# Patient Record
Sex: FEMALE | Race: WHITE | Employment: UNEMPLOYED | URBAN - METROPOLITAN AREA
[De-identification: names, ages, dates, MRNs, and addresses within clinical notes are randomized per-mention and may not be internally consistent; named-entity substitution may affect disease eponyms.]

---

## 2017-01-21 ENCOUNTER — HOSPITAL ENCOUNTER (EMERGENCY)
Age: 52
Discharge: HOME | End: 2017-01-21
Attending: EMERGENCY MEDICINE
Payer: SELF-PAY

## 2017-01-21 VITALS
HEIGHT: 65 IN | RESPIRATION RATE: 18 BRPM | SYSTOLIC BLOOD PRESSURE: 152 MMHG | DIASTOLIC BLOOD PRESSURE: 77 MMHG | TEMPERATURE: 98.5 F | WEIGHT: 223.99 LBS | BODY MASS INDEX: 37.32 KG/M2 | OXYGEN SATURATION: 96 % | HEART RATE: 90 BPM

## 2017-01-21 DIAGNOSIS — M54.2 CHRONIC NECK PAIN: Primary | ICD-10-CM

## 2017-01-21 DIAGNOSIS — G89.29 CHRONIC NECK PAIN: Primary | ICD-10-CM

## 2017-01-21 PROCEDURE — 74011250637 HC RX REV CODE- 250/637: Performed by: PHYSICIAN ASSISTANT

## 2017-01-21 PROCEDURE — 99283 EMERGENCY DEPT VISIT LOW MDM: CPT

## 2017-01-21 RX ORDER — ACETAMINOPHEN 500 MG
1000 TABLET ORAL
COMMUNITY
End: 2018-02-26

## 2017-01-21 RX ORDER — CYCLOBENZAPRINE HCL 5 MG
5 TABLET ORAL
Qty: 15 TAB | Refills: 0 | Status: SHIPPED | OUTPATIENT
Start: 2017-01-21 | End: 2017-03-09

## 2017-01-21 RX ORDER — CYCLOBENZAPRINE HCL 10 MG
5 TABLET ORAL
Status: COMPLETED | OUTPATIENT
Start: 2017-01-21 | End: 2017-01-21

## 2017-01-21 RX ORDER — HYDROCODONE BITARTRATE AND ACETAMINOPHEN 5; 325 MG/1; MG/1
1 TABLET ORAL
Status: COMPLETED | OUTPATIENT
Start: 2017-01-21 | End: 2017-01-21

## 2017-01-21 RX ADMIN — CYCLOBENZAPRINE HYDROCHLORIDE 5 MG: 10 TABLET, FILM COATED ORAL at 18:50

## 2017-01-21 RX ADMIN — HYDROCODONE BITARTRATE AND ACETAMINOPHEN 1 TABLET: 5; 325 TABLET ORAL at 18:50

## 2017-01-21 NOTE — ED PROVIDER NOTES
HPI Comments: Domenica Silva, 46 y.o. Female with PMHx of DM, neuropathy, arthritis, HTN, asthma, and chronic back pain presents ambulatory to ED [facility] ED with cc of acute-on-chronic neck pain x few days. Pt notes this feels like an arthritis flare that she attributes to the recent cold weather. Pt does not have insurance and has been treated in this ED 13 times in the the past year. She is currently applying for Medicaid. Pt last had a neck XR performed here on 11/27/16 after falling and hitting her head which showed no acute fracture, comparable to previous XR in 2015 and denies subsequent falls since. Pt has not fallen or hit her head since November. Her current neck pain is similar to previous neck and back pain visits here. She denies any fevers, chills, nausea, vomiting, diarrhea, abdominal pain, CP, SOB, or dysuria. PCP: none currently due to lack of insurance    Social history significant for: - Tobacco, - EtOH, - Illicit Drug Use  PSHx: tonsillectomy    There are no other complaints, changes, or physical findings at this time. Written by YUAN Mcwilliams, as dictated by Wan Shetty. The history is provided by the patient. No  was used. Past Medical History:   Diagnosis Date    Arthritis     Asthma     Chronic back pain     Diabetes ([address]     Hypertension     MRSA (methicillin resistant staph aureus) culture positive      labial abscess    Neuropathy        Past Surgical History:   Procedure Laterality Date    Hx heent       tonsillectomy    Hx orthopaedic       left ft bunionectomy    Hx other surgical       lanced labial abscess         No family history on file. Social History     Social History    Marital status:      Spouse name: N/A    Number of children: N/A    Years of education: N/A     Occupational History    Not on file.      Social History Main Topics    Smoking status: Never Smoker    Smokeless tobacco: Never Used   Sheron Tucker Alcohol use No    Drug use: No    Sexual activity: Not on file     Other Topics Concern    Not on file     Social History Narrative         ALLERGIES: Aspirin and Ciprofloxacin    Review of Systems   Constitutional: Negative for activity change, chills, fatigue and unexpected weight change. Respiratory: Negative for cough, chest tightness, shortness of breath and wheezing. Cardiovascular: Negative. Negative for chest pain and palpitations. Gastrointestinal: Negative. Negative for abdominal pain, diarrhea, nausea and vomiting. Genitourinary: Negative. Negative for dysuria, flank pain, frequency and hematuria. Musculoskeletal: Positive for neck pain. Negative for arthralgias, back pain and neck stiffness. Skin: Negative. Negative for color change and rash. Neurological: Negative for dizziness, weakness, numbness and headaches. Psychiatric/Behavioral: Negative. Negative for confusion. All other systems reviewed and are negative. Patient Vitals for the past 12 hrs:   Temp Pulse Resp BP SpO2   01/21/17 1913 - 90 - - -   01/21/17 1747 98.5 °F (36.9 °C) (!) 103 18 152/77 96 %         Physical Exam   Constitutional: She is oriented to person, place, and time. She appears well-developed and well-nourished. She is active. Non-toxic appearance. No distress. WF in no acute distress   HENT:   Head: Normocephalic and atraumatic. Eyes: Conjunctivae are normal. Pupils are equal, round, and reactive to light. Right eye exhibits no discharge. Left eye exhibits no discharge. Neck: Normal range of motion and full passive range of motion without pain. Neck supple. No tracheal tenderness present. No midline spinous process tenderness throughout; FROM of neck. No meningismus. Cardiovascular: Normal rate, intact distal pulses and normal pulses. Pulmonary/Chest: Effort normal and breath sounds normal. No respiratory distress. She exhibits no tenderness. Abdominal: She exhibits no distension. Musculoskeletal: Normal range of motion. She exhibits no edema or tenderness. Neurological: She is alert and oriented to person, place, and time. She has normal strength. No cranial nerve deficit or sensory deficit. Coordination normal.   Skin: Skin is warm, dry and intact. No abrasion and no rash noted. She is not diaphoretic. No erythema. Psychiatric: She has a normal mood and affect. Her speech is normal and behavior is normal. Cognition and memory are normal.   Nursing note and vitals reviewed. MDM  Number of Diagnoses or Management Options  Chronic neck pain:   Diagnosis management comments: DDx: chronic pain, muscle spasm, drug seeking behavior       Amount and/or Complexity of Data Reviewed  Review and summarize past medical records: yes    Patient Progress  Patient progress: stable    ED Course       Procedures    MEDICATIONS GIVEN:  Medications   cyclobenzaprine (FLEXERIL) tablet 5 mg (5 mg Oral Given 1/21/17 1850)   HYDROcodone-acetaminophen (NORCO) 5-325 mg per tablet 1 Tab (1 Tab Oral Given 1/21/17 1850)       IMPRESSION:  1. Chronic neck pain        PLAN:  1. Rx Flexeril; advised will not continue to prescribe narcotics from the ED and she should see pain management, PCP or clinic or ortho Spine for further management of her chornic neck pain. She expressed understanding. Most recent imaging done Nov. 2016, no acute changes, discussed results with patient and she denies fall since imaging done. No neuro deficits. Discharge Medication List as of 1/21/2017  6:59 PM      CONTINUE these medications which have CHANGED    Details   cyclobenzaprine (FLEXERIL) 5 mg tablet Take 1 Tab by mouth three (3) times daily as needed for Muscle Spasm(s). , Print, Disp-15 Tab, R-0         CONTINUE these medications which have NOT CHANGED    Details   acetaminophen (TYLENOL EXTRA STRENGTH) 500 mg tablet Take 1,000 mg by mouth every six (6) hours as needed for Pain., Historical Med      insulin lispro (HUMALOG) 100 unit/mL injection Take 32 units with meals, only if BG > 100. Indications: HYPERGLYCEMIA, TYPE 2 DIABETES MELLITUS, Print, Disp-3 Vial, R-3      sitaGLIPtin-metFORMIN (JANUMET) 50-1,000 mg per tablet Take 1 Tab by mouth two (2) times daily (with meals). , Print, Disp-60 Tab, R-0      insulin glargine (LANTUS) 100 unit/mL injection 72 Units by SubCUTAneous route daily. Indications: TYPE 2 DIABETES MELLITUS, Normal, Disp-3 Vial, R-3      glucose blood VI test strips (BLOOD GLUCOSE TEST) strip Check blood sugar 1-2 times daily. , Normal, Disp-1 Each, R-11      fenofibrate nanocrystallized (TRICOR) 145 mg tablet Take 1 Tab by mouth daily. , Normal, Disp-30 Tab, R-3      lisinopril (PRINIVIL, ZESTRIL) 10 mg tablet Take 1 Tab by mouth daily. , Normal, Disp-30 Tab, R-3      glipiZIDE (GLUCOTROL) 5 mg tablet Take 1 Tab by mouth two (2) times a day., Normal, Disp-60 Tab, R-3      traZODone (DESYREL) 50 mg tablet Take 1 Tab by mouth nightly., Normal, Disp-30 Tab, R-3      amitriptyline (ELAVIL) 100 mg tablet Take 1 Tab by mouth nightly., Normal, Disp-30 Tab, R-3      ondansetron (ZOFRAN ODT) 4 mg disintegrating tablet Take 1 Tab by mouth every eight (8) hours as needed for Nausea. , Print, Disp-16 Tab, R-0      HYDROcodone-acetaminophen (NORCO) 5-325 mg per tablet Take 1 Tab by mouth every four (4) hours as needed for Pain. Max Daily Amount: 6 Tabs., Print, Disp-20 Tab, R-0           2. Follow-up Information     Follow up With Details Comments [address], [place] Schedule an appointment as soon as possible for a visit FOR FOLLOW-UP OR WITH A FREE CLINIC FOR MANAGEMENT OF YOUR CHRONIC NECK PAIN. [address] [address]  [phone] [id number] [phone]       Schedule an appointment as soon as possible for a visit          Return to ED if worse     Discharge Note:  7:09 PM  The pt is ready for discharge.  The pt's signs, symptoms, diagnosis, and discharge instructions have been discussed and pt has conveyed their understanding. The pt is to follow up as recommended or return to ER should their symptoms worsen. Plan has been discussed and pt is in agreement. This note is prepared by Cahrlotte Almanza, acting as a Scribe for [employer] [place]. HILLARY Groves: The scribe's documentation has been prepared under my direction and personally reviewed by me in its entirety. I confirm that the notes above accurately reflects all work, treatment, procedures, and medical decision making performed by me.

## 2017-01-21 NOTE — DISCHARGE INSTRUCTIONS
Neck Pain: Care Instructions  Your Care Instructions  You can have neck pain anywhere from the bottom of your head to the top of your shoulders. It can spread to the upper back or arms. Injuries, painting a ceiling, sleeping with your neck twisted, staying in one position for too long, and many other activities can cause neck pain. Most neck pain gets better with home care. Your doctor may recommend medicine to relieve pain or relax your muscles. He or she may suggest exercise and physical therapy to increase flexibility and relieve stress. You may need to wear a special (cervical) collar to support your neck for a day or two. Follow-up care is a key part of your treatment and safety. Be sure to make and go to all appointments, and call your doctor if you are having problems. It's also a good idea to know your test results and keep a list of the medicines you take. How can you care for yourself at home? · Try using a heating pad on a low or medium setting for 15 to 20 minutes every 2 or 3 hours. Try a warm shower in place of one session with the heating pad. · You can also try an ice pack for 10 to 15 minutes every 2 to 3 hours. Put a thin cloth between the ice and your skin. · Take pain medicines exactly as directed. ¨ If the doctor gave you a prescription medicine for pain, take it as prescribed. ¨ If you are not taking a prescription pain medicine, ask your doctor if you can take an over-the-counter medicine. · If your doctor recommends a cervical collar, wear it exactly as directed. When should you call for help? Call your doctor now or seek immediate medical care if:  · You have new or worsening numbness in your arms, buttocks or legs. · You have new or worsening weakness in your arms or legs. (This could make it hard to stand up.)  · You lose control of your bladder or bowels.   Watch closely for changes in your health, and be sure to contact your doctor if:  · Your neck pain is getting worse.  · You are not getting better after 1 week. · You do not get better as expected. Where can you learn more? Go to http://delfina-cony.info/. Enter [id number] in the search box to learn more about \"Neck Pain: Care Instructions. \"  Current as of: May 23, 2016  Content Version: 11.1  © 8794-8858 [employer]. Care instructions adapted under license by [employer] (which disclaims liability or warranty for this information). If you have questions about a medical condition or this instruction, always ask your healthcare professional. [place] [address] any warranty or liability for your use of this information. Chronic Pain: Care Instructions  Your Care Instructions  Chronic pain is pain that lasts a long time (months or even years) and may or may not have a clear cause. It is different from acute pain, which usually does have a clear cause--like an injury or illness--and gets better over time. Chronic pain:  · Lasts over time but may vary from day to day. · Does not go away despite efforts to end it. · May disrupt your sleep and lead to fatigue. · May cause depression or anxiety. · May make your muscles tense, causing more pain. · Can disrupt your work, hobbies, home life, and relationships with friends and family. Chronic pain is a very real condition. It is not just in your head. Treatment can help and usually includes several methods used together, such as medicines, physical therapy, exercise, and other treatments. Learning how to relax and changing negative thought patterns can also help you cope. Chronic pain is complex. Taking an active role in your treatment will help you better manage your pain. Tell your doctor if you have trouble dealing with your pain. You may have to try several things before you find what works best for you. Follow-up care is a key part of your treatment and safety.  Be sure to make and go to all appointments, and call your doctor if you are having problems. Its also a good idea to know your test results and keep a list of the medicines you take. How can you care for yourself at home? · Pace yourself. Break up large jobs into smaller tasks. Save harder tasks for days when you have less pain, or go back and forth between hard tasks and easier ones. Take rest breaks. · Relax, and reduce stress. Relaxation techniques such as deep breathing or meditation can help. · Keep moving. Gentle, daily exercise can help reduce pain over the long run. Try low- or no-impact exercises such as walking, swimming, and stationary biking. Do stretches to stay flexible. · Try heat, cold packs, and massage. · Get enough sleep. Chronic pain can make you tired and drain your energy. Talk with your doctor if you have trouble sleeping because of pain. · Think positive. Your thoughts can affect your pain level. Do things that you enjoy to distract yourself when you have pain instead of focusing on the pain. See a movie, read a book, listen to music, or spend time with a friend. · If you think you are depressed, talk to your doctor about treatment. · Keep a daily pain diary. Record how your moods, thoughts, sleep patterns, activities, and medicine affect your pain. You may find that your pain is worse during or after certain activities or when you are feeling a certain emotion. Having a record of your pain can help you and your doctor find the best ways to treat your pain. · Take pain medicines exactly as directed. ¨ If the doctor gave you a prescription medicine for pain, take it as prescribed. ¨ If you are not taking a prescription pain medicine, ask your doctor if you can take an over-the-counter medicine. Reducing constipation caused by pain medicine  · Include fruits, vegetables, beans, and whole grains in your diet each day. These foods are high in fiber.   · Drink plenty of fluids, enough so that your urine is light yellow or clear like water. If you have kidney, heart, or liver disease and have to limit fluids, talk with your doctor before you increase the amount of fluids you drink. · If your doctor recommends it, get more exercise. Walking is a good choice. Bit by bit, increase the amount you walk every day. Try for at least 30 minutes on most days of the week. · Schedule time each day for a bowel movement. A daily routine may help. Take your time and do not strain when having a bowel movement. When should you call for help? Call your doctor now or seek immediate medical care if:  · Your pain gets worse or is out of control. · You feel down or blue, or you do not enjoy things like you once did. You may be depressed, which is common in people with chronic pain. Depression can be treated. · You have vomiting or cramps for more than 2 hours. Watch closely for changes in your health, and be sure to contact your doctor if:  · You cannot sleep because of pain. · You are very worried or anxious about your pain. · You have trouble taking your pain medicine. · You have any concerns about your pain medicine. · You have trouble with bowel movements, such as:  ¨ No bowel movement in 3 days. ¨ Blood in the anal area, in your stool, or on the toilet paper. ¨ Diarrhea for more than 24 hours. Where can you learn more? Go to http://delfina-cony.info/. Enter N004 in the search box to learn more about \"Chronic Pain: Care Instructions. \"  Current as of: February 19, 2016  Content Version: 11.1  © 7617-7993 [employer]. Care instructions adapted under license by [employer] (which disclaims liability or warranty for this information). If you have questions about a medical condition or this instruction, always ask your healthcare professional. [place] [address] any warranty or liability for your use of this information.     [facility] Departments     For adult and child immunizations, family planning, TB screening, STD testing and women's health services. [facility] AND [facility] CENTER: [place] [phone]      [facility] D 25   [address]   [address]   170 [facility]: Blanca Rouse [address] [phone]      [address]          [address] [address]     For primary care services, woman and child wellness, and some clinics providing specialty care. [facility] -- [address] [facility] 2525 [facility] [phone]   411 [facility] 200 [facility] 3614 [facility] [phone]   339 [facility] [address] [place] [address] [phone] [address]  [employer] [address] [place] [address] 5850  Community  [phone]   [address] [place] [address] [address] [phone]   [facility] 81 [facility] [phone]   [facility] [address] [place] [address] [phone]   [facility]: [facility] [address], ext [address] [facility], [address] [phone]     [place] [address] [facility] [address] [phone]   [facility] 5850 [facility]  [phone]   [employer] [facility]  [address] ([email or web address]) [phone]         Sexual Health/Woman Wellness Clinics    For STD/HIV testing and treatment, pregnancy testing and services, men's health, birth control services, LGBT services, and hepatitis/HPV vaccine services. [employer] for [place] [employer] [address]. [facility] [address] [facility] [address] [facility] 15[address] [phone]   [facility] [address], 5th floor [phone]   Pregnancy [address] [facility] [address] Blood Pressure Center [phone]   [facility] -- [address] Luanne [address]   [phone]   [place]   [phone]   Women, Infant and Children's Services: [address] [phone]       600 [facility]   [phone]   [address]   [facility]     [phone]   [address] Crisis   [address] [phone]     Local Primary Care Physicians  [facility] [phone]  MD Saira Quigley MD Bernice Duval, MD [facility] Community Doctors [phone]  Monique Danielle, P  Staci Cuellar, MD Fady Zheng MD   908 [facility] [phone]  MD Aura Pineda MD [address] [facility] [phone]  MD Sam Cid MD Kirtland Faith, MD Fredderick Pion, MD   [facility] [phone]  [facility] ZXRNYV ALEXANDRA, MD Emma Hassan, MD Mendez [facility], NP [address] [phone]  Kai Resendez, MD Wesley Granda, MD Yesika Nelson MD Vertis Board, MD Yesenia Puri MD   1739 [facility] [phone]  Jad Lopez MD [address] [facility] [phone]  MD Essence Man, NP  Jenn Dumont, MD Lori Mcrae MD Dionicia Lights, MD Padmini Mata MD   24[address] [facility] [phone]  Nghia De León, MD Lee Mason, P  Haritha Cano, AIMEE De Souza, MD Mariella Perez MD Tonye Messing, MD [facility] [phone]  [facility] Dorothea, MD Jennifer Guerra, MD Gustavo Elias MD Manning Rue, MD   [address] [phone]  MD Micaela Gofranklin Morel, [address] [facility] [phone]  MD Mlaachi Quintanilla MD Adella Ina, [address] [place] [address] [phone]  Mitch Crowe Phi Jimenez, MD Danyelle Reno, MD Zehra Suárez, MD Julian Roldan, MD Veronica Ruiz, MD Miranda Orozco, NP  Gaby Caputo MD 1619 [facility]   [phone]  Roberto Melissa, MD Juan Jamison, MD Hannah Ballard MD   [address] [phone]  [address] [place], [place] [address]MD Simone, FNP  Nash Cavazos, HILLARY Cavazos, JENNIFER Hamilton, HILLARY Baron, MD Yvette Scott, AIMEE Fang, DO Miscellaneous:  Evi Stewart [phone]

## 2017-01-22 NOTE — ED NOTES
PA has provided verbal and written d/c instructions. All questions answered.  Pt ambulatory from ED in stable condition with papers in hand

## 2017-03-06 ENCOUNTER — HOSPITAL ENCOUNTER (EMERGENCY)
Age: 52
Discharge: HOME | End: 2017-03-06
Attending: EMERGENCY MEDICINE
Payer: SELF-PAY

## 2017-03-06 VITALS
RESPIRATION RATE: 16 BRPM | WEIGHT: 219.8 LBS | DIASTOLIC BLOOD PRESSURE: 79 MMHG | BODY MASS INDEX: 36.62 KG/M2 | HEIGHT: 65 IN | OXYGEN SATURATION: 98 % | TEMPERATURE: 98.1 F | HEART RATE: 104 BPM | SYSTOLIC BLOOD PRESSURE: 131 MMHG

## 2017-03-06 PROCEDURE — 75810000275 HC EMERGENCY DEPT VISIT NO LEVEL OF CARE

## 2017-03-07 NOTE — ED NOTES
1st attempt to call patient with no answer. Dr. Agudelo Media to see patient in triage but no answer.

## 2017-03-09 ENCOUNTER — HOSPITAL ENCOUNTER (EMERGENCY)
Age: 52
Discharge: HOME | End: 2017-03-10
Attending: EMERGENCY MEDICINE
Payer: SELF-PAY

## 2017-03-09 ENCOUNTER — APPOINTMENT (OUTPATIENT)
Dept: CT IMAGING | Age: 52
End: 2017-03-09
Attending: EMERGENCY MEDICINE
Payer: SELF-PAY

## 2017-03-09 VITALS
TEMPERATURE: 98.2 F | RESPIRATION RATE: 16 BRPM | WEIGHT: 221.38 LBS | BODY MASS INDEX: 35.58 KG/M2 | SYSTOLIC BLOOD PRESSURE: 148 MMHG | HEART RATE: 100 BPM | DIASTOLIC BLOOD PRESSURE: 87 MMHG | OXYGEN SATURATION: 95 % | HEIGHT: 66 IN

## 2017-03-09 DIAGNOSIS — N30.01 ACUTE CYSTITIS WITH HEMATURIA: Primary | ICD-10-CM

## 2017-03-09 LAB
ALBUMIN SERPL BCP-MCNC: 3.5 G/DL (ref 3.5–5)
ALBUMIN/GLOB SERPL: 0.8 {RATIO} (ref 1.1–2.2)
ALP SERPL-CCNC: 166 U/L (ref 45–117)
ALT SERPL-CCNC: 35 U/L (ref 12–78)
ANION GAP BLD CALC-SCNC: 10 MMOL/L (ref 5–15)
APPEARANCE UR: ABNORMAL
AST SERPL W P-5'-P-CCNC: 20 U/L (ref 15–37)
BACTERIA URNS QL MICRO: ABNORMAL /HPF
BASOPHILS # BLD AUTO: 0 K/UL (ref 0–0.1)
BASOPHILS # BLD: 0 % (ref 0–1)
BILIRUB SERPL-MCNC: 0.5 MG/DL (ref 0.2–1)
BILIRUB UR QL: NEGATIVE
BUN SERPL-MCNC: 18 MG/DL (ref 6–20)
BUN/CREAT SERPL: 18 (ref 12–20)
CALCIUM SERPL-MCNC: 9.3 MG/DL (ref 8.5–10.1)
CHLORIDE SERPL-SCNC: 93 MMOL/L (ref 97–108)
CO2 SERPL-SCNC: 25 MMOL/L (ref 21–32)
COLOR UR: ABNORMAL
CREAT SERPL-MCNC: 0.98 MG/DL (ref 0.55–1.02)
EOSINOPHIL # BLD: 0.1 K/UL (ref 0–0.4)
EOSINOPHIL NFR BLD: 2 % (ref 0–7)
EPITH CASTS URNS QL MICRO: ABNORMAL /LPF
ERYTHROCYTE [DISTWIDTH] IN BLOOD BY AUTOMATED COUNT: 12.8 % (ref 11.5–14.5)
GLOBULIN SER CALC-MCNC: 4.2 G/DL (ref 2–4)
GLUCOSE SERPL-MCNC: 469 MG/DL (ref 65–100)
GLUCOSE UR STRIP.AUTO-MCNC: >1000 MG/DL
HCT VFR BLD AUTO: 37.1 % (ref 35–47)
HGB BLD-MCNC: 13.3 G/DL (ref 11.5–16)
HGB UR QL STRIP: ABNORMAL
KETONES UR QL STRIP.AUTO: 15 MG/DL
LEUKOCYTE ESTERASE UR QL STRIP.AUTO: ABNORMAL
LYMPHOCYTES # BLD AUTO: 16 % (ref 12–49)
LYMPHOCYTES # BLD: 1.1 K/UL (ref 0.8–3.5)
MCH RBC QN AUTO: 30.5 PG (ref 26–34)
MCHC RBC AUTO-ENTMCNC: 35.8 G/DL (ref 30–36.5)
MCV RBC AUTO: 85.1 FL (ref 80–99)
MONOCYTES # BLD: 0.5 K/UL (ref 0–1)
MONOCYTES NFR BLD AUTO: 7 % (ref 5–13)
NEUTS SEG # BLD: 5.1 K/UL (ref 1.8–8)
NEUTS SEG NFR BLD AUTO: 75 % (ref 32–75)
NITRITE UR QL STRIP.AUTO: NEGATIVE
PH UR STRIP: 6 [PH] (ref 5–8)
PLATELET # BLD AUTO: 185 K/UL (ref 150–400)
POTASSIUM SERPL-SCNC: 4 MMOL/L (ref 3.5–5.1)
PROT SERPL-MCNC: 7.7 G/DL (ref 6.4–8.2)
PROT UR STRIP-MCNC: 100 MG/DL
RBC # BLD AUTO: 4.36 M/UL (ref 3.8–5.2)
RBC #/AREA URNS HPF: ABNORMAL /HPF (ref 0–5)
SODIUM SERPL-SCNC: 128 MMOL/L (ref 136–145)
SP GR UR REFRACTOMETRY: 1.03 (ref 1–1.03)
UROBILINOGEN UR QL STRIP.AUTO: 0.2 EU/DL (ref 0.2–1)
WBC # BLD AUTO: 6.8 K/UL (ref 3.6–11)
WBC URNS QL MICRO: >100 /HPF (ref 0–4)
YEAST BUDDING URNS QL: PRESENT

## 2017-03-09 PROCEDURE — 74011636320 HC RX REV CODE- 636/320: Performed by: EMERGENCY MEDICINE

## 2017-03-09 PROCEDURE — 36415 COLL VENOUS BLD VENIPUNCTURE: CPT | Performed by: EMERGENCY MEDICINE

## 2017-03-09 PROCEDURE — 87186 SC STD MICRODIL/AGAR DIL: CPT | Performed by: EMERGENCY MEDICINE

## 2017-03-09 PROCEDURE — 87077 CULTURE AEROBIC IDENTIFY: CPT | Performed by: EMERGENCY MEDICINE

## 2017-03-09 PROCEDURE — 85025 COMPLETE CBC W/AUTO DIFF WBC: CPT | Performed by: EMERGENCY MEDICINE

## 2017-03-09 PROCEDURE — 74011000258 HC RX REV CODE- 258: Performed by: EMERGENCY MEDICINE

## 2017-03-09 PROCEDURE — 80053 COMPREHEN METABOLIC PANEL: CPT | Performed by: EMERGENCY MEDICINE

## 2017-03-09 PROCEDURE — 99283 EMERGENCY DEPT VISIT LOW MDM: CPT

## 2017-03-09 PROCEDURE — 87086 URINE CULTURE/COLONY COUNT: CPT | Performed by: EMERGENCY MEDICINE

## 2017-03-09 PROCEDURE — 96365 THER/PROPH/DIAG IV INF INIT: CPT

## 2017-03-09 PROCEDURE — 74177 CT ABD & PELVIS W/CONTRAST: CPT

## 2017-03-09 PROCEDURE — 81001 URINALYSIS AUTO W/SCOPE: CPT | Performed by: EMERGENCY MEDICINE

## 2017-03-09 PROCEDURE — 74011250636 HC RX REV CODE- 250/636: Performed by: EMERGENCY MEDICINE

## 2017-03-09 PROCEDURE — 96375 TX/PRO/DX INJ NEW DRUG ADDON: CPT

## 2017-03-09 RX ORDER — MORPHINE SULFATE 2 MG/ML
6 INJECTION, SOLUTION INTRAMUSCULAR; INTRAVENOUS
Status: COMPLETED | OUTPATIENT
Start: 2017-03-09 | End: 2017-03-09

## 2017-03-09 RX ORDER — SODIUM CHLORIDE 0.9 % (FLUSH) 0.9 %
10 SYRINGE (ML) INJECTION
Status: COMPLETED | OUTPATIENT
Start: 2017-03-09 | End: 2017-03-09

## 2017-03-09 RX ORDER — SODIUM CHLORIDE 9 MG/ML
1000 INJECTION, SOLUTION INTRAVENOUS ONCE
Status: COMPLETED | OUTPATIENT
Start: 2017-03-09 | End: 2017-03-09

## 2017-03-09 RX ORDER — HYDROCODONE BITARTRATE AND ACETAMINOPHEN 5; 325 MG/1; MG/1
1 TABLET ORAL
Qty: 12 TAB | Refills: 0 | Status: SHIPPED | OUTPATIENT
Start: 2017-03-09 | End: 2017-04-09

## 2017-03-09 RX ORDER — CEFDINIR 300 MG/1
300 CAPSULE ORAL 2 TIMES DAILY
Qty: 20 CAP | Refills: 0 | Status: SHIPPED | OUTPATIENT
Start: 2017-03-09 | End: 2017-03-19

## 2017-03-09 RX ADMIN — CEFTRIAXONE 1 G: 1 INJECTION, POWDER, FOR SOLUTION INTRAMUSCULAR; INTRAVENOUS at 22:24

## 2017-03-09 RX ADMIN — SODIUM CHLORIDE 100 ML: 900 INJECTION, SOLUTION INTRAVENOUS at 21:48

## 2017-03-09 RX ADMIN — Medication 10 ML: at 21:48

## 2017-03-09 RX ADMIN — Medication 6 MG: at 20:55

## 2017-03-09 RX ADMIN — IOPAMIDOL 100 ML: 755 INJECTION, SOLUTION INTRAVENOUS at 21:48

## 2017-03-09 RX ADMIN — SODIUM CHLORIDE 1000 ML: 900 INJECTION, SOLUTION INTRAVENOUS at 20:55

## 2017-03-09 NOTE — ED TRIAGE NOTES
Left lower abd pain since today, denies fever, denies n/v, +constipation, last bm Monday night, pt stated she does not feel good

## 2017-03-10 RX ORDER — FLUCONAZOLE 150 MG/1
150 TABLET ORAL ONCE
Qty: 1 TAB | Refills: 0 | Status: SHIPPED | OUTPATIENT
Start: 2017-03-10 | End: 2017-03-10

## 2017-03-10 NOTE — ED PROVIDER NOTES
HPI Comments: 72-year-old female presents to the emergency department for evaluation of left lower quadrant pain. Pain started this morning and constant. The pain is described as a spasm. Pain now radiates into the back. No associated nausea or vomiting. No associated diarrhea or constipation. No known dysuria frequency or urgency. No known blood in the urine. No cough congestion runny nose or sore throat. No fever. He has not taken anything for the pain. No alleviating factors. No known precipitating event. Pain 9/10    Social hx  Nonsmoker  No alcohol      Patient is a 46 y.o. female presenting with abdominal pain. The history is provided by the patient. Abdominal Pain    Pertinent negatives include no fever, no diarrhea, no nausea, no vomiting, no dysuria, no frequency, no headaches, no arthralgias, no myalgias and no chest pain. Past Medical History:   Diagnosis Date    Arthritis     Asthma     Chronic back pain     [facility] [address]     Diabetic retinopathy ([facility] [address]     Hypertension     MRSA (methicillin resistant staph aureus) culture positive     labial abscess    Neuropathy        Past Surgical History:   Procedure Laterality Date    HX HEENT      tonsillectomy    HX ORTHOPAEDIC      left ft bunionectomy    HX OTHER SURGICAL      lanced labial abscess         History reviewed. No pertinent family history. Social History     Social History    Marital status:      Spouse name: N/A    Number of children: N/A    Years of education: N/A     Occupational History    Not on file. Social History Main Topics    Smoking status: Never Smoker    Smokeless tobacco: Never Used    Alcohol use No    Drug use: No    Sexual activity: Not on file     Other Topics Concern    Not on file     Social History Narrative         ALLERGIES: Aspirin and Ciprofloxacin    Review of Systems   Constitutional: Negative for chills and fever. HENT: Negative for congestion and sore throat. Respiratory: Negative for cough and shortness of breath. Cardiovascular: Negative for chest pain and palpitations. Gastrointestinal: Positive for abdominal pain. Negative for blood in stool, diarrhea, nausea and vomiting. Genitourinary: Negative for dysuria, flank pain, frequency and urgency. Musculoskeletal: Negative for arthralgias, myalgias, neck pain and neck stiffness. Skin: Negative for rash and wound. Neurological: Negative for dizziness, numbness and headaches. All other systems reviewed and are negative. Vitals:    03/09/17 1813   BP: (!) 171/103   Pulse: (!) 106   Resp: 20   Temp: 98.2 °F (36.8 °C)   SpO2: 96%   Weight: 100.4 kg (221 lb 6 oz)   Height: 5' 5.5\" (1.664 m)            Physical Exam   Constitutional: She is oriented to person, place, and time. She appears well-developed and well-nourished. No distress. HENT:   Head: Normocephalic and atraumatic. Right Ear: External ear normal.   Left Ear: External ear normal.   Eyes: Conjunctivae and EOM are normal. Pupils are equal, round, and reactive to light. Neck: Normal range of motion. Neck supple. Cardiovascular: Normal rate, regular rhythm and normal heart sounds. Pulmonary/Chest: Effort normal and breath sounds normal. No respiratory distress. She has no wheezes. Abdominal: Soft. Normal appearance and bowel sounds are normal. She exhibits no shifting dullness, no distension, no pulsatile liver, no abdominal bruit, no pulsatile midline mass and no mass. There is no hepatosplenomegaly, splenomegaly or hepatomegaly. There is tenderness. There is no rigidity, no rebound, no guarding, no CVA tenderness, no tenderness at McBurney's point and negative Liao's sign. Abdomen exposed for exam.  Soft, no peritoneal signs   Tender mild llq. Musculoskeletal: Normal range of motion. She exhibits no edema or tenderness. Neurological: She is alert and oriented to person, place, and time. She has normal reflexes.  No cranial nerve deficit. Coordination normal.   Skin: Skin is warm and dry. No rash noted. No erythema. Psychiatric: She has a normal mood and affect. Her behavior is normal. Judgment and thought content normal.   Nursing note and vitals reviewed. MDM  Number of Diagnoses or Management Options  Acute cystitis with hematuria:   Diagnosis management comments: 80-year-old female presenting for left lower quadrant pain. Abdomen is soft no peritoneal signs on exam. She is tender in left lower quadrant. She is afebrile and nontoxic-appearing. Diverticulitis versus UTI versus kidney stone versus colitis versus constipation versus ovarian pathology versus musculoskeletal  Plan: CT, labs and urinalysis, intravenous fluid pain medication    11:39 PM  Ct unremarkable. UA consistent with uti. Patient is well hydrated, well appearing, and in no respiratory distress. Physical exam is reassuring, and without signs of serious illness. Pt has received dose of rocephin. Will discharge patient home with omnicef supportive care, and follow-up with PCP within the next few days. Standard narcotic and sedating medication warnings given  Patient's results have been reviewed with them. Patient and/or family have verbally conveyed their understanding and agreement of the patient's signs, symptoms, diagnosis, treatment and prognosis and additionally agree to follow up as recommended or return to the Emergency Room should their condition change prior to follow-up. Discharge instructions have also been provided to the patient with some educational information regarding their diagnosis as well a list of reasons why they would want to return to the ER prior to their follow-up appointment should their condition change.                Amount and/or Complexity of Data Reviewed  Discuss the patient with other providers: yes (ER attending-Renny)    Patient Progress  Patient progress: stable    ED Course       Procedures         Pt case including HPI, PE, and all available lab and radiology results has been discussed with attending physician. Opportunity to evaluate patient has been provided to ER attending. Discharge and prescription plan has been agreed upon.

## 2017-03-10 NOTE — DISCHARGE INSTRUCTIONS
We hope that we have addressed all of your medical concerns. The examination and treatment you received in the Emergency Department were for an emergent problem and were not intended as complete care. It is important that you follow up with your healthcare provider(s) for ongoing care. If your symptoms worsen or do not improve as expected, and you are unable to reach your usual health care provider(s), you should return to the Emergency Department. Today's healthcare is undergoing tremendous change, and patient satisfaction surveys are one of the many tools to assess the quality of medical care. You may receive a survey from the [employer] organization regarding your experience in the Emergency Department. I hope that your experience has been completely positive, particularly the medical care that I provided. As such, please participate in the survey; anything less than excellent does not meet my expectations or intentions. Count includes the [facility] and [facility] participate in nationally recognized quality of care measures. If your blood pressure is greater than 120/80, as reported below, we urge that you seek medical care to address the potential of high blood pressure, commonly known as hypertension. Hypertension can be hereditary or can be caused by certain medical conditions, pain, stress, or \"white coat syndrome. \"       Please make an appointment with your health care provider(s) for follow up of your Emergency Department visit. VITALS:   Patient Vitals for the past 8 hrs:   Temp Pulse Resp BP SpO2   03/09/17 2227 - 100 16 148/87 95 %   03/09/17 1813 98.2 °F (36.8 °C) (!) 106 20 (!) 171/103 96 %          Thank you for allowing us to provide you with medical care today. We realize that you have many choices for your emergency care needs. Please choose us in the future for any continued health care needs. Andrew Villarreal, [address] [place] [address].   Office: [phone]            Recent Results (from the past 24 hour(s))   CBC WITH AUTOMATED DIFF    Collection Time: 03/09/17  6:18 PM   Result Value Ref Range    WBC 6.8 3.6 - 11.0 K/uL    RBC 4.36 3.80 - 5.20 M/uL    HGB 13.3 11.5 - 16.0 g/dL    HCT 37.1 35.0 - 47.0 %    MCV 85.1 80.0 - 99.0 FL    MCH 30.5 26.0 - 34.0 PG    MCHC 35.8 30.0 - 36.5 g/dL    RDW 12.8 11.5 - 14.5 %    PLATELET 671 717 - 547 K/uL    NEUTROPHILS 75 32 - 75 %    LYMPHOCYTES 16 12 - 49 %    MONOCYTES 7 5 - 13 %    EOSINOPHILS 2 0 - 7 %    BASOPHILS 0 0 - 1 %    ABS. NEUTROPHILS 5.1 1.8 - 8.0 K/UL    ABS. LYMPHOCYTES 1.1 0.8 - 3.5 K/UL    ABS. MONOCYTES 0.5 0.0 - 1.0 K/UL    ABS. EOSINOPHILS 0.1 0.0 - 0.4 K/UL    ABS. BASOPHILS 0.0 0.0 - 0.1 K/UL   METABOLIC PANEL, COMPREHENSIVE    Collection Time: 03/09/17  6:18 PM   Result Value Ref Range    Sodium 128 (L) 136 - 145 mmol/L    Potassium 4.0 3.5 - 5.1 mmol/L    Chloride 93 (L) 97 - 108 mmol/L    CO2 25 21 - 32 mmol/L    Anion gap 10 5 - 15 mmol/L    Glucose 469 (H) 65 - 100 mg/dL    BUN 18 6 - 20 MG/DL    Creatinine 0.98 0.55 - 1.02 MG/DL    BUN/Creatinine ratio 18 12 - 20      GFR est AA >60 >60 ml/min/1.73m2    GFR est non-AA 60 (L) >60 ml/min/1.73m2    Calcium 9.3 8.5 - 10.1 MG/DL    Bilirubin, total 0.5 0.2 - 1.0 MG/DL    ALT (SGPT) 35 12 - 78 U/L    AST (SGOT) 20 15 - 37 U/L    Alk.  phosphatase 166 (H) 45 - 117 U/L    Protein, total 7.7 6.4 - 8.2 g/dL    Albumin 3.5 3.5 - 5.0 g/dL    Globulin 4.2 (H) 2.0 - 4.0 g/dL    A-G Ratio 0.8 (L) 1.1 - 2.2     URINALYSIS W/MICROSCOPIC    Collection Time: 03/09/17  8:20 PM   Result Value Ref Range    Color YELLOW/STRAW      Appearance CLOUDY (A) CLEAR      Specific gravity 1.026 1.003 - 1.030      pH (UA) 6.0 5.0 - 8.0      Protein 100 (A) NEG mg/dL    Glucose >1000 (A) NEG mg/dL    Ketone 15 (A) NEG mg/dL    Bilirubin NEGATIVE  NEG      Blood LARGE (A) NEG      Urobilinogen 0.2 0.2 - 1.0 EU/dL Nitrites NEGATIVE  NEG      Leukocyte Esterase MODERATE (A) NEG      WBC >100 (H) 0 - 4 /hpf    RBC 0-5 0 - 5 /hpf    Epithelial cells FEW FEW /lpf    Bacteria 4+ (A) NEG /hpf    Budding Yeast PRESENT (A) NEG         Ct Abd Pelv W Cont    Result Date: 3/9/2017  Clinical history: Left lower quadrant pain INDICATION: Left lower quadrant pain COMPARISON: 8/9/2013 TECHNIQUE: Following the uneventful intravenous administration of 100 cc Isovue-370, thin axial images were obtained through the abdomen and pelvis. Coronal and sagittal reconstructions were generated. Oral contrast was not administered. CT dose reduction was achieved through use of a standardized protocol tailored for this examination and automatic exposure control for dose modulation. FINDINGS: The lung bases are clear. The heart size is normal. There is mild diffuse hepatic steatosis. . The spleen demonstrates no abnormality. The pancreas has a normal appearance. The gallbladder demonstrates no evidence of wall thickening or cholelithiasis. The kidneys are normal in size and demonstrate no evidence of mass or hydronephrosis. There is a 2 mm nonobstructing right renal calculus. The adrenal glands are normal. There is a 2.0 x 2.7 cm previously 3.5 x 2.3 cm right common iliac chain lymph node. . No intraperitoneal free air or free fluid is seen. The stomach, small bowel, and colon are unremarkable. The appendix is normal. Imaging of the pelvis demonstrates a normal uterus and ovaries. Air within the urinary bladder. Correlate for recent instrumentation. No pelvic mass or free fluid is seen. Review of the bone windows demonstrates no evidence of destructive bone lesion. IMPRESSION: Mild diffuse hepatic steatosis. Diminished size of right common iliac lymph node, diminished in size since 8/9/2013.               Urinary Tract Infection in Women: Care Instructions  Your Care Instructions    A urinary tract infection, or UTI, is a general term for an infection anywhere between the kidneys and the urethra (where urine comes out). Most UTIs are bladder infections. They often cause pain or burning when you urinate. UTIs are caused by bacteria and can be cured with antibiotics. Be sure to complete your treatment so that the infection goes away. Follow-up care is a key part of your treatment and safety. Be sure to make and go to all appointments, and call your doctor if you are having problems. It's also a good idea to know your test results and keep a list of the medicines you take. How can you care for yourself at home? · Take your antibiotics as directed. Do not stop taking them just because you feel better. You need to take the full course of antibiotics. · Drink extra water and other fluids for the next day or two. This may help wash out the bacteria that are causing the infection. (If you have kidney, heart, or liver disease and have to limit fluids, talk with your doctor before you increase your fluid intake.)  · Avoid drinks that are carbonated or have caffeine. They can irritate the bladder. · Urinate often. Try to empty your bladder each time. · To relieve pain, take a hot bath or lay a heating pad set on low over your lower belly or genital area. Never go to sleep with a heating pad in place. To prevent UTIs  · Drink plenty of water each day. This helps you urinate often, which clears bacteria from your system. (If you have kidney, heart, or liver disease and have to limit fluids, talk with your doctor before you increase your fluid intake.)  · Consider adding cranberry juice to your diet. · Urinate when you need to. · Urinate right after you have sex. · Change sanitary pads often. · Avoid douches, bubble baths, feminine hygiene sprays, and other feminine hygiene products that have deodorants. · After going to the bathroom, wipe from front to back. When should you call for help?   Call your doctor now or seek immediate medical care if:  · Symptoms such as fever, chills, nausea, or vomiting get worse or appear for the first time. · You have new pain in your back just below your rib cage. This is called flank pain. · There is new blood or pus in your urine. · You have any problems with your antibiotic medicine. Watch closely for changes in your health, and be sure to contact your doctor if:  · You are not getting better after taking an antibiotic for 2 days. · Your symptoms go away but then come back. Where can you learn more? Go to http://delfina-cony.info/. Enter J638 in the search box to learn more about \"Urinary Tract Infection in Women: Care Instructions. \"  Current as of: August 12, 2016  Content Version: 11.1  © 3833-9176 [employer], [employer]. Care instructions adapted under license by [employer] (which disclaims liability or warranty for this information). If you have questions about a medical condition or this instruction, always ask your healthcare professional. [place] [address] any warranty or liability for your use of this information.

## 2017-03-11 LAB
BACTERIA SPEC CULT: ABNORMAL
CC UR VC: ABNORMAL
SERVICE CMNT-IMP: ABNORMAL

## 2017-04-08 ENCOUNTER — HOSPITAL ENCOUNTER (EMERGENCY)
Age: 52
Discharge: HOME | End: 2017-04-09
Attending: EMERGENCY MEDICINE
Payer: SELF-PAY

## 2017-04-08 VITALS
RESPIRATION RATE: 16 BRPM | TEMPERATURE: 98.2 F | OXYGEN SATURATION: 100 % | DIASTOLIC BLOOD PRESSURE: 82 MMHG | HEIGHT: 66 IN | SYSTOLIC BLOOD PRESSURE: 150 MMHG | BODY MASS INDEX: 34.26 KG/M2 | WEIGHT: 213.19 LBS | HEART RATE: 88 BPM

## 2017-04-08 DIAGNOSIS — R73.9 HYPERGLYCEMIA: ICD-10-CM

## 2017-04-08 DIAGNOSIS — N30.00 ACUTE CYSTITIS WITHOUT HEMATURIA: Primary | ICD-10-CM

## 2017-04-08 LAB
ALBUMIN SERPL BCP-MCNC: 3.5 G/DL (ref 3.5–5)
ALBUMIN/GLOB SERPL: 0.7 {RATIO} (ref 1.1–2.2)
ALP SERPL-CCNC: 113 U/L (ref 45–117)
ALT SERPL-CCNC: 28 U/L (ref 12–78)
ANION GAP BLD CALC-SCNC: 9 MMOL/L (ref 5–15)
APPEARANCE UR: ABNORMAL
AST SERPL W P-5'-P-CCNC: 15 U/L (ref 15–37)
BACTERIA URNS QL MICRO: ABNORMAL /HPF
BASOPHILS # BLD AUTO: 0 K/UL (ref 0–0.1)
BASOPHILS # BLD: 0 % (ref 0–1)
BILIRUB SERPL-MCNC: 0.8 MG/DL (ref 0.2–1)
BILIRUB UR QL: NEGATIVE
BUN SERPL-MCNC: 11 MG/DL (ref 6–20)
BUN/CREAT SERPL: 11 (ref 12–20)
CALCIUM SERPL-MCNC: 8.9 MG/DL (ref 8.5–10.1)
CHLORIDE SERPL-SCNC: 92 MMOL/L (ref 97–108)
CO2 SERPL-SCNC: 29 MMOL/L (ref 21–32)
COLOR UR: ABNORMAL
CREAT SERPL-MCNC: 1.04 MG/DL (ref 0.55–1.02)
DIFFERENTIAL METHOD BLD: ABNORMAL
EOSINOPHIL # BLD: 0.1 K/UL (ref 0–0.4)
EOSINOPHIL NFR BLD: 1 % (ref 0–7)
EPITH CASTS URNS QL MICRO: ABNORMAL /LPF
ERYTHROCYTE [DISTWIDTH] IN BLOOD BY AUTOMATED COUNT: 13.3 % (ref 11.5–14.5)
GLOBULIN SER CALC-MCNC: 4.9 G/DL (ref 2–4)
GLUCOSE BLD STRIP.AUTO-MCNC: 392 MG/DL (ref 65–100)
GLUCOSE BLD STRIP.AUTO-MCNC: 456 MG/DL (ref 65–100)
GLUCOSE BLD STRIP.AUTO-MCNC: 561 MG/DL (ref 65–100)
GLUCOSE SERPL-MCNC: 539 MG/DL (ref 65–100)
GLUCOSE UR STRIP.AUTO-MCNC: >1000 MG/DL
HCT VFR BLD AUTO: 39.3 % (ref 35–47)
HGB BLD-MCNC: 13.9 G/DL (ref 11.5–16)
HGB UR QL STRIP: ABNORMAL
HYALINE CASTS URNS QL MICRO: ABNORMAL /LPF (ref 0–5)
KETONES UR QL STRIP.AUTO: ABNORMAL MG/DL
LEUKOCYTE ESTERASE UR QL STRIP.AUTO: ABNORMAL
LYMPHOCYTES # BLD AUTO: 12 % (ref 12–49)
LYMPHOCYTES # BLD: 0.8 K/UL (ref 0.8–3.5)
MCH RBC QN AUTO: 31.2 PG (ref 26–34)
MCHC RBC AUTO-ENTMCNC: 35.4 G/DL (ref 30–36.5)
MCV RBC AUTO: 88.1 FL (ref 80–99)
MONOCYTES # BLD: 0.5 K/UL (ref 0–1)
MONOCYTES NFR BLD AUTO: 8 % (ref 5–13)
NEUTS SEG # BLD: 5 K/UL (ref 1.8–8)
NEUTS SEG NFR BLD AUTO: 79 % (ref 32–75)
NITRITE UR QL STRIP.AUTO: POSITIVE
PH UR STRIP: 5.5 [PH] (ref 5–8)
PLATELET # BLD AUTO: 189 K/UL (ref 150–400)
POTASSIUM SERPL-SCNC: 4 MMOL/L (ref 3.5–5.1)
PROT SERPL-MCNC: 8.4 G/DL (ref 6.4–8.2)
PROT UR STRIP-MCNC: 100 MG/DL
RBC # BLD AUTO: 4.46 M/UL (ref 3.8–5.2)
RBC #/AREA URNS HPF: ABNORMAL /HPF (ref 0–5)
RBC MORPH BLD: ABNORMAL
SERVICE CMNT-IMP: ABNORMAL
SODIUM SERPL-SCNC: 130 MMOL/L (ref 136–145)
SP GR UR REFRACTOMETRY: 1.03 (ref 1–1.03)
UA: UC IF INDICATED,UAUC: ABNORMAL
UROBILINOGEN UR QL STRIP.AUTO: 0.2 EU/DL (ref 0.2–1)
WBC # BLD AUTO: 6.4 K/UL (ref 3.6–11)
WBC URNS QL MICRO: ABNORMAL /HPF (ref 0–4)

## 2017-04-08 PROCEDURE — 74011636637 HC RX REV CODE- 636/637: Performed by: PHYSICIAN ASSISTANT

## 2017-04-08 PROCEDURE — 87186 SC STD MICRODIL/AGAR DIL: CPT | Performed by: PHYSICIAN ASSISTANT

## 2017-04-08 PROCEDURE — 87086 URINE CULTURE/COLONY COUNT: CPT | Performed by: PHYSICIAN ASSISTANT

## 2017-04-08 PROCEDURE — 74011250636 HC RX REV CODE- 250/636: Performed by: PHYSICIAN ASSISTANT

## 2017-04-08 PROCEDURE — 82962 GLUCOSE BLOOD TEST: CPT

## 2017-04-08 PROCEDURE — 96361 HYDRATE IV INFUSION ADD-ON: CPT

## 2017-04-08 PROCEDURE — 74011000258 HC RX REV CODE- 258: Performed by: PHYSICIAN ASSISTANT

## 2017-04-08 PROCEDURE — 96365 THER/PROPH/DIAG IV INF INIT: CPT

## 2017-04-08 PROCEDURE — 99284 EMERGENCY DEPT VISIT MOD MDM: CPT

## 2017-04-08 PROCEDURE — 36415 COLL VENOUS BLD VENIPUNCTURE: CPT | Performed by: PHYSICIAN ASSISTANT

## 2017-04-08 PROCEDURE — 87077 CULTURE AEROBIC IDENTIFY: CPT | Performed by: PHYSICIAN ASSISTANT

## 2017-04-08 PROCEDURE — 96375 TX/PRO/DX INJ NEW DRUG ADDON: CPT

## 2017-04-08 PROCEDURE — 85025 COMPLETE CBC W/AUTO DIFF WBC: CPT | Performed by: PHYSICIAN ASSISTANT

## 2017-04-08 PROCEDURE — 81001 URINALYSIS AUTO W/SCOPE: CPT | Performed by: PHYSICIAN ASSISTANT

## 2017-04-08 PROCEDURE — 80053 COMPREHEN METABOLIC PANEL: CPT | Performed by: PHYSICIAN ASSISTANT

## 2017-04-08 RX ORDER — ONDANSETRON 2 MG/ML
4 INJECTION INTRAMUSCULAR; INTRAVENOUS
Status: COMPLETED | OUTPATIENT
Start: 2017-04-08 | End: 2017-04-08

## 2017-04-08 RX ADMIN — SODIUM CHLORIDE 1000 ML: 900 INJECTION, SOLUTION INTRAVENOUS at 21:51

## 2017-04-08 RX ADMIN — CEFTRIAXONE 1 G: 1 INJECTION, POWDER, FOR SOLUTION INTRAMUSCULAR; INTRAVENOUS at 21:17

## 2017-04-08 RX ADMIN — ONDANSETRON HYDROCHLORIDE 4 MG: 2 INJECTION, SOLUTION INTRAMUSCULAR; INTRAVENOUS at 22:35

## 2017-04-08 RX ADMIN — SODIUM CHLORIDE 1000 ML: 9 INJECTION, SOLUTION INTRAVENOUS at 20:24

## 2017-04-08 RX ADMIN — INSULIN HUMAN 10 UNITS: 100 INJECTION, SOLUTION PARENTERAL at 21:51

## 2017-04-08 NOTE — ED PROVIDER NOTES
HPI Comments: Ninoska De La Torre is a 46 y.o. 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 She also c/o worsening tingling and numbness in her extremities for the past 2 days stating \"the neuropathy is spreading and getting worse. I came in because I couldn't stand it. \" Per pt, she has been taking her sister's boyfriend's Lantus and Metformin (1000 mg bid) because \"they took him off it and my sister didn't want to throw it away, so she gave it to me. \" Pt also c/o lower suprapubic pain last night with an episode of nausea with non-bloody emesis x1 last night, but notes no episodes since then. She notes the abdominal discomfort persists today. She specifically denies any fevers, chills, groin numbness, chest pain or shortness of breath. PCP: None currently, previously Jameson Zhou NP    There are no other complaints, changes, or physical findings at this time. The history is provided by the patient. Past Medical History:   Diagnosis Date    Arthritis     Asthma     Chronic back pain     Diabetes ([address]     Diabetic retinopathy ([address]     Hypertension     MRSA (methicillin resistant staph aureus) culture positive     labial abscess    Neuropathy        Past Surgical History:   Procedure Laterality Date    HX HEENT      tonsillectomy    HX ORTHOPAEDIC      left ft bunionectomy    HX OTHER SURGICAL      lanced labial abscess         History reviewed. No pertinent family history. Social History     Social History    Marital status:      Spouse name: N/A    Number of children: N/A    Years of education: N/A     Occupational History    Not on file.      Social History Main Topics    Smoking status: Never Smoker    Smokeless tobacco: Never Used    Alcohol use No    Drug use: No    Sexual activity: Not on file     Other Topics Concern    Not on file     Social History Narrative         ALLERGIES: Aspirin and Ciprofloxacin    Review of Systems   Constitutional: Negative. Negative for chills and fever. HENT: Negative. Negative for rhinorrhea and sore throat. Eyes: Negative. Negative for visual disturbance. Respiratory: Negative. Negative for cough, chest tightness, shortness of breath and wheezing. Cardiovascular: Negative. Negative for chest pain and palpitations. Gastrointestinal: Negative. Negative for abdominal pain, constipation, diarrhea, nausea and vomiting. Genitourinary: Negative. Negative for dysuria and hematuria. Musculoskeletal: Positive for back pain. Negative for arthralgias and myalgias. Skin: Negative. Negative for rash. Allergic/Immunologic: Negative. Negative for environmental allergies and food allergies. Neurological: Positive for numbness (chronic in BL hands and feet). Negative for headaches. Psychiatric/Behavioral: Negative. Negative for suicidal ideas. Vitals:    04/08/17 1706   BP: 150/82   Pulse: 88   Resp: 16   Temp: 98.2 °F (36.8 °C)   SpO2: 100%   Weight: 96.7 kg (213 lb 3 oz)   Height: 5' 5.5\" (1.664 m)            Physical Exam   Constitutional: She is oriented to person, place, and time. She appears well-developed and well-nourished. She is active. Non-toxic appearance. No distress. Elevated BMI   HENT:   Head: Normocephalic and atraumatic. Eyes: Conjunctivae are normal. Pupils are equal, round, and reactive to light. Right eye exhibits no discharge. Left eye exhibits no discharge. Neck: Normal range of motion and full passive range of motion without pain. Neck supple. No tracheal tenderness present. Cardiovascular: Normal rate, regular rhythm, normal heart sounds, intact distal pulses and normal pulses. Exam reveals no gallop and no friction rub. No murmur heard. Pulmonary/Chest: Effort normal and breath sounds normal. No respiratory distress. She has no wheezes. She has no rales.  She exhibits no tenderness. Abdominal: Soft. Bowel sounds are normal. She exhibits no distension. There is no tenderness. There is no rebound, no guarding and no CVA tenderness. Musculoskeletal: Normal range of motion. She exhibits no edema or tenderness. Neurological: She is alert and oriented to person, place, and time. She has normal strength. No cranial nerve deficit or sensory deficit. Coordination normal.   Skin: Skin is warm, dry and intact. No abrasion and no rash noted. She is not diaphoretic. No erythema. Psychiatric: She has a normal mood and affect. Her speech is normal and behavior is normal. Cognition and memory are normal.   Nursing note and vitals reviewed. MDM  Number of Diagnoses or Management Options  Acute cystitis without hematuria:   Hyperglycemia:   Uncontrolled type 2 diabetes mellitus with other specified complication, with long-term current use of insulin [facility]):   Diagnosis management comments: DDx: electrolyte abnormality, DKA, UTI       Amount and/or Complexity of Data Reviewed  Clinical lab tests: ordered and reviewed  Review and summarize past medical records: yes    Patient Progress  Patient progress: stable    ED Course       Procedures    Progress Note  7:05 PM  I saw the patient on 01/21/2017 and told her I would not continue to prescribe her narcotics. Written by Damián Hudson ED Scribe, as dictated by Loreta Crocker. Progress Note  7:33 PM  I discussed the patient with Ariel Quinn DO. She recommends blood work based on the patient's compliant's and history. CBC, CMP, liter of fluids and UA. Written by Damián Hudson ED Scribe, as dictated by Loreta Crocker. Progress Note  9:50 PM  Discussed with Ariel Quinn DO. She recommends another liter of saline, 10 units of insulin IV with the goal of lowering her glucose and agrees to treating her UTI. Written by Damián Hudson ED Scribe, as dictated by Loreta Crocker.     Progress Note  10:26 PM  Nursing informed me the patient vomited. No blood present. Discussed with Tavon Castillo DO, who at this time recommends watching patient, doing PO challenge once her nausea improved. Written by Boni Babin, ED Scribe, as dictated by Kenton Nelson. Progress Note  12:17 AM  Patient is able to tolerate PO challenge. Has not vomited. Written by Boni Babin, ED Scribe, as dictated by Kenton Nelson. 12:29 AM  I reviewed our electronic medical record system for any past medical records that were available that may contribute to the patients current condition. Madie Chahal PA-C    Progress Note  1:18 AM  I spoke with Dwaine Ham MD who took over for Tavon Castillo DO and discussed careplan that was discussed with Dr. Spencer Fernandez. Dr. Asad Pierson advises as long as patient is tolerating PO she may be discharged and recommends to fill chronic medications patient may need. The patient requests generic Rx's only due to lack of insurance. She can only afford regular Metformin (had been on JanuMet at some point per records), gabapentin, phenergan and requests generic abx. I discussed the risk of uncontrolled diabetes that can result in neuropathy, reticular neuropathy, blindness, stroke, MI, organ failure and death. Tavon Castillo DO had said to give her the [place] [facility] pressure [facility] information at discharge as they will also see diabetic patients on Wednesdays. I also gave patient return precautions (fever, vomiting, confusion / worsening sx's to return)  Written by Boni Babin, ED Scribe, as dictated by Kenton Nelson.     LABORATORY TESTS:  Recent Results (from the past 12 hour(s))   CBC WITH AUTOMATED DIFF    Collection Time: 04/08/17  8:20 PM   Result Value Ref Range    WBC 6.4 3.6 - 11.0 K/uL    RBC 4.46 3.80 - 5.20 M/uL    HGB 13.9 11.5 - 16.0 g/dL    HCT 39.3 35.0 - 47.0 %    MCV 88.1 80.0 - 99.0 FL    MCH 31.2 26.0 - 34.0 PG    MCHC 35.4 30.0 - 36.5 g/dL    RDW 13.3 11.5 - 14.5 %    PLATELET 164 796 - 924 K/uL    NEUTROPHILS 79 (H) 32 - 75 %    LYMPHOCYTES 12 12 - 49 %    MONOCYTES 8 5 - 13 %    EOSINOPHILS 1 0 - 7 %    BASOPHILS 0 0 - 1 %    ABS. NEUTROPHILS 5.0 1.8 - 8.0 K/UL    ABS. LYMPHOCYTES 0.8 0.8 - 3.5 K/UL    ABS. MONOCYTES 0.5 0.0 - 1.0 K/UL    ABS. EOSINOPHILS 0.1 0.0 - 0.4 K/UL    ABS. BASOPHILS 0.0 0.0 - 0.1 K/UL    DF SMEAR SCANNED      RBC COMMENTS NORMOCYTIC, NORMOCHROMIC     METABOLIC PANEL, COMPREHENSIVE    Collection Time: 04/08/17  8:20 PM   Result Value Ref Range    Sodium 130 (L) 136 - 145 mmol/L    Potassium 4.0 3.5 - 5.1 mmol/L    Chloride 92 (L) 97 - 108 mmol/L    CO2 29 21 - 32 mmol/L    Anion gap 9 5 - 15 mmol/L    Glucose 539 (H) 65 - 100 mg/dL    BUN 11 6 - 20 MG/DL    Creatinine 1.04 (H) 0.55 - 1.02 MG/DL    BUN/Creatinine ratio 11 (L) 12 - 20      GFR est AA >60 >60 ml/min/1.73m2    GFR est non-AA 56 (L) >60 ml/min/1.73m2    Calcium 8.9 8.5 - 10.1 MG/DL    Bilirubin, total 0.8 0.2 - 1.0 MG/DL    ALT (SGPT) 28 12 - 78 U/L    AST (SGOT) 15 15 - 37 U/L    Alk.  phosphatase 113 45 - 117 U/L    Protein, total 8.4 (H) 6.4 - 8.2 g/dL    Albumin 3.5 3.5 - 5.0 g/dL    Globulin 4.9 (H) 2.0 - 4.0 g/dL    A-G Ratio 0.7 (L) 1.1 - 2.2     URINALYSIS W/ REFLEX CULTURE    Collection Time: 04/08/17  8:20 PM   Result Value Ref Range    Color YELLOW/STRAW      Appearance CLOUDY (A) CLEAR      Specific gravity 1.029 1.003 - 1.030      pH (UA) 5.5 5.0 - 8.0      Protein 100 (A) NEG mg/dL    Glucose >1000 (A) NEG mg/dL    Ketone TRACE (A) NEG mg/dL    Bilirubin NEGATIVE  NEG      Blood LARGE (A) NEG      Urobilinogen 0.2 0.2 - 1.0 EU/dL    Nitrites POSITIVE (A) NEG      Leukocyte Esterase SMALL (A) NEG      WBC  0 - 4 /hpf    RBC 20-50 0 - 5 /hpf    Epithelial cells FEW FEW /lpf    Bacteria 4+ (A) NEG /hpf    UA:UC IF INDICATED URINE CULTURE ORDERED (A) CNI      Hyaline cast 0-2 0 - 5 /lpf   GLUCOSE, POC    Collection Time: 04/08/17  8:29 PM   Result Value Ref Range Glucose (POC) 561 (H) 65 - 100 mg/dL    Performed by Brown Ruiz, POC    Collection Time: 04/08/17  9:22 PM   Result Value Ref Range    Glucose (POC) 456 (H) 65 - 100 mg/dL    Performed by CASSIDY LUCAS    GLUCOSE, POC    Collection Time: 04/08/17 10:33 PM   Result Value Ref Range    Glucose (POC) 392 (H) 65 - 100 mg/dL    Performed by Kendal Ramires    GLUCOSE, POC    Collection Time: 04/09/17 12:04 AM   Result Value Ref Range    Glucose (POC) 381 (H) 65 - 100 mg/dL    Performed by Winifred Bennett      MEDICATIONS GIVEN:  Medications   insulin regular (NOVOLIN R, HUMULIN R) 100 unit/mL injection (not administered)   sodium chloride 0.9 % bolus infusion 1,000 mL (1,000 mL IntraVENous New Bag 4/8/17 2024)   cefTRIAXone (ROCEPHIN) 1 g in 0.9% sodium chloride (MBP/ADV) 50 mL (1 g IntraVENous New Bag 4/8/17 2117)   insulin regular (NOVOLIN R, HUMULIN R) injection 10 Units (10 Units IntraVENous Given 4/8/17 2151)   sodium chloride 0.9 % bolus infusion 1,000 mL (1,000 mL IntraVENous New Bag 4/8/17 2151)   ondansetron (ZOFRAN) injection 4 mg (4 mg IntraVENous Given 4/8/17 2235)       IMPRESSION:  1. Acute cystitis without hematuria    2. Hyperglycemia    3. Uncontrolled type 2 diabetes mellitus with other specified complication, with long-term current use of insulin [facility]        PLAN:   1. Current Discharge Medication List      START taking these medications    Details   metFORMIN (GLUCOPHAGE) 1,000 mg tablet Take 1 Tab by mouth two (2) times daily (with meals) for 30 days. Qty: 60 Tab, Refills: 0      gabapentin (NEURONTIN) 300 mg capsule Take 2 Caps by mouth three (3) times daily as needed. Qty: 30 Cap, Refills: 0      promethazine (PHENERGAN) 25 mg tablet Take 1 Tab by mouth every six (6) hours as needed for Nausea. Qty: 12 Tab, Refills: 0      cephALEXin (KEFLEX) 500 mg capsule Take 1 Cap by mouth three (3) times daily for 7 days.   Qty: 21 Cap, Refills: 0         STOP taking these medications HYDROcodone-acetaminophen (NORCO) 5-325 mg per tablet Comments:   Reason for Stopping:         sitaGLIPtin-metFORMIN (JANUMET) 50-1,000 mg per tablet Comments:   Reason for Stoppin.   Follow-up Information     Follow up With Details Comments Contact Info    Your PCP or clinic Schedule an appointment as soon as possible for a visit for follow-up in the next week without fail. [facility] Schedule an appointment as soon as possible for a visit  THEY SEE PATIENTS WITH DIABETES SO CALL FOR AN APPOINTMENT TO BE SEEN. [address]  [phone]    \[facility]\"" EMERGENCY DEPT  If symptoms worsen- fever, vomiting or other worsening symptoms develop. [address] [place] [address]  [phone]          3. Return to ED if worse     DISCHARGE NOTE  1:18 AM  The patient has been re-evaluated and is ready for discharge. Reviewed available results with patient. Counseled patient on diagnosis and care plan. Patient has expressed understanding, and all questions have been answered. Patient agrees with plan and agrees to follow up as recommended, or return to the ED if their symptoms worsen. Discharge instructions have been provided and explained to the patient, along with reasons to return to the ED. This note is prepared by Larry Marrero, acting as Scribe for [employer]. PA-C Lue Hodgkin: The the scribe's documentation has been prepared under my direction and personally reviewed by me in its entirety. I confirm that the note above accurately reflects all work, treatment, procedures, and medical decision making performed by me. This note will not be viewable in [address]

## 2017-04-08 NOTE — ED NOTES
Pt arrives to the ED with c/c of lower back pain ongoing for a week since doing yard work. 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 Pt reports taking Tylenol and Aleve with no symptom relief. Pt reports having a history or neuropathy. Pt alert, ambulatory, able to follow commands. Pt reports having an episode of nausea and vomiting last night. Pt currently eating and drinking at bedside.

## 2017-04-09 LAB
GLUCOSE BLD STRIP.AUTO-MCNC: 381 MG/DL (ref 65–100)
SERVICE CMNT-IMP: ABNORMAL

## 2017-04-09 PROCEDURE — 82962 GLUCOSE BLOOD TEST: CPT

## 2017-04-09 RX ORDER — GABAPENTIN 300 MG/1
600 CAPSULE ORAL
Qty: 30 CAP | Refills: 0 | Status: SHIPPED | OUTPATIENT
Start: 2017-04-09 | End: 2017-06-06

## 2017-04-09 RX ORDER — CEPHALEXIN 500 MG/1
500 CAPSULE ORAL 3 TIMES DAILY
Qty: 21 CAP | Refills: 0 | Status: SHIPPED | OUTPATIENT
Start: 2017-04-09 | End: 2017-04-16

## 2017-04-09 RX ORDER — PROMETHAZINE HYDROCHLORIDE 25 MG/1
25 TABLET ORAL
Qty: 12 TAB | Refills: 0 | Status: SHIPPED | OUTPATIENT
Start: 2017-04-09 | End: 2018-12-20

## 2017-04-09 RX ORDER — METFORMIN HYDROCHLORIDE 1000 MG/1
1000 TABLET ORAL 2 TIMES DAILY WITH MEALS
Qty: 60 TAB | Refills: 0 | Status: SHIPPED | OUTPATIENT
Start: 2017-04-09 | End: 2017-05-09

## 2017-04-09 NOTE — DISCHARGE INSTRUCTIONS
Urinary Tract Infection in Women: Care Instructions  Your Care Instructions    A urinary tract infection, or UTI, is a general term for an infection anywhere between the kidneys and the urethra (where urine comes out). Most UTIs are bladder infections. They often cause pain or burning when you urinate. UTIs are caused by bacteria and can be cured with antibiotics. Be sure to complete your treatment so that the infection goes away. Follow-up care is a key part of your treatment and safety. Be sure to make and go to all appointments, and call your doctor if you are having problems. It's also a good idea to know your test results and keep a list of the medicines you take. How can you care for yourself at home? · Take your antibiotics as directed. Do not stop taking them just because you feel better. You need to take the full course of antibiotics. · Drink extra water and other fluids for the next day or two. This may help wash out the bacteria that are causing the infection. (If you have kidney, heart, or liver disease and have to limit fluids, talk with your doctor before you increase your fluid intake.)  · Avoid drinks that are carbonated or have caffeine. They can irritate the bladder. · Urinate often. Try to empty your bladder each time. · To relieve pain, take a hot bath or lay a heating pad set on low over your lower belly or genital area. Never go to sleep with a heating pad in place. To prevent UTIs  · Drink plenty of water each day. This helps you urinate often, which clears bacteria from your system. (If you have kidney, heart, or liver disease and have to limit fluids, talk with your doctor before you increase your fluid intake.)  · Urinate when you need to. · Urinate right after you have sex. · Change sanitary pads often. · Avoid douches, bubble baths, feminine hygiene sprays, and other feminine hygiene products that have deodorants.   · After going to the bathroom, wipe from front to back.  When should you call for help? Call your doctor now or seek immediate medical care if:  · Symptoms such as fever, chills, nausea, or vomiting get worse or appear for the first time. · You have new pain in your back just below your rib cage. This is called flank pain. · There is new blood or pus in your urine. · You have any problems with your antibiotic medicine. Watch closely for changes in your health, and be sure to contact your doctor if:  · You are not getting better after taking an antibiotic for 2 days. · Your symptoms go away but then come back. Where can you learn more? Go to http://delfina-cony.info/. Enter P512 in the search box to learn more about \"Urinary Tract Infection in Women: Care Instructions. \"  Current as of: November 28, 2016  Content Version: 11.2  © 0070-9115 [employer]. Care instructions adapted under license by [employer] (which disclaims liability or warranty for this information). If you have questions about a medical condition or this instruction, always ask your healthcare professional. [address] any warranty or liability for your use of this information. Learning About Diabetes Food Guidelines  Your Care Instructions  Meal planning is important to manage diabetes. It helps keep your blood sugar at a target level (which you set with your doctor). You don't have to eat special foods. You can eat what your family eats, including sweets once in a while. But you do have to pay attention to how often you eat and how much you eat of certain foods. You may want to work with a dietitian or a certified diabetes educator (CDE) to help you plan meals and snacks. A dietitian or CDE can also help you lose weight if that is one of your goals. What should you know about eating carbs? Managing the amount of carbohydrate (carbs) you eat is an important part of healthy meals when you have diabetes. Carbohydrate is found in many foods. · Learn which foods have carbs. And learn the amounts of carbs in different foods. ¨ Bread, cereal, pasta, and rice have about 15 grams of carbs in a serving. A serving is 1 slice of bread (1 ounce), ½ cup of cooked cereal, or 1/3 cup of cooked pasta or rice. ¨ Fruits have 15 grams of carbs in a serving. A serving is 1 small fresh fruit, such as an apple or orange; ½ of a banana; ½ cup of cooked or canned fruit; ½ cup of fruit juice; 1 cup of melon or raspberries; or 2 tablespoons of dried fruit. ¨ Milk and no-sugar-added yogurt have 15 grams of carbs in a serving. A serving is 1 cup of milk or 2/3 cup of no-sugar-added yogurt. ¨ Starchy vegetables have 15 grams of carbs in a serving. A serving is ½ cup of mashed potatoes or sweet potato; 1 cup winter squash; ½ of a small baked potato; ½ cup of cooked beans; or ½ cup cooked corn or green peas. · Learn how much carbs to eat each day and at each meal. A dietitian or CDE can teach you how to keep track of the amount of carbs you eat. This is called carbohydrate counting. · If you are not sure how to count carbohydrate grams, use the Plate Method to plan meals. It is a good, quick way to make sure that you have a balanced meal. It also helps you spread carbs throughout the day. ¨ Divide your plate by types of foods. Put non-starchy vegetables on half the plate, meat or other protein food on one-quarter of the plate, and a grain or starchy vegetable in the final quarter of the plate. To this you can add a small piece of fruit and 1 cup of milk or yogurt, depending on how many carbs you are supposed to eat at a meal.  · Try to eat about the same amount of carbs at each meal. Do not \"save up\" your daily allowance of carbs to eat at one meal.  · Proteins have very little or no carbs per serving. Examples of proteins are beef, chicken, turkey, fish, eggs, tofu, cheese, cottage cheese, and peanut butter.  A serving size of meat is 3 ounces, which is about the size of a deck of cards. Examples of meat substitute serving sizes (equal to 1 ounce of meat) are 1/4 cup of cottage cheese, 1 egg, 1 tablespoon of peanut butter, and ½ cup of tofu. How can you eat out and still eat healthy? · Learn to estimate the serving sizes of foods that have carbohydrate. If you measure food at home, it will be easier to estimate the amount in a serving of restaurant food. · If the meal you order has too much carbohydrate (such as potatoes, corn, or baked beans), ask to have a low-carbohydrate food instead. Ask for a salad or green vegetables. · If you use insulin, check your blood sugar before and after eating out to help you plan how much to eat in the future. · If you eat more carbohydrate at a meal than you had planned, take a walk or do other exercise. This will help lower your blood sugar. What else should you know? · Limit saturated fat, such as the fat from meat and dairy products. This is a healthy choice because people who have diabetes are at higher risk of heart disease. So choose lean cuts of meat and nonfat or low-fat dairy products. Use olive or canola oil instead of butter or shortening when cooking. · Don't skip meals. Your blood sugar may drop too low if you skip meals and take insulin or certain medicines for diabetes. · Check with your doctor before you drink alcohol. Alcohol can cause your blood sugar to drop too low. Alcohol can also cause a bad reaction if you take certain diabetes medicines. Follow-up care is a key part of your treatment and safety. Be sure to make and go to all appointments, and call your doctor if you are having problems. It's also a good idea to know your test results and keep a list of the medicines you take. Where can you learn more? Go to http://delfina-cony.info/. Enter W198 in the search box to learn more about \"Learning About Diabetes Food Guidelines. \"  Current as of: May 23, 2016  Content Version: 11.2  © 7931-9084 [employer]. Care instructions adapted under license by [employer] (which disclaims liability or warranty for this information). If you have questions about a medical condition or this instruction, always ask your healthcare professional. Leela Rasmussen any warranty or liability for your use of this information. [facility] Departments     For adult and child immunizations, family planning, TB screening, STD testing and women's health services. [facility] [place] [phone]      Psychiatric 25   [address]   [address]   170 [facility]: [facility] [address] [facility] [address] [phone]      [address] [facility]          [address] [address]     For primary care services, woman and child wellness, and some clinics providing specialty care. [facility] -- [address] [place] [facility]. [address] [place] [address] [phone] 411 [facility] 200 [facility] [address] [place] [address] [phone]   [address] [place] [address] [address] [place] [address] [phone]   [address] [facility] [address] [place] [address] 5850  Community  [phone]   [address] [place] [address] [phone]   [facility] 81 [facility] [phone]   [facility] [address] [place] [address] [phone]   [facility]: [facility] [address] [facility] [address] [phone]     Juan [address] [facility] [address] [phone]   [facility] [address]  Community  [phone]   [facility]  [address] ([email or web address]. [email or web address]) [phone]         Sexual Health/Woman Wellness Clinics    For STD/HIV testing and treatment, pregnancy testing and services, men's health, birth control services, LGBT services, and hepatitis/HPV vaccine services.    [place] [facility] [address] [facility] [address] [facility] [address] [facility] 15[address] [phone]   [facility] [address], 5th floor [phone]   Pregnancy [address] [facility] [address] [phone]         [address] [facility] [address] [place] [address]   [phone]   [place]   [phone]   Women, Infant and Children's Services: [address] [phone]       600 [facility]   [phone]   [facility]\""   [phone]   [facility]     [phone]   [address] Crisis   [address] [facility] [address] [phone]     Local Primary Care Physicians  [facility] [phone]  MD Michele Trivdei MD Burtis Service, MD [facility] [phone]  Moy Ardon, [facility]  MD Mat Valdez MD Kristi Lofty, MD [address] [phone]  Skip Rust, MD [place] [address] [facility] [phone]  MD Maria Del Carmen Campbell MD Alfrieda Campus, MD Jaylyn Del Rio MD   [facility] [phone]  [facility] IIFJCE ALIYA, MD Neha Stephens, NP [address] [phone]  MD Joe Dickey MD Alford Spotted, MD Antony Pound, MD Arlo Mariscal, MD Sigmund Grana, MD Regina Resides, MD   [address]36 [facility] [phone]  Carla Marrero MD [address] [phone]  MD Jailyn Nicole, NP  MD Danyelle Peters MD Levan Mesi, MD Donata Alpha, MD Purcell Jewel, MD   [address] [facility] [phone]  MD Josey Son, FNP  Sebastian Borrego, AIMEE Gallardo, MD Richie Presley, MD Devin Bennett, MD [facility] [phone]  Artie Dean, MD Cassius Gomes, MD Jaedn Escobar, MD Remedios Hernandez, MD Lien Holt, MD   [facility] [phone]  Bryson Rothman, MD Prasanna Perry [phone]  MD Cassy Soto, MD Octavia Mai, MD   [facility] [phone]  Izaiah Flores, MD Thad Bond, MD Ross Rico, MD Sheela Dejesus, MD Paul Killian, MD Hollie Sandhu, AIMEE Parisi MD [address]   [phone]  MD Will Cotton, MD Teo Doshi MD   2102 [facility] [phone]  [place] [address], MD Julian Gamboa, DERREKP  Jessica Gutierrez PA-C  Jessica Gutierrez, DERREKP  Soumya Fung PA-YOUSIF Puckett, MD Jefry Romo, AIMEE Turner, DO Miscellaneous:  Eddie Welsh [phone]

## 2017-04-11 LAB
BACTERIA SPEC CULT: ABNORMAL
CC UR VC: ABNORMAL
SERVICE CMNT-IMP: ABNORMAL

## 2017-06-06 ENCOUNTER — APPOINTMENT (OUTPATIENT)
Dept: GENERAL RADIOLOGY | Age: 52
End: 2017-06-06
Attending: PHYSICIAN ASSISTANT
Payer: SELF-PAY

## 2017-06-06 ENCOUNTER — HOSPITAL ENCOUNTER (EMERGENCY)
Age: 52
Discharge: HOME | End: 2017-06-06
Attending: EMERGENCY MEDICINE
Payer: SELF-PAY

## 2017-06-06 VITALS
DIASTOLIC BLOOD PRESSURE: 78 MMHG | WEIGHT: 217 LBS | BODY MASS INDEX: 34.87 KG/M2 | HEIGHT: 66 IN | HEART RATE: 99 BPM | RESPIRATION RATE: 16 BRPM | SYSTOLIC BLOOD PRESSURE: 159 MMHG | OXYGEN SATURATION: 97 % | TEMPERATURE: 98.4 F

## 2017-06-06 DIAGNOSIS — L97.511 FOOT ULCER, RIGHT, LIMITED TO BREAKDOWN OF SKIN (HCC): ICD-10-CM

## 2017-06-06 DIAGNOSIS — L97.521 FOOT ULCER, LEFT, LIMITED TO BREAKDOWN OF SKIN (HCC): Primary | ICD-10-CM

## 2017-06-06 LAB
ALBUMIN SERPL BCP-MCNC: 3.2 G/DL (ref 3.5–5)
ALBUMIN/GLOB SERPL: 0.7 {RATIO} (ref 1.1–2.2)
ALP SERPL-CCNC: 140 U/L (ref 45–117)
ALT SERPL-CCNC: 32 U/L (ref 12–78)
ANION GAP BLD CALC-SCNC: 8 MMOL/L (ref 5–15)
AST SERPL W P-5'-P-CCNC: 18 U/L (ref 15–37)
BASOPHILS # BLD AUTO: 0 K/UL (ref 0–0.1)
BASOPHILS # BLD: 1 % (ref 0–1)
BILIRUB SERPL-MCNC: 0.4 MG/DL (ref 0.2–1)
BUN SERPL-MCNC: 13 MG/DL (ref 6–20)
BUN/CREAT SERPL: 16 (ref 12–20)
CALCIUM SERPL-MCNC: 9.4 MG/DL (ref 8.5–10.1)
CHLORIDE SERPL-SCNC: 96 MMOL/L (ref 97–108)
CO2 SERPL-SCNC: 28 MMOL/L (ref 21–32)
CREAT SERPL-MCNC: 0.83 MG/DL (ref 0.55–1.02)
CRP SERPL-MCNC: 3.45 MG/DL (ref 0–0.6)
EOSINOPHIL # BLD: 0.2 K/UL (ref 0–0.4)
EOSINOPHIL NFR BLD: 3 % (ref 0–7)
ERYTHROCYTE [DISTWIDTH] IN BLOOD BY AUTOMATED COUNT: 13.2 % (ref 11.5–14.5)
ERYTHROCYTE [SEDIMENTATION RATE] IN BLOOD: 67 MM/HR (ref 0–30)
GLOBULIN SER CALC-MCNC: 4.5 G/DL (ref 2–4)
GLUCOSE SERPL-MCNC: 352 MG/DL (ref 65–100)
HCT VFR BLD AUTO: 38.5 % (ref 35–47)
HGB BLD-MCNC: 13.5 G/DL (ref 11.5–16)
LACTATE SERPL-SCNC: 0.8 MMOL/L (ref 0.4–2)
LYMPHOCYTES # BLD AUTO: 24 % (ref 12–49)
LYMPHOCYTES # BLD: 1.4 K/UL (ref 0.8–3.5)
MCH RBC QN AUTO: 29.9 PG (ref 26–34)
MCHC RBC AUTO-ENTMCNC: 35.1 G/DL (ref 30–36.5)
MCV RBC AUTO: 85.2 FL (ref 80–99)
MONOCYTES # BLD: 0.3 K/UL (ref 0–1)
MONOCYTES NFR BLD AUTO: 5 % (ref 5–13)
NEUTS SEG # BLD: 4 K/UL (ref 1.8–8)
NEUTS SEG NFR BLD AUTO: 67 % (ref 32–75)
PLATELET # BLD AUTO: 227 K/UL (ref 150–400)
POTASSIUM SERPL-SCNC: 3.9 MMOL/L (ref 3.5–5.1)
PROT SERPL-MCNC: 7.7 G/DL (ref 6.4–8.2)
RBC # BLD AUTO: 4.52 M/UL (ref 3.8–5.2)
SODIUM SERPL-SCNC: 132 MMOL/L (ref 136–145)
WBC # BLD AUTO: 6 K/UL (ref 3.6–11)

## 2017-06-06 PROCEDURE — 73630 X-RAY EXAM OF FOOT: CPT

## 2017-06-06 PROCEDURE — 36415 COLL VENOUS BLD VENIPUNCTURE: CPT | Performed by: PHYSICIAN ASSISTANT

## 2017-06-06 PROCEDURE — 74011250637 HC RX REV CODE- 250/637: Performed by: PHYSICIAN ASSISTANT

## 2017-06-06 PROCEDURE — 99283 EMERGENCY DEPT VISIT LOW MDM: CPT

## 2017-06-06 PROCEDURE — 80053 COMPREHEN METABOLIC PANEL: CPT | Performed by: PHYSICIAN ASSISTANT

## 2017-06-06 PROCEDURE — 83605 ASSAY OF LACTIC ACID: CPT | Performed by: PHYSICIAN ASSISTANT

## 2017-06-06 PROCEDURE — 85025 COMPLETE CBC W/AUTO DIFF WBC: CPT | Performed by: PHYSICIAN ASSISTANT

## 2017-06-06 PROCEDURE — 85652 RBC SED RATE AUTOMATED: CPT | Performed by: PHYSICIAN ASSISTANT

## 2017-06-06 PROCEDURE — 86140 C-REACTIVE PROTEIN: CPT | Performed by: PHYSICIAN ASSISTANT

## 2017-06-06 RX ORDER — GABAPENTIN 300 MG/1
600 CAPSULE ORAL
Qty: 30 CAP | Refills: 0 | Status: SHIPPED | OUTPATIENT
Start: 2017-06-06 | End: 2018-12-20 | Stop reason: SDUPTHER

## 2017-06-06 RX ORDER — BACITRACIN 500 UNIT/G
1 PACKET (EA) TOPICAL
Status: DISCONTINUED | OUTPATIENT
Start: 2017-06-06 | End: 2017-06-06 | Stop reason: HOSPADM

## 2017-06-06 RX ORDER — TRAMADOL HYDROCHLORIDE 50 MG/1
50 TABLET ORAL
Status: COMPLETED | OUTPATIENT
Start: 2017-06-06 | End: 2017-06-06

## 2017-06-06 RX ORDER — CLINDAMYCIN HYDROCHLORIDE 150 MG/1
300 CAPSULE ORAL
Status: COMPLETED | OUTPATIENT
Start: 2017-06-06 | End: 2017-06-06

## 2017-06-06 RX ORDER — CLINDAMYCIN HYDROCHLORIDE 300 MG/1
300 CAPSULE ORAL 4 TIMES DAILY
Qty: 28 CAP | Refills: 0 | Status: SHIPPED | OUTPATIENT
Start: 2017-06-06 | End: 2017-06-13

## 2017-06-06 RX ADMIN — TRAMADOL HYDROCHLORIDE 50 MG: 50 TABLET, FILM COATED ORAL at 19:38

## 2017-06-06 RX ADMIN — CLINDAMYCIN HYDROCHLORIDE 300 MG: 150 CAPSULE ORAL at 19:38

## 2017-06-06 NOTE — Clinical Note
Please follow-up with podiatry tomorrow. Call and schedule an appointment. Apply bacitracin three times daily.   Naprosyn twice daily for pain

## 2017-06-06 NOTE — ED PROVIDER NOTES
HPI Comments: 47 yo  female with medical hx remarkable for arthritis, asthma, chronic back pain, DM, diabetic retinopathy, hypertension, MRSA presenting ambulatory to the ED with complaint of pain and ulceration to the plantar surface of the feet bilaterally, at the base of great toes. She reports walking for about 2 hrs this weekend, 4 days ago. Associated low grade fever 100F orally, the other day and purulent drainage. Wound \"popped open\" spontaneously. Chronic neuropathy to the feet. Has not seen podiatry \"in a while\". No headache, sore throat, cough, rhinorrhea, sneezing, SOB, abdominal pain, nausea, vomiting, or urinary complaints. Patient is a 46 y.o. female presenting with foot problem. The history is provided by the patient. Foot Problem    Pertinent negatives include no numbness, no back pain and no neck pain. Past Medical History:   Diagnosis Date    Arthritis     Asthma     Chronic back pain     Diabetes ([address]     Diabetic retinopathy ([address]     Hypertension     MRSA (methicillin resistant staph aureus) culture positive     labial abscess    Neuropathy        Past Surgical History:   Procedure Laterality Date    HX HEENT      tonsillectomy    HX ORTHOPAEDIC      left ft bunionectomy    HX OTHER SURGICAL      lanced labial abscess         History reviewed. No pertinent family history. Social History     Social History    Marital status:      Spouse name: N/A    Number of children: N/A    Years of education: N/A     Occupational History    Not on file. Social History Main Topics    Smoking status: Never Smoker    Smokeless tobacco: Never Used    Alcohol use No    Drug use: No    Sexual activity: Not on file     Other Topics Concern    Not on file     Social History Narrative         ALLERGIES: Aspirin and Ciprofloxacin    Review of Systems   Constitutional: Negative. Negative for chills, fatigue and fever. HENT: Negative.   Negative for congestion, ear pain, facial swelling, rhinorrhea, sneezing and sore throat. Eyes: Negative for pain, discharge and itching. Respiratory: Negative for cough, chest tightness and shortness of breath. Cardiovascular: Negative. Negative for chest pain and leg swelling. Gastrointestinal: Negative. Negative for abdominal distention, abdominal pain, constipation, diarrhea, nausea and vomiting. Genitourinary: Negative for difficulty urinating, frequency and urgency. Musculoskeletal: Negative for arthralgias, back pain, joint swelling, neck pain and neck stiffness. Skin: Positive for wound. Negative for color change and rash. Neurological: Negative for dizziness, numbness and headaches. Psychiatric/Behavioral: Negative for confusion and decreased concentration. All other systems reviewed and are negative. Vitals:    06/06/17 1850   BP: 159/78   Pulse: 99   Resp: 16   Temp: 98.4 °F (36.9 °C)   SpO2: 97%   Weight: 98.4 kg (217 lb)   Height: 5' 5.5\" (1.664 m)            Physical Exam   Constitutional: She is oriented to person, place, and time. She appears well-developed and well-nourished. No distress. Well appearing obese  female in NAD   HENT:   Head: Normocephalic and atraumatic. Right Ear: External ear normal.   Left Ear: External ear normal.   Nose: Nose normal.   Mouth/Throat: Oropharynx is clear and moist. No oropharyngeal exudate. Eyes: Conjunctivae and EOM are normal. Pupils are equal, round, and reactive to light. Right eye exhibits no discharge. Left eye exhibits no discharge. No scleral icterus. Neck: Normal range of motion. Neck supple. Cardiovascular: Normal rate and regular rhythm. Exam reveals no gallop and no friction rub. No murmur heard. Pulmonary/Chest: Effort normal and breath sounds normal. She has no wheezes. She has no rales. Abdominal: Soft. Bowel sounds are normal. She exhibits no distension. There is no tenderness.  There is no rebound and no guarding. Musculoskeletal:        Feet:    Neurological: She is alert and oriented to person, place, and time. No cranial nerve deficit. Coordination normal.   Skin: Skin is warm and dry. She is not diaphoretic. Psychiatric: She has a normal mood and affect. Her behavior is normal.   Nursing note and vitals reviewed. MDM  Number of Diagnoses or Management Options  Diagnosis management comments: 45 yo  female with complaint of bilateral foot pain with fairly superficial ulcers on exam.  Reported fever and purulent drainage. Appears well currently, afebrile with stable vitals and non-toxic on exam.    Plan  CBC, CMP, ESR, CRP, Xray feet bilaterally, analgesia and reassess. Jessie Haines         Amount and/or Complexity of Data Reviewed  Clinical lab tests: ordered and reviewed  Tests in the radiology section of CPT®: ordered and reviewed  Independent visualization of images, tracings, or specimens: yes      ED Course       Procedures                               Progress note    Labs and imaging reviewed. No sig leukocytosis, afebrile with elevated inflam markers but - lact and reassuring xray. Jessie Haines     Pt will follow-up with podiatry tomorrow. Jessie Haines      Patient's results have been reviewed with them. Patient and/or family have verbally conveyed their understanding and agreement of the patient's signs, symptoms, diagnosis, treatment and prognosis and additionally agree to follow up as recommended or return to the Emergency Room should their condition change prior to follow-up. Discharge instructions have also been provided to the patient with some educational information regarding their diagnosis as well a list of reasons why they would want to return to the ER prior to their follow-up appointment should their condition change.  Jessie Dowd

## 2017-06-06 NOTE — ED TRIAGE NOTES
Pt reports walking >2 hrs this past weekend and have \"chuncks of skin missing\" to left foot and a wound to right foot. Hx DM.

## 2017-06-07 NOTE — DISCHARGE INSTRUCTIONS
We hope that we have addressed all of your medical concerns. The examination and treatment you received in the Emergency Department were for an emergent problem and were not intended as complete care. It is important that you follow up with your healthcare provider(s) for ongoing care. If your symptoms worsen or do not improve as expected, and you are unable to reach your usual health care provider(s), you should return to the Emergency Department. Today's healthcare is undergoing tremendous change, and patient satisfaction surveys are one of the many tools to assess the quality of medical care. You may receive a survey from the [employer] regarding your experience in the Emergency Department. I hope that your experience has been completely positive, particularly the medical care that I provided. As such, please participate in the survey; anything less than excellent does not meet my expectations or intentions. [facility] and [address] [place] [address] participate in nationally recognized quality of care measures. If your blood pressure is greater than 120/80, as reported below, we urge that you seek medical care to address the potential of high blood pressure, commonly known as hypertension. Hypertension can be hereditary or can be caused by certain medical conditions, pain, stress, or \"white coat syndrome. \"       Please make an appointment with your health care provider(s) for follow up of your Emergency Department visit. VITALS:   Patient Vitals for the past 8 hrs:   Temp Pulse Resp BP SpO2   06/06/17 1850 98.4 °F (36.9 °C) 99 16 159/78 97 %          Thank you for allowing us to provide you with medical care today. We realize that you have many choices for your emergency care needs. Please choose us in the future for any continued health care needs. Regards,           April C.  [place], [address] Office: [phone]            Recent Results (from the past 24 hour(s))   CBC WITH AUTOMATED DIFF    Collection Time: 06/06/17  7:32 PM   Result Value Ref Range    WBC 6.0 3.6 - 11.0 K/uL    RBC 4.52 3.80 - 5.20 M/uL    HGB 13.5 11.5 - 16.0 g/dL    HCT 38.5 35.0 - 47.0 %    MCV 85.2 80.0 - 99.0 FL    MCH 29.9 26.0 - 34.0 PG    MCHC 35.1 30.0 - 36.5 g/dL    RDW 13.2 11.5 - 14.5 %    PLATELET 528 921 - 630 K/uL    NEUTROPHILS 67 32 - 75 %    LYMPHOCYTES 24 12 - 49 %    MONOCYTES 5 5 - 13 %    EOSINOPHILS 3 0 - 7 %    BASOPHILS 1 0 - 1 %    ABS. NEUTROPHILS 4.0 1.8 - 8.0 K/UL    ABS. LYMPHOCYTES 1.4 0.8 - 3.5 K/UL    ABS. MONOCYTES 0.3 0.0 - 1.0 K/UL    ABS. EOSINOPHILS 0.2 0.0 - 0.4 K/UL    ABS. BASOPHILS 0.0 0.0 - 0.1 K/UL   METABOLIC PANEL, COMPREHENSIVE    Collection Time: 06/06/17  7:32 PM   Result Value Ref Range    Sodium 132 (L) 136 - 145 mmol/L    Potassium 3.9 3.5 - 5.1 mmol/L    Chloride 96 (L) 97 - 108 mmol/L    CO2 28 21 - 32 mmol/L    Anion gap 8 5 - 15 mmol/L    Glucose 352 (H) 65 - 100 mg/dL    BUN 13 6 - 20 MG/DL    Creatinine 0.83 0.55 - 1.02 MG/DL    BUN/Creatinine ratio 16 12 - 20      GFR est AA >60 >60 ml/min/1.73m2    GFR est non-AA >60 >60 ml/min/1.73m2    Calcium 9.4 8.5 - 10.1 MG/DL    Bilirubin, total 0.4 0.2 - 1.0 MG/DL    ALT (SGPT) 32 12 - 78 U/L    AST (SGOT) 18 15 - 37 U/L    Alk.  phosphatase 140 (H) 45 - 117 U/L    Protein, total 7.7 6.4 - 8.2 g/dL    Albumin 3.2 (L) 3.5 - 5.0 g/dL    Globulin 4.5 (H) 2.0 - 4.0 g/dL    A-G Ratio 0.7 (L) 1.1 - 2.2     SED RATE (ESR)    Collection Time: 06/06/17  7:32 PM   Result Value Ref Range    Sed rate, automated 67 (H) 0 - 30 mm/hr   C REACTIVE PROTEIN, QT    Collection Time: 06/06/17  7:32 PM   Result Value Ref Range    C-Reactive protein 3.45 (H) 0.00 - 0.60 mg/dL   LACTIC ACID, PLASMA    Collection Time: 06/06/17  7:32 PM   Result Value Ref Range    Lactic acid 0.8 0.4 - 2.0 MMOL/L       Xr Foot Lt Min 3 V    Result Date: 6/6/2017  EXAM:  XR FOOT LT MIN 3 V INDICATION:   diabetic, wound to plantar surface. COMPARISON:  None. FINDINGS:  Three views of the left foot demonstrate postsurgical changes with hardware in the proximal first metatarsal and in the first proximal phalanx. There is no evidence of acute fracture or focal bone destruction. No periosteal reaction or gas in the deep soft tissues is noted. .  There is soft tissue swelling. Brianne Espino IMPRESSION:  Postsurgical changes. No acute fracture or focal bone destruction. .     Xr Foot Rt Min 3 V    Result Date: 6/6/2017  EXAM:  XR FOOT RT MIN 3 V INDICATION:   ulcer to the plantar surface of foot. COMPARISON:  None. FINDINGS:  Three views of the right foot demonstrate no fracture or focal bone destruction. No periosteal reaction is demonstrated. A small soft tissue defect is seen on the plantar surface of the foot in the region of the metatarsals. No gas is seen in the deep soft tissues. .  There is deformity of the fifth proximal phalanx. Bony detail is not seen and the age is indeterminate. Clinical correlation is recommended. .     IMPRESSION:  Plantar soft tissue defect. No radiographic evidence of osteomyelitis. .  Indeterminate age fracture of the distal end of the fifth proximal phalanx. Pressure Sores: Care Instructions  Your Care Instructions    A pressure sore is an injury to the skin caused by constant pressure. These sores--also called decubitus ulcers or bedsores--may happen when you lie in bed or sit in a wheelchair for a long time. The constant pressure blocks the blood supply to the skin. This causes skin cells to die and creates a sore. Pressure sores usually occur over bony areas, such as the hips, lower back, elbows, heels, and shoulders. They also can occur in places where the skin folds over on itself. You may have mild redness or open sores that are harder to heal.  Good care at home can help heal pressure sores.  You or your caregiver needs to check your skin every day for sores. You need good nutrition and plenty of fluids to keep your skin healthy and prevent new pressure sores. Follow-up care is a key part of your treatment and safety. Be sure to make and go to all appointments, and call your doctor if you are having problems. It's also a good idea to know your test results and keep a list of the medicines you take. How can you care for yourself at home? · If your doctor prescribed a medicated ointment or cream, use it exactly as prescribed. Call your doctor if you think you are having a problem with your medicine. · Wash pressure sores every day, or as often as your doctor recommends. Most tap water is safe, but follow the advice of your doctor or nurse. He or she may recommend that you use a saline solution. This is a salt and water solution that you can buy over the counter. · Put on bandages as your doctor or wound care specialist says. · Keep healthy tissue around the sore clean and dry. · Check your skin every day for sores (or have a caregiver do it). · If you know what is causing the pressure that caused the sore, find a way to remove that pressure. To prevent pressure sores  · Change your position or have your caregiver help you change your position often. You may need to do this every 2 hours if you are in bed or every 15 minutes if you are in a wheelchair. This lowers the chance of making sores worse and getting new sores. · Use special mattresses or other support. These may include low-pressure mattresses or cushions made of foam that can be filled with air, water, beads, or fiber. · Eat healthy foods with plenty of protein to help heal damaged skin and to help new skin grow. · Try to stay at a healthy weight. Being overweight can lead to more pressure on your skin. · Do not slide across sheets or slump in a chair or bed. · Do not smoke. Smoking dries the skin and reduces its blood supply.  If you need help quitting, talk to your doctor about stop-smoking programs and medicines. These can increase your chances of quitting for good. When should you call for help? Call your doctor now or seek immediate medical care if:  · You have signs of infection, such as:  ¨ Increased pain, swelling, warmth, or redness. ¨ Red streaks leading from the sore. ¨ Pus draining from the sore. ¨ A fever. Watch closely for changes in your health, and be sure to contact your doctor if:  · Your pressure sores are not healing. · You have new pressure sores. · You need help changing positions in bed or in a chair. · Your caregiver needs help to move you. Where can you learn more? Go to http://delfina-cony.info/. Enter F114 in the search box to learn more about \"Pressure Sores: Care Instructions. \"  Current as of: June 4, 2016  Content Version: 11.2  © 4015-9576 [employer]. Care instructions adapted under license by [employer] (which disclaims liability or warranty for this information). If you have questions about a medical condition or this instruction, always ask your healthcare professional. [address] any warranty or liability for your use of this information.

## 2017-07-15 ENCOUNTER — HOSPITAL ENCOUNTER (EMERGENCY)
Age: 52
Discharge: HOME | End: 2017-07-15
Attending: EMERGENCY MEDICINE | Admitting: EMERGENCY MEDICINE
Payer: SELF-PAY

## 2017-07-15 ENCOUNTER — APPOINTMENT (OUTPATIENT)
Dept: CT IMAGING | Age: 52
End: 2017-07-15
Attending: EMERGENCY MEDICINE
Payer: SELF-PAY

## 2017-07-15 VITALS
BODY MASS INDEX: 34.07 KG/M2 | HEIGHT: 66 IN | OXYGEN SATURATION: 96 % | TEMPERATURE: 98.1 F | HEART RATE: 88 BPM | DIASTOLIC BLOOD PRESSURE: 89 MMHG | WEIGHT: 212 LBS | SYSTOLIC BLOOD PRESSURE: 150 MMHG | RESPIRATION RATE: 16 BRPM

## 2017-07-15 DIAGNOSIS — R10.84 ABDOMINAL PAIN, GENERALIZED: Primary | ICD-10-CM

## 2017-07-15 LAB
ALBUMIN SERPL BCP-MCNC: 3.4 G/DL (ref 3.5–5)
ALBUMIN/GLOB SERPL: 0.8 {RATIO} (ref 1.1–2.2)
ALP SERPL-CCNC: 101 U/L (ref 45–117)
ALT SERPL-CCNC: 29 U/L (ref 12–78)
ANION GAP BLD CALC-SCNC: 9 MMOL/L (ref 5–15)
APPEARANCE UR: ABNORMAL
AST SERPL W P-5'-P-CCNC: 24 U/L (ref 15–37)
BACTERIA URNS QL MICRO: ABNORMAL /HPF
BASOPHILS # BLD AUTO: 0 K/UL (ref 0–0.1)
BASOPHILS # BLD: 0 % (ref 0–1)
BILIRUB SERPL-MCNC: 0.5 MG/DL (ref 0.2–1)
BILIRUB UR QL: NEGATIVE
BUN SERPL-MCNC: 15 MG/DL (ref 6–20)
BUN/CREAT SERPL: 16 (ref 12–20)
CALCIUM SERPL-MCNC: 8.7 MG/DL (ref 8.5–10.1)
CHLORIDE SERPL-SCNC: 99 MMOL/L (ref 97–108)
CO2 SERPL-SCNC: 24 MMOL/L (ref 21–32)
COLOR UR: ABNORMAL
CREAT SERPL-MCNC: 0.92 MG/DL (ref 0.55–1.02)
EOSINOPHIL # BLD: 0.1 K/UL (ref 0–0.4)
EOSINOPHIL NFR BLD: 1 % (ref 0–7)
EPITH CASTS URNS QL MICRO: ABNORMAL /LPF
ERYTHROCYTE [DISTWIDTH] IN BLOOD BY AUTOMATED COUNT: 12.9 % (ref 11.5–14.5)
EST. AVERAGE GLUCOSE BLD GHB EST-MCNC: 209 MG/DL
GLOBULIN SER CALC-MCNC: 4.3 G/DL (ref 2–4)
GLUCOSE BLD STRIP.AUTO-MCNC: 347 MG/DL (ref 65–100)
GLUCOSE SERPL-MCNC: 522 MG/DL (ref 65–100)
GLUCOSE UR STRIP.AUTO-MCNC: >1000 MG/DL
HBA1C MFR BLD: 8.9 % (ref 4.2–6.3)
HCT VFR BLD AUTO: 40.4 % (ref 35–47)
HGB BLD-MCNC: 14.4 G/DL (ref 11.5–16)
HGB UR QL STRIP: ABNORMAL
KETONES UR QL STRIP.AUTO: ABNORMAL MG/DL
LEUKOCYTE ESTERASE UR QL STRIP.AUTO: NEGATIVE
LIPASE SERPL-CCNC: 153 U/L (ref 73–393)
LYMPHOCYTES # BLD AUTO: 20 % (ref 12–49)
LYMPHOCYTES # BLD: 1.4 K/UL (ref 0.8–3.5)
MCH RBC QN AUTO: 29.1 PG (ref 26–34)
MCHC RBC AUTO-ENTMCNC: 35.6 G/DL (ref 30–36.5)
MCV RBC AUTO: 81.6 FL (ref 80–99)
MONOCYTES # BLD: 0.5 K/UL (ref 0–1)
MONOCYTES NFR BLD AUTO: 7 % (ref 5–13)
NEUTS SEG # BLD: 4.8 K/UL (ref 1.8–8)
NEUTS SEG NFR BLD AUTO: 72 % (ref 32–75)
NITRITE UR QL STRIP.AUTO: NEGATIVE
PH UR STRIP: 5.5 [PH] (ref 5–8)
PLATELET # BLD AUTO: 217 K/UL (ref 150–400)
POTASSIUM SERPL-SCNC: 3.8 MMOL/L (ref 3.5–5.1)
PROT SERPL-MCNC: 7.7 G/DL (ref 6.4–8.2)
PROT UR STRIP-MCNC: 300 MG/DL
RBC # BLD AUTO: 4.95 M/UL (ref 3.8–5.2)
RBC #/AREA URNS HPF: ABNORMAL /HPF (ref 0–5)
SERVICE CMNT-IMP: ABNORMAL
SODIUM SERPL-SCNC: 132 MMOL/L (ref 136–145)
SP GR UR REFRACTOMETRY: 1.02 (ref 1–1.03)
UROBILINOGEN UR QL STRIP.AUTO: 0.2 EU/DL (ref 0.2–1)
WBC # BLD AUTO: 6.8 K/UL (ref 3.6–11)
WBC URNS QL MICRO: ABNORMAL /HPF (ref 0–4)
YEAST BUDDING URNS QL: PRESENT

## 2017-07-15 PROCEDURE — 85025 COMPLETE CBC W/AUTO DIFF WBC: CPT | Performed by: EMERGENCY MEDICINE

## 2017-07-15 PROCEDURE — 36415 COLL VENOUS BLD VENIPUNCTURE: CPT | Performed by: EMERGENCY MEDICINE

## 2017-07-15 PROCEDURE — 81001 URINALYSIS AUTO W/SCOPE: CPT | Performed by: EMERGENCY MEDICINE

## 2017-07-15 PROCEDURE — 74011250637 HC RX REV CODE- 250/637: Performed by: EMERGENCY MEDICINE

## 2017-07-15 PROCEDURE — 80053 COMPREHEN METABOLIC PANEL: CPT | Performed by: EMERGENCY MEDICINE

## 2017-07-15 PROCEDURE — 74011250636 HC RX REV CODE- 250/636: Performed by: EMERGENCY MEDICINE

## 2017-07-15 PROCEDURE — 96374 THER/PROPH/DIAG INJ IV PUSH: CPT

## 2017-07-15 PROCEDURE — 83690 ASSAY OF LIPASE: CPT | Performed by: EMERGENCY MEDICINE

## 2017-07-15 PROCEDURE — 74011636637 HC RX REV CODE- 636/637: Performed by: EMERGENCY MEDICINE

## 2017-07-15 PROCEDURE — 83036 HEMOGLOBIN GLYCOSYLATED A1C: CPT | Performed by: EMERGENCY MEDICINE

## 2017-07-15 PROCEDURE — 96361 HYDRATE IV INFUSION ADD-ON: CPT

## 2017-07-15 PROCEDURE — 99283 EMERGENCY DEPT VISIT LOW MDM: CPT

## 2017-07-15 PROCEDURE — 74176 CT ABD & PELVIS W/O CONTRAST: CPT

## 2017-07-15 PROCEDURE — 82962 GLUCOSE BLOOD TEST: CPT

## 2017-07-15 RX ORDER — ACETAMINOPHEN 325 MG/1
650 TABLET ORAL
Status: COMPLETED | OUTPATIENT
Start: 2017-07-15 | End: 2017-07-15

## 2017-07-15 RX ADMIN — SODIUM CHLORIDE 1000 ML: 900 INJECTION, SOLUTION INTRAVENOUS at 18:51

## 2017-07-15 RX ADMIN — ACETAMINOPHEN 650 MG: 325 TABLET, FILM COATED ORAL at 17:25

## 2017-07-15 RX ADMIN — HUMAN INSULIN 15 UNITS: 100 INJECTION, SOLUTION SUBCUTANEOUS at 18:51

## 2017-07-15 NOTE — DISCHARGE INSTRUCTIONS
Abdominal Pain: Care Instructions  Your Care Instructions    Abdominal pain has many possible causes. Some aren't serious and get better on their own in a few days. Others need more testing and treatment. If your pain continues or gets worse, you need to be rechecked and may need more tests to find out what is wrong. You may need surgery to correct the problem. Don't ignore new symptoms, such as fever, nausea and vomiting, urination problems, pain that gets worse, and dizziness. These may be signs of a more serious problem. Your doctor may have recommended a follow-up visit in the next 8 to 12 hours. If you are not getting better, you may need more tests or treatment. The doctor has checked you carefully, but problems can develop later. If you notice any problems or new symptoms, get medical treatment right away. Follow-up care is a key part of your treatment and safety. Be sure to make and go to all appointments, and call your doctor if you are having problems. It's also a good idea to know your test results and keep a list of the medicines you take. How can you care for yourself at home? · Rest until you feel better. · To prevent dehydration, drink plenty of fluids, enough so that your urine is light yellow or clear like water. Choose water and other caffeine-free clear liquids until you feel better. If you have kidney, heart, or liver disease and have to limit fluids, talk with your doctor before you increase the amount of fluids you drink. · If your stomach is upset, eat mild foods, such as rice, dry toast or crackers, bananas, and applesauce. Try eating several small meals instead of two or three large ones. · Wait until 48 hours after all symptoms have gone away before you have spicy foods, alcohol, and drinks that contain caffeine. · Do not eat foods that are high in fat. · Avoid anti-inflammatory medicines such as aspirin, ibuprofen (Advil, Motrin), and naproxen (Aleve).  These can cause stomach upset. Talk to your doctor if you take daily aspirin for another health problem. When should you call for help? Call 911 anytime you think you may need emergency care. For example, call if:  · You passed out (lost consciousness). · You pass maroon or very bloody stools. · You vomit blood or what looks like coffee grounds. · You have new, severe belly pain. Call your doctor now or seek immediate medical care if:  · Your pain gets worse, especially if it becomes focused in one area of your belly. · You have a new or higher fever. · Your stools are black and look like tar, or they have streaks of blood. · You have unexpected vaginal bleeding. · You have symptoms of a urinary tract infection. These may include:  ¨ Pain when you urinate. ¨ Urinating more often than usual.  ¨ Blood in your urine. · You are dizzy or lightheaded, or you feel like you may faint. Watch closely for changes in your health, and be sure to contact your doctor if:  · You are not getting better after 1 day (24 hours). Where can you learn more? Go to http://delfina-cony.info/. Enter D129 in the search box to learn more about \"Abdominal Pain: Care Instructions. \"  Current as of: March 20, 2017  Content Version: 11.3  © 1480-5291 [employer]. Care instructions adapted under license by [employer] (which disclaims liability or warranty for this information). If you have questions about a medical condition or this instruction, always ask your healthcare professional. [place] [address] any warranty or liability for your use of this information. We hope that we have addressed all of your medical concerns. The examination and treatment you received in the Emergency Department were for an emergent problem and were not intended as complete care. It is important that you follow up with your healthcare provider(s) for ongoing care.  If your symptoms worsen or do not improve as expected, and you are unable to reach your usual health care provider(s), you should return to the Emergency Department. Today's healthcare is undergoing tremendous change, and patient satisfaction surveys are one of the many tools to assess the quality of medical care. You may receive a survey from the [employer] regarding your experience in the Emergency Department. I hope that your experience has been completely positive, particularly the medical care that I provided. As such, please participate in the survey; anything less than excellent does not meet my expectations or intentions. [address] and [facility] participate in nationally recognized quality of care measures. If your blood pressure is greater than 120/80, as reported below, we urge that you seek medical care to address the potential of high blood pressure, commonly known as hypertension. Hypertension can be hereditary or can be caused by certain medical conditions, pain, stress, or \"white coat syndrome. \"       Please make an appointment with your health care provider(s) for follow up of your Emergency Department visit. VITALS:   Patient Vitals for the past 8 hrs:   Temp Pulse Resp BP   07/15/17 1636 98.5 °F (36.9 °C) 100 16 164/83          Thank you for allowing us to provide you with medical care today. We realize that you have many choices for your emergency care needs. Please choose us in the future for any continued health care needs.       Josefina Lovell MD    [facility]   Office: [phone]            Recent Results (from the past 24 hour(s))   CBC WITH AUTOMATED DIFF    Collection Time: 07/15/17  5:14 PM   Result Value Ref Range    WBC 6.8 3.6 - 11.0 K/uL    RBC 4.95 3.80 - 5.20 M/uL    HGB 14.4 11.5 - 16.0 g/dL    HCT 40.4 35.0 - 47.0 %    MCV 81.6 80.0 - 99.0 FL    MCH 29.1 26.0 - 34.0 PG    MCHC 35.6 30.0 - 36.5 g/dL    RDW 12.9 11.5 - 14.5 %    PLATELET 929 689 - 974 K/uL    NEUTROPHILS 72 32 - 75 %    LYMPHOCYTES 20 12 - 49 %    MONOCYTES 7 5 - 13 %    EOSINOPHILS 1 0 - 7 %    BASOPHILS 0 0 - 1 %    ABS. NEUTROPHILS 4.8 1.8 - 8.0 K/UL    ABS. LYMPHOCYTES 1.4 0.8 - 3.5 K/UL    ABS. MONOCYTES 0.5 0.0 - 1.0 K/UL    ABS. EOSINOPHILS 0.1 0.0 - 0.4 K/UL    ABS. BASOPHILS 0.0 0.0 - 0.1 K/UL   METABOLIC PANEL, COMPREHENSIVE    Collection Time: 07/15/17  5:14 PM   Result Value Ref Range    Sodium 132 (L) 136 - 145 mmol/L    Potassium 3.8 3.5 - 5.1 mmol/L    Chloride 99 97 - 108 mmol/L    CO2 24 21 - 32 mmol/L    Anion gap 9 5 - 15 mmol/L    Glucose 522 (H) 65 - 100 mg/dL    BUN 15 6 - 20 MG/DL    Creatinine 0.92 0.55 - 1.02 MG/DL    BUN/Creatinine ratio 16 12 - 20      GFR est AA >60 >60 ml/min/1.73m2    GFR est non-AA >60 >60 ml/min/1.73m2    Calcium 8.7 8.5 - 10.1 MG/DL    Bilirubin, total 0.5 0.2 - 1.0 MG/DL    ALT (SGPT) 29 12 - 78 U/L    AST (SGOT) 24 15 - 37 U/L    Alk.  phosphatase 101 45 - 117 U/L    Protein, total 7.7 6.4 - 8.2 g/dL    Albumin 3.4 (L) 3.5 - 5.0 g/dL    Globulin 4.3 (H) 2.0 - 4.0 g/dL    A-G Ratio 0.8 (L) 1.1 - 2.2     LIPASE    Collection Time: 07/15/17  5:14 PM   Result Value Ref Range    Lipase 153 73 - 393 U/L   URINALYSIS W/MICROSCOPIC    Collection Time: 07/15/17  5:14 PM   Result Value Ref Range    Color YELLOW/STRAW      Appearance TURBID (A) CLEAR      Specific gravity 1.020 1.003 - 1.030      pH (UA) 5.5 5.0 - 8.0      Protein 300 (A) NEG mg/dL    Glucose >1000 (A) NEG mg/dL    Ketone TRACE (A) NEG mg/dL    Bilirubin NEGATIVE  NEG      Blood SMALL (A) NEG      Urobilinogen 0.2 0.2 - 1.0 EU/dL    Nitrites NEGATIVE  NEG      Leukocyte Esterase NEGATIVE  NEG      WBC  0 - 4 /hpf    RBC 0-5 0 - 5 /hpf    Epithelial cells FEW FEW /lpf    Bacteria 4+ (A) NEG /hpf    Budding Yeast PRESENT (A) NEG     GLUCOSE, POC    Collection Time: 07/15/17  7:46 PM   Result Value Ref Range    Glucose (POC) 347 (H) 65 - 100 mg/dL    Performed by Nayla Castillo        Ct Abd Pelv Wo Cont    Result Date: 7/15/2017  EXAM:  CT ABD PELV WO CONT INDICATION:  small mass in soft tissue of L abd wall.  lymphnode?  lipoma? COMPARISON: 3/9/2017. CONTRAST:  None. TECHNIQUE: Unenhanced multislice helical CT was performed from the diaphragm to the symphysis pubis without oral or intravenous contrast administration. Contiguous 5 mm axial images were reconstructed and lung and soft tissue windows were generated. Coronal and sagittal reformations were generated. CT dose reduction was achieved through use of a standardized protocol tailored for this examination and automatic exposure control for dose modulation. FINDINGS: LUNG: The visualized portions of the lung bases are clear. The absence of intravenous contrast material reduces the sensitivity for evaluation of the solid parenchymal organs of the abdomen. LIVER: No focal lesion. GALLBLADDER: There is higher density material in the dependent portion of the gallbladder which may be poorly calcified stones. SPLEEN: No focal lesion. PANCREAS: No focal lesion. ADRENALS: No focal lesion. KIDNEYS: Small nonobstructing renal calculi noted in both kidneys. There is no hydronephrosis. GI: There is no evidence of bowel obstruction. There is moderate amount of fecal material in the right colon and transverse colon. . APPENDIX: Unremarkable. AORTA: The aorta tapers without aneurysm. RETROPERITONEUM:  There is no retroperitoneal adenopathy or mass. PERITONEUM: There is no ascites or free intraperitoneal air. Abdominal wall THAT: There are small subcutaneous densities in the right and left side of the abdominal wall which may be small sebaceous cysts. The largest of these is noted on the left at the level of the iliac crest measuring 1.2 x 2.2 cm. BLADDER: There is air in the urinary bladder. This should be correlated with patient's history of instrumentation to determine significance.  Bladder wall is otherwise unremarkable. PELVIS: There is no pelvic mass or adenopathy. BONES: No sclerotic or lytic lesions noted. IMPRESSION:  1. No evidence of urinary tract obstruction. Nonobstructing tiny renal calculi noted bilaterally. There is gas in the urinary bladder which needs to be correlated with patient's history of instrumentation. 2. No evidence of bowel obstruction. 3. There are subcutaneous densities which may be sebaceous cyst in the anterior abdominal wall inferiorly as described above.

## 2017-07-15 NOTE — ED PROVIDER NOTES
HPI Comments: 46 y.o. female with past medical history significant for DM, peripheral neuropathy, diabetic retinopathy/neuropathy, HTN, and asthma, who presents from home with chief complaint of intermittent 7/10 left sided abdominal pain, accompanied by a \"lump\" in the area of her pain, onset 1 week ago. Pt states that she can feel the \"lump\" if she lies on her right side, but she denies any mitigating factors for the abdominal pain. She reports that she has been taking Tylenol for pain control at home, and she states that her sx are different than abdominal pain she has experienced in the past. Of note, pt states that she was recently admitted to [facility] for tx of a diabetic foot ulcer and was given 2 days of IV abx while inpatient. She was discharged with PO abx, and states the ulcer is now \"all clear\". She also notes an insulin needle broke off around the same area of the \"lump\" 1 year ago. Pt specifically denies any vomiting, diarrhea, difficulty urinating, and measured fevers. There are no other acute medical concerns at this time. Note written by Olivia Kate, as dictated by Markel Villarreal MD 5:02 PM.    The history is provided by the patient. Past Medical History:   Diagnosis Date    Arthritis     Asthma     Chronic back pain     Diabetes ([address]     Diabetic retinopathy ([address]     Hypertension     MRSA (methicillin resistant staph aureus) culture positive     labial abscess    [facility]urop[address] [address]        Past Surgical History:   Procedure Laterality Date    HX HEENT      tonsillectomy    HX ORTHOPAEDIC      left ft bunionectomy    HX OTHER SURGICAL      lanced labial abscess         History reviewed. No pertinent family history. Social History     Social History    Marital status:      Spouse name: N/A    Number of children: N/A    Years of education: N/A     Occupational History    Not on file.      Social History Main Topics    Smoking status: Never Smoker    Smokeless tobacco: Never Used    Alcohol use No    Drug use: No    Sexual activity: Not on file     Other Topics Concern    Not on file     Social History Narrative         ALLERGIES: Aspirin and Ciprofloxacin    Review of Systems   Constitutional: Negative for fever. HENT: Negative for facial swelling. Eyes: Negative for visual disturbance. Respiratory: Negative for chest tightness. Cardiovascular: Negative for chest pain. Gastrointestinal: Positive for abdominal pain. Negative for diarrhea and vomiting. Positive for \"lump\" in the left abdomen   Genitourinary: Negative for difficulty urinating and dysuria. Musculoskeletal: Negative for arthralgias. Skin: Negative for rash. Neurological: Negative for dizziness. Hematological: Negative for adenopathy. Psychiatric/Behavioral: Negative for suicidal ideas. All other systems reviewed and are negative. Vitals:    07/15/17 1636   BP: 164/83   Pulse: 100   Resp: 16   Temp: 98.5 °F (36.9 °C)   Weight: 96.2 kg (212 lb)   Height: 5' 5.5\" (1.664 m)            Physical Exam   Constitutional: She is oriented to person, place, and time. She appears well-developed. No distress. She is obese. HENT:   Head: Normocephalic and atraumatic. Mouth/Throat: Oropharynx is clear and moist.   Eyes: Pupils are equal, round, and reactive to light. No scleral icterus. Neck: Normal range of motion. Neck supple. No thyromegaly present. Cardiovascular: Normal rate, regular rhythm, normal heart sounds and intact distal pulses. No murmur heard. Pulmonary/Chest: Effort normal and breath sounds normal. No respiratory distress. Abdominal: Soft. Bowel sounds are normal. She exhibits no distension. There is no tenderness. On the left side of the abdomen, there is a 1 cm palpable knot in the soft tissue. Musculoskeletal: Normal range of motion. She exhibits no edema.    Neurological: She is alert and oriented to person, place, and time. Skin: Skin is warm and dry. No rash noted. She is not diaphoretic. Nursing note and vitals reviewed. Note written by Stephen Gray. Michael Diamond, as dictated by Chelly Salgado MD 5:03 PM.         MDM  Number of Diagnoses or Management Options  Abdominal pain, generalized:   Diagnosis management comments: abd soft. Small nodule palpable in L flank/abd wall. Nontender. No redness. VS normal.    P:  Ct b/p  labs    ED Course       Procedures    Labs unremarkable except for glucose=522    BGL improved after fluids and insulin. F/u with PCP regarding CT findings. CT Results (most recent):    Results from Hospital Encounter encounter on 07/15/17   CT ABD PELV WO CONT   Narrative EXAM:  CT ABD PELV WO CONT    INDICATION:  small mass in soft tissue of L abd wall.  lymphnode?  lipoma? COMPARISON: 3/9/2017. CONTRAST:  None. TECHNIQUE:   Unenhanced multislice helical CT was performed from the diaphragm to the  symphysis pubis without oral or intravenous contrast administration. Contiguous  5 mm axial images were reconstructed and lung and soft tissue windows were  generated. Coronal and sagittal reformations were generated. CT dose reduction  was achieved through use of a standardized protocol tailored for this  examination and automatic exposure control for dose modulation. FINDINGS:  LUNG: The visualized portions of the lung bases are clear. The absence of intravenous contrast material reduces the sensitivity for  evaluation of the solid parenchymal organs of the abdomen. LIVER: No focal lesion. GALLBLADDER: There is higher density material in the dependent portion of the  gallbladder which may be poorly calcified stones. SPLEEN: No focal lesion. PANCREAS: No focal lesion. ADRENALS: No focal lesion. KIDNEYS: Small nonobstructing renal calculi noted in both kidneys. There is no  hydronephrosis. GI: There is no evidence of bowel obstruction.  There is moderate amount of fecal  material in the right colon and transverse colon. .  APPENDIX: Unremarkable. AORTA: The aorta tapers without aneurysm. RETROPERITONEUM:  There is no retroperitoneal adenopathy or mass. PERITONEUM: There is no ascites or free intraperitoneal air. Abdominal wall THAT: There are small subcutaneous densities in the right and  left side of the abdominal wall which may be small sebaceous cysts. The largest  of these is noted on the left at the level of the iliac crest measuring 1.2 x  2.2 cm. BLADDER: There is air in the urinary bladder. This should be correlated with  patient's history of instrumentation to determine significance. Bladder wall is  otherwise unremarkable. PELVIS: There is no pelvic mass or adenopathy. BONES: No sclerotic or lytic lesions noted. Impression IMPRESSION:    1. No evidence of urinary tract obstruction. Nonobstructing tiny renal calculi  noted bilaterally. There is gas in the urinary bladder which needs to be  correlated with patient's history of instrumentation. 2. No evidence of bowel obstruction. 3. There are subcutaneous densities which may be sebaceous cyst in the anterior  abdominal wall inferiorly as described above.

## 2017-07-15 NOTE — ED TRIAGE NOTES
LUQ abdominal pain onset 5 days ago. Denies N/V/D, dysuria, or fever. \"Constipation is normal for me but I feel a knot in my stomach when I lie on my left side\".

## 2017-07-16 NOTE — ED NOTES
MD reviewed discharge instructions with the patient. The patient verbalized understanding. Patient ambulated out of the emergency department without assistance. Patient remains in pain. Patient is in no apparent distress.

## 2017-11-14 ENCOUNTER — HOSPITAL ENCOUNTER (EMERGENCY)
Age: 52
Discharge: HOME | End: 2017-11-14
Attending: EMERGENCY MEDICINE
Payer: SELF-PAY

## 2017-11-14 VITALS
BODY MASS INDEX: 35.08 KG/M2 | OXYGEN SATURATION: 96 % | RESPIRATION RATE: 18 BRPM | TEMPERATURE: 98.5 F | DIASTOLIC BLOOD PRESSURE: 82 MMHG | WEIGHT: 210.54 LBS | HEART RATE: 100 BPM | HEIGHT: 65 IN | SYSTOLIC BLOOD PRESSURE: 160 MMHG

## 2017-11-14 DIAGNOSIS — R31.9 URINARY TRACT INFECTION WITH HEMATURIA, SITE UNSPECIFIED: ICD-10-CM

## 2017-11-14 DIAGNOSIS — B37.41 YEAST CYSTITIS: ICD-10-CM

## 2017-11-14 DIAGNOSIS — R73.9 HYPERGLYCEMIA: ICD-10-CM

## 2017-11-14 DIAGNOSIS — E11.40 DIABETIC NEUROPATHY, PAINFUL (HCC): Primary | ICD-10-CM

## 2017-11-14 DIAGNOSIS — Z76.5 DRUG-SEEKING BEHAVIOR: ICD-10-CM

## 2017-11-14 DIAGNOSIS — N39.0 URINARY TRACT INFECTION WITH HEMATURIA, SITE UNSPECIFIED: ICD-10-CM

## 2017-11-14 LAB
APPEARANCE UR: ABNORMAL
BACTERIA URNS QL MICRO: ABNORMAL /HPF
BILIRUB UR QL: NEGATIVE
COLOR UR: ABNORMAL
EPITH CASTS URNS QL MICRO: ABNORMAL /LPF
GLUCOSE BLD STRIP.AUTO-MCNC: 414 MG/DL (ref 65–100)
GLUCOSE UR STRIP.AUTO-MCNC: >1000 MG/DL
HGB UR QL STRIP: ABNORMAL
KETONES UR QL STRIP.AUTO: NEGATIVE MG/DL
LEUKOCYTE ESTERASE UR QL STRIP.AUTO: NEGATIVE
NITRITE UR QL STRIP.AUTO: POSITIVE
PH UR STRIP: 5.5 [PH] (ref 5–8)
PROT UR STRIP-MCNC: >300 MG/DL
RBC #/AREA URNS HPF: ABNORMAL /HPF (ref 0–5)
SERVICE CMNT-IMP: ABNORMAL
SP GR UR REFRACTOMETRY: 1.02 (ref 1–1.03)
UA: UC IF INDICATED,UAUC: ABNORMAL
UROBILINOGEN UR QL STRIP.AUTO: 1 EU/DL (ref 0.2–1)
WBC URNS QL MICRO: ABNORMAL /HPF (ref 0–4)
YEAST URNS QL MICRO: PRESENT

## 2017-11-14 PROCEDURE — 87186 SC STD MICRODIL/AGAR DIL: CPT | Performed by: EMERGENCY MEDICINE

## 2017-11-14 PROCEDURE — 74011636637 HC RX REV CODE- 636/637: Performed by: EMERGENCY MEDICINE

## 2017-11-14 PROCEDURE — 99284 EMERGENCY DEPT VISIT MOD MDM: CPT

## 2017-11-14 PROCEDURE — 87077 CULTURE AEROBIC IDENTIFY: CPT | Performed by: EMERGENCY MEDICINE

## 2017-11-14 PROCEDURE — 81001 URINALYSIS AUTO W/SCOPE: CPT | Performed by: EMERGENCY MEDICINE

## 2017-11-14 PROCEDURE — 82962 GLUCOSE BLOOD TEST: CPT

## 2017-11-14 PROCEDURE — 87086 URINE CULTURE/COLONY COUNT: CPT | Performed by: EMERGENCY MEDICINE

## 2017-11-14 RX ORDER — FLUCONAZOLE 150 MG/1
150 TABLET ORAL
Qty: 1 TAB | Refills: 1 | OUTPATIENT
Start: 2017-11-14 | End: 2017-11-14

## 2017-11-14 RX ORDER — CEPHALEXIN 500 MG/1
500 CAPSULE ORAL 4 TIMES DAILY
Qty: 28 CAP | Refills: 0 | Status: SHIPPED | OUTPATIENT
Start: 2017-11-14 | End: 2017-11-21

## 2017-11-14 RX ORDER — FLUCONAZOLE 150 MG/1
150 TABLET ORAL
Qty: 1 TAB | Refills: 1 | Status: SHIPPED | OUTPATIENT
Start: 2017-11-15 | End: 2017-11-15

## 2017-11-14 RX ORDER — CEPHALEXIN 500 MG/1
500 CAPSULE ORAL 4 TIMES DAILY
Qty: 28 CAP | Refills: 0 | OUTPATIENT
Start: 2017-11-14 | End: 2017-11-14

## 2017-11-14 RX ADMIN — INSULIN HUMAN 8 UNITS: 100 INJECTION, SOLUTION PARENTERAL at 22:43

## 2017-11-14 NOTE — LETTER
[address] 
[facility] EMERGENCY DEPT 
[address] [place] [address] [address] [id number] 
[phone] Work/School Note Date: To Whom It May concern: 
 
   was seen and treated today in the emergency room by the following provider(s): 
Attending Provider: Marco Russell MD.   
 
    may return to work/school on        . Sincerely, Marco Russell MD

## 2017-11-15 NOTE — ED NOTES
Dr. Dawayne Dandy reviewed discharge instructions with the patient. The patient verbalized understanding.

## 2017-11-15 NOTE — ED PROVIDER NOTES
[facility] [address] [address]  EMERGENCY DEPARTMENT HISTORY AND PHYSICAL EXAM         Date of Service: 11/14/2017   Patient Name: Sharon Olivas   YOB: 1965  Medical Record Number: [id number]    History of Presenting Illness     Chief Complaint   Patient presents with    Neuropathy     marty harveyill, been out of rx for 1 week, bilateral hand and feet pain        History Provided By:  patient    Additional History:   Sharon Olivas is a 46 y.o. female with PMHx significant for diabetic neuropathy who presents ambulatory to the ED with cc of 8/10 progressively worsening burning BL hand pain and BL feet pain x 1 week. She states that her pain feels similar to her diabetic neuropathy. The patient reports that she has taken OTC remedies with no relief in pain. She states that she is typically prescribed Gabapentin 600 mg TID, but she ran out of her prescription 1 week ago. The patient also c/o dysuria, and she states that she has a history of recurrent UTIs. She notes that she has been taking Azo. She states that she last evaluated her BG 1 week ago. She reports that she has been compliant with her insulin. She denies close follow up with a PCP. She specifically denies fevers, chest pain, SOB, nausea, vomiting, abdominal pain, weakness, or other complaints at this time. Social Hx: - Tobacco, - EtOH, - Illicit Drugs    There are no other complaints, changes or physical findings at this time.     Primary Care Provider: None     Past History     Past Medical History:   Past Medical History:   Diagnosis Date    Arthritis     Asthma     Chronic back pain     Diabetes ([facility]     Diabetic retinopathy ([facility]     Hypertension     MRSA (methicillin resistant staph aureus) culture positive     labial abscess    Neuropathy         Past Surgical History:   Past Surgical History:   Procedure Laterality Date    HX HEENT      tonsillectomy    HX ORTHOPAEDIC      left ft bunionectomy    HX OTHER SURGICAL      lanced labial abscess        Family History:   History reviewed. No pertinent family history. Social History:   Social History   Substance Use Topics    Smoking status: Never Smoker    Smokeless tobacco: Never Used    Alcohol use No        Allergies: Allergies   Allergen Reactions    Aspirin Unknown (comments)     Patient states naproxen ok    Ciprofloxacin Hives        Review of Systems   Review of Systems   Constitutional: Negative for chills and fever. HENT: Negative for congestion, ear pain, rhinorrhea and sore throat. Respiratory: Negative for cough and shortness of breath. Cardiovascular: Negative for chest pain, palpitations and leg swelling. Gastrointestinal: Negative for abdominal pain, constipation, diarrhea, nausea and vomiting. No melena  No hematochezia   Endocrine: Negative for polyuria. Genitourinary: Positive for dysuria. Negative for frequency and hematuria. Musculoskeletal: Positive for arthralgias (BL hands, feet). Neurological: Negative for dizziness, weakness, light-headedness, numbness and headaches. No tingling   All other systems reviewed and are negative.       Physical Exam  Physical Exam   General appearance - overweight, well appearing, and in no distress  Eyes - pupils equal and reactive, extraocular eye movements intact  ENT - mucous membranes moist, pharynx normal without lesions  Neck - supple, no significant adenopathy; non-tender to palpation  Chest - clear to auscultation, no wheezes, rales or rhonchi; non-tender to palpation  Heart - normal rate and regular rhythm, S1 and S2 normal, no murmurs noted, normal pulses  Abdomen - soft, nontender, nondistended, no masses or organomegaly  Musculoskeletal - no joint tenderness, deformity or swelling; normal ROM  Extremities - peripheral pulses normal, no pedal edema  Skin - normal coloration and turgor, no rashes, normal capillary refill, no wounds to her feet  Neurological - alert, oriented x3, normal speech, no movement disorder noted, decreased sensation to her hands and feet      Medical Decision Making   I am the first provider for this patient. I reviewed the vital signs, available nursing notes, past medical history, past surgical history, family history and social history. Provider Notes:   DDx: Neuropathy, UTI, hyperglycemia, diabetes. ED Course:  10:07 PM   Initial assessment performed. The patients presenting problems have been discussed, and they are in agreement with the care plan formulated and outlined with them. I have encouraged them to ask questions as they arise throughout their visit. Progress Note:  10:15 PM  The patient was counseled that she should evaluate her BG on a daily basis to ensure that her BG is stable. She conveys her understanding of this . The pt's  was reviewed, and she filled 90 tablets of Gabapentin 300 mg on 11/5/17. Per , this was intended to be a 30-day supply. Written by YUAN Weiner, as dictated by Octaviano Sauer MD.    Progress Note:  10:30 PM  The pt's initial POC BG in the ED is 414. Will order 8 units of insulin. Written by YUAN Weiner, as dictated by Octaviano Sauer MD.    Progress Note:  10:33 PM  The patient was confronted about her recently filled prescription of Gabapentin on 11/5/17. She was informed that she will not be getting an additional prescription for Gabapentin at time of discharge today. She is in understanding of this. Pending UA. Written by YUAN Weiner, as dictated by Octaviano Sauer MD.    Progress Note:  10:50 PM  The pt's UA is significant for a UTI, will prescribe Keflex, Diflucan and discharge. The patient was informed of her UA results, and she is in agreement with the plan at this time.   Written by YUAN Weiner, as dictated by Octaviano Sauer MD.      Diagnostic Study Results   Labs -      Recent Results (from the past 12 hour(s))   GLUCOSE, POC Collection Time: 11/14/17 10:24 PM   Result Value Ref Range    Glucose (POC) 414 (H) 65 - 100 mg/dL    Performed by Duffy Collette    URINALYSIS W/ REFLEX CULTURE    Collection Time: 11/14/17 10:26 PM   Result Value Ref Range    Color ORANGE      Appearance HAZY (A) CLEAR      Specific gravity 1.020 1.003 - 1.030      pH (UA) 5.5 5.0 - 8.0      Protein >300 (A) NEG mg/dL    Glucose >1000 (A) NEG mg/dL    Ketone NEGATIVE  NEG mg/dL    Bilirubin NEGATIVE  NEG      Blood SMALL (A) NEG      Urobilinogen 1.0 0.2 - 1.0 EU/dL    Nitrites POSITIVE (A) NEG      Leukocyte Esterase NEGATIVE  NEG      WBC 10-20 0 - 4 /hpf    RBC 5-10 0 - 5 /hpf    Epithelial cells FEW FEW /lpf    Bacteria 4+ (A) NEG /hpf    UA:UC IF INDICATED URINE CULTURE ORDERED (A) CNI      Yeast PRESENT (A) NEG         Vital Signs-Reviewed the patient's vital signs. Patient Vitals for the past 12 hrs:   Temp Pulse Resp BP SpO2   11/14/17 2245 - 100 18 160/82 96 %   11/14/17 2230 - (!) 102 18 168/87 96 %   11/14/17 2152 98.5 °F (36.9 °C) (!) 111 16 156/87 98 %       Medications Given in the ED:  Medications   insulin regular (NOVOLIN R, HUMULIN R) injection 8 Units (8 Units SubCUTAneous Given 11/14/17 2243)       Diagnosis:  Clinical Impression:   1. Diabetic neuropathy, painful ([address]    2. Hyperglycemia    3. Urinary tract infection with hematuria, site unspecified    4. Yeast cystitis    5. Drug-seeking behavior         Plan:  1:   Follow-up Information     Follow up With Details Comments Contact Info    [facility] EMERGENCY DEPT  If symptoms worsen [address] [place], [place] [address]way  [phone]          2:   Current Discharge Medication List      START taking these medications    Details   cephALEXin (KEFLEX) 500 mg capsule Take 1 Cap by mouth four (4) times daily for 7 days. Qty: 28 Cap, Refills: 0      fluconazole (DIFLUCAN) 150 mg tablet Take 1 Tab by mouth now for 1 dose.  FDA advises cautious prescribing of oral fluconazole in pregnancy. Qty: 1 Tab, Refills: 1           Return to ED if worse. Discharge Note:  10:57 PM  The pt is ready for discharge. The pt's signs, symptoms, diagnosis, and discharge instructions have been discussed and pt has conveyed their understanding. The pt is to follow up as recommended or return to ER should their symptoms worsen. Plan has been discussed and pt is in agreement. Attestations: This note is prepared by Sirisha Evans, acting as a Scribe for Bev Ruggiero MD.    Astrid Lin MD: The scribe's documentation has been prepared under my direction and personally reviewed by me in its entirety. I confirm that the notes above accurately reflects all work, treatment, procedures, and medical decision making performed by me. This note will not be viewable in [address]

## 2017-11-15 NOTE — DISCHARGE INSTRUCTIONS
Candidiasis: Care Instructions  Your Care Instructions  Candidiasis (say \"eyh-[id number]-uh-yuki\") is a yeast infection. Yeast normally lives in your body. But it can cause problems if your body's defenses don't work as they should. Some medicines can increase your chance of getting a yeast infection. These include antibiotics, steroids, and cancer drugs. And some diseases like AIDS and diabetes can make you more likely to get yeast infections. There are different types of yeast infections. Berneda Balls is a yeast infection in the mouth. It usually occurs in people with weak immune systems. It causes white patches inside the mouth and throat. Yeast infections of the skin usually occur in skin folds where the skin stays moist. They cause red, oozing patches on your skin. Babies can get these infections under the diaper. People who often wear gloves can get them on their hands. Many women get vaginal yeast infections. They are most common when women take antibiotics. These infections can cause the vagina to itch and burn. They also cause white discharge that looks like cottage cheese. In rare cases, yeast infects the blood. This can cause serious disease. This kind of infection is treated with medicine given through a needle into a vein (IV). After you start treatment, a yeast infection usually goes away quickly. But if your immune system is weak, the infection may come back. Tell your doctor if you get yeast infections often. Follow-up care is a key part of your treatment and safety. Be sure to make and go to all appointments, and call your doctor if you are having problems. It's also a good idea to know your test results and keep a list of the medicines you take. How can you care for yourself at home? · Take your medicines exactly as prescribed. Call your doctor if you think you are having a problem with your medicine. · Use antibiotics only as directed by your doctor. · Eat yogurt with live cultures.  It has bacteria called lactobacillus. It may help prevent some types of yeast infections. · Keep your skin clean and dry. Put powder on moist places. · If you are using a cream or suppository to treat a vaginal yeast infection, don't use condoms or a diaphragm. Use a different type of birth control. · Eat a healthy diet and get regular exercise. This will help keep your immune system strong. When should you call for help? Watch closely for changes in your health, and be sure to contact your doctor if:  ? · You do not get better as expected. Where can you learn more? Go to http://delfina-cony.info/. Enter C459 in the search box to learn more about \"Candidiasis: Care Instructions. \"  Current as of: October 13, 2016  Content Version: 11.4  © 1376-5893 [employer]. Care instructions adapted under license by [employer] (which disclaims liability or warranty for this information). If you have questions about a medical condition or this instruction, always ask your healthcare professional. [address] any warranty or liability for your use of this information. Learning About Diabetes Food Guidelines  Your Care Instructions    Meal planning is important to manage diabetes. It helps keep your blood sugar at a target level (which you set with your doctor). You don't have to eat special foods. You can eat what your family eats, including sweets once in a while. But you do have to pay attention to how often you eat and how much you eat of certain foods. You may want to work with a dietitian or a certified diabetes educator (CDE) to help you plan meals and snacks. A dietitian or CDE can also help you lose weight if that is one of your goals. What should you know about eating carbs? Managing the amount of carbohydrate (carbs) you eat is an important part of healthy meals when you have diabetes. Carbohydrate is found in many foods.   · Learn which foods have carbs. And learn the amounts of carbs in different foods. ¨ Bread, cereal, pasta, and rice have about 15 grams of carbs in a serving. A serving is 1 slice of bread (1 ounce), ½ cup of cooked cereal, or 1/3 cup of cooked pasta or rice. ¨ Fruits have 15 grams of carbs in a serving. A serving is 1 small fresh fruit, such as an apple or orange; ½ of a banana; ½ cup of cooked or canned fruit; ½ cup of fruit juice; 1 cup of melon or raspberries; or 2 tablespoons of dried fruit. ¨ Milk and no-sugar-added yogurt have 15 grams of carbs in a serving. A serving is 1 cup of milk or 2/3 cup of no-sugar-added yogurt. ¨ Starchy vegetables have 15 grams of carbs in a serving. A serving is ½ cup of mashed potatoes or sweet potato; 1 cup winter squash; ½ of a small baked potato; ½ cup of cooked beans; or ½ cup cooked corn or green peas. · Learn how much carbs to eat each day and at each meal. A dietitian or CDE can teach you how to keep track of the amount of carbs you eat. This is called carbohydrate counting. · If you are not sure how to count carbohydrate grams, use the Plate Method to plan meals. It is a good, quick way to make sure that you have a balanced meal. It also helps you spread carbs throughout the day. ¨ Divide your plate by types of foods. Put non-starchy vegetables on half the plate, meat or other protein food on one-quarter of the plate, and a grain or starchy vegetable in the final quarter of the plate. To this you can add a small piece of fruit and 1 cup of milk or yogurt, depending on how many carbs you are supposed to eat at a meal.  · Try to eat about the same amount of carbs at each meal. Do not \"save up\" your daily allowance of carbs to eat at one meal.  · Proteins have very little or no carbs per serving. Examples of proteins are beef, chicken, turkey, fish, eggs, tofu, cheese, cottage cheese, and peanut butter. A serving size of meat is 3 ounces, which is about the size of a deck of cards. Examples of meat substitute serving sizes (equal to 1 ounce of meat) are 1/4 cup of cottage cheese, 1 egg, 1 tablespoon of peanut butter, and ½ cup of tofu. How can you eat out and still eat healthy? · Learn to estimate the serving sizes of foods that have carbohydrate. If you measure food at home, it will be easier to estimate the amount in a serving of restaurant food. · If the meal you order has too much carbohydrate (such as potatoes, corn, or baked beans), ask to have a low-carbohydrate food instead. Ask for a salad or green vegetables. · If you use insulin, check your blood sugar before and after eating out to help you plan how much to eat in the future. · If you eat more carbohydrate at a meal than you had planned, take a walk or do other exercise. This will help lower your blood sugar. What else should you know? · Limit saturated fat, such as the fat from meat and dairy products. This is a healthy choice because people who have diabetes are at higher risk of heart disease. So choose lean cuts of meat and nonfat or low-fat dairy products. Use olive or canola oil instead of butter or shortening when cooking. · Don't skip meals. Your blood sugar may drop too low if you skip meals and take insulin or certain medicines for diabetes. · Check with your doctor before you drink alcohol. Alcohol can cause your blood sugar to drop too low. Alcohol can also cause a bad reaction if you take certain diabetes medicines. Follow-up care is a key part of your treatment and safety. Be sure to make and go to all appointments, and call your doctor if you are having problems. It's also a good idea to know your test results and keep a list of the medicines you take. Where can you learn more? Go to http://delfina-cony.info/. Enter T742 in the search box to learn more about \"Learning About Diabetes Food Guidelines. \"  Current as of: March 13, 2017  Content Version: 11.4  © 4781-2379 [employer]. Care instructions adapted under license by [employer] (which disclaims liability or warranty for this information). If you have questions about a medical condition or this instruction, always ask your healthcare professional. [place] [address] any warranty or liability for your use of this information. Urinary Tract Infection in Women: Care Instructions  Your Care Instructions    A urinary tract infection, or UTI, is a general term for an infection anywhere between the kidneys and the urethra (where urine comes out). Most UTIs are bladder infections. They often cause pain or burning when you urinate. UTIs are caused by bacteria and can be cured with antibiotics. Be sure to complete your treatment so that the infection goes away. Follow-up care is a key part of your treatment and safety. Be sure to make and go to all appointments, and call your doctor if you are having problems. It's also a good idea to know your test results and keep a list of the medicines you take. How can you care for yourself at home? · Take your antibiotics as directed. Do not stop taking them just because you feel better. You need to take the full course of antibiotics. · Drink extra water and other fluids for the next day or two. This may help wash out the bacteria that are causing the infection. (If you have kidney, heart, or liver disease and have to limit fluids, talk with your doctor before you increase your fluid intake.)  · Avoid drinks that are carbonated or have caffeine. They can irritate the bladder. · Urinate often. Try to empty your bladder each time. · To relieve pain, take a hot bath or lay a heating pad set on low over your lower belly or genital area. Never go to sleep with a heating pad in place. To prevent UTIs  · Drink plenty of water each day. This helps you urinate often, which clears bacteria from your system.  (If you have kidney, heart, or liver disease and have to limit fluids, talk with your doctor before you increase your fluid intake.)  · Urinate when you need to. · Urinate right after you have sex. · Change sanitary pads often. · Avoid douches, bubble baths, feminine hygiene sprays, and other feminine hygiene products that have deodorants. · After going to the bathroom, wipe from front to back. When should you call for help? Call your doctor now or seek immediate medical care if:  ? · Symptoms such as fever, chills, nausea, or vomiting get worse or appear for the first time. ? · You have new pain in your back just below your rib cage. This is called flank pain. ? · There is new blood or pus in your urine. ? · You have any problems with your antibiotic medicine. ? Watch closely for changes in your health, and be sure to contact your doctor if:  ? · You are not getting better after taking an antibiotic for 2 days. ? · Your symptoms go away but then come back. Where can you learn more? Go to http://delfina-cony.info/. Enter Z294 in the search box to learn more about \"Urinary Tract Infection in Women: Care Instructions. \"  Current as of: May 12, 2017  Content Version: 11.4  © 3145-0836 [employer]. Care instructions adapted under license by [employer] (which disclaims liability or warranty for this information). If you have questions about a medical condition or this instruction, always ask your healthcare professional. [place] [address] any warranty or liability for your use of this information. Diabetic Neuropathy: Care Instructions  Your Care Instructions    When you have diabetes, your blood sugar level may get too high. Over time, high blood sugar levels can damage nerves. This is called diabetic neuropathy. Nerve damage can cause pain, burning, tingling, and numbness and may leave you feeling weak. The feet are often affected.  When you have nerve damage in your feet, you cannot feel your feet and toes as well as normal and may not notice cuts or sores. Even a small injury can lead to a serious infection. It is very important that you follow your doctor's advice on foot care. Sometimes diabetes damages nerves that help the body function. If this happens, your blood pressure, sweating, digestion, and urination might be affected. Your doctor may give you a target blood sugar level that is higher or lower than you are used to. Try to keep your blood sugar very close to this target level to prevent more damage. Follow-up care is a key part of your treatment and safety. Be sure to make and go to all appointments, and call your doctor if you are having problems. It's also a good idea to know your test results and keep a list of the medicines you take. How can you care for yourself at home? · Take your medicines exactly as prescribed. Call your doctor if you think you are having a problem with your medicine. It is very important that you take your insulin or diabetes pills as your doctor tells you. · Try to keep blood sugar at your target level. ¨ Eat a variety of healthy foods, with carbohydrate spread out in your meals. A dietitian can help you plan meals. ¨ Try to get at least 30 minutes of exercise on most days. ¨ Check your blood sugar as many times each day as your doctor recommends. · Take and record your blood pressure at home if your doctor tells you to. Learn the importance of the two measures of blood pressure (such as 130 over 80, or 130/80). To take your blood pressure at home:  ¨ Ask your doctor to check your blood pressure monitor to be sure it is accurate and the cuff fits you. Also ask your doctor to watch you to make sure that you are using it right. ¨ Do not use medicine known to raise blood pressure (such as some nasal decongestant sprays) before taking your blood pressure. ¨ Avoid taking your blood pressure if you have just exercised or are nervous or upset.  Rest at least 15 minutes before you take a reading. · Take pain medicines exactly as directed. ¨ If the doctor gave you a prescription medicine for pain, take it as prescribed. ¨ If you are not taking a prescription pain medicine, ask your doctor if you can take an over-the-counter medicine. · Do not smoke. Smoking can increase your chance for a heart attack or stroke. If you need help quitting, talk to your doctor about stop-smoking programs and medicines. These can increase your chances of quitting for good. · Limit alcohol to 2 drinks a day for men and 1 drink a day for women. Too much alcohol can cause health problems. · Eat small meals often, rather than 2 or 3 large meals a day. To care for your feet  · Prevent injury by wearing shoes at all times, even when you are indoors. · Do foot care as part of your daily routine. Wash your feet and then rub lotion on your feet, but not between your toes. Use a handheld mirror or magnifying mirror to inspect your feet for blisters, cuts, cracks, or sores. · Have your toenails trimmed and filed straight across. · Wear shoes and socks that fit well. Soft shoes that have good support and that fit well (such as tennis shoes) are best for your feet. · Check your shoes for any loose objects or rough edges before you put them on. · Ask your doctor to check your feet during each visit. Your doctor may notice a foot problem you have missed. · Get early treatment for any foot problem, even a minor one. When should you call for help? Call your doctor now or seek immediate medical care if:  ? · You have symptoms of infection, such as:  ¨ Increased pain, swelling, warmth, or redness. ¨ Red streaks leading from the area. ¨ Pus draining from the area. ¨ A fever. ? · You have new or worse numbness, pain, or tingling in any part of your body. ? Watch closely for changes in your health, and be sure to contact your doctor if:  ? · You have a new problem with your feet, such as:  ¨ A new sore or ulcer. ¨ A break in the skin that is not healing after several days. ¨ Bleeding corns or calluses. ¨ An ingrown toenail. ? · You do not get better as expected. Where can you learn more? Go to http://delfina-cony.info/. Enter K857 in the search box to learn more about \"Diabetic Neuropathy: Care Instructions. \"  Current as of: March 13, 2017  Content Version: 11.4  © 0878-2998 [employer]. Care instructions adapted under license by [employer] (which disclaims liability or warranty for this information). If you have questions about a medical condition or this instruction, always ask your healthcare professional. [address] any warranty or liability for your use of this information. Diabetes Foot Health: Care Instructions  Your Care Instructions    When you have diabetes, your feet need extra care and attention. Diabetes can damage the nerve endings and blood vessels in your feet, making you less likely to notice when your feet are injured. Diabetes also limits your body's ability to fight infection and get blood to areas that need it. If you get a minor foot injury, it could become an ulcer or a serious infection. With good foot care, you can prevent most of these problems. Caring for your feet can be quick and easy. Most of the care can be done when you are bathing or getting ready for bed. Follow-up care is a key part of your treatment and safety. Be sure to make and go to all appointments, and call your doctor if you are having problems. It's also a good idea to know your test results and keep a list of the medicines you take. How can you care for yourself at home? · Keep your blood sugar close to normal by watching what and how much you eat, monitoring blood sugar, taking medicines if prescribed, and getting regular exercise. · Do not smoke. Smoking affects blood flow and can make foot problems worse.  If you need help quitting, talk to your doctor about stop-smoking programs and medicines. These can increase your chances of quitting for good. · Eat a diet that is low in fats. High fat intake can cause fat to build up in your blood vessels and decrease blood flow. · Inspect your feet daily for blisters, cuts, cracks, or sores. If you cannot see well, use a mirror or have someone help you. · Take care of your feet:  [facility] [place] [facility] your feet every day. Use warm (not hot) water. Check the water temperature with your wrists or other part of your body, not your feet. ¨ Dry your feet well. Pat them dry. Do not rub the skin on your feet too hard. Dry well between your toes. If the skin on your feet stays moist, bacteria or a fungus can grow, which can lead to infection. ¨ Keep your skin soft. Use moisturizing skin cream to keep the skin on your feet soft and prevent calluses and cracks. But do not put the cream between your toes, and stop using any cream that causes a rash. ¨ Clean underneath your toenails carefully. Do not use a sharp object to clean underneath your toenails. Use the blunt end of a nail file or other rounded tool. ¨ Trim and file your toenails straight across to prevent ingrown toenails. Use a nail clipper, not scissors. Use an emery board to smooth the edges. · Change socks daily. Socks without seams are best, because seams often rub the feet. You can find socks for people with diabetes from specialty catalogs. · Look inside your shoes every day for things like gravel or torn linings, which could cause blisters or sores. · Buy shoes that fit well:  ¨ Look for shoes that have plenty of space around the toes. This helps prevent bunions and blisters. ¨ Try on shoes while wearing the kind of socks you will usually wear with the shoes. ¨ Avoid plastic shoes. They may rub your feet and cause blisters. Good shoes should be made of materials that are flexible and breathable, such as leather or cloth.   ¨ Break in new shoes slowly by wearing them for no more than an hour a day for several days. Take extra time to check your feet for red areas, blisters, or other problems after you wear new shoes. · Do not go barefoot. Do not wear sandals, and do not wear shoes with very thin soles. Thin soles are easy to puncture. They also do not protect your feet from hot pavement or cold weather. · Have your doctor check your feet during each visit. If you have a foot problem, see your doctor. Do not try to treat an early foot problem at home. Home remedies or treatments that you can buy without a prescription (such as corn removers) can be harmful. · Always get early treatment for foot problems. A minor irritation can lead to a major problem if not properly cared for early. When should you call for help? Call your doctor now or seek immediate medical care if:  ? · You have a foot sore, an ulcer or break in the skin that is not healing after 4 days, bleeding corns or calluses, or an ingrown toenail. ? · You have blue or black areas, which can mean bruising or blood flow problems. ? · You have peeling skin or tiny blisters between your toes or cracking or oozing of the skin. ? · You have a fever for more than 24 hours and a foot sore. ? · You have new numbness or tingling in your feet that does not go away after you move your feet or change positions. ? · You have unexplained or unusual swelling of the foot or ankle. ? Watch closely for changes in your health, and be sure to contact your doctor if:  ? · You cannot do proper foot care. Where can you learn more? Go to http://delfina-cony.info/. Enter A739 in the search box to learn more about \"Diabetes Foot Health: Care Instructions. \"  Current as of: March 13, 2017  Content Version: 11.4  © 1683-3922 [employer]. Care instructions adapted under license by [employer] (which disclaims liability or warranty for this information).  If you have questions about a medical condition or this instruction, always ask your healthcare professional. [place] [address] any warranty or liability for your use of this information.

## 2017-11-17 LAB
BACTERIA SPEC CULT: ABNORMAL
CC UR VC: ABNORMAL
SERVICE CMNT-IMP: ABNORMAL

## 2018-02-26 ENCOUNTER — APPOINTMENT (OUTPATIENT)
Dept: GENERAL RADIOLOGY | Age: 53
End: 2018-02-26
Attending: PHYSICIAN ASSISTANT
Payer: SELF-PAY

## 2018-02-26 ENCOUNTER — HOSPITAL ENCOUNTER (EMERGENCY)
Age: 53
Discharge: HOME | End: 2018-02-26
Attending: EMERGENCY MEDICINE
Payer: SELF-PAY

## 2018-02-26 VITALS
SYSTOLIC BLOOD PRESSURE: 168 MMHG | TEMPERATURE: 98.2 F | RESPIRATION RATE: 16 BRPM | HEART RATE: 102 BPM | BODY MASS INDEX: 33.49 KG/M2 | WEIGHT: 201.28 LBS | DIASTOLIC BLOOD PRESSURE: 87 MMHG | OXYGEN SATURATION: 95 %

## 2018-02-26 DIAGNOSIS — M25.552 LEFT HIP PAIN: Primary | ICD-10-CM

## 2018-02-26 DIAGNOSIS — G89.4 CHRONIC PAIN SYNDROME: ICD-10-CM

## 2018-02-26 DIAGNOSIS — Z86.59 HISTORY OF DEPRESSION: ICD-10-CM

## 2018-02-26 DIAGNOSIS — M16.12 ARTHRITIS OF LEFT HIP: ICD-10-CM

## 2018-02-26 PROCEDURE — 99283 EMERGENCY DEPT VISIT LOW MDM: CPT

## 2018-02-26 PROCEDURE — 73502 X-RAY EXAM HIP UNI 2-3 VIEWS: CPT

## 2018-02-26 PROCEDURE — 74011250637 HC RX REV CODE- 250/637: Performed by: PHYSICIAN ASSISTANT

## 2018-02-26 RX ORDER — ACETAMINOPHEN 325 MG/1
650 TABLET ORAL
Qty: 20 TAB | Refills: 0 | Status: SHIPPED | OUTPATIENT
Start: 2018-02-26 | End: 2018-02-26

## 2018-02-26 RX ORDER — HYDROCODONE BITARTRATE AND ACETAMINOPHEN 5; 325 MG/1; MG/1
1 TABLET ORAL
Status: COMPLETED | OUTPATIENT
Start: 2018-02-26 | End: 2018-02-26

## 2018-02-26 RX ORDER — ACETAMINOPHEN 325 MG/1
650 TABLET ORAL
Qty: 20 TAB | Refills: 0 | Status: ON HOLD | OUTPATIENT
Start: 2018-02-26 | End: 2021-08-08

## 2018-02-26 RX ORDER — LIDOCAINE 50 MG/G
1 PATCH TOPICAL EVERY 24 HOURS
Status: DISCONTINUED | OUTPATIENT
Start: 2018-02-26 | End: 2018-02-26 | Stop reason: HOSPADM

## 2018-02-26 RX ADMIN — HYDROCODONE BITARTRATE AND ACETAMINOPHEN 1 TABLET: 5; 325 TABLET ORAL at 19:24

## 2018-02-26 NOTE — ED PROVIDER NOTES
EMERGENCY DEPARTMENT HISTORY AND PHYSICAL EXAM      Date: 2/26/2018  Patient Name: Gordon Strauss    History of Presenting Illness     Chief Complaint   Patient presents with    Hip Pain     left hip pain x3 weeks, reports hx arthritis. no injury. History Provided By: Patient    HPI: Gordon Strauss, 46 y.o. female with PMHx significant for HTN, diabetes, and chronic pain, presents ambulatory to the ED with cc of gradually worsening, constant, left hip pain that intermittently radiates to her left groin x 3 weeks. She reports that her pain is exacerbated with ambulation and while bearing weight. She reports taking a dose of Tylenol with no relief. She states that she was evaluated at another local ED 2 days ago, and she was prescribed Norco with some relief. She denies having any imaging ordered during that time. She denies close PCP or Orthopedic Surgery follow up due to lack of insurance. She also denies any recent falls, injuries, or trauma to her left hip. She specifically denies fevers or other complaints at this time. There are no other complaints, changes, or physical findings at this time. PCP: None    Current Facility-Administered Medications   Medication Dose Route Frequency Provider Last Rate Last Dose    lidocaine (LIDODERM) 5 % patch 1 Patch  1 [facility]H [facility]   1 Patch at 02/26/18 1924     Current Outpatient Prescriptions   Medication Sig Dispense Refill    acetaminophen (TYLENOL) 325 mg tablet Take 2 Tabs by mouth every four (4) hours as needed for Pain. 20 Tab 0    gabapentin (NEURONTIN) 300 mg capsule Take 2 Caps by mouth three (3) times daily as needed. 30 Cap 0    promethazine (PHENERGAN) 25 mg tablet Take 1 Tab by mouth every six (6) hours as needed for Nausea. 12 Tab 0    ondansetron (ZOFRAN ODT) 4 mg disintegrating tablet Take 1 Tab by mouth every eight (8) hours as needed for Nausea.  16 Tab 0    insulin lispro (HUMALOG) 100 unit/mL injection Take 32 units with meals, only if BG > 100. Indications: HYPERGLYCEMIA, TYPE 2 DIABETES MELLITUS 3 Vial 3    insulin glargine (LANTUS) 100 unit/mL injection 72 Units by SubCUTAneous route daily. Indications: TYPE 2 DIABETES MELLITUS 3 Vial 3    glucose blood VI test strips (BLOOD GLUCOSE TEST) strip Check blood sugar 1-2 times daily. 1 Each 11    fenofibrate nanocrystallized (TRICOR) 145 mg tablet Take 1 Tab by mouth daily. 30 Tab 3    lisinopril (PRINIVIL, ZESTRIL) 10 mg tablet Take 1 Tab by mouth daily. 30 Tab 3    glipiZIDE (GLUCOTROL) 5 mg tablet Take 1 Tab by mouth two (2) times a day. 60 Tab 3    traZODone (DESYREL) 50 mg tablet Take 1 Tab by mouth nightly. 30 Tab 3       Past History     Past Medical History:  Past Medical History:   Diagnosis Date    Arthritis     Asthma     Chronic back pain     Diabetes ([address]     Di[address] retinopathy [address] [address]     Hypertension     MRSA (methicillin resistant staph aureus) culture positive     labial abscess    Neuropathy        Past Surgical History:  Past Surgical History:   Procedure Laterality Date    HX HEENT      tonsillectomy    HX ORTHOPAEDIC      left ft bunionectomy    HX OTHER SURGICAL      lanced labial abscess       Family History:  No family history on file. Social History:  Social History   Substance Use Topics    Smoking status: Never Smoker    Smokeless tobacco: Never Used    Alcohol use No       Allergies: Allergies   Allergen Reactions    Aspirin Unknown (comments)     Patient states naproxen ok    Ciprofloxacin Hives       Review of Systems   Review of Systems   Constitutional: Negative. Negative for activity change, appetite change, chills, diaphoresis, fever and unexpected weight change. HENT: Negative for congestion, hearing loss, rhinorrhea, sinus pressure, sneezing, sore throat and trouble swallowing. Eyes: Negative for pain, redness, itching and visual disturbance.    Respiratory: Negative for cough, shortness of breath and wheezing. Cardiovascular: Negative for chest pain, palpitations and leg swelling. Gastrointestinal: Negative for abdominal pain, constipation, diarrhea, nausea and vomiting. Genitourinary: Negative for dysuria. Musculoskeletal: Positive for arthralgias (Left hip). Negative for gait problem and myalgias. Skin: Negative for color change, pallor, rash and wound. Neurological: Negative for tremors, weakness, light-headedness, numbness and headaches. All other systems reviewed and are negative. Physical Exam   Physical Exam   Constitutional: She is oriented to person, place, and time. Vital signs are normal. No distress. 46 y.o.  female in NAD  Communicates appropriately and in full sentences  Normal vital signs  Elevated BMI   HENT:   Head: Normocephalic and atraumatic. Eyes: Conjunctivae are normal. Pupils are equal, round, and reactive to light. Right eye exhibits no discharge. Left eye exhibits no discharge. Neck: Normal range of motion. Neck supple. No nuchal rigidity   Cardiovascular: Normal rate, regular rhythm and intact distal pulses. Pulmonary/Chest: Effort normal and breath sounds normal. No respiratory distress. She has no wheezes. Abdominal: Soft. Bowel sounds are normal. She exhibits no distension. There is no tenderness. No palpable or visible inguinal hernias. Musculoskeletal: Normal range of motion. She exhibits no edema or deformity. No neurologic, motor, vascular, or compartment embarrassment observed on exam. No focal neurologic deficits. Left hip: No bruising, redness, or swelling. Diffuse left hip tenderness; no focal tenderness. Ambulatory. Neurological: She is alert and oriented to person, place, and time. Coordination normal.   Skin: Skin is warm and dry. No rash noted. She is not diaphoretic. No erythema. No pallor. Psychiatric: She has a normal mood and affect. Nursing note and vitals reviewed.       Diagnostic Study Results     Radiologic Studies -    INDICATION:  left hip pain x 3 weeks, Hx of arthritis, no known injury      AP view of the pelvis and lateral view of the left hip.     No acute fracture or dislocation is visualized. There is mild narrowing of the  hip joint spaces bilaterally. Bones are well-mineralized. Soft tissues are  normal.     IMPRESSION  IMPRESSION: No evidence of acute fracture or dislocation.                  Medical Decision Making   I am the first provider for this patient. I reviewed the vital signs, available nursing notes, past medical history, past surgical history, family history and social history. Vital Signs-Reviewed the patient's vital signs. Patient Vitals for the past 12 hrs:   Temp Pulse Resp BP SpO2   02/26/18 1820 98.2 °F (36.8 °C) (!) 102 16 168/87 95 %       Records Reviewed: Nursing Notes and Old Medical Records    Provider Notes (Medical Decision Making):   DDx: Sprain, strain, fracture, contusion, DJD, arthritis. ED Course:   Initial assessment performed. The patients presenting problems have been discussed, and they are in agreement with the care plan formulated and outlined with them. I have encouraged them to ask questions as they arise throughout their visit. Progress Note:  6:35 PM  The patient was recommended to seek evaluation in a free clinic given her lack of insurance. She will be provided with a list of local free clinics. Written by Michelle Banda ED Scribfranklin, as dictated by Gladis Reyna PA-C.    7:26 PM  Pt requesting pain medication with ride in ED.  reviewed. Discussed the safe use of narcotics with the patient as well as common side effects including constipation, nausea, sedation, and drowsiness. Informed pt that they should not operate heavy machinery or a vehicle while taking this drug. Additionally, discussed concern for addiction or overuse with narcotic use.  Pt expresses she has no questions about appropriate pain medication use and her concerns have been addressed.    -[facility], [facility]  -02/15: Tramadol, #15  -02/06: Tramdol, #10    Progress Note:  7:21 PM  At time of discharge, the patient was informed of her negative left hip xray results, and she conveys her understanding. She was provided with Orthopedic Surgery follow up should her symptoms persist or worsen. Pt was highly encouraged to apply for health insurance, and she was provided with a list of local free clinics. Pt is currently stable for discharge, and she is in agreement with the plan. Written by YUAN Mathew, as dictated by Dutch Griffin PA-C. Critical Care Time: 0 minutes    Discharge Note:  7:22 PM  The pt is ready for discharge. The pt's signs, symptoms, diagnosis, and discharge instructions have been discussed and pt has conveyed their understanding. The pt is to follow up as recommended or return to ER should their symptoms worsen. Plan has been discussed and pt is in agreement. PLAN:  1. Discharge Medication List as of 2/26/2018  7:16 PM      CONTINUE these medications which have CHANGED    Details   acetaminophen (TYLENOL) 325 mg tablet Take 2 Tabs by mouth every four (4) hours as needed for Pain., Normal, Disp-20 Tab, R-0         CONTINUE these medications which have NOT CHANGED    Details   gabapentin (NEURONTIN) 300 mg capsule Take 2 Caps by mouth three (3) times daily as needed. , Print, Disp-30 Cap, R-0      promethazine (PHENERGAN) 25 mg tablet Take 1 Tab by mouth every six (6) hours as needed for Nausea. , Print, Disp-12 Tab, R-0      ondansetron (ZOFRAN ODT) 4 mg disintegrating tablet Take 1 Tab by mouth every eight (8) hours as needed for Nausea. , Print, Disp-16 Tab, R-0      insulin lispro (HUMALOG) 100 unit/mL injection Take 32 units with meals, only if BG > 100. Indications: HYPERGLYCEMIA, TYPE 2 DIABETES MELLITUS, Print, Disp-3 Vial, R-3      insulin glargine (LANTUS) 100 unit/mL injection 72 Units by SubCUTAneous route daily.  Indications: TYPE 2 DIABETES MELLITUS, Normal, Disp-3 Vial, R-3      glucose blood VI test strips (BLOOD GLUCOSE TEST) strip Check blood sugar 1-2 times daily. , Normal, Disp-1 Each, R-11      fenofibrate nanocrystallized (TRICOR) 145 mg tablet Take 1 Tab by mouth daily. , Normal, Disp-30 Tab, R-3      lisinopril (PRINIVIL, ZESTRIL) 10 mg tablet Take 1 Tab by mouth daily. , Normal, Disp-30 Tab, R-3      glipiZIDE (GLUCOTROL) 5 mg tablet Take 1 Tab by mouth two (2) times a day., Normal, Disp-60 Tab, R-3      traZODone (DESYREL) 50 mg tablet Take 1 Tab by mouth nightly., Normal, Disp-30 Tab, R-3           2. Follow-up Information     Follow up With Details Comments Contact Info    None Schedule an appointment as soon as possible for a visit in 2 days As needed, Possible further evaluation and treatment, If symptoms worsen None [phone] Patient stated that they have no PCP      [facility] EM[facility]PT Go to As needed, If symptoms worsen [address] Janell Bishop MD Call today  [facility]  [address]  [phone]          Return to ED if worse     Diagnosis     Clinical Impression:   1. Left hip pain    2. Arthritis of left hip    3. History of depression    4. Chronic pain syndrome        Attestations: This note is prepared by Susan Hoover, acting as a Scribe for [facility] Temitope [address] 79, PA-C: The scribe's documentation has been prepared under my direction and personally reviewed by me in its entirety. I confirm that the notes above accurately reflects all work, treatment, procedures, and medical decision making performed by me. This note will not be viewable in [address]

## 2018-02-26 NOTE — ED NOTES
Patient evaluated in [facility]. Patient alert and oriented;  C/o chronic left hip pain; denies injury. PA has evaluated.

## 2018-02-27 NOTE — DISCHARGE INSTRUCTIONS
Arthritis: Care Instructions  Your Care Instructions  Arthritis, also called osteoarthritis, is a breakdown of the cartilage that cushions your joints. When the cartilage wears down, your bones rub against each other. This causes pain and stiffness. Many people have some arthritis as they age. Arthritis most often affects the joints of the spine, hands, hips, knees, or feet. You can take simple measures to protect your joints, ease your pain, and help you stay active. Follow-up care is a key part of your treatment and safety. Be sure to make and go to all appointments, and call your doctor if you are having problems. It's also a good idea to know your test results and keep a list of the medicines you take. How can you care for yourself at home? · Stay at a healthy weight. Being overweight puts extra strain on your joints. · Talk to your doctor or physical therapist about exercises that will help ease joint pain. ¨ Stretch. You may enjoy gentle forms of yoga to help keep your joints and muscles flexible. ¨ Walk instead of jog. Other types of exercise that are less stressful on the joints include riding a bicycle, swimming, lula chi, or water exercise. ¨ Lift weights. Strong muscles help reduce stress on your joints. Stronger thigh muscles, for example, take some of the stress off of the knees and hips. Learn the right way to lift weights so you do not make joint pain worse. · Take your medicines exactly as prescribed. Call your doctor if you think you are having a problem with your medicine. · Take pain medicines exactly as directed. ¨ If the doctor gave you a prescription medicine for pain, take it as prescribed. ¨ If you are not taking a prescription pain medicine, ask your doctor if you can take an over-the-counter medicine. · Use a cane, crutch, walker, or another device if you need help to get around. These can help rest your joints.  You also can use other things to make life easier, such as a higher toilet seat and padded handles on kitchen utensils. · Do not sit in low chairs, which can make it hard to get up. · Put heat or cold on your sore joints as needed. Use whichever helps you most. You also can take turns with hot and cold packs. ¨ Apply heat 2 or 3 times a day for 20 to 30 minutes-using a heating pad, hot shower, or hot pack-to relieve pain and stiffness. ¨ Put ice or a cold pack on your sore joint for 10 to 20 minutes at a time. Put a thin cloth between the ice and your skin. When should you call for help? Call your doctor now or seek immediate medical care if:  ? · You have sudden swelling, warmth, or pain in any joint. ? · You have joint pain and a fever or rash. ? · You have such bad pain that you cannot use a joint. ? Watch closely for changes in your health, and be sure to contact your doctor if:  ? · You have mild joint symptoms that continue even with more than 6 weeks of care at home. ? · You have stomach pain or other problems with your medicine. Where can you learn more? Go to http://delfina-cony.info/. Enter Q429 in the search box to learn more about \"Arthritis: Care Instructions. \"  Current as of: October 31, 2016  Content Version: 11.4  © 5974-8639 [employer]. Care instructions adapted under license by [employer] (which disclaims liability or warranty for this information). If you have questions about a medical condition or this instruction, always ask your healthcare professional. [place] [address] any warranty or liability for your use of this information. Hip Arthritis: Care Instructions  Your Care Instructions    Arthritis, also called osteoarthritis, is a breakdown of the tissue (cartilage) that cushions your joints. Many people have some arthritis as they age. When the cartilage in your hip joints wears down, your hip bone rubs against the hip socket. This causes pain and stiffness.   Work with your doctor to find the right mix of treatments for your arthritis. There are things you can do at home to protect your hip joints, ease your pain, and help you stay active. But if your arthritis becomes so bad that you cannot walk, you may need surgery to replace the hip joint. Follow-up care is a key part of your treatment and safety. Be sure to make and go to all appointments, and call your doctor if you are having problems. It's also a good idea to know your test results and keep a list of the medicines you take. How can you care for yourself at home? · Stay at a healthy weight. Being overweight puts extra strain on your hip joints. · Talk to your doctor or physical therapist about exercises that will help ease hip pain. These tips may help. ¨ Stretch to help prevent stiffness and to prevent injury before you exercise. You may enjoy gentle forms of yoga to help keep your joints and muscles flexible. ¨ Walk instead of jog. Other types of exercise that are less stressful on the joints include riding a bike, swimming, and doing water exercise. ¨ Lift weights. Strong muscles help reduce stress on your joints. Stronger thigh muscles, for example, take some of the stress off of the knees and hips. Learn the right way to lift weights so you do not make joint pain worse. · Take pain medicines exactly as directed. ¨ If the doctor gave you a prescription medicine for pain, take it as prescribed. ¨ If you are not taking a prescription pain medicine, ask your doctor if you can take an over-the-counter medicine. · Use a cane, crutch, walker, or another device if you need help to get around. These can help rest your hips. You also can use other things to make life easier, such as a higher toilet seat. · Do not sit in low chairs, which can make it painful to get up. · Put heat or cold on your sore hips as needed. Use whichever helps you most. You also can go back and forth between hot and cold packs.   ¨ Apply heat 2 or 3 times a day for 20 to 30 minutes-using a heating pad, hot shower, or hot pack-to relieve pain and stiffness. ¨ Put ice or a cold pack on your sore hips for 10 to 20 minutes at a time to numb the area. Put a thin cloth between the ice and your skin. · Think about talking to your doctor about using capsaicin, a cream you apply to the skin for pain relief. When should you call for help? Call your doctor now or seek immediate medical care if:  ? · You have sudden swelling, warmth, or pain in any joint. ? · You have joint pain and a fever or rash. ? · You have such bad pain that you cannot use the joint. ? Watch closely for changes in your health, and be sure to contact your doctor if:  ? · You have mild joint symptoms that continue even with more than 6 weeks of care at home. ? · You do not get better as expected. ? · You have stomach pain or other problems with your medicine. Where can you learn more? Go to http://delfina-cony.info/. Enter R225 in the search box to learn more about \"Hip Arthritis: Care Instructions. \"  Current as of: October 31, 2016  Content Version: 11.4  © 6277-0364 [employer]. Care instructions adapted under license by [employer] (which disclaims liability or warranty for this information). If you have questions about a medical condition or this instruction, always ask your healthcare professional. [place] [address] any warranty or liability for your use of this information. Hip Arthritis: Exercises  Your Care Instructions  Here are some examples of exercises for hip arthritis. Start each exercise slowly. Ease off the exercise if you start to have pain. Your doctor or physical therapist will tell you when you can start these exercises and which ones will work best for you. How to do the exercises  Straight-leg raises to the outside    1. Lie on your side, with your affected hip on top.   2. Tighten the front thigh muscles of your top leg to keep your knee straight. 3. Keep your hip and your leg straight in line with the rest of your body, and keep your knee pointing forward. Do not drop your hip back. 4. Lift your top leg straight up toward the ceiling, about 12 inches off the floor. Hold for about 6 seconds, then slowly lower your leg. 5. Repeat 8 to 12 times. 6. Switch legs and repeat steps 1 through 5, even if only one hip is sore. Straight-leg raises to the inside    1. Lie on your side with your affected hip on the floor. 2. You can either prop up your other leg on a chair, or you can bend that knee and put that foot in front of your other knee. Do not drop your hip back. 3. Tighten the muscles on the front thigh of your bottom leg to straighten that knee. 4. Keep your kneecap pointing forward and your leg straight, and lift your bottom leg up toward the ceiling about 6 inches. Hold for about 6 seconds, then lower slowly. 5. Repeat 8 to 12 times. 6. Switch legs and repeat steps 1 through 5, even if only one hip is sore. Hip hike    1. Stand sideways on the bottom step of a staircase, and hold on to the banister or wall. 2. Keeping both knees straight, lift your good leg off the step and let it hang down. Then hike your good hip up to the same level as your affected hip or a little higher. 3. Repeat 8 to 12 times. 4. Switch legs and repeat steps 1 through 3, even if only one hip is sore. Bridging    1. Lie on your back with both knees bent. Your knees should be bent about 90 degrees. 2. Then push your feet into the floor, squeeze your buttocks, and lift your hips off the floor until your shoulders, hips, and knees are all in a straight line. 3. Hold for about 6 seconds as you continue to breathe normally, and then slowly lower your hips back down to the floor and rest for up to 10 seconds. 4. Repeat 8 to 12 times. Hamstring stretch (lying down)    1.  Lie flat on your back with your legs straight. If you feel discomfort in your back, place a small towel roll under your lower back. 2. Holding the back of your affected leg, lift your leg straight up and toward your body until you feel a stretch at the back of your thigh. 3. Hold the stretch for at least 30 seconds. 4. Repeat 2 to 4 times. 5. Switch legs and repeat steps 1 through 4, even if only one hip is sore. Standing quadriceps stretch    1. If you are not steady on your feet, hold on to a chair, counter, or wall. You can also lie on your stomach or your side to do this exercise. 2. Bend the knee of the leg you want to stretch, and reach behind you to grab the front of your foot or ankle with the hand on the same side. For example, if you are stretching your right leg, use your right hand. 3. Keeping your knees next to each other, pull your foot toward your buttock until you feel a gentle stretch across the front of your hip and down the front of your thigh. Your knee should be pointed directly to the ground, and not out to the side. 4. Hold the stretch for at least 15 to 30 seconds. 5. Repeat 2 to 4 times. 6. Switch legs and repeat steps 1 through 5, even if only one hip is sore. Hip rotator stretch    1. Lie on your back with both knees bent and your feet flat on the floor. 2. Put the ankle of your affected leg on your opposite thigh near your knee. 3. Use your hand to gently push your knee away from your body until you feel a gentle stretch around your hip. 4. Hold the stretch for 15 to 30 seconds. 5. Repeat 2 to 4 times. 6. Repeat steps 1 through 5, but this time use your hand to gently pull your knee toward your opposite shoulder. 7. Switch legs and repeat steps 1 through 6, even if only one hip is sore. Knee-to-chest    1. Lie on your back with your knees bent and your feet flat on the floor.   2. Bring your affected leg to your chest, keeping the other foot flat on the floor (or keeping the other leg straight, whichever feels better on your lower back). 3. Keep your lower back pressed to the floor. Hold for at least 15 to 30 seconds. 4. Relax, and lower the knee to the starting position. 5. Repeat 2 to 4 times. 6. Switch legs and repeat steps 1 through 5, even if only one hip is sore. 7. To get more stretch, put your other leg flat on the floor while pulling your knee to your chest.  Clamshell    1. Lie on your side, with your affected hip on top. Keep your feet and knees together and your knees bent. 2. Raise your top knee, but keep your feet together. Do not let your hips roll back. Your legs should open up like a clamshell. 3. Hold for 6 seconds. 4. Slowly lower your knee back down. Rest for 10 seconds. 5. Repeat 8 to 12 times. 6. Switch legs and repeat steps 1 through 5, even if only one hip is sore. Follow-up care is a key part of your treatment and safety. Be sure to make and go to all appointments, and call your doctor if you are having problems. It's also a good idea to know your test results and keep a list of the medicines you take. Where can you learn more? Go to http://delfina-cony.info/. Enter Y885 in the search box to learn more about \"Hip Arthritis: Exercises. \"  Current as of: March 21, 2017  Content Version: 11.4  © 8441-7315 [employer]. Care instructions adapted under license by [employer]" (which disclaims liability or warranty for this information). If you have questions about a medical condition or this instruction, always ask your healthcare professional. [address] any warranty or liability for your use of this information.

## 2018-04-08 ENCOUNTER — APPOINTMENT (OUTPATIENT)
Dept: GENERAL RADIOLOGY | Age: 53
End: 2018-04-08
Attending: PHYSICIAN ASSISTANT
Payer: SELF-PAY

## 2018-04-08 ENCOUNTER — HOSPITAL ENCOUNTER (EMERGENCY)
Age: 53
Discharge: HOME | End: 2018-04-08
Attending: EMERGENCY MEDICINE
Payer: SELF-PAY

## 2018-04-08 VITALS
SYSTOLIC BLOOD PRESSURE: 146 MMHG | TEMPERATURE: 98.4 F | WEIGHT: 202.6 LBS | HEIGHT: 66 IN | OXYGEN SATURATION: 98 % | BODY MASS INDEX: 32.56 KG/M2 | HEART RATE: 90 BPM | RESPIRATION RATE: 18 BRPM | DIASTOLIC BLOOD PRESSURE: 84 MMHG

## 2018-04-08 DIAGNOSIS — M25.552 LEFT HIP PAIN: Primary | ICD-10-CM

## 2018-04-08 PROCEDURE — 73502 X-RAY EXAM HIP UNI 2-3 VIEWS: CPT

## 2018-04-08 PROCEDURE — 73562 X-RAY EXAM OF KNEE 3: CPT

## 2018-04-08 PROCEDURE — 99282 EMERGENCY DEPT VISIT SF MDM: CPT

## 2018-04-08 RX ORDER — GABAPENTIN 300 MG/1
300 CAPSULE ORAL 3 TIMES DAILY
Qty: 30 CAP | Refills: 0 | Status: SHIPPED | OUTPATIENT
Start: 2018-04-08 | End: 2018-12-20 | Stop reason: DRUGHIGH

## 2018-04-08 RX ORDER — NAPROXEN 500 MG/1
500 TABLET ORAL 2 TIMES DAILY WITH MEALS
Qty: 20 TAB | Refills: 0 | Status: SHIPPED | OUTPATIENT
Start: 2018-04-08 | End: 2018-04-18

## 2018-04-08 NOTE — ED PROVIDER NOTES
HPI Comments: 46 y.o. female with past medical history significant for DM, neuropathy, arthritis, chronic back pain, asthma, and MRSA who presents from home with chief complaint of left hip pain x 2 months. Pt also c/o left knee pain and generalized weakness. Pt reports two ground level falls in the past 2 days. Denies hitting her head. She is not on any blood thinners. There are no other acute medical concerns at this time. Social hx: never smoker, no alcohol or drug use  PCP: None    Note written by Madyson Spaulding, as dictated by Oscar Beltran PA-C 1:53 PM    The history is provided by the patient. No  was used. Past Medical History:   Diagnosis Date    Arthritis     Asthma     Chronic back pain     Diabetes ([address]     Diabetic retinopathy ([address]     Hypertension     MRSA (methicillin resistant staph aureus) culture positive     labial abscess    Neuropathy        Past Surgical History:   Procedure Laterality Date    HX HEENT      tonsillectomy    HX ORTHOPAEDIC      left ft bunionectomy    HX OTHER SURGICAL      lanced labial abscess         History reviewed. No pertinent family history. Social History     Social History    Marital status:      Spouse name: N/A    Number of children: N/A    Years of education: N/A     Occupational History    Not on file. Social History Main Topics    Smoking status: Never Smoker    Smokeless tobacco: Never Used    Alcohol use No    Drug use: No    Sexual activity: Not on file     Other Topics Concern    Not on file     Social History Narrative         ALLERGIES: Aspirin and Ciprofloxacin    Review of Systems   Constitutional: Negative for activity change, appetite change and fatigue. HENT: Negative for ear pain, facial swelling, sore throat and trouble swallowing. Eyes: Negative for pain, discharge and visual disturbance.    Respiratory: Negative for chest tightness, shortness of breath and wheezing. Cardiovascular: Negative for chest pain and palpitations. Gastrointestinal: Negative for abdominal pain, blood in stool, nausea and vomiting. Genitourinary: Negative for difficulty urinating, flank pain and hematuria. Musculoskeletal: Positive for arthralgias (left hip pain, left knee pain). Negative for joint swelling, myalgias and neck pain. Skin: Negative for color change and rash. Neurological: Positive for weakness. Negative for dizziness, numbness and headaches. Hematological: Negative for adenopathy. Does not bruise/bleed easily. Psychiatric/Behavioral: Negative for behavioral problems, confusion and sleep disturbance. All other systems reviewed and are negative. Vitals:    04/08/18 1050 04/08/18 1235   BP: (!) 173/91 146/84   Pulse: 100 90   Resp: 20 18   Temp: 97.9 °F (36.6 °C) 98.4 °F (36.9 °C)   SpO2: 95% 98%   Weight: 91.9 kg (202 lb 9.6 oz)    Height: 5' 5.5\" (1.664 m)             Physical Exam   Constitutional: She is oriented to person, place, and time. She appears well-developed and well-nourished. No distress. HENT:   Head: Normocephalic and atraumatic. Nose: Nose normal.   Mouth/Throat: Oropharynx is clear and moist.   Eyes: Conjunctivae and EOM are normal. Pupils are equal, round, and reactive to light. No scleral icterus. Neck: Normal range of motion. Neck supple. No JVD present. No tracheal deviation present. No thyromegaly present. No carotid bruits noted. Cardiovascular: Normal rate, regular rhythm, normal heart sounds and intact distal pulses. Exam reveals no gallop and no friction rub. No murmur heard. Pulmonary/Chest: Effort normal and breath sounds normal. No respiratory distress. She has no wheezes. She has no rales. She exhibits no tenderness. Abdominal: Soft. Bowel sounds are normal. She exhibits no distension and no mass. There is no tenderness. There is no rebound and no guarding. Musculoskeletal: Normal range of motion.  She exhibits no edema or tenderness. No left hip or knee pain to palpation. No C, T, L, S spine tenderness. Pt has full mobility of upper and lower extremities. Pt is able to ambulate without difficulty. No perineal numbness. Pt is NVI. Lymphadenopathy:     She has no cervical adenopathy. Neurological: She is alert and oriented to person, place, and time. She has normal reflexes. No cranial nerve deficit. Coordination normal.   Skin: Skin is warm and dry. No rash noted. No erythema. Psychiatric: She has a normal mood and affect. Her behavior is normal. Judgment and thought content normal.   Nursing note and vitals reviewed. MDM  Number of Diagnoses or Management Options  Left hip pain:   Diagnosis management comments: Pt afebrile and nontoxic appearing. Vitals, physical exam, and imaging studies all unremarkable. Low index of suspicion for stroke, aneurysm, SAH, meningitis, PE, PTX, ACS, esophageal rupture, dissection, pancreatitis, appendicitis, cholecystitis/cholagnitis, bowel obstruction/perforation, sepsis or any other acute life threatening condition. Will treat symptomatically and advise close follow up with family doctor. Reviewed treatment plan with attending and they agree.   Batool Rojo PA-C        ED Course       Procedures

## 2018-04-08 NOTE — DISCHARGE INSTRUCTIONS
Hip Pain: Care Instructions  Your Care Instructions    Hip pain may be caused by many things, including overuse, a fall, or a twisting movement. Another cause of hip pain is arthritis. Your pain may increase when you stand up, walk, or squat. The pain may come and go or may be constant. Home treatment can help relieve hip pain, swelling, and stiffness. If your pain is ongoing, you may need more tests and treatment. Follow-up care is a key part of your treatment and safety. Be sure to make and go to all appointments, and call your doctor if you are having problems. It's also a good idea to know your test results and keep a list of the medicines you take. How can you care for yourself at home? · Take pain medicines exactly as directed. ¨ If the doctor gave you a prescription medicine for pain, take it as prescribed. ¨ If you are not taking a prescription pain medicine, ask your doctor if you can take an over-the-counter medicine. · Rest and protect your hip. Take a break from any activity, including standing or walking, that may cause pain. · Put ice or a cold pack against your hip for 10 to 20 minutes at a time. Try to do this every 1 to 2 hours for the next 3 days (when you are awake) or until the swelling goes down. Put a thin cloth between the ice and your skin. · Sleep on your healthy side with a pillow between your knees, or sleep on your back with pillows under your knees. · If there is no swelling, you can put moist heat, a heating pad, or a warm cloth on your hip. Do gentle stretching exercises to help keep your hip flexible. · Learn how to prevent falls. Have your vision and hearing checked regularly. Wear slippers or shoes with a nonskid sole. · Stay at a healthy weight. · Wear comfortable shoes. When should you call for help? Call 911 anytime you think you may need emergency care. For example, call if:  ? · You have sudden chest pain and shortness of breath, or you cough up blood.    ? · You are not able to stand or walk or bear weight. ? · Your buttocks, legs, or feet feel numb or tingly. ? · Your leg or foot is cool or pale or changes color. ? · You have severe pain. ?Call your doctor now or seek immediate medical care if:  ? · You have signs of infection, such as:  ¨ Increased pain, swelling, warmth, or redness in the hip area. ¨ Red streaks leading from the hip area. ¨ Pus draining from the hip area. ¨ A fever. ? · You have signs of a blood clot, such as:  ¨ Pain in your calf, back of the knee, thigh, or groin. ¨ Redness and swelling in your leg or groin. ? · You are not able to bend, straighten, or move your leg normally. ? · You have trouble urinating or having bowel movements. ? Watch closely for changes in your health, and be sure to contact your doctor if:  ? · You do not get better as expected. Where can you learn more? Go to http://delfina-cony.info/. Enter G781 in the search box to learn more about \"Hip Pain: Care Instructions. \"  Current as of: March 20, 2017  Content Version: 11.4  © 2861-4018 [employer]. Care instructions adapted under license by [employer] (which disclaims liability or warranty for this information). If you have questions about a medical condition or this instruction, always ask your healthcare professional. [place] [address] any warranty or liability for your use of this information. We hope that we have addressed all of your medical concerns. The examination and treatment you received in the Emergency Department were for an emergent problem and were not intended as complete care. It is important that you follow up with your healthcare provider(s) for ongoing care. If your symptoms worsen or do not improve as expected, and you are unable to reach your usual health care provider(s), you should return to the Emergency Department.       Today's healthcare is undergoing tremendous change, and patient satisfaction surveys are one of the many tools to assess the quality of medical care. You may receive a survey from the [employer] regarding your experience in the Emergency Department. I hope that your experience has been completely positive, particularly the medical care that I provided. As such, please participate in the survey; anything less than excellent does not meet my expectations or intentions. [facility] participate in nationally recognized quality of care measures. If your blood pressure is greater than 120/80, as reported below, we urge that you seek medical care to address the potential of high blood pressure, commonly known as hypertension. Hypertension can be hereditary or can be caused by certain medical conditions, pain, stress, or \"white coat syndrome. \"       Please make an appointment with your health care provider(s) for follow up of your Emergency Department visit. VITALS:   Patient Vitals for the past 8 hrs:   Temp Pulse Resp BP SpO2   04/08/18 1050 97.9 °F (36.6 °C) 100 20 (!) 173/91 95 %          Thank you for allowing us to provide you with medical care today. We realize that you have many choices for your emergency care needs. Please choose us in the future for any continued health care needs. Martinez Doshi, 12 [facility]: [phone]            No results found for this or any previous visit (from the past 24 hour(s)). Xr Hip Lt W Or Wo Pelv 2-3 Vws    Result Date: 4/8/2018  EXAM:  XR HIP LT W OR WO PELV 2-3 VWS INDICATION:   left hip pain. COMPARISON: None. FINDINGS: An AP view of the pelvis and a frogleg lateral view of the left hip demonstrate no fracture, dislocation or other acute abnormality. IMPRESSION:  No acute abnormality.      Xr Knee Lt 3 V    Result Date: 4/8/2018  EXAM:  XR KNEE LT 3 V INDICATION:   left knee pain with weakness beginning 2 months ago. 2 ground-level falls in the past 2 days. COMPARISON: None. FINDINGS: 4 views of the left knee demonstrate no fracture or other acute osseous or articular abnormality. There is no effusion. IMPRESSION:  No acute abnormality.

## 2018-04-08 NOTE — ED TRIAGE NOTES
Triage note: Patient c/o left hip and left knee pain and weakness beginning two months ago. Patient reports two GLF in the last two days.

## 2018-06-24 ENCOUNTER — HOSPITAL ENCOUNTER (EMERGENCY)
Age: 53
Discharge: HOME | End: 2018-06-24
Attending: EMERGENCY MEDICINE | Admitting: EMERGENCY MEDICINE
Payer: SELF-PAY

## 2018-06-24 ENCOUNTER — APPOINTMENT (OUTPATIENT)
Dept: GENERAL RADIOLOGY | Age: 53
End: 2018-06-24
Attending: EMERGENCY MEDICINE
Payer: SELF-PAY

## 2018-06-24 VITALS
BODY MASS INDEX: 32.54 KG/M2 | SYSTOLIC BLOOD PRESSURE: 135 MMHG | HEART RATE: 88 BPM | OXYGEN SATURATION: 96 % | TEMPERATURE: 97.9 F | WEIGHT: 195.33 LBS | HEIGHT: 65 IN | DIASTOLIC BLOOD PRESSURE: 77 MMHG | RESPIRATION RATE: 16 BRPM

## 2018-06-24 DIAGNOSIS — S42.341A CLOSED DISPLACED SPIRAL FRACTURE OF SHAFT OF RIGHT HUMERUS, INITIAL ENCOUNTER: ICD-10-CM

## 2018-06-24 DIAGNOSIS — R73.9 HYPERGLYCEMIA: Primary | ICD-10-CM

## 2018-06-24 DIAGNOSIS — N30.01 ACUTE CYSTITIS WITH HEMATURIA: ICD-10-CM

## 2018-06-24 LAB
ALBUMIN SERPL-MCNC: 2.9 G/DL (ref 3.5–5)
ALBUMIN/GLOB SERPL: 0.8 {RATIO} (ref 1.1–2.2)
ALP SERPL-CCNC: 72 U/L (ref 45–117)
ALT SERPL-CCNC: 17 U/L (ref 12–78)
ANION GAP SERPL CALC-SCNC: 10 MMOL/L (ref 5–15)
APPEARANCE UR: ABNORMAL
AST SERPL-CCNC: 14 U/L (ref 15–37)
BACTERIA URNS QL MICRO: ABNORMAL /HPF
BASE DEFICIT BLDV-SCNC: 1 MMOL/L
BASOPHILS # BLD: 0.1 K/UL (ref 0–0.1)
BASOPHILS NFR BLD: 1 % (ref 0–1)
BDY SITE: ABNORMAL
BILIRUB SERPL-MCNC: 0.3 MG/DL (ref 0.2–1)
BILIRUB UR QL: NEGATIVE
BREATHS.SPONTANEOUS ON VENT: 20
BUN SERPL-MCNC: 15 MG/DL (ref 6–20)
BUN/CREAT SERPL: 16 (ref 12–20)
CALCIUM SERPL-MCNC: 8.7 MG/DL (ref 8.5–10.1)
CHLORIDE SERPL-SCNC: 104 MMOL/L (ref 97–108)
CO2 SERPL-SCNC: 24 MMOL/L (ref 21–32)
COLOR UR: ABNORMAL
CREAT SERPL-MCNC: 0.93 MG/DL (ref 0.55–1.02)
DIFFERENTIAL METHOD BLD: ABNORMAL
EOSINOPHIL # BLD: 0.3 K/UL (ref 0–0.4)
EOSINOPHIL NFR BLD: 5 % (ref 0–7)
EPITH CASTS URNS QL MICRO: ABNORMAL /LPF
ERYTHROCYTE [DISTWIDTH] IN BLOOD BY AUTOMATED COUNT: 13.2 % (ref 11.5–14.5)
GLOBULIN SER CALC-MCNC: 3.6 G/DL (ref 2–4)
GLUCOSE BLD STRIP.AUTO-MCNC: 283 MG/DL (ref 65–100)
GLUCOSE BLD STRIP.AUTO-MCNC: 320 MG/DL (ref 65–100)
GLUCOSE SERPL-MCNC: 352 MG/DL (ref 65–100)
GLUCOSE UR STRIP.AUTO-MCNC: >1000 MG/DL
HCO3 BLDV-SCNC: 23 MMOL/L (ref 23–28)
HCT VFR BLD AUTO: 31.9 % (ref 35–47)
HGB BLD-MCNC: 11.3 G/DL (ref 11.5–16)
HGB UR QL STRIP: ABNORMAL
HYALINE CASTS URNS QL MICRO: ABNORMAL /LPF (ref 0–5)
IMM GRANULOCYTES # BLD: 0 K/UL (ref 0–0.04)
IMM GRANULOCYTES NFR BLD AUTO: 1 % (ref 0–0.5)
INR PPP: 1.1 (ref 0.9–1.1)
KETONES SERPL QL: NEGATIVE
KETONES UR QL STRIP.AUTO: NEGATIVE MG/DL
LEUKOCYTE ESTERASE UR QL STRIP.AUTO: NEGATIVE
LYMPHOCYTES # BLD: 1.5 K/UL (ref 0.8–3.5)
LYMPHOCYTES NFR BLD: 24 % (ref 12–49)
MCH RBC QN AUTO: 30 PG (ref 26–34)
MCHC RBC AUTO-ENTMCNC: 35.4 G/DL (ref 30–36.5)
MCV RBC AUTO: 84.6 FL (ref 80–99)
MONOCYTES # BLD: 0.4 K/UL (ref 0–1)
MONOCYTES NFR BLD: 7 % (ref 5–13)
NEUTS SEG # BLD: 3.8 K/UL (ref 1.8–8)
NEUTS SEG NFR BLD: 63 % (ref 32–75)
NITRITE UR QL STRIP.AUTO: POSITIVE
NRBC # BLD: 0 K/UL (ref 0–0.01)
NRBC BLD-RTO: 0 PER 100 WBC
PCO2 BLDV: 35 MMHG (ref 41–51)
PH BLDV: 7.43 [PH] (ref 7.32–7.42)
PH UR STRIP: 5 [PH] (ref 5–8)
PLATELET # BLD AUTO: 192 K/UL (ref 150–400)
PMV BLD AUTO: 9.5 FL (ref 8.9–12.9)
PO2 BLDV: 70 MMHG (ref 25–40)
POTASSIUM SERPL-SCNC: 3.5 MMOL/L (ref 3.5–5.1)
PROT SERPL-MCNC: 6.5 G/DL (ref 6.4–8.2)
PROT UR STRIP-MCNC: 300 MG/DL
PROTHROMBIN TIME: 10.7 SEC (ref 9–11.1)
RBC # BLD AUTO: 3.77 M/UL (ref 3.8–5.2)
RBC #/AREA URNS HPF: ABNORMAL /HPF (ref 0–5)
SAO2 % BLDV: 95 % (ref 65–88)
SAO2% DEVICE SAO2% SENSOR NAME: ABNORMAL
SERVICE CMNT-IMP: ABNORMAL
SERVICE CMNT-IMP: ABNORMAL
SODIUM SERPL-SCNC: 138 MMOL/L (ref 136–145)
SP GR UR REFRACTOMETRY: >1.03 (ref 1–1.03)
SPECIMEN SITE: ABNORMAL
UA: UC IF INDICATED,UAUC: ABNORMAL
UROBILINOGEN UR QL STRIP.AUTO: 0.2 EU/DL (ref 0.2–1)
WBC # BLD AUTO: 6.1 K/UL (ref 3.6–11)
WBC URNS QL MICRO: ABNORMAL /HPF (ref 0–4)

## 2018-06-24 PROCEDURE — 81001 URINALYSIS AUTO W/SCOPE: CPT | Performed by: EMERGENCY MEDICINE

## 2018-06-24 PROCEDURE — 74011250636 HC RX REV CODE- 250/636: Performed by: EMERGENCY MEDICINE

## 2018-06-24 PROCEDURE — 36415 COLL VENOUS BLD VENIPUNCTURE: CPT | Performed by: EMERGENCY MEDICINE

## 2018-06-24 PROCEDURE — 96376 TX/PRO/DX INJ SAME DRUG ADON: CPT

## 2018-06-24 PROCEDURE — 85610 PROTHROMBIN TIME: CPT | Performed by: EMERGENCY MEDICINE

## 2018-06-24 PROCEDURE — 73060 X-RAY EXAM OF HUMERUS: CPT

## 2018-06-24 PROCEDURE — 82962 GLUCOSE BLOOD TEST: CPT

## 2018-06-24 PROCEDURE — 87077 CULTURE AEROBIC IDENTIFY: CPT | Performed by: EMERGENCY MEDICINE

## 2018-06-24 PROCEDURE — 85025 COMPLETE CBC W/AUTO DIFF WBC: CPT | Performed by: EMERGENCY MEDICINE

## 2018-06-24 PROCEDURE — 74011250637 HC RX REV CODE- 250/637: Performed by: EMERGENCY MEDICINE

## 2018-06-24 PROCEDURE — 87086 URINE CULTURE/COLONY COUNT: CPT | Performed by: EMERGENCY MEDICINE

## 2018-06-24 PROCEDURE — 96361 HYDRATE IV INFUSION ADD-ON: CPT

## 2018-06-24 PROCEDURE — L3650 SO 8 ABD RESTRAINT PRE OTS: HCPCS

## 2018-06-24 PROCEDURE — 74011250636 HC RX REV CODE- 250/636: Performed by: PHYSICIAN ASSISTANT

## 2018-06-24 PROCEDURE — 80053 COMPREHEN METABOLIC PANEL: CPT | Performed by: EMERGENCY MEDICINE

## 2018-06-24 PROCEDURE — 96365 THER/PROPH/DIAG IV INF INIT: CPT

## 2018-06-24 PROCEDURE — 87186 SC STD MICRODIL/AGAR DIL: CPT | Performed by: EMERGENCY MEDICINE

## 2018-06-24 PROCEDURE — 82803 BLOOD GASES ANY COMBINATION: CPT | Performed by: EMERGENCY MEDICINE

## 2018-06-24 PROCEDURE — 77030038269 HC DRN EXT URIN PURWCK BARD -A

## 2018-06-24 PROCEDURE — 74011000258 HC RX REV CODE- 258: Performed by: EMERGENCY MEDICINE

## 2018-06-24 PROCEDURE — 82009 KETONE BODYS QUAL: CPT | Performed by: EMERGENCY MEDICINE

## 2018-06-24 PROCEDURE — 96375 TX/PRO/DX INJ NEW DRUG ADDON: CPT

## 2018-06-24 PROCEDURE — 74011636637 HC RX REV CODE- 636/637: Performed by: EMERGENCY MEDICINE

## 2018-06-24 PROCEDURE — 75810000053 HC SPLINT APPLICATION

## 2018-06-24 PROCEDURE — 99285 EMERGENCY DEPT VISIT HI MDM: CPT

## 2018-06-24 RX ORDER — CEPHALEXIN 500 MG/1
500 CAPSULE ORAL 3 TIMES DAILY
Qty: 21 CAP | Refills: 0 | Status: SHIPPED | OUTPATIENT
Start: 2018-06-24 | End: 2018-07-01

## 2018-06-24 RX ORDER — ACETAMINOPHEN 325 MG/1
650 TABLET ORAL
Status: COMPLETED | OUTPATIENT
Start: 2018-06-24 | End: 2018-06-24

## 2018-06-24 RX ORDER — HYDROCODONE BITARTRATE AND ACETAMINOPHEN 5; 325 MG/1; MG/1
1 TABLET ORAL
Qty: 20 TAB | Refills: 0 | Status: SHIPPED | OUTPATIENT
Start: 2018-06-24 | End: 2018-12-20

## 2018-06-24 RX ORDER — HYDROCODONE BITARTRATE AND ACETAMINOPHEN 5; 325 MG/1; MG/1
1 TABLET ORAL
Status: COMPLETED | OUTPATIENT
Start: 2018-06-24 | End: 2018-06-24

## 2018-06-24 RX ORDER — SODIUM CHLORIDE 9 MG/ML
2000 INJECTION, SOLUTION INTRAVENOUS ONCE
Status: COMPLETED | OUTPATIENT
Start: 2018-06-24 | End: 2018-06-24

## 2018-06-24 RX ORDER — FENTANYL CITRATE 50 UG/ML
75 INJECTION, SOLUTION INTRAMUSCULAR; INTRAVENOUS
Status: COMPLETED | OUTPATIENT
Start: 2018-06-24 | End: 2018-06-24

## 2018-06-24 RX ORDER — MIDAZOLAM HYDROCHLORIDE 1 MG/ML
1 INJECTION, SOLUTION INTRAMUSCULAR; INTRAVENOUS ONCE
Status: DISCONTINUED | OUTPATIENT
Start: 2018-06-24 | End: 2018-06-24 | Stop reason: HOSPADM

## 2018-06-24 RX ADMIN — INSULIN HUMAN 7 UNITS: 100 INJECTION, SOLUTION PARENTERAL at 06:28

## 2018-06-24 RX ADMIN — HYDROCODONE BITARTRATE AND ACETAMINOPHEN 1 TABLET: 5; 325 TABLET ORAL at 06:29

## 2018-06-24 RX ADMIN — ACETAMINOPHEN 650 MG: 325 TABLET ORAL at 02:58

## 2018-06-24 RX ADMIN — SODIUM CHLORIDE 2000 ML: 900 INJECTION, SOLUTION INTRAVENOUS at 03:16

## 2018-06-24 RX ADMIN — SODIUM CHLORIDE 1 G: 900 INJECTION, SOLUTION INTRAVENOUS at 05:55

## 2018-06-24 RX ADMIN — FENTANYL CITRATE 75 MCG: 50 INJECTION, SOLUTION INTRAMUSCULAR; INTRAVENOUS at 05:25

## 2018-06-24 RX ADMIN — FENTANYL CITRATE 75 MCG: 50 INJECTION, SOLUTION INTRAMUSCULAR; INTRAVENOUS at 03:12

## 2018-06-24 NOTE — DISCHARGE INSTRUCTIONS
Humerus Fracture: Care Instructions  Your Care Instructions    Your humerus is a bone in your upper arm. It extends from your shoulder to your elbow, and it is the largest bone in your arm. This bone may break (fracture) during sports, a fall, or other accidents. It may happen when your arm or shoulder is hit or used to protect you in a fall. Fractures can range from a small, hairline crack to a bone or bones broken into two or more pieces. Your treatment depends on how bad the break is. Your doctor may have put your arm in a cast, splint, or sling to allow it to heal or to keep it stable until you see another doctor. It may take weeks or months for your arm to heal. You can help your arm heal with some care at home. You heal best when you take good care of yourself. Eat a variety of healthy foods, and don't smoke. Follow-up care is a key part of your treatment and safety. Be sure to make and go to all appointments, and call your doctor if you are having problems. It's also a good idea to know your test results and keep a list of the medicines you take. How can you care for yourself at home? · Put ice or a cold pack on your arm for 10 to 20 minutes at a time. Try to do this every 1 to 2 hours for the next 3 days (when you are awake). Put a thin cloth between the ice and your cast or splint. Keep the cast or splint dry. If you do not have a splint or cast, use a cloth between the ice and your skin. · Follow the care instructions your doctor gives you. If you have a sling, do not take it off unless your doctor tells you to. · Be safe with medicines. Take pain medicines exactly as directed. ¨ If the doctor gave you a prescription medicine for pain, take it as prescribed. ¨ If you are not taking a prescription pain medicine, ask your doctor if you can take an over-the-counter medicine. · Your doctor may advise you to keep your arm next to your body.  It may help to use a pillow to support your elbow while sitting. · Follow instructions for moving your arm and doing exercises to keep your arm strong. · Wiggle your fingers and wrist often to reduce swelling and stiffness. When should you call for help? Call your doctor now or seek immediate medical care if:  ? · You have increased or severe pain in your arm. ? · Your hand is cool or pale or changes color. ? · You have tingling, weakness, or numbness in your hand or fingers. ? · Your cast or splint feels too tight. ? · You cannot move your fingers. ? · The skin under your cast or splint is burning or stinging. ? Watch closely for changes in your health, and be sure to contact your doctor if:  ? · You do not get better as expected. Where can you learn more? Go to http://delfina-cony.info/. Enter I461 in the search box to learn more about \"Humerus Fracture: Care Instructions. \"  Current as of: March 21, 2017  Content Version: 11.4  © 0508-8812 [employer]. Care instructions adapted under license by [email or web address] (which disclaims liability or warranty for this information). If you have questions about a medical condition or this instruction, always ask your healthcare professional. [place] [address] any warranty or liability for your use of this information. Urinary Tract Infection in Women: Care Instructions  Your Care Instructions    A urinary tract infection, or UTI, is a general term for an infection anywhere between the kidneys and the urethra (where urine comes out). Most UTIs are bladder infections. They often cause pain or burning when you urinate. UTIs are caused by bacteria and can be cured with antibiotics. Be sure to complete your treatment so that the infection goes away. Follow-up care is a key part of your treatment and safety. Be sure to make and go to all appointments, and call your doctor if you are having problems.  It's also a good idea to know your test results and keep a list of the medicines you take. How can you care for yourself at home? · Take your antibiotics as directed. Do not stop taking them just because you feel better. You need to take the full course of antibiotics. · Drink extra water and other fluids for the next day or two. This may help wash out the bacteria that are causing the infection. (If you have kidney, heart, or liver disease and have to limit fluids, talk with your doctor before you increase your fluid intake.)  · Avoid drinks that are carbonated or have caffeine. They can irritate the bladder. · Urinate often. Try to empty your bladder each time. · To relieve pain, take a hot bath or lay a heating pad set on low over your lower belly or genital area. Never go to sleep with a heating pad in place. To prevent UTIs  · Drink plenty of water each day. This helps you urinate often, which clears bacteria from your system. (If you have kidney, heart, or liver disease and have to limit fluids, talk with your doctor before you increase your fluid intake.)  · Urinate when you need to. · Urinate right after you have sex. · Change sanitary pads often. · Avoid douches, bubble baths, feminine hygiene sprays, and other feminine hygiene products that have deodorants. · After going to the bathroom, wipe from front to back. When should you call for help? Call your doctor now or seek immediate medical care if:  ? · Symptoms such as fever, chills, nausea, or vomiting get worse or appear for the first time. ? · You have new pain in your back just below your rib cage. This is called flank pain. ? · There is new blood or pus in your urine. ? · You have any problems with your antibiotic medicine. ? Watch closely for changes in your health, and be sure to contact your doctor if:  ? · You are not getting better after taking an antibiotic for 2 days. ? · Your symptoms go away but then come back. Where can you learn more?   Go to http://delfina-cony.info/. Enter G675 in the search box to learn more about \"Urinary Tract Infection in Women: Care Instructions. \"  Current as of: May 12, 2017  Content Version: 11.4  © 3325-0227 [employer]. Care instructions adapted under license by [employer] (which disclaims liability or warranty for this information). If you have questions about a medical condition or this instruction, always ask your healthcare professional. [address] any warranty or liability for your use of this information. Learning About High Blood Sugar  What is high blood sugar? Your body turns the food you eat into glucose (sugar), which it uses for energy. But if your body isn't able to use the sugar right away, it can build up in your blood and lead to high blood sugar. When the amount of sugar in your blood stays too high for too much of the time, you may have diabetes. Diabetes is a disease that can cause serious health problems. The good news is that lifestyle changes may help you get your blood sugar back to normal and avoid or delay diabetes. What causes high blood sugar? Sugar (glucose) can build up in your blood if you:  · Are overweight. · Have a family history of diabetes. · Take certain medicines, such as steroids. What are the symptoms? Having high blood sugar may not cause any symptoms at all. Or it may make you feel very thirsty or very hungry. You may also urinate more often than usual, have blurry vision, or lose weight without trying. How is high blood sugar treated? You can take steps to lower your blood sugar level if you understand what makes it get higher. Your doctor may want you to learn how to test your blood sugar level at home. Then you can see how illness, stress, or different kinds of food or medicine raise or lower your blood sugar level. Other tests may be needed to see if you have diabetes.   How can you prevent high blood sugar? · Watch your weight. If you're overweight, losing just a small amount of weight may help. Reducing fat around your waist is most important. · Limit the amount of calories, sweets, and unhealthy fat you eat. Ask your doctor if a dietitian can help you. A registered dietitian can help you create meal plans that fit your lifestyle. · Get at least 30 minutes of exercise on most days of the week. Exercise helps control your blood sugar. It also helps you maintain a healthy weight. Walking is a good choice. You also may want to do other activities, such as running, swimming, cycling, or playing tennis or team sports. · If your doctor prescribed medicines, take them exactly as prescribed. Call your doctor if you think you are having a problem with your medicine. You will get more details on the specific medicines your doctor prescribes. Follow-up care is a key part of your treatment and safety. Be sure to make and go to all appointments, and call your doctor if you are having problems. It's also a good idea to know your test results and keep a list of the medicines you take. Where can you learn more? Go to http://delfinaCayenne Medicalcony.info/. Enter O108 in the search box to learn more about \"Learning About High Blood Sugar. \"  Current as of: March 13, 2017  Content Version: 11.4  © 7155-1033 Healthwise, Incorporated. Care instructions adapted under license by "[employer] (which disclaims liability or warranty for this information). If you have questions about a medical condition or this instruction, always ask your healthcare professional. [place] [address] any warranty or liability for your use of this information.      ==================================    [facility] Departments     For adult and child immunizations, family planning, TB screening, STD testing and women's health services.    [facility]: [place] [phone]      [place] [phone] [facility]   [phone]    [place]   [phone]    [facility]   [phone]    [address] [facility] [phone]      [facility] [phone]      [address] [place] [address]     For primary care services, woman and child wellness, and some clinics providing specialty care. [facility] -- [address]. [facility]5 [facility] [phone]   [address] [place] [address] 200 [facility] 3614 [facility] [phone]   [address] [facility] [address] [phone]   [address] [facility] [address] [place] [address] 5850  Community  [phone]   [address] [facility] [address] [address] [place] [phone]   [facility] [phone]   [facility] [address] [place] [address] [phone]   [facility]: [facility] [address] [facility] [address] [phone]     [address] [phone]   [facility] [address] [facility]  [phone]   [facility]  [address] ([email or web address]) [phone]         Sexual Health/Woman Wellness Clinics    For STD/HIV testing and treatment, pregnancy testing and services, men's health, birth control services, LGBT services, and hepatitis/HPV vaccine services. [employer] for [place] [employer] [address] [facility] [address] [facility] [address] [facility] 15[address] [place] [phone]   [facility] [place] [facility] [address], 5th floor [phone]   Pregnancy [address] [facility] Women [address] Claude [phone]         [place] [address] [facility] [address] [place] [address]   [phone]   [place]   [phone]   Women, Infant and Children's Services: Genny Alcantara [phone] 600 [facility]   [phone]   [address]   9662 [facility]     [phone]   [address] Crisis   1212 [facility] [phone]     Local Primary Care Physicians  [facility] [phone]  MD Mitesh Lu, MD Jan Regalado MD [facility] [phone]  Annabel Campoverde, [facility]  Brenda Akhtar, MD Kim Hancock MD [address]  [phone]  Janet Garza, [place] [address] [facility] [phone]  MD Fam Coates MD Letta Monas, MD Barbera Pont, MD   [facility] [phone]  MD Josh Burns, MD De Souza [facility], NP [address] [phone]  MD Dahlia Kim, MD Lasha Coyne, MD Derik Pelletier, MD Cori Wagner, MD Bebe Sweeney, MD Laine Wall MD   [address] [facility] [phone]  Carlos Nunez MD [address] [facility] [address] [phone]  Melanie Treviño, MD Laura Oneill, NP  Kate Rees, MD Kennedi Duron, MD Michael Grady MD   026[address] [facility] [phone]  Bakari Angel, MD Kristin Oliveira, P  Litzy Hernandez, NP  Claris Dakins, MD Claudia Worrell MD Si Harper, MD [facility] [phone]  MD Mauro Brice MD Cathi Biles, MD Levon Nation, MD Ole Idol, MD   [address] [phone]  MD Silvia Winchester Hindman, 611 [facility] [phone]  MD Sadie Nails, MD Vince Burgos MD   [facility] [phone]  Mendel Alamin, MD Debbi Durham, MD Gab Rivas, MD Tyrel Mcclain, MD Joanie Reis, NP  Darrius Hogan  [address] [facility] [address]   [phone]  MD Sharif Zambrano MD Ballard Siren, MD   [address] [facility] [address] [phone]  MD Lindsay Huerta, DERREKP  Earl Lamb, HILLARY Lamb, HILLARY Love MD Lesli Postal, NP   Yessica Estrada,  Miscellaneous:  Elba Richardson [phone]

## 2018-06-24 NOTE — ED PROVIDER NOTES
EMERGENCY DEPARTMENT HISTORY AND PHYSICAL EXAM      Date: 6/24/2018  Patient Name: Dreama Lennox    History of Presenting Illness     Chief Complaint   Patient presents with   Rica Fare Fall     GLF with right arm/shoulder pain       History Provided By: Patient    HPI: Dreama Lennox, 46 y.o. female with PMHx significant for HTN, DM, arthritis, neuropathy, presents via EMS to the ED with cc of severe stabbing RUE and R shoulder pain s/p a GLF that occurred PTA. Pt reports that she was walking to the kitchen to get a popsicle when she caught her right foot \"on the extension cord\" and landed on her right elbow. She reports that the floor she landed on is \"thin carpet. \" Pt notes that she \"falls a lot because of my neuropathy,\" and per chart review pt was seen in the [facility] ED for a fall in April of this year. She denies hitting her abdomen, hitting her head, or any LOC. Pt denies any recent sickness. She denies any anticoagulation. Pt denies any exacerbating or alleviating factors for her sxs. She specifically denies any fevers, chills, N/V/D, constipation, back pain, leg pain, abd pain, SOB, CP, HA, or hematuria. Notes mild frequency, suspicious for UTI. There are no other complaints, changes, or physical findings at this time. PCP: None    Current Facility-Administered Medications   Medication Dose Route Frequency Provider Last Rate Last Dose    midazolam (VERSED) injection 1 mg  1 mg IntraVENous ONCE Patti Keller PA-C   Stopped at 06/24/18 0533     Current Outpatient Prescriptions   Medication Sig Dispense Refill    HYDROcodone-acetaminophen (LORTAB 5-325) 5-325 mg per tablet Take 1 Tab by mouth every six (6) hours as needed for Pain (breakthrough pain). Max Daily Amount: 4 Tabs. Indications: causes drowsiness;do not drink,drive, or use machinery while taking; take with a stool softener 20 Tab 0    cephALEXin (KEFLEX) 500 mg capsule Take 1 Cap by mouth three (3) times daily for 7 days.  21 Cap 0    gabapentin (NEURONTIN) 300 mg capsule Take 1 Cap by mouth three (3) times daily. 30 Cap 0    acetaminophen (TYLENOL) 325 mg tablet Take 2 Tabs by mouth every four (4) hours as needed for Pain. 20 Tab 0    gabapentin (NEURONTIN) 300 mg capsule Take 2 Caps by mouth three (3) times daily as needed. 30 Cap 0    promethazine (PHENERGAN) 25 mg tablet Take 1 Tab by mouth every six (6) hours as needed for Nausea. 12 Tab 0    ondansetron (ZOFRAN ODT) 4 mg disintegrating tablet Take 1 Tab by mouth every eight (8) hours as needed for Nausea. 16 Tab 0    insulin lispro (HUMALOG) 100 unit/mL injection Take 32 units with meals, only if BG > 100. Indications: HYPERGLYCEMIA, TYPE 2 DIABETES MELLITUS 3 Vial 3    insulin glargine (LANTUS) 100 unit/mL injection 72 Units by SubCUTAneous route daily. Indications: TYPE 2 DIABETES MELLITUS 3 Vial 3    glucose blood VI test strips (BLOOD GLUCOSE TEST) strip Check blood sugar 1-2 times daily. 1 Each 11    fenofibrate nanocrystallized (TRICOR) 145 mg tablet Take 1 Tab by mouth daily. 30 Tab 3    lisinopril (PRINIVIL, ZESTRIL) 10 mg tablet Take 1 Tab by mouth daily. 30 Tab 3    glipiZIDE (GLUCOTROL) 5 mg tablet Take 1 Tab by mouth two (2) times a day. 60 Tab 3    traZODone (DESYREL) 50 mg tablet Take 1 Tab by mouth nightly. 30 Tab 3       Past History     Past Medical History:  Past Medical History:   Diagnosis Date    Arthritis     Asthma     Chronic back pain     Diabetes [facility] [address] [facility]     Diabetic retinopathy ([facility] [address] [facility]     Hypertension     MRSA (methicillin resistant staph aureus) culture positive     labial abscess    Neuropathy        Past Surgical History:  Past Surgical History:   Procedure Laterality Date    HX HEENT      tonsillectomy    HX ORTHOPAEDIC      left ft bunionectomy    HX OTHER SURGICAL      lanced labial abscess       Family History:  No family history on file.     Social History:  Social History   Substance Use Topics    Smoking status: Never Smoker    Smokeless tobacco: Never Used    Alcohol use No       Allergies: Allergies   Allergen Reactions    Aspirin Unknown (comments)     Patient states naproxen ok    Ciprofloxacin Hives         Review of Systems   Review of Systems   Constitutional: Negative for chills and fever. HENT: Negative for congestion. Eyes: Negative for visual disturbance. Respiratory: Negative for chest tightness. Cardiovascular: Negative for chest pain and leg swelling. Gastrointestinal: Negative for abdominal pain and vomiting. Endocrine: Negative for polyuria. Genitourinary: Negative for dysuria and frequency. Musculoskeletal: Positive for myalgias. Negative for back pain. Skin: Negative for color change. Allergic/Immunologic: Negative for immunocompromised state. Neurological: Negative for numbness. Physical Exam   Physical Exam   Nursing note and vitals reviewed.   General appearance: non-toxic, uncomfortable  Eyes: PERRL, EOMI, conjunctiva normal, anicteric sclera  HEENT: mucous membranes tacky, oropharynx is clear, mostly edentulous   Pulmonary: clear to auscultation bilaterally  Cardiac: normal rate and regular rhythm, no murmurs, gallops, or rubs, 2+DP pulses, 2+ radial pulses, able to fully  w/ R hand, light touch intact RUE  Abdomen: soft, nontender, nondistended, bowel sounds present  MSK: no pre-tibial edema, swelling to the RUE (humerus) ROM RUE deferred  Neuro: Alert, answers questions appropriately, no step off over C spine   Skin: capillary refill brisk      Diagnostic Study Results     Labs -     Recent Results (from the past 12 hour(s))   CBC WITH AUTOMATED DIFF    Collection Time: 06/24/18  3:18 AM   Result Value Ref Range    WBC 6.1 3.6 - 11.0 K/uL    RBC 3.77 (L) 3.80 - 5.20 M/uL    HGB 11.3 (L) 11.5 - 16.0 g/dL    HCT 31.9 (L) 35.0 - 47.0 %    MCV 84.6 80.0 - 99.0 FL    MCH 30.0 26.0 - 34.0 PG    MCHC 35.4 30.0 - 36.5 g/dL    RDW 13.2 11.5 - 14.5 %    PLATELET 416 314 - 261 K/uL    MPV 9.5 8.9 - 12.9 FL    NRBC 0.0 0  WBC    ABSOLUTE NRBC 0.00 0.00 - 0.01 K/uL    NEUTROPHILS 63 32 - 75 %    LYMPHOCYTES 24 12 - 49 %    MONOCYTES 7 5 - 13 %    EOSINOPHILS 5 0 - 7 %    BASOPHILS 1 0 - 1 %    IMMATURE GRANULOCYTES 1 (H) 0.0 - 0.5 %    ABS. NEUTROPHILS 3.8 1.8 - 8.0 K/UL    ABS. LYMPHOCYTES 1.5 0.8 - 3.5 K/UL    ABS. MONOCYTES 0.4 0.0 - 1.0 K/UL    ABS. EOSINOPHILS 0.3 0.0 - 0.4 K/UL    ABS. BASOPHILS 0.1 0.0 - 0.1 K/UL    ABS. IMM. GRANS. 0.0 0.00 - 0.04 K/UL    DF AUTOMATED     PROTHROMBIN TIME + INR    Collection Time: 06/24/18  3:18 AM   Result Value Ref Range    INR 1.1 0.9 - 1.1      Prothrombin time 10.7 9.0 - 51.4 sec   METABOLIC PANEL, COMPREHENSIVE    Collection Time: 06/24/18  3:18 AM   Result Value Ref Range    Sodium 138 136 - 145 mmol/L    Potassium 3.5 3.5 - 5.1 mmol/L    Chloride 104 97 - 108 mmol/L    CO2 24 21 - 32 mmol/L    Anion gap 10 5 - 15 mmol/L    Glucose 352 (H) 65 - 100 mg/dL    BUN 15 6 - 20 MG/DL    Creatinine 0.93 0.55 - 1.02 MG/DL    BUN/Creatinine ratio 16 12 - 20      GFR est AA >60 >60 ml/min/1.73m2    GFR est non-AA >60 >60 ml/min/1.73m2    Calcium 8.7 8.5 - 10.1 MG/DL    Bilirubin, total 0.3 0.2 - 1.0 MG/DL    ALT (SGPT) 17 12 - 78 U/L    AST (SGOT) 14 (L) 15 - 37 U/L    Alk.  phosphatase 72 45 - 117 U/L    Protein, total 6.5 6.4 - 8.2 g/dL    Albumin 2.9 (L) 3.5 - 5.0 g/dL    Globulin 3.6 2.0 - 4.0 g/dL    A-G Ratio 0.8 (L) 1.1 - 2.2     ACETONE/KETONE, QL    Collection Time: 06/24/18  3:18 AM   Result Value Ref Range    Acetone/Ketone serum, QL. NEGATIVE  NEG        URINALYSIS W/ REFLEX CULTURE    Collection Time: 06/24/18  3:18 AM   Result Value Ref Range    Color YELLOW/STRAW      Appearance CLOUDY (A) CLEAR      Specific gravity >1.030 (H) 1.003 - 1.030    pH (UA) 5.0 5.0 - 8.0      Protein 300 (A) NEG mg/dL    Glucose >1000 (A) NEG mg/dL    Ketone NEGATIVE  NEG mg/dL    Bilirubin NEGATIVE  NEG      Blood LARGE (A) NEG      Urobilinogen 0.2 0.2 - 1.0 EU/dL Nitrites POSITIVE (A) NEG      Leukocyte Esterase NEGATIVE  NEG      WBC 5-10 0 - 4 /hpf    RBC 20-50 0 - 5 /hpf    Epithelial cells FEW FEW /lpf    Bacteria 4+ (A) NEG /hpf    UA:UC IF INDICATED URINE CULTURE ORDERED (A) CNI      Hyaline cast 0-2 0 - 5 /lpf   VENOUS BLOOD GAS    Collection Time: 06/24/18  5:30 AM   Result Value Ref Range    VENOUS PH 7.43 (H) 7.32 - 7.42      VENOUS PCO2 35 (L) 41 - 51 mmHg    VENOUS PO2 70 (H) 25 - 40 mmHg    VENOUS O2 SATURATION 95 (H) 65 - 88 %    VENOUS BICARBONATE 23 23 - 28 mmol/L    VENOUS BASE DEFICIT 1.0 mmol/L    O2 METHOD ROOM AIR      SPONTANEOUS RATE 20.0      Sample source VENOUS      SITE OTHER     GLUCOSE, POC    Collection Time: 06/24/18  5:59 AM   Result Value Ref Range    Glucose (POC) 320 (H) 65 - 100 mg/dL    Performed by Nilay Viera    GLUCOSE, POC    Collection Time: 06/24/18  7:22 AM   Result Value Ref Range    Glucose (POC) 283 (H) 65 - 100 mg/dL    Performed by Inez Christopher        Radiologic Studies -   XR HUMERUS RT   Final Result   EXAM:  XR HUMERUS RT     INDICATION:   R upper arm pain s/p fall.     COMPARISON: None.     FINDINGS: Two views of the right humerus demonstrate a spiral fracture of the  proximal third of the right humeral diaphysis with 50% anterolateral  displacement and 2 cm telescoping. No substantial angular deformity is shown. There is no dislocation.     IMPRESSION  IMPRESSION:  Positive for fracture of proximal-mid humeral diaphysis. _________________________________________________________________  XR HUMERUS RT (Post-splint placement)    EXAM:  XR HUMERUS RT     INDICATION:   post-splint placement.     COMPARISON: Earlier radiographs.     FINDINGS: Two views of the right humerus demonstrate interval splint placement  with spiral fracture of proximal humeral shaft showing no substantial change in  position or alignment.     IMPRESSION  IMPRESSION:  Interval splint placement.      Medical Decision Making   I am the first provider for this patient. I reviewed the vital signs, available nursing notes, past medical history, past surgical history, family history and social history. Vital Signs-Reviewed the patient's vital signs. Patient Vitals for the past 12 hrs:   Temp Pulse Resp BP SpO2   06/24/18 0500 - - - 171/89 96 %   06/24/18 0400 - - - 162/88 97 %   06/24/18 0239 97.9 °F (36.6 °C) 88 16 142/75 94 %       Pulse Oximetry Analysis - 94% on RA    Cardiac Monitor:   Rate: 88 bpm  Rhythm: Normal Sinus Rhythm      Records Reviewed: Nursing Notes, Old Medical Records, Ambulance Run Sheet, Previous Radiology Studies and Previous Laboratory Studies    Provider Notes (Medical Decision Making):   DDx: highly suspicious for humeral fracture. Doubt other associated injury. Elevated BG and UTI noted; fluids, Abx, insulin. Well appearing, residual pain RUE. Advised close ortho f/u this week. ED Course:   Initial assessment performed. The patients presenting problems have been discussed, and they are in agreement with the care plan formulated and outlined with them. I have encouraged them to ask questions as they arise throughout their visit. PROGRESS NOTE:   4:57 AM  [place] Text to Katie Santiago MD (Ortho)    Procedure Note - Splint Placement:  5:56 AM  Performed by: Coretta Tavares MD and Nadine Meadows PA-C (Ortho)  Neurovascularly intact prior to tx. An Orthoglass sugar tong/coaptation splint was placed on pt's right humerus. Joint was placed in neutral position. Neurovascularly intact after tx. The procedure took 1-15 minutes, and pt tolerated well. Critical Care Time:   0 minutes    Disposition:  Discharge Note:  7:581 AM  The pt is ready for discharge. The pt's signs, symptoms, diagnosis, and discharge instructions have been discussed and pt has conveyed their understanding. The pt is to follow up as recommended or return to ER should their symptoms worsen.  Plan has been discussed and pt is in agreement. PLAN:  1. Current Discharge Medication List      START taking these medications    Details   HYDROcodone-acetaminophen (LORTAB 5-325) 5-325 mg per tablet Take 1 Tab by mouth every six (6) hours as needed for Pain (breakthrough pain). Max Daily Amount: 4 Tabs. Indications: causes drowsiness;do not drink,drive, or use machinery while taking; take with a stool softener  Qty: 20 Tab, Refills: 0    Associated Diagnoses: Closed displaced spiral fracture of shaft of right humerus, initial encounter      cephALEXin (KEFLEX) 500 mg capsule Take 1 Cap by mouth three (3) times daily for 7 days. Qty: 21 Cap, Refills: 0    Associated Diagnoses: Acute cystitis with hematuria         CONTINUE these medications which have NOT CHANGED    Details   !! gabapentin (NEURONTIN) 300 mg capsule Take 1 Cap by mouth three (3) times daily. Qty: 30 Cap, Refills: 0      acetaminophen (TYLENOL) 325 mg tablet Take 2 Tabs by mouth every four (4) hours as needed for Pain. Qty: 20 Tab, Refills: 0      !! gabapentin (NEURONTIN) 300 mg capsule Take 2 Caps by mouth three (3) times daily as needed. Qty: 30 Cap, Refills: 0      promethazine (PHENERGAN) 25 mg tablet Take 1 Tab by mouth every six (6) hours as needed for Nausea. Qty: 12 Tab, Refills: 0      ondansetron (ZOFRAN ODT) 4 mg disintegrating tablet Take 1 Tab by mouth every eight (8) hours as needed for Nausea. Qty: 16 Tab, Refills: 0      insulin lispro (HUMALOG) 100 unit/mL injection Take 32 units with meals, only if BG > 100. Indications: HYPERGLYCEMIA, TYPE 2 DIABETES MELLITUS  Qty: 3 Vial, Refills: 3    Associated Diagnoses: Type 2 diabetes mellitus with peripheral neuropathy (HCC)      insulin glargine (LANTUS) 100 unit/mL injection 72 Units by SubCUTAneous route daily.  Indications: TYPE 2 DIABETES MELLITUS  Qty: 3 Vial, Refills: 3    Associated Diagnoses: Type 2 diabetes mellitus with peripheral neuropathy (HCC)      glucose blood VI test strips (BLOOD GLUCOSE TEST) strip Check blood sugar 1-2 times daily. Qty: 1 Each, Refills: 11    Associated Diagnoses: Type 2 diabetes mellitus with peripheral neuropathy (HCC)      fenofibrate nanocrystallized (TRICOR) 145 mg tablet Take 1 Tab by mouth daily. Qty: 30 Tab, Refills: 3    Associated Diagnoses: Hypercholesteremia      lisinopril (PRINIVIL, ZESTRIL) 10 mg tablet Take 1 Tab by mouth daily. Qty: 30 Tab, Refills: 3    Associated Diagnoses: Essential hypertension; Type 2 diabetes mellitus with peripheral neuropathy (HCC)      glipiZIDE (GLUCOTROL) 5 mg tablet Take 1 Tab by mouth two (2) times a day. Qty: 60 Tab, Refills: 3    Associated Diagnoses: Type 2 diabetes mellitus with peripheral neuropathy (HCC)      traZODone (DESYREL) 50 mg tablet Take 1 Tab by mouth nightly. Qty: 30 Tab, Refills: 3    Associated Diagnoses: Episodic tension-type headache, not intractable       !! - Potential duplicate medications found. Please discuss with provider. 2.   Follow-up Information     Follow up With Details Comments Milo Funes MD Schedule an appointment as soon as possible for a visit in 1 day [address] [place] [address]  [phone]      [facility] EMERGENCY DEPT Go in 1 day If symptoms worsen [address] [facility] [address]  [phone]    PRIMARY CARE DOCTOR Schedule an appointment as soon as possible for a visit in 1 week TO RECHECK YOUR BLOOD SUGAR SEE ATTACHED LIST        Return to ED if worse     Diagnosis     Clinical Impression:   1. Hyperglycemia    2. Acute cystitis with hematuria    3. Closed displaced spiral fracture of shaft of right humerus, initial encounter        Attestations: This note is prepared by Barbara Rachel, acting as Scribe for Khurram Garza MD.    Khurram Garza MD: The scribe's documentation has been prepared under my direction and personally reviewed by me in its entirety. I confirm that the note above accurately reflects all work, treatment, procedures, and medical decision making performed by me.

## 2018-06-26 LAB
BACTERIA SPEC CULT: ABNORMAL
CC UR VC: ABNORMAL
SERVICE CMNT-IMP: ABNORMAL

## 2018-08-03 ENCOUNTER — HOSPITAL ENCOUNTER (EMERGENCY)
Age: 53
Discharge: HOME | End: 2018-08-03
Attending: EMERGENCY MEDICINE | Admitting: EMERGENCY MEDICINE
Payer: SELF-PAY

## 2018-08-03 VITALS
WEIGHT: 190 LBS | HEIGHT: 66 IN | BODY MASS INDEX: 30.53 KG/M2 | OXYGEN SATURATION: 99 % | DIASTOLIC BLOOD PRESSURE: 75 MMHG | TEMPERATURE: 98.5 F | HEART RATE: 92 BPM | SYSTOLIC BLOOD PRESSURE: 153 MMHG | RESPIRATION RATE: 17 BRPM

## 2018-08-03 VITALS
TEMPERATURE: 98.2 F | RESPIRATION RATE: 12 BRPM | WEIGHT: 189 LBS | BODY MASS INDEX: 30.97 KG/M2 | OXYGEN SATURATION: 99 % | HEART RATE: 90 BPM

## 2018-08-03 DIAGNOSIS — T14.8XXA ABRASION: Primary | ICD-10-CM

## 2018-08-03 DIAGNOSIS — N30.00 ACUTE CYSTITIS WITHOUT HEMATURIA: ICD-10-CM

## 2018-08-03 DIAGNOSIS — E86.0 DEHYDRATION: ICD-10-CM
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
## 2018-08-03 LAB
ANION GAP SERPL CALC-SCNC: 7 MMOL/L (ref 5–15)
APPEARANCE UR: ABNORMAL
BACTERIA URNS QL MICRO: ABNORMAL /HPF
BASOPHILS # BLD: 0 K/UL (ref 0–0.1)
BASOPHILS NFR BLD: 1 % (ref 0–1)
BILIRUB UR QL: NEGATIVE
BUN SERPL-MCNC: 24 MG/DL (ref 6–20)
BUN/CREAT SERPL: 21 (ref 12–20)
CALCIUM SERPL-MCNC: 9.2 MG/DL (ref 8.5–10.1)
CHLORIDE SERPL-SCNC: 103 MMOL/L (ref 97–108)
CO2 SERPL-SCNC: 28 MMOL/L (ref 21–32)
COLOR UR: ABNORMAL
CREAT SERPL-MCNC: 1.12 MG/DL (ref 0.55–1.02)
DIFFERENTIAL METHOD BLD: ABNORMAL
EOSINOPHIL # BLD: 0.1 K/UL (ref 0–0.4)
EOSINOPHIL NFR BLD: 3 % (ref 0–7)
EPITH CASTS URNS QL MICRO: ABNORMAL /LPF
ERYTHROCYTE [DISTWIDTH] IN BLOOD BY AUTOMATED COUNT: 13 % (ref 11.5–14.5)
GLUCOSE SERPL-MCNC: 104 MG/DL (ref 65–100)
GLUCOSE UR STRIP.AUTO-MCNC: NEGATIVE MG/DL
HCT VFR BLD AUTO: 33.5 % (ref 35–47)
HGB BLD-MCNC: 11.7 G/DL (ref 11.5–16)
HGB UR QL STRIP: ABNORMAL
IMM GRANULOCYTES # BLD: 0 K/UL (ref 0–0.04)
IMM GRANULOCYTES NFR BLD AUTO: 0 % (ref 0–0.5)
KETONES UR QL STRIP.AUTO: NEGATIVE MG/DL
LEUKOCYTE ESTERASE UR QL STRIP.AUTO: ABNORMAL
LYMPHOCYTES # BLD: 1.7 K/UL (ref 0.8–3.5)
LYMPHOCYTES NFR BLD: 34 % (ref 12–49)
MAGNESIUM SERPL-MCNC: 1.7 MG/DL (ref 1.6–2.4)
MCH RBC QN AUTO: 29.3 PG (ref 26–34)
MCHC RBC AUTO-ENTMCNC: 34.9 G/DL (ref 30–36.5)
MCV RBC AUTO: 83.8 FL (ref 80–99)
MONOCYTES # BLD: 0.4 K/UL (ref 0–1)
MONOCYTES NFR BLD: 8 % (ref 5–13)
NEUTS SEG # BLD: 2.8 K/UL (ref 1.8–8)
NEUTS SEG NFR BLD: 55 % (ref 32–75)
NITRITE UR QL STRIP.AUTO: NEGATIVE
NRBC # BLD: 0 K/UL (ref 0–0.01)
NRBC BLD-RTO: 0 PER 100 WBC
PH UR STRIP: 5.5 [PH] (ref 5–8)
PLATELET # BLD AUTO: 199 K/UL (ref 150–400)
PMV BLD AUTO: 9.1 FL (ref 8.9–12.9)
POTASSIUM SERPL-SCNC: 3.2 MMOL/L (ref 3.5–5.1)
PROT UR STRIP-MCNC: 100 MG/DL
RBC # BLD AUTO: 4 M/UL (ref 3.8–5.2)
RBC #/AREA URNS HPF: ABNORMAL /HPF (ref 0–5)
SODIUM SERPL-SCNC: 138 MMOL/L (ref 136–145)
SP GR UR REFRACTOMETRY: 1.01 (ref 1–1.03)
UROBILINOGEN UR QL STRIP.AUTO: 0.2 EU/DL (ref 0.2–1)
WBC # BLD AUTO: 5.1 K/UL (ref 3.6–11)
WBC URNS QL MICRO: ABNORMAL /HPF (ref 0–4)

## 2018-08-03 PROCEDURE — 81001 URINALYSIS AUTO W/SCOPE: CPT | Performed by: EMERGENCY MEDICINE

## 2018-08-03 PROCEDURE — 36415 COLL VENOUS BLD VENIPUNCTURE: CPT | Performed by: EMERGENCY MEDICINE

## 2018-08-03 PROCEDURE — 96361 HYDRATE IV INFUSION ADD-ON: CPT

## 2018-08-03 PROCEDURE — 80048 BASIC METABOLIC PNL TOTAL CA: CPT | Performed by: EMERGENCY MEDICINE

## 2018-08-03 PROCEDURE — 99283 EMERGENCY DEPT VISIT LOW MDM: CPT

## 2018-08-03 PROCEDURE — 96365 THER/PROPH/DIAG IV INF INIT: CPT

## 2018-08-03 PROCEDURE — G8978 MOBILITY CURRENT STATUS: HCPCS

## 2018-08-03 PROCEDURE — 83735 ASSAY OF MAGNESIUM: CPT | Performed by: EMERGENCY MEDICINE

## 2018-08-03 PROCEDURE — 85025 COMPLETE CBC W/AUTO DIFF WBC: CPT | Performed by: EMERGENCY MEDICINE

## 2018-08-03 PROCEDURE — 87086 URINE CULTURE/COLONY COUNT: CPT | Performed by: EMERGENCY MEDICINE

## 2018-08-03 PROCEDURE — 99284 EMERGENCY DEPT VISIT MOD MDM: CPT

## 2018-08-03 PROCEDURE — 74011250637 HC RX REV CODE- 250/637: Performed by: EMERGENCY MEDICINE

## 2018-08-03 PROCEDURE — G8980 MOBILITY D/C STATUS: HCPCS

## 2018-08-03 PROCEDURE — 74011250636 HC RX REV CODE- 250/636: Performed by: EMERGENCY MEDICINE

## 2018-08-03 PROCEDURE — 97161 PT EVAL LOW COMPLEX 20 MIN: CPT

## 2018-08-03 PROCEDURE — 87186 SC STD MICRODIL/AGAR DIL: CPT | Performed by: EMERGENCY MEDICINE

## 2018-08-03 PROCEDURE — 87077 CULTURE AEROBIC IDENTIFY: CPT | Performed by: EMERGENCY MEDICINE

## 2018-08-03 PROCEDURE — 74011000258 HC RX REV CODE- 258: Performed by: EMERGENCY MEDICINE

## 2018-08-03 PROCEDURE — G8979 MOBILITY GOAL STATUS: HCPCS

## 2018-08-03 RX ORDER — CEPHALEXIN 500 MG/1
500 CAPSULE ORAL 3 TIMES DAILY
Qty: 21 CAP | Refills: 0 | Status: SHIPPED | OUTPATIENT
Start: 2018-08-03 | End: 2018-08-10

## 2018-08-03 RX ORDER — HYDROCODONE BITARTRATE AND ACETAMINOPHEN 5; 325 MG/1; MG/1
1 TABLET ORAL ONCE
Status: COMPLETED | OUTPATIENT
Start: 2018-08-03 | End: 2018-08-03

## 2018-08-03 RX ADMIN — HYDROCODONE BITARTRATE AND ACETAMINOPHEN 1 TABLET: 5; 325 TABLET ORAL at 04:56

## 2018-08-03 RX ADMIN — CEFTRIAXONE 1 G: 1 INJECTION, POWDER, FOR SOLUTION INTRAMUSCULAR; INTRAVENOUS at 06:08

## 2018-08-03 RX ADMIN — SODIUM CHLORIDE 1000 ML: 900 INJECTION, SOLUTION INTRAVENOUS at 05:45

## 2018-08-03 NOTE — PROGRESS NOTES
Date of previous inpatient admission/ ED visit? 6/24/2018    What brought the patient back to ED? 46 y.o. female with PMHx significant for DM, neuropathy, arthritis, HTN, chronic back pain, asthma, and diabetic retinopathy, presents via EMS to the ED s/p GLF x2 PTA that was preceded by bll knee and leg weakness. Per EMS, pt was on her way to the bathroom when she fell onto her knees. She was able to get back up, but shortly sustained another GLF where she was unable to stand back up despite assistance from her family members, which prompted her family to call EMS. Did patient decline recommended services during last admission/ ED visit (if yes, what)? No - last CM intervention was 6/22/2011 - patient was provided free medications at that time    Has patient seen a provider since their last inpatient admission/ED visit (if yes, when)? Yes - patient currently has [address] and sees Dr. Kyler Flores at [facility]. CM Interventions:  From previous inpatient admission/ED visit:N/A   From current inpatient admission/ED visit:  CM confirmed patient's follow-up appt at [address] with Dr. Kyler Flores - patient states she has several people that can provide transportation and she has not missed any ortho or [facility] appts. Patient states need for bedside commode and would probably benefit from home health safety evaluation. Due to patient's lack of insurance, CM spoke to PCP office to ensure that patient would be provided as many resources as possible through [address]. PCP office to follow-up with patient. Reminder for PCP appt placed on AVS.    Patient has no transportation home  - disabled sister and son with autism at home. Patient will be provided with [facility] - [phone] - for transport home and assistance with picking up her prescription from [facility] - [phone]. Rx for Keflex phoned into pharmacy - cost is $9.92.   Rx assistance forms FAX'd to pharmacy for CM to cover cost of medication as patient has no payment source at present time. Fax - [phone] - copies of Medication Assistance forms, facesheet and notes given to Faby Mcclelland.     Yelena Greer RN, BSN, AC   - Medical Oncology  [phone]    12:00PM - CM received phone call from [facility] - [facility] will be coming to  patient and escort her to pharmacy and home - ETA 1:00PM.    Yelena Greer RN, BSN, [facility]   - Medical Oncology  [phone]

## 2018-08-03 NOTE — PROGRESS NOTES
Patient fell while attempting to get into [facility] with [facility]. Patient was brought back to ED for evaluation - r/o injury. Patient will be sent home via [facility] stretcher transport due to fall risk and need to get into home with 5 steps to access at front entrance. [facility] transport approved by [facility]. [facility] transport arranged for ED pick-up - PCS and Facesheet placed on chart for transport.     Gwen Colon, RN, BSN, ACM   - Medical Oncology  [phone]

## 2018-08-03 NOTE — DISCHARGE INSTRUCTIONS
Scrapes (Abrasions): Care Instructions  Your Care Instructions  Scrapes (abrasions) are wounds where your skin has been rubbed or torn off. Most scrapes do not go deep into the skin, but some may remove several layers of skin. Scrapes usually don't bleed much, but they may ooze pinkish fluid. Scrapes on the head or face may appear worse than they are. They may bleed a lot because of the good blood supply to this area. Most scrapes heal well and may not need a bandage. They usually heal within 3 to 7 days. A large, deep scrape may take 1 to 2 weeks or longer to heal. A scab may form on some scrapes. Follow-up care is a key part of your treatment and safety. Be sure to make and go to all appointments, and call your doctor if you are having problems. It's also a good idea to know your test results and keep a list of the medicines you take. How can you care for yourself at home? · If your doctor told you how to care for your wound, follow your doctor's instructions. If you did not get instructions, follow this general advice:  ¨ Wash the scrape with clean water 2 times a day. Don't use hydrogen peroxide or alcohol, which can slow healing. ¨ You may cover the scrape with a thin layer of petroleum jelly, such as Vaseline, and a nonstick bandage. ¨ Apply more petroleum jelly and replace the bandage as needed. · Prop up the injured area on a pillow anytime you sit or lie down during the next 3 days. Try to keep it above the level of your heart. This will help reduce swelling. · Be safe with medicines. Take pain medicines exactly as directed. ¨ If the doctor gave you a prescription medicine for pain, take it as prescribed. ¨ If you are not taking a prescription pain medicine, ask your doctor if you can take an over-the-counter medicine. When should you call for help?   Call your doctor now or seek immediate medical care if:    · You have signs of infection, such as:  ¨ Increased pain, swelling, warmth, or redness around the scrape. ¨ Red streaks leading from the scrape. ¨ Pus draining from the scrape. ¨ A fever.     · The scrape starts to bleed, and blood soaks through the bandage. Oozing small amounts of blood is normal.    Watch closely for changes in your health, and be sure to contact your doctor if the scrape is not getting better each day. Where can you learn more? Go to http://delfina-cony.info/. Enter A374 in the search box to learn more about \"Scrapes (Abrasions): Care Instructions. \"  Current as of: November 20, 2017  Content Version: 11.7  © 1498-7009 [employer]. Care instructions adapted under license by [employer] (which disclaims liability or warranty for this information). If you have questions about a medical condition or this instruction, always ask your healthcare professional. [place] [address] any warranty or liability for your use of this information.

## 2018-08-03 NOTE — DISCHARGE INSTRUCTIONS
Dehydration: Care Instructions  Your Care Instructions  Dehydration happens when your body loses too much fluid. This might happen when you do not drink enough water or you lose large amounts of fluids from your body because of diarrhea, vomiting, or sweating. Severe dehydration can be life-threatening. Water and minerals called electrolytes help put your body fluids back in balance. Learn the early signs of fluid loss, and drink more fluids to prevent dehydration. Follow-up care is a key part of your treatment and safety. Be sure to make and go to all appointments, and call your doctor if you are having problems. It's also a good idea to know your test results and keep a list of the medicines you take. How can you care for yourself at home? · To prevent dehydration, drink plenty of fluids, enough so that your urine is light yellow or clear like water. Choose water and other caffeine-free clear liquids until you feel better. If you have kidney, heart, or liver disease and have to limit fluids, talk with your doctor before you increase the amount of fluids you drink. · If you do not feel like eating or drinking, try taking small sips of water, sports drinks, or other rehydration drinks. · Get plenty of rest.  To prevent dehydration  · Add more fluids to your diet and daily routine, unless your doctor has told you not to. · During hot weather, drink more fluids. Drink even more fluids if you exercise a lot. Stay away from drinks with alcohol or caffeine. · Watch for the symptoms of dehydration. These include:  ¨ A dry, sticky mouth. ¨ Dark yellow urine, and not much of it. ¨ Dry and sunken eyes. ¨ Feeling very tired. · Learn what problems can lead to dehydration. These include:  ¨ Diarrhea, fever, and vomiting. ¨ Any illness with a fever, such as pneumonia or the flu. ¨ Activities that cause heavy sweating, such as endurance races and heavy outdoor work in hot or humid weather.   ¨ Alcohol or drug abuse or withdrawal.  ¨ Certain medicines, such as cold and allergy pills (antihistamines), diet pills (diuretics), and laxatives. ¨ Certain diseases, such as diabetes, cancer, and heart or kidney disease. When should you call for help? Call 911 anytime you think you may need emergency care. For example, call if:    · You passed out (lost consciousness).    Call your doctor now or seek immediate medical care if:    · You are confused and cannot think clearly.     · You are dizzy or lightheaded, or you feel like you may faint.     · You have signs of needing more fluids. You have sunken eyes and a dry mouth, and you pass only a little dark urine.     · You cannot keep fluids down.    Watch closely for changes in your health, and be sure to contact your doctor if:    · You are not making tears.     · Your skin is very dry and sags slowly back into place after you pinch it.     · Your mouth and eyes are very dry. Where can you learn more? Go to http://delfina-cony.info/. Enter R429 in the search box to learn more about \"Dehydration: Care Instructions. \"  Current as of: November 20, 2017  Content Version: 11.7  © 8778-0657 [employer]. Care instructions adapted under license by [employer] (which disclaims liability or warranty for this information). If you have questions about a medical condition or this instruction, always ask your healthcare professional. [place] [address] any warranty or liability for your use of this information. Preventing Falls: Care Instructions  Your Care Instructions    Getting around your home safely can be a challenge if you have injuries or health problems that make it easy for you to fall. Loose rugs and furniture in walkways are among the dangers for many older people who have problems walking or who have poor eyesight.  People who have conditions such as arthritis, osteoporosis, or dementia also have to be careful not to fall. You can make your home safer with a few simple measures. Follow-up care is a key part of your treatment and safety. Be sure to make and go to all appointments, and call your doctor if you are having problems. It's also a good idea to know your test results and keep a list of the medicines you take. How can you care for yourself at home? Taking care of yourself  · You may get dizzy if you do not drink enough water. To prevent dehydration, drink plenty of fluids, enough so that your urine is light yellow or clear like water. Choose water and other caffeine-free clear liquids. If you have kidney, heart, or liver disease and have to limit fluids, talk with your doctor before you increase the amount of fluids you drink. · Exercise regularly to improve your strength, muscle tone, and balance. Walk if you can. Swimming may be a good choice if you cannot walk easily. · Have your vision and hearing checked each year or any time you notice a change. If you have trouble seeing and hearing, you might not be able to avoid objects and could lose your balance. · Know the side effects of the medicines you take. Ask your doctor or pharmacist whether the medicines you take can affect your balance. Sleeping pills or sedatives can affect your balance. · Limit the amount of alcohol you drink. Alcohol can impair your balance and other senses. · Ask your doctor whether calluses or corns on your feet need to be removed. If you wear loose-fitting shoes because of calluses or corns, you can lose your balance and fall. · Talk to your doctor if you have numbness in your feet. Preventing falls at home  · Remove raised doorway thresholds, throw rugs, and clutter. Repair loose carpet or raised areas in the floor. · Move furniture and electrical cords to keep them out of walking paths. · Use nonskid floor wax, and wipe up spills right away, especially on ceramic tile floors.   · If you use a walker or cane, put rubber tips on it. If you use crutches, clean the bottoms of them regularly with an abrasive pad, such as steel wool. · Keep your house well lit, especially [place]ates, and outside walkways. Use night-lights in areas such as hallways and bathrooms. Add extra light switches or use remote switches (such as switches that go on or off when you clap your hands) to make it easier to turn lights on if you have to get up during the night. · Install sturdy handrails on stairways. · Move items in your cabinets so that the things you use a lot are on the lower shelves (about waist level). · Keep a cordless phone and a flashlight with new batteries by your bed. If possible, put a phone in each of the main rooms of your house, or carry a cell phone in case you fall and cannot reach a phone. Or, you can wear a device around your neck or wrist. You push a button that sends a signal for help. · Wear low-heeled shoes that fit well and give your feet good support. Use footwear with nonskid soles. Check the heels and soles of your shoes for wear. Repair or replace worn heels or soles. · Do not wear socks without shoes on wood floors. · Walk on the grass when the sidewalks are slippery. If you live in an area that gets snow and ice in the winter, sprinkle salt on slippery steps and sidewalks. Preventing falls in the bath  · Install grab bars and nonskid mats inside and outside your shower or tub and near the toilet and sinks. · Use shower chairs and bath benches. · Use a hand-held shower head that will allow you to sit while showering. · Get into a tub or shower by putting the weaker leg in first. Get out of a tub or shower with your strong side first.  · Repair loose toilet seats and consider installing a raised toilet seat to make getting on and off the toilet easier. · Keep your bathroom door unlocked while you are in the shower. Where can you learn more? Go to http://delfina-cony.info/.   Enter [id number] in the search box to learn more about \"Preventing Falls: Care Instructions. \"  Current as of: May 12, 2017  Content Version: 11.7  © 5478-8213 [employer]. Care instructions adapted under license by [employer] (which disclaims liability or warranty for this information). If you have questions about a medical condition or this instruction, always ask your healthcare professional. [address] any warranty or liability for your use of this information. Urinary Tract Infection in Women: Care Instructions  Your Care Instructions    A urinary tract infection, or UTI, is a general term for an infection anywhere between the kidneys and the urethra (where urine comes out). Most UTIs are bladder infections. They often cause pain or burning when you urinate. UTIs are caused by bacteria and can be cured with antibiotics. Be sure to complete your treatment so that the infection goes away. Follow-up care is a key part of your treatment and safety. Be sure to make and go to all appointments, and call your doctor if you are having problems. It's also a good idea to know your test results and keep a list of the medicines you take. How can you care for yourself at home? · Take your antibiotics as directed. Do not stop taking them just because you feel better. You need to take the full course of antibiotics. · Drink extra water and other fluids for the next day or two. This may help wash out the bacteria that are causing the infection. (If you have kidney, heart, or liver disease and have to limit fluids, talk with your doctor before you increase your fluid intake.)  · Avoid drinks that are carbonated or have caffeine. They can irritate the bladder. · Urinate often. Try to empty your bladder each time. · To relieve pain, take a hot bath or lay a heating pad set on low over your lower belly or genital area. Never go to sleep with a heating pad in place.   To prevent UTIs  · Drink plenty of water each day. This helps you urinate often, which clears bacteria from your system. (If you have kidney, heart, or liver disease and have to limit fluids, talk with your doctor before you increase your fluid intake.)  · Urinate when you need to. · Urinate right after you have sex. · Change sanitary pads often. · Avoid douches, bubble baths, feminine hygiene sprays, and other feminine hygiene products that have deodorants. · After going to the bathroom, wipe from front to back. When should you call for help? Call your doctor now or seek immediate medical care if:    · Symptoms such as fever, chills, nausea, or vomiting get worse or appear for the first time.     · You have new pain in your back just below your rib cage. This is called flank pain.     · There is new blood or pus in your urine.     · You have any problems with your antibiotic medicine.    Watch closely for changes in your health, and be sure to contact your doctor if:    · You are not getting better after taking an antibiotic for 2 days.     · Your symptoms go away but then come back. Where can you learn more? Go to http://delfina-cony.info/. Enter H430 in the search box to learn more about \"Urinary Tract Infection in Women: Care Instructions. \"  Current as of: May 12, 2017  Content Version: 11.7  © 2457-3179 [employer]. Care instructions adapted under license by [employer] (which disclaims liability or warranty for this information). If you have questions about a medical condition or this instruction, always ask your healthcare professional. [place] [address] any warranty or liability for your use of this information.

## 2018-08-03 NOTE — PROGRESS NOTES
physical Therapy Emergency Department EVALUATION/DISCHARGE with CMS G codes  Patient: Edwin Porter (67 y.o. female)  Date: 8/3/2018  Primary Diagnosis: There are no admission diagnoses documented for this encounter. Precautions:     ASSESSMENT :  Based on the objective data described below, the patient presents with LE weakness L>R s/p 2 falls. Asked by Lilibeth Barreto and María Zepeda to see pt for eval and recommendations as pt is awaiting d/c. Pt lives with family, 4 other adults in the household, and has had a hx of multiple medical issues including back problems and previous fall in 6/18 that resulted in a L humeral fx for which she is still not to WB and remains in splint. She states she sponge bathes at her bedside and requires min A from her sister for dressing due to shoulder fx. She says she uses a cane or furniture/walls to walk at home. At this time, pt is showing 3+/5 strength LLE vs. 4/5 RLE. She states this has been evident over last 4-6 months. She requires min A for bed mobility. She is min A of 1 for sit to stand from [facility] stretcher and mod A from [facility]. She transfers bed to [facility] with min A. She amb with mod A HHA of 1 on L, had SBA of ED tech due to pt's instability but did not need A of second person. She displays decreased end range knee and hip control on L in stance. She does not overtly buckle but does require the mod A to prevent fall. Pt returned to bed with call bell in reach. At this time, pt appears to be having ongoing issues with chronic LLE weakness compounded by the R humeral fx limiting WB and any use of increased BUE support. Would recommend HHPT/OT for strengthening and gait training at home with progression to use of RW when orthopedically allowed. Would also recommend use of BSC to reduce risk of fall with toileting. She will need increased assist at home from family until progresses to independent amb.  Rounded with Dr. María Zepeda and with CM who will assist with d/d planning. Further acute physical therapy in the ED is not indicated at this time. PLAN :  Discharge Recommendations:     [x]   Home with family  []   Skilled nursing facility  []   Admission to hospital with rehab likely needed  []   Inpatient rehab referral  []   Outpatient physical therapy referral  []   Other:    Further Equipment Recommendations for Discharge:   []   Rolling walker with 5\" wheels  []   Crutches   []   Abel Jeanie   []   Wheelchair   [x]   Other: RW when cleared by ortho for RUE    COMMUNICATION/EDUCATION:   Communication/Collaboration:  [x]   Fall prevention education was provided and the patient/caregiver indicated understanding. [x]   Patient/family have participated as able and agree with findings and recommendations. []   Patient is unable to participate in plan of care at this time. Findings and recommendations were discussed with: MD physician and care manager       SUBJECTIVE:   Patient stated My left leg has been weak for months.     OBJECTIVE DATA SUMMARY:     Past Medical History:   Diagnosis Date    Arthritis     Asthma     Chronic back pain     [facility]     [facility]     Hypertension     MRSA (methicillin resistant staph aureus) culture positive     labial abscess    Neuropathy      Past Surgical History:   Procedure Laterality Date    HX HEENT      tonsillectomy    HX ORTHOPAEDIC      left ft bunionectomy    HX OTHER SURGICAL      lanced labial abscess     Prior Level of Function/Home Situation: Pt lives with family, 4 other adults in the household, and has had a hx of multiple medical issues including back problems and previous fall in 6/18 that resulted in a L humeral fx for which she is still not to WB and remains in splint. She states she sponge bathes at her bedside and requires min A from her sister for dressing due to shoulder fx. She says she uses a cane or furniture/walls to walk at home.     Home Situation  Home Environment: Private residence  # Steps to Enter: 6  Rails to Enter: Yes  Hand Rails : Bilateral  One/Two Story Residence: One story  Living Alone: No  Support Systems: Child(jesus manuel), Family member(s), Friends \ neighbors (lives in the home with 4 adults)  Patient Expects to be Discharged to[de-identified] Private residence  Current DME Used/Available at Home: Cane, straight  Critical Behavior:  Neurologic State: Alert  Orientation Level: Oriented X4  Cognition: Follows commands       Strength:    Strength:  (RLE 4/5; LLE 3+/5 )                                  Range Of Motion:  AROM: Generally decreased, functional           PROM: Within functional limits           Coordination:  Coordination: Generally decreased, functional    Functional Mobility:  Bed Mobility:     Supine to Sit: Minimum assistance  Sit to Supine: Minimum assistance     Transfers:  Sit to Stand: Minimum assistance; Moderate assistance (min from stretcher; mod from [facility])  Stand to Sit: Minimum assistance                       Balance:   Sitting: Intact  Standing: Impaired  Standing - Static: Constant support; Fair  Standing - Dynamic : Poor  Ambulation/Gait Training:  Distance (ft): 30 Feet (ft)  Assistive Device: Gait belt  Ambulation - Level of Assistance: Moderate assistance        Gait Abnormalities: Decreased step clearance;Trunk sway increased; Other (decreased end range control L knee/hip)        Base of Support: Widened;Shift to right     Speed/Vangie: Slow  Step Length: Right shortened;Left shortened  Swing Pattern: Right asymmetrical;Left asymmetrical             Functional Measure:  Barthel Index:    Bathin  Bladder: 10  Bowels: 10  Groomin  Dressin  Feedin  Mobility: 0  Stairs: 0  Toilet Use: 5  Transfer (Bed to Chair and Back): 10  Total: 45       Barthel and G-code impairment scale:  Percentage of impairment CH  0% CI  1-19% CJ  20-39% CK  40-59% CL  60-79% CM  80-99% CN  100%   Barthel Score 0-100 100 99-80 79-60 59-40 20-39 1-19   0   Barthel Score 0-20 20 17-19 13-16 9-12 5-8 1-4 0      The Barthel ADL Index: Guidelines  1. The index should be used as a record of what a patient does, not as a record of what a patient could do. 2. The main aim is to establish degree of independence from any help, physical or verbal, however minor and for whatever reason. 3. The need for supervision renders the patient not independent. 4. A patient's performance should be established using the best available evidence. Asking the patient, friends/relatives and nurses are the usual sources, but direct observation and common sense are also important. However direct testing is not needed. 5. Usually the patient's performance over the preceding 24-48 hours is important, but occasionally longer periods will be relevant. 6. Middle categories imply that the patient supplies over 50 per cent of the effort. 7. Use of aids to be independent is allowed. Harlee Cowden., Barthel, D.W. (8189). Functional evaluation: the Barthel Index. [facility] (14)2. Alfredo Muniz dwayne CAMERON Thorne, Gurwinder Millard, Donnie Mills., [place], [address] [place] [address] (1999). Measuring the change indisability after inpatient rehabilitation; comparison of the responsiveness of the Barthel Index and Functional [place] Measure. Journal of Neurology, Neurosurgery, and Psychiatry, 66(4), 583-930. Lubna Goodwin, SILVIA.J.[email or web address], KAMILLA Ramirez, & [place] M.A. (2004.) Assessment of post-stroke quality of life in cost-effectiveness studies: The usefulness of the Barthel Index and the EuroQoL-5D. Quality of Life Research, 13, 614-20     In compliance with CMSs Claims Based Outcome Reporting, the following G-code set was chosen for this patient based on their primary functional limitation being treated: The outcome measure chosen to determine the severity of the functional limitation was the barthel with a score of 45/100 which was correlated with the impairment scale.     ? Mobility - Walking and Moving Around:     - CURRENT STATUS: CK - 40%-59% impaired, limited or restricted    - GOAL STATUS: CK - 40%-59% impaired, limited or restricted    - D/C STATUS:  CK - 40%-59% impaired, limited or restricted   Pain:  Pain Scale 1: Numeric (0 - 10)  Pain Intensity 1: 8  Pain Location 1: Arm  Pain Orientation 1: Upper;Right  Pain Description 1: Sharp     Activity Tolerance:   Good for session; no acute distress    Please refer to the flowsheet for vital signs taken during this treatment.   After treatment:   []         Patient left in no apparent distress sitting up in chair  [x]         Patient left in no apparent distress in bed  [x]         Call bell left within reach  [x]         Nursing notified  []         Caregiver present  []         Bed alarm activated        Thank you for this referral.  Jenise Bhatia, PT   Time Calculation: 16 mins

## 2018-08-03 NOTE — ED NOTES
Case management states that discharge [facility] : [facility] will come to  patient with an ETA of around 1300. Patient updated.

## 2018-08-03 NOTE — ED PROVIDER NOTES
EMERGENCY DEPARTMENT HISTORY AND PHYSICAL EXAM      Date: 8/3/2018  Patient Name: Best Salazar    History of Presenting Illness     Chief Complaint   Patient presents with    Fall     RIGHT knee pain, pt fell while being discharged. History Provided By: Patient    HPI: Best Salazar, 46 y.o. female with PMHx significant for DM, HTN, neuropathy, presents ambulatory via wheelchair to the ED with cc of acute, moderate R knee pain with an associated abrasion following mechanical fall while exiting ED ~ 20 minutes ago. Pt was seen at ED earlier today x multiple falls and leg weakness, furthermore while exiting ER x discharge pt experienced a 2nd mechanical fall leading to knee abrasion. Pt denies endorsing any medication to relieve acute symptoms. Pt denies any exacerbating and alleviating factors. She specifically denies any fevers, chills, nausea, vomiting, chest pain, shortness of breath, headache, rash, diarrhea, sweating or weight loss. Chief Complaint: R knee pain  Duration: ~ 20 minutes ago  Timing:  Acute  Location: R knee  Quality: Aching  Severity: Moderate  Modifying Factors: Denies any exacerbating and alleviating factors. Associated Symptoms:Associated abrasion, but denies any other associated signs or symptoms    There are no other complaints, changes, or physical findings at this time. PCP: None    Current Outpatient Prescriptions   Medication Sig Dispense Refill    cephALEXin (KEFLEX) 500 mg capsule Take 1 Cap by mouth three (3) times daily for 7 days. 21 Cap 0    HYDROcodone-acetaminophen (LORTAB 5-325) 5-325 mg per tablet Take 1 Tab by mouth every six (6) hours as needed for Pain (breakthrough pain). Max Daily Amount: 4 Tabs. Indications: causes drowsiness;do not drink,drive, or use machinery while taking; take with a stool softener 20 Tab 0    gabapentin (NEURONTIN) 300 mg capsule Take 1 Cap by mouth three (3) times daily.  30 Cap 0    acetaminophen (TYLENOL) 325 mg tablet Take 2 Tabs by mouth every four (4) hours as needed for Pain. 20 Tab 0    gabapentin (NEURONTIN) 300 mg capsule Take 2 Caps by mouth three (3) times daily as needed. 30 Cap 0    promethazine (PHENERGAN) 25 mg tablet Take 1 Tab by mouth every six (6) hours as needed for Nausea. 12 Tab 0    ondansetron (ZOFRAN ODT) 4 mg disintegrating tablet Take 1 Tab by mouth every eight (8) hours as needed for Nausea. 16 Tab 0    insulin lispro (HUMALOG) 100 unit/mL injection Take 32 units with meals, only if BG > 100. Indications: HYPERGLYCEMIA, TYPE 2 DIABETES MELLITUS 3 Vial 3    insulin glargine (LANTUS) 100 unit/mL injection 72 Units by SubCUTAneous route daily. Indications: TYPE 2 DIABETES MELLITUS 3 Vial 3    glucose blood VI test strips (BLOOD GLUCOSE TEST) strip Check blood sugar 1-2 times daily. 1 Each 11    fenofibrate nanocrystallized (TRICOR) 145 mg tablet Take 1 Tab by mouth daily. 30 Tab 3    lisinopril (PRINIVIL, ZESTRIL) 10 mg tablet Take 1 Tab by mouth daily. 30 Tab 3    glipiZIDE (GLUCOTROL) 5 mg tablet Take 1 Tab by mouth two (2) times a day. 60 Tab 3    traZODone (DESYREL) 50 mg tablet Take 1 Tab by mouth nightly. 30 Tab 3       Past History     Past Medical History:  Past Medical History:   Diagnosis Date    Arthritis     Asthma     Chronic back pain     Diabetes ([address]     [facility] retinopathy [address] [address] [facility]     Hypertension     MRSA (methicillin resistant staph aureus) culture positive     labial abscess    Neuropathy        Past Surgical History:  Past Surgical History:   Procedure Laterality Date    HX HEENT      tonsillectomy    HX ORTHOPAEDIC      left ft bunionectomy    HX OTHER SURGICAL      lanced labial abscess       Family History:  History reviewed. No pertinent family history. Social History:  Social History   Substance Use Topics    Smoking status: Never Smoker    Smokeless tobacco: Never Used    Alcohol use No       Allergies:   Allergies   Allergen Reactions    Aspirin Unknown (comments)     Patient states naproxen ok    Ciprofloxacin Hives         Review of Systems   Review of Systems   Constitutional: Negative. Negative for activity change, appetite change, chills, fatigue, fever and unexpected weight change. HENT: Negative. Negative for congestion, hearing loss, rhinorrhea, sneezing and voice change. Eyes: Negative. Negative for pain and visual disturbance. Respiratory: Negative. Negative for apnea, cough, choking, chest tightness and shortness of breath. Cardiovascular: Negative. Negative for chest pain and palpitations. Gastrointestinal: Negative. Negative for abdominal distention, abdominal pain, blood in stool, diarrhea, nausea and vomiting. Genitourinary: Negative. Negative for difficulty urinating, flank pain, frequency and urgency. No discharge   Musculoskeletal: Positive for myalgias (R knee). Negative for arthralgias, back pain and neck stiffness. Skin: Positive for wound (R knee). Negative for rash. Neurological: Negative. Negative for dizziness, seizures, syncope, speech difficulty, weakness, numbness and headaches. Hematological: Negative for adenopathy. Psychiatric/Behavioral: Negative. Negative for agitation, behavioral problems, dysphoric mood and suicidal ideas. The patient is not nervous/anxious. Physical Exam   Physical Exam   Constitutional: She is oriented to person, place, and time. She appears well-developed and well-nourished. No distress. Sling on R shoulder   HENT:   Head: Normocephalic and atraumatic. Mouth/Throat: Oropharynx is clear and moist. No oropharyngeal exudate. Eyes: Conjunctivae and EOM are normal. Pupils are equal, round, and reactive to light. Right eye exhibits no discharge. Left eye exhibits no discharge. Neck: Normal range of motion. Neck supple. Cardiovascular: Normal rate, regular rhythm and intact distal pulses. Exam reveals no gallop and no friction rub.     No murmur heard.  Pulmonary/Chest: Effort normal and breath sounds normal. No respiratory distress. She has no wheezes. She has no rales. She exhibits no tenderness. Abdominal: Soft. Bowel sounds are normal. She exhibits no distension and no mass. There is no tenderness. There is no rebound and no guarding. Musculoskeletal: Normal range of motion. She exhibits no edema. Lymphadenopathy:     She has no cervical adenopathy. Neurological: She is alert and oriented to person, place, and time. No cranial nerve deficit. Coordination normal.   Skin: Skin is warm and dry. Abrasion noted. No rash noted. No erythema. Abrasion to R anterior knee   Psychiatric: She has a normal mood and affect. Nursing note and vitals reviewed.       Diagnostic Study Results     Labs -     Recent Results (from the past 12 hour(s))   URINALYSIS W/ RFLX MICROSCOPIC    Collection Time: 08/03/18  4:50 AM   Result Value Ref Range    Color YELLOW/STRAW      Appearance CLOUDY (A) CLEAR      Specific gravity 1.009 1.003 - 1.030      pH (UA) 5.5 5.0 - 8.0      Protein 100 (A) NEG mg/dL    Glucose NEGATIVE  NEG mg/dL    Ketone NEGATIVE  NEG mg/dL    Bilirubin NEGATIVE  NEG      Blood MODERATE (A) NEG      Urobilinogen 0.2 0.2 - 1.0 EU/dL    Nitrites NEGATIVE  NEG      Leukocyte Esterase LARGE (A) NEG      WBC  0 - 4 /hpf    RBC 10-20 0 - 5 /hpf    Epithelial cells FEW FEW /lpf    Bacteria 3+ (A) NEG /hpf   METABOLIC PANEL, BASIC    Collection Time: 08/03/18  4:50 AM   Result Value Ref Range    Sodium 138 136 - 145 mmol/L    Potassium 3.2 (L) 3.5 - 5.1 mmol/L    Chloride 103 97 - 108 mmol/L    CO2 28 21 - 32 mmol/L    Anion gap 7 5 - 15 mmol/L    Glucose 104 (H) 65 - 100 mg/dL    BUN 24 (H) 6 - 20 MG/DL    Creatinine 1.12 (H) 0.55 - 1.02 MG/DL    BUN/Creatinine ratio 21 (H) 12 - 20      GFR est AA >60 >60 ml/min/1.73m2    GFR est non-AA 51 (L) >60 ml/min/1.73m2    Calcium 9.2 8.5 - 10.1 MG/DL   CBC WITH AUTOMATED DIFF    Collection Time: 08/03/18 4:50 AM   Result Value Ref Range    WBC 5.1 3.6 - 11.0 K/uL    RBC 4.00 3.80 - 5.20 M/uL    HGB 11.7 11.5 - 16.0 g/dL    HCT 33.5 (L) 35.0 - 47.0 %    MCV 83.8 80.0 - 99.0 FL    MCH 29.3 26.0 - 34.0 PG    MCHC 34.9 30.0 - 36.5 g/dL    RDW 13.0 11.5 - 14.5 %    PLATELET 622 248 - 516 K/uL    MPV 9.1 8.9 - 12.9 FL    NRBC 0.0 0  WBC    ABSOLUTE NRBC 0.00 0.00 - 0.01 K/uL    NEUTROPHILS 55 32 - 75 %    LYMPHOCYTES 34 12 - 49 %    MONOCYTES 8 5 - 13 %    EOSINOPHILS 3 0 - 7 %    BASOPHILS 1 0 - 1 %    IMMATURE GRANULOCYTES 0 0.0 - 0.5 %    ABS. NEUTROPHILS 2.8 1.8 - 8.0 K/UL    ABS. LYMPHOCYTES 1.7 0.8 - 3.5 K/UL    ABS. MONOCYTES 0.4 0.0 - 1.0 K/UL    ABS. EOSINOPHILS 0.1 0.0 - 0.4 K/UL    ABS. BASOPHILS 0.0 0.0 - 0.1 K/UL    ABS. IMM. GRANS. 0.0 0.00 - 0.04 K/UL    DF AUTOMATED     MAGNESIUM    Collection Time: 08/03/18  4:50 AM   Result Value Ref Range    Magnesium 1.7 1.6 - 2.4 mg/dL       Radiologic Studies -   None    Medical Decision Making   I am the first provider for this patient. I reviewed the vital signs, available nursing notes, past medical history, past surgical history, family history and social history. Vital Signs-Reviewed the patient's vital signs. Patient Vitals for the past 12 hrs:   Temp Pulse Resp SpO2   08/03/18 1351 98.2 °F (36.8 °C) 90 12 99 %       Pulse Oximetry Analysis - 99% on RA    Records Reviewed: Nursing Notes, Old Medical Records, Previous Radiology Studies and Previous Laboratory Studies    Provider Notes (Medical Decision Making):   DDx: Strain, sprain, contusion    ED Course:   Initial assessment performed. The patients presenting problems have been discussed, and they are in agreement with the care plan formulated and outlined with them. I have encouraged them to ask questions as they arise throughout their visit. Critical Care Time: 0 minutes      Disposition:  2:48 PM  Fabricio Liao  results have been reviewed with her.   She has been counseled regarding her diagnosis. She verbally conveys understanding and agreement of the signs, symptoms, diagnosis, treatment and prognosis and additionally agrees to follow up as recommended with Dr. None in 24 - 48 hours. She also agrees with the care-plan and conveys that all of her questions have been answered. I have also put together some discharge instructions for her that include: 1) educational information regarding their diagnosis, 2) how to care for their diagnosis at home, as well a 3) list of reasons why they would want to return to the ED prior to their follow-up appointment, should their condition change. PLAN:  1. Current Discharge Medication List        2. Follow-up Information     Follow up With Details Comments Contact Info    None   None ([phone]) Patient stated that they have no PCP      [facility] EMERGENCY DEPT Call in 2 days As needed, If symptoms worsen [address] [place] [address]  [phone]        Return to ED if worse     Diagnosis     Clinical Impression:   1. Abrasion        Attestations: This note is prepared by Nash Alvares, acting as Scribe for [employer] Greg Garza, [facility] Greg Garza MD: The scribe's documentation has been prepared under my direction and personally reviewed by me in its entirety.  I confirm that the note above accurately reflects all work, treatment, procedures, and medical decision making performed by me

## 2018-08-03 NOTE — ED TRIAGE NOTES
Pt to ed via EMS with multiple falls and leg weakness. Per EMS, pt fell 2 times tonight onto her knees and her legs feels weak and has been going on since on narcotic medication. Pt states she has felt werak since injuring her rt upper arm on June 22nd.

## 2018-08-03 NOTE — ED NOTES
Sling applied to patient's right arm per patient's request. Patient sitting on stretcher at this time. Patient completed meal tray. Patient updated on plan of care at this time.

## 2018-08-03 NOTE — ED PROVIDER NOTES
EMERGENCY DEPARTMENT HISTORY AND PHYSICAL EXAM      Date: 8/3/2018  Patient Name: Negin Gonzalez    History of Presenting Illness     Chief Complaint   Patient presents with    Extremity Weakness       History Provided By: Patient and EMS    HPI: Negin Gonzalez, 46 y.o. female with PMHx significant for DM, neuropathy, arthritis, HTN, chronic back pain, asthma, and diabetic retinopathy, presents via EMS to the ED s/p GLF x2 PTA that was preceded by bll knee and leg weakness. Per EMS, pt was on her way to the bathroom when she fell onto her knees. She was able to get back up, but shortly sustained another GLF where she was unable to stand back up despite assistance from her family members, which prompted her family to call EMS. Pt reports that she has been feeling extremity weakness since being on narcotic medications s/p R humerus fx on 6/22. Pt reports she last took Hydrocodone at 2100 last night. Pt states that she has been able to drink and eat normally. Pt also c/o low back pain. Pt denies CP, SOB, or abd pain. There are no other complaints, changes, or physical findings at this time. Social Hx: -Tobacco, -EtOH, -Illicit Drug Use  Surgical hx: Tonsillectomy, L bunionectomy    PCP: None    Current Outpatient Prescriptions   Medication Sig Dispense Refill    cephALEXin (KEFLEX) 500 mg capsule Take 1 Cap by mouth three (3) times daily for 7 days. 21 Cap 0    HYDROcodone-acetaminophen (LORTAB 5-325) 5-325 mg per tablet Take 1 Tab by mouth every six (6) hours as needed for Pain (breakthrough pain). Max Daily Amount: 4 Tabs. Indications: causes drowsiness;do not drink,drive, or use machinery while taking; take with a stool softener 20 Tab 0    gabapentin (NEURONTIN) 300 mg capsule Take 1 Cap by mouth three (3) times daily. 30 Cap 0    acetaminophen (TYLENOL) 325 mg tablet Take 2 Tabs by mouth every four (4) hours as needed for Pain.  20 Tab 0    gabapentin (NEURONTIN) 300 mg capsule Take 2 Caps by mouth three (3) times daily as needed. 30 Cap 0    promethazine (PHENERGAN) 25 mg tablet Take 1 Tab by mouth every six (6) hours as needed for Nausea. 12 Tab 0    ondansetron (ZOFRAN ODT) 4 mg disintegrating tablet Take 1 Tab by mouth every eight (8) hours as needed for Nausea. 16 Tab 0    insulin lispro (HUMALOG) 100 unit/mL injection Take 32 units with meals, only if BG > 100. Indications: HYPERGLYCEMIA, TYPE 2 DIABETES MELLITUS 3 Vial 3    insulin glargine (LANTUS) 100 unit/mL injection 72 Units by SubCUTAneous route daily. Indications: TYPE 2 DIABETES MELLITUS 3 Vial 3    glucose blood VI test strips (BLOOD GLUCOSE TEST) strip Check blood sugar 1-2 times daily. 1 Each 11    fenofibrate nanocrystallized (TRICOR) 145 mg tablet Take 1 Tab by mouth daily. 30 Tab 3    lisinopril (PRINIVIL, ZESTRIL) 10 mg tablet Take 1 Tab by mouth daily. 30 Tab 3    glipiZIDE (GLUCOTROL) 5 mg tablet Take 1 Tab by mouth two (2) times a day. 60 Tab 3    traZODone (DESYREL) 50 mg tablet Take 1 Tab by mouth nightly. 30 Tab 3       Past History     Past Medical History:  Past Medical History:   Diagnosis Date    Arthritis     Asthma     Chronic back pain     Diabetes ([address]     [facility] [address]     Hypertension     MRSA (methicillin resistant staph aureus) culture positive     labial abscess    Neuropathy        Past Surgical History:  Past Surgical History:   Procedure Laterality Date    HX HEENT      tonsillectomy    HX ORTHOPAEDIC      left ft bunionectomy    HX OTHER SURGICAL      lanced labial abscess       Family History:  No family history on file. Social History:  Social History   Substance Use Topics    Smoking status: Never Smoker    Smokeless tobacco: Never Used    Alcohol use No       Allergies: Allergies   Allergen Reactions    Aspirin Unknown (comments)     Patient states naproxen ok    Ciprofloxacin Hives         Review of Systems   Review of Systems   Constitutional: Positive for fatigue. Negative for activity change, appetite change, chills, fever and unexpected weight change. HENT: Negative for congestion. Eyes: Negative for pain and visual disturbance. Respiratory: Negative for cough and shortness of breath. Cardiovascular: Negative for chest pain. Gastrointestinal: Negative for abdominal pain, diarrhea, nausea and vomiting. Genitourinary: Negative for dysuria. Musculoskeletal: Positive for back pain (low). Skin: Negative for rash. Neurological: Positive for weakness (BLE). Negative for headaches. Physical Exam   Physical Exam   Constitutional: She is oriented to person, place, and time. She appears well-developed and well-nourished. This is an obese middle aged female appearing older than stated age in mild distress due to right arm pain   HENT:   Head: Normocephalic and atraumatic. Mildly dry MM   Eyes: Conjunctivae and EOM are normal. Pupils are equal, round, and reactive to light. Right eye exhibits no discharge. Left eye exhibits no discharge. Neck: Normal range of motion. Neck supple. Cardiovascular: Normal rate, regular rhythm, normal heart sounds and intact distal pulses. No murmur heard. Pulmonary/Chest: Effort normal and breath sounds normal. No respiratory distress. She has no wheezes. She has no rales. Abdominal: Soft. Bowel sounds are normal. She exhibits no distension and no mass. There is no tenderness. There is no rebound and no guarding. Musculoskeletal: Normal range of motion. She exhibits no edema. Right arm in sling and partial plastic splint. Patient able to maneuver in bed and range other joints without production of pain   Neurological: She is alert and oriented to person, place, and time. No cranial nerve deficit. She exhibits normal muscle tone. Skin: Skin is warm and dry. No rash noted. She is not diaphoretic. Nursing note and vitals reviewed.       Diagnostic Study Results     Labs -     Recent Results (from the past 12 hour(s))   URINALYSIS W/ RFLX MICROSCOPIC    Collection Time: 08/03/18  4:50 AM   Result Value Ref Range    Color YELLOW/STRAW      Appearance CLOUDY (A) CLEAR      Specific gravity 1.009 1.003 - 1.030      pH (UA) 5.5 5.0 - 8.0      Protein 100 (A) NEG mg/dL    Glucose NEGATIVE  NEG mg/dL    Ketone NEGATIVE  NEG mg/dL    Bilirubin NEGATIVE  NEG      Blood MODERATE (A) NEG      Urobilinogen 0.2 0.2 - 1.0 EU/dL    Nitrites NEGATIVE  NEG      Leukocyte Esterase LARGE (A) NEG      WBC  0 - 4 /hpf    RBC 10-20 0 - 5 /hpf    Epithelial cells FEW FEW /lpf    Bacteria 3+ (A) NEG /hpf   METABOLIC PANEL, BASIC    Collection Time: 08/03/18  4:50 AM   Result Value Ref Range    Sodium 138 136 - 145 mmol/L    Potassium 3.2 (L) 3.5 - 5.1 mmol/L    Chloride 103 97 - 108 mmol/L    CO2 28 21 - 32 mmol/L    Anion gap 7 5 - 15 mmol/L    Glucose 104 (H) 65 - 100 mg/dL    BUN 24 (H) 6 - 20 MG/DL    Creatinine 1.12 (H) 0.55 - 1.02 MG/DL    BUN/Creatinine ratio 21 (H) 12 - 20      GFR est AA >60 >60 ml/min/1.73m2    GFR est non-AA 51 (L) >60 ml/min/1.73m2    Calcium 9.2 8.5 - 10.1 MG/DL   CBC WITH AUTOMATED DIFF    Collection Time: 08/03/18  4:50 AM   Result Value Ref Range    WBC 5.1 3.6 - 11.0 K/uL    RBC 4.00 3.80 - 5.20 M/uL    HGB 11.7 11.5 - 16.0 g/dL    HCT 33.5 (L) 35.0 - 47.0 %    MCV 83.8 80.0 - 99.0 FL    MCH 29.3 26.0 - 34.0 PG    MCHC 34.9 30.0 - 36.5 g/dL    RDW 13.0 11.5 - 14.5 %    PLATELET 899 617 - 214 K/uL    MPV 9.1 8.9 - 12.9 FL    NRBC 0.0 0  WBC    ABSOLUTE NRBC 0.00 0.00 - 0.01 K/uL    NEUTROPHILS 55 32 - 75 %    LYMPHOCYTES 34 12 - 49 %    MONOCYTES 8 5 - 13 %    EOSINOPHILS 3 0 - 7 %    BASOPHILS 1 0 - 1 %    IMMATURE GRANULOCYTES 0 0.0 - 0.5 %    ABS. NEUTROPHILS 2.8 1.8 - 8.0 K/UL    ABS. LYMPHOCYTES 1.7 0.8 - 3.5 K/UL    ABS. MONOCYTES 0.4 0.0 - 1.0 K/UL    ABS. EOSINOPHILS 0.1 0.0 - 0.4 K/UL    ABS. BASOPHILS 0.0 0.0 - 0.1 K/UL    ABS. IMM.  GRANS. 0.0 0.00 - 0.04 K/UL    DF AUTOMATED MAGNESIUM    Collection Time: 08/03/18  4:50 AM   Result Value Ref Range    Magnesium 1.7 1.6 - 2.4 mg/dL       Radiologic Studies -   No orders to display     CT Results  (Last 48 hours)    None        CXR Results  (Last 48 hours)    None            Medical Decision Making   I am the first provider for this patient. I reviewed the vital signs, available nursing notes, past medical history, past surgical history, family history and social history. Vital Signs-Reviewed the patient's vital signs. Patient Vitals for the past 12 hrs:   Temp Pulse Resp BP SpO2   08/03/18 0500 - 92 17 153/75 99 %   08/03/18 0430 - 93 17 137/66 98 %   08/03/18 0423 98.5 °F (36.9 °C) 92 17 137/66 99 %       Pulse Oximetry Analysis - 99% on RA     Records Reviewed: Nursing Notes, Old Medical Records, Ambulance Run Sheet, Previous Radiology Studies and Previous Laboratory Studies    Provider Notes (Medical Decision Making):   Middle aged female with frequent ED visits to multiple hospitals; frequently for narcotic seeking type behavior. Concern for possible overmedication as cause of falls, but pt denies medications since 9pm yesterday. Exam without evidence of new trauma. Given feeling of diffuse weakness, will initiate evaluation r/o metabolic derangement, infection. PT evaluation to be completed when they arrive for possible sources of DME to prevent further falls. ED Course:   Initial assessment performed. The patients presenting problems have been discussed, and they are in agreement with the care plan formulated and outlined with them. I have encouraged them to ask questions as they arise throughout their visit. Progress Notes:  6:27 AM  Results notable for dehydration and recurrence of UTI. Previous cultures pan sensitive E coli. IVF with ceftriaxone to be given while awaiting PT evaluation.    Written by Hiwot Mckinney ED Scribe, as dictated by Amparo Lazaro MD    07:15 AM SIGN-OUT  Discussed pt's hx, disposition, and available diagnostic and imaging results with Brian Nieto MD. Reviewed care plans. Agrees with plans as outlined. Care of pt transferred to [place] [id number]. Ken Nieto MD.   Written by Dolly Marquez ED Scribe, as dictated by Lucrecia Garcia MD.    Progress Note:  10:14 AM  Pt re-evaluated, can go home without family. Pt reports there are four adults in household with two adult males to assist ambulate. Pt states PCP is Kalee Fitzpatrick MD at [facility]. Pt is setting up a follow-up appt with PCP for next week. Written by YUAN Hernandezibe, as dictated by [place] [id number]. Ken Nieto, [address] [place] [address] Time:   0 minutes. Disposition:  Discharge Note:  10:16AM  The patient has been re-evaluated and is ready for discharge. Reviewed available results with patient. Counseled patient/parent/guardian on diagnosis and care plan. Patient has expressed understanding, and all questions have been answered. Patient agrees with plan and agrees to follow up as recommended, or return to the ED if their symptoms worsen. Discharge instructions have been provided and explained to the patient, along with reasons to return to the ED. Written by YUAN Sparks, as dictated by Lucrecia Garcia MD    PLAN:  1. Current Discharge Medication List      START taking these medications    Details   cephALEXin (KEFLEX) 500 mg capsule Take 1 Cap by mouth three (3) times daily for 7 days. Qty: 21 Cap, Refills: 0           2. Follow-up Information     Follow up With Details Comments Contact Info    [facility] EMERGENCY DEPT  If symptoms worsen [address] Kalee Fitzpatrick, [address]  [address] [place] [address] [phone]          Return to ED if worse     Diagnosis     Clinical Impression:   1. Fall, initial encounter    2. Dehydration    3. Acute cystitis without hematuria        Attestations:     This note is prepared by Mitch Child, acting as scribe for MD Tom León MD: The scribe's documentation has been prepared under my direction and personally reviewed by me in its entirety. I confirm that the note above accurately reflects all work, treatment, procedures, and medical decision making performed by me.

## 2018-08-03 NOTE — ED NOTES
Attempted to clean wound on patients right knee. Patient is not in (or near) sumner bed. Will follow up with charge RN. 1546- Patient in front of ED with [facility] for transport. Patient's right knee cleaned with normal saline and large bandaid. Patient being prepped for discharge home.

## 2018-08-03 NOTE — ED NOTES
Pt able to use her legs to raise hips to get pants off. Pt able to use her legs to adjust herself on stretcher. MD notified.

## 2018-08-06 LAB
BACTERIA SPEC CULT: ABNORMAL
CC UR VC: ABNORMAL
SERVICE CMNT-IMP: ABNORMAL

## 2018-11-13 ENCOUNTER — HOSPITAL ENCOUNTER (EMERGENCY)
Age: 53
Discharge: HOME | End: 2018-11-13
Attending: EMERGENCY MEDICINE
Payer: SELF-PAY

## 2018-11-13 VITALS
HEART RATE: 108 BPM | TEMPERATURE: 98.2 F | HEIGHT: 65 IN | SYSTOLIC BLOOD PRESSURE: 121 MMHG | RESPIRATION RATE: 10 BRPM | DIASTOLIC BLOOD PRESSURE: 46 MMHG | WEIGHT: 180 LBS | OXYGEN SATURATION: 97 % | BODY MASS INDEX: 29.99 KG/M2

## 2018-11-13 DIAGNOSIS — E86.0 DEHYDRATION: ICD-10-CM

## 2018-11-13 DIAGNOSIS — R53.1 WEAKNESS: Primary | ICD-10-CM

## 2018-11-13 LAB
ALBUMIN SERPL-MCNC: 3 G/DL (ref 3.5–5)
ALBUMIN/GLOB SERPL: 0.7 {RATIO} (ref 1.1–2.2)
ALP SERPL-CCNC: 99 U/L (ref 45–117)
ALT SERPL-CCNC: 25 U/L (ref 12–78)
ANION GAP SERPL CALC-SCNC: 10 MMOL/L (ref 5–15)
APPEARANCE UR: ABNORMAL
AST SERPL-CCNC: 26 U/L (ref 15–37)
ATRIAL RATE: 108 BPM
BACTERIA URNS QL MICRO: ABNORMAL /HPF
BASOPHILS # BLD: 0 K/UL (ref 0–0.1)
BASOPHILS NFR BLD: 0 % (ref 0–1)
BILIRUB SERPL-MCNC: 0.4 MG/DL (ref 0.2–1)
BILIRUB UR QL CFM: NEGATIVE
BUN SERPL-MCNC: 26 MG/DL (ref 6–20)
BUN/CREAT SERPL: 22 (ref 12–20)
CALCIUM SERPL-MCNC: 9.3 MG/DL (ref 8.5–10.1)
CALCULATED P AXIS, ECG09: 39 DEGREES
CALCULATED T AXIS, ECG11: 62 DEGREES
CHLORIDE SERPL-SCNC: 106 MMOL/L (ref 97–108)
CK SERPL-CCNC: 84 U/L (ref 26–192)
CO2 SERPL-SCNC: 22 MMOL/L (ref 21–32)
COLOR UR: ABNORMAL
CREAT SERPL-MCNC: 1.18 MG/DL (ref 0.55–1.02)
DIAGNOSIS, 93000: NORMAL
DIFFERENTIAL METHOD BLD: ABNORMAL
EOSINOPHIL # BLD: 0.2 K/UL (ref 0–0.4)
EOSINOPHIL NFR BLD: 2 % (ref 0–7)
EPITH CASTS URNS QL MICRO: ABNORMAL /LPF
ERYTHROCYTE [DISTWIDTH] IN BLOOD BY AUTOMATED COUNT: 13.6 % (ref 11.5–14.5)
GLOBULIN SER CALC-MCNC: 4.4 G/DL (ref 2–4)
GLUCOSE SERPL-MCNC: 155 MG/DL (ref 65–100)
GLUCOSE UR STRIP.AUTO-MCNC: NEGATIVE MG/DL
HCT VFR BLD AUTO: 33.5 % (ref 35–47)
HGB BLD-MCNC: 10.9 G/DL (ref 11.5–16)
HGB UR QL STRIP: NEGATIVE
IMM GRANULOCYTES # BLD: 0 K/UL (ref 0–0.04)
IMM GRANULOCYTES NFR BLD AUTO: 1 % (ref 0–0.5)
KETONES UR QL STRIP.AUTO: NEGATIVE MG/DL
LEUKOCYTE ESTERASE UR QL STRIP.AUTO: ABNORMAL
LYMPHOCYTES # BLD: 1.4 K/UL (ref 0.8–3.5)
LYMPHOCYTES NFR BLD: 21 % (ref 12–49)
MAGNESIUM SERPL-MCNC: 1.6 MG/DL (ref 1.6–2.4)
MCH RBC QN AUTO: 27.9 PG (ref 26–34)
MCHC RBC AUTO-ENTMCNC: 32.5 G/DL (ref 30–36.5)
MCV RBC AUTO: 85.7 FL (ref 80–99)
MONOCYTES # BLD: 0.6 K/UL (ref 0–1)
MONOCYTES NFR BLD: 9 % (ref 5–13)
NEUTS SEG # BLD: 4.5 K/UL (ref 1.8–8)
NEUTS SEG NFR BLD: 67 % (ref 32–75)
NITRITE UR QL STRIP.AUTO: NEGATIVE
NRBC # BLD: 0 K/UL (ref 0–0.01)
NRBC BLD-RTO: 0 PER 100 WBC
P-R INTERVAL, ECG05: 178 MS
PH UR STRIP: 5 [PH] (ref 5–8)
PLATELET # BLD AUTO: 203 K/UL (ref 150–400)
PMV BLD AUTO: 9.6 FL (ref 8.9–12.9)
POTASSIUM SERPL-SCNC: 4 MMOL/L (ref 3.5–5.1)
PROT SERPL-MCNC: 7.4 G/DL (ref 6.4–8.2)
PROT UR STRIP-MCNC: 300 MG/DL
Q-T INTERVAL, ECG07: 314 MS
QRS DURATION, ECG06: 70 MS
QTC CALCULATION (BEZET), ECG08: 420 MS
RBC # BLD AUTO: 3.91 M/UL (ref 3.8–5.2)
RBC #/AREA URNS HPF: ABNORMAL /HPF (ref 0–5)
SODIUM SERPL-SCNC: 138 MMOL/L (ref 136–145)
SP GR UR REFRACTOMETRY: 1.02 (ref 1–1.03)
TROPONIN I SERPL-MCNC: <0.05 NG/ML
UR CULT HOLD, URHOLD: NORMAL
UROBILINOGEN UR QL STRIP.AUTO: 1 EU/DL (ref 0.2–1)
VENTRICULAR RATE, ECG03: 108 BPM
WBC # BLD AUTO: 6.7 K/UL (ref 3.6–11)
WBC URNS QL MICRO: ABNORMAL /HPF (ref 0–4)

## 2018-11-13 PROCEDURE — 81001 URINALYSIS AUTO W/SCOPE: CPT

## 2018-11-13 PROCEDURE — 96361 HYDRATE IV INFUSION ADD-ON: CPT

## 2018-11-13 PROCEDURE — 93005 ELECTROCARDIOGRAM TRACING: CPT

## 2018-11-13 PROCEDURE — 87086 URINE CULTURE/COLONY COUNT: CPT

## 2018-11-13 PROCEDURE — 87077 CULTURE AEROBIC IDENTIFY: CPT

## 2018-11-13 PROCEDURE — 99284 EMERGENCY DEPT VISIT MOD MDM: CPT

## 2018-11-13 PROCEDURE — 84484 ASSAY OF TROPONIN QUANT: CPT

## 2018-11-13 PROCEDURE — 96360 HYDRATION IV INFUSION INIT: CPT

## 2018-11-13 PROCEDURE — 36415 COLL VENOUS BLD VENIPUNCTURE: CPT

## 2018-11-13 PROCEDURE — 85025 COMPLETE CBC W/AUTO DIFF WBC: CPT

## 2018-11-13 PROCEDURE — 82550 ASSAY OF CK (CPK): CPT

## 2018-11-13 PROCEDURE — 83735 ASSAY OF MAGNESIUM: CPT

## 2018-11-13 PROCEDURE — 80053 COMPREHEN METABOLIC PANEL: CPT

## 2018-11-13 PROCEDURE — 74011250636 HC RX REV CODE- 250/636: Performed by: EMERGENCY MEDICINE

## 2018-11-13 PROCEDURE — 51701 INSERT BLADDER CATHETER: CPT

## 2018-11-13 PROCEDURE — 87186 SC STD MICRODIL/AGAR DIL: CPT

## 2018-11-13 RX ADMIN — SODIUM CHLORIDE 1000 ML: 900 INJECTION, SOLUTION INTRAVENOUS at 11:04

## 2018-11-13 NOTE — ED NOTES
Transport in ER to take patient home. RN reviewed discharge instructions with patient. Patient verbalized understanding. Time allotted for questions. A&O at time of discharge. VSS. Patient wheeled off unit.

## 2018-11-13 NOTE — DISCHARGE INSTRUCTIONS
Dehydration: Care Instructions  Your Care Instructions  Dehydration happens when your body loses too much fluid. This might happen when you do not drink enough water or you lose large amounts of fluids from your body because of diarrhea, vomiting, or sweating. Severe dehydration can be life-threatening. Water and minerals called electrolytes help put your body fluids back in balance. Learn the early signs of fluid loss, and drink more fluids to prevent dehydration. Follow-up care is a key part of your treatment and safety. Be sure to make and go to all appointments, and call your doctor if you are having problems. It's also a good idea to know your test results and keep a list of the medicines you take. How can you care for yourself at home? · To prevent dehydration, drink plenty of fluids, enough so that your urine is light yellow or clear like water. Choose water and other caffeine-free clear liquids until you feel better. If you have kidney, heart, or liver disease and have to limit fluids, talk with your doctor before you increase the amount of fluids you drink. · If you do not feel like eating or drinking, try taking small sips of water, sports drinks, or other rehydration drinks. · Get plenty of rest.  To prevent dehydration  · Add more fluids to your diet and daily routine, unless your doctor has told you not to. · During hot weather, drink more fluids. Drink even more fluids if you exercise a lot. Stay away from drinks with alcohol or caffeine. · Watch for the symptoms of dehydration. These include:  ? A dry, sticky mouth. ? Dark yellow urine, and not much of it. ? Dry and sunken eyes. ? Feeling very tired. · Learn what problems can lead to dehydration. These include:  ? Diarrhea, fever, and vomiting. ? Any illness with a fever, such as pneumonia or the flu. ? Activities that cause heavy sweating, such as endurance races and heavy outdoor work in hot or humid weather. ?  Alcohol or drug abuse or withdrawal.  ? Certain medicines, such as cold and allergy pills (antihistamines), diet pills (diuretics), and laxatives. ? Certain diseases, such as diabetes, cancer, and heart or kidney disease. When should you call for help? Call 911 anytime you think you may need emergency care. For example, call if:    · You passed out (lost consciousness).    Call your doctor now or seek immediate medical care if:    · You are confused and cannot think clearly.     · You are dizzy or lightheaded, or you feel like you may faint.     · You have signs of needing more fluids. You have sunken eyes and a dry mouth, and you pass only a little dark urine.     · You cannot keep fluids down.    Watch closely for changes in your health, and be sure to contact your doctor if:    · You are not making tears.     · Your skin is very dry and sags slowly back into place after you pinch it.     · Your mouth and eyes are very dry. Where can you learn more? Go to http://delfina-cony.info/. Enter K183 in the search box to learn more about \"Dehydration: Care Instructions. \"  Current as of: November 20, 2017  Content Version: 11.8  © 8573-8035 [employer]. Care instructions adapted under license by [employer] (which disclaims liability or warranty for this information). If you have questions about a medical condition or this instruction, always ask your healthcare professional. [place] [address] any warranty or liability for your use of this information. Fatigue: Care Instructions  Your Care Instructions    Fatigue is a feeling of tiredness, exhaustion, or lack of energy. You may feel fatigue because of too much or not enough activity. It can also come from stress, lack of sleep, boredom, and poor diet. Many medical problems, such as viral infections, can cause fatigue. Emotional problems, especially depression, are often the cause of fatigue.   Fatigue is most often a symptom of another problem. Treatment for fatigue depends on the cause. For example, if you have fatigue because you have a certain health problem, treating this problem also treats your fatigue. If depression or anxiety is the cause, treatment may help. Follow-up care is a key part of your treatment and safety. Be sure to make and go to all appointments, and call your doctor if you are having problems. It's also a good idea to know your test results and keep a list of the medicines you take. How can you care for yourself at home? · Get regular exercise. But don't overdo it. Go back and forth between rest and exercise. · Get plenty of rest.  · Eat a healthy diet. Do not skip meals, especially breakfast.  · Reduce your use of caffeine, tobacco, and alcohol. Caffeine is most often found in coffee, tea, cola drinks, and chocolate. · Limit medicines that can cause fatigue. This includes tranquilizers and cold and allergy medicines. When should you call for help? Watch closely for changes in your health, and be sure to contact your doctor if:    · You have new symptoms such as fever or a rash.     · Your fatigue gets worse.     · You have been feeling down, depressed, or hopeless. Or you may have lost interest in things that you usually enjoy.     · You are not getting better as expected. Where can you learn more? Go to http://delfina-cony.info/. Enter D215 in the search box to learn more about \"Fatigue: Care Instructions. \"  Current as of: November 20, 2017  Content Version: 11.8  © 3687-2831 [employer]. Care instructions adapted under license by [employer] (which disclaims liability or warranty for this information). If you have questions about a medical condition or this instruction, always ask your healthcare professional. [address] any warranty or liability for your use of this information.

## 2018-11-13 NOTE — ED PROVIDER NOTES
46 y.o. female with past medical history significant for diabetes, HTN, and asthma who presents from home via EMS with chief complaint of weakness. Pt reports having surgery on her arm in September, and reports waking up with \"stroke symptoms\" and memory loss. Pt reports difficulty ambulating d/t leg weakness since then, and was sent to rehab post-op. Pt reports being sent home 6 days ago, and has been ambulatory with a walker since. Pt reports this morning, her legs felt weak, causing her to fall. On EMS arrival, the patient was found to be hypotensive, but this quickly resolved en route. Pt reports some difficulty urinating yesterday, but denies any urinary sx today. Pt denies hitting her head or LOC. Pt denies any vomiting, diarrhea, cough, or fever. There are no other acute medical concerns at this time. Old Chart Review: Per Amalia Ramos, patient has 20 local ED visits in the last year, most recently August 3rd at [address] for a fall and UTI. Pt has been seen frequently for UTI, hip pain, and falls. Social hx: Nonsmoker; No EtOH use    Note written by Nate Tucker, as dictated by Hannah Argueta MD 10:47 AM      The history is provided by the patient, medical records and the EMS personnel. No  was used. Past Medical History:   Diagnosis Date    Arthritis     Asthma     Chronic back pain     Diabetes ([address]     Diabetic retinopathy ([address]     Hypertension     MRSA (methicillin resistant staph aureus) culture positive     labial abscess    Neuropathy        Past Surgical History:   Procedure Laterality Date    HX HEENT      tonsillectomy    HX ORTHOPAEDIC      left ft bunionectomy    HX OTHER SURGICAL      lanced labial abscess         History reviewed. No pertinent family history.     Social History     Socioeconomic History    Marital status:      Spouse name: Not on file    Number of children: Not on file    Years of education: Not on file    Highest education level: Not on file   Social Needs    Financial resource strain: Not on file    Food insecurity - worry: Not on file    Food insecurity - inability: Not on file    Transportation needs - medical: Not on file   Immunetics needs - non-medical: Not on file   Occupational History    Not on file   Tobacco Use    Smoking status: Never Smoker    Smokeless tobacco: Never Used   Substance and Sexual Activity    Alcohol use: No    Drug use: No    Sexual activity: Not on file   Other Topics Concern    Not on file   Social History Narrative    Not on file         ALLERGIES: Aspirin and Ciprofloxacin    Review of Systems   Constitutional: Negative for fever. HENT: Negative for facial swelling and nosebleeds. Eyes: Negative for pain. Respiratory: Negative for cough, chest tightness and shortness of breath. Cardiovascular: Negative for chest pain and leg swelling. Gastrointestinal: Negative for abdominal pain, diarrhea and vomiting. Endocrine: Negative for polyuria. Genitourinary: Negative for difficulty urinating (Resolved), dysuria, flank pain, frequency and urgency. Musculoskeletal: Negative for arthralgias and back pain. Skin: Negative for color change. Allergic/Immunologic: Negative for immunocompromised state. Neurological: Positive for weakness (Lower extremities). Negative for dizziness, syncope and headaches. Hematological: Does not bruise/bleed easily. Psychiatric/Behavioral: Negative for agitation. All other systems reviewed and are negative. There were no vitals filed for this visit. Physical Exam   Constitutional: She is oriented to person, place, and time. She appears well-developed and well-nourished. HENT:   Head: Normocephalic and atraumatic.    Right Ear: External ear normal.   Left Ear: External ear normal.   Nose: Nose normal.   Mouth/Throat: Oropharynx is clear and moist.   Eyes: EOM are normal. Pupils are equal, round, and reactive to light. No scleral icterus. Neck: Normal range of motion. Neck supple. No JVD present. No tracheal deviation present. No thyromegaly present. Cardiovascular: Regular rhythm, normal heart sounds and intact distal pulses. Tachycardia present. Exam reveals no friction rub. No murmur heard. Tachycardic at 105 bpm.    Pulmonary/Chest: Effort normal and breath sounds normal. No stridor. No respiratory distress. She has no wheezes. She has no rales. She exhibits no tenderness. Abdominal: Soft. Bowel sounds are normal. She exhibits no distension. There is no tenderness. There is no rebound and no guarding. Musculoskeletal: Normal range of motion. She exhibits no edema or tenderness. No obvious signs of trauma. FROM of all extremities. Lymphadenopathy:     She has no cervical adenopathy. Neurological: She is alert and oriented to person, place, and time. She has normal strength and normal reflexes. No cranial nerve deficit or sensory deficit. Coordination normal.   Normal  strength bilaterally. No facial droop. Normal speech. Skin: Skin is warm and dry. No rash noted. No erythema. Psychiatric: She has a normal mood and affect. Her behavior is normal. Judgment and thought content normal.   Nursing note and vitals reviewed. Note written by Nate Marquez, as dictated by Balaji Clifton MD 10:47 AM    MDM  Number of Diagnoses or Management Options  Diagnosis management comments: 49-year-old white female presents to the emergency department with fall. The patient has been having difficulty with ambulation over the past couple of months since she had arm surgery. Patient overall looks well and nontoxic with normal neuro exam. Will check basic blood work and urinalysis. We'll discuss and reassess shortly after testing is completed. Patient agrees.         Amount and/or Complexity of Data Reviewed  Clinical lab tests: ordered and reviewed  Tests in the radiology section of CPT®: ordered and reviewed  Tests in the medicine section of CPT®: ordered and reviewed  Decide to obtain previous medical records or to obtain history from someone other than the patient: yes  Obtain history from someone other than the patient: yes  Review and summarize past medical records: yes  Independent visualization of images, tracings, or specimens: yes           Procedures    ED EKG interpretation:  Rhythm: sinus tachycardia; and regular . Rate (approx.): 108 bpm; Normal axis; Normal intervals; No ST elevations or depressions. Note written by [place], Scribe, as dictated by Dory Klein MD 10:56 AM    11:56 AM   UA shows bacteria but no WBCs. Will not treat at this time, but await culture result. 12:05 PM  Labs are unremarkable. Discussed results with the patient, who is agreeable for discharge at this time. Pt is at baseline and leg weakness has been for 2 months    No cause found on work up    Good return precautions given to patient. Close follow up with PCP recommended. Patient and/or family voices understanding of this plan. Discharge instructions were explained by me and all concerns were addressed.

## 2018-11-13 NOTE — PROGRESS NOTES
Date of previous inpatient admission/ ED visit? 9/12/18  VCU/MCV Arm surgery, post surgery she reports complications, hospitalized X 12 days. Discharged to [address] [place] [address] 6 weeks, discharged 11/7/18. What brought the patient back to ED? Fall at home, complaints of weakness    Did patient decline recommended services during last admission/ ED visit (if yes, what)? N/A    Has patient seen a provider since their last inpatient admission/ED visit (if yes, when)? N/A    CM Interventions:    Yasmin Prasad is a 46year old female to [facility] ED with complaints of weakness, she reports being home from rehab for 6 days. She uses walker in the home, and fell today. ED workup:  Labs, urinalysis, EKG. Consult received for resources at home and transportation. No payor source. Medically stable for discharge. Care Management Interventions  PCP Verified by CM: Yes(Dr. Kirsten Ramos)  Palliative Care Criteria Met (RRAT>21 & CHF Dx)?: No  Mode of Transport at Discharge: [facility] will pay, no insurance  $50 ETA they will bring wheelchair. [phone])  Transition of Care Consult (CM Consult): Discharge Planning(RRAT 18/0/4   Home Health set up through [facility], they have not started)  Current Support Network: Lives with Caregiver(Lives with sister, brother in law, and son. They assist in her care. Open to home health, new agency.  )  Plan discussed with Pt/Family/Caregiver: Yes(Met with patient in ED \/06. Consult received for transportation home and resources.)  Discharge Location  Discharge Placement: Home(Discharge planned to home)      [facility]  ETA 60 minutes  Estimated $50.00  Family will need to assist with stairs, 4 steps to enter  CRM submitted service agreement paperwork to management    Ms. Mendoza states that new home health agency is scheduled to come to home, she can't remember name. Asked her to call me back this evening if agency unable to come.   She is alert and oriented and agreeable with plan.     Maximiliano Richardson, RN, BSN, [facility]  ED Care Management  [phone]

## 2018-11-13 NOTE — ED TRIAGE NOTES
Patient comes to the ER via EMS from rehab c/o leg weakness and fall. Patient had R arm surgery a couple of weeks ago and since then her legs have been increasingly weak. Reports hypotension upon EMS arrival.   Denies LOC or hitting head.

## 2018-11-15 LAB
BACTERIA SPEC CULT: ABNORMAL
CC UR VC: ABNORMAL
SERVICE CMNT-IMP: ABNORMAL

## 2018-11-15 NOTE — PROGRESS NOTES
I called and spoke with patient. She denies any abdominal pain, back pain, fever, dysuria, frequency or urgency. No wbc in urine.  No further

## 2018-12-20 ENCOUNTER — HOSPITAL ENCOUNTER (OUTPATIENT)
Age: 53
Setting detail: OBSERVATION
Discharge: HOME | End: 2018-12-22
Attending: EMERGENCY MEDICINE | Admitting: FAMILY MEDICINE
Payer: SELF-PAY

## 2018-12-20 ENCOUNTER — APPOINTMENT (OUTPATIENT)
Dept: CT IMAGING | Age: 53
End: 2018-12-20
Attending: EMERGENCY MEDICINE
Payer: SELF-PAY

## 2018-12-20 DIAGNOSIS — I63.9 CEREBROVASCULAR ACCIDENT (CVA), UNSPECIFIED MECHANISM (HCC): Primary | ICD-10-CM

## 2018-12-20 DIAGNOSIS — M51.36 DDD (DEGENERATIVE DISC DISEASE), LUMBAR: ICD-10-CM

## 2018-12-20 PROBLEM — G45.9 TIA (TRANSIENT ISCHEMIC ATTACK): Status: ACTIVE | Noted: 2018-12-20

## 2018-12-20 LAB
ALBUMIN SERPL-MCNC: 3.3 G/DL (ref 3.5–5)
ALBUMIN/GLOB SERPL: 0.9 {RATIO} (ref 1.1–2.2)
ALP SERPL-CCNC: 70 U/L (ref 45–117)
ALT SERPL-CCNC: 14 U/L (ref 12–78)
AMPHET UR QL SCN: NEGATIVE
ANION GAP SERPL CALC-SCNC: 7 MMOL/L (ref 5–15)
APPEARANCE UR: ABNORMAL
AST SERPL-CCNC: 15 U/L (ref 15–37)
BACTERIA URNS QL MICRO: NEGATIVE /HPF
BARBITURATES UR QL SCN: NEGATIVE
BASOPHILS # BLD: 0 K/UL (ref 0–0.1)
BASOPHILS NFR BLD: 1 % (ref 0–1)
BENZODIAZ UR QL: NEGATIVE
BILIRUB DIRECT SERPL-MCNC: 0.1 MG/DL (ref 0–0.2)
BILIRUB SERPL-MCNC: 0.4 MG/DL (ref 0.2–1)
BILIRUB UR QL: NEGATIVE
BUN SERPL-MCNC: 14 MG/DL (ref 6–20)
BUN/CREAT SERPL: 18 (ref 12–20)
CALCIUM SERPL-MCNC: 9 MG/DL (ref 8.5–10.1)
CANNABINOIDS UR QL SCN: NEGATIVE
CHLORIDE SERPL-SCNC: 101 MMOL/L (ref 97–108)
CO2 SERPL-SCNC: 27 MMOL/L (ref 21–32)
COCAINE UR QL SCN: NEGATIVE
COLOR UR: ABNORMAL
CREAT SERPL-MCNC: 0.78 MG/DL (ref 0.55–1.02)
DIFFERENTIAL METHOD BLD: NORMAL
DRUG SCRN COMMENT,DRGCM: NORMAL
EOSINOPHIL # BLD: 0 K/UL (ref 0–0.4)
EOSINOPHIL NFR BLD: 1 % (ref 0–7)
EPITH CASTS URNS QL MICRO: ABNORMAL /LPF
ERYTHROCYTE [DISTWIDTH] IN BLOOD BY AUTOMATED COUNT: 14.1 % (ref 11.5–14.5)
ETHANOL SERPL-MCNC: <10 MG/DL
GLOBULIN SER CALC-MCNC: 3.5 G/DL (ref 2–4)
GLUCOSE BLD STRIP.AUTO-MCNC: 116 MG/DL (ref 65–100)
GLUCOSE SERPL-MCNC: 162 MG/DL (ref 65–100)
GLUCOSE UR STRIP.AUTO-MCNC: NEGATIVE MG/DL
HCT VFR BLD AUTO: 36 % (ref 35–47)
HGB BLD-MCNC: 12.2 G/DL (ref 11.5–16)
HGB UR QL STRIP: ABNORMAL
IMM GRANULOCYTES # BLD: 0 K/UL (ref 0–0.04)
IMM GRANULOCYTES NFR BLD AUTO: 0 % (ref 0–0.5)
INR PPP: 1.2 (ref 0.9–1.1)
KETONES UR QL STRIP.AUTO: ABNORMAL MG/DL
LEUKOCYTE ESTERASE UR QL STRIP.AUTO: NEGATIVE
LYMPHOCYTES # BLD: 1.9 K/UL (ref 0.8–3.5)
LYMPHOCYTES NFR BLD: 34 % (ref 12–49)
MCH RBC QN AUTO: 27.1 PG (ref 26–34)
MCHC RBC AUTO-ENTMCNC: 33.9 G/DL (ref 30–36.5)
MCV RBC AUTO: 80 FL (ref 80–99)
METHADONE UR QL: NEGATIVE
MONOCYTES # BLD: 0.4 K/UL (ref 0–1)
MONOCYTES NFR BLD: 7 % (ref 5–13)
NEUTS SEG # BLD: 3.2 K/UL (ref 1.8–8)
NEUTS SEG NFR BLD: 58 % (ref 32–75)
NITRITE UR QL STRIP.AUTO: NEGATIVE
NRBC # BLD: 0 K/UL (ref 0–0.01)
NRBC BLD-RTO: 0 PER 100 WBC
OPIATES UR QL: NEGATIVE
PCP UR QL: NEGATIVE
PH UR STRIP: 6 [PH] (ref 5–8)
PLATELET # BLD AUTO: 194 K/UL (ref 150–400)
PMV BLD AUTO: 8.9 FL (ref 8.9–12.9)
POTASSIUM SERPL-SCNC: 3.3 MMOL/L (ref 3.5–5.1)
PROT SERPL-MCNC: 6.8 G/DL (ref 6.4–8.2)
PROT UR STRIP-MCNC: 300 MG/DL
PROTHROMBIN TIME: 11.9 SEC (ref 9–11.1)
RBC # BLD AUTO: 4.5 M/UL (ref 3.8–5.2)
RBC #/AREA URNS HPF: ABNORMAL /HPF (ref 0–5)
SERVICE CMNT-IMP: ABNORMAL
SODIUM SERPL-SCNC: 135 MMOL/L (ref 136–145)
SP GR UR REFRACTOMETRY: 1.03 (ref 1–1.03)
TROPONIN I SERPL-MCNC: <0.05 NG/ML
UR CULT HOLD, URHOLD: NORMAL
UROBILINOGEN UR QL STRIP.AUTO: 0.2 EU/DL (ref 0.2–1)
WBC # BLD AUTO: 5.5 K/UL (ref 3.6–11)
WBC URNS QL MICRO: ABNORMAL /HPF (ref 0–4)

## 2018-12-20 PROCEDURE — 93005 ELECTROCARDIOGRAM TRACING: CPT

## 2018-12-20 PROCEDURE — 0042T CT CODE NEURO PERF W CBF: CPT

## 2018-12-20 PROCEDURE — 77030011943

## 2018-12-20 PROCEDURE — 80076 HEPATIC FUNCTION PANEL: CPT

## 2018-12-20 PROCEDURE — 82962 GLUCOSE BLOOD TEST: CPT

## 2018-12-20 PROCEDURE — 74011000258 HC RX REV CODE- 258: Performed by: RADIOLOGY

## 2018-12-20 PROCEDURE — 96361 HYDRATE IV INFUSION ADD-ON: CPT

## 2018-12-20 PROCEDURE — 84484 ASSAY OF TROPONIN QUANT: CPT

## 2018-12-20 PROCEDURE — 74011250636 HC RX REV CODE- 250/636: Performed by: FAMILY MEDICINE

## 2018-12-20 PROCEDURE — 74011250637 HC RX REV CODE- 250/637: Performed by: EMERGENCY MEDICINE

## 2018-12-20 PROCEDURE — 85610 PROTHROMBIN TIME: CPT

## 2018-12-20 PROCEDURE — 99218 HC RM OBSERVATION: CPT

## 2018-12-20 PROCEDURE — 80048 BASIC METABOLIC PNL TOTAL CA: CPT

## 2018-12-20 PROCEDURE — 70498 CT ANGIOGRAPHY NECK: CPT

## 2018-12-20 PROCEDURE — 74011636320 HC RX REV CODE- 636/320: Performed by: RADIOLOGY

## 2018-12-20 PROCEDURE — 99285 EMERGENCY DEPT VISIT HI MDM: CPT

## 2018-12-20 PROCEDURE — 96374 THER/PROPH/DIAG INJ IV PUSH: CPT

## 2018-12-20 PROCEDURE — 81001 URINALYSIS AUTO W/SCOPE: CPT

## 2018-12-20 PROCEDURE — 74011250636 HC RX REV CODE- 250/636: Performed by: EMERGENCY MEDICINE

## 2018-12-20 PROCEDURE — 51701 INSERT BLADDER CATHETER: CPT

## 2018-12-20 PROCEDURE — 85025 COMPLETE CBC W/AUTO DIFF WBC: CPT

## 2018-12-20 PROCEDURE — 36415 COLL VENOUS BLD VENIPUNCTURE: CPT

## 2018-12-20 PROCEDURE — 74011250637 HC RX REV CODE- 250/637: Performed by: FAMILY MEDICINE

## 2018-12-20 PROCEDURE — 70450 CT HEAD/BRAIN W/O DYE: CPT

## 2018-12-20 PROCEDURE — 80307 DRUG TEST PRSMV CHEM ANLYZR: CPT

## 2018-12-20 RX ORDER — ACETAMINOPHEN 325 MG/1
650 TABLET ORAL
Status: DISCONTINUED | OUTPATIENT
Start: 2018-12-20 | End: 2018-12-22 | Stop reason: HOSPADM

## 2018-12-20 RX ORDER — CLOPIDOGREL BISULFATE 75 MG/1
75 TABLET ORAL DAILY
Status: DISCONTINUED | OUTPATIENT
Start: 2018-12-21 | End: 2018-12-22 | Stop reason: HOSPADM

## 2018-12-20 RX ORDER — PROCHLORPERAZINE EDISYLATE 5 MG/ML
10 INJECTION INTRAMUSCULAR; INTRAVENOUS
Status: COMPLETED | OUTPATIENT
Start: 2018-12-20 | End: 2018-12-20

## 2018-12-20 RX ORDER — SODIUM CHLORIDE 9 MG/ML
75 INJECTION, SOLUTION INTRAVENOUS CONTINUOUS
Status: DISCONTINUED | OUTPATIENT
Start: 2018-12-20 | End: 2018-12-21

## 2018-12-20 RX ORDER — LISINOPRIL 10 MG/1
10 TABLET ORAL DAILY
Status: DISCONTINUED | OUTPATIENT
Start: 2018-12-21 | End: 2018-12-22 | Stop reason: HOSPADM

## 2018-12-20 RX ORDER — FAMOTIDINE 20 MG/1
20 TABLET, FILM COATED ORAL EVERY 12 HOURS
Status: DISCONTINUED | OUTPATIENT
Start: 2018-12-20 | End: 2018-12-22 | Stop reason: HOSPADM

## 2018-12-20 RX ORDER — SODIUM CHLORIDE 0.9 % (FLUSH) 0.9 %
5-10 SYRINGE (ML) INJECTION AS NEEDED
Status: DISCONTINUED | OUTPATIENT
Start: 2018-12-20 | End: 2018-12-22 | Stop reason: HOSPADM

## 2018-12-20 RX ORDER — SODIUM CHLORIDE 0.9 % (FLUSH) 0.9 %
10 SYRINGE (ML) INJECTION
Status: COMPLETED | OUTPATIENT
Start: 2018-12-20 | End: 2018-12-20

## 2018-12-20 RX ORDER — ACETAMINOPHEN 650 MG/1
650 SUPPOSITORY RECTAL
Status: DISCONTINUED | OUTPATIENT
Start: 2018-12-20 | End: 2018-12-22 | Stop reason: HOSPADM

## 2018-12-20 RX ORDER — MAGNESIUM SULFATE 100 %
4 CRYSTALS MISCELLANEOUS AS NEEDED
Status: DISCONTINUED | OUTPATIENT
Start: 2018-12-20 | End: 2018-12-22 | Stop reason: HOSPADM

## 2018-12-20 RX ORDER — HYDROCODONE BITARTRATE AND ACETAMINOPHEN 5; 325 MG/1; MG/1
1 TABLET ORAL
Status: DISCONTINUED | OUTPATIENT
Start: 2018-12-20 | End: 2018-12-22 | Stop reason: HOSPADM

## 2018-12-20 RX ORDER — GABAPENTIN 300 MG/1
900 CAPSULE ORAL 3 TIMES DAILY
Status: DISCONTINUED | OUTPATIENT
Start: 2018-12-20 | End: 2018-12-22 | Stop reason: HOSPADM

## 2018-12-20 RX ORDER — HYDRALAZINE HYDROCHLORIDE 20 MG/ML
10 INJECTION INTRAMUSCULAR; INTRAVENOUS
Status: DISCONTINUED | OUTPATIENT
Start: 2018-12-20 | End: 2018-12-22 | Stop reason: HOSPADM

## 2018-12-20 RX ORDER — FENOFIBRATE 145 MG/1
145 TABLET, COATED ORAL DAILY
Status: DISCONTINUED | OUTPATIENT
Start: 2018-12-21 | End: 2018-12-20

## 2018-12-20 RX ORDER — INSULIN LISPRO 100 [IU]/ML
INJECTION, SOLUTION INTRAVENOUS; SUBCUTANEOUS
Status: DISCONTINUED | OUTPATIENT
Start: 2018-12-20 | End: 2018-12-22 | Stop reason: HOSPADM

## 2018-12-20 RX ORDER — TRAMADOL HYDROCHLORIDE 50 MG/1
50 TABLET ORAL
COMMUNITY
End: 2020-08-03

## 2018-12-20 RX ORDER — GABAPENTIN 300 MG/1
900 CAPSULE ORAL 3 TIMES DAILY
Status: ON HOLD | COMMUNITY
End: 2021-08-12 | Stop reason: SDUPTHER

## 2018-12-20 RX ORDER — POTASSIUM CHLORIDE 750 MG/1
10 TABLET, FILM COATED, EXTENDED RELEASE ORAL
Status: COMPLETED | OUTPATIENT
Start: 2018-12-20 | End: 2018-12-20

## 2018-12-20 RX ORDER — GUAIFENESIN 100 MG/5ML
324 LIQUID (ML) ORAL
Status: COMPLETED | OUTPATIENT
Start: 2018-12-20 | End: 2018-12-20

## 2018-12-20 RX ORDER — SODIUM CHLORIDE 0.9 % (FLUSH) 0.9 %
5-10 SYRINGE (ML) INJECTION EVERY 8 HOURS
Status: DISCONTINUED | OUTPATIENT
Start: 2018-12-20 | End: 2018-12-22 | Stop reason: HOSPADM

## 2018-12-20 RX ORDER — DEXTROSE 50 % IN WATER (D50W) INTRAVENOUS SYRINGE
25-50 AS NEEDED
Status: DISCONTINUED | OUTPATIENT
Start: 2018-12-20 | End: 2018-12-22 | Stop reason: HOSPADM

## 2018-12-20 RX ORDER — DIPHENHYDRAMINE HYDROCHLORIDE 50 MG/ML
50 INJECTION, SOLUTION INTRAMUSCULAR; INTRAVENOUS
Status: ACTIVE | OUTPATIENT
Start: 2018-12-20 | End: 2018-12-21

## 2018-12-20 RX ADMIN — SODIUM CHLORIDE 1000 ML: 900 INJECTION, SOLUTION INTRAVENOUS at 17:03

## 2018-12-20 RX ADMIN — ASPIRIN 81 MG CHEWABLE TABLET 324 MG: 81 TABLET CHEWABLE at 18:47

## 2018-12-20 RX ADMIN — POTASSIUM CHLORIDE 10 MEQ: 750 TABLET, EXTENDED RELEASE ORAL at 20:40

## 2018-12-20 RX ADMIN — GABAPENTIN 900 MG: 300 CAPSULE ORAL at 22:38

## 2018-12-20 RX ADMIN — HYDROCODONE BITARTRATE AND ACETAMINOPHEN 1 TABLET: 5; 325 TABLET ORAL at 21:21

## 2018-12-20 RX ADMIN — SODIUM CHLORIDE 75 ML/HR: 900 INJECTION, SOLUTION INTRAVENOUS at 21:21

## 2018-12-20 RX ADMIN — Medication 10 ML: at 21:21

## 2018-12-20 RX ADMIN — SODIUM CHLORIDE 100 ML: 900 INJECTION, SOLUTION INTRAVENOUS at 16:18

## 2018-12-20 RX ADMIN — Medication 10 ML: at 16:18

## 2018-12-20 RX ADMIN — FAMOTIDINE 20 MG: 20 TABLET ORAL at 22:38

## 2018-12-20 RX ADMIN — HYDRALAZINE HYDROCHLORIDE 10 MG: 20 INJECTION INTRAMUSCULAR; INTRAVENOUS at 21:20

## 2018-12-20 RX ADMIN — PROCHLORPERAZINE EDISYLATE 10 MG: 5 INJECTION INTRAMUSCULAR; INTRAVENOUS at 18:22

## 2018-12-20 RX ADMIN — IOPAMIDOL 120 ML: 755 INJECTION, SOLUTION INTRAVENOUS at 16:18

## 2018-12-20 NOTE — ED PROVIDER NOTES
48 y.o. female with past medical history significant for diabetes, neuropathy, arthritis, HTN, chronic back pain, asthma, MRSA, and diabetic retinopathy who presents from home via EMS with chief complaint of numbness. Patient states that she has had a headache for the past two days. Per EMS, patient indicates that her head pain is most severe in her \"right temple area. \"  She also mentions that her stomach \"has not been feeling right recently\" and that she has felt nauseated after eating. Upon waking this morning, about 7 hours ago, patient notes that she began experiencing right sided numbness and weakness. She mentions that she went to sleep about 20 hours ago and subsequently woke up 4 hours later at [address] but is unsure if she had any right-sided numbness or weakness at that time. She complains of progressively worsening weakness today. She also notes that when attempting to get dressed about 3.5 hours ago, she began experiencing dizziness. Additionally, patient states that her blood sugar was 47 this morning but eventually improved to 198 while en route to the ED with EMS. Of note, patient states that she was hospitalized at [facility] in September and informed that she had a \"spinal cord stroke\" at that time. \"  She notes that since being discharged [facility], she has been experiencing LLE weakness. Patient adds that she went to rehab from 9/23/18 to 11/7/18 for her left-sided weakness. She claims that she currently uses a walker to ambulate. In addition, patient reports that she takes Plavix and has a h/o diabetes. She claims to control her diabetes with diet and takes Humalog if needed. EMS adds that the patient has a h/o TIA. Patient denies fever, chills, vomiting, and chest pain. There are no other acute medical concerns at this time. Old Chart Review: Patient previously visited the ED on 11/13 for weakness and dehydration. No discharge summaries are currently in the system for the patient.   She had a normal head CT on 11/21/13. Social hx: negative tobacco, alcohol, and drug use  PCP: Krista Catsle MD    Note written by Nate Kilgore, as dictated by Dovie Fleischer, DO 3:53 PM        The history is provided by the patient and the EMS personnel. No  was used. Past Medical History:   Diagnosis Date    Arthritis     Asthma     Chronic back pain     [facility]     Diabetic retinopathy [facility]     Hypertension     MRSA (methicillin resistant staph aureus) culture positive     labial abscess    Neuropathy        Past Surgical History:   Procedure Laterality Date    HX HEENT      tonsillectomy    HX ORTHOPAEDIC      left ft bunionectomy    HX OTHER SURGICAL      lanced labial abscess         History reviewed. No pertinent family history. Social History     Socioeconomic History    Marital status:      Spouse name: Not on file    Number of children: Not on file    Years of education: Not on file    Highest education level: Not on file   Social Needs    Financial resource strain: Not on file    Food insecurity - worry: Not on file    Food insecurity - inability: Not on file    Transportation needs - medical: Not on file   [place] needs - non-medical: Not on file   Occupational History    Not on file   Tobacco Use    Smoking status: Never Smoker    Smokeless tobacco: Never Used   Substance and Sexual Activity    Alcohol use: No    Drug use: No    Sexual activity: Not on file   Other Topics Concern    Not on file   Social History Narrative    Not on file         ALLERGIES: Aspirin and Ciprofloxacin    Review of Systems   Constitutional: Negative for chills and fever. Respiratory: Negative for chest tightness. Cardiovascular: Negative for chest pain. Gastrointestinal: Positive for abdominal pain (\"stomach hasn't felt right recently\") and nausea (with eating). Negative for constipation, diarrhea and vomiting. Musculoskeletal: Positive for gait problem (since 9/2018). Negative for back pain. Neurological: Positive for weakness, numbness (right-sided) and headaches. All other systems reviewed and are negative. Vitals:    12/20/18 1800 12/20/18 1815 12/20/18 1830 12/20/18 1846   BP: 188/87 122/81 (!) 130/94    Pulse: 85 85 88    Resp: 15 15 18    Temp:       SpO2: 98% 99% 99%    Weight:    79.4 kg (175 lb 0.7 oz)            Physical Exam        Constitutional: Pt is awake and alert. Pt appears well-developed and well-nourished. NAD. HENT:   Head: Normocephalic and atraumatic. Nose: Nose normal.   Mouth/Throat: Oropharynx is clear and moist. No oropharyngeal exudate. Eyes: Conjunctivae and extraocular motions are normal. Pupils are equal, round, and reactive to light. Right eye exhibits no discharge. Left eye exhibits no discharge. No scleral icterus. Neck: No tracheal deviation present. Supple neck. Cardiovascular: Normal rate, regular rhythm, normal heart sounds and intact distal pulses. Exam reveals no gallop and no friction rub. No murmur heard. Pulmonary/Chest: Effort normal and breath sounds normal.  Pt  has no wheezes. Pt  has no rales. Abdominal: Soft. Pt  exhibits no distension and no mass. No tenderness. Pt  has no rebound and no guarding. Musculoskeletal:  Pt  exhibits no edema and no tenderness. Ext: Normal ROM in all four extremities; not tender to palpation; distal pulses are normal, no edema. Neurological:  Pt is alert. No pronator drift. Sensory deficit present at RUE and RLE. Mild drift noted of LLE. Skin: Skin is warm and dry. Pt  is not diaphoretic. Psychiatric:  Pt  has a normal mood and affect.  Behavior is normal.     Note written by Nate Bradley, as dictated by Dayna Ritter DO 3:53 PM      [facility]       Procedures    PROGRESS NOTE:  4:11 PM  Reviewed CT images and final impressions from radiologist.      CONSULT NOTE:  4:12 PM Dayna Ritter  spoke with Dr. Hector Arroyo, Consult for tele-neurology. Discussed available diagnostic tests and clinical findings. Dr. 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 She recommends that the patient be admitted to the hospital.  No TPA or thrombectomy. ED EKG interpretation:  Rhythm: normal sinus rhythm; and regular . Rate (approx.): 96 bpm; Axis: normal; ST/T wave: non-specific ST changes; Note written by Nate Ham, as dictated by Debbie Jaeger DO 4:25 PM        CONSULT NOTE:  6:22 PM Debbie Jaeger DO communicated with Dr. Dayanna Copeland, Consult for hospitalist via [facility] Text. Discussed available diagnostic tests and clinical findings. Dr. Dayanna Copeland will accept the patient for admission.          Labs Reviewed   PROTHROMBIN TIME + INR - Abnormal; Notable for the following components:       Result Value    INR 1.2 (*)     Prothrombin time 11.9 (*)     All other components within normal limits   METABOLIC PANEL, BASIC - Abnormal; Notable for the following components:    Sodium 135 (*)     Potassium 3.3 (*)     Glucose 162 (*)     All other components within normal limits   HEPATIC FUNCTION PANEL - Abnormal; Notable for the following components:    Albumin 3.3 (*)     A-G Ratio 0.9 (*)     All other components within normal limits   URINALYSIS W/MICROSCOPIC - Abnormal; Notable for the following components:    Appearance CLOUDY (*)     Protein 300 (*)     Ketone TRACE (*)     Blood MODERATE (*)     All other components within normal limits   URINE CULTURE HOLD SAMPLE   CBC WITH AUTOMATED DIFF   TROPONIN I   ETHYL ALCOHOL   DRUG SCREEN, URINE   SAMPLES BEING HELD

## 2018-12-20 NOTE — ED TRIAGE NOTES
TRIAGE: Pt arrives via EMS from home with complaints of R temporal HA and R-sided numbness and tingling that has turned to into weakness and dizziness. Hx of TIA. Pt takes Plavix. Pt reports L-sided weakness since Sept. Pt arrives A&Ox4 with /104 and . Brant Lopez 2000 yesterday.

## 2018-12-20 NOTE — ED NOTES
1555- Pt to CT with RN and Kailash Montoya MD.    1600- Per Kailash Montoya MD pt to have CTA & CT Perf done now.

## 2018-12-21 ENCOUNTER — APPOINTMENT (OUTPATIENT)
Dept: MRI IMAGING | Age: 53
End: 2018-12-21
Attending: FAMILY MEDICINE
Payer: SELF-PAY

## 2018-12-21 ENCOUNTER — APPOINTMENT (OUTPATIENT)
Dept: GENERAL RADIOLOGY | Age: 53
End: 2018-12-21
Attending: INTERNAL MEDICINE
Payer: SELF-PAY

## 2018-12-21 LAB
ATRIAL RATE: 96 BPM
CALCULATED P AXIS, ECG09: 44 DEGREES
CALCULATED R AXIS, ECG10: 6 DEGREES
CALCULATED T AXIS, ECG11: 14 DEGREES
CHOLEST SERPL-MCNC: 213 MG/DL
CRP SERPL-MCNC: <0.29 MG/DL (ref 0–0.6)
DIAGNOSIS, 93000: NORMAL
ERYTHROCYTE [DISTWIDTH] IN BLOOD BY AUTOMATED COUNT: 14.4 % (ref 11.5–14.5)
EST. AVERAGE GLUCOSE BLD GHB EST-MCNC: 131 MG/DL
GLUCOSE BLD STRIP.AUTO-MCNC: 110 MG/DL (ref 65–100)
GLUCOSE BLD STRIP.AUTO-MCNC: 127 MG/DL (ref 65–100)
GLUCOSE BLD STRIP.AUTO-MCNC: 147 MG/DL (ref 65–100)
GLUCOSE BLD STRIP.AUTO-MCNC: 148 MG/DL (ref 65–100)
HBA1C MFR BLD: 6.2 % (ref 4.2–6.3)
HCT VFR BLD AUTO: 33.4 % (ref 35–47)
HDLC SERPL-MCNC: 43 MG/DL
HDLC SERPL: 5 {RATIO} (ref 0–5)
HGB BLD-MCNC: 11.6 G/DL (ref 11.5–16)
LDLC SERPL CALC-MCNC: 143.6 MG/DL (ref 0–100)
LIPID PROFILE,FLP: ABNORMAL
MCH RBC QN AUTO: 27.7 PG (ref 26–34)
MCHC RBC AUTO-ENTMCNC: 34.7 G/DL (ref 30–36.5)
MCV RBC AUTO: 79.7 FL (ref 80–99)
NRBC # BLD: 0 K/UL (ref 0–0.01)
NRBC BLD-RTO: 0 PER 100 WBC
P-R INTERVAL, ECG05: 200 MS
PLATELET # BLD AUTO: 171 K/UL (ref 150–400)
PMV BLD AUTO: 9 FL (ref 8.9–12.9)
Q-T INTERVAL, ECG07: 344 MS
QRS DURATION, ECG06: 70 MS
QTC CALCULATION (BEZET), ECG08: 434 MS
RBC # BLD AUTO: 4.19 M/UL (ref 3.8–5.2)
SERVICE CMNT-IMP: ABNORMAL
TRIGL SERPL-MCNC: 132 MG/DL (ref ?–150)
VENTRICULAR RATE, ECG03: 96 BPM
VLDLC SERPL CALC-MCNC: 26.4 MG/DL
WBC # BLD AUTO: 5.4 K/UL (ref 3.6–11)

## 2018-12-21 PROCEDURE — 73502 X-RAY EXAM HIP UNI 2-3 VIEWS: CPT

## 2018-12-21 PROCEDURE — G8988 SELF CARE GOAL STATUS: HCPCS

## 2018-12-21 PROCEDURE — G8987 SELF CARE CURRENT STATUS: HCPCS

## 2018-12-21 PROCEDURE — 97161 PT EVAL LOW COMPLEX 20 MIN: CPT

## 2018-12-21 PROCEDURE — 97530 THERAPEUTIC ACTIVITIES: CPT

## 2018-12-21 PROCEDURE — 74011250636 HC RX REV CODE- 250/636: Performed by: FAMILY MEDICINE

## 2018-12-21 PROCEDURE — 74011636637 HC RX REV CODE- 636/637: Performed by: FAMILY MEDICINE

## 2018-12-21 PROCEDURE — 83036 HEMOGLOBIN GLYCOSYLATED A1C: CPT

## 2018-12-21 PROCEDURE — 86140 C-REACTIVE PROTEIN: CPT

## 2018-12-21 PROCEDURE — 94760 N-INVAS EAR/PLS OXIMETRY 1: CPT

## 2018-12-21 PROCEDURE — 80061 LIPID PANEL: CPT

## 2018-12-21 PROCEDURE — 97165 OT EVAL LOW COMPLEX 30 MIN: CPT

## 2018-12-21 PROCEDURE — 70551 MRI BRAIN STEM W/O DYE: CPT

## 2018-12-21 PROCEDURE — 85027 COMPLETE CBC AUTOMATED: CPT

## 2018-12-21 PROCEDURE — 36415 COLL VENOUS BLD VENIPUNCTURE: CPT

## 2018-12-21 PROCEDURE — 99218 HC RM OBSERVATION: CPT

## 2018-12-21 PROCEDURE — 82962 GLUCOSE BLOOD TEST: CPT

## 2018-12-21 PROCEDURE — 93306 TTE W/DOPPLER COMPLETE: CPT

## 2018-12-21 PROCEDURE — 74011250637 HC RX REV CODE- 250/637: Performed by: INTERNAL MEDICINE

## 2018-12-21 PROCEDURE — 74011250637 HC RX REV CODE- 250/637: Performed by: FAMILY MEDICINE

## 2018-12-21 RX ORDER — GUAIFENESIN 100 MG/5ML
81 LIQUID (ML) ORAL DAILY
Status: DISCONTINUED | OUTPATIENT
Start: 2018-12-22 | End: 2018-12-22 | Stop reason: HOSPADM

## 2018-12-21 RX ORDER — ATORVASTATIN CALCIUM 40 MG/1
80 TABLET, FILM COATED ORAL
Status: DISCONTINUED | OUTPATIENT
Start: 2018-12-21 | End: 2018-12-22 | Stop reason: HOSPADM

## 2018-12-21 RX ADMIN — GABAPENTIN 900 MG: 300 CAPSULE ORAL at 09:05

## 2018-12-21 RX ADMIN — FAMOTIDINE 20 MG: 20 TABLET ORAL at 09:05

## 2018-12-21 RX ADMIN — GABAPENTIN 900 MG: 300 CAPSULE ORAL at 21:10

## 2018-12-21 RX ADMIN — SODIUM CHLORIDE 75 ML/HR: 900 INJECTION, SOLUTION INTRAVENOUS at 11:50

## 2018-12-21 RX ADMIN — GABAPENTIN 900 MG: 300 CAPSULE ORAL at 17:09

## 2018-12-21 RX ADMIN — LISINOPRIL 10 MG: 10 TABLET ORAL at 09:05

## 2018-12-21 RX ADMIN — INSULIN LISPRO 2 UNITS: 100 INJECTION, SOLUTION INTRAVENOUS; SUBCUTANEOUS at 22:46

## 2018-12-21 RX ADMIN — HYDROCODONE BITARTRATE AND ACETAMINOPHEN 1 TABLET: 5; 325 TABLET ORAL at 11:46

## 2018-12-21 RX ADMIN — CLOPIDOGREL BISULFATE 75 MG: 75 TABLET ORAL at 09:05

## 2018-12-21 RX ADMIN — FAMOTIDINE 20 MG: 20 TABLET ORAL at 20:49

## 2018-12-21 RX ADMIN — Medication 10 ML: at 11:46

## 2018-12-21 RX ADMIN — INSULIN LISPRO 2 UNITS: 100 INJECTION, SOLUTION INTRAVENOUS; SUBCUTANEOUS at 11:45

## 2018-12-21 RX ADMIN — HYDROCODONE BITARTRATE AND ACETAMINOPHEN 1 TABLET: 5; 325 TABLET ORAL at 04:37

## 2018-12-21 RX ADMIN — ATORVASTATIN CALCIUM 80 MG: 40 TABLET, FILM COATED ORAL at 21:10

## 2018-12-21 RX ADMIN — HYDROCODONE BITARTRATE AND ACETAMINOPHEN 1 TABLET: 5; 325 TABLET ORAL at 17:10

## 2018-12-21 RX ADMIN — Medication 10 ML: at 21:11

## 2018-12-21 NOTE — PROGRESS NOTES
NUTRITION COMPLETE ASSESSMENT    RECOMMENDATIONS:   1. Diet order Change: Consistent Carb, 1800 anh, GIOVANNI    2. Daily weights  3. RD added ONS x1 daily     Interventions/Plan:   Food/Nutrient Delivery:            Ensure Compact (instructed pt to drink 1 per day)  Nutrition Education:     Coordination of Care:      Assessment:   Reason for Assessment:   [] Provider Consult  [x]BPA/MST Referral - wt loss   []LOS   []Reassessment   []NPO/Clear Liquid   []At Nutrition Risk  []Other    Diet: Cardiac  Supplements: none PTA  Nutritionally Significant Medications: [x] Reviewed & Includes: SSI, Lipitor, plavix, pepcid  Meal Intake: No data found. Pre-Hospitalization:  Usual Appetite: Fair    Current Hospitalization:   Fluid Restriction:    Appetite: Good  PO Ability: Independent Average po intake:25-50%  Average supplements intake: n/a      Subjective:  \"I have been having this GI bug since the beginning of December\"    Objective:  Pt admitted with tongue & leg numbness. PMHx: diabetes, diabetic peripheral neuropathy, hypertension, spinal cord stroke. Pt reports she has been losing weight for a couple years now. She was intentionally losing down from 290# back in 2015 & has been staying around 185# the last 6 months or so. Pt's wt is down from having frequent \"GI bug\" throughout December on & off. She is currently getting her appetite back and eating between 50-75% of meals. Pt with hx of DM will benefit from adjustment in diet order & 1 ONS daily to maintain stable wt & nutrition. Estimated Nutrition Needs:   Kcals/day: 9394 Kcals/day  Protein: 80 g(1 g/kg of current wt)  Fluid: (1mL/kcal)  Based On: [address] [place](AF 1.3)  Weight Used: Actual wt(79.6 kg)    Pt expected to meet estimated nutrient needs:  [x]   Yes     []  No [] Unable to predict at this time    Nutrition Diagnosis:   1.  Unintended weight loss related to GI \"bug\"  as evidenced by interview with patient reports being sick several days since beginning of December with nausea & diarrhea, decreased intake       Goals:     stable weight during hospital days +/- 2 kg     Monitoring & Evaluation:    - Total energy intake   - Weight/weight change, GI profile, Glucose profile   -      Previous Nutrition Goals Met:   N/A  Previous Recommendations:    N/A    Education & Discharge Needs:   [x] None Identified   [] Identified and addressed    [x] Participated in care plan, discharge planning, and/or interdisciplinary rounds        Cultural, Quaker and ethnic food preferences identified: None    Skin Integrity: [x]Intact  []Other  Edema: [x]None []Other  Last BM: PTA  Food Allergies: [x]None []Other  Diet Restrictions: Cultural/Hindu Preference(s): None     Anthropometrics:    Weight Loss Metrics 12/21/2018 11/13/2018 8/3/2018 8/3/2018 6/24/2018 4/8/2018 2/26/2018   Today's Wt 175 lb 7.8 oz 180 lb 189 lb 190 lb 195 lb 5.2 oz 202 lb 9.6 oz 201 lb 4.5 oz   BMI 29.2 kg/m2 29.95 kg/m2 30.97 kg/m2 31.14 kg/m2 32.5 kg/m2 33.2 kg/m2 33.49 kg/m2         Height: 5' 5\" (165.1 cm),    Body mass index is 29.2 kg/m².   IBW : 56.7 kg (125 lb),    Usual Body Weight: 83.9 kg (185 lb),      Labs:    Lab Results   Component Value Date/Time    Sodium 135 (L) 12/20/2018 04:32 PM    Potassium 3.3 (L) 12/20/2018 04:32 PM    Chloride 101 12/20/2018 04:32 PM    CO2 27 12/20/2018 04:32 PM    Glucose 162 (H) 12/20/2018 04:32 PM    BUN 14 12/20/2018 04:32 PM    Creatinine 0.78 12/20/2018 04:32 PM    Calcium 9.0 12/20/2018 04:32 PM    Magnesium 1.6 11/13/2018 10:52 AM    Albumin 3.3 (L) 12/20/2018 04:32 PM     Lab Results   Component Value Date/Time    Hemoglobin A1c 6.2 12/21/2018 02:36 AM    Hemoglobin A1c (POC) 10.2 06/25/2015 11:01 AM         Deedee Ewing RD, MS, CDE  Pager #[phone]

## 2018-12-21 NOTE — PROGRESS NOTES
Hospitalist Progress Note  [address] [facility]   Answering service: [phone] from in house phone      Date of Service:  2018  NAME:  Krish Marmolejo  :  1965  MRN:  [id number]      Admission Summary:   48year old female presenting with left arm and facial numbness. Interval history / Subjective:      Patient seen and examined. Continues to have numbness. MRI negative. Complaining of left hip pain     Assessment & Plan:     Transient ischemic attack:  -presented with left arm and facial numbness, persisting  -MRI negative  -per neurology, start aspirin and plavix  -high intensity statin  -PT/OT consult  -echo 50-55%    Hypertension: continue to monitor, on lisinopril   Diabetes mellitus: continue SSI, check Ha1c  Left hip pain: will order x-ray       Code status: full  DVT prophylaxis: holding    Care Plan discussed with: Patient/Family  Disposition: Home w/Family and TBD     Hospital Problems  Date Reviewed: 2018          Codes Class Noted POA    * (Principal) TIA (transient ischemic attack) ICD-10-CM: G45.9  ICD-9-CM: 435.9  2018 Unknown                Review of Systems:   Negative unless stated above      Vital Signs:    Last 24hrs VS reviewed since prior progress note. Most recent are:  Visit Vitals  /56 (BP 1 Location: Left arm, BP Patient Position: At rest)   Pulse 82   Temp 98.6 °F (37 °C)   Resp 13   Ht 5' 5\" (1.651 m)   Wt 79.6 kg (175 lb 7.8 oz)   SpO2 98%   BMI 29.20 kg/m²       No intake or output data in the 24 hours ending 18 2493     Physical Examination:             Constitutional:  No acute distress, cooperative, pleasant    ENT:  Oral mucous moist, oropharynx benign. Neck supple,    Resp:  CTA bilaterally. No wheezing/rhonchi/rales. No accessory muscle use   CV:  Regular rhythm, normal rate, no murmurs, gallops, rubs    GI:  Soft, non distended, non tender.  normoactive bowel sounds, no hepatosplenomegaly     Musculoskeletal:  No edema, warm, 2+ pulses throughout    Neurologic:  Moves all extremities. AAOx3, CN II-XII reviewed          Data Review:    Review and/or order of clinical lab test      Labs:     Recent Labs     12/21/18  0236 12/20/18  1632   WBC 5.4 5.5   HGB 11.6 12.2   HCT 33.4* 36.0    194     Recent Labs     12/20/18  1632   *   K 3.3*      CO2 27   BUN 14   CREA 0.78   *   CA 9.0     Recent Labs     12/20/18  1632   SGOT 15   ALT 14   AP 70   TBILI 0.4   TP 6.8   ALB 3.3*   GLOB 3.5     Recent Labs     12/20/18  1632   INR 1.2*   PTP 11.9*      No results for input(s): FE, TIBC, PSAT, FERR in the last 72 hours. No results found for: FOL, RBCF   No results for input(s): PH, PCO2, PO2 in the last 72 hours.   Recent Labs     12/20/18  1632   TROIQ <0.05     Lab Results   Component Value Date/Time    Cholesterol, total 213 (H) 12/21/2018 02:36 AM    HDL Cholesterol 43 12/21/2018 02:36 AM    LDL,Direct 57 11/15/2011 10:50 AM    LDL, calculated 143.6 (H) 12/21/2018 02:36 AM    Triglyceride 132 12/21/2018 02:36 AM    CHOL/HDL Ratio 5.0 12/21/2018 02:36 AM     Lab Results   Component Value Date/Time    Glucose (POC) 127 (H) 12/21/2018 04:44 PM    Glucose (POC) 147 (H) 12/21/2018 11:32 AM    Glucose (POC) 110 (H) 12/21/2018 07:29 AM    Glucose (POC) 116 (H) 12/20/2018 09:25 PM    Glucose (POC) 283 (H) 06/24/2018 07:22 AM     Lab Results   Component Value Date/Time    Color YELLOW/STRAW 12/20/2018 04:47 PM    Appearance CLOUDY (A) 12/20/2018 04:47 PM    Specific gravity 1.029 12/20/2018 04:47 PM    Specific gravity >1.030 (H) 06/24/2018 03:18 AM    pH (UA) 6.0 12/20/2018 04:47 PM    Protein 300 (A) 12/20/2018 04:47 PM    Glucose NEGATIVE  12/20/2018 04:47 PM    Ketone TRACE (A) 12/20/2018 04:47 PM    Bilirubin NEGATIVE  12/20/2018 04:47 PM    Urobilinogen 0.2 12/20/2018 04:47 PM    Nitrites NEGATIVE  12/20/2018 04:47 PM    Leukocyte Esterase NEGATIVE  12/20/2018 04:47 PM    Epithelial cells FEW 12/20/2018 04:47 PM    Bacteria NEGATIVE  12/20/2018 04:47 PM    WBC 0-4 12/20/2018 04:47 PM    RBC 5-10 12/20/2018 04:47 PM         Medications Reviewed:     Current Facility-Administered Medications   Medication Dose Route Frequency    atorvastatin (LIPITOR) tablet 80 mg  80 mg Oral QHS    [START ON 12/22/2018] aspirin chewable tablet 81 mg  81 mg Oral DAILY    gabapentin (NEURONTIN) capsule 900 mg  900 mg Oral TID    HYDROcodone-acetaminophen (NORCO) 5-325 mg per tablet 1 Tab  1 Tab Oral Q6H PRN    lisinopril (PRINIVIL, ZESTRIL) tablet 10 mg  10 mg Oral DAILY    sodium chloride (NS) flush 5-10 mL  5-10 mL IntraVENous Q8H    sodium chloride (NS) flush 5-10 mL  5-10 mL IntraVENous PRN    acetaminophen (TYLENOL) tablet 650 mg  650 mg Oral Q4H PRN    Or    acetaminophen (TYLENOL) solution 650 mg  650 mg Per NG tube Q4H PRN    Or    acetaminophen (TYLENOL) suppository 650 mg  650 mg Rectal Q4H PRN    0.9% sodium chloride infusion  75 mL/hr IntraVENous CONTINUOUS    clopidogrel (PLAVIX) tablet 75 mg  75 mg Oral DAILY    famotidine (PEPCID) tablet 20 mg  20 mg Oral Q12H    glucose chewable tablet 16 g  4 Tab Oral PRN    dextrose (D50W) injection syrg 12.5-25 g  25-50 mL IntraVENous PRN    glucagon (GLUCAGEN) injection 1 mg  1 mg IntraMUSCular PRN    insulin lispro (HUMALOG) injection   SubCUTAneous AC&HS    hydrALAZINE (APRESOLINE) 20 mg/mL injection 10 mg  10 mg IntraVENous Q6H PRN     ______________________________________________________________________  EXPECTED LENGTH OF STAY: - - -  ACTUAL LENGTH OF STAY:          0                 Sana Gregory DO

## 2018-12-21 NOTE — PROGRESS NOTES
Admission Medication Reconciliation:    Information obtained from: Patient    Significant PMH/Disease States:   Past Medical History:   Diagnosis Date    Arthritis     Asthma     Chronic back pain     Diabetes ([facility] [address]     Diabetic retinopathy ([facility] [address]     Hypertension     MRSA (methicillin resistant staph aureus) culture positive     labial abscess    Neuropathy        Chief Complaint for this Admission:    Chief Complaint   Patient presents with    Headache    Extremity Weakness       Allergies:  Aspirin and Ciprofloxacin    Prior to Admission Medications:   Prior to Admission Medications   Prescriptions Last Dose Informant Patient Reported? Taking?   acetaminophen (TYLENOL) 325 mg tablet   No Yes   Sig: Take 2 Tabs by mouth every four (4) hours as needed for Pain. aspirin-acetaminophen-caffeine (EXCEDRIN ES) 250-250-65 mg per tablet 12/19/2018 at PM  Yes Yes   Sig: Take 2 Tabs by mouth every six (6) hours as needed for Headache.   gabapentin (NEURONTIN) 300 mg capsule 12/20/2018 at AM  Yes Yes   Sig: Take 900 mg by mouth three (3) times daily. glipiZIDE (GLUCOTROL) 5 mg tablet 12/20/2018 at AM  No Yes   Sig: Take 1 Tab by mouth two (2) times a day. insulin lispro (HUMALOG) 100 unit/mL injection   No Yes   Sig: Take 32 units with meals, only if BG > 100. Indications: HYPERGLYCEMIA, TYPE 2 DIABETES MELLITUS   lisinopril (PRINIVIL, ZESTRIL) 10 mg tablet 12/20/2018 at Unknown time  No Yes   Sig: Take 1 Tab by mouth daily. ondansetron (ZOFRAN ODT) 4 mg disintegrating tablet   No Yes   Sig: Take 1 Tab by mouth every eight (8) hours as needed for Nausea. traMADol (ULTRAM) 50 mg tablet 12/20/2018 at AM  Yes Yes   Sig: Take 50 mg by mouth every eight (8) hours as needed for Pain. traZODone (DESYREL) 50 mg tablet 12/19/2018 at Unknown time  No Yes   Sig: Take 1 Tab by mouth nightly.       Facility-Administered Medications: None         Comments/Recommendations:     Spoke with patient regarding allergies and PTA medications. 1) Reviewed and confirmed allergies. 2) Updated PTA medication list. Added tramadol and Excedrin. Modified gabapentin (changed from 300 mg TID to 900 mg TID). Removed diabetic testing supplies, Tricor, Lortab, Lantus, and promethazine. Last dose information listed in table above.       Robby Murry, PharmD

## 2018-12-21 NOTE — PROGRESS NOTES
Care Management - Received call from Dr. Mai Iraheta regarding patient needing home health for PT and OT. Plan for discharge early tomorrow morning. Per chart review, patient does not have insurance. She have the [facility] card and a [address]. Explained to MD that the referral will be sent to [facility] since has no insurance. Per Dr. Mai Iraheta, if patient does not meet criteria, she can go to therapy at [facility]. Manas Nunez, sent referral via [facility] and requested intake follow up with weekend  at [phone] tomorrow morning. Spoke with patient. She is agreeable to home health with [facility] if she is approved. She is also agreeable to following up with her PCP to order outpatient therapy at [facility] if she does not qualify. Patient said that she has [employer] at this time. Due to the Medicaid expansion beginning January 1, 2019, she will have full Medicaid on that date. Will ask weekend care manager to follow up with [facility] to see if they will accept patient and to place name and number of agency of [facility] in follow up section.      KALEY Pimentel

## 2018-12-21 NOTE — CONSULTS
Consult dictated. 48year-old admitted with tongue numbness and R leg weakness. MRI negative for stroke. She still has symptoms. ?Occult non radiological stroke in brainstem. Agree with adding ASA to Plavix. Risk factor control. Optimize statin dose.  PT/OT eval.  Viviana Reina MD

## 2018-12-21 NOTE — PROGRESS NOTES
Consult received and appreciated. Reviewed chart and discussed case with nsg. Nsg dysphagia screen completed and WNL and patient is tolerating a diet without difficulty. MRI negative for acute infarct and no concerns regarding speech or swallowing function. Formal SLP evaluation is not clinically indicated at this time. Please re-consult if further needs arise. Thanks. Laura Limon M.CD.  CCC-SLP

## 2018-12-21 NOTE — PROGRESS NOTES
Spoke with Dr Laura Bush from tele neuro  Discussed prev case history, imaging exam and other pertinent info  No new recs, start plavix from tomorrow per him, no additional anti platelets tonite

## 2018-12-21 NOTE — PROGRESS NOTES
Problem: Self Care Deficits Care Plan (Adult)  Goal: *Acute Goals and Plan of Care (Insert Text)  Occupational Therapy Goals  Initiated 12/21/2018  1. Patient will perform grooming standing at sink with modified independence within 7 day(s). 2.  Patient will perform bathing with modified independence within 7 day(s). 3.  Patient will perform upper body dressing and lower body dressing with modified independence within 7 day(s). 4.  Patient will perform toilet transfers with modified independence within 7 day(s). 5.  Patient will perform all aspects of toileting with modified independence within 7 day(s). 6.  Patient will participate in upper extremity therapeutic exercise/activities with modified independence for 10 minutes within 7 day(s). 7.  Patient will utilize energy conservation techniques during functional activities with verbal cues within 7 day(s). Occupational Therapy EVALUATION  Patient: Mile Scanlon (13 y.o. 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 Pt received in bed, alert and oriented x4. ADLs overall IND to Homero, CGA-modA for transfers primarily d/t dizziness (supine 160s/80s; sitting 130s/80s, standing 90s/50s). Pt with no overt LOB using RW to transfer to bedside chair. Pt reports at baseline she is Maria Guadalupe, uses rollator, and lives with family; recently was discharged from rehab. Anticipate once pt is medically stable and BP stabilizes without symptoms, pt can discharge home with HHOT/PT; will continue to assess.     Recommend with nursing patient to complete as able in order to maintain strength, endurance and independence: ADLs with supervision/setup, OOB to chair 3x/day and mobilizing to the bathroom for toileting with 1 assist and RW. Thank you for your assistance. Patient will benefit from skilled intervention to address the above impairments. Patients rehabilitation potential is considered to be Good  Factors which may influence rehabilitation potential include:   []             None noted  []             Mental ability/status  [x]             Medical condition  []             Home/family situation and support systems  []             Safety awareness  []             Pain tolerance/management  []             Other:      PLAN :  Recommendations and Planned Interventions:  [x]               Self Care Training                  [x]        Therapeutic Activities  [x]               Functional Mobility Training    []        Cognitive Retraining  [x]               Therapeutic Exercises           [x]        Endurance Activities  [x]               Balance Training                   [x]        Neuromuscular Re-Education  []               Visual/Perceptual Training     [x]   Home Safety Training  [x]               Patient Education                 [x]        Family Training/Education  []               Other (comment):    Frequency/Duration: Patient will be followed by occupational therapy 4 times a week to address goals. Discharge Recommendations: anticipating Home Health  Further Equipment Recommendations for Discharge: none at this time     SUBJECTIVE:   Patient stated I am dizzy.  BP dropped from 160s/80s to 90s/50s in standing.     OBJECTIVE DATA SUMMARY:   HISTORY:   Past Medical History:   Diagnosis Date    Arthritis     Asthma     Chronic back pain     Diabetes ([address]     [facility] [address]     Hypertension     MRSA (methicillin resistant staph aureus) culture positive     labial abscess    Neuropathy      Past Surgical History:   Procedure Laterality Date    HX HEENT tonsillectomy    HX ORTHOPAEDIC      left ft bunionectomy    HX OTHER SURGICAL      lanced labial abscess       Prior Level of Function/Environment/Context: Pt lives with family, uses rollator and reports she is overall Maria Guadalupe with ADLs. Baseline L sided weakness and peripheral neuropathy in hands>feet. Occupations in which the patient is/was successful, what are the barriers preventing that success:   Performance Patterns (routines, roles, habits, and rituals):   Personal Interests and/or values:   Expanded or extensive additional review of patient history:     Home Situation  Home Environment: Private residence  # Steps to Enter: 5  Rails to Enter: Yes  Hand Rails : Bilateral  One/Two Story Residence: One story  Living Alone: No  Support Systems: Child(jesus manuel), Family member(s)  Patient Expects to be Discharged to[de-identified] Private residence  Current DME Used/Available at Home: [employer], rollator, [address] chair, Commode, bedside  Tub or Shower Type: Shower    Hand dominance: Right    EXAMINATION OF PERFORMANCE DEFICITS:  Cognitive/Behavioral Status:  Neurologic State: Alert; Appropriate for age  Orientation Level: Oriented X4  Cognition: Follows commands; Appropriate safety awareness; Appropriate for age attention/concentration; Appropriate decision making  Perception: Appears intact  Perseveration: No perseveration noted  Safety/Judgement: Awareness of environment; Fall prevention    Skin: appears intact    Edema: none noted in BUEs    Hearing: Auditory  Auditory Impairment: None    Vision/Perceptual:    Tracking: Able to track stimulus in all quadrants w/o difficulty                 Diplopia: No              Range of Motion:    AROM: Generally decreased, functional  PROM: Within functional limits                      Strength:    Strength: Generally decreased, functional                Coordination:  Coordination: Within functional limits  Fine Motor Skills-Upper: Left Intact; Right Intact    Gross Motor Skills-Upper: Left Intact; Right Intact    Tone & Sensation:    Tone: Normal  Sensation: Impaired(peripheral neuropathy at baseline in hands>feet)                      Balance:  Sitting: Intact  Standing: Impaired; With support  Standing - Static: Good  Standing - Dynamic : Fair    Functional Mobility and Transfers for ADLs:  Bed Mobility:  Supine to Sit: Stand-by assistance    Transfers:  Sit to Stand: Moderate assistance;Assist x1  Stand to Sit: Minimum assistance;Assist x1  Bed to Chair: Minimum assistance; Adaptive equipment; Additional time;Assist x1  Toilet Transfer : Minimum assistance; Adaptive equipment; Additional time;Assist x1    ADL Assessment:  Feeding: Independent    Oral Facial Hygiene/Grooming: Modified Independent    Bathing: Minimum assistance; Adaptive equipment; Additional time;Assist x1(seated d/t dizziness)    Upper Body Dressing: Supervision;Setup    Lower Body Dressing: Minimum assistance; Adaptive equipment; Additional time;Assist x1    Toileting: Minimum assistance; Adaptive equipment;Assist x1;Additional time                ADL Intervention and task modifications:                                     Cognitive Retraining  Safety/Judgement: Awareness of environment; Fall prevention     Functional Measure:  Barthel Index:    Bathin  Bladder: 10  Bowels: 10  Groomin  Dressin  Feeding: 10  Mobility: 10  Stairs: 0  Toilet Use: 5  Transfer (Bed to Chair and Back): 10  Total: 65       Barthel and G-code impairment scale:  Percentage of impairment CH  0% CI  1-19% CJ  20-39% CK  40-59% CL  60-79% CM  80-99% CN  100%   Barthel Score 0-100 100 99-80 79-60 59-40 20-39 1-19   0   Barthel Score 0-20 20 17-19 13-16 9-12 5-8 1-4 0      The Barthel ADL Index: Guidelines  1. The index should be used as a record of what a patient does, not as a record of what a patient could do. 2. The main aim is to establish degree of independence from any help, physical or verbal, however minor and for whatever reason.   3. The need for supervision renders the patient not independent. 4. A patient's performance should be established using the best available evidence. Asking the patient, friends/relatives and nurses are the usual sources, but direct observation and common sense are also important. However direct testing is not needed. 5. Usually the patient's performance over the preceding 24-48 hours is important, but occasionally longer periods will be relevant. 6. Middle categories imply that the patient supplies over 50 per cent of the effort. 7. Use of aids to be independent is allowed. Serge Chowdhury., Barthel, D.W. (2087). Functional evaluation: the Barthel Index. [facility] (14)2. Live Goel dwayne CAMERON Thorne, Kellie Solorzano., Андрей Sanford., [place], [address] [place] [address] (1999). Measuring the change indisability after inpatient rehabilitation; comparison of the responsiveness of the Barthel Index and Functional Bath Measure. Journal of Neurology, Neurosurgery, and Psychiatry, 66(4), 432-660. Ilsa Morales, N.J.A, KAMILLA Ramirez, & Shaye Newton MJS. (2004.) Assessment of post-stroke quality of life in cost-effectiveness studies: The usefulness of the Barthel Index and the EuroQoL-5D. Quality of Life Research, 13, 774-55         G codes: In compliance with CMSs Claims Based Outcome Reporting, the following G-code set was chosen for this patient based on their primary functional limitation being treated: The outcome measure chosen to determine the severity of the functional limitation was the Barthel INdex with a score of 65/100 which was correlated with the impairment scale. ?  Self Care:     - CURRENT STATUS: CJ - 20%-39% impaired, limited or restricted    - GOAL STATUS: CI - 1%-19% impaired, limited or restricted    - D/C STATUS:  ---------------To be determined---------------     Occupational Therapy Evaluation Charge Determination   History Examination Decision-Making   LOW Complexity : Brief history review  MEDIUM Complexity : 3-5 performance deficits relating to physical, cognitive , or psychosocial skils that result in activity limitations and / or participation restrictions MEDIUM Complexity : Patient may present with comorbidities that affect occupational performnce. Miniml to moderate modification of tasks or assistance (eg, physical or verbal ) with assesment(s) is necessary to enable patient to complete evaluation       Based on the above components, the patient evaluation is determined to be of the following complexity level: LOW   Activity Tolerance:   BP drop and dizziness    Please refer to the flowsheet for vital signs taken during this treatment. After treatment:   [x] Patient left in no apparent distress sitting up in chair  [] Patient left in no apparent distress in bed  [x] Call bell left within reach  [x] Nursing notified  [] Caregiver present  [] Bed alarm activated    COMMUNICATION/EDUCATION:   The patients plan of care was discussed with: Physical Therapist and Registered Nurse. [x] Home safety education was provided and the patient/caregiver indicated understanding. [x] Patient/family have participated as able in goal setting and plan of care. [x] Patient/family agree to work toward stated goals and plan of care. [] Patient understands intent and goals of therapy, but is neutral about his/her participation. [] Patient is unable to participate in goal setting and plan of care. This patients plan of care is appropriate for delegation to [facility].     Thank you for this referral.  Janine Franco OT  Time Calculation: 22 mins

## 2018-12-21 NOTE — H&P
[address]   HISTORY AND PHYSICAL      Mariam CAT  MR#: [id number]  : 1965  ACCOUNT #: [de-identified]   ADMIT DATE: 2018    CHIEF COMPLAINT:  Left-sided numbness. HISTORY OF PRESENT ILLNESS:   The patient is a 51-year-old female with a past medical history of diabetes, neuropathy, hypertension, arthritis, chronic back pain, asthma, MRSA, and diabetic retinopathy, presents to the hospital with the above-mentioned symptoms. The patient reports that she has been having a headache situated all over her head for the past 2 days. Reports that this morning she woke up and had some right-sided tongue numbness, facial numbness extending up to her head and then extending down to her right arm and right leg. The patient reports that she also felt some weakness in her right arm and right leg, got concerned and decided to come to the hospital. Patient reports that the pain is situated more in her right temple area. She reports that she was initially admitted to [facility] in September and was told that she had a \"spinal cord stroke\" at that time. The patient reports that she was discharged from [facility] put on Plavix and had a little bit more left lower extremity weakness. The patient reports she had no effects on her left upper extremity. Reports that she was in rehab from 2018 to 2018 for left-sided weakness. Reports that she used a walker to ambulate. The patient also reports that this morning she checked her blood glucose and it was 47 but eventually improved to 198. The patient also reports that she also felt nauseous and  patient came to the  ER and was requested to be admitted under hospitalist service. Patient denies any other complaints or problems at this point in time. Reports that almost feels like that all of her symptoms have resolved. The patient was requested to be admitted under the hospitalist service. Patient denies any headache.  Currently, denies any headache, denies any blurry vision, sore throat, trouble swallowing, trouble with speech, any chest pain, shortness of breath, cough, fever, chills, abdominal pain, constipation, diarrhea, urinary symptoms focal or generalized neurological weakness, recent travel, sick contacts, falls, injuries, hematemesis, melena, hemoptysis, or any other concerns or problems. PAST MEDICAL HISTORY:  See above. HOME MEDICATIONS:  Currently the patient is on Excedrin, gabapentin 900 mg b.i.d., tramadol, acetaminophen, Zofran, lisinopril 10 mg daily, glipizide 5 mg b.i.d. and Desyrel. SOCIAL HISTORY:  Denies tobacco abuse, alcohol use, IV drug abuse. Lives at home. ALLERGIES:  ASPIRIN AND CIPROFLOXACIN. REVIEW OF SYSTEMS:  All systems were reviewed and found to be essentially negative except for the symptoms mentioned above. PHYSICAL EXAMINATION:  VITAL SIGNS:  Temperature 98.5,  pulse 99, respiratory 16, blood pressure 172/82, pulse ox 98% on room air. GENERAL:  Alert x3, awake in mildly distressed, pleasant female appears to be stated age. HEENT:  Pupils equal and reactive to light. Dry mucous membranes. Tympanic membranes clear. NECK:  Supple. CHEST:  Clear to auscultation bilaterally. HEART: S1, S2 were heard. ABDOMEN:  Soft, nontender, nondistended. Bowel sounds are physiologic. EXTREMITIES:  No clubbing, cyanosis. No edema. NEUROPSYCHIATRIC:  Pleasant mood and affect. Cranial nerves II-III grossly intact. Sensory grossly within normal limits. DTRs 2+/4. Strength 5/5 right upper and right lower extremities, 4/5 left lower, 5/5 left upper extremity. SKIN:  Warm. LABORATORY DATA:  White count 5.5, hemoglobin 12.2, hematocrit 36, platelets 619. Urine shows no signs of infection. INR 1.2, sodium 135, potassium 3.3, chloride 101, bicarbonate 27, anion gap 7, glucose 162, BUN 14, creatinine 0.7, calcium 9.0, bilirubin total 0.4, ALT 14, AST 15, alk phos 70.   Troponin less than 0.05. Urine drug screen was negative. INR is 1.2. CT of the head shows no acute abnormality. CT of the head showed small right basal ganglia lacunar infarct. ASSESSMENT AND PLAN:  1. Right-sided numbness, cerebrovascular accident versus transient ischemic attack. The patient will be admitted on a telemetry bed. The patient received a full dose of aspirin today. We will continue patient on Plavix. We will get an MRI. If MRI shows an acute stroke, may consider adding dual antiplatelet agents, neurovascular checks. Neurology consult, speech, PT, OT consult. We will get troponins, await neurology input and echocardiogram.  Obtain records from [facility]. Further intervention will be per hospital course. We will also get a CRP and ESR levels and reassess as needed. Continue to closely monitor. 2.  Diabetes. The patient will be on sliding scale Novolog insulin and Accu-Cheks and diet control and close monitoring. 3.  Hypertension. Suboptimally controlled. Optimize blood pressure control and allow permissive hypertension in light of possible recent strokes. 4.  Gastrointestinal and deep venous thrombosis prophylaxis. The patient will be on SCDs.       Dima Abebe MD MM/JERICA  D: 12/20/2018 20:54     T: 12/20/2018 21:56  JOB #: [id number]

## 2018-12-21 NOTE — PROGRESS NOTES
Problem: Mobility Impaired (Adult and Pediatric)  Goal: *Acute Goals and Plan of Care (Insert Text)  Physical Therapy Goals  Initiated 12/21/2018  1. Patient will move from supine to sit and sit to supine , scoot up and down and roll side to side in bed with modified independence within 7 day(s). 2.  Patient will transfer from bed to chair and chair to bed with modified independence using the least restrictive device within 7 day(s). 3.  Patient will perform sit to stand with modified independence within 7 day(s). 4.  Patient will ambulate with modified independence for 100 feet with the least restrictive device within 7 day(s). 5.  Patient will ascend/descend 5 stairs with 2 handrail(s) with modified independence within 7 day(s). 6.  Patient will improve Gunter Balance score by 7 points within 7 days. physical Therapy EVALUATION- neuro population    Patient: Mile Scanlon (89 y.o. female)  Date: 12/21/2018  Primary Diagnosis: TIA (transient ischemic attack)       Precautions:        ASSESSMENT :   Patient cleared by nursing for mobility and agreeable to participate in mobility assessment. Patient vital signs within normal limits. Patient received in bed. Able to achieve EOB sitting with supervision. No reports of increased pain- however reports dizziness. Drop In BP. See below. Recovers with seated rest break. Deferred gait due to current hypotension- however has been ambulating with nurses. Patient agreeable to sit in chair for lunch time. Educated patient on impaired balance and encouraged mobility with assistance/supervision. Will continue to follow to progress towards acute care goals. Recommend short rehab stay vs outpatient PT  at d/c to continue to optimize independence and safety with mobility. .    Patient will benefit from skilled intervention to address the above impairments.   Patients rehabilitation potential is considered to be Good  Factors which may influence rehabilitation potential include:   []           None noted  []           Mental ability/status  [x]           Medical condition  []           Home/family situation and support systems  []           Safety awareness  []           Pain tolerance/management  []           Other:      PLAN :  Recommendations and Planned Interventions:  [x]             Bed Mobility Training             [x]      Neuromuscular Re-Education  [x]             Transfer Training                   []      Orthotic/Prosthetic Training  [x]             Gait Training                         []      Modalities  [x]             Therapeutic Exercises           []      Edema Management/Control  [x]             Therapeutic Activities            [x]      Patient and Family Training/Education  []             Other (comment):  Frequency/Duration: Patient will be followed by physical therapy 5 times a week to address goals. Discharge Recommendations: Home Health  Further Equipment Recommendations for Discharge: has equipment     SUBJECTIVE:   Patient stated I lay on my side.     OBJECTIVE DATA SUMMARY:   HISTORY:    Past Medical History:   Diagnosis Date    Arthritis     Asthma     Chronic back pain     [facility]     [facility] retinopathy [facility]     Hypertension     MRSA (methicillin resistant staph aureus) culture positive     labial abscess    Neuropathy      Past Surgical History:   Procedure Laterality Date    HX HEENT      tonsillectomy    HX ORTHOPAEDIC      left ft bunionectomy    HX OTHER SURGICAL      lanced labial abscess     Prior Level of Function/Home Situation: mod I  Personal factors and/or comorbidities impacting plan of care: [facility]    Home Situation  Home Environment: Private residence  # Steps to Enter: 5  Rails to Enter: Yes  Hand Rails : Bilateral  One/Two Story Residence: One story  Living Alone: No  Support Systems: Child(jesus manuel), Family member(s)  Patient Expects to be Discharged to[de-identified] Private residence  Current DME Used/Available at Home: [id number], rollator, Shower chair, Commode, bedside  Tub or Shower Type: Shower    EXAMINATION/PRESENTATION/DECISION MAKING:   Critical Behavior:  Neurologic State: Alert, Appropriate for age  Orientation Level: Oriented X4  Cognition: Follows commands, Appropriate safety awareness, Appropriate for age attention/concentration, Appropriate decision making  Safety/Judgement: Awareness of environment, Fall prevention  Hearing: Auditory  Auditory Impairment: None    Range Of Motion:  AROM: Generally decreased, functional           PROM: Within functional limits           Strength:    Strength: Generally decreased, functional                    Tone & Sensation:   Tone: Normal              Sensation: Impaired(peripheral neuropathy at baseline in hands>feet)               Coordination:  Coordination: Within functional limits  Vision:   Tracking: Able to track stimulus in all quadrants w/o difficulty  Diplopia: No  Functional Mobility:  Bed Mobility:     Supine to Sit: Stand-by assistance        Transfers:  Sit to Stand: Moderate assistance;Assist x1  Stand to Sit: Minimum assistance;Assist x1        Bed to Chair: Minimum assistance; Adaptive equipment; Additional time;Assist x1              Balance:   Sitting: Intact  Standing: Impaired; With support  Standing - Static: Good  Standing - Dynamic : Fair  Ambulation/Gait Training:                                              Functional Measure  Gunter Balance Test:    Sitting to Standing: 3  Standing Unsupported: 2  Sitting with Back Unsupported: 4  Standing to Sittin  Transfers: 1  Standing Unsupported with Eyes Closed: 1  Standing Unsupported with Feet Together: 1  Reach Forward with Outstretched Arm: 1   Object: 1  Turn to Look Over Shoulders: 1  Turn 360 Degrees: 1  Alternate Foot on Step/Stool: 1  Standing Unsupported One Foot in Front: 1  Stand on One Le  Total: 21         56=Maximum possible score;   0-20=High fall risk  21-40=Moderate fall risk   41-56=Low fall risk Gunter Balance Test and G-code impairment scale:  Percentage of Impairment CH    0%   CI    1-19% CJ    20-39% CK    40-59% CL    60-79% CM    80-99% CN     100%   Gunter   Score 0-56 56 45-55 34-44 23-33 12-22 1-11 0       G codes: In compliance with CMSs Claims Based Outcome Reporting, the following G-code set was chosen for this patient based on their primary functional limitation being treated: The outcome measure chosen to determine the severity of the functional limitation was the [place] with a score of 21/56 which was correlated with the impairment scale.     ? Mobility - Walking and Moving Around:     - CURRENT STATUS: CL - 60%-79% impaired, limited or restricted    - GOAL STATUS: CK - 40%-59% impaired, limited or restricted    - D/C STATUS:  ---------------To be determined---------------     Physical Therapy Evaluation Charge Determination   History Examination Presentation Decision-Making   HIGH Complexity :3+ comorbidities / personal factors will impact the outcome/ POC  MEDIUM Complexity : 3 Standardized tests and measures addressing body structure, function, activity limitation and / or participation in recreation  LOW Complexity : Stable, uncomplicated  MEDIUM Complexity : FOTO score of 26-74      Based on the above components, the patient evaluation is determined to be of the following complexity level: LOW       Pain:  Pain Scale 1: Numeric (0 - 10)  Pain Intensity 1: 7  Pain Location 1: Hip  Pain Orientation 1: Left  Pain Description 1: Aching  Pain Intervention(s) 1: Medication (see MAR)  Activity Tolerance:   Patient Vitals for the past 12 hrs:   Temp Pulse Resp BP SpO2   12/21/18 1500 98.6 °F (37 °C) 82 13 128/56 98 %   12/21/18 1217 -- 95 18 162/68 95 %   12/21/18 1135 98.4 °F (36.9 °C) 85 13 166/68 97 %   12/21/18 0905 -- 96 -- 145/54 --   12/21/18 0800 -- -- -- -- 97 %   12/21/18 0700 98.2 °F (36.8 °C) 91 16 172/73 97 %       Please refer to the flowsheet for vital signs taken during this treatment. After treatment:   [x]     Patient left in no apparent distress sitting up in chair  []     Patient left in no apparent distress in bed  [x]     Call bell left within reach  [x]     Nursing notified  [x]     Caregiver present  []     Bed alarm activated    COMMUNICATION/EDUCATION:   The patients plan of care was discussed with: Occupational Therapist and Registered Nurse. Patient and/or family was verbally educated on the BE FAST acronym for signs/symptoms of CVA and TIA. BE FAST was written on patient's communication board  for visual education and reinforcement. All questions answered with patient indicating good understanding. [x]  Fall prevention education was provided and the patient/caregiver indicated understanding. [x]  Patient/family have participated as able in goal setting and plan of care. [x]  Patient/family agree to work toward stated goals and plan of care. []  Patient understands intent and goals of therapy, but is neutral about his/her participation. []  Patient is unable to participate in goal setting and plan of care.     Thank you for this referral.  Elder Rosales, PT , DPT   Time Calculation: 22 mins

## 2018-12-21 NOTE — PROGRESS NOTES
Problem: Pressure Injury - Risk of  Goal: *Prevention of pressure injury  Document Guillermo Scale and appropriate interventions in the flowsheet. Outcome: Progressing Towards Goal  Pressure Injury Interventions:             Activity Interventions: Increase time out of bed, PT/OT evaluation, Pressure redistribution bed/mattress(bed type)    Mobility Interventions: HOB 30 degrees or less, Pressure redistribution bed/mattress (bed type), PT/OT evaluation    Nutrition Interventions: Document food/fluid/supplement intake

## 2018-12-21 NOTE — PROGRESS NOTES
TRANSFER - IN REPORT:    Verbal report received from [address] RN(name) on [address]  being received from ED (unit) for routine progression of care      Report consisted of patients Situation, Background, Assessment and   Recommendations(SBAR). Information from the following report(s) SBAR, MAR, Recent Results and Cardiac Rhythm NSR was reviewed with the receiving nurse. Opportunity for questions and clarification was provided. Assessment completed upon patients arrival to unit and care assumed.

## 2018-12-21 NOTE — CONSULTS
[address]    Araceli Park  MR#: [id number]  : 1965  ACCOUNT #: [de-identified]   DATE OF SERVICE: 2018    REQUESTING PHYSICIAN:  Tam Noel MD    REASON FOR EVALUATION:  Tongue numbness and right-sided weakness. HISTORY OF PRESENT ILLNESS:  Patient is a 70-year-old female with a history of diabetes, diabetic peripheral neuropathy, and hypertension, who tells me that back in September, she was admitted with weakness of all her extremities and was diagnosed with a spinal cord stroke. She was in an extensive rehab and recovered well, but still has some residual left-sided weakness in the arm and leg. Yesterday, she woke up and felt numb in her tongue. When she got out of bed, she felt dizzy. She has felt dizzy off and on, especially when she stands up or gets out of the bed first thing in the morning. Later in the day, she noticed that her right leg was feeling weaker and numb, and it extended all the way up to her arm. She came in to the hospital and was admitted for further workup. Overall, she is slightly better, but her right leg still feels weak and her tongue is still numb. Her workup so far has been negative. Denies any associated headache, changes in vision, difficulty with swallowing, chest pain, shortness of breath, palpitations, nausea, or vomiting. PAST MEDICAL HISTORY:  As mentioned above. HOME MEDICATIONS:  Excedrin as needed, gabapentin, tramadol, Zofran, lisinopril, glipizide, and Desyrel. ALLERGIES:  ASPIRIN AND CIPROFLOXACIN. REVIEW OF SYSTEMS:  A 10-point review of systems was negative except as mentioned in the HPI. PHYSICAL EXAMINATION:  GENERAL:  Patient is alert, fully oriented. VITAL SIGNS:  Blood pressure 162/68, temperature 98.4, pulse is 95. NEUROLOGIC:  Speech is clear. Comprehension is normal.  Pupils are equal, round, and reactive. Extraocular movements are full. Face is symmetric.   Tongue is midline. Hearing is baseline. Muscle tone and bulk normal.  Strength normal in both upper extremities proximally and distally. She is able to raise her left leg against gravity without difficulty, but the right leg falls to the bed before 5 seconds. Deep tendon reflexes 2/2 and symmetric. Ankle jerks difficult to elicit. Toes downgoing. Vibration sense impaired at the ankles. Gait exam was deferred. HEART:  Regular rate and rhythm. CHEST:  Clear. ABDOMEN:  Soft, nontender, positive bowel sounds. EXTREMITIES:  No edema. LABORATORY DATA:  CBC is unremarkable. Urinalysis is negative for infection. Chemistries:  Sodium 135, potassium 3.3, BUN 14, creatinine 0.78. Lipid profile:  Triglycerides 132, HDL 43, . 6. Hemoglobin A1c is 6.2. C-reactive protein less than 0.29. CTA of the head and neck did not show any significant stenosis. MRI of the brain showed an old lacunar infarct in the right basal ganglia/periventricular white matter. No new or acute infarction. ASSESSMENT AND PLAN:  A 71-year-old female with a history of diabetes, hypertension, and previous stroke who was admitted with symptoms of tongue numbness and right-sided lower extremity weakness. Her MRI was negative for stroke. However, she still has symptoms. It is possible that she had an occult non-neurological stroke in the brainstem. Etiology would be most likely small vessel atherosclerosis. Agree with adding aspirin to Plavix and optimizing the statin dose as her LDL is well above 100. Continue with risk factor control. PT and OT evaluation, and we can initiate discharge planning. Please feel free to call if there are any further questions. Thank you for this consultation.       MD MICHELINE Roman / ANNA  D: 12/21/2018 13:47     T: 12/21/2018 14:05  JOB #: [id number]

## 2018-12-21 NOTE — PROGRESS NOTES
Reason for Admission:   TIA                RRAT Score:     23             Resources/supports as identified by patient/family: This pt's support is her sister. Top Challenges facing patient (as identified by patient/family and CM): Finances/Medication cost?     Yes. Pt is self pay. Transportation? No. Pt's sister transports her. Living arrangements? Pt lives with her sister, June. Self-care/ADLs/Cognition? Per pt, she is independent with ADL's. Current Advanced Directive/Advance Care Plan:  Not on file. Plan for utilizing home health:  TBD. Likelihood of readmission: high                 Transition of Care Plan:    CM met with pt to introduce her to the role of CM and transition of care. She verbalized understanding. This pt lives at home with her sister and pt's son. Per pt, she will return there at d/c. This pt does have a PCP, but currently no insurance. Per pt, she is eligible for Medicaid on January 1, 2019 with the medicaid expansion. She does have a [address] card and a [address]. Pt has [facility] to [facility], which [facility] paid for, and she has had home health , also paid for by [facility]. This pt will be seen by therapy today, so CM will await recommendations. [address] Management Interventions  PCP Verified by CM:  Yes  Palliative Care Criteria Met (RRAT>21 & CHF Dx)?: No  Transition of Care Consult (CM Consult): Discharge Planning  MyChart Signup: No  Discharge Durable Medical Equipment: No  Physical Therapy Consult: Yes  Occupational Therapy Consult: Yes  Speech Therapy Consult: No  Current Support Network: Relative's Home(This pt lives with her sister, June.)  Confirm Follow Up Transport: Family  Plan discussed with Pt/Family/Caregiver: Yes  Freedom of Choice Offered: Yes  [employer] Information Provided?: No

## 2018-12-21 NOTE — PROGRESS NOTES
Bedside shift change report given to [address] (oncoming nurse) by Roxann Ortiz RN (offgoing nurse).  Report included the following information SBAR, MAR, Recent Results and Cardiac Rhythm SR.

## 2018-12-21 NOTE — PROGRESS NOTES
Problem: TIA/CVA Stroke: 0-24 hours  Goal: *Stroke education started(Stroke Metric)  Outcome: Progressing Towards Goal  Pt given stroke education had book

## 2018-12-21 NOTE — ED NOTES
Pt assisted to bedside commode. Unsteady gait noted. Pt returned safely to bed and placed on monitor x4. Will continue to monitor.

## 2018-12-22 ENCOUNTER — HOME HEALTH ADMISSION (OUTPATIENT)
Dept: HOME HEALTH SERVICES | Facility: HOME HEALTH | Age: 53
End: 2018-12-22

## 2018-12-22 VITALS
SYSTOLIC BLOOD PRESSURE: 134 MMHG | HEIGHT: 65 IN | HEART RATE: 86 BPM | OXYGEN SATURATION: 98 % | TEMPERATURE: 98.4 F | BODY MASS INDEX: 29.24 KG/M2 | RESPIRATION RATE: 11 BRPM | WEIGHT: 175.49 LBS | DIASTOLIC BLOOD PRESSURE: 53 MMHG

## 2018-12-22 LAB
BACTERIA SPEC CULT: NORMAL
BACTERIA SPEC CULT: NORMAL
GLUCOSE BLD STRIP.AUTO-MCNC: 134 MG/DL (ref 65–100)
GLUCOSE BLD STRIP.AUTO-MCNC: 193 MG/DL (ref 65–100)
SERVICE CMNT-IMP: ABNORMAL
SERVICE CMNT-IMP: ABNORMAL
SERVICE CMNT-IMP: NORMAL

## 2018-12-22 PROCEDURE — 82962 GLUCOSE BLOOD TEST: CPT

## 2018-12-22 PROCEDURE — 74011636637 HC RX REV CODE- 636/637: Performed by: FAMILY MEDICINE

## 2018-12-22 PROCEDURE — 99218 HC RM OBSERVATION: CPT

## 2018-12-22 PROCEDURE — 74011250637 HC RX REV CODE- 250/637: Performed by: INTERNAL MEDICINE

## 2018-12-22 PROCEDURE — 74011250637 HC RX REV CODE- 250/637: Performed by: FAMILY MEDICINE

## 2018-12-22 RX ORDER — TRAMADOL HYDROCHLORIDE 50 MG/1
50 TABLET ORAL
Qty: 5 TAB | Refills: 0 | OUTPATIENT
Start: 2018-12-22 | End: 2020-08-03

## 2018-12-22 RX ORDER — CLOPIDOGREL BISULFATE 75 MG/1
75 TABLET ORAL DAILY
Qty: 30 TAB | Refills: 0 | Status: SHIPPED | OUTPATIENT
Start: 2018-12-23

## 2018-12-22 RX ORDER — ATORVASTATIN CALCIUM 80 MG/1
80 TABLET, FILM COATED ORAL
Qty: 30 TAB | Refills: 0 | Status: SHIPPED | OUTPATIENT
Start: 2018-12-22

## 2018-12-22 RX ORDER — GUAIFENESIN 100 MG/5ML
81 LIQUID (ML) ORAL DAILY
Qty: 30 TAB | Refills: 0 | Status: ON HOLD | OUTPATIENT
Start: 2018-12-23 | End: 2021-08-08

## 2018-12-22 RX ADMIN — HYDROCODONE BITARTRATE AND ACETAMINOPHEN 1 TABLET: 5; 325 TABLET ORAL at 10:41

## 2018-12-22 RX ADMIN — CLOPIDOGREL BISULFATE 75 MG: 75 TABLET ORAL at 08:39

## 2018-12-22 RX ADMIN — ASPIRIN 81 MG CHEWABLE TABLET 81 MG: 81 TABLET CHEWABLE at 08:39

## 2018-12-22 RX ADMIN — HYDROCODONE BITARTRATE AND ACETAMINOPHEN 1 TABLET: 5; 325 TABLET ORAL at 05:11

## 2018-12-22 RX ADMIN — GABAPENTIN 900 MG: 300 CAPSULE ORAL at 08:39

## 2018-12-22 RX ADMIN — INSULIN LISPRO 2 UNITS: 100 INJECTION, SOLUTION INTRAVENOUS; SUBCUTANEOUS at 08:39

## 2018-12-22 RX ADMIN — FAMOTIDINE 20 MG: 20 TABLET ORAL at 08:39

## 2018-12-22 RX ADMIN — LISINOPRIL 10 MG: 10 TABLET ORAL at 08:39

## 2018-12-22 RX ADMIN — Medication 10 ML: at 06:58

## 2018-12-22 NOTE — DISCHARGE SUMMARY
Discharge Summary       PATIENT ID: Samuel Christy  MRN: [id number]   YOB: 1965    DATE OF ADMISSION: 12/20/2018  3:52 PM    DATE OF DISCHARGE: 12/22/2018  PRIMARY CARE PROVIDER: Genesis Benton MD     ATTENDING PHYSICIAN: Joanie Guzman DO   DISCHARGING PROVIDER: Joanie Guzman DO    To contact this individual call [phone] and ask the  to page. If unavailable ask to be transferred the Adult Hospitalist Department. CONSULTATIONS: IP CONSULT TO HOSPITALIST  IP CONSULT TO NEUROLOGY    PROCEDURES/SURGERIES: * No surgery found *    ADMITTING DIAGNOSES & HOSPITAL COURSE:     48year old female with past medical history hypertension, diabetes, presenting with left arm and facial numbness. MRI completed and negative. Neurology consulted. Recommended initiating aspirin and statin, in addition to home plavix. Patient stable for discharge.  Educated on new medications and prevention of further strokes.            DISCHARGE DIAGNOSES / PLAN:      Transient ischemic attack:  -presented with left arm and facial numbness, persisting  -MRI negative  -per neurology, start aspirin and plavix  -high intensity statin  -PT/OT consult  -echo 50-55%     Hypertension: continue to monitor, on lisinopril   Diabetes mellitus: home medications  Left hip pain: negative, follow up outpatient        PENDING TEST RESULTS:   At the time of discharge the following test results are still pending: none    FOLLOW UP APPOINTMENTS:    Follow-up Information     Follow up With Specialties Details Why Contact Info    Genesis Benton MD [facility]   [address]  [address]  [facility] [address]  [phone]             ADDITIONAL CARE RECOMMENDATIONS:   -Start aspirin and atorvastatin  -Continue plavix  -Resume other home medications  -Outpatient work-up for left hip pain  -Follow up with PCP      DIET: Diabetic Diet    ACTIVITY: Activity as tolerated    WOUND CARE: none    EQUIPMENT needed: none      DISCHARGE MEDICATIONS:  Current Discharge Medication List      START taking these medications    Details   aspirin 81 mg chewable tablet Take 1 Tab by mouth daily. Qty: 30 Tab, Refills: 0      atorvastatin (LIPITOR) 80 mg tablet Take 1 Tab by mouth nightly. Qty: 30 Tab, Refills: 0      clopidogrel (PLAVIX) 75 mg tab Take 1 Tab by mouth daily. Qty: 30 Tab, Refills: 0      !! traMADol (ULTRAM) 50 mg tablet Take 1 Tab by mouth every eight (8) hours as needed for Pain. Max Daily Amount: 150 mg.  Qty: 5 Tab, Refills: 0    Associated Diagnoses: DDD (degenerative disc disease), lumbar       !! - Potential duplicate medications found. Please discuss with provider. CONTINUE these medications which have NOT CHANGED    Details   aspirin-acetaminophen-caffeine (EXCEDRIN ES) 250-250-65 mg per tablet Take 2 Tabs by mouth every six (6) hours as needed for Headache.      gabapentin (NEURONTIN) 300 mg capsule Take 900 mg by mouth three (3) times daily. !! traMADol (ULTRAM) 50 mg tablet Take 50 mg by mouth every eight (8) hours as needed for Pain. acetaminophen (TYLENOL) 325 mg tablet Take 2 Tabs by mouth every four (4) hours as needed for Pain. Qty: 20 Tab, Refills: 0      ondansetron (ZOFRAN ODT) 4 mg disintegrating tablet Take 1 Tab by mouth every eight (8) hours as needed for Nausea. Qty: 16 Tab, Refills: 0      insulin lispro (HUMALOG) 100 unit/mL injection Take 32 units with meals, only if BG > 100. Indications: HYPERGLYCEMIA, TYPE 2 DIABETES MELLITUS  Qty: 3 Vial, Refills: 3    Associated Diagnoses: Type 2 diabetes mellitus with peripheral neuropathy (HCC)      lisinopril (PRINIVIL, ZESTRIL) 10 mg tablet Take 1 Tab by mouth daily. Qty: 30 Tab, Refills: 3    Associated Diagnoses: Essential hypertension; Type 2 diabetes mellitus with peripheral neuropathy (HCC)      glipiZIDE (GLUCOTROL) 5 mg tablet Take 1 Tab by mouth two (2) times a day.   Qty: 60 Tab, Refills: 3    Associated Diagnoses: Type 2 diabetes mellitus with peripheral neuropathy (HCC)      traZODone (DESYREL) 50 mg tablet Take 1 Tab by mouth nightly. Qty: 30 Tab, Refills: 3    Associated Diagnoses: Episodic tension-type headache, not intractable       !! - Potential duplicate medications found. Please discuss with provider. STOP taking these medications       HYDROcodone-acetaminophen (LORTAB 5-325) 5-325 mg per tablet Comments:   Reason for Stopping:                 NOTIFY YOUR PHYSICIAN FOR ANY OF THE FOLLOWING:   Fever over 101 degrees for 24 hours. Chest pain, shortness of breath, fever, chills, nausea, vomiting, diarrhea, change in mentation, falling, weakness, bleeding. Severe pain or pain not relieved by medications. Or, any other signs or symptoms that you may have questions about. DISPOSITION:  x  Home With:   OT  PT  HH  RN       Long term SNF/Inpatient Rehab    Independent/assisted living    Hospice    Other:       PATIENT CONDITION AT DISCHARGE:     Functional status    Poor    x Deconditioned     Independent      Cognition   x  Lucid     Forgetful     Dementia      Catheters/lines (plus indication)    Mena     PICC     PEG    x None      Code status    x Full code     DNR      PHYSICAL EXAMINATION AT DISCHARGE:  Constitutional:  No acute distress, cooperative, pleasant    ENT:  Oral mucous moist, oropharynx benign. Neck supple,    Resp:  CTA bilaterally. No wheezing/rhonchi/rales. No accessory muscle use   CV:  Regular rhythm, normal rate, no murmurs, gallops, rubs    GI:  Soft, non distended, non tender. normoactive bowel sounds, no hepatosplenomegaly     Musculoskeletal:  No edema, warm, 2+ pulses throughout    Neurologic:  Moves all extremities.   subjective left sided numbness of face and arm                CHRONIC MEDICAL DIAGNOSES:  Problem List as of 12/22/2018 Date Reviewed: 12/20/2018          Codes Class Noted - Resolved    * (Principal) TIA (transient ischemic attack) ICD-10-CM: G45.9  ICD-9-CM: 435.9 12/20/2018 - Present        Vitamin D deficiency ICD-10-CM: E55.9  ICD-9-CM: 268.9  9/8/2015 - Present        Atopic rhinitis ICD-10-CM: J30.9  ICD-9-CM: 477.9  9/2/2015 - Present        Disorder of liver ICD-10-CM: K76.9  ICD-9-CM: 573.9  9/2/2015 - Present        Lymphoma ([facility] ICD-10-CM: C85.90  ICD-9-CM: 202.80  9/2/2015 - Present        Lymphadenopathy ICD-10-CM: R59.1  ICD-9-CM: 785.6  9/2/2015 - Present        Morbid obesity ([facility] ICD-10-CM: E66.01  ICD-9-CM: 278.01  9/2/2015 - Present        Hypercholesteremia ICD-10-CM: E78.00  ICD-9-CM: 272.0  6/25/2015 - Present        Episodic tension-type headache, not intractable ICD-10-CM: T87.882  ICD-9-CM: 339.11  6/25/2015 - Present        Type 2 diabetes mellitus with peripheral neuropathy ([facility] ICD-10-CM: E11.42  ICD-9-CM: 250.60, 357.2  6/25/2015 - Present        Essential hypertension ICD-10-CM: I10  ICD-9-CM: 401.9  6/25/2015 - Present        DDD (degenerative disc disease), lumbar ICD-10-CM: M51.36  ICD-9-CM: 722.52  6/25/2015 - Present        Cervical spondylosis ICD-10-CM: G48.383  ICD-9-CM: 721.0  6/25/2015 - Present        Morbid obesity with BMI of 45.0-49.9, adult [facility]) ICD-10-CM: E66.01, Z68.42  ICD-9-CM: 278.01, V85.42  6/25/2015 - Present              Greater than 30 minutes were spent with the patient on counseling and coordination of care    Signed:   [address] [facility], DO  12/22/2018  1:10 PM

## 2018-12-22 NOTE — DISCHARGE INSTRUCTIONS
Discharge Instructions       PATIENT ID: Rhona Domingo  MRN: [id number]   YOB: 1965    DATE OF ADMISSION: 12/20/2018  3:52 PM    DATE OF DISCHARGE: 12/22/2018    PRIMARY CARE PROVIDER: Evelyn Denton MD     ATTENDING PHYSICIAN: Too Ochoa DO  DISCHARGING PROVIDER: Jono Barrett DO    To contact this individual call [phone] and ask the  to page. If unavailable ask to be transferred the Adult Hospitalist Department. DISCHARGE DIAGNOSES     Transient Ischemic Attack (TIA)    CONSULTATIONS: IP CONSULT TO HOSPITALIST  IP CONSULT TO NEUROLOGY    PROCEDURES/SURGERIES: * No surgery found *    PENDING TEST RESULTS:   At the time of discharge the following test results are still pending: none    FOLLOW UP APPOINTMENTS:   Follow-up Information     Follow up With Specialties Details Why Contact Info    [facility] Denton MD [facility] [address] [place] [address]  [phone]             ADDITIONAL CARE RECOMMENDATIONS:   -Start aspirin and atorvastatin  -Continue plavix  -Resume other home medications  -Outpatient work-up for left hip pain  -Follow up with PCP    DIET: Diabetic Diet    ACTIVITY: Activity as tolerated    WOUND CARE: none    EQUIPMENT needed: none      DISCHARGE MEDICATIONS:   See Medication Reconciliation Form    · It is important that you take the medication exactly as they are prescribed. · Keep your medication in the bottles provided by the pharmacist and keep a list of the medication names, dosages, and times to be taken in your wallet. · Do not take other medications without consulting your doctor. NOTIFY YOUR PHYSICIAN FOR ANY OF THE FOLLOWING:   Fever over 101 degrees for 24 hours. Chest pain, shortness of breath, fever, chills, nausea, vomiting, diarrhea, change in mentation, falling, weakness, bleeding. Severe pain or pain not relieved by medications.   Or, any other signs or symptoms that you may have questions about.      DISPOSITION:   x Home With:   OT  PT  [place]  RN       SNF/Inpatient Rehab/LTAC    Independent/assisted living    Hospice    Other:     CDMP Checked:   Yes x     PROBLEM LIST Updated:  Yes x       Signed:   Lieutenant Lorne DO  12/22/2018  1:13 PM

## 2018-12-22 NOTE — PROGRESS NOTES
Stroke Education documented in Patient Education: YES  Core Measures Documented in [facility]:  Risk Factors: YES  Warning signs of stroke: YES  When to Activate 911: YES  Medication Education for Risk Factors: YES  Smoking cessation if applicable: YES  Written Education Given:  YES    Discharge NIH Completed: YES  Score: 1    BRAINS: YES    Follow Up Appointment Made: YES  Date/Time if applicable: Follow up w [facility] PCP    Bedside RN performed patient education and medication education. Discharge concerns initiated and discussed with patient, including clarification on \"who\" assists the patient at their home and instructions for when the home going patient should call their provider after discharge. Opportunity for questions and clarification was provided. Patient receptive to education: YES  Patient stated: verbal w teachback  Barriers to Education: n/a  Diagnosis Education given:  YES    Length of stay: 0  Expected Day of Discharge: 0  Ask if they have \"Help at Home\" & add to white board? YES    Education Day #: 0    Medication Education Given:  YES  M in the box Medication name: aspirin, plavix.  lipitor    Pt aware of HCAHPS survey: YES

## 2018-12-22 NOTE — PROGRESS NOTES
Writer spoke with Rosi from [facility] this morning who advised that they would not be able to accept patient at this time. Looks like if this were to be the case, patient would follow-up with her PCP to ask that they order outpatient therapy through [facility]. Writer met with patient to discuss the above and this is her understanding as well. Patient denies barriers to care or needs at this time. Her sister will pick her up for discharge. Writer communicated with the bedside the nurse plan above.  Marquis Chua, [facility]

## 2020-08-03 ENCOUNTER — HOSPITAL ENCOUNTER (EMERGENCY)
Age: 55
Discharge: HOME | End: 2020-08-03
Attending: EMERGENCY MEDICINE
Payer: MEDICAID

## 2020-08-03 VITALS
OXYGEN SATURATION: 99 % | TEMPERATURE: 98.7 F | DIASTOLIC BLOOD PRESSURE: 79 MMHG | WEIGHT: 222 LBS | RESPIRATION RATE: 16 BRPM | HEIGHT: 65 IN | SYSTOLIC BLOOD PRESSURE: 146 MMHG | BODY MASS INDEX: 36.99 KG/M2 | HEART RATE: 97 BPM

## 2020-08-03 DIAGNOSIS — L03.116 CELLULITIS OF LEFT LOWER EXTREMITY: Primary | ICD-10-CM

## 2020-08-03 LAB
ALBUMIN SERPL-MCNC: 2.3 G/DL (ref 3.5–5)
ALBUMIN/GLOB SERPL: 0.5 {RATIO} (ref 1.1–2.2)
ALP SERPL-CCNC: 125 U/L (ref 45–117)
ALT SERPL-CCNC: 15 U/L (ref 12–78)
ANION GAP SERPL CALC-SCNC: 5 MMOL/L (ref 5–15)
AST SERPL-CCNC: 8 U/L (ref 15–37)
BASOPHILS # BLD: 0 K/UL (ref 0–0.1)
BASOPHILS NFR BLD: 0 % (ref 0–1)
BILIRUB SERPL-MCNC: 0.2 MG/DL (ref 0.2–1)
BUN SERPL-MCNC: 17 MG/DL (ref 6–20)
BUN/CREAT SERPL: 12 (ref 12–20)
CALCIUM SERPL-MCNC: 8.6 MG/DL (ref 8.5–10.1)
CHLORIDE SERPL-SCNC: 102 MMOL/L (ref 97–108)
CO2 SERPL-SCNC: 27 MMOL/L (ref 21–32)
CREAT SERPL-MCNC: 1.37 MG/DL (ref 0.55–1.02)
DIFFERENTIAL METHOD BLD: ABNORMAL
EOSINOPHIL # BLD: 0.2 K/UL (ref 0–0.4)
EOSINOPHIL NFR BLD: 3 % (ref 0–7)
ERYTHROCYTE [DISTWIDTH] IN BLOOD BY AUTOMATED COUNT: 14.2 % (ref 11.5–14.5)
GLOBULIN SER CALC-MCNC: 4.5 G/DL (ref 2–4)
GLUCOSE SERPL-MCNC: 274 MG/DL (ref 65–100)
HCT VFR BLD AUTO: 28.9 % (ref 35–47)
HGB BLD-MCNC: 9.9 G/DL (ref 11.5–16)
IMM GRANULOCYTES # BLD AUTO: 0.1 K/UL (ref 0–0.04)
IMM GRANULOCYTES NFR BLD AUTO: 1 % (ref 0–0.5)
LYMPHOCYTES # BLD: 1.3 K/UL (ref 0.8–3.5)
LYMPHOCYTES NFR BLD: 19 % (ref 12–49)
MCH RBC QN AUTO: 29.2 PG (ref 26–34)
MCHC RBC AUTO-ENTMCNC: 34.3 G/DL (ref 30–36.5)
MCV RBC AUTO: 85.3 FL (ref 80–99)
MONOCYTES # BLD: 0.6 K/UL (ref 0–1)
MONOCYTES NFR BLD: 8 % (ref 5–13)
NEUTS SEG # BLD: 4.8 K/UL (ref 1.8–8)
NEUTS SEG NFR BLD: 69 % (ref 32–75)
NRBC # BLD: 0 K/UL (ref 0–0.01)
NRBC BLD-RTO: 0 PER 100 WBC
PLATELET # BLD AUTO: 230 K/UL (ref 150–400)
PMV BLD AUTO: 9 FL (ref 8.9–12.9)
POTASSIUM SERPL-SCNC: 3.7 MMOL/L (ref 3.5–5.1)
PROT SERPL-MCNC: 6.8 G/DL (ref 6.4–8.2)
RBC # BLD AUTO: 3.39 M/UL (ref 3.8–5.2)
SODIUM SERPL-SCNC: 134 MMOL/L (ref 136–145)
WBC # BLD AUTO: 7 K/UL (ref 3.6–11)

## 2020-08-03 PROCEDURE — 74011250637 HC RX REV CODE- 250/637: Performed by: EMERGENCY MEDICINE

## 2020-08-03 PROCEDURE — 99283 EMERGENCY DEPT VISIT LOW MDM: CPT

## 2020-08-03 PROCEDURE — 36415 COLL VENOUS BLD VENIPUNCTURE: CPT

## 2020-08-03 PROCEDURE — 85025 COMPLETE CBC W/AUTO DIFF WBC: CPT

## 2020-08-03 PROCEDURE — 80053 COMPREHEN METABOLIC PANEL: CPT

## 2020-08-03 RX ORDER — DOXYCYCLINE HYCLATE 100 MG
100 TABLET ORAL
Status: COMPLETED | OUTPATIENT
Start: 2020-08-03 | End: 2020-08-03

## 2020-08-03 RX ORDER — HYDROCODONE BITARTRATE AND ACETAMINOPHEN 5; 325 MG/1; MG/1
1 TABLET ORAL
Qty: 8 TAB | Refills: 0 | Status: SHIPPED | OUTPATIENT
Start: 2020-08-03 | End: 2020-08-06

## 2020-08-03 RX ORDER — OXYCODONE AND ACETAMINOPHEN 5; 325 MG/1; MG/1
1 TABLET ORAL
Status: COMPLETED | OUTPATIENT
Start: 2020-08-03 | End: 2020-08-03

## 2020-08-03 RX ORDER — DOXYCYCLINE HYCLATE 100 MG
100 TABLET ORAL 2 TIMES DAILY
Qty: 20 TAB | Refills: 0 | Status: SHIPPED | OUTPATIENT
Start: 2020-08-03 | End: 2020-08-13

## 2020-08-03 RX ORDER — DOXYCYCLINE HYCLATE 100 MG
100 TABLET ORAL
Status: DISCONTINUED | OUTPATIENT
Start: 2020-08-03 | End: 2020-08-04 | Stop reason: HOSPADM

## 2020-08-03 RX ADMIN — OXYCODONE HYDROCHLORIDE AND ACETAMINOPHEN 1 TABLET: 5; 325 TABLET ORAL at 18:07

## 2020-08-03 RX ADMIN — DOXYCYCLINE HYCLATE 100 MG: 100 TABLET, COATED ORAL at 18:15

## 2020-08-03 NOTE — DISCHARGE INSTRUCTIONS

## 2020-08-03 NOTE — ED TRIAGE NOTES
Wound to top of left foot for longer than 1 week but son noticed redness Saturday night and her sister noted it recently and told her to come to the ER. Redness around wound left foot and up left lower leg. Increased edema noted to left lower leg.  History of diabetes

## 2020-08-03 NOTE — ED PROVIDER NOTES
EMERGENCY DEPARTMENT HISTORY AND PHYSICAL EXAM      Date: 8/3/2020  Patient Name: Bryanna Bhatti    Please note that this dictation was completed with [employer], the computer voice recognition software. Quite often unanticipated grammatical, syntax, homophones, and other interpretive errors are inadvertently transcribed by the computer software. Please disregard these errors. Please excuse any errors that have escaped final proofreading. History of Presenting Illness     Chief Complaint   Patient presents with    Foot Swelling    Wound Check       History Provided By: Patient     HPI: Bryanna Bhatti, 47 y.o. female, presenting the emergency department complaining of pain and swelling and redness to the left foot and left leg. Symptoms started about a week ago, have gotten progressively worse. Rates the pain is severe. Redness initially was just around a scab on the proximal foot and is now spread up the leg. No distal numbness or tingling. No fevers or chills. No nausea vomiting or diarrhea. Has not taken anything for the pain. Nothing makes the pain better, nothing makes it worse. Does report that the leg appears swollen. No chest pain or shortness of breath. PCP: Cisco Ngo MD    No current facility-administered medications on file prior to encounter. Current Outpatient Medications on File Prior to Encounter   Medication Sig Dispense Refill    aspirin 81 mg chewable tablet Take 1 Tab by mouth daily. 30 Tab 0    atorvastatin (LIPITOR) 80 mg tablet Take 1 Tab by mouth nightly. 30 Tab 0    clopidogrel (PLAVIX) 75 mg tab Take 1 Tab by mouth daily. 30 Tab 0    [DISCONTINUED] traMADol (ULTRAM) 50 mg tablet Take 1 Tab by mouth every eight (8) hours as needed for Pain. Max Daily Amount: 150 mg. 5 Tab 0    aspirin-acetaminophen-caffeine (EXCEDRIN ES) 250-250-65 mg per tablet Take 2 Tabs by mouth every six (6) hours as needed for Headache.       gabapentin (NEURONTIN) 300 mg capsule Take 900 mg by mouth three (3) times daily.  [DISCONTINUED] traMADol (ULTRAM) 50 mg tablet Take 50 mg by mouth every eight (8) hours as needed for Pain.  acetaminophen (TYLENOL) 325 mg tablet Take 2 Tabs by mouth every four (4) hours as needed for Pain. 20 Tab 0    ondansetron (ZOFRAN ODT) 4 mg disintegrating tablet Take 1 Tab by mouth every eight (8) hours as needed for Nausea. 16 Tab 0    insulin lispro (HUMALOG) 100 unit/mL injection Take 32 units with meals, only if BG > 100. Indications: HYPERGLYCEMIA, TYPE 2 DIABETES MELLITUS 3 Vial 3    lisinopril (PRINIVIL, ZESTRIL) 10 mg tablet Take 1 Tab by mouth daily. 30 Tab 3    glipiZIDE (GLUCOTROL) 5 mg tablet Take 1 Tab by mouth two (2) times a day. 60 Tab 3    traZODone (DESYREL) 50 mg tablet Take 1 Tab by mouth nightly. 30 Tab 3       Past History     Past Medical History:  Past Medical History:   Diagnosis Date    Arthritis     Asthma     Chronic back pain     Diabetes [facility] [address] [facility]     Diabetic retinopathy ([facility] [facility]     Hypertension     MRSA (methicillin resistant staph aureus) culture positive     labial abscess    Neuropathy        Past Surgical History:  Past Surgical History:   Procedure Laterality Date    HX HEENT      tonsillectomy    HX ORTHOPAEDIC      left ft bunionectomy    HX OTHER SURGICAL      lanced labial abscess       Family History:  No family history on file. Social History:  Social History     Tobacco Use    Smoking status: Never Smoker    Smokeless tobacco: Never Used   Substance Use Topics    Alcohol use: No    Drug use: No       Allergies: Allergies   Allergen Reactions    Aspirin Unknown (comments)     Patient states naproxen ok    Ciprofloxacin Hives         Review of Systems   Review of Systems   Constitutional: Negative for chills and fever. HENT: Negative for congestion and sore throat. Eyes: Negative for visual disturbance. Respiratory: Negative for cough and shortness of breath.     Cardiovascular: Positive for leg swelling (left>right). Negative for chest pain. Gastrointestinal: Negative for abdominal pain, blood in stool, diarrhea and nausea. Endocrine: Negative for polyuria. Genitourinary: Negative for dysuria, flank pain, vaginal bleeding and vaginal discharge. Musculoskeletal: Negative for myalgias. Skin: Positive for rash and wound. Allergic/Immunologic: Negative for immunocompromised state. Neurological: Negative for weakness and headaches. Psychiatric/Behavioral: Negative for confusion. Physical Exam   Physical Exam  Vitals signs and nursing note reviewed. Constitutional:       Appearance: She is well-developed. HENT:      Head: Normocephalic and atraumatic. Eyes:      General:         Right eye: No discharge. Left eye: No discharge. Conjunctiva/sclera: Conjunctivae normal.      Pupils: Pupils are equal, round, and reactive to light. Neck:      Musculoskeletal: Normal range of motion and neck supple. Trachea: No tracheal deviation. Cardiovascular:      Rate and Rhythm: Normal rate and regular rhythm. Heart sounds: Normal heart sounds. No murmur. Pulmonary:      Effort: Pulmonary effort is normal. No respiratory distress. Breath sounds: Normal breath sounds. No wheezing or rales. Abdominal:      General: Bowel sounds are normal.      Palpations: Abdomen is soft. Tenderness: There is no abdominal tenderness. There is no guarding or rebound. Musculoskeletal: Normal range of motion. General: No tenderness or deformity. Right lower leg: Edema present. Left lower leg: Edema present. Skin:     General: Skin is warm and dry. Findings: Erythema and rash present. Comments: Erythema from the left midfoot, extending up to the left leg to mid calf.  No evidence of abscess, there is an eschar at the ankle, but there is no fluctuance, induration or crepitus   Neurological:      Mental Status: She is alert and oriented to person, place, and time. Psychiatric:         Behavior: Behavior normal.         Diagnostic Study Results     Labs -     Recent Results (from the past 12 hour(s))   CBC WITH AUTOMATED DIFF    Collection Time: 08/03/20  4:45 PM   Result Value Ref Range    WBC 7.0 3.6 - 11.0 K/uL    RBC 3.39 (L) 3.80 - 5.20 M/uL    HGB 9.9 (L) 11.5 - 16.0 g/dL    HCT 28.9 (L) 35.0 - 47.0 %    MCV 85.3 80.0 - 99.0 FL    MCH 29.2 26.0 - 34.0 PG    MCHC 34.3 30.0 - 36.5 g/dL    RDW 14.2 11.5 - 14.5 %    PLATELET 684 567 - 061 K/uL    MPV 9.0 8.9 - 12.9 FL    NRBC 0.0 0  WBC    ABSOLUTE NRBC 0.00 0.00 - 0.01 K/uL    NEUTROPHILS 69 32 - 75 %    LYMPHOCYTES 19 12 - 49 %    MONOCYTES 8 5 - 13 %    EOSINOPHILS 3 0 - 7 %    BASOPHILS 0 0 - 1 %    IMMATURE GRANULOCYTES 1 (H) 0.0 - 0.5 %    ABS. NEUTROPHILS 4.8 1.8 - 8.0 K/UL    ABS. LYMPHOCYTES 1.3 0.8 - 3.5 K/UL    ABS. MONOCYTES 0.6 0.0 - 1.0 K/UL    ABS. EOSINOPHILS 0.2 0.0 - 0.4 K/UL    ABS. BASOPHILS 0.0 0.0 - 0.1 K/UL    ABS. IMM. GRANS. 0.1 (H) 0.00 - 0.04 K/UL    DF AUTOMATED     METABOLIC PANEL, COMPREHENSIVE    Collection Time: 08/03/20  4:45 PM   Result Value Ref Range    Sodium 134 (L) 136 - 145 mmol/L    Potassium 3.7 3.5 - 5.1 mmol/L    Chloride 102 97 - 108 mmol/L    CO2 27 21 - 32 mmol/L    Anion gap 5 5 - 15 mmol/L    Glucose 274 (H) 65 - 100 mg/dL    BUN 17 6 - 20 MG/DL    Creatinine 1.37 (H) 0.55 - 1.02 MG/DL    BUN/Creatinine ratio 12 12 - 20      GFR est AA 49 (L) >60 ml/min/1.73m2    GFR est non-AA 40 (L) >60 ml/min/1.73m2    Calcium 8.6 8.5 - 10.1 MG/DL    Bilirubin, total 0.2 0.2 - 1.0 MG/DL    ALT (SGPT) 15 12 - 78 U/L    AST (SGOT) 8 (L) 15 - 37 U/L    Alk.  phosphatase 125 (H) 45 - 117 U/L    Protein, total 6.8 6.4 - 8.2 g/dL    Albumin 2.3 (L) 3.5 - 5.0 g/dL    Globulin 4.5 (H) 2.0 - 4.0 g/dL    A-G Ratio 0.5 (L) 1.1 - 2.2         Radiologic Studies -   No orders to display     CT Results  (Last 48 hours)    None        CXR Results  (Last 48 hours)    None Medical Decision Making   I am the first provider for this patient. I reviewed the vital signs, available nursing notes, past medical history, past surgical history, family history and social history. Vital Signs-Reviewed the patient's vital signs. Patient Vitals for the past 12 hrs:   Temp Pulse Resp BP SpO2   08/03/20 1641 98.7 °F (37.1 °C) 97 16 146/79 99 %           Records Reviewed:   Nursing notes, Prior visits     Provider Notes (Medical Decision Making):   Consistent with cellulitis, patient looks well. Bedside ultrasound shows no evidence of abscess. Will treat with p.o. antibiotics. Close return precautions discussed with patient. Patient understands that if the erythema continues to spread or if there is any worsening of symptoms like fever, nausea, vomiting patient needs to return to the emergency department immediately. Close follow-up with PCP discussed with patient. Patient voiced understanding. He is comfortable with plan. ED Course:   Initial assessment performed. The patients presenting problems have been discussed, and they are in agreement with the care plan formulated and outlined with them. I have encouraged them to ask questions as they arise throughout their visit. Critical Care Time:   none    Disposition:  DISCHARGE NOTE  Patients results have been reviewed with them. Patient and/or family have verbally conveyed their understanding and agreement of the patient's signs, symptoms, diagnosis, treatment and prognosis and additionally agree to follow up as recommended or return to the Emergency Room should their condition change or have any new concerns prior to their follow-up appointment. Patient verbally agrees with the care-plan and verbally conveys that all of their questions have been answered.    Discharge instructions have also been provided to the patient with some educational information regarding their diagnosis as well a list of reasons why they would want to return to the ER prior to their follow-up appointment should their condition change. PLAN:  1. Current Discharge Medication List      START taking these medications    Details   HYDROcodone-acetaminophen (NORCO) 5-325 mg per tablet Take 1 Tab by mouth every six (6) hours as needed for Pain for up to 3 days. Max Daily Amount: 4 Tabs. Qty: 8 Tab, Refills: 0    Associated Diagnoses: Cellulitis of left lower extremity      doxycycline (VIBRA-TABS) 100 mg tablet Take 1 Tab by mouth two (2) times a day for 10 days. Qty: 20 Tab, Refills: 0         STOP taking these medications       traMADol (ULTRAM) 50 mg tablet Comments:   Reason for Stopping:         traMADol (ULTRAM) 50 mg tablet Comments:   Reason for Stoppin.   Follow-up Information     Follow up With Specialties Details Why Contact Info    Radha Gonsales MD Family Medicine Schedule an appointment as soon as possible for a visit  [address]  [phone]      [facility] EMERGENCY DEPT Emergency Medicine  If symptoms worsen [address]  [address] [facility]  [phone]          Return to ED if worse     Diagnosis     Clinical Impression:   1. Cellulitis of left lower extremity        Attestations:   This note was completed by Eamon Tanner DO

## 2020-08-16 ENCOUNTER — APPOINTMENT (OUTPATIENT)
Dept: CT IMAGING | Age: 55
End: 2020-08-16
Attending: INTERNAL MEDICINE
Payer: MEDICAID

## 2020-08-16 ENCOUNTER — APPOINTMENT (OUTPATIENT)
Dept: GENERAL RADIOLOGY | Age: 55
End: 2020-08-16
Attending: EMERGENCY MEDICINE
Payer: MEDICAID

## 2020-08-16 ENCOUNTER — HOSPITAL ENCOUNTER (OUTPATIENT)
Age: 55
Setting detail: OBSERVATION
Discharge: HOME | End: 2020-08-18
Attending: EMERGENCY MEDICINE | Admitting: INTERNAL MEDICINE
Payer: MEDICAID

## 2020-08-16 ENCOUNTER — APPOINTMENT (OUTPATIENT)
Dept: VASCULAR SURGERY | Age: 55
End: 2020-08-16
Attending: EMERGENCY MEDICINE
Payer: MEDICAID

## 2020-08-16 DIAGNOSIS — M51.36 DDD (DEGENERATIVE DISC DISEASE), LUMBAR: ICD-10-CM

## 2020-08-16 DIAGNOSIS — L03.116 CELLULITIS OF LEFT LOWER EXTREMITY: ICD-10-CM

## 2020-08-16 DIAGNOSIS — R60.1 ANASARCA: ICD-10-CM

## 2020-08-16 DIAGNOSIS — R06.09 DOE (DYSPNEA ON EXERTION): ICD-10-CM

## 2020-08-16 DIAGNOSIS — R06.02 SOB (SHORTNESS OF BREATH): ICD-10-CM

## 2020-08-16 DIAGNOSIS — I50.9 ACUTE CONGESTIVE HEART FAILURE, UNSPECIFIED HEART FAILURE TYPE (HCC): Primary | ICD-10-CM

## 2020-08-16 LAB
ALBUMIN SERPL-MCNC: 2.5 G/DL (ref 3.5–5)
ALBUMIN/GLOB SERPL: 0.5 {RATIO} (ref 1.1–2.2)
ALP SERPL-CCNC: 114 U/L (ref 45–117)
ALT SERPL-CCNC: 18 U/L (ref 12–78)
ANION GAP SERPL CALC-SCNC: 5 MMOL/L (ref 5–15)
AST SERPL-CCNC: 22 U/L (ref 15–37)
ATRIAL RATE: 83 BPM
BASOPHILS # BLD: 0 K/UL (ref 0–0.1)
BASOPHILS NFR BLD: 1 % (ref 0–1)
BILIRUB SERPL-MCNC: 0.2 MG/DL (ref 0.2–1)
BNP SERPL-MCNC: 657 PG/ML
BUN SERPL-MCNC: 22 MG/DL (ref 6–20)
BUN/CREAT SERPL: 16 (ref 12–20)
CALCIUM SERPL-MCNC: 8.9 MG/DL (ref 8.5–10.1)
CALCULATED P AXIS, ECG09: 36 DEGREES
CALCULATED R AXIS, ECG10: 22 DEGREES
CALCULATED T AXIS, ECG11: 57 DEGREES
CHLORIDE SERPL-SCNC: 108 MMOL/L (ref 97–108)
CO2 SERPL-SCNC: 25 MMOL/L (ref 21–32)
COMMENT, HOLDF: NORMAL
CREAT SERPL-MCNC: 1.35 MG/DL (ref 0.55–1.02)
DIAGNOSIS, 93000: NORMAL
DIFFERENTIAL METHOD BLD: ABNORMAL
EOSINOPHIL # BLD: 0.2 K/UL (ref 0–0.4)
EOSINOPHIL NFR BLD: 3 % (ref 0–7)
ERYTHROCYTE [DISTWIDTH] IN BLOOD BY AUTOMATED COUNT: 13.9 % (ref 11.5–14.5)
FERRITIN SERPL-MCNC: 64 NG/ML (ref 26–388)
FOLATE SERPL-MCNC: 8 NG/ML (ref 5–21)
GLOBULIN SER CALC-MCNC: 4.6 G/DL (ref 2–4)
GLUCOSE BLD STRIP.AUTO-MCNC: 153 MG/DL (ref 65–100)
GLUCOSE SERPL-MCNC: 153 MG/DL (ref 65–100)
HCT VFR BLD AUTO: 31.5 % (ref 35–47)
HGB BLD-MCNC: 10.1 G/DL (ref 11.5–16)
IMM GRANULOCYTES # BLD AUTO: 0.1 K/UL (ref 0–0.04)
IMM GRANULOCYTES NFR BLD AUTO: 2 % (ref 0–0.5)
IRON SATN MFR SERPL: 27 % (ref 20–50)
IRON SERPL-MCNC: 83 UG/DL (ref 35–150)
LYMPHOCYTES # BLD: 1.4 K/UL (ref 0.8–3.5)
LYMPHOCYTES NFR BLD: 21 % (ref 12–49)
MCH RBC QN AUTO: 28.6 PG (ref 26–34)
MCHC RBC AUTO-ENTMCNC: 32.1 G/DL (ref 30–36.5)
MCV RBC AUTO: 89.2 FL (ref 80–99)
MONOCYTES # BLD: 0.5 K/UL (ref 0–1)
MONOCYTES NFR BLD: 8 % (ref 5–13)
NEUTS SEG # BLD: 4.3 K/UL (ref 1.8–8)
NEUTS SEG NFR BLD: 65 % (ref 32–75)
NRBC # BLD: 0 K/UL (ref 0–0.01)
NRBC BLD-RTO: 0 PER 100 WBC
P-R INTERVAL, ECG05: 176 MS
PLATELET # BLD AUTO: 196 K/UL (ref 150–400)
PMV BLD AUTO: 9.6 FL (ref 8.9–12.9)
POTASSIUM SERPL-SCNC: 4.2 MMOL/L (ref 3.5–5.1)
PROT SERPL-MCNC: 7.1 G/DL (ref 6.4–8.2)
Q-T INTERVAL, ECG07: 380 MS
QRS DURATION, ECG06: 72 MS
QTC CALCULATION (BEZET), ECG08: 446 MS
RBC # BLD AUTO: 3.53 M/UL (ref 3.8–5.2)
SAMPLES BEING HELD,HOLD: NORMAL
SERVICE CMNT-IMP: ABNORMAL
SODIUM SERPL-SCNC: 138 MMOL/L (ref 136–145)
TIBC SERPL-MCNC: 305 UG/DL (ref 250–450)
TROPONIN I SERPL-MCNC: <0.05 NG/ML
VENTRICULAR RATE, ECG03: 83 BPM
VIT B12 SERPL-MCNC: 470 PG/ML (ref 193–986)
WBC # BLD AUTO: 6.5 K/UL (ref 3.6–11)

## 2020-08-16 PROCEDURE — 85025 COMPLETE CBC W/AUTO DIFF WBC: CPT

## 2020-08-16 PROCEDURE — 82746 ASSAY OF FOLIC ACID SERUM: CPT

## 2020-08-16 PROCEDURE — 84484 ASSAY OF TROPONIN QUANT: CPT

## 2020-08-16 PROCEDURE — 96365 THER/PROPH/DIAG IV INF INIT: CPT

## 2020-08-16 PROCEDURE — 83540 ASSAY OF IRON: CPT

## 2020-08-16 PROCEDURE — 74011250636 HC RX REV CODE- 250/636: Performed by: INTERNAL MEDICINE

## 2020-08-16 PROCEDURE — 83880 ASSAY OF NATRIURETIC PEPTIDE: CPT

## 2020-08-16 PROCEDURE — 96375 TX/PRO/DX INJ NEW DRUG ADDON: CPT

## 2020-08-16 PROCEDURE — 82962 GLUCOSE BLOOD TEST: CPT

## 2020-08-16 PROCEDURE — 36415 COLL VENOUS BLD VENIPUNCTURE: CPT

## 2020-08-16 PROCEDURE — 82607 VITAMIN B-12: CPT

## 2020-08-16 PROCEDURE — 99284 EMERGENCY DEPT VISIT MOD MDM: CPT

## 2020-08-16 PROCEDURE — 82728 ASSAY OF FERRITIN: CPT

## 2020-08-16 PROCEDURE — 65660000000 HC RM CCU STEPDOWN

## 2020-08-16 PROCEDURE — 74011250637 HC RX REV CODE- 250/637: Performed by: INTERNAL MEDICINE

## 2020-08-16 PROCEDURE — 99218 HC RM OBSERVATION: CPT

## 2020-08-16 PROCEDURE — 93971 EXTREMITY STUDY: CPT

## 2020-08-16 PROCEDURE — 71046 X-RAY EXAM CHEST 2 VIEWS: CPT

## 2020-08-16 PROCEDURE — 74011250636 HC RX REV CODE- 250/636: Performed by: EMERGENCY MEDICINE

## 2020-08-16 PROCEDURE — 74011000258 HC RX REV CODE- 258: Performed by: EMERGENCY MEDICINE

## 2020-08-16 PROCEDURE — 93005 ELECTROCARDIOGRAM TRACING: CPT

## 2020-08-16 PROCEDURE — 80053 COMPREHEN METABOLIC PANEL: CPT

## 2020-08-16 PROCEDURE — 74176 CT ABD & PELVIS W/O CONTRAST: CPT

## 2020-08-16 RX ORDER — ALBUTEROL SULFATE 0.83 MG/ML
2.5 SOLUTION RESPIRATORY (INHALATION)
Status: DISCONTINUED | OUTPATIENT
Start: 2020-08-16 | End: 2020-08-18 | Stop reason: HOSPADM

## 2020-08-16 RX ORDER — FUROSEMIDE 10 MG/ML
40 INJECTION INTRAMUSCULAR; INTRAVENOUS
Status: COMPLETED | OUTPATIENT
Start: 2020-08-16 | End: 2020-08-16

## 2020-08-16 RX ORDER — SODIUM CHLORIDE 0.9 % (FLUSH) 0.9 %
5-40 SYRINGE (ML) INJECTION AS NEEDED
Status: DISCONTINUED | OUTPATIENT
Start: 2020-08-16 | End: 2020-08-18 | Stop reason: HOSPADM

## 2020-08-16 RX ORDER — GABAPENTIN 300 MG/1
300 CAPSULE ORAL 3 TIMES DAILY
Status: DISCONTINUED | OUTPATIENT
Start: 2020-08-16 | End: 2020-08-18 | Stop reason: HOSPADM

## 2020-08-16 RX ORDER — ENOXAPARIN SODIUM 100 MG/ML
40 INJECTION SUBCUTANEOUS DAILY
Status: DISCONTINUED | OUTPATIENT
Start: 2020-08-16 | End: 2020-08-18 | Stop reason: HOSPADM

## 2020-08-16 RX ORDER — ATORVASTATIN CALCIUM 40 MG/1
80 TABLET, FILM COATED ORAL
Status: DISCONTINUED | OUTPATIENT
Start: 2020-08-16 | End: 2020-08-18 | Stop reason: HOSPADM

## 2020-08-16 RX ORDER — INSULIN ASPART 100 [IU]/ML
35 INJECTION, SOLUTION INTRAVENOUS; SUBCUTANEOUS
Status: ON HOLD | COMMUNITY
End: 2021-08-08

## 2020-08-16 RX ORDER — HYDRALAZINE HYDROCHLORIDE 20 MG/ML
10 INJECTION INTRAMUSCULAR; INTRAVENOUS
Status: DISCONTINUED | OUTPATIENT
Start: 2020-08-16 | End: 2020-08-18 | Stop reason: HOSPADM

## 2020-08-16 RX ORDER — LINEZOLID 600 MG/1
600 TABLET, FILM COATED ORAL EVERY 12 HOURS
Status: DISCONTINUED | OUTPATIENT
Start: 2020-08-16 | End: 2020-08-18 | Stop reason: HOSPADM

## 2020-08-16 RX ORDER — AMLODIPINE BESYLATE 5 MG/1
5 TABLET ORAL DAILY
Status: DISCONTINUED | OUTPATIENT
Start: 2020-08-17 | End: 2020-08-18 | Stop reason: HOSPADM

## 2020-08-16 RX ORDER — CLOPIDOGREL BISULFATE 75 MG/1
75 TABLET ORAL DAILY
Status: DISCONTINUED | OUTPATIENT
Start: 2020-08-17 | End: 2020-08-18 | Stop reason: HOSPADM

## 2020-08-16 RX ORDER — FAMOTIDINE 20 MG/1
20 TABLET, FILM COATED ORAL 2 TIMES DAILY
Status: DISCONTINUED | OUTPATIENT
Start: 2020-08-16 | End: 2020-08-18 | Stop reason: HOSPADM

## 2020-08-16 RX ORDER — METFORMIN HYDROCHLORIDE 500 MG/1
500 TABLET ORAL
Status: ON HOLD | COMMUNITY
End: 2021-08-08 | Stop reason: DRUGHIGH

## 2020-08-16 RX ORDER — INSULIN LISPRO 100 [IU]/ML
INJECTION, SOLUTION INTRAVENOUS; SUBCUTANEOUS
Status: DISCONTINUED | OUTPATIENT
Start: 2020-08-16 | End: 2020-08-18 | Stop reason: HOSPADM

## 2020-08-16 RX ORDER — POLYETHYLENE GLYCOL 3350 17 G/17G
17 POWDER, FOR SOLUTION ORAL DAILY PRN
Status: DISCONTINUED | OUTPATIENT
Start: 2020-08-16 | End: 2020-08-18 | Stop reason: HOSPADM

## 2020-08-16 RX ORDER — ACETAMINOPHEN 325 MG/1
650 TABLET ORAL
Status: DISCONTINUED | OUTPATIENT
Start: 2020-08-16 | End: 2020-08-18 | Stop reason: HOSPADM

## 2020-08-16 RX ORDER — TRAMADOL HYDROCHLORIDE 50 MG/1
50 TABLET ORAL
Status: DISCONTINUED | OUTPATIENT
Start: 2020-08-16 | End: 2020-08-18 | Stop reason: HOSPADM

## 2020-08-16 RX ORDER — DEXTROSE MONOHYDRATE 100 MG/ML
0-250 INJECTION, SOLUTION INTRAVENOUS AS NEEDED
Status: DISCONTINUED | OUTPATIENT
Start: 2020-08-16 | End: 2020-08-18 | Stop reason: HOSPADM

## 2020-08-16 RX ORDER — METOPROLOL TARTRATE 25 MG/1
25 TABLET, FILM COATED ORAL 2 TIMES DAILY
Status: ON HOLD | COMMUNITY
End: 2021-08-12 | Stop reason: SDUPTHER

## 2020-08-16 RX ORDER — FUROSEMIDE 10 MG/ML
60 INJECTION INTRAMUSCULAR; INTRAVENOUS EVERY 12 HOURS
Status: DISCONTINUED | OUTPATIENT
Start: 2020-08-16 | End: 2020-08-16

## 2020-08-16 RX ORDER — ACETAMINOPHEN 650 MG/1
650 SUPPOSITORY RECTAL
Status: DISCONTINUED | OUTPATIENT
Start: 2020-08-16 | End: 2020-08-18 | Stop reason: HOSPADM

## 2020-08-16 RX ORDER — METOPROLOL TARTRATE 25 MG/1
12.5 TABLET, FILM COATED ORAL 2 TIMES DAILY
Status: DISCONTINUED | OUTPATIENT
Start: 2020-08-16 | End: 2020-08-18 | Stop reason: HOSPADM

## 2020-08-16 RX ORDER — DOCUSATE SODIUM 100 MG/1
100 CAPSULE, LIQUID FILLED ORAL 2 TIMES DAILY
Status: DISCONTINUED | OUTPATIENT
Start: 2020-08-16 | End: 2020-08-18 | Stop reason: HOSPADM

## 2020-08-16 RX ORDER — SODIUM CHLORIDE 0.9 % (FLUSH) 0.9 %
5-40 SYRINGE (ML) INJECTION EVERY 8 HOURS
Status: DISCONTINUED | OUTPATIENT
Start: 2020-08-16 | End: 2020-08-18 | Stop reason: HOSPADM

## 2020-08-16 RX ORDER — PROMETHAZINE HYDROCHLORIDE 25 MG/1
12.5 TABLET ORAL
Status: DISCONTINUED | OUTPATIENT
Start: 2020-08-16 | End: 2020-08-18 | Stop reason: HOSPADM

## 2020-08-16 RX ORDER — ONDANSETRON 2 MG/ML
4 INJECTION INTRAMUSCULAR; INTRAVENOUS
Status: DISCONTINUED | OUTPATIENT
Start: 2020-08-16 | End: 2020-08-18 | Stop reason: HOSPADM

## 2020-08-16 RX ORDER — AMITRIPTYLINE HYDROCHLORIDE 50 MG/1
50 TABLET, FILM COATED ORAL
COMMUNITY

## 2020-08-16 RX ORDER — LABETALOL HYDROCHLORIDE 5 MG/ML
10 INJECTION, SOLUTION INTRAVENOUS
Status: DISCONTINUED | OUTPATIENT
Start: 2020-08-16 | End: 2020-08-18 | Stop reason: HOSPADM

## 2020-08-16 RX ORDER — MAGNESIUM SULFATE 100 %
4 CRYSTALS MISCELLANEOUS AS NEEDED
Status: DISCONTINUED | OUTPATIENT
Start: 2020-08-16 | End: 2020-08-18 | Stop reason: HOSPADM

## 2020-08-16 RX ORDER — FUROSEMIDE 20 MG/1
20 TABLET ORAL
Status: ON HOLD | COMMUNITY
End: 2021-08-08

## 2020-08-16 RX ORDER — DOCUSATE SODIUM 100 MG/1
100 CAPSULE, LIQUID FILLED ORAL
COMMUNITY

## 2020-08-16 RX ORDER — FUROSEMIDE 20 MG/1
20 TABLET ORAL DAILY
Status: DISCONTINUED | OUTPATIENT
Start: 2020-08-17 | End: 2020-08-18 | Stop reason: HOSPADM

## 2020-08-16 RX ADMIN — ATORVASTATIN CALCIUM 80 MG: 40 TABLET, FILM COATED ORAL at 22:15

## 2020-08-16 RX ADMIN — LINEZOLID 600 MG: 600 TABLET, FILM COATED ORAL at 22:15

## 2020-08-16 RX ADMIN — GABAPENTIN 300 MG: 300 CAPSULE ORAL at 22:15

## 2020-08-16 RX ADMIN — FAMOTIDINE 20 MG: 20 TABLET ORAL at 22:15

## 2020-08-16 RX ADMIN — FUROSEMIDE 40 MG: 10 INJECTION, SOLUTION INTRAMUSCULAR; INTRAVENOUS at 16:46

## 2020-08-16 RX ADMIN — Medication 10 ML: at 22:27

## 2020-08-16 RX ADMIN — METOPROLOL TARTRATE 12.5 MG: 25 TABLET, FILM COATED ORAL at 22:15

## 2020-08-16 RX ADMIN — CEFAZOLIN SODIUM 1 G: 1 INJECTION, POWDER, FOR SOLUTION INTRAMUSCULAR; INTRAVENOUS at 16:46

## 2020-08-16 RX ADMIN — HYDRALAZINE HYDROCHLORIDE 10 MG: 20 INJECTION INTRAMUSCULAR; INTRAVENOUS at 21:00

## 2020-08-16 RX ADMIN — TRAMADOL HYDROCHLORIDE 50 MG: 50 TABLET, FILM COATED ORAL at 22:15

## 2020-08-16 NOTE — H&P
[address] [facility]  History and Physical    Primary Care Provider: Dominic Maddox MD  Date of Service:  8/16/2020    Subjective:     Fely Flannery is a 47 y.o. female PMH of CHF, with PMH of DM 2, HTN, asthma,?  CHF, fluids overload, left leg cellulitis with recent hospitalization for the same at [facility], HLD, history of CVA x2 in 9/2018, neuropathy, headache, Constipation, sleep disturbances. Patient has left ankle wound/scabbed lesion develop with tenderness and redness at the beginning of August for which she went to [facility] ED on 8/3 and was discharged the same night with some antibiotics. On 8/5 patient had progressive redness with pain and swelling over the left ankle progressive upwards to mid leg for which she went to [facility] and was admitted until 8/8. Patient was discharged with some antibiotics. On 8/9, next day patient noted worsening of swelling further along with abdominal distention and went to [facility] again where she got admitted until 8/12. Patient had stress test which was unremarkable and echocardiogram for which she has been told to have congestive heart failure, no other details available. Patient was discharged with Lasix 20 mg p.o. daily and Zyvox 600 mg twice daily for 12 more days. Patient started taking Zyvox on 8/12/2020. She still has been on it. On her last admission, patient also had shortness of breath in form of significant dyspnea on exertion. Patient uses 2 pillow to lay down but denied any worsening recently. Patient complained of significant weight gain about 70 pounds in last 1 month. Patient is advised not to use amitriptyline while she is taking Zyvox due to drug drug interaction which she uses for sleep. Apparently, since discharge patient has not felt better and her symptoms of dyspnea on exertion,  Swelling, abdominal distention so she decided to come to ED here at [facility]. Patient denied any abdominal pain, nausea, vomiting. No constipation or diarrhea. Last bowel movement today. In the ED, patient is evaluated for the same. CBC unremarkable with WBC 6.5 hemoglobin 10.1 platelet 126. Differential unremarkable. CMP pertinent for creatinine 1.35, BUN 22, otherwise unremarkable. Troponin less than 0.05, proBNP 657. CXR unremarkable. Left LE duplex US unremarkable for any DVT/VTE. EKG unchanged and non-concerning. Patient was [facility] by [facility] cardiology group Dr. Harjeet Perez at [facility].  Patient sister at bedside is her next of kin. She has experience with Dr. Cha Aguilar for her . Patient and her sister both would prefer Dr. Cha Aguilar or his partner to be involved in the care. Review of Systems:    A comprehensive review of systems was negative except for that written in the History of Present Illness. Past Medical History:   Diagnosis Date    Arthritis     Asthma     CHF (congestive heart failure) (HCC)     Chronic back pain     [facility] [address] [address]     Diabetic retinopathy ([facility]     Hypertension     MRSA (methicillin resistant staph aureus) culture positive     labial abscess    Neuropathy       Past Surgical History:   Procedure Laterality Date    HX HEENT      tonsillectomy    HX ORTHOPAEDIC      left ft bunionectomy    HX OTHER SURGICAL      lanced labial abscess     Prior to Admission medications    Medication Sig Start Date End Date Taking? Authorizing Provider   aspirin 81 mg chewable tablet Take 1 Tab by mouth daily. 12/23/18   Sana Pollack, DO   atorvastatin (LIPITOR) 80 mg tablet Take 1 Tab by mouth nightly. 12/22/18   Marline DEWITT, DO   clopidogrel (PLAVIX) 75 mg tab Take 1 Tab by mouth daily. 12/23/18   Marline DEWITT, DO   aspirin-acetaminophen-caffeine (EXCEDRIN ES) 538-432-96 mg per tablet Take 2 Tabs by mouth every six (6) hours as needed for Headache.     Provider, Historical   gabapentin (NEURONTIN) 300 mg capsule Take 900 mg by mouth three (3) times daily. Provider, Historical   acetaminophen (TYLENOL) 325 mg tablet Take 2 Tabs by mouth every four (4) hours as needed for Pain. 2/26/18   Valerie Reyes PA   ondansetron (ZOFRAN ODT) 4 mg disintegrating tablet Take 1 Tab by mouth every eight (8) hours as needed for Nausea. 12/25/16   Kristi Perez DO   insulin lispro (HUMALOG) 100 unit/mL injection Take 32 units with meals, only if BG > 100. Indications: HYPERGLYCEMIA, TYPE 2 DIABETES MELLITUS 10/18/16   Ba Awad MD   lisinopril (PRINIVIL, ZESTRIL) 10 mg tablet Take 1 Tab by mouth daily. 6/25/15   Doug Davis NP   glipiZIDE (GLUCOTROL) 5 mg tablet Take 1 Tab by mouth two (2) times a day. 6/25/15   Doug Davis NP   traZODone (DESYREL) 50 mg tablet Take 1 Tab by mouth nightly. 6/25/15   Doug Davis NP     Allergies   Allergen Reactions    Aspirin Unknown (comments)     Patient states naproxen ok    Ciprofloxacin Hives      History reviewed. No pertinent family history. SOCIAL HISTORY:  Patient resides at Home. Patient ambulates with no assistance with occasional use of cane lately. Smoking history: never  Alcohol history: none    Home medication list:  #1 gabapentin 300 mg 3 times daily  #2 Lasix 20 mg daily  #3 glipizide 5 mg daily  #4 Fioricet 1 tablet every 4 hours as needed. Not to take along with tramadol  #5 tramadol 50 mg every 8 hours as needed for pain  #6 atorvastatin 80 mg daily  #7 amlodipine 5 mg daily  #8 metoprolol 12.5 mg twice daily  #9 Plavix 75 mg daily  #10 pro-air inhaler  #11 stool softener generic 1 tablet twice daily  #12 amitriptyline 50 mg at bedtime  #13 famotidine 20 mg twice daily  #14 linezolid/Zyvox 600 mg twice daily for 12 days  #15 NovoLog 35 units 3 times daily with meals  #16 Metformin 500 mg 3 times daily    Recently discontinued: Lisinopril 20 mg daily.   It was replaced by addition of metoprolol as noted above    Objective:     Visit Vitals  /85   Pulse 86   Temp 98.3 °F (36.8 °C)   Resp 18   Ht 5' 4\" (1.626 m)   Wt 132.1 kg (291 lb 5.1 oz)   SpO2 98%   BMI 50.00 kg/m²       Physical Exam:   General:          Alert, cooperative, no distress, appears stated age. HEENT:           Atraumatic, anicteric sclerae, pink conjunctivae                          No oral ulcers, mucosa mild dry, throat clear, dentition fair  Neck:               Supple, symmetrical  Lungs:              Minimal scattered wheezing anterosuperiorly-likely related to referred sounds from forcing air against narrow glottis. Otherwise clear to auscultation bilaterally. No Wheezing or Rhonchi. No rales. Chest wall:      No tenderness  No Accessory muscle use. Heart:              Regular  rhythm,  No  murmur. Mild baseline +2 lymphedematous legs, not specifically pitting clearly. Abdomen:        Soft, large distended belly, firm, limited exam for guarding is felt tight at few places. Bowel sounds normal  Extremities:     No cyanosis. No clubbing,   left leg mild erythema around ankle with scabbed wound, mild tenderness. Skin turgor normal, Capillary refill normal  Skin:                Not pale. Not Jaundiced  No rashes   Psych:             Not anxious or agitated. Neurologic:      Alert, moves all extremities, answers questions appropriately and responds to commands     Cap refill: Brisk, less than 3 seconds  Pulses: 2+, symmetric in all extremities    ECG:  normal EKG, normal sinus rhythm, unchanged from previous tracings, no acute ST-T changes     Data Review: All diagnostic labs and studies have been reviewed. Radiology  Xr Chest Pa Lat    Result Date: 8/16/2020  Impression: No acute process.      Recent Results (from the past 24 hour(s))   CBC WITH AUTOMATED DIFF    Collection Time: 08/16/20  2:02 PM   Result Value Ref Range    WBC 6.5 3.6 - 11.0 K/uL    RBC 3.53 (L) 3.80 - 5.20 M/uL    HGB 10.1 (L) 11.5 - 16.0 g/dL    HCT 31.5 (L) 35.0 - 47.0 %    MCV 89.2 80.0 - 99.0 FL    MCH 28.6 26.0 - 34.0 PG    MCHC 32.1 30.0 - 36.5 g/dL    RDW 13.9 11.5 - 14.5 %    PLATELET 797 993 - 870 K/uL    MPV 9.6 8.9 - 12.9 FL    NRBC 0.0 0  WBC    ABSOLUTE NRBC 0.00 0.00 - 0.01 K/uL    NEUTROPHILS 65 32 - 75 %    LYMPHOCYTES 21 12 - 49 %    MONOCYTES 8 5 - 13 %    EOSINOPHILS 3 0 - 7 %    BASOPHILS 1 0 - 1 %    IMMATURE GRANULOCYTES 2 (H) 0.0 - 0.5 %    ABS. NEUTROPHILS 4.3 1.8 - 8.0 K/UL    ABS. LYMPHOCYTES 1.4 0.8 - 3.5 K/UL    ABS. MONOCYTES 0.5 0.0 - 1.0 K/UL    ABS. EOSINOPHILS 0.2 0.0 - 0.4 K/UL    ABS. BASOPHILS 0.0 0.0 - 0.1 K/UL    ABS. IMM. GRANS. 0.1 (H) 0.00 - 0.04 K/UL    DF AUTOMATED     METABOLIC PANEL, COMPREHENSIVE    Collection Time: 08/16/20  2:02 PM   Result Value Ref Range    Sodium 138 136 - 145 mmol/L    Potassium 4.2 3.5 - 5.1 mmol/L    Chloride 108 97 - 108 mmol/L    CO2 25 21 - 32 mmol/L    Anion gap 5 5 - 15 mmol/L    Glucose 153 (H) 65 - 100 mg/dL    BUN 22 (H) 6 - 20 MG/DL    Creatinine 1.35 (H) 0.55 - 1.02 MG/DL    BUN/Creatinine ratio 16 12 - 20      GFR est AA 50 (L) >60 ml/min/1.73m2    GFR est non-AA 41 (L) >60 ml/min/1.73m2    Calcium 8.9 8.5 - 10.1 MG/DL    Bilirubin, total 0.2 0.2 - 1.0 MG/DL    ALT (SGPT) 18 12 - 78 U/L    AST (SGOT) 22 15 - 37 U/L    Alk. phosphatase 114 45 - 117 U/L    Protein, total 7.1 6.4 - 8.2 g/dL    Albumin 2.5 (L) 3.5 - 5.0 g/dL    Globulin 4.6 (H) 2.0 - 4.0 g/dL    A-G Ratio 0.5 (L) 1.1 - 2.2     NT-PRO BNP    Collection Time: 08/16/20  2:02 PM   Result Value Ref Range    NT pro- (H) <125 PG/ML   SAMPLES BEING HELD    Collection Time: 08/16/20  2:02 PM   Result Value Ref Range    SAMPLES BEING HELD 1RED,1BLU,1UA,1UC     COMMENT        Add-on orders for these samples will be processed based on acceptable specimen integrity and analyte stability, which may vary by analyte.    TROPONIN I    Collection Time: 08/16/20  2:02 PM   Result Value Ref Range    Troponin-I, Qt. <0.05 <0.05 ng/mL   EKG, 12 LEAD, INITIAL    Collection Time: 08/16/20  2:15 PM   Result Value Ref Range    Ventricular Rate 83 BPM    Atrial Rate 83 BPM    P-R Interval 176 ms    QRS Duration 72 ms    Q-T Interval 380 ms    QTC Calculation (Bezet) 446 ms    Calculated P Axis 36 degrees    Calculated R Axis 22 degrees    Calculated T Axis 57 degrees    Diagnosis       Normal sinus rhythm  Low voltage QRS  When compared with ECG of 20-DEC-2018 16:25,  ST no longer depressed in Inferior leads  Confirmed by Cheryl Mccall M.D., [place] ([id number]) on 8/16/2020 5:36:33 PM           Assessment:     Active Problems:    Acute exacerbation of CHF (congestive heart failure) ([facility] [address] (8/16/2020)      #Concern for fluid overload with dyspnea on exertion and abdominal distention. Patient seems comfortable, no hypoxia, appears close to euvolemic, no respiratory distress, no orthopnea/PND. Abdomen limited exam given habitus. ? Baseline habitus versus distention versus masked findings. Some tightness perceived on exam-palpation. #CHF exacerbation, unknown systolic or diastolic components or details. Clinically does not feel strongly for CHF exacerbation at present. #Hypertensive urgency, BP around [phone] systolic during my evaluation  #Acute kidney insufficiency versus CKD 3  #Left lower extremity/leg cellulitis, likely MRSA, on Zyvox ongoing treatment  #Normocytic anemia  #HLD  #History of CVA x2 in 9/2018  #Asthma  #? GERD  #Constipation      Plan:     -Admit to inpatient, telemetry, ALOS greater than 2 MN  -Lasix 40 mg IV given in the ED  -I/O, daily weight, close monitoring-BMP, CBC  -Trend CBC, BMP, proBNP, mag  -Iron panel, ferritin, O97, folic acid  -Stool Hemoccult  -CT A/P without contrast given concern for CKD versus EMMANUEL  -Cardiology consult: Dr. Gabi Sloan group  -Serial troponin, EKG  -Echocardiogram  -[facility] from [facility]  [facility]ospital  -PRN IV hydralazine or labetalol for blood pressure greater than 170/100  -Resume home medications  -Continue Zyvox p.o. 600 mg twice daily for 12 days as already ongoing  -Wound care consult  -Further based on CT A/P, echo results  -Repeat CXR in a.m.  -Correctional insulin with Accu-Cheks-resistant profile. No basal insulin. Hold home metformin and glipizide.  -Hold Fioricet and amitriptyline given Zyvox.  -Continue other home medications as noted above.     CODE STATUS: Full code  DVT prophylaxis: Lovenox SQ    Emergency contact: Sister, Ms. Maite Hernandez, [phone]    FUNCTIONAL STATUS PRIOR  [address] with Use of Assistive Devices (including history of recent falls)     Signed By: Emilia Jung MD     August 16, 2020

## 2020-08-16 NOTE — ED NOTES
Patient assisted to restroom via wheelchair. Patient noted to have very unsteady gait with little ability to ambulate independently.

## 2020-08-16 NOTE — ED TRIAGE NOTES
Patient arrives to ED via EMS for shortness of breath getting worse this morning, denies any chest pain. Patient with history of CHF and discharged from [facility] on Wednesday.

## 2020-08-16 NOTE — ED PROVIDER NOTES
Released from [facility] 5 days ago after being treated for CHF. Now with increased swelling worse compared to before she was admitted. Increased 70 pounds in one month. Redness to LLE also worsening past few days. Worsening sob with exertion. Pt can't lay flat because of sob. No CP, fever, vomiting. Slight dry cough. Some wheezing. Does not have cardiologist.           Past Medical History:   Diagnosis Date    Arthritis     Asthma     CHF (congestive heart failure) (HCC)     Chronic back pain     Diabetes ([facility] [address]     Diabetic retinopathy ([facility] [address]     Hypertension     MRSA (methicillin resistant staph aureus) culture positive     labial abscess    Neuropathy        Past Surgical History:   Procedure Laterality Date    HX HEENT      tonsillectomy    HX ORTHOPAEDIC      left ft bunionectomy    HX OTHER SURGICAL      lanced labial abscess         History reviewed. No pertinent family history.     Social History     Socioeconomic History    Marital status:      Spouse name: Not on file    Number of children: Not on file    Years of education: Not on file    Highest education level: Not on file   Occupational History    Not on file   Social Needs    Financial resource strain: Not on file    Food insecurity     Worry: Not on file     Inability: Not on file    Transportation needs     Medical: Not on file     Non-medical: Not on file   Tobacco Use    Smoking status: Never Smoker    Smokeless tobacco: Never Used   Substance and Sexual Activity    Alcohol use: No    Drug use: No    Sexual activity: Not on file   Lifestyle    Physical activity     Days per week: Not on file     Minutes per session: Not on file    Stress: Not on file   Relationships    Social connections     Talks on phone: Not on file     Gets together: Not on file     Attends Jewish service: Not on file     Active member of club or organization: Not on file     Attends meetings of clubs or organizations: Not on file Relationship status: Not on file    Intimate partner violence     Fear of current or ex partner: Not on file     Emotionally abused: Not on file     Physically abused: Not on file     Forced sexual activity: Not on file   Other Topics Concern    Not on file   Social History Narrative    Not on file         ALLERGIES: Aspirin and Ciprofloxacin    Review of Systems   Constitutional: Negative for fever. HENT: Negative for facial swelling. Eyes: Negative for visual disturbance. Respiratory: Negative for chest tightness. Cardiovascular: Negative for chest pain. Gastrointestinal: Positive for abdominal distention. Negative for abdominal pain. Genitourinary: Negative for difficulty urinating and dysuria. Musculoskeletal: Negative for arthralgias. Skin: Negative for rash. Neurological: Negative for headaches. Hematological: Negative for adenopathy. Psychiatric/Behavioral: Negative for suicidal ideas. Vitals:    08/16/20 1247   BP: 171/87   Pulse: 86   Resp: 18   Temp: 98.3 °F (36.8 °C)   SpO2: 95%   Weight: 132.1 kg (291 lb 5.1 oz)   Height: 5' 4\" (1.626 m)            Physical Exam  Vitals signs and nursing note reviewed. Constitutional:       General: She is not in acute distress. Appearance: She is well-developed. HENT:      Head: Normocephalic and atraumatic. Eyes:      General: No scleral icterus. Conjunctiva/sclera: Conjunctivae normal.      Pupils: Pupils are equal, round, and reactive to light. Neck:      Musculoskeletal: Normal range of motion and neck supple. Cardiovascular:      Rate and Rhythm: Normal rate and regular rhythm. Heart sounds: No murmur. Pulmonary:      Effort: Pulmonary effort is normal. No tachypnea, accessory muscle usage or respiratory distress. Breath sounds: Wheezing present. Abdominal:      General: There is no distension. Musculoskeletal: Normal range of motion. Right lower leg: Edema present.       Left lower leg: Edema present. Comments: 2+ pitting edema of both lower extremities with left greater than right. Mild erythema stemming from a scabbed wound on the top of the left foot spreading up the left lower leg. Skin:     General: Skin is warm and dry. Findings: No rash. Neurological:      Mental Status: She is alert and oriented to person, place, and time. [facility]  ED Course as of Aug 16 1629   Sun Aug 16, 2020   1547 ED EKG interpretation:  Rhythm: normal sinus rhythm. Rate (approx.): 83. Axis: normal.  ST segment:  No concerning ST elevations or depressions. This EKG was interpreted by Gerry Odom MD,ED Provider. [JM]      ED Course User Index  [JM] Leny Ann MD     Hospitalist Perfect Serve for Admission  4:45 PM    ED Room Number: R36/R36  Patient Name and age: Colette Mendoza 47 y.o.  female  Working Diagnosis:   1. Acute congestive heart failure, unspecified heart failure type ([address]    2. Anasarca    3. SOB (shortness of breath)    4. BRUSH (dyspnea on exertion)    5. Cellulitis of left lower extremity        COVID-19 Suspicion:  no    Code Status:  Full Code  Readmission: yes (discharged from [facility] on 8/12)  Isolation Requirements:  no  Recommended Level of Care:  telemetry  Department:[facility] Adult ED - 21   Other:  Worsening edema, sob, brush, chest pain. Diagnosed with CHF at [facility] last week but no previous cardiac history. Already on increasing dose of lasix.     Procedures

## 2020-08-17 ENCOUNTER — APPOINTMENT (OUTPATIENT)
Dept: GENERAL RADIOLOGY | Age: 55
End: 2020-08-17
Attending: INTERNAL MEDICINE
Payer: MEDICAID

## 2020-08-17 ENCOUNTER — APPOINTMENT (OUTPATIENT)
Dept: NON INVASIVE DIAGNOSTICS | Age: 55
End: 2020-08-17
Attending: INTERNAL MEDICINE
Payer: MEDICAID

## 2020-08-17 LAB
ALBUMIN SERPL-MCNC: 2.1 G/DL (ref 3.5–5)
ALBUMIN/GLOB SERPL: 0.5 {RATIO} (ref 1.1–2.2)
ALP SERPL-CCNC: 107 U/L (ref 45–117)
ALT SERPL-CCNC: 15 U/L (ref 12–78)
ANION GAP SERPL CALC-SCNC: 7 MMOL/L (ref 5–15)
AST SERPL-CCNC: 16 U/L (ref 15–37)
ATRIAL RATE: 81 BPM
BASOPHILS # BLD: 0 K/UL (ref 0–0.1)
BASOPHILS NFR BLD: 0 % (ref 0–1)
BILIRUB SERPL-MCNC: 0.2 MG/DL (ref 0.2–1)
BNP SERPL-MCNC: 913 PG/ML
BUN SERPL-MCNC: 18 MG/DL (ref 6–20)
BUN/CREAT SERPL: 15 (ref 12–20)
CALCIUM SERPL-MCNC: 8.8 MG/DL (ref 8.5–10.1)
CALCULATED P AXIS, ECG09: 36 DEGREES
CALCULATED R AXIS, ECG10: 25 DEGREES
CALCULATED T AXIS, ECG11: 67 DEGREES
CHLORIDE SERPL-SCNC: 107 MMOL/L (ref 97–108)
CK SERPL-CCNC: 62 U/L (ref 26–192)
CO2 SERPL-SCNC: 25 MMOL/L (ref 21–32)
CREAT SERPL-MCNC: 1.24 MG/DL (ref 0.55–1.02)
DIAGNOSIS, 93000: NORMAL
DIFFERENTIAL METHOD BLD: ABNORMAL
ECHO AO ROOT DIAM: 3.11 CM
ECHO LA AREA 4C: 20.32 CM2
ECHO LA MAJOR AXIS: 3.45 CM
ECHO LA MINOR AXIS: 1.52 CM
ECHO LA VOL 2C: 66.71 ML (ref 22–52)
ECHO LA VOL 4C: 56.78 ML (ref 22–52)
ECHO LA VOL BP: 66.84 ML (ref 22–52)
ECHO LA VOL/BSA BIPLANE: 29.52 ML/M2 (ref 16–28)
ECHO LA VOLUME INDEX A2C: 29.46 ML/M2 (ref 16–28)
ECHO LA VOLUME INDEX A4C: 25.08 ML/M2 (ref 16–28)
ECHO LV INTERNAL DIMENSION DIASTOLIC: 5.02 CM (ref 3.9–5.3)
ECHO LV INTERNAL DIMENSION SYSTOLIC: 2.51 CM
ECHO LV IVSD: 1.25 CM (ref 0.6–0.9)
ECHO LV MASS 2D: 223 G (ref 67–162)
ECHO LV MASS INDEX 2D: 98.5 G/M2 (ref 43–95)
ECHO LV POSTERIOR WALL DIASTOLIC: 1.06 CM (ref 0.6–0.9)
ECHO LVOT DIAM: 1.97 CM
ECHO LVOT PEAK GRADIENT: 3.28 MMHG
ECHO LVOT PEAK VELOCITY: 90.51 CM/S
ECHO MV A VELOCITY: 85.2 CM/S
ECHO MV AREA PHT: 4.47 CM2
ECHO MV E DECELERATION TIME (DT): 0.17 S
ECHO MV E VELOCITY: 129.5 CM/S
ECHO MV E/A RATIO: 1.52
ECHO MV PRESSURE HALF TIME (PHT): 0.05 S
ECHO PV PEAK INSTANTANEOUS GRADIENT SYSTOLIC: 2.89 MMHG
ECHO RV TAPSE: 2.34 CM (ref 1.5–2)
ECHO TV REGURGITANT MAX VELOCITY: 231.61 CM/S
ECHO TV REGURGITANT PEAK GRADIENT: 21.46 MMHG
EOSINOPHIL # BLD: 0.2 K/UL (ref 0–0.4)
EOSINOPHIL NFR BLD: 2 % (ref 0–7)
ERYTHROCYTE [DISTWIDTH] IN BLOOD BY AUTOMATED COUNT: 14 % (ref 11.5–14.5)
EST. AVERAGE GLUCOSE BLD GHB EST-MCNC: 180 MG/DL
GLOBULIN SER CALC-MCNC: 3.9 G/DL (ref 2–4)
GLUCOSE BLD STRIP.AUTO-MCNC: 206 MG/DL (ref 65–100)
GLUCOSE BLD STRIP.AUTO-MCNC: 233 MG/DL (ref 65–100)
GLUCOSE BLD STRIP.AUTO-MCNC: 238 MG/DL (ref 65–100)
GLUCOSE BLD STRIP.AUTO-MCNC: 248 MG/DL (ref 65–100)
GLUCOSE SERPL-MCNC: 190 MG/DL (ref 65–100)
HBA1C MFR BLD: 7.9 % (ref 4–5.6)
HCT VFR BLD AUTO: 27.5 % (ref 35–47)
HEMOCCULT STL QL: NEGATIVE
HGB BLD-MCNC: 8.9 G/DL (ref 11.5–16)
IMM GRANULOCYTES # BLD AUTO: 0 K/UL (ref 0–0.04)
IMM GRANULOCYTES NFR BLD AUTO: 1 % (ref 0–0.5)
INR PPP: 1.1 (ref 0.9–1.1)
LYMPHOCYTES # BLD: 1 K/UL (ref 0.8–3.5)
LYMPHOCYTES NFR BLD: 14 % (ref 12–49)
MAGNESIUM SERPL-MCNC: 1.8 MG/DL (ref 1.6–2.4)
MCH RBC QN AUTO: 28.6 PG (ref 26–34)
MCHC RBC AUTO-ENTMCNC: 32.4 G/DL (ref 30–36.5)
MCV RBC AUTO: 88.4 FL (ref 80–99)
MONOCYTES # BLD: 0.5 K/UL (ref 0–1)
MONOCYTES NFR BLD: 7 % (ref 5–13)
NEUTS SEG # BLD: 5.3 K/UL (ref 1.8–8)
NEUTS SEG NFR BLD: 76 % (ref 32–75)
NRBC # BLD: 0 K/UL (ref 0–0.01)
NRBC BLD-RTO: 0 PER 100 WBC
P-R INTERVAL, ECG05: 188 MS
PHOSPHATE SERPL-MCNC: 3.3 MG/DL (ref 2.6–4.7)
PLATELET # BLD AUTO: 170 K/UL (ref 150–400)
PMV BLD AUTO: 9.2 FL (ref 8.9–12.9)
POTASSIUM SERPL-SCNC: 4.1 MMOL/L (ref 3.5–5.1)
PROT SERPL-MCNC: 6 G/DL (ref 6.4–8.2)
PROTHROMBIN TIME: 11.4 SEC (ref 9–11.1)
Q-T INTERVAL, ECG07: 390 MS
QRS DURATION, ECG06: 78 MS
QTC CALCULATION (BEZET), ECG08: 453 MS
RBC # BLD AUTO: 3.11 M/UL (ref 3.8–5.2)
SERVICE CMNT-IMP: ABNORMAL
SODIUM SERPL-SCNC: 139 MMOL/L (ref 136–145)
TROPONIN I SERPL-MCNC: <0.05 NG/ML
VENTRICULAR RATE, ECG03: 81 BPM
WBC # BLD AUTO: 7.1 K/UL (ref 3.6–11)

## 2020-08-17 PROCEDURE — 82550 ASSAY OF CK (CPK): CPT

## 2020-08-17 PROCEDURE — 85610 PROTHROMBIN TIME: CPT

## 2020-08-17 PROCEDURE — 94760 N-INVAS EAR/PLS OXIMETRY 1: CPT

## 2020-08-17 PROCEDURE — 93306 TTE W/DOPPLER COMPLETE: CPT

## 2020-08-17 PROCEDURE — 96372 THER/PROPH/DIAG INJ SC/IM: CPT

## 2020-08-17 PROCEDURE — 82272 OCCULT BLD FECES 1-3 TESTS: CPT

## 2020-08-17 PROCEDURE — 97165 OT EVAL LOW COMPLEX 30 MIN: CPT

## 2020-08-17 PROCEDURE — 93005 ELECTROCARDIOGRAM TRACING: CPT

## 2020-08-17 PROCEDURE — 71045 X-RAY EXAM CHEST 1 VIEW: CPT

## 2020-08-17 PROCEDURE — 36415 COLL VENOUS BLD VENIPUNCTURE: CPT

## 2020-08-17 PROCEDURE — 84484 ASSAY OF TROPONIN QUANT: CPT

## 2020-08-17 PROCEDURE — 83880 ASSAY OF NATRIURETIC PEPTIDE: CPT

## 2020-08-17 PROCEDURE — 82962 GLUCOSE BLOOD TEST: CPT

## 2020-08-17 PROCEDURE — 83735 ASSAY OF MAGNESIUM: CPT

## 2020-08-17 PROCEDURE — 97535 SELF CARE MNGMENT TRAINING: CPT

## 2020-08-17 PROCEDURE — 99218 HC RM OBSERVATION: CPT

## 2020-08-17 PROCEDURE — 80053 COMPREHEN METABOLIC PANEL: CPT

## 2020-08-17 PROCEDURE — 84100 ASSAY OF PHOSPHORUS: CPT

## 2020-08-17 PROCEDURE — 97161 PT EVAL LOW COMPLEX 20 MIN: CPT

## 2020-08-17 PROCEDURE — 74011250636 HC RX REV CODE- 250/636: Performed by: INTERNAL MEDICINE

## 2020-08-17 PROCEDURE — 74011250637 HC RX REV CODE- 250/637: Performed by: INTERNAL MEDICINE

## 2020-08-17 PROCEDURE — 96375 TX/PRO/DX INJ NEW DRUG ADDON: CPT

## 2020-08-17 PROCEDURE — 74011636637 HC RX REV CODE- 636/637: Performed by: INTERNAL MEDICINE

## 2020-08-17 PROCEDURE — 77030038269 HC DRN EXT URIN PURWCK BARD -A

## 2020-08-17 PROCEDURE — 85025 COMPLETE CBC W/AUTO DIFF WBC: CPT

## 2020-08-17 PROCEDURE — 83036 HEMOGLOBIN GLYCOSYLATED A1C: CPT

## 2020-08-17 PROCEDURE — 96376 TX/PRO/DX INJ SAME DRUG ADON: CPT

## 2020-08-17 PROCEDURE — 97116 GAIT TRAINING THERAPY: CPT

## 2020-08-17 RX ORDER — DEXTROMETHORPHAN POLISTIREX 30 MG/5 ML
SUSPENSION, EXTENDED RELEASE 12 HR ORAL
Status: COMPLETED | OUTPATIENT
Start: 2020-08-17 | End: 2020-08-17

## 2020-08-17 RX ORDER — FUROSEMIDE 10 MG/ML
40 INJECTION INTRAMUSCULAR; INTRAVENOUS ONCE
Status: COMPLETED | OUTPATIENT
Start: 2020-08-17 | End: 2020-08-17

## 2020-08-17 RX ADMIN — Medication 10 ML: at 13:47

## 2020-08-17 RX ADMIN — AMLODIPINE BESYLATE 5 MG: 5 TABLET ORAL at 09:38

## 2020-08-17 RX ADMIN — Medication 10 ML: at 07:11

## 2020-08-17 RX ADMIN — ONDANSETRON 4 MG: 2 INJECTION INTRAMUSCULAR; INTRAVENOUS at 06:10

## 2020-08-17 RX ADMIN — ATORVASTATIN CALCIUM 80 MG: 40 TABLET, FILM COATED ORAL at 22:11

## 2020-08-17 RX ADMIN — LINEZOLID 600 MG: 600 TABLET, FILM COATED ORAL at 21:15

## 2020-08-17 RX ADMIN — GABAPENTIN 300 MG: 300 CAPSULE ORAL at 09:38

## 2020-08-17 RX ADMIN — INSULIN LISPRO 4 UNITS: 100 INJECTION, SOLUTION INTRAVENOUS; SUBCUTANEOUS at 12:50

## 2020-08-17 RX ADMIN — Medication 10 ML: at 22:12

## 2020-08-17 RX ADMIN — LINEZOLID 600 MG: 600 TABLET, FILM COATED ORAL at 09:49

## 2020-08-17 RX ADMIN — DOCUSATE SODIUM 100 MG: 100 CAPSULE, LIQUID FILLED ORAL at 17:20

## 2020-08-17 RX ADMIN — ACETAMINOPHEN 650 MG: 325 TABLET ORAL at 00:31

## 2020-08-17 RX ADMIN — CLOPIDOGREL BISULFATE 75 MG: 75 TABLET ORAL at 09:39

## 2020-08-17 RX ADMIN — GABAPENTIN 300 MG: 300 CAPSULE ORAL at 17:20

## 2020-08-17 RX ADMIN — FAMOTIDINE 20 MG: 20 TABLET ORAL at 09:38

## 2020-08-17 RX ADMIN — ENOXAPARIN SODIUM 40 MG: 40 INJECTION SUBCUTANEOUS at 17:20

## 2020-08-17 RX ADMIN — TRAMADOL HYDROCHLORIDE 50 MG: 50 TABLET, FILM COATED ORAL at 10:15

## 2020-08-17 RX ADMIN — ENOXAPARIN SODIUM 40 MG: 40 INJECTION SUBCUTANEOUS at 00:21

## 2020-08-17 RX ADMIN — INSULIN LISPRO 4 UNITS: 100 INJECTION, SOLUTION INTRAVENOUS; SUBCUTANEOUS at 17:20

## 2020-08-17 RX ADMIN — METOPROLOL TARTRATE 12.5 MG: 25 TABLET, FILM COATED ORAL at 09:39

## 2020-08-17 RX ADMIN — METOPROLOL TARTRATE 12.5 MG: 25 TABLET, FILM COATED ORAL at 17:20

## 2020-08-17 RX ADMIN — INSULIN LISPRO 4 UNITS: 100 INJECTION, SOLUTION INTRAVENOUS; SUBCUTANEOUS at 07:10

## 2020-08-17 RX ADMIN — MINERAL OIL: 100 ENEMA RECTAL at 18:05

## 2020-08-17 RX ADMIN — TRAMADOL HYDROCHLORIDE 50 MG: 50 TABLET, FILM COATED ORAL at 17:20

## 2020-08-17 RX ADMIN — DOCUSATE SODIUM 100 MG: 100 CAPSULE, LIQUID FILLED ORAL at 09:39

## 2020-08-17 RX ADMIN — FUROSEMIDE 40 MG: 10 INJECTION, SOLUTION INTRAMUSCULAR; INTRAVENOUS at 09:38

## 2020-08-17 RX ADMIN — FAMOTIDINE 20 MG: 20 TABLET ORAL at 17:20

## 2020-08-17 RX ADMIN — INSULIN LISPRO 2 UNITS: 100 INJECTION, SOLUTION INTRAVENOUS; SUBCUTANEOUS at 22:11

## 2020-08-17 RX ADMIN — GABAPENTIN 300 MG: 300 CAPSULE ORAL at 22:11

## 2020-08-17 NOTE — ROUTINE PROCESS
TRANSFER - OUT REPORT:    Verbal report given to Danni(name) on Justin Speed  being transferred to [facility](unit) for routine progression of care       Report consisted of patients Situation, Background, Assessment and   Recommendations(SBAR). Information from the following report(s) SBAR, Kardex, ED Summary, [facility] and Recent Results was reviewed with the receiving nurse. Lines:   Peripheral IV 08/16/20 Right Antecubital (Active)        Opportunity for questions and clarification was provided.       Patient transported with:   Monitor  Registered Nurse

## 2020-08-17 NOTE — CONSULTS
[facility]  CLINICAL NURSE SPECIALIST CONSULT  [facility]    INITIAL NOTE    Presentation   Ana Laura Fagan is a 47 y.o. female with a PMH of CHF, diabetes with neuropathy, HTN, CHF, HLD, CVA (x2) and recent hospitalization at [facility] for left leg cellulitis who presented to the ED with acute worsening SOB and was admitted with acute CHF exacerbation. DX: Acute CHF exacerbation   TX: Diuresis     Current clinical course has been complicated by hyperglycemia. Diabetes: Patient has known Type two diabetes, treated with metformin, glipizide and humalog PTA. Admitting A1C 7.9%   Consulted by Provider for advanced diabetes nursing assessment and care, specifically related to   [] Transitioning off Raquela Geovanniy   [x] Inpatient management strategy  [] Home management assessment  [] Survival skill education    Diabetes-related medical history  Acute complications: Hyperglycemia without acidosis  Neurological complications  Peripheral neuropathy  Microvascular disease  Nephropathy  Macrovascular disease  Cerebral vascular accident      Diabetes medication history  Drug class Currently in use Discontinued Never used   Biguanide Metformin 500 mg 3 times daily     DDP-4 inhibitor       Sulfonylurea glipizide 5 mg daily     Thiazolidinedione      GLP-1 RA      SGLT-2 inhibitors      Basal insulin      Fixed Dose  Combinations      Bolus insulin Novolog 35 units with meals of glucose over 100       Subjective   Patient reports a 30 year history of diabetes  A1C 7.9% (up from 6.2% 12/18)  Lives with sister and adult son  She is disabled and does not work  Sister does all cooking  Will eat 1-2 meals a day  Reports compliance with medications   Largely inactive    Objective   Physical exam  General Alert, oriented and in no acute distress/ill-appearing. Conversant and cooperative.    Vital Signs   Visit Vitals  /71 (BP 1 Location: Left arm, BP Patient Position: At rest)   Pulse 77   Temp 98.2 °F (36.8 °C) Resp 18   Ht 5' 4\" (1.626 m)   Wt 128 kg (282 lb 3 oz)   SpO2 97%   BMI 48.44 kg/m²     Skin  Warm and dry. Acanthosis noted along neckline. No lipohypertrophy or lipoatrophy noted at injection sites   Heart   Regular rate and rhythm. No murmurs, rubs or gallops  Lungs  Clear to auscultation without rales or rhonchi  Extremities No foot wounds    Diabetic foot exam:    Left Foot     Visual Exam: normal    Pulse DP: 2+ (normal)   Filament test: reduced sensation    Vibratory sensation: diminished  Right Foot   Visual Exam: normal    Pulse DP: 2+ (normal)   Filament test: reduced sensation    Vibratory sensation: diminished DP & PT pulses +2. Laboratory  Lab Results   Component Value Date/Time    Hemoglobin A1c 7.9 (H) 08/17/2020 05:03 AM    Hemoglobin A1c (POC) 10.2 06/25/2015 11:01 AM     Lab Results   Component Value Date/Time    LDL,Direct 57 11/15/2011 10:50 AM    LDL, calculated 143.6 (H) 12/21/2018 02:36 AM     Lab Results   Component Value Date/Time    Creatinine (POC) 0.6 04/20/2013 06:08 PM    Creatinine 1.24 (H) 08/17/2020 04:48 AM     Lab Results   Component Value Date/Time    Sodium 139 08/17/2020 04:48 AM    Potassium 4.1 08/17/2020 04:48 AM    Chloride 107 08/17/2020 04:48 AM    CO2 25 08/17/2020 04:48 AM    Anion gap 7 08/17/2020 04:48 AM    Glucose 190 (H) 08/17/2020 04:48 AM    BUN 18 08/17/2020 04:48 AM    Creatinine 1.24 (H) 08/17/2020 04:48 AM    BUN/Creatinine ratio 15 08/17/2020 04:48 AM    GFR est AA 55 (L) 08/17/2020 04:48 AM    GFR est non-AA 45 (L) 08/17/2020 04:48 AM    Calcium 8.8 08/17/2020 04:48 AM    Bilirubin, total 0.2 08/17/2020 04:48 AM    Alk.  phosphatase 107 08/17/2020 04:48 AM    Protein, total 6.0 (L) 08/17/2020 04:48 AM    Albumin 2.1 (L) 08/17/2020 04:48 AM    Globulin 3.9 08/17/2020 04:48 AM    A-G Ratio 0.5 (L) 08/17/2020 04:48 AM    ALT (SGPT) 15 08/17/2020 04:48 AM     Lab Results   Component Value Date/Time    ALT (SGPT) 15 08/17/2020 04:48 AM       Factors affecting BG pattern  Factor Dose Comments   Nutrition:  Carb-controlled meals   60 grams/meal   Change to consistent carb cardiac    Decreased GFR GFR 41  Impaired ability to filter and excrete glucose    Infection LE cellulitis On linezolid PO     Blood glucose pattern        Assessment and Plan   Nursing Diagnosis Risk for unstable blood glucose pattern   Nursing Intervention Domain 5258 Decision-making Support   Nursing Interventions Examined current inpatient diabetes control   Explored factors facilitating and impeding inpatient management  Identified self-management practices impeding diabetes control  Explored corrective strategies with patient and responsible inpatient provider   Informed patient of rational for insulin strategy while hospitalized     Evaluation   Al Gonzalez is a 47year old female with diabetes with neuropathy and likely nephropathy admitted with anasarca and hypoalbuminemia. Diuresis has been initiated. At this time, home oral antihyperglycemic agents have been held and correctional insulin has been initiated. Glucose at this time now above goal and the subcutaneous insulin orderset can be adjusted to target in patient glucose goal of 100-180. Given her admitting A1C of 7.9%, it would be reasonable to resume PTA medications on discharge. Recommendations   Recommend:  Initiate the subcutaneous insulin order set with:  1. Low dose basal insulin: 0.2 units/kg/day. First dose now  2. Correctional insulin ACHS at normal sensitivity, start at a glucose of 200  3. POC glucose ACHS  4. Consistent Carbohydrate diet  5. If patient is experiencing pre-prandial hyperglycemia over 200 on basal and correctional insulin, add Low dose bolus insulin. 0.05 units/kg/meal. Hold if patient consumes less than 50% of carbohydrates on meal tray  6. Consider nephrology for evaluation of decreased GFR    On discharge:  [id number] Rocío Machado to resume oral antihyperglycemic agents on PTA doses.   Patient to continue to monitor glucose     Billing Code(s)   I personally reviewed chart, notes, data and current medications in the medical record, and examined the patient at bedside before making care recommendations. Thank you for including us in their care. I spent 40 minutes in direct patient care today for this patient.   Time includes chart review, face to face with patient and collaboration with interdisciplinary care team.        FERN Finnegan  [facility]  Access via [address] [place] [address]  [phone]

## 2020-08-17 NOTE — CONSULTS
S Cardiology Consult Note    Date of consult:  08/17/20  Date of admission: 8/16/2020  Primary Cardiologist: none (? Saw Dr Saleem Curran at [facility])  Physician Requesting consult: Dr Joanna Espino / Reason for consult: Possible CHF    History of the presenting illness:  Rahul Flores is a 47 y.o. F with morbid obesity, HTN and type II diabetes, who was admitted yesterday with symptoms of worsening lower extremity edema, abdominal distension and shortness of breath. She has had recent weight gain. No chest pain. She apparently was admitted to [facility] recently for left leg cellulitis. She apparently had a stress test at [facility] and was told that was normal.     Past Medical History:   Diagnosis Date    Arthritis     Asthma     CHF (congestive heart failure) (HCC)     Chronic back pain     [facility] ([facility]     Diabetic retinopathy ([facility]     Hypertension     MRSA (methicillin resistant staph aureus) culture positive     labial abscess    Neuropathy        Prior to Admission medications    Medication Sig Start Date End Date Taking? Authorizing Provider   metoprolol tartrate (LOPRESSOR) 25 mg tablet Take 12.5 mg by mouth two (2) times a day. Yes Provider, Historical   furosemide (LASIX) 20 mg tablet Take 20 mg by mouth. Yes Provider, Historical   metFORMIN (GLUCOPHAGE) 500 mg tablet Take 500 mg by mouth three (3) times daily (with meals). Yes Provider, Historical   insulin aspart U-100 (NovoLOG U-100 Insulin aspart) 100 unit/mL injection 35 Units by SubCUTAneous route Before breakfast, lunch, and dinner. Yes Provider, Historical   amitriptyline (ELAVIL) 50 mg tablet Take 50 mg by mouth nightly. Yes Provider, Historical   docusate sodium (Colace) 100 mg capsule Take 100 mg by mouth two (2) times a day. Yes Provider, Historical   aspirin 81 mg chewable tablet Take 1 Tab by mouth daily. 12/23/18  Yes Sana López, DO   atorvastatin (LIPITOR) 80 mg tablet Take 1 Tab by mouth nightly.  12/22/18  Yes Elier Murry Sana DEWITT, DO   clopidogrel (PLAVIX) 75 mg tab Take 1 Tab by mouth daily. 12/23/18  Yes Sana López, DO   gabapentin (NEURONTIN) 300 mg capsule Take 900 mg by mouth three (3) times daily. Yes Provider, Historical   insulin lispro (HUMALOG) 100 unit/mL injection Take 32 units with meals, only if BG > 100. Indications: HYPERGLYCEMIA, TYPE 2 DIABETES MELLITUS 10/18/16  Yes Luz Burrows MD   glipiZIDE (GLUCOTROL) 5 mg tablet Take 1 Tab by mouth two (2) times a day. 6/25/15  Yes Becca Nayak NP   aspirin-acetaminophen-caffeine (EXCEDRIN ES) 250-250-65 mg per tablet Take 2 Tabs by mouth every six (6) hours as needed for Headache. Provider, Historical   acetaminophen (TYLENOL) 325 mg tablet Take 2 Tabs by mouth every four (4) hours as needed for Pain. 2/26/18   Ted Reyes PA   ondansetron (ZOFRAN ODT) 4 mg disintegrating tablet Take 1 Tab by mouth every eight (8) hours as needed for Nausea. 12/25/16   Axel Barlow,    lisinopril (PRINIVIL, ZESTRIL) 10 mg tablet Take 1 Tab by mouth daily. 6/25/15   Sheila Perry NP   traZODone (DESYREL) 50 mg tablet Take 1 Tab by mouth nightly. 6/25/15   Sheila JulyAIMEE       Allergies   Allergen Reactions    Aspirin Unknown (comments)     Patient states naproxen ok    Ciprofloxacin Hives        History reviewed. No pertinent family history.     Social History     Socioeconomic History    Marital status:      Spouse name: Not on file    Number of children: Not on file    Years of education: Not on file    Highest education level: Not on file   Occupational History    Not on file   Social Needs    Financial resource strain: Not on file    Food insecurity     Worry: Not on file     Inability: Not on file    Transportation needs     Medical: Not on file     Non-medical: Not on file   Tobacco Use    Smoking status: Never Smoker    Smokeless tobacco: Never Used   Substance and Sexual Activity    Alcohol use: No    Drug use: No    Sexual activity: Not on file   Lifestyle    Physical activity     Days per week: Not on file     Minutes per session: Not on file    Stress: Not on file   Relationships    Social connections     Talks on phone: Not on file     Gets together: Not on file     Attends Sikhism service: Not on file     Active member of club or organization: Not on file     Attends meetings of clubs or organizations: Not on file     Relationship status: Not on file    Intimate partner violence     Fear of current or ex partner: Not on file     Emotionally abused: Not on file     Physically abused: Not on file     Forced sexual activity: Not on file   Other Topics Concern    Not on file   Social History Narrative    Not on file       Review of Systems   Constitutional: Negative for chills and fever. HENT: Negative for hearing loss and nosebleeds. Eyes: Negative for blurred vision and double vision. Respiratory: Positive for shortness of breath. Negative for cough and sputum production. Cardiovascular: Positive for orthopnea and leg swelling. Negative for chest pain. Gastrointestinal: Negative for abdominal pain, nausea and vomiting. Genitourinary: Negative for frequency and urgency. Musculoskeletal: Negative for back pain and joint pain. Skin: Negative for itching and rash. Neurological: Negative for dizziness and headaches. Endo/Heme/Allergies: Negative for environmental allergies. Does not bruise/bleed easily. Visit Vitals  /71 (BP 1 Location: Left arm, BP Patient Position: At rest)   Pulse 77   Temp 98.2 °F (36.8 °C)   Resp 18   Ht 5' 4\" (1.626 m)   Wt 128 kg (282 lb 3 oz)   LMP 05/01/2013   SpO2 95%   BMI 48.44 kg/m²     Physical Exam  Constitutional:       Appearance: She is obese. HENT:      Head: Normocephalic and atraumatic. Eyes:      General: No scleral icterus. Conjunctiva/sclera: Conjunctivae normal.   Neck:      Vascular: No JVD.    Cardiovascular:      Rate and Rhythm: Normal rate and regular rhythm. Heart sounds: Normal heart sounds. No murmur. No gallop. Pulmonary:      Effort: Pulmonary effort is normal. No respiratory distress. Breath sounds: Normal breath sounds. No stridor. No wheezing. Abdominal:      General: There is distension. Palpations: Abdomen is soft. Musculoskeletal: Normal range of motion. General: No deformity. Right lower leg: Edema present. Left lower leg: Edema present. Skin:     General: Skin is warm and dry. Neurological:      Mental Status: She is alert and oriented to person, place, and time.    Psychiatric:         Mood and Affect: Mood and affect normal.         Lab review:  BMP:   Lab Results   Component Value Date/Time     08/17/2020 04:48 AM    K 4.1 08/17/2020 04:48 AM     08/17/2020 04:48 AM    CO2 25 08/17/2020 04:48 AM    AGAP 7 08/17/2020 04:48 AM     (H) 08/17/2020 04:48 AM    BUN 18 08/17/2020 04:48 AM    CREA 1.24 (H) 08/17/2020 04:48 AM    GFRAA 55 (L) 08/17/2020 04:48 AM    GFRNA 45 (L) 08/17/2020 04:48 AM        CBC:  Lab Results   Component Value Date/Time    WBC 7.1 08/17/2020 04:48 AM    Hemoglobin (POC) 13.9 04/20/2013 06:08 PM    HGB 8.9 (L) 08/17/2020 04:48 AM    Hematocrit (POC) 41 04/20/2013 06:08 PM    HCT 27.5 (L) 08/17/2020 04:48 AM    PLATELET 288 96/60/5726 04:48 AM    MCV 88.4 08/17/2020 04:48 AM       All Cardiac Markers in the last 24 hours:    Lab Results   Component Value Date/Time    CPK 62 08/17/2020 04:48 AM    TROIQ <0.05 08/17/2020 04:48 AM    TROIQ <0.05 08/16/2020 02:02 PM    BNPNT 913 (H) 08/17/2020 04:48 AM    BNPNT 657 (H) 08/16/2020 02:02 PM       Lab Results   Component Value Date/Time    Cholesterol, total 213 (H) 12/21/2018 02:36 AM    Cholesterol (POC) 206 06/25/2015 11:03 AM    HDL Cholesterol 43 12/21/2018 02:36 AM    HDL Cholesterol (POC) 26 06/25/2015 11:03 AM    LDL,Direct 57 11/15/2011 10:50 AM    LDL Cholesterol (POC) 126 06/25/2015 11:03 AM    LDL, calculated 143.6 (H) 12/21/2018 02:36 AM    VLDL, calculated 26.4 12/21/2018 02:36 AM    Triglyceride 132 12/21/2018 02:36 AM    Triglycerides (POC) 270 06/25/2015 11:03 AM    CHOL/HDL Ratio 5.0 12/21/2018 02:36 AM        Data review:  EKG tracing personally reviewed: sinus rhythm, low voltage    Echocardiogram:  08/16/20   ECHO ADULT COMPLETE 08/17/2020 8/17/2020    Narrative · LV: Estimated LVEF is 60 - 65%. Normal cavity size, systolic function   (ejection fraction normal) and diastolic function. Mild concentric   hypertrophy. · LA: Mildly dilated left atrium. · RV: Not well visualized. Signed by: Black Alejo MD       Other imaging:   CXR - normal (film personally reviewed)    CT Abdo   IMPRESSION:     1. Bilateral nephrolithiasis. 2. Moderate colonic stool. Assessment:    1. Anasarca  Overall she does seem fluid overloaded  Her echo showed normal systolic and diastolic function, so I would not label her fluid overload state as CHF. BNP can be elevated with volume overload of any cause, so that does not confirm a diagnosis of CHF. Her albumin level is extremely low at 2.1, and this can certainly be a cause of edema / anasarca    2. Hypoalbuminemia  3. Type II diabetes with diabetic CKD  4. CKD stage 3. ? Proteinuria as potential cause of low albumin  5. Morbid obesity    Recommendations / Plan:    Reasonable to continue lasix  Needs weight loss and nutritional counseling. Suggest UA +/- 24hr urine protein collection   Consider Nephrology consult    I don't think further work-up for the heart is needed at this time since has had an unremarkable echo and a normal stress test recently. Signed:  Nemo ELIZABETH  [facility]  Interventional Cardiology  08/17/20

## 2020-08-17 NOTE — PROGRESS NOTES
Occupational Therapy  08/17/20    1156: Order acknowledged, chart reviewed. Attempted to see patient for OT evaluation however just set up for lunch, pleasantly requesting to defer, agreeable for OT to return after lunch. Will follow up this afternoon as able & appropriate. 1510: Attempted for OT evaluation again this afternoon, patient on phone requesting brief deferral, then wound care coming into room. Will follow up tomorrow for OT evaluation as able & appropriate.      Thank you,   Thierno Cruz, OTD, OTR/L

## 2020-08-17 NOTE — PROGRESS NOTES
[address] [facility] Adult  Hospitalist Group                                                                                          Hospitalist Progress Note  Kyle Carrasco MD  Answering service: [phone] OR [phone] from in house phone        Date of Service:  2020  NAME:  Joslyn Ribera  :  1965  MRN:  [id number]      Admission Summary:   Joslyn Ribera is a 47 y.o. female PMH of CHF, with PMH of DM 2, HTN, asthma,?  CHF, fluids overload, left leg cellulitis with recent hospitalization for the same at [facility], HLD, history of CVA x2 in 2018, neuropathy, headache, Constipation, sleep disturbances.     Patient has left ankle wound/scabbed lesion develop with tenderness and redness at the beginning of August for which she went to [facility] ED on 8/3 and was discharged the same night with some antibiotics. On  patient had progressive redness with pain and swelling over the left ankle progressive upwards to mid leg for which she went to [facility] and was admitted until . Patient was discharged with some antibiotics. On , next day patient noted worsening of swelling further along with abdominal distention and went to [facility] again where she got admitted until . Patient had stress test which was unremarkable and echocardiogram for which she has been told to have congestive heart failure, no other details available. Patient was discharged with Lasix 20 mg p.o. daily and Zyvox 600 mg twice daily for 12 more days. Patient started taking Zyvox on 2020. She still has been on it. On her last admission, patient also had shortness of breath in form of significant dyspnea on exertion. Patient uses 2 pillow to lay down but denied any worsening recently. Patient complained of significant weight gain about 70 pounds in last 1 month.   Patient is advised not to use amitriptyline while she is taking Zyvox due to drug drug interaction which she uses for sleep. Apparently, since discharge patient has not felt better and her symptoms of dyspnea on exertion,  Swelling, abdominal distention so she decided to come to ED here at [facility]. Patient denied any abdominal pain, nausea, vomiting. No constipation or diarrhea. Last bowel movement today.     In the ED, patient is evaluated for the same. CBC unremarkable with WBC 6.5 hemoglobin 10.1 platelet 401. Differential unremarkable. CMP pertinent for creatinine 1.35, BUN 22, otherwise unremarkable. Troponin less than 0.05, proBNP 657. CXR unremarkable. Left LE duplex US unremarkable for any DVT/VTE. EKG unchanged and non-concerning.     Patient was seen by [facility] cardiology group Dr. Jay Cuellar at [facility].  Patient sister at bedside is her next of kin. She has experience with Dr. Haritha Burns for her . Patient and her sister both would prefer Dr. Haritha Burns or his partner to be involved in the care. Interval history / Subjective: Follow up [facility], abdominal distention. Patient seen and examined at the bedside. Labs, images and notes reviewed  Discussed with nursing staff, orders reviewed. Plan discussed with patient/Family  Patient is feeling better. Having frequent bowel movements last night and today. Breathing improving. Not much change in abdominal distention feeling. CT A/P results discussed with patient. Outpatient urology follow-up for bilateral nephrolithiasis discussed. Patient agreeable. Blood pressure significantly elevated overnight and in the morning. Much better after medication adjustment. CK checked as patient is on Zyvox. 62 on 8/17/2020. Echocardiogram noted. EF 60-65%. Normal diastolic function as well. Assessment & Plan:     #Concern for fluid overload with dyspnea on exertion and abdominal distention. Patient seems comfortable, no hypoxia, appears close to euvolemic, no respiratory distress, no orthopnea/PND. Abdomen limited exam given habitus. ? Baseline habitus versus distention versus masked findings. Some tightness perceived on exam-palpation. Pulmonary edema on CXR on 8/17  #Moderate colonic stool burden on CT A/P  #CHF exacerbation, unknown systolic or diastolic components or details. Clinically does not feel strongly for CHF exacerbation at present. Echocardiogram 8/17/2020 unremarkable for any systolic or diastolic dysfunction. #Hypertensive urgency, BP around 562-257 systolic On admission, improving  #Acute kidney insufficiency versus CKD 3  #Left lower extremity/leg cellulitis, likely MRSA, on Zyvox ongoing treatment  #Normocytic anemia  #HLD  #History of CVA x2 in 9/2018  #Asthma  #? GERD  #Constipation  #Bilateral nephrolithiasis, non-obstructing        Plan:      -Admitted to inpatient, telemetry, ALOS greater than 2 MN  -Lasix 40 mg IV given in the ED. Will give 1 more dose today and reassess  -CXR with Pulmonary edema. Reassess CXR in 1-2 days post-diuresis  -I/O, daily weight, close monitoring-BMP, CBC  -Trend CBC, BMP, proBNP, mag  -Iron panel, ferritin, W46, folic acid WNL  -Stool Hemoccult  -CT A/P without contrast given concern for CKD versus EMMANUEL noted. -Enemas x 2 today and reassess. Need for good large BMs to empty colon  -Cardiology consult: Dr. Suzy Ribera group per pt request  -Serial troponin, EKG  -Echocardiogram noted. Unremarkable for any CHF. No other concerns. -Get records from [facility]  -PRN IV hydralazine or labetalol for blood pressure greater than 170/100  -Resume home medications  -Continue Zyvox p.o. 600 mg twice daily for 12 days as already ongoing. Monitor CPK periodically. 62 on 8/17/2020.  -Wound care consult  -Further based on CT A/P, echo results  -Repeat CXR in a.m. as needed based on clinical course  -Correctional insulin with Accu-Cheks-resistant profile. No basal insulin.   Hold home metformin and glipizide.  -Hold Fioricet and amitriptyline given Zyvox.  -Continue other home medications as noted above.  -Consider Urology consutation - inpatient vs outpatient, for b/l nephrolithiasis     CODE STATUS: Full code  DVT prophylaxis: Lovenox SQ     Emergency contact: Sister, Ms. Elena Downing, [phone]     FUNCTIONAL STATUS PRIOR  [address] Amb[address] with Use of Assistive Devices (including history of recent falls)       Care Plan discussed with: Patient/Family and Nurse  Anticipated Disposition: Home w/Family  Anticipated Discharge: 24 hours to 48 hours     Hospital Problems  Date Reviewed: 12/20/2018          Codes Class Noted POA    Acute exacerbation of CHF (congestive heart failure) ([facility] [address] ICD-10-CM: I50.9  ICD-9-CM: 428.0  8/16/2020 Unknown                Review of Systems:   A comprehensive review of systems was negative except for that written in the HPI. Vital Signs:    Last 24hrs VS reviewed since prior progress note. Most recent are:  Visit Vitals  /67 (BP 1 Location: Left arm, BP Patient Position: At rest)   Pulse 80   Temp 98.1 °F (36.7 °C)   Resp 16   Ht 5' 4\" (1.626 m)   Wt 128.2 kg (282 lb 10.1 oz)   SpO2 96%   BMI 48.51 kg/m²         Intake/Output Summary (Last 24 hours) at 8/17/2020 0756  Last data filed at 8/17/2020 1868  Gross per 24 hour   Intake 770 ml   Output 3200 ml   Net -2430 ml        Physical Examination:     General:          Alert, cooperative, no distress, appears stated age. Adriana Knows  HEENT:           Atraumatic, anicteric sclerae, pink conjunctivae                          No oral ulcers, mucosa mild dry, throat clear, dentition fair  Neck:               Supple, symmetrical  Lungs:              Minimal scattered wheezing anterosuperiorly-likely related to referred sounds from forcing air against narrow glottis. Otherwise clear to auscultation bilaterally.  No Wheezing or Rhonchi. No rales. Chest wall:      No tenderness  No Accessory muscle use. Heart:              Regular  rhythm,  No  murmur.   Mild baseline +2 lymphedematous legs, not specifically pitting clearly. Abdomen:        Soft, large distended belly, firm, limited exam for guarding is felt tight at few places. Bowel sounds normal  Extremities:     No cyanosis.  No clubbing,   left leg mild erythema around ankle with scabbed wound, mild tenderness.  Skin turgor normal, Capillary refill normal  Skin:                Not pale.  Not Jaundiced  No rashes   Psych:             Not anxious or agitated. Neurologic:      Alert, moves all extremities, answers questions appropriately and responds to commands        Data Review:    Review and/or order of clinical lab test  Review and/or order of tests in the radiology section of CPT  Review and/or order of tests in the medicine section of CPT    Xr Chest Pa Lat    Result Date: 8/16/2020  Impression: No acute process. Ct Abd Pelv Wo Cont    Result Date: 8/16/2020  IMPRESSION: 1. Bilateral nephrolithiasis. 2. Moderate colonic stool. Xr Chest Port    Result Date: 8/17/2020  IMPRESSION: Pulmonary edema. Labs:     Recent Labs     08/17/20 0448 08/16/20  1402   WBC 7.1 6.5   HGB 8.9* 10.1*   HCT 27.5* 31.5*    196     Recent Labs     08/17/20  0448 08/16/20  1402    138   K 4.1 4.2    108   CO2 25 25   BUN 18 22*   CREA 1.24* 1.35*   * 153*   CA 8.8 8.9   MG 1.8  --    PHOS 3.3  --      Recent Labs     08/17/20  0448 08/16/20  1402   ALT 15 18    114   TBILI 0.2 0.2   TP 6.0* 7.1   ALB 2.1* 2.5*   GLOB 3.9 4.6*     Recent Labs     08/17/20  0448   INR 1.1   PTP 11.4*      Recent Labs     08/16/20  1402   TIBC 305   PSAT 27   FERR 64      Lab Results   Component Value Date/Time    Folate 8.0 08/16/2020 02:02 PM      No results for input(s): PH, PCO2, PO2 in the last 72 hours.   Recent Labs     08/17/20  0448 08/16/20  1402   TROIQ <0.05 <0.05     Lab Results   Component Value Date/Time    Cholesterol, total 213 (H) 12/21/2018 02:36 AM    HDL Cholesterol 43 12/21/2018 02:36 AM    LDL,Direct 57 11/15/2011 10:50 AM    LDL, calculated 143.6 (H) 12/21/2018 02:36 AM    Triglyceride 132 12/21/2018 02:36 AM    CHOL/HDL Ratio 5.0 12/21/2018 02:36 AM     Lab Results   Component Value Date/Time    Glucose (POC) 206 (H) 08/17/2020 06:59 AM    Glucose (POC) 153 (H) 08/16/2020 10:48 PM    Glucose (POC) 134 (H) 12/22/2018 11:47 AM    Glucose (POC) 193 (H) 12/22/2018 07:26 AM    Glucose (POC) 148 (H) 12/21/2018 09:53 PM     Lab Results   Component Value Date/Time    Color YELLOW/STRAW 12/20/2018 04:47 PM    Appearance CLOUDY (A) 12/20/2018 04:47 PM    Specific gravity 1.029 12/20/2018 04:47 PM    Specific gravity >1.030 (H) 06/24/2018 03:18 AM    pH (UA) 6.0 12/20/2018 04:47 PM    Protein 300 (A) 12/20/2018 04:47 PM    Glucose NEGATIVE  12/20/2018 04:47 PM    Ketone TRACE (A) 12/20/2018 04:47 PM    Bilirubin NEGATIVE  12/20/2018 04:47 PM    Urobilinogen 0.2 12/20/2018 04:47 PM    Nitrites NEGATIVE  12/20/2018 04:47 PM    Leukocyte Esterase NEGATIVE  12/20/2018 04:47 PM    Epithelial cells FEW 12/20/2018 04:47 PM    Bacteria NEGATIVE  12/20/2018 04:47 PM    WBC 0-4 12/20/2018 04:47 PM    RBC 5-10 12/20/2018 04:47 PM         Medications Reviewed:     Current Facility-Administered Medications   Medication Dose Route Frequency    mineral oil (FLEET) enema   Rectal NOW    sodium chloride (NS) flush 5-40 mL  5-40 mL IntraVENous Q8H    sodium chloride (NS) flush 5-40 mL  5-40 mL IntraVENous PRN    acetaminophen (TYLENOL) tablet 650 mg  650 mg Oral Q6H PRN    Or    acetaminophen (TYLENOL) suppository 650 mg  650 mg Rectal Q6H PRN    polyethylene glycol (MIRALAX) packet 17 g  17 g Oral DAILY PRN    promethazine (PHENERGAN) tablet 12.5 mg  12.5 mg Oral Q6H PRN    Or    ondansetron (ZOFRAN) injection 4 mg  4 mg IntraVENous Q6H PRN    enoxaparin (LOVENOX) injection 40 mg  40 mg SubCUTAneous DAILY    linezolid (ZYVOX) tablet 600 mg  600 mg Oral Q12H    hydrALAZINE (APRESOLINE) 20 mg/mL injection 10 mg  10 mg IntraVENous Q6H PRN    labetaloL (NORMODYNE;TRANDATE) injection 10 mg  10 mg IntraVENous Q6H PRN    amLODIPine (NORVASC) tablet 5 mg  5 mg Oral DAILY    gabapentin (NEURONTIN) capsule 300 mg  300 mg Oral TID    atorvastatin (LIPITOR) tablet 80 mg  80 mg Oral QHS    clopidogreL (PLAVIX) tablet 75 mg  75 mg Oral DAILY    albuterol (PROVENTIL VENTOLIN) nebulizer solution 2.5 mg  2.5 mg Nebulization Q4H PRN    docusate sodium (COLACE) capsule 100 mg  100 mg Oral BID    metoprolol tartrate (LOPRESSOR) tablet 12.5 mg  12.5 mg Oral BID    traMADoL (ULTRAM) tablet 50 mg  50 mg Oral Q8H PRN    famotidine (PEPCID) tablet 20 mg  20 mg Oral BID    furosemide (LASIX) tablet 20 mg  20 mg Oral DAILY    glucose chewable tablet 16 g  4 Tab Oral PRN    glucagon (GLUCAGEN) injection 1 mg  1 mg IntraMUSCular PRN    dextrose 10% infusion 0-250 mL  0-250 mL IntraVENous PRN    insulin lispro (HUMALOG) injection   SubCUTAneous AC&HS     ______________________________________________________________________  EXPECTED LENGTH OF STAY: - - -  ACTUAL LENGTH OF STAY:          1                 Mika Massey MD

## 2020-08-17 NOTE — PROGRESS NOTES
[id number] Bedside and Verbal shift change report given to DOTTIE Green (oncoming nurse) by Esperanza Peña (offgoing nurse). Report included the following information SBAR, Kardex, ED Summary, Intake/Output, MAR, Recent Results, Med Rec Status and Cardiac Rhythm NSR.     1930 Bedside and Verbal shift change report given to DOTTIE Romano (oncoming nurse) by Robby Martinez (offgoing nurse). Report included the following information SBAR, Kardex, ED Summary, Intake/Output, MAR, Recent Results, Med Rec Status and Cardiac Rhythm NSR. Problem: Falls - Risk of  Goal: *Absence of Falls  Description: Document [facility] Fall Risk and appropriate interventions in the flowsheet. Outcome: Progressing Towards Goal  Note: Fall Risk Interventions:  Mobility Interventions: Communicate number of staff needed for ambulation/transfer, Patient to call before getting OOB, PT Consult for mobility concerns, Utilize walker, cane, or other assistive device, Utilize gait belt for transfers/ambulation         Medication Interventions: Evaluate medications/consider consulting pharmacy, Patient to call before getting OOB, Teach patient to arise slowly, Utilize gait belt for transfers/ambulation    Elimination Interventions: Call light in reach, Patient to call for help with toileting needs, Stay With Me (per policy), Toilet paper/wipes in reach, Toileting schedule/hourly rounds    History of Falls Interventions: Consult care management for discharge planning, Investigate reason for fall         Problem: Patient Education: Go to Patient Education Activity  Goal: Patient/Family Education  Outcome: Progressing Towards Goal     Problem: Diabetes Self-Management  Goal: *Disease process and treatment process  Description: Define diabetes and identify own type of diabetes; list 3 options for treating diabetes.   Outcome: Progressing Towards Goal  Goal: *Using medications safely  Description: State effect of diabetes medications on diabetes; name diabetes medication taking, action and side effects. Outcome: Progressing Towards Goal  Goal: *Monitoring blood glucose, interpreting and using results  Description: Identify recommended blood glucose targets  and personal targets.   Outcome: Progressing Towards Goal     Problem: Patient Education: Go to Patient Education Activity  Goal: Patient/Family Education  Outcome: Progressing Towards Goal

## 2020-08-17 NOTE — PROGRESS NOTES
Problem: Mobility Impaired (Adult and Pediatric)  Goal: *Acute Goals and Plan of Care (Insert Text)  Description: FUNCTIONAL STATUS PRIOR TO ADMISSION: Patient required moderate assistance for basic and instrumental ADLs and walking with Rw/rollator without assistance. HOME SUPPORT PRIOR TO ADMISSION: The patient lived alone with sister to provide assistance. Physical Therapy Goals  Initiated 8/17/2020  1. Patient will move from supine to sit and sit to supine , scoot up and down, and roll side to side in bed with modified independence within 7 day(s). 2.  Patient will transfer from bed to chair and chair to bed with modified independence using the least restrictive device within 7 day(s). 3.  Patient will perform sit to stand with modified independence within 7 day(s). 4.  Patient will ambulate with supervision/set-up for 150 feet with the least restrictive device within 7 day(s). 5.  Patient will ascend/descend 5 stairs with 1 handrail(s) with minimal assistance/contact guard assist within 7 day(s). Outcome: Progressing Towards Goal       PHYSICAL THERAPY EVALUATION  Patient: Ben Francisco (16 y.o. female)  Date: 8/17/2020  Primary Diagnosis: Acute exacerbation of CHF (congestive heart failure) ([facility]) [I50.9]  Acute exacerbation of CHF (congestive heart failure) ([facility] [I50.9]        Precautions: falls         ASSESSMENT  Based on the objective data described below, the patient presents with decreased strength, decreased coordination, increased safety deficits s/p reported 70lb weight gain d/t CHF, noted to have little changes in EF and likely DM II and CKD contributing to fluid retention and low albumin.  Briefly attempted to discuss getting DM under control for better quality of life, pt dismisses statements saying \"I know what to do to get my stomach working\" Pt tolerates standing and participates in gait training for approx 50ft with RW and frequent rest breaks, multiple stops and increased inattention to task/where body is in space. Pt participates in 2 steps with B rails and increased fear, requires sitting rest break prior to walking back to room. Pt appears to have awareness of deficits but manages fair overall. Current Level of Function Impacting Discharge (mobility/balance): min A- S depending on task. Likely will improve if patient's weight can level off    Functional Outcome Measure: The patient scored Total: 55/100 on the Barthel Index which is indicative of moderate impaired ability to care for basic self needs/dependency on others. Other factors to consider for discharge: lives with sister to assist     Patient will benefit from skilled therapy intervention to address the above noted impairments. PLAN :  Recommendations and Planned Interventions: bed mobility training, transfer training, gait training, therapeutic exercises, neuromuscular re-education, patient and family training/education and therapeutic activities      Frequency/Duration: Patient will be followed by physical therapy:  5 times a week to address goals. Recommendation for discharge: (in order for the patient to meet his/her long term goals)  Physical therapy at least 2 days/week in the home vs no services. This discharge recommendation:  Has been made in collaboration with the attending provider and/or case management    IF patient discharges home will need the following DME: patient owns DME required for discharge         SUBJECTIVE:   Patient stated \"I can walk, no problem.     OBJECTIVE DATA SUMMARY:   HISTORY:    Past Medical History:   Diagnosis Date    Arthritis     Asthma     CHF (congestive heart failure) (HCC)     Chronic back pain     [facility]     Diabetic retinopathy [facility]     Hypertension     MRSA (methicillin resistant staph aureus) culture positive     labial abscess    Neuropathy      Past Surgical History:   Procedure Laterality Date    HX HEENT      tonsillectomy    HX ORTHOPAEDIC      left ft bunionectomy    HX OTHER SURGICAL      lanced labial abscess       Personal factors and/or comorbidities impacting plan of care:     Home Situation  Home Environment: Private residence  # Steps to Enter: 5  Rails to Enter: Yes  One/Two Story Residence: One story  Living Alone: No  Support Systems: Family member(s)  Patient Expects to be Discharged to[de-identified] Private residence  Current DME Used/Available at Home: Burnis Likens, rollator, [address] chair, Raised toilet seat, Walker, rolling  Tub or Shower Type: Tub/Shower combination    EXAMINATION/PRESENTATION/DECISION MAKING:   Critical Behavior:              Hearing: Auditory  Auditory Impairment: None  Skin:  intact  Edema: none  Range Of Motion:  AROM: Within functional limits           PROM: Within functional limits           Strength:    Strength: Generally decreased, functional                    Tone & Sensation:   Tone: Normal              Sensation: Intact               Coordination:  Coordination: Generally decreased, functional  Vision:      Functional Mobility:  Bed Mobility:  Rolling: Supervision  Supine to Sit: Supervision  Sit to Supine: Supervision     Transfers:  Sit to Stand: Contact guard assistance; Adaptive equipment  Stand to Sit: Contact guard assistance; Adaptive equipment        Bed to Chair: Contact guard assistance              Balance:   Sitting: Intact; Without support  Standing: Impaired; With support  Standing - Static: Fair;Constant support  Standing - Dynamic : Fair;Constant support  Ambulation/Gait Training:  Distance (ft): 50 Feet (ft)(50+50)  Assistive Device: Gait belt;Walker, rolling  Ambulation - Level of Assistance: Contact guard assistance;Minimal assistance     Gait Description (WDL): Exceptions to WDL  Gait Abnormalities: Shuffling gait;Trunk sway increased;Decreased step clearance;Circumduction; Other(decreased attention to task/body in space)        Base of Support: Widened     Speed/Vangie: Shuffled Stairs:  Number of Stairs Trained: 2  Stairs - Level of Assistance: Minimum assistance(heavy guarding, SOB, poor tolerance)   Rail Use: Both     Functional Measure:  Barthel Index:    Bathin  Bladder: 10  Bowels: 10  Groomin  Dressin  Feeding: 10  Mobility: 0  Stairs: 0  Toilet Use: 5  Transfer (Bed to Chair and Back): 10  Total: 55/100       The Barthel ADL Index: Guidelines  1. The index should be used as a record of what a patient does, not as a record of what a patient could do. 2. The main aim is to establish degree of independence from any help, physical or verbal, however minor and for whatever reason. 3. The need for supervision renders the patient not independent. 4. A patient's performance should be established using the best available evidence. Asking the patient, friends/relatives and nurses are the usual sources, but direct observation and common sense are also important. However direct testing is not needed. 5. Usually the patient's performance over the preceding 24-48 hours is important, but occasionally longer periods will be relevant. 6. Middle categories imply that the patient supplies over 50 per cent of the effort. 7. Use of aids to be independent is allowed. Raven Godfrey., Barthel, D.W. ([id number]). Functional evaluation: the Barthel Index. [address] [facility] (14)2. Frankey Bohr der [place], J.J.M.F, Loren Newby.Vu., John Burks, [address] [place] [address] (). Measuring the change indisability after inpatient rehabilitation; comparison of the responsiveness of the Barthel Index and Functional [place] Measure. Journal of Neurology, Neurosurgery, and Psychiatry, 66(4), 846-159. Jason Whitaker, N.J.A, KAMILLA Ramirez, & Cesia Fernandes, M.A. (2004.) Assessment of post-stroke quality of life in cost-effectiveness studies: The usefulness of the Barthel Index and the EuroQoL-5D.  Quality of Life Research, 15, 285-10        Physical Therapy Evaluation Charge Determination   History Examination Presentation Decision-Making   HIGH Complexity :3+ comorbidities / personal factors will impact the outcome/ POC  HIGH Complexity : 4+ Standardized tests and measures addressing body structure, function, activity limitation and / or participation in recreation  LOW Complexity : Stable, uncomplicated  Other outcome measures barthel  HIGH       Based on the above components, the patient evaluation is determined to be of the following complexity level: LOW     Pain Rating:  controlled    Activity Tolerance:   Fair and requires rest breaks  Please refer to the flowsheet for vital signs taken during this treatment. After treatment patient left in no apparent distress:   Call bell within reach and sitting edge of bed    COMMUNICATION/EDUCATION:   The patients plan of care was discussed with: Registered nurse. Fall prevention education was provided and the patient/caregiver indicated understanding. and Patient/family have participated as able in goal setting and plan of care.     Thank you for this referral.  Elvia Marc, PT   Time Calculation: 23 mins

## 2020-08-17 NOTE — WOUND CARE
WOCN Note:       Discharged from [facility] days ago. Assessed patient in Room: 454  Resting on a Versa Care Bed  AAO and continent. New consult placed by RN for: LLE swelling: wound care left leg and ankle. Chart shows:  Admitted for CHF exacerbation: EMS: SOB. PMH: elevated cholesterol, DM with neuropathy, HTN, DDD, cervical spondolyosis, obese, rhinitis, liver disorder, lymphoma, Vit D, TIA. WBC = 7.1  On = 8-17-20  Admitted from: home. Assessment:   Patient is A&O x 3 , communicative, continent and mobile with assistance. Due to weight (283) needs assistance lifting up in the bed occasionally. Patient continent. Bed: Versa Care Bed  Diet: cardiac regular. Patient reports no pain with assesssmet. Bilateral heels, buttocks and sacral skin intact and without erythema. Heels offloaded on pillow. Left ankle: square patch of thick raised scabbed tissue. Not opened, not draining and surrounding  skin intact with slight swelling. 4.0cm x 3.0cm x 0.0cm. Recommendations:    Skin care left anterior ankle: twice daily apply Remedy Skin Repair Cream: purple tube. Minimize layers of linen/pads under patient to optimize support surface. Turn/reposition approximately every 2 hours and offload heels. Manage incontinence / promote continence; Aloe Vesta to buttocks and sacrum daily and as needed / patient continent. Discussed above plan with patient and RN: Natasha Gottlieb.     Transition of Care: Plan to follow weekly and as needed while admitted to hospital.     Godwin WILLIAMSON RN  Wound Care Department  Office: [phone]  Pager: [phone]

## 2020-08-17 NOTE — PROGRESS NOTES
2030: TRANSFER - IN REPORT:    Verbal report received from Rosanna RN (name) on Margy Caceres  being received from ED (unit) for routine progression of care    Report consisted of patients Situation, Background, Assessment and   Recommendations(SBAR). Information from the following report(s) SBAR, Kardex, Intake/Output, Recent Results, Med Rec Status and Cardiac Rhythm NSR was reviewed with the receiving nurse. Opportunity for questions and clarification was provided. 2144: Assessment completed upon patients arrival to unit and care assumed. 0730: Bedside and Verbal shift change report given to RN (oncoming nurse) by Mine Mullen RN (offgoing nurse). Report included the following information SBAR, Kardex, Intake/Output, Recent Results, Med Rec Status and Cardiac Rhythm NSR.

## 2020-08-17 NOTE — PROGRESS NOTES
Problem: Self Care Deficits Care Plan (Adult)  Goal: *Acute Goals and Plan of Care (Insert Text)  Description:   FUNCTIONAL STATUS PRIOR TO ADMISSION: Patient was modified independent using a rolling walker for functional mobility. Patient was modified independent for basic ADLs, sister and family friend assist with IADLs/transportation. HOME SUPPORT: The patient lived with her sister, sister's boyfriend, and teenage son who has autism (high functioning), assist for IADL PRN. Occupational Therapy Goals  Initiated 8/17/2020  1. Patient will perform grooming at the sink with supervision/set-up within 7 day(s). 2.  Patient will perform lower body dressing with supervision/set-up within 7 day(s). 3.  Patient will perform bathing with supervision/set-up within 7 day(s). 4.  Patient will perform toilet transfers with supervision/set-up within 7 day(s). 5.  Patient will perform all aspects of toileting with supervision/set-up within 7 day(s). 6.  Patient will participate in upper extremity therapeutic exercise/activities with supervision/set-up for 5 minutes within 7 day(s). 7.  Patient will utilize energy conservation techniques during functional activities with verbal and visual cues within 7 day(s). Outcome: Not Met     OCCUPATIONAL THERAPY EVALUATION  Patient: Marquise Kelly (32 y.o. female)  Date: 8/17/2020  Primary Diagnosis: Acute exacerbation of CHF (congestive heart failure) ([facility]) [I50.9]  Acute exacerbation of CHF (congestive heart failure) ([facility] [I50.9]        Precautions: Fall       ASSESSMENT  Based on the objective data described below, the patient presents with impaired balance, activity tolerance, strength, insight into deficits, and endurance as well as global edema s/p admission for SOB, also with LLE cellulitis, and increased swelling. PTA, patient Mod I for BADLs and mobility with RW, lives with family who assist with IADLs PRN.  She now presents below her baseline, requiring SPV-CGA for brief mobility, fatigues quickly requiring increased time for mobility & distal lower body dressing. Encouraged increasing OOB mobility with staff assist, participation in ADLs, and strengthening to maximize functional independence. Recommend d/c home with [facility] and family support. Current Level of Function Impacting Discharge (ADLs/self-care): SPV-CGA for ADLs and mobility with RW    Functional Outcome Measure: The patient scored Total: 60/100 on the Barthel Index outcome measure which is indicative of 40% impaired ability to care for basic self needs/dependency on others; inferred 90% dependency on others for instrumental ADLs. Other factors to consider for discharge: fall risk, debility, recent 70# weight gain in 1 month     Patient will benefit from skilled therapy intervention to address the above noted impairments. PLAN :  Recommendations and Planned Interventions: self care training, functional mobility training, therapeutic exercise, balance training, therapeutic activities, endurance activities, patient education, home safety training, and family training/education    Frequency/Duration: Patient will be followed by occupational therapy 3 times a week to address goals. Recommendation for discharge: (in order for the patient to meet his/her long term goals)  Occupational therapy at least 2 days/week in the home     This discharge recommendation:  Has been made in collaboration with the attending provider and/or case management    IF patient discharges home will need the following DME: patient owns DME required for discharge       SUBJECTIVE:   Patient stated I normally have small thighs, and now look at me. I have never had thighs this big in my life.     OBJECTIVE DATA SUMMARY:   HISTORY:   Past Medical History:   Diagnosis Date    Arthritis     Asthma     CHF (congestive heart failure) ([facility]     Chronic back pain     Diabetes ([facility]     Diabetic retinopathy ([facility] Hypertension     MRSA (methicillin resistant staph aureus) culture positive     labial abscess    Neuropathy      Past Surgical History:   Procedure Laterality Date    HX HEENT      tonsillectomy    HX ORTHOPAEDIC      left ft bunionectomy    HX OTHER SURGICAL      lanced labial abscess       Expanded or extensive additional review of patient history:     Home Situation  Home Environment: Private residence  # Steps to Enter: 5  Rails to Enter: Yes  One/Two Story Residence: One story  Living Alone: No  Support Systems: Family member(s)  Patient Expects to be Discharged to[de-identified] Private residence  Current DME Used/Available at Home: Edie Border, rollator, Shower chair, Raised toilet seat, Walker, rolling, Commode, bedside  Tub or Shower Type: Shower    Hand dominance: Right    EXAMINATION OF PERFORMANCE DEFICITS:  Cognitive/Behavioral Status:  Neurologic State: Alert  Orientation Level: Oriented X4  Cognition: Appropriate decision making; Appropriate for age attention/concentration; Appropriate safety awareness; Follows commands  Perception: Appears intact  Perseveration: No perseveration noted  Safety/Judgement: Awareness of environment; Fall prevention    Skin: Appears intact    Edema: Global edema (onel BLEs and abdomen)    Hearing: Auditory  Auditory Impairment: None    Vision/Perceptual:    Tracking: Able to track stimulus in all quadrants w/o difficulty                      Acuity: Within Defined Limits         Range of Motion:  AROM: Within functional limits  PROM: Within functional limits                      Strength:  Strength: Generally decreased, functional                Coordination:  Coordination: Generally decreased, functional  Fine Motor Skills-Upper: Left Intact; Right Intact    Gross Motor Skills-Upper: Left Intact; Right Intact    Tone & Sensation:  Tone: Normal  Sensation: Intact                      Balance:  Sitting: Intact; Without support  Standing: Impaired; With support  Standing - Static: Fair;Constant support  Standing - Dynamic : Fair;Constant support    Functional Mobility and Transfers for ADLs:  Bed Mobility:  Rolling: Supervision  Supine to Sit: Supervision  Sit to Supine: Supervision  Scooting: Supervision    Transfers:  Sit to Stand: Contact guard assistance; Adaptive equipment  Stand to Sit: Contact guard assistance; Adaptive equipment  Bed to Chair: Contact guard assistance  Toilet Transfer : Contact guard assistance(infer per mobility)  Assistive Device : Gait Belt;Walker, rolling    ADL Assessment:  Feeding: Setup    Oral Facial Hygiene/Grooming: Contact guard assistance(infer seated at bathroom sink per act nikolas)    Bathing: Contact guard assistance(infer for transfer in, incr time seated for EC)    Upper Body Dressing: Modified independent    Lower Body Dressing: Contact guard assistance    Toileting: Contact guard assistance                ADL Intervention and task modifications:    Lower Body Dressing Assistance  Dressing Assistance: Contact guard assistance  Pants With Elastic Waist: Contact guard assistance(simulated)  Socks: Supervision; Compensatory technique training  Leg Crossed Method Used: Yes(prop sit on EOB)  Position Performed: Seated edge of bed;Standing  Cues: Verbal cues provided;Visual cues provided         Cognitive Retraining  Safety/Judgement: Awareness of environment; Fall prevention    Functional Measure:  Barthel Index:    Bathin  Bladder: 10  Bowels: 10  Groomin  Dressin  Feeding: 10  Mobility: 0  Stairs: 5  Toilet Use: 5  Transfer (Bed to Chair and Back): 10  Total: 60/100        The Barthel ADL Index: Guidelines  1. The index should be used as a record of what a patient does, not as a record of what a patient could do. 2. The main aim is to establish degree of independence from any help, physical or verbal, however minor and for whatever reason. 3. The need for supervision renders the patient not independent.   4. A patient's performance should be established using the best available evidence. Asking the patient, friends/relatives and nurses are the usual sources, but direct observation and common sense are also important. However direct testing is not needed. 5. Usually the patient's performance over the preceding 24-48 hours is important, but occasionally longer periods will be relevant. 6. Middle categories imply that the patient supplies over 50 per cent of the effort. 7. Use of aids to be independent is allowed. Beatris Roberts., Barthel, D.W. ([id number]). Functional evaluation: the Barthel Index. [facility] (14)2. Jihan Jessi dwayne CAMERON Thorne, Jovita Collado., [facility] [place], [facility] (1999). Measuring the change indisability after inpatient rehabilitation; comparison of the responsiveness of the Barthel Index and Functional [place] Measure. Journal of Neurology, Neurosurgery, and Psychiatry, 66(4), 342-808. Irma Rodriguez, N.J.A, KAMILLA Ramirez, & Giselle Ch M.A. (2004.) Assessment of post-stroke quality of life in cost-effectiveness studies: The usefulness of the Barthel Index and the EuroQoL-5D. Quality of Life Research, 15, 568-51         Occupational Therapy Evaluation Charge Determination   History Examination Decision-Making   LOW Complexity : Brief history review  LOW Complexity : 1-3 performance deficits relating to physical, cognitive , or psychosocial skils that result in activity limitations and / or participation restrictions  LOW Complexity : No comorbidities that affect functional and no verbal or physical assistance needed to complete eval tasks       Based on the above components, the patient evaluation is determined to be of the following complexity level: LOW   Pain Rating:  None    Activity Tolerance:   Fair and requires rest breaks  Please refer to the flowsheet for vital signs taken during this treatment.     After treatment patient left in no apparent distress:    Supine in bed, Call bell within reach, and Side rails x 3    COMMUNICATION/EDUCATION:   The patients plan of care was discussed with: Physical therapist and Registered nurse. Home safety education was provided and the patient/caregiver indicated understanding., Patient/family have participated as able in goal setting and plan of care. , and Patient/family agree to work toward stated goals and plan of care. This patients plan of care is appropriate for delegation to ANNA.     Thank you for this referral.  JUANA Deleon, OTR/L  Time Calculation: 25 mins

## 2020-08-17 NOTE — PROGRESS NOTES
Reason for Admission: Patient presented with CHF, fluid overload                  RUR Score: 21% risk of re-admission         PCP: First and Last name: Dr. Van Bell    Name of Practice: [facility]   Are you a current patient: Yes/No: Yes   Approximate date of last visit: Within last year    Can you do a virtual visit with your PCP:  Patient has access to Internet for virtual visit              Resources/supports as identified by patient/family:  Patient's sister is primary support sytem                  Top Challenges facing patient (as identified by patient/family and CM): Finances/Medication cost?  Patient denied difficulty accessing medications prior to hospitalization- utilizes [employer] on [employer]? Patient's sister will transport home upon discharge              Support system or lack thereof? Primary support is the patient's sister                      Living arrangements? Patient lives in [facility] with her sister, sister's significant other, and son               Self-care/ADLs/Cognition? Patient is alert and oriented x4- patient endorses that she ambulates with a walker at baseline, able to complete ADLs independently           Current Advanced Directive/Advance Care Plan:  Full code- not on file, will offer AMD this admission                           Plan for utilizing home health:  Patient denied any home health services in-place prior to admission, will monitor for home health needs                  Transition of Care Plan: Anticipate home, PT/OT evaluations pending               The CM met with the patient at bedside in order to introduce the role of CM and assess for patient needs. The patient lives in [facility] with her sister Elena, sister's significant other, and son. The patient verified demographics and insurance information- [place] [employer].  The patient endorses that she ambulates with a walker at baseline, able to complete ADLs independently. The patient does have a bedside commode at home- she endorses it also can be moved over-the-toilet for additional height/support. The patient denied difficulty accessing medications- utilizes [employer]. The patient endorses her sister Elena will transport home. PT/OT evaluations pending. CM will follow for transitions of care needs. Allen Devlin, MSW     17:07 p.m.- The patient was downgraded from IP to OBS status, does not require Code 44. Observation notice provided in writing to patient and/or caregiver as well as verbal explanation of the policy. Patients who are in outpatient status also receive the Observation notice. The CM discussed home health with the patient- patient agreeable, agreeable for [facility] referral- CM discussed with attending MD and agreeable. Referral placed in cclink. The Plan for Transition of Care is related to the following treatment goals: Home with home health    The Patient and/or patient  was provided with a choice of provider and agrees   with the discharge plan. [x] Yes [] No    Freedom of choice list was provided with basic dialogue that supports the patient's individualized plan of care/goals, treatment preferences and shares the quality data associated with the providers. [x] Yes [] No      Care Management Interventions  PCP Verified by CM: Yes(Patient verified PCP as Dr. Patel Swan)  Mode of Transport at Discharge:  Other (see comment)(Sister to transport home upon discharge)  Transition of Care Consult (CM Consult): Discharge Planning  MyChart Signup: Yes  Physical Therapy Consult: Yes  Occupational Therapy Consult: Yes  Current Support Network: Relative's Home(Patient lives with her sister, son, and sister's significant other)  Confirm Follow Up Transport: Family  Discharge Location  Discharge Placement: Home(PT/OT evaluations pending )

## 2020-08-18 VITALS
BODY MASS INDEX: 47.84 KG/M2 | RESPIRATION RATE: 18 BRPM | DIASTOLIC BLOOD PRESSURE: 59 MMHG | OXYGEN SATURATION: 96 % | HEART RATE: 77 BPM | HEIGHT: 64 IN | WEIGHT: 280.2 LBS | TEMPERATURE: 98.4 F | SYSTOLIC BLOOD PRESSURE: 145 MMHG

## 2020-08-18 LAB
ALBUMIN SERPL-MCNC: 2.3 G/DL (ref 3.5–5)
ALBUMIN/GLOB SERPL: 0.6 {RATIO} (ref 1.1–2.2)
ALP SERPL-CCNC: 104 U/L (ref 45–117)
ALT SERPL-CCNC: 19 U/L (ref 12–78)
ANION GAP SERPL CALC-SCNC: 5 MMOL/L (ref 5–15)
AST SERPL-CCNC: 19 U/L (ref 15–37)
BILIRUB SERPL-MCNC: 0.2 MG/DL (ref 0.2–1)
BUN SERPL-MCNC: 19 MG/DL (ref 6–20)
BUN/CREAT SERPL: 13 (ref 12–20)
CALCIUM SERPL-MCNC: 8.7 MG/DL (ref 8.5–10.1)
CHLORIDE SERPL-SCNC: 106 MMOL/L (ref 97–108)
CO2 SERPL-SCNC: 27 MMOL/L (ref 21–32)
COMMENT, HOLDF: NORMAL
CREAT SERPL-MCNC: 1.42 MG/DL (ref 0.55–1.02)
ERYTHROCYTE [DISTWIDTH] IN BLOOD BY AUTOMATED COUNT: 14 % (ref 11.5–14.5)
GLOBULIN SER CALC-MCNC: 4.1 G/DL (ref 2–4)
GLUCOSE BLD STRIP.AUTO-MCNC: 189 MG/DL (ref 65–100)
GLUCOSE BLD STRIP.AUTO-MCNC: 202 MG/DL (ref 65–100)
GLUCOSE SERPL-MCNC: 179 MG/DL (ref 65–100)
HCT VFR BLD AUTO: 28.5 % (ref 35–47)
HGB BLD-MCNC: 9 G/DL (ref 11.5–16)
MCH RBC QN AUTO: 28.3 PG (ref 26–34)
MCHC RBC AUTO-ENTMCNC: 31.6 G/DL (ref 30–36.5)
MCV RBC AUTO: 89.6 FL (ref 80–99)
NRBC # BLD: 0 K/UL (ref 0–0.01)
NRBC BLD-RTO: 0 PER 100 WBC
PHOSPHATE SERPL-MCNC: 3.7 MG/DL (ref 2.6–4.7)
PLATELET # BLD AUTO: 184 K/UL (ref 150–400)
PMV BLD AUTO: 9.5 FL (ref 8.9–12.9)
POTASSIUM SERPL-SCNC: 4 MMOL/L (ref 3.5–5.1)
PROT SERPL-MCNC: 6.4 G/DL (ref 6.4–8.2)
RBC # BLD AUTO: 3.18 M/UL (ref 3.8–5.2)
SAMPLES BEING HELD,HOLD: NORMAL
SERVICE CMNT-IMP: ABNORMAL
SERVICE CMNT-IMP: ABNORMAL
SODIUM SERPL-SCNC: 138 MMOL/L (ref 136–145)
TROPONIN I SERPL-MCNC: <0.05 NG/ML
WBC # BLD AUTO: 6.5 K/UL (ref 3.6–11)

## 2020-08-18 PROCEDURE — 97116 GAIT TRAINING THERAPY: CPT

## 2020-08-18 PROCEDURE — 74011250637 HC RX REV CODE- 250/637: Performed by: INTERNAL MEDICINE

## 2020-08-18 PROCEDURE — 84100 ASSAY OF PHOSPHORUS: CPT

## 2020-08-18 PROCEDURE — 84484 ASSAY OF TROPONIN QUANT: CPT

## 2020-08-18 PROCEDURE — 36415 COLL VENOUS BLD VENIPUNCTURE: CPT

## 2020-08-18 PROCEDURE — 99218 HC RM OBSERVATION: CPT

## 2020-08-18 PROCEDURE — 74011636637 HC RX REV CODE- 636/637: Performed by: INTERNAL MEDICINE

## 2020-08-18 PROCEDURE — 82962 GLUCOSE BLOOD TEST: CPT

## 2020-08-18 PROCEDURE — 85027 COMPLETE CBC AUTOMATED: CPT

## 2020-08-18 PROCEDURE — 80053 COMPREHEN METABOLIC PANEL: CPT

## 2020-08-18 RX ORDER — AMOXICILLIN 250 MG
2 CAPSULE ORAL 2 TIMES DAILY
Qty: 120 TAB | Refills: 0 | Status: ON HOLD | OUTPATIENT
Start: 2020-08-18 | End: 2021-08-08

## 2020-08-18 RX ORDER — TRAMADOL HYDROCHLORIDE 50 MG/1
50 TABLET ORAL
Qty: 5 TAB | Refills: 0 | Status: SHIPPED
Start: 2020-08-18 | End: 2020-08-21

## 2020-08-18 RX ORDER — AMLODIPINE BESYLATE 5 MG/1
5 TABLET ORAL DAILY
Qty: 30 TAB | Refills: 0 | Status: ON HOLD | OUTPATIENT
Start: 2020-08-19 | End: 2021-08-08 | Stop reason: DRUGHIGH

## 2020-08-18 RX ORDER — LINEZOLID 600 MG/1
600 TABLET, FILM COATED ORAL EVERY 12 HOURS
Qty: 20 TAB | Refills: 0 | Status: ON HOLD
Start: 2020-08-18 | End: 2021-08-08

## 2020-08-18 RX ORDER — POLYETHYLENE GLYCOL 3350 17 G/17G
17 POWDER, FOR SOLUTION ORAL DAILY
Qty: 30 EACH | Refills: 0 | Status: SHIPPED | OUTPATIENT
Start: 2020-08-18 | End: 2021-08-25

## 2020-08-18 RX ORDER — ADHESIVE BANDAGE
30 BANDAGE TOPICAL DAILY
Qty: 900 ML | Refills: 0 | Status: SHIPPED | OUTPATIENT
Start: 2020-08-18 | End: 2020-09-17

## 2020-08-18 RX ADMIN — METOPROLOL TARTRATE 12.5 MG: 25 TABLET, FILM COATED ORAL at 09:30

## 2020-08-18 RX ADMIN — LINEZOLID 600 MG: 600 TABLET, FILM COATED ORAL at 09:31

## 2020-08-18 RX ADMIN — Medication 10 ML: at 06:28

## 2020-08-18 RX ADMIN — INSULIN LISPRO 4 UNITS: 100 INJECTION, SOLUTION INTRAVENOUS; SUBCUTANEOUS at 11:25

## 2020-08-18 RX ADMIN — INSULIN LISPRO 3 UNITS: 100 INJECTION, SOLUTION INTRAVENOUS; SUBCUTANEOUS at 07:29

## 2020-08-18 RX ADMIN — FAMOTIDINE 20 MG: 20 TABLET ORAL at 09:30

## 2020-08-18 RX ADMIN — AMLODIPINE BESYLATE 5 MG: 5 TABLET ORAL at 09:31

## 2020-08-18 RX ADMIN — CLOPIDOGREL BISULFATE 75 MG: 75 TABLET ORAL at 09:30

## 2020-08-18 RX ADMIN — FUROSEMIDE 20 MG: 20 TABLET ORAL at 09:30

## 2020-08-18 RX ADMIN — DOCUSATE SODIUM 100 MG: 100 CAPSULE, LIQUID FILLED ORAL at 09:30

## 2020-08-18 RX ADMIN — GABAPENTIN 300 MG: 300 CAPSULE ORAL at 09:30

## 2020-08-18 RX ADMIN — ACETAMINOPHEN 650 MG: 325 TABLET ORAL at 06:29

## 2020-08-18 NOTE — PROGRESS NOTES
1930: Bedside and Verbal shift change report given to [address] (oncoming nurse) by Betsy Stoner (offgoing nurse). Report included the following information SBAR, Kardex, Intake/Output, Recent Results, Med Rec Status and Cardiac Rhythm NSR.     0730: Bedside and Verbal shift change report given to Betsy Stoner (oncoming nurse) by Humble Gay RN (offgoing nurse). Report included the following information SBAR, Kardex, Intake/Output, Recent Results, Med Rec Status and Cardiac Rhythm NSR.

## 2020-08-18 NOTE — PROGRESS NOTES
Hospital follow-up visit has been scheduled with [facility] for Thursday, 8/20/20. Office will contact patient prior to arrival.  Guerrero Frost, Care Management Specialist.    Hospital follow-up telemedicine PCP transitional care appointment has been scheduled with Dr. Tuan Christy for Tuesday, 8/25/20 at 9:00 a.m. Pending patient discharge.   Guerrero Frost, Care Management Specialist.

## 2020-08-18 NOTE — ROUTINE PROCESS
Attempted to schedule a hospital PCP follow up appointment and the  for Dr. Tushar Wong informed me that the nurse has to make the appointment with the patient. The nurse will be calling today midday to schedule.

## 2020-08-18 NOTE — PROGRESS NOTES
8509 Bedside and Verbal shift change report given to DOTTIE De La Torre (oncoming nurse) by Rosa Romano (offgoing nurse). Report included the following information SBAR, Kardex, Intake/Output, MAR, Recent Results, Med Rec Status and Cardiac Rhythm NSR.       1155 Discharge medications reviewed with patient and appropriate educational materials and side effects teaching were provided. I have reviewed discharge instructions with the patient. The patient verbalized understanding. Opportunity for questions provided. IVs and telemetry removed. Problem: Falls - Risk of  Goal: *Absence of Falls  Description: Document [facility] Fall Risk and appropriate interventions in the flowsheet. Outcome: Progressing Towards Goal  Note: Fall Risk Interventions:  Mobility Interventions: Communicate number of staff needed for ambulation/transfer         Medication Interventions: Teach patient to arise slowly, Patient to call before getting OOB    Elimination Interventions: Call light in reach    History of Falls Interventions: Consult care management for discharge planning         Problem: Patient Education: Go to Patient Education Activity  Goal: Patient/Family Education  Outcome: Progressing Towards Goal     Problem: Diabetes Self-Management  Goal: *Monitoring blood glucose, interpreting and using results  Description: Identify recommended blood glucose targets  and personal targets. Outcome: Progressing Towards Goal  Goal: *Prevention, detection, treatment of acute complications  Description: List symptoms of hyper- and hypoglycemia; describe how to treat low blood sugar and actions for lowering  high blood glucose level. Outcome: Progressing Towards Goal  Goal: *Prevention, detection and treatment of chronic complications  Description: Define the natural course of diabetes and describe the relationship of blood glucose levels to long term complications of diabetes.   Outcome: Progressing Towards Goal  Goal: *Developing strategies to address psychosocial issues  Description: Describe feelings about living with diabetes; identify support needed and support network  Outcome: Progressing Towards Goal

## 2020-08-18 NOTE — CONSULTS
[facility]  CLINICAL NURSE SPECIALIST CONSULT  [facility]GRAM FOR DIAB[facility]ES [facility]    FOLLOW-UP NOTE    Presentation   Laymon Schirmer is a 47 y.o. female with a PMH of CHF, diabetes with neuropathy, HTN, CHF, HLD, CVA (x2) and recent hospitalization at [facility] for left leg cellulitis who presented to the ED with acute worsening SOB and was admitted with acute CHF exacerbation. DX: Acute CHF exacerbation   TX: Diuresis     Current clinical course has been complicated by hyperglycemia. Diabetes: Patient has known Type two diabetes, treated with metformin, glipizide and humalog PTA. Admitting A1C 7.9%   Consulted by Provider for advanced diabetes nursing assessment and care, specifically related to   [] Transitioning off [facility]   [x] Inpatient management strategy  [] Home management assessment  [] Survival skill education    Diabetes-related medical history  Acute complications: Hyperglycemia without acidosis  Neurological complications  Peripheral neuropathy  Microvascular disease  Nephropathy  Macrovascular disease  Cerebral vascular accident      Diabetes medication history  Drug class Currently in use Discontinued Never used   Biguanide Metformin 500 mg 3 times daily     DDP-4 inhibitor       Sulfonylurea glipizide 5 mg daily     Thiazolidinedione      GLP-1 RA      SGLT-2 inhibitors      Basal insulin      Fixed Dose  Combinations      Bolus insulin Novolog 35 units with meals of glucose over 100       Subjective   Patient reports a 30 year history of diabetes  A1C 7.9% (up from 6.2% 12/18)  Lives with sister and adult son  She is disabled and does not work  Sister does all cooking  Will eat 1-2 meals a day  Reports compliance with medications   Largely inactive    Objective   Physical exam  General Alert, oriented and in no acute distress/ill-appearing. Conversant and cooperative.    Vital Signs   Visit Vitals  /52 (BP 1 Location: Left arm, BP Patient Position: At rest)   Pulse 76   Temp 98.2 °F (36.8 °C)   Resp 18   Ht 5' 4\" (1.626 m)   Wt 127.1 kg (280 lb 3.3 oz)   SpO2 99%   BMI 48.10 kg/m²     Skin  Warm and dry. Acanthosis noted along neckline. No lipohypertrophy or lipoatrophy noted at injection sites   Heart   Regular rate and rhythm. No murmurs, rubs or gallops  Lungs  Clear to auscultation without rales or rhonchi  Extremities No foot wounds, left lower leg/ankle wound     Laboratory  Lab Results   Component Value Date/Time    Hemoglobin A1c 7.9 (H) 08/17/2020 05:03 AM    Hemoglobin A1c (POC) 10.2 06/25/2015 11:01 AM     Lab Results   Component Value Date/Time    LDL,Direct 57 11/15/2011 10:50 AM    LDL, calculated 143.6 (H) 12/21/2018 02:36 AM     Lab Results   Component Value Date/Time    Creatinine (POC) 0.6 04/20/2013 06:08 PM    Creatinine 1.42 (H) 08/18/2020 03:10 AM     Lab Results   Component Value Date/Time    Sodium 138 08/18/2020 03:10 AM    Potassium 4.0 08/18/2020 03:10 AM    Chloride 106 08/18/2020 03:10 AM    CO2 27 08/18/2020 03:10 AM    Anion gap 5 08/18/2020 03:10 AM    Glucose 179 (H) 08/18/2020 03:10 AM    BUN 19 08/18/2020 03:10 AM    Creatinine 1.42 (H) 08/18/2020 03:10 AM    BUN/Creatinine ratio 13 08/18/2020 03:10 AM    GFR est AA 47 (L) 08/18/2020 03:10 AM    GFR est non-AA 39 (L) 08/18/2020 03:10 AM    Calcium 8.7 08/18/2020 03:10 AM    Bilirubin, total 0.2 08/18/2020 03:10 AM    Alk.  phosphatase 104 08/18/2020 03:10 AM    Protein, total 6.4 08/18/2020 03:10 AM    Albumin 2.3 (L) 08/18/2020 03:10 AM    Globulin 4.1 (H) 08/18/2020 03:10 AM    A-G Ratio 0.6 (L) 08/18/2020 03:10 AM    ALT (SGPT) 19 08/18/2020 03:10 AM     Lab Results   Component Value Date/Time    ALT (SGPT) 19 08/18/2020 03:10 AM       Factors affecting BG pattern  Factor Dose Comments   Nutrition:  Carb-controlled meals   60 grams/meal   Change to consistent carb cardiac    Decreased GFR GFR 41  Impaired ability to filter and excrete glucose    Infection LE cellulitis On linezolid PO     Blood glucose pattern        Assessment and Plan   Nursing Diagnosis Risk for unstable blood glucose pattern   Nursing Intervention Domain 5632 Decision-making Support   Nursing Interventions Examined current inpatient diabetes control   Explored factors facilitating and impeding inpatient management  Identified self-management practices impeding diabetes control  Explored corrective strategies with patient and responsible inpatient provider   Informed patient of rational for insulin strategy while hospitalized     Evaluation   Loc Carbajal is a 47year old female with diabetes with neuropathy and likely nephropathy admitted with anasarca and hypoalbuminemia. Diuresis has been initiated. At this time, home oral antihyperglycemic agents have been held and correctional insulin has been initiated. Glucose above goal yesterday, fasting 189 and the subcutaneous insulin orderset can be adjusted to target in patient glucose goal of 100-180 if she remains inpatient today. Given her admitting A1C of 7.9%, it would be reasonable to resume PTA medications on discharge. Recommendations   Recommend:  If staying inpatient today, initiate the subcutaneous insulin order set with:  1. Low dose basal insulin: 0.2 units/kg/day. First dose now  2. Correctional insulin ACHS at normal sensitivity, start at a glucose of 200  3. POC glucose ACHS  4. Consistent Carbohydrate diet  5. If patient is experiencing pre-prandial hyperglycemia over 200 on basal and correctional insulin, add Low dose bolus insulin. 0.05 units/kg/meal. Hold if patient consumes less than 50% of carbohydrates on meal tray  6. Consider nephrology for evaluation of decreased GFR    On discharge:  Pati Fish to resume oral antihyperglycemic agents on PTA doses. Patient to continue to monitor glucose     Billing Code(s)   I personally reviewed chart, notes, data and current medications in the medical record, and examined the patient at bedside before making care recommendations. Thank you for including us in their care. I spent 15 minutes in direct patient care today for this patient.   Time includes chart review, face to face with patient and collaboration with interdisciplinary care team.        FERN Finnegan  Program [facility] Diabetes [facility]  Access via [facility]  [phone]

## 2020-08-18 NOTE — PROGRESS NOTES
Transitions of Care: 20% risk of re-admission      -Patient will discharge home today with family, now declining home health    -PCP follow-up   -Family to transport    The CM met with the patient at bedside- she is now declining home health, she endorses that she thought about it and does not feel like she needs home health services. The patient endorses her sister will transport home, no other concerns identified. The CM provided the patient with [facility] information. KALEY Gorman    10:22 a.m.- The CM met with patient at bedside, she is agreeable for [facility] follow-up- CM notified Case Management Specialist.     11:51 a.m.- Patient now informed bedside RN that she has changed her mind and now wants home health- CM met with patient, she is dressed and ready for discharge. CM explained this writer will attempt to locate home health, however, patient may discharge before it can be established. Patient is aware, will follow-up with PCP. CM will attempt to locate home health. CM sent referrals to [facility] via [employer]. [address] [place] [address] denied the patient. 13:03 p.m.- [facility] can accept this patient for services.

## 2020-08-18 NOTE — DISCHARGE INSTRUCTIONS
Discharge Instructions       PATIENT ID: Yandy Briceno  MRN: [id number]   YOB: 1965    DATE OF ADMISSION: 8/16/2020  2:02 PM    DATE OF DISCHARGE: 8/18/2020    PRIMARY CARE PROVIDER: Maite Sanchez MD     ATTENDING PHYSICIAN: Diego Reyes MD  DISCHARGING PROVIDER: Creston Cushing, MD    To contact this individual call [phone] and ask the  to page. If unavailable ask to be transferred the Adult Hospitalist Department. DISCHARGE DIAGNOSES   # PENA and concern for fluid overaload. Resolved. No CHF as confirmed with echocardiogram on 8/17/20  # Abdominal distention. Likely aggravated by stool-laden colon. Counseled about aggressive bowel regime needs and maintenance subsequently. Defer to PCP to assist with the same further. # Hypertensive urgency, better  # CKD-3  # DM2  # HLD  # GERD  # Asthma  # Bilateral nephrolithiasis, non-obstructing. Outpatient Urology eval per PCP. # Morbid obesity, BMI 48.1    CONSULTATIONS: IP CONSULT TO CARDIOLOGY    PROCEDURES/SURGERIES: * No surgery found *    PENDING TEST RESULTS:   At the time of discharge the following test results are still pending: none    FOLLOW UP APPOINTMENTS:   Follow-up Information     Follow up With Specialties Details Why Contact Info    Maite Sanchez MD Family Medicine In 1 week The office nurse will call you to schedule an appt within 1 week [address] Floor Urgent Care, In-Home Clinical Assessments   [facility] [address] [place] [address]  [phone]           ADDITIONAL CARE RECOMMENDATIONS:   Follow up with PCP in 1 week. CBC, CMP, BP and blood sugar monitoring. Bowel regime efficacy monitoring. Encouraged more activities and if feels agreeable for [facility] PT/OT, to check with PCP. · It is important that you take the medication exactly as they are prescribed.    · Keep your medication in the bottles provided by the pharmacist and keep a list of the medication names, dosages, and times to be taken in your wallet. · Do not take other medications without consulting your doctor. · No drinking alcohol or driving car or operating machinery if you are on narcotic pain medications. Donot take sedating mediations if you are sleepy or confused. · Fall Precautions  · Keep Well Hydrated  · Report to your medical provider if you feel you have  developed allergies to medications  · Follow up with your PCP or Consultant for medication adjustments and refills  · Monitor for signs of fevers,chills,bleeding,chest pain and seek medical attention if you do so. DIET: Cardiac Diet and Diabetic Diet    ACTIVITY: Activity as tolerated    WOUND CARE: NA    EQUIPMENT needed: NA        DISCHARGE MEDICATIONS:   See Medication Reconciliation Form    · It is important that you take the medication exactly as they are prescribed. · Keep your medication in the bottles provided by the pharmacist and keep a list of the medication names, dosages, and times to be taken in your wallet. · Do not take other medications without consulting your doctor. NOTIFY YOUR PHYSICIAN FOR ANY OF THE FOLLOWING:   Fever over 101 degrees for 24 hours. Chest pain, shortness of breath, fever, chills, nausea, vomiting, diarrhea, change in mentation, falling, weakness, bleeding. Severe pain or pain not relieved by medications. Or, any other signs or symptoms that you may have questions about. DISPOSITION:  x  Home With:   OT  PT  HH  RN       SNF/Inpatient Rehab/LTAC    Independent/assisted living    Hospice    Other:     CDMP Checked:   Yes x     PROBLEM LIST Updated:  Yes x        My Medications      START taking these medications      Instructions Each Dose to Equal Morning Noon Evening Bedtime   amLODIPine 5 mg tablet  Commonly known as:  NORVASC  Start taking on:  August 19, 2020    Your last dose was: Your next dose is:           Take 1 Tab by mouth daily. 5 mg                 linezolid 600 mg tablet  Commonly known as:  ZYVOX    Your last dose was: Your next dose is: Take 1 Tab by mouth every twelve (12) hours. Please continue your Zyvox and follow up with your PCP in 1 week for further evaluation. 600 mg                 magnesium hydroxide 400 mg/5 mL suspension  Commonly known as:  Milk of Magnesia    Your last dose was: Your next dose is: Take 30 mL by mouth daily for 30 days. 30 mL                 polyethylene glycol 17 gram packet  Commonly known as:  MIRALAX    Your last dose was: Your next dose is: Take 1 Packet by mouth daily. Hold for diarrhea   17 g                 senna-docusate 8.6-50 mg per tablet  Commonly known as:  PERICOLACE    Your last dose was: Your next dose is: Take 2 Tabs by mouth two (2) times a day. Hold for diarrhea or loose stools   2 Tab                 traMADoL 50 mg tablet  Commonly known as:  ULTRAM    Your last dose was: Your next dose is: Take 1 Tab by mouth every eight (8) hours as needed for Pain for up to 3 days. Max Daily Amount: 150 mg. Patient already has this medication in her home medication box. No need of new prescription. Defer to PCP. 50 mg                    CONTINUE taking these medications      Instructions Each Dose to Equal Morning Noon Evening Bedtime   acetaminophen 325 mg tablet  Commonly known as:  TYLENOL    Your last dose was: Your next dose is: Take 2 Tabs by mouth every four (4) hours as needed for Pain. 650 mg                 amitriptyline 50 mg tablet  Commonly known as:  ELAVIL    Your last dose was: Your next dose is: Take 50 mg by mouth nightly. 50 mg                 aspirin 81 mg chewable tablet    Your last dose was: Your next dose is: Take 1 Tab by mouth daily.    81 mg                 aspirin-acetaminophen-caffeine 250-250-65 mg per tablet  Commonly known as: EXCEDRIN ES    Your last dose was: Your next dose is: Take 2 Tabs by mouth every six (6) hours as needed for Headache. 2 Tab                 atorvastatin 80 mg tablet  Commonly known as:  LIPITOR    Your last dose was: Your next dose is: Take 1 Tab by mouth nightly. 80 mg                 clopidogreL 75 mg Tab  Commonly known as:  PLAVIX    Your last dose was: Your next dose is: Take 1 Tab by mouth daily. 75 mg                 Colace 100 mg capsule  Generic drug:  docusate sodium    Your last dose was: Your next dose is: Take 100 mg by mouth two (2) times a day. 100 mg                 furosemide 20 mg tablet  Commonly known as:  LASIX    Your last dose was: Your next dose is: Take 20 mg by mouth. 20 mg                 gabapentin 300 mg capsule  Commonly known as:  NEURONTIN    Your last dose was: Your next dose is: Take 900 mg by mouth three (3) times daily. 900 mg                 glipiZIDE 5 mg tablet  Commonly known as:  GLUCOTROL    Your last dose was: Your next dose is: Take 1 Tab by mouth two (2) times a day. 5 mg                 insulin lispro 100 unit/mL injection  Commonly known as:  HumaLOG U-100 Insulin    Your last dose was: Your next dose is: Take 32 units with meals, only if BG > 100. Indications: HYPERGLYCEMIA, TYPE 2 DIABETES MELLITUS                  metFORMIN 500 mg tablet  Commonly known as:  GLUCOPHAGE    Your last dose was: Your next dose is: Take 500 mg by mouth three (3) times daily (with meals). 500 mg                 metoprolol tartrate 25 mg tablet  Commonly known as:  LOPRESSOR    Your last dose was: Your next dose is: Take 12.5 mg by mouth two (2) times a day. 12.5 mg                 NovoLOG U-100 Insulin aspart 100 unit/mL injection  Generic drug:  insulin aspart U-100    Your last dose was:       Your next dose is:          35 Units by SubCUTAneous route Before breakfast, lunch, and dinner. 35 Units                 ondansetron 4 mg disintegrating tablet  Commonly known as:  Zofran ODT    Your last dose was: Your next dose is: Take 1 Tab by mouth every eight (8) hours as needed for Nausea. 4 mg                 traZODone 50 mg tablet  Commonly known as:  DESYREL    Your last dose was: Your next dose is: Take 1 Tab by mouth nightly. 50 mg                    STOP taking these medications    lisinopriL 10 mg tablet  Commonly known as:  Jaden Polanco              Where to Get Your Medications      Information on where to get these meds will be given to you by the nurse or doctor.     Ask your nurse or doctor about these medications  · amLODIPine 5 mg tablet  · linezolid 600 mg tablet  · magnesium hydroxide 400 mg/5 mL suspension  · polyethylene glycol 17 gram packet  · senna-docusate 8.6-50 mg per tablet  · traMADoL 50 mg tablet         Signed:   Aspen Underwood MD  8/18/2020  10:35 AM

## 2020-08-18 NOTE — DISCHARGE SUMMARY
Discharge Summary       PATIENT ID: Elizabeth San  MRN: [id number]   YOB: 1965    DATE OF ADMISSION: 8/16/2020  2:02 PM    DATE OF DISCHARGE: 08/18/20   PRIMARY CARE PROVIDER: Sinan Suarez MD     ATTENDING PHYSICIAN: Ela Julio MD  DISCHARGING PROVIDER: Ela Julio MD    To contact this individual call [phone] and ask the  to page. If unavailable ask to be transferred the Adult Hospitalist Department. CONSULTATIONS: IP CONSULT TO CARDIOLOGY    PROCEDURES/SURGERIES: * No surgery found *    ADMITTING DIAGNOSES & HOSPITAL COURSE:   # PENA and concern for fluid overaload. Resolved. No CHF as confirmed with echocardiogram on 8/17/20  # Abdominal distention. Likely aggravated by stool-laden colon. Counseled about aggressive bowel regime needs and maintenance subsequently. Defer to PCP to assist with the same further. # Hypertensive urgency, better  # CKD-3  # DM2  # HLD  # GERD  # Asthma  # Bilateral nephrolithiasis, non-obstructing. Outpatient Urology eval per PCP. # Morbid obesity, BMI 48.1    Admission Summary:   Juan Zhu a 47 y. o. female PMH of CHF, with PMH of DM 2, HTN, asthma,?  CHF, fluids overload, left leg cellulitis with recent hospitalization for the same [facility], HLD, history of CVA x2 in 9/2018, neuropathy, headache, Constipation, sleep disturbances.     Patient has left ankle wound/scabbed lesion develop with tenderness and redness at the beginning of August for which she went to [facility] ED on 8/3 and was discharged the same night with some antibiotics.  On 8/5 patient had progressive redness with pain and swelling over the left ankle progressive upwards to mid leg for which she went [facility] and was admitted until 8/8. Trish Paz was discharged with some antibiotics.  On 8/9, next day patient noted worsening of swelling further along with abdominal distention and [facility] where she got admitted until 8/12. Melanie Nunez had stress test which was unremarkable and echocardiogram for which she has been told to have congestive heart failure, no other details available. Melanie Nunez was discharged with Lasix 20 mg p.o. daily and Zyvox 600 mg twice daily for 12 more days.  Patient started taking Zyvox on 8/12/2020.  She still has been on it.  On her last admission, patient also had shortness of breath in form of significant dyspnea on exertion.  Patient uses 2 pillow to lay down but denied any worsening recently.  Patient complained of significant weight gain about 70 pounds in last 1 month.  Patient is advised not to use amitriptyline while she is taking Zyvox due to drug drug interaction which she uses for sleep. Apparently, since discharge patient has not felt better and her symptoms of dyspnea on exertion,  Swelling, abdominal distention so she decided to come to ED here at [facility].  Patient denied any abdominal pain, nausea, vomiting.  No constipation or diarrhea.  Last bowel movement today.     In the ED, patient is evaluated for the same. [address] unremarkable with WBC 6.5 hemoglobin 10.1 platelet 036.  Differential unremarkable.  CMP pertinent for creatinine 1.35, BUN 22, otherwise unremarkable.  Troponin less than 0.05, proBNP 657.  CXR unremarkable.  Left LE duplex US unremarkable for any DVT/VTE.  EKG unchanged and non-concerning.     Patient was seen by [facility] cardiology group Dr. Kvng Rodriguez at [facility] Lilibeth Ramirez sister at bedside is her next of kin. Davis Timmons has experience with Dr. Cheryl Mccall for her . Melanie Nunez and her sister both would prefer Dr. Cheryl Mccall or his partner to be involved in the care.     Interval history / Subjective: Follow up PENA, abdominal distention. Patient seen and examined at the bedside. Labs, images and notes reviewed  Discussed with nursing staff, orders reviewed. Plan discussed with patient/Family  Pt is feeling ok. Some leg pain and asked something for it.  I reminded her about her Tramadol and she \"Ok\" it. Has had small BMs but not a good enough one. Enemas helped some but not much. Counseled about regular intensified bowel regime and to work with PCP on the same. No other concerns. Counseled about outpatient Urology assessment for bilateral nephrolithiasis- kidney stones and that they are not creating any obstruction at present. Pt voiced understanding and would check with PCP about the same. D/w Nephew, Mr. Lesley Reid at length on phone as per his aunt-patient's request.      Silvia Marquis / PLAN:      #Concern for fluid overload with dyspnea on exertion and abdominal distention.  Patient seems comfortable, no hypoxia, appears close to euvolemic, no respiratory distress, no orthopnea/PND. Resolved respiratory concerns  #Moderate colonic stool burden on CT A/P  #Concern CHF exacerbation on admission, unknown systolic or diastolic components or details.  Clinically does not feel strongly for CHF exacerbation at present. Echocardiogram 8/17/2020 unremarkable for any systolic or diastolic dysfunction. PT DOES NOT HAVE CHF based on the echo findings. #Hypertensive urgency, BP around 358-977 systolic On admission, improved. #CKD 3. Lisinopril on hold on DC. Defer to PCP to reassess as needed given DM. #DM2, on novolod insuline and oral medications. A1c 7.9 on 8/17/20  #Left lower extremity/leg cellulitis, likely MRSA, on Zyvox ongoing treatment for 10 more days. Advised to complete the same  #Normocytic anemia  #HLD  #History of CVA x2 in 9/2018  #Asthma  #?  GERD  #Constipation  #Bilateral nephrolithiasis, non-obstructing        Plan:      -Admitted to inpatient, telemetry, ALOS greater than 2 MN  -Lasix 40 mg IV given in the ED. Will give 1 more dose today and reassess  -CXR with Pulmonary edema. Reassess CXR in 1-2 days post-diuresis  -I/O, daily weight, close monitoring-BMP, CBC  -Trend CBC, BMP, proBNP, mag. WNL.   -Iron panel, ferritin, I19, folic acid WNL  -Stool Hemoccult  -Enemas x 2 on 8/17 helped some. Added aggressive first line bowel regime. If this is not doing enough, would defer to PCP to cosider Linzess or Amitiza.  -Cardiology consult: Dr. Astrid Mayers group per pt request. Mary Ellen Suarez. No further cardiac work up or follow up needed.  -Serial troponin, EKG unremarkable.  -Echocardiogram noted. Unremarkable for any CHF. No other concerns. -PRN IV hydralazine or labetalol for blood pressure greater than 170/100  -Resume home medications - Hold Lisinopril until further BMP/CMP check by PCP. Amlodipine added for BP control. PCP to monitor and adjust meds as needed.  -Continue Zyvox p.o. 600 mg twice daily for 12 days as already ongoing. Monitor CPK periodically. 62-WNL on 8/17/2020.  -Further based on CT A/P, echo results  -Repeat CXR in a.m. as needed based on clinical course  -Correctional insulin with Accu-Cheks-resistant profile.  No basal insulin.  Hold home metformin and glipizide.  -Hold Fioricet and amitriptyline given Zyvox.  -Continue other home medications as noted above.  -Consider Urology consutation - inpatient vs outpatient, for b/l nephrolithiasis     CODE STATUS: Full code  DVT prophylaxis: Lovenox SQ     Emergency contact: Sister, Ms. Eunice Willoughby, [phone]     FUNCTIONAL STATUS PRIOR TO HOSPITALIZATION Ambulatory with Use of Assistive Devices (including history of recent falls)         Care Plan discussed with: Patient/Family and Nurse  Anticipated Disposition: Home w/Family  Anticipated Discharge: 24 hours to 48 hours       ADDITIONAL CARE RECOMMENDATIONS:   Follow up with PCP in 1 week. CBC, CMP, BP and blood sugar monitoring. Bowel regime efficacy monitoring. Encouraged more activities and if feels agreeable for [facility] PT/OT, to check with PCP. · It is important that you take the medication exactly as they are prescribed.    · Keep your medication in the bottles provided by the pharmacist and keep a list of the medication names, dosages, and times to be taken in your wallet. · Do not take other medications without consulting your doctor. · No drinking alcohol or driving car or operating machinery if you are on narcotic pain medications. Donot take sedating mediations if you are sleepy or confused. · Fall Precautions  · Keep Well Hydrated  · Report to your medical provider if you feel you have  developed allergies to medications  · Follow up with your PCP or Consultant for medication adjustments and refills  · Monitor for signs of fevers,chills,bleeding,chest pain and seek medical attention if you do so. PENDING TEST RESULTS:   At the time of discharge the following test results are still pending: none    FOLLOW UP APPOINTMENTS:    Follow-up Information     Follow up With Specialties Details Why Contact Info    Jose Rebolledo MD Family Medicine In 1 week The office nurse will call you to schedule an appt within 1 week [address] [facility], In-Home Clinical Assessments   [facility] [address] [place] [address]  [phone]             DIET: Cardiac Diet and Diabetic Diet    ACTIVITY: Activity as tolerated    WOUND CARE: NA    EQUIPMENT needed: NA      DISCHARGE MEDICATIONS:  Current Discharge Medication List      START taking these medications    Details   amLODIPine (NORVASC) 5 mg tablet Take 1 Tab by mouth daily. Qty: 30 Tab, Refills: 0      linezolid (ZYVOX) 600 mg tablet Take 1 Tab by mouth every twelve (12) hours. Please continue your Zyvox and follow up with your PCP in 1 week for further evaluation. Qty: 20 Tab, Refills: 0      polyethylene glycol (MIRALAX) 17 gram packet Take 1 Packet by mouth daily. Hold for diarrhea  Qty: 30 Each, Refills: 0      traMADoL (ULTRAM) 50 mg tablet Take 1 Tab by mouth every eight (8) hours as needed for Pain for up to 3 days. Max Daily Amount: 150 mg.  Patient already has this medication in her home medication box. No need of new prescription. Defer to PCP. Qty: 5 Tab, Refills: 0    Associated Diagnoses: DDD (degenerative disc disease), lumbar      magnesium hydroxide (Milk of Magnesia) 400 mg/5 mL suspension Take 30 mL by mouth daily for 30 days. Qty: 900 mL, Refills: 0      senna-docusate (PERICOLACE) 8.6-50 mg per tablet Take 2 Tabs by mouth two (2) times a day. Hold for diarrhea or loose stools  Qty: 120 Tab, Refills: 0         CONTINUE these medications which have NOT CHANGED    Details   metoprolol tartrate (LOPRESSOR) 25 mg tablet Take 12.5 mg by mouth two (2) times a day. furosemide (LASIX) 20 mg tablet Take 20 mg by mouth.      metFORMIN (GLUCOPHAGE) 500 mg tablet Take 500 mg by mouth three (3) times daily (with meals). insulin aspart U-100 (NovoLOG U-100 Insulin aspart) 100 unit/mL injection 35 Units by SubCUTAneous route Before breakfast, lunch, and dinner. amitriptyline (ELAVIL) 50 mg tablet Take 50 mg by mouth nightly. docusate sodium (Colace) 100 mg capsule Take 100 mg by mouth two (2) times a day. aspirin 81 mg chewable tablet Take 1 Tab by mouth daily. Qty: 30 Tab, Refills: 0      atorvastatin (LIPITOR) 80 mg tablet Take 1 Tab by mouth nightly. Qty: 30 Tab, Refills: 0      clopidogrel (PLAVIX) 75 mg tab Take 1 Tab by mouth daily. Qty: 30 Tab, Refills: 0      gabapentin (NEURONTIN) 300 mg capsule Take 900 mg by mouth three (3) times daily. insulin lispro (HUMALOG) 100 unit/mL injection Take 32 units with meals, only if BG > 100. Indications: HYPERGLYCEMIA, TYPE 2 DIABETES MELLITUS  Qty: 3 Vial, Refills: 3    Associated Diagnoses: Type 2 diabetes mellitus with peripheral neuropathy (HCC)      glipiZIDE (GLUCOTROL) 5 mg tablet Take 1 Tab by mouth two (2) times a day.   Qty: 60 Tab, Refills: 3    Associated Diagnoses: Type 2 diabetes mellitus with peripheral neuropathy (HCC)      aspirin-acetaminophen-caffeine (EXCEDRIN ES) 250-250-65 mg per tablet Take 2 Tabs by mouth every six (6) hours as needed for Headache. acetaminophen (TYLENOL) 325 mg tablet Take 2 Tabs by mouth every four (4) hours as needed for Pain. Qty: 20 Tab, Refills: 0      ondansetron (ZOFRAN ODT) 4 mg disintegrating tablet Take 1 Tab by mouth every eight (8) hours as needed for Nausea. Qty: 16 Tab, Refills: 0      traZODone (DESYREL) 50 mg tablet Take 1 Tab by mouth nightly. Qty: 30 Tab, Refills: 3    Associated Diagnoses: Episodic tension-type headache, not intractable         STOP taking these medications       lisinopril (PRINIVIL, ZESTRIL) 10 mg tablet Comments:   Reason for Stopping:                 NOTIFY YOUR PHYSICIAN FOR ANY OF THE FOLLOWING:   Fever over 101 degrees for 24 hours. Chest pain, shortness of breath, fever, chills, nausea, vomiting, diarrhea, change in mentation, falling, weakness, bleeding. Severe pain or pain not relieved by medications. Or, any other signs or symptoms that you may have questions about. DISPOSITION:  x  Home With:   OT  PT  HH  RN       Long term SNF/Inpatient Rehab    Independent/assisted living    Hospice    Other:       PATIENT CONDITION AT DISCHARGE:     Functional status    Poor     Deconditioned    x Independent      Cognition   x  Lucid     Forgetful     Dementia      Catheters/lines (plus indication)    Mena     PICC     PEG    x None      Code status   x  Full code     DNR      PHYSICAL EXAMINATION AT DISCHARGE:  Visit Vitals  /59 (BP 1 Location: Left arm, BP Patient Position: Sitting)   Pulse 77   Temp 98.4 °F (36.9 °C)   Resp 18   Ht 5' 4\" (1.626 m)   Wt 127.1 kg (280 lb 3.3 oz)   SpO2 96%   BMI 48.10 kg/m²       General:          Alert, cooperative, no distress, appears stated age.      HEENT:           Atraumatic, anicteric sclerae, pink conjunctivae                          No oral ulcers, mucosa moist, throat clear, dentition fair  Neck:               Supple, symmetrical  Lungs:             Clear to auscultation bilaterally. Limited exam with habitus and mildly reduced AE in lower zone. No Wheezing or Rhonchi. No rales. Chest wall:      No tenderness  No Accessory muscle use. Heart:              Regular  rhythm,  No  murmur   No edema  Abdomen:        Soft, non-tender. Not distended. Bowel sounds normal.   Extremities:     No cyanosis. No clubbing,                            Skin turgor normal, Capillary refill normal  Skin:                Not pale. Not Jaundiced  No rashes   Psych:             Not anxious or agitated.   Neurologic:      Alert, moves all extremities, answers questions appropriately and responds to commands       CHRONIC MEDICAL DIAGNOSES:  Problem List as of 8/18/2020 Date Reviewed: 12/20/2018          Codes Class Noted - Resolved    Acute exacerbation of CHF (congestive heart failure) ([facility]) ICD-10-CM: I50.9  ICD-9-CM: 428.0  8/16/2020 - Present        TIA (transient ischemic attack) ICD-10-CM: G45.9  ICD-9-CM: 435.9  12/20/2018 - Present        Vitamin D deficiency ICD-10-CM: E55.9  ICD-9-CM: 268.9  9/8/2015 - Present        Atopic rhinitis ICD-10-CM: J30.9  ICD-9-CM: 477.9  9/2/2015 - Present        Disorder of liver ICD-10-CM: K76.9  ICD-9-CM: 573.9  9/2/2015 - Present        Lymphoma ([facility]) ICD-10-CM: C85.90  ICD-9-CM: 202.80  9/2/2015 - Present        Lymphadenopathy ICD-10-CM: R59.1  ICD-9-CM: 785.6  9/2/2015 - Present        Morbid obesity ([facility]) ICD-10-CM: E66.01  ICD-9-CM: 278.01  9/2/2015 - Present        Hypercholesteremia ICD-10-CM: E78.00  ICD-9-CM: 272.0  6/25/2015 - Present        Episodic tension-type headache, not intractable ICD-10-CM: E85.853  ICD-9-CM: 339.11  6/25/2015 - Present        Type 2 diabetes mellitus with peripheral neuropathy ([facility]) ICD-10-CM: E11.42  ICD-9-CM: 250.60, 357.2  6/25/2015 - Present        Essential hypertension ICD-10-CM: I10  ICD-9-CM: 401.9  6/25/2015 - Present        DDD (degenerative disc disease), lumbar ICD-10-CM: M51.36  ICD-9-CM: 722.52  6/25/2015 - Present        Cervical spondylosis ICD-10-CM: U70.069  ICD-9-CM: 721.0  6/25/2015 - Present        Morbid obesity with BMI of 45.0-49.9, adult [facility]) ICD-10-CM: E66.01, Z68.42  ICD-9-CM: 278.01, V85.42  6/25/2015 - Present            Radiology  Xr Chest Pa Lat    Result Date: 8/16/2020  Impression: No acute process. Ct Abd Pelv Wo Cont    Result Date: 8/16/2020  IMPRESSION: 1. Bilateral nephrolithiasis. 2. Moderate colonic stool. Xr Chest Port    Result Date: 8/17/2020  IMPRESSION: Pulmonary edema.     Recent Results (from the past 24 hour(s))   GLUCOSE, POC    Collection Time: 08/17/20 12:34 PM   Result Value Ref Range    Glucose (POC) 248 (H) 65 - 100 mg/dL    Performed by JUNO GALVAN    OCCULT BLOOD, STOOL    Collection Time: 08/17/20  1:48 PM   Result Value Ref Range    Occult blood, stool Negative NEG     GLUCOSE, POC    Collection Time: 08/17/20  4:37 PM   Result Value Ref Range    Glucose (POC) 238 (H) 65 - 100 mg/dL    Performed by JUNO GALVAN    GLUCOSE, POC    Collection Time: 08/17/20 10:05 PM   Result Value Ref Range    Glucose (POC) 233 (H) 65 - 100 mg/dL    Performed by Rosalina Razo    TROPONIN I    Collection Time: 08/18/20  3:10 AM   Result Value Ref Range    Troponin-I, Qt. <0.05 <0.05 ng/mL   CBC W/O DIFF    Collection Time: 08/18/20  3:10 AM   Result Value Ref Range    WBC 6.5 3.6 - 11.0 K/uL    RBC 3.18 (L) 3.80 - 5.20 M/uL    HGB 9.0 (L) 11.5 - 16.0 g/dL    HCT 28.5 (L) 35.0 - 47.0 %    MCV 89.6 80.0 - 99.0 FL    MCH 28.3 26.0 - 34.0 PG    MCHC 31.6 30.0 - 36.5 g/dL    RDW 14.0 11.5 - 14.5 %    PLATELET 853 409 - 199 K/uL    MPV 9.5 8.9 - 12.9 FL    NRBC 0.0 0  WBC    ABSOLUTE NRBC 0.00 0.00 - 0.01 K/uL   PHOSPHORUS    Collection Time: 08/18/20  3:10 AM   Result Value Ref Range    Phosphorus 3.7 2.6 - 4.7 MG/DL   METABOLIC PANEL, COMPREHENSIVE    Collection Time: 08/18/20  3:10 AM   Result Value Ref Range    Sodium 138 136 - 145 mmol/L    Potassium 4.0 3.5 - 5.1 mmol/L Chloride 106 97 - 108 mmol/L    CO2 27 21 - 32 mmol/L    Anion gap 5 5 - 15 mmol/L    Glucose 179 (H) 65 - 100 mg/dL    BUN 19 6 - 20 MG/DL    Creatinine 1.42 (H) 0.55 - 1.02 MG/DL    BUN/Creatinine ratio 13 12 - 20      GFR est AA 47 (L) >60 ml/min/1.73m2    GFR est non-AA 39 (L) >60 ml/min/1.73m2    Calcium 8.7 8.5 - 10.1 MG/DL    Bilirubin, total 0.2 0.2 - 1.0 MG/DL    ALT (SGPT) 19 12 - 78 U/L    AST (SGOT) 19 15 - 37 U/L    Alk. phosphatase 104 45 - 117 U/L    Protein, total 6.4 6.4 - 8.2 g/dL    Albumin 2.3 (L) 3.5 - 5.0 g/dL    Globulin 4.1 (H) 2.0 - 4.0 g/dL    A-G Ratio 0.6 (L) 1.1 - 2.2     SAMPLES BEING HELD    Collection Time: 08/18/20  3:10 AM   Result Value Ref Range    SAMPLES BEING HELD 1SWAB     COMMENT        Add-on orders for these samples will be processed based on acceptable specimen integrity and analyte stability, which may vary by analyte.    GLUCOSE, POC    Collection Time: 08/18/20  7:21 AM   Result Value Ref Range    Glucose (POC) 189 (H) 65 - 100 mg/dL    Performed by Meagan Brewer        Greater than 40 minutes were spent with the patient on counseling and coordination of care    Signed:   Jaylyn Romero MD  8/18/2020  10:36 AM

## 2020-08-18 NOTE — PROGRESS NOTES
Problem: Mobility Impaired (Adult and Pediatric)  Goal: *Acute Goals and Plan of Care (Insert Text)  Description: FUNCTIONAL STATUS PRIOR TO ADMISSION: Patient required moderate assistance for basic and instrumental ADLs and walking with Rw/rollator without assistance. HOME SUPPORT PRIOR TO ADMISSION: The patient lived alone with sister to provide assistance. Physical Therapy Goals  Initiated 8/17/2020  1. Patient will move from supine to sit and sit to supine , scoot up and down, and roll side to side in bed with modified independence within 7 day(s). 2.  Patient will transfer from bed to chair and chair to bed with modified independence using the least restrictive device within 7 day(s). 3.  Patient will perform sit to stand with modified independence within 7 day(s). 4.  Patient will ambulate with supervision/set-up for 150 feet with the least restrictive device within 7 day(s). 5.  Patient will ascend/descend 5 stairs with 1 handrail(s) with minimal assistance/contact guard assist within 7 day(s). Outcome: Progressing Towards Goal    PHYSICAL THERAPY TREATMENT  Patient: Narciso Marcelino (12 y.o. female)  Date: 8/18/2020  Diagnosis: Acute exacerbation of CHF (congestive heart failure) ([facility] [I50.9]  Acute exacerbation of CHF (congestive heart failure) ([facility] [I50.9]   <principal problem not specified>       Precautions:    Chart, physical therapy assessment, plan of care and goals were reviewed. ASSESSMENT  Patient continues with skilled PT services and is progressing towards goals. Pt seated EOB upon arrival and agreeable to therapy. Pt mod I for mobility and transfers. Pt continues to express concerns about edema in abdomen and BLEs. Physician entered room during treatment to discuss d/c plans and ease concerns. Pt ambulated with RW and CGA with complaints of shortness of breath and discomfort in back. Pt SpO2 97% post activity.  Pt deferred stair training d/t completing them prior and having no concerns. Current Level of Function Impacting Discharge (mobility/balance): CGA    Other factors to consider for discharge:          PLAN :  Patient continues to benefit from skilled intervention to address the above impairments. Continue treatment per established plan of care. to address goals. Recommendation for discharge: (in order for the patient to meet his/her long term goals)  No skilled physical therapy/ follow up rehabilitation needs identified at this time. This discharge recommendation:  Has been made in collaboration with the attending provider and/or case management    IF patient discharges home will need the following DME: patient owns DME required for discharge       SUBJECTIVE:   Patient stated my legs are still so swollen.     OBJECTIVE DATA SUMMARY:   Critical Behavior:  Neurologic State: Alert  Orientation Level: Oriented X4  Cognition: Appropriate decision making, Appropriate for age attention/concentration, Appropriate safety awareness, Follows commands  Safety/Judgement: Awareness of environment, Fall prevention  Functional Mobility Training:  Bed Mobility:                    Transfers:  Sit to Stand: Modified independent  Stand to Sit: Modified independent                             Balance:  Sitting: Intact; Without support  Standing: Intact; With support  Standing - Static: Constant support;Good  Standing - Dynamic : Constant support;Good  Ambulation/Gait Training:  Distance (ft): 75 Feet (ft)  Assistive Device: Gait belt;Walker, rolling  Ambulation - Level of Assistance: Contact guard assistance        Gait Abnormalities: Decreased step clearance;Shuffling gait        Base of Support: Widened     Speed/Vangie: Pace decreased (<100 feet/min)                                 Therapeutic Exercises:    Ankle pumps, LAQ, seated marches x10  Pain Rating:  back    Activity Tolerance:   Fair and requires rest breaks  Please refer to the flowsheet for vital signs taken during this treatment. After treatment patient left in no apparent distress:   Sitting in chair and Call bell within reach    COMMUNICATION/COLLABORATION:   The patients plan of care was discussed with: Registered nurse.      MARKELL Vásquez   Time Calculation: 18 mins

## 2020-08-19 ENCOUNTER — PATIENT OUTREACH (OUTPATIENT)
Dept: CASE MANAGEMENT | Age: 55
End: 2020-08-19

## 2020-08-19 LAB
BACTERIA SPEC CULT: NORMAL
BACTERIA SPEC CULT: NORMAL
SERVICE CMNT-IMP: NORMAL

## 2020-08-19 NOTE — PROGRESS NOTES
8/19/2020 -   Care transitions nurse    Marcellus Peña, RN, [facility], [facility]  Care transitions nurse [phone]  [facility] Coordination Team  Attempt to reach pt x2 - phone line busy - LM for pt's sister with whom patient lives -   Request call for follow up.  8/24 - Attempt to reach pt -  - LM for her sister - alternate # - Ms. Weir - reached nephew who said to call Ms. Weir. Hospital Discharge Follow-Up    Date/Time:  8/19/2020 12:46 PM    Patient was admitted to [facility] on 8/16 and discharged on 8/18/20  for CHF exacerbation . The physician discharge summary was available at the time of outreach. Patient was contacted within 1 business days of discharge. Top Challenges reviewed with the provider   CHF exacerbation  Recent hospital visit for cellulitis  Additional visit noted to [address] [place] [address].  8/17/2020  5:46 AM - Jose M, [facility] [employer]     Component  Value  Flag  Ref Range  Units  Status    Hemoglobin A1c  7.9  High    4.0 - 5.6  %  Final             Method of communication with provider :face to face, chart routing, staff message, phone    Inpatient RRAT score: 21%  Was this a readmission? yes   Patient stated reason for the readmission: shortness of breath/ CHF exacerbation     ____________________________________________________  Summary of patient's top problems:  1. Echocardiogram 8/17/2020 unremarkable for any systolic or diastolic dysfunction. PT DOES NOT HAVE CHF based on the echo findings. 2. Diabetes  3. Cellulitis - with recent treatment/ antibiotics  4. Nephrolithiasis  5. Hx of CVA    Home Health orders at discharge: [address]: [facility]  Date of initial visit: pending      Per CM note - pt declined [facility] - then:   11:51 a.m.- Patient now informed bedside RN that she has changed her mind and now wants home health- CM met with patient, she is dressed and ready for discharge.  CM explained this writer will attempt to locate home health, however, patient may discharge before it can be established. Patient is aware, will follow-up with PCP. CM will attempt to locate home health.      CM sent referrals to At [address] [place] [address] via [employer]. [facility] denied the patient. Durable Medical Equipment ordered/company: Pt uses rollator  Durable Medical Equipment received: rollator walker    Medication(s):   New Medications at Discharge: Amlodipine 5 mg every day, Zyvos 600 mg q12 h, Miralax, every day, Tramadol 50 mg prn - MOM, 30 ml every day, Pericolace 2 tabs bid -   Changed Medications at Discharge: none  Discontinued Medications at Discharge: Lisinopril    [facility] follow up appointment(s): No future appointments. Non-[facility] follow up appointment(s): Dr. Mary Grace Diaz, - follow up 1 week -   [facility]:  scheduled / call to [facility] - pt never answered to give permission for visit - no follow up visit occurred.     13:03 p.m.[facility] patient for services. _____________________________________________________________________________  ADMITTING DIAGNOSES & HOSPITAL COURSE:   # PENA and concern for fluid overaload. Resolved. No CHF as confirmed with echocardiogram on 8/17/20  # Abdominal distention. Likely aggravated by stool-laden colon. Counseled about aggressive bowel regime needs and maintenance subsequently. Defer to PCP to assist with the same further. # Hypertensive urgency, better  # CKD-3  # DM2  # HLD  # GERD  # Asthma  # Bilateral nephrolithiasis, non-obstructing. Outpatient Urology eval per PCP.   # Morbid obesity, BMI 48.1

## 2021-08-07 ENCOUNTER — APPOINTMENT (OUTPATIENT)
Dept: GENERAL RADIOLOGY | Age: 56
DRG: 115 | End: 2021-08-07
Attending: NURSE PRACTITIONER
Payer: MEDICAID

## 2021-08-07 ENCOUNTER — HOSPITAL ENCOUNTER (INPATIENT)
Age: 56
LOS: 5 days | Discharge: HOME | DRG: 115 | End: 2021-08-12
Attending: EMERGENCY MEDICINE | Admitting: FAMILY MEDICINE
Payer: MEDICAID

## 2021-08-07 DIAGNOSIS — N17.9 AKI (ACUTE KIDNEY INJURY) (HCC): ICD-10-CM

## 2021-08-07 DIAGNOSIS — I50.9 CONGESTIVE HEART FAILURE, UNSPECIFIED HF CHRONICITY, UNSPECIFIED HEART FAILURE TYPE (HCC): Primary | ICD-10-CM

## 2021-08-07 DIAGNOSIS — M47.812 CERVICAL SPONDYLOSIS: ICD-10-CM

## 2021-08-07 DIAGNOSIS — M51.36 DDD (DEGENERATIVE DISC DISEASE), LUMBAR: ICD-10-CM

## 2021-08-07 DIAGNOSIS — E11.42 TYPE 2 DIABETES MELLITUS WITH PERIPHERAL NEUROPATHY (HCC): ICD-10-CM

## 2021-08-07 LAB
ALBUMIN SERPL-MCNC: 2.6 G/DL (ref 3.5–5)
ALBUMIN/GLOB SERPL: 0.6 {RATIO} (ref 1.1–2.2)
ALP SERPL-CCNC: 112 U/L (ref 45–117)
ALT SERPL-CCNC: 15 U/L (ref 12–78)
ANION GAP SERPL CALC-SCNC: 10 MMOL/L (ref 5–15)
AST SERPL-CCNC: 15 U/L (ref 15–37)
BASOPHILS # BLD: 0 K/UL (ref 0–0.1)
BASOPHILS NFR BLD: 0 % (ref 0–1)
BILIRUB SERPL-MCNC: 0.3 MG/DL (ref 0.2–1)
BNP SERPL-MCNC: 1190 PG/ML (ref 0–125)
BUN SERPL-MCNC: 25 MG/DL (ref 6–20)
BUN/CREAT SERPL: 10 (ref 12–20)
CALCIUM SERPL-MCNC: 8.5 MG/DL (ref 8.5–10.1)
CHLORIDE SERPL-SCNC: 106 MMOL/L (ref 97–108)
CO2 SERPL-SCNC: 24 MMOL/L (ref 21–32)
CREAT SERPL-MCNC: 2.58 MG/DL (ref 0.55–1.02)
DIFFERENTIAL METHOD BLD: ABNORMAL
EOSINOPHIL # BLD: 0.3 K/UL (ref 0–0.4)
EOSINOPHIL NFR BLD: 4 % (ref 0–7)
ERYTHROCYTE [DISTWIDTH] IN BLOOD BY AUTOMATED COUNT: 13.4 % (ref 11.5–14.5)
GLOBULIN SER CALC-MCNC: 4.6 G/DL (ref 2–4)
GLUCOSE SERPL-MCNC: 206 MG/DL (ref 65–100)
HCT VFR BLD AUTO: 24.1 % (ref 35–47)
HGB BLD-MCNC: 7.9 G/DL (ref 11.5–16)
IMM GRANULOCYTES # BLD AUTO: 0 K/UL (ref 0–0.04)
IMM GRANULOCYTES NFR BLD AUTO: 0 % (ref 0–0.5)
LYMPHOCYTES # BLD: 1.6 K/UL (ref 0.8–3.5)
LYMPHOCYTES NFR BLD: 22 % (ref 12–49)
MAGNESIUM SERPL-MCNC: 1.8 MG/DL (ref 1.6–2.4)
MCH RBC QN AUTO: 27.8 PG (ref 26–34)
MCHC RBC AUTO-ENTMCNC: 32.8 G/DL (ref 30–36.5)
MCV RBC AUTO: 84.9 FL (ref 80–99)
MONOCYTES # BLD: 0.6 K/UL (ref 0–1)
MONOCYTES NFR BLD: 8 % (ref 5–13)
NEUTS SEG # BLD: 4.9 K/UL (ref 1.8–8)
NEUTS SEG NFR BLD: 66 % (ref 32–75)
NRBC # BLD: 0 K/UL (ref 0–0.01)
NRBC BLD-RTO: 0 PER 100 WBC
PLATELET # BLD AUTO: 231 K/UL (ref 150–400)
PMV BLD AUTO: 9.2 FL (ref 8.9–12.9)
POTASSIUM SERPL-SCNC: 3.9 MMOL/L (ref 3.5–5.1)
PROT SERPL-MCNC: 7.2 G/DL (ref 6.4–8.2)
RBC # BLD AUTO: 2.84 M/UL (ref 3.8–5.2)
SODIUM SERPL-SCNC: 140 MMOL/L (ref 136–145)
TROPONIN I SERPL-MCNC: <0.05 NG/ML
WBC # BLD AUTO: 7.5 K/UL (ref 3.6–11)

## 2021-08-07 PROCEDURE — 99284 EMERGENCY DEPT VISIT MOD MDM: CPT

## 2021-08-07 PROCEDURE — 85025 COMPLETE CBC W/AUTO DIFF WBC: CPT

## 2021-08-07 PROCEDURE — 93005 ELECTROCARDIOGRAM TRACING: CPT

## 2021-08-07 PROCEDURE — 80053 COMPREHEN METABOLIC PANEL: CPT

## 2021-08-07 PROCEDURE — 77030029684 HC NEB SM VOL KT MONA -A

## 2021-08-07 PROCEDURE — 83880 ASSAY OF NATRIURETIC PEPTIDE: CPT

## 2021-08-07 PROCEDURE — 36415 COLL VENOUS BLD VENIPUNCTURE: CPT

## 2021-08-07 PROCEDURE — 94760 N-INVAS EAR/PLS OXIMETRY 1: CPT

## 2021-08-07 PROCEDURE — 84484 ASSAY OF TROPONIN QUANT: CPT

## 2021-08-07 PROCEDURE — 96374 THER/PROPH/DIAG INJ IV PUSH: CPT

## 2021-08-07 PROCEDURE — 74011250636 HC RX REV CODE- 250/636: Performed by: NURSE PRACTITIONER

## 2021-08-07 PROCEDURE — 94640 AIRWAY INHALATION TREATMENT: CPT

## 2021-08-07 PROCEDURE — 65270000029 HC RM PRIVATE

## 2021-08-07 PROCEDURE — 71045 X-RAY EXAM CHEST 1 VIEW: CPT

## 2021-08-07 PROCEDURE — 83735 ASSAY OF MAGNESIUM: CPT

## 2021-08-07 PROCEDURE — 74011000250 HC RX REV CODE- 250: Performed by: NURSE PRACTITIONER

## 2021-08-07 RX ORDER — SODIUM CHLORIDE 0.9 % (FLUSH) 0.9 %
5-40 SYRINGE (ML) INJECTION AS NEEDED
Status: DISCONTINUED | OUTPATIENT
Start: 2021-08-07 | End: 2021-08-12 | Stop reason: HOSPADM

## 2021-08-07 RX ORDER — INSULIN LISPRO 100 [IU]/ML
INJECTION, SOLUTION INTRAVENOUS; SUBCUTANEOUS
Status: DISCONTINUED | OUTPATIENT
Start: 2021-08-08 | End: 2021-08-12 | Stop reason: HOSPADM

## 2021-08-07 RX ORDER — IPRATROPIUM BROMIDE AND ALBUTEROL SULFATE 2.5; .5 MG/3ML; MG/3ML
6 SOLUTION RESPIRATORY (INHALATION)
Status: COMPLETED | OUTPATIENT
Start: 2021-08-07 | End: 2021-08-07

## 2021-08-07 RX ORDER — ONDANSETRON 2 MG/ML
4 INJECTION INTRAMUSCULAR; INTRAVENOUS
Status: DISCONTINUED | OUTPATIENT
Start: 2021-08-07 | End: 2021-08-12 | Stop reason: HOSPADM

## 2021-08-07 RX ORDER — ATORVASTATIN CALCIUM 40 MG/1
80 TABLET, FILM COATED ORAL
Status: DISCONTINUED | OUTPATIENT
Start: 2021-08-08 | End: 2021-08-12 | Stop reason: HOSPADM

## 2021-08-07 RX ORDER — CLOPIDOGREL BISULFATE 75 MG/1
75 TABLET ORAL DAILY
Status: DISCONTINUED | OUTPATIENT
Start: 2021-08-08 | End: 2021-08-12 | Stop reason: HOSPADM

## 2021-08-07 RX ORDER — ACETAMINOPHEN 650 MG/1
650 SUPPOSITORY RECTAL
Status: DISCONTINUED | OUTPATIENT
Start: 2021-08-07 | End: 2021-08-12 | Stop reason: HOSPADM

## 2021-08-07 RX ORDER — FUROSEMIDE 10 MG/ML
20 INJECTION INTRAMUSCULAR; INTRAVENOUS
Status: COMPLETED | OUTPATIENT
Start: 2021-08-07 | End: 2021-08-07

## 2021-08-07 RX ORDER — POLYETHYLENE GLYCOL 3350 17 G/17G
17 POWDER, FOR SOLUTION ORAL DAILY PRN
Status: DISCONTINUED | OUTPATIENT
Start: 2021-08-07 | End: 2021-08-12 | Stop reason: HOSPADM

## 2021-08-07 RX ORDER — FUROSEMIDE 10 MG/ML
40 INJECTION INTRAMUSCULAR; INTRAVENOUS DAILY
Status: DISCONTINUED | OUTPATIENT
Start: 2021-08-08 | End: 2021-08-08

## 2021-08-07 RX ORDER — ACETAMINOPHEN 325 MG/1
650 TABLET ORAL
Status: DISCONTINUED | OUTPATIENT
Start: 2021-08-07 | End: 2021-08-12 | Stop reason: HOSPADM

## 2021-08-07 RX ORDER — SODIUM CHLORIDE 0.9 % (FLUSH) 0.9 %
5-40 SYRINGE (ML) INJECTION EVERY 8 HOURS
Status: DISCONTINUED | OUTPATIENT
Start: 2021-08-08 | End: 2021-08-12 | Stop reason: HOSPADM

## 2021-08-07 RX ORDER — METOPROLOL TARTRATE 25 MG/1
12.5 TABLET, FILM COATED ORAL 2 TIMES DAILY
Status: DISCONTINUED | OUTPATIENT
Start: 2021-08-08 | End: 2021-08-12 | Stop reason: HOSPADM

## 2021-08-07 RX ORDER — MAGNESIUM SULFATE 100 %
4 CRYSTALS MISCELLANEOUS AS NEEDED
Status: DISCONTINUED | OUTPATIENT
Start: 2021-08-07 | End: 2021-08-12 | Stop reason: HOSPADM

## 2021-08-07 RX ORDER — ENOXAPARIN SODIUM 100 MG/ML
40 INJECTION SUBCUTANEOUS DAILY
Status: DISCONTINUED | OUTPATIENT
Start: 2021-08-08 | End: 2021-08-08 | Stop reason: DRUGHIGH

## 2021-08-07 RX ORDER — GUAIFENESIN 100 MG/5ML
81 LIQUID (ML) ORAL DAILY
Status: DISCONTINUED | OUTPATIENT
Start: 2021-08-08 | End: 2021-08-08

## 2021-08-07 RX ORDER — DEXTROSE 50 % IN WATER (D50W) INTRAVENOUS SYRINGE
25-50 AS NEEDED
Status: DISCONTINUED | OUTPATIENT
Start: 2021-08-07 | End: 2021-08-12 | Stop reason: HOSPADM

## 2021-08-07 RX ORDER — ONDANSETRON 4 MG/1
4 TABLET, ORALLY DISINTEGRATING ORAL
Status: DISCONTINUED | OUTPATIENT
Start: 2021-08-07 | End: 2021-08-12 | Stop reason: HOSPADM

## 2021-08-07 RX ADMIN — IPRATROPIUM BROMIDE AND ALBUTEROL SULFATE 6 ML: .5; 3 SOLUTION RESPIRATORY (INHALATION) at 17:11

## 2021-08-07 RX ADMIN — FUROSEMIDE 20 MG: 10 INJECTION, SOLUTION INTRAVENOUS at 20:04

## 2021-08-07 NOTE — ED PROVIDER NOTES
EMERGENCY DEPARTMENT HISTORY AND PHYSICAL EXAM    Date: 8/7/2021  Patient Name: Ramesh Malave    History of Presenting Illness     Chief Complaint   Patient presents with    Shortness of Breath    Peripheral Edema         History Provided By: Patient    Chief Complaint: shortness of breath  Duration: onset one week ago   Timing:  Acute  Quality: 'breathing fast\"   Severity: Moderate  Modifying Factors: rest relieves symptoms  Associated Symptoms: wheezing lower extremity edema      HPI: Ramesh Malave is a 54 y.o. female with a PMH of arthritis asthma CHF diabetes hypertension and as below who presents with depressed for 1 week. Patient states she has been wheezing. Patient states she was diagnosed with CHF 1 year ago. Patient states she takes torsemide for CHF and leg swelling. Patient states she noted her lower extremities have been increasingly swelling for the past week. She denies cough or fever. She denies abdominal pain nausea vomiting diarrhea. She denies headache numbness or tingling. She has no other complaints patient this time.     PCP: Bebe Hicks MD    Current Facility-Administered Medications   Medication Dose Route Frequency Provider Last Rate Last Admin    [START ON 8/8/2021] aspirin chewable tablet 81 mg  81 mg Oral DAILY Karla Mehta MD        [START ON 8/8/2021] atorvastatin (LIPITOR) tablet 80 mg  80 mg Oral QHS Karla Mehta MD        [START ON 8/8/2021] clopidogreL (PLAVIX) tablet 75 mg  75 mg Oral DAILY Karla Mehta MD        [START ON 8/8/2021] metoprolol tartrate (LOPRESSOR) tablet 12.5 mg  12.5 mg Oral BID Karla Mehta MD        [START ON 8/8/2021] sodium chloride (NS) flush 5-40 mL  5-40 mL IntraVENous Q8H Karla Mehta MD        sodium chloride (NS) flush 5-40 mL  5-40 mL IntraVENous PRN Karla Mehta MD        acetaminophen (TYLENOL) tablet 650 mg  650 mg Oral Q6H PRN Karla Mehta MD        Or    acetaminophen (TYLENOL) suppository 650 mg 650 mg Rectal Q6H PRN Cedric Gao MD        polyethylene glycol (MIRALAX) packet 17 g  17 g Oral DAILY PRN Cedric Gao MD        ondansetron (ZOFRAN ODT) tablet 4 mg  4 mg Oral Q8H PRN Cedric Gao MD        Or    ondansetron [facility]) injection 4 mg  4 mg IntraVENous Q6H PRN Cedric Gao MD        [START ON 8/8/2021] enoxaparin (LOVENOX) injection 40 mg  40 mg SubCUTAneous DAILY Cedric Gao MD        glucose chewable tablet 16 g  4 Tablet Oral PRN Cedric Gao MD        dextrose (D50W) injection syrg 12.5-25 g  25-50 mL IntraVENous PRN Cedric Gao MD        glucagon [facility]) injection 1 mg  1 mg IntraMUSCular PRN Cedric Gao MD        [START ON 8/8/2021] insulin lispro (HUMALOG) injection   SubCUTAneous AC&HS Cedric Gao MD        [START ON 8/8/2021] furosemide (LASIX) injection 40 mg  40 mg IntraVENous DAILY Cedric Gao MD           Past History     Past Medical History:  Past Medical History:   Diagnosis Date    Arthritis     Asthma     CHF (congestive heart failure) (HCC)     Chronic back pain     Diabetes ([address]     [facility]opathy [facility]     Hypertension     MRSA (methicillin resistant staph aureus) culture positive     labial abscess    Neuropathy        Past Surgical History:  Past Surgical History:   Procedure Laterality Date    HX HEENT      tonsillectomy    HX ORTHOPAEDIC      left ft bunionectomy    HX OTHER SURGICAL      lanced labial abscess       Family History:  History reviewed. No pertinent family history. Social History:  Social History     Tobacco Use    Smoking status: Never Smoker    Smokeless tobacco: Never Used   Substance Use Topics    Alcohol use: No    Drug use: No       Allergies:   Allergies   Allergen Reactions    Amoxicillin Nausea Only    Aspirin Unknown (comments)     Patient states naproxen ok    Ciprofloxacin Hives         Review of Systems   Review of Systems   Constitutional: Negative for fatigue and fever.   Respiratory: Positive for shortness of breath and wheezing. Cardiovascular: Positive for leg swelling. Negative for chest pain. Gastrointestinal: Negative for abdominal pain. Musculoskeletal: Negative for arthralgias, myalgias, neck pain and neck stiffness. Skin: Negative for pallor and rash. Neurological: Negative for dizziness and headaches. All other systems reviewed and are negative. Physical Exam     Vitals:    08/07/21 1659 08/07/21 1834 08/07/21 2250   BP: 125/67  (!) 154/64   Pulse: 93  (!) 103   Resp: 18  16   Temp: 97.7 °F (36.5 °C)  98 °F (36.7 °C)   SpO2:  94% 99%   Weight: 138.6 kg (305 lb 8 oz)  136.7 kg (301 lb 6.4 oz)     Physical Exam  Vitals and nursing note reviewed. Constitutional:       General: She is not in acute distress. Appearance: She is well-developed. She is obese. HENT:      Head: Normocephalic and atraumatic. Right Ear: External ear normal.      Left Ear: External ear normal.      Nose: Nose normal.   Eyes:      Conjunctiva/sclera: Conjunctivae normal.   Cardiovascular:      Rate and Rhythm: Normal rate and regular rhythm. Heart sounds: Normal heart sounds. Pulmonary:      Effort: Pulmonary effort is normal. No respiratory distress. Breath sounds: Examination of the right-upper field reveals wheezing. Examination of the left-upper field reveals wheezing. Decreased breath sounds and wheezing present. Abdominal:      General: Bowel sounds are normal.      Palpations: Abdomen is soft. Tenderness: There is no abdominal tenderness. Musculoskeletal:         General: Normal range of motion. Cervical back: Normal range of motion and neck supple. Lymphadenopathy:      Cervical: No cervical adenopathy. Skin:     General: Skin is warm and dry. Findings: No rash. Neurological:      Mental Status: She is alert and oriented to person, place, and time. Cranial Nerves: No cranial nerve deficit.       Coordination: Coordination normal.   Psychiatric:         Behavior: Behavior normal.         Thought Content: Thought content normal.         Judgment: Judgment normal.           Diagnostic Study Results     Labs -     Recent Results (from the past 12 hour(s))   EKG, 12 LEAD, INITIAL    Collection Time: 08/07/21  5:46 PM   Result Value Ref Range    Ventricular Rate 102 BPM    Atrial Rate 102 BPM    P-R Interval 178 ms    QRS Duration 72 ms    Q-T Interval 350 ms    QTC Calculation (Bezet) 456 ms    Calculated P Axis 28 degrees    Calculated R Axis 52 degrees    Calculated T Axis 34 degrees    Diagnosis       Sinus tachycardia  Low voltage QRS  Septal infarct , age undetermined  Abnormal ECG  When compared with ECG of 17-AUG-2020 04:50,  Septal infarct is now present  Nonspecific T wave abnormality now evident in Inferior leads     CBC WITH AUTOMATED DIFF    Collection Time: 08/07/21  6:00 PM   Result Value Ref Range    WBC 7.5 3.6 - 11.0 K/uL    RBC 2.84 (L) 3.80 - 5.20 M/uL    HGB 7.9 (L) 11.5 - 16.0 g/dL    HCT 24.1 (L) 35.0 - 47.0 %    MCV 84.9 80.0 - 99.0 FL    MCH 27.8 26.0 - 34.0 PG    MCHC 32.8 30.0 - 36.5 g/dL    RDW 13.4 11.5 - 14.5 %    PLATELET 971 027 - 931 K/uL    MPV 9.2 8.9 - 12.9 FL    NRBC 0.0 0  WBC    ABSOLUTE NRBC 0.00 0.00 - 0.01 K/uL    NEUTROPHILS 66 32 - 75 %    LYMPHOCYTES 22 12 - 49 %    MONOCYTES 8 5 - 13 %    EOSINOPHILS 4 0 - 7 %    BASOPHILS 0 0 - 1 %    IMMATURE GRANULOCYTES 0 0.0 - 0.5 %    ABS. NEUTROPHILS 4.9 1.8 - 8.0 K/UL    ABS. LYMPHOCYTES 1.6 0.8 - 3.5 K/UL    ABS. MONOCYTES 0.6 0.0 - 1.0 K/UL    ABS. EOSINOPHILS 0.3 0.0 - 0.4 K/UL    ABS. BASOPHILS 0.0 0.0 - 0.1 K/UL    ABS. IMM.  GRANS. 0.0 0.00 - 0.04 K/UL    DF AUTOMATED     METABOLIC PANEL, COMPREHENSIVE    Collection Time: 08/07/21  6:00 PM   Result Value Ref Range    Sodium 140 136 - 145 mmol/L    Potassium 3.9 3.5 - 5.1 mmol/L    Chloride 106 97 - 108 mmol/L    CO2 24 21 - 32 mmol/L    Anion gap 10 5 - 15 mmol/L    Glucose 206 (H) 65 - 100 mg/dL    BUN 25 (H) 6 - 20 MG/DL    Creatinine 2.58 (H) 0.55 - 1.02 MG/DL    BUN/Creatinine ratio 10 (L) 12 - 20      GFR est AA 23 (L) >60 ml/min/1.73m2    GFR est non-AA 19 (L) >60 ml/min/1.73m2    Calcium 8.5 8.5 - 10.1 MG/DL    Bilirubin, total 0.3 0.2 - 1.0 MG/DL    ALT (SGPT) 15 12 - 78 U/L    AST (SGOT) 15 15 - 37 U/L    Alk. phosphatase 112 45 - 117 U/L    Protein, total 7.2 6.4 - 8.2 g/dL    Albumin 2.6 (L) 3.5 - 5.0 g/dL    Globulin 4.6 (H) 2.0 - 4.0 g/dL    A-G Ratio 0.6 (L) 1.1 - 2.2     TROPONIN I    Collection Time: 08/07/21  6:00 PM   Result Value Ref Range    Troponin-I, Qt. <0.05 <0.05 ng/mL   NT-PRO BNP    Collection Time: 08/07/21  6:00 PM   Result Value Ref Range    NT pro-BNP 1,190 (H) 0 - 125 PG/ML   MAGNESIUM    Collection Time: 08/07/21  6:00 PM   Result Value Ref Range    Magnesium 1.8 1.6 - 2.4 mg/dL       Radiologic Studies -   XR CHEST PORT   Final Result   1. Limited exam. Left basilar opacity which could be entirely explained by   technique and body habitus. Findings may represent effusion or opacity. Diffuse   interstitial prominence suggesting pulmonary vascular congestion, accentuated by   technique. Consider PA and lateral exam when clinically appropriate. CT Results  (Last 48 hours)    None        CXR Results  (Last 48 hours)               08/07/21 1735  XR CHEST PORT Final result    Impression:  1. Limited exam. Left basilar opacity which could be entirely explained by   technique and body habitus. Findings may represent effusion or opacity. Diffuse   interstitial prominence suggesting pulmonary vascular congestion, accentuated by   technique. Consider PA and lateral exam when clinically appropriate. Narrative:  INDICATION: . chest pain   Additional history:   COMPARISON: Previous chest xray, 8/17/2020 and 8/16/2020. LIMITATIONS: Portable technique. Patient body habitus limits evaluation. [place]    FINDINGS: Single frontal view of the chest.    .   Lines/tubes/surgical: None. Heart/mediastinum: Unremarkable. Lungs/pleura: Diffuse interstitial prominence. Obscured left hemidiaphragm. Additional Comments: Partially imaged humeral fixation. .               Medical Decision Making   I am the first provider for this patient. I reviewed the vital signs, available nursing notes, past medical history, past surgical history, family history and social history. Vital Signs-Reviewed the patient's vital signs. Records Reviewed: Nursing Notes and Old Medical Records    Provider Notes (Medical Decision Making):   77-year-old female presents with shortness of breath history of CHF will order labs chest x-ray probable admit  ED Course as of Aug 07 2315   Sat Aug 07, 2021   2022 Discussed available findings with attending. Patient will be admitted to hospitalist has been called. [AN]   2058  DiscussedAvailable findings and test results with Dr. Neida Meadows hospitalist.  He has accepted admission    [AN]      ED Course User Index  [AN] Justin Cosby NP            Procedures:  Procedures    Please note that this dictation was completed with Dragon, computer voice recognition software. Quite often unanticipated grammatical, syntax, homophones, and other interpretive errors are inadvertently transcribed by the computer software. Please disregard these errors. Additionally, please excuse any errors that have escaped final proofreading. Diagnosis     Clinical Impression:   1. Congestive heart failure, unspecified HF chronicity, unspecified heart failure type ([address]    2.  EMMANUEL (acute kidney injury) ([address]

## 2021-08-07 NOTE — ED NOTES
Patient here with c/o SOB. Patient states symptoms ongoing for over a week, patient states that she had an appointment with her PCP however she states that her doctor's appointment got pushed back by the office. Patient denies fevers. Patient denies contact with anyone with known illness. Patient denies productive cough. Patient states hx of HTN, diabetes, and chronic kidney disease. Patient states that her father  when he was 35 y/o due to diabetes. Patient states that she used her inhaler at home with no relief. Patient denies smoking hx however states that her sister and her sister's boyfriend smoke, passive second hand smoke exposure. Emergency Department Nursing Plan of Care       The Nursing Plan of Care is developed from the Nursing assessment and Emergency Department Attending provider initial evaluation. The plan of care may be reviewed in the ED Provider note.     The Plan of Care was developed with the following considerations:   Patient / Family readiness to learn indicated by:verbalized understanding  Persons(s) to be included in education: patient  Barriers to Learning/Limitations:No    Signed     Renato Garcia RN    2021   7:21 PM

## 2021-08-08 LAB
EST. AVERAGE GLUCOSE BLD GHB EST-MCNC: 194 MG/DL
GLUCOSE BLD STRIP.AUTO-MCNC: 196 MG/DL (ref 65–117)
GLUCOSE BLD STRIP.AUTO-MCNC: 213 MG/DL (ref 65–117)
GLUCOSE BLD STRIP.AUTO-MCNC: 222 MG/DL (ref 65–117)
GLUCOSE BLD STRIP.AUTO-MCNC: 235 MG/DL (ref 65–117)
HBA1C MFR BLD: 8.4 % (ref 4–5.6)
SERVICE CMNT-IMP: ABNORMAL
TROPONIN I SERPL-MCNC: <0.05 NG/ML

## 2021-08-08 PROCEDURE — 77010033678 HC OXYGEN DAILY

## 2021-08-08 PROCEDURE — 74011250637 HC RX REV CODE- 250/637: Performed by: FAMILY MEDICINE

## 2021-08-08 PROCEDURE — 74011000250 HC RX REV CODE- 250: Performed by: STUDENT IN AN ORGANIZED HEALTH CARE EDUCATION/TRAINING PROGRAM

## 2021-08-08 PROCEDURE — 94760 N-INVAS EAR/PLS OXIMETRY 1: CPT

## 2021-08-08 PROCEDURE — 94640 AIRWAY INHALATION TREATMENT: CPT

## 2021-08-08 PROCEDURE — 74011250637 HC RX REV CODE- 250/637: Performed by: STUDENT IN AN ORGANIZED HEALTH CARE EDUCATION/TRAINING PROGRAM

## 2021-08-08 PROCEDURE — 74011250636 HC RX REV CODE- 250/636: Performed by: STUDENT IN AN ORGANIZED HEALTH CARE EDUCATION/TRAINING PROGRAM

## 2021-08-08 PROCEDURE — 74011250636 HC RX REV CODE- 250/636: Performed by: FAMILY MEDICINE

## 2021-08-08 PROCEDURE — 74011636637 HC RX REV CODE- 636/637: Performed by: STUDENT IN AN ORGANIZED HEALTH CARE EDUCATION/TRAINING PROGRAM

## 2021-08-08 PROCEDURE — 65270000029 HC RM PRIVATE

## 2021-08-08 PROCEDURE — 74011636637 HC RX REV CODE- 636/637: Performed by: FAMILY MEDICINE

## 2021-08-08 PROCEDURE — 82962 GLUCOSE BLOOD TEST: CPT

## 2021-08-08 PROCEDURE — 36415 COLL VENOUS BLD VENIPUNCTURE: CPT

## 2021-08-08 PROCEDURE — 84484 ASSAY OF TROPONIN QUANT: CPT

## 2021-08-08 PROCEDURE — 83036 HEMOGLOBIN GLYCOSYLATED A1C: CPT

## 2021-08-08 PROCEDURE — 77030029684 HC NEB SM VOL KT MONA -A

## 2021-08-08 RX ORDER — AMITRIPTYLINE HYDROCHLORIDE 50 MG/1
50 TABLET, FILM COATED ORAL
Status: DISCONTINUED | OUTPATIENT
Start: 2021-08-08 | End: 2021-08-12 | Stop reason: HOSPADM

## 2021-08-08 RX ORDER — TRAMADOL HYDROCHLORIDE 50 MG/1
50 TABLET ORAL
Status: ON HOLD | COMMUNITY
Start: 2021-02-26 | End: 2021-08-28 | Stop reason: SDUPTHER

## 2021-08-08 RX ORDER — NYSTATIN 100000 [USP'U]/G
POWDER TOPICAL 2 TIMES DAILY
Status: DISCONTINUED | OUTPATIENT
Start: 2021-08-08 | End: 2021-08-12 | Stop reason: HOSPADM

## 2021-08-08 RX ORDER — AMLODIPINE BESYLATE 5 MG/1
10 TABLET ORAL DAILY
Status: DISCONTINUED | OUTPATIENT
Start: 2021-08-09 | End: 2021-08-12 | Stop reason: HOSPADM

## 2021-08-08 RX ORDER — INSULIN GLARGINE 100 [IU]/ML
20 INJECTION, SOLUTION SUBCUTANEOUS DAILY
Status: DISCONTINUED | OUTPATIENT
Start: 2021-08-08 | End: 2021-08-12 | Stop reason: HOSPADM

## 2021-08-08 RX ORDER — ALBUTEROL SULFATE 90 UG/1
2 AEROSOL, METERED RESPIRATORY (INHALATION)
COMMUNITY

## 2021-08-08 RX ORDER — INSULIN GLARGINE 100 [IU]/ML
20 INJECTION, SOLUTION SUBCUTANEOUS
COMMUNITY
Start: 2021-06-08

## 2021-08-08 RX ORDER — SPIRONOLACTONE 25 MG/1
TABLET ORAL
COMMUNITY
Start: 2020-10-01 | End: 2021-08-12

## 2021-08-08 RX ORDER — HEPARIN SODIUM 5000 [USP'U]/ML
7500 INJECTION, SOLUTION INTRAVENOUS; SUBCUTANEOUS EVERY 8 HOURS
Status: DISCONTINUED | OUTPATIENT
Start: 2021-08-08 | End: 2021-08-12 | Stop reason: HOSPADM

## 2021-08-08 RX ORDER — GABAPENTIN 300 MG/1
900 CAPSULE ORAL 3 TIMES DAILY
Status: DISCONTINUED | OUTPATIENT
Start: 2021-08-08 | End: 2021-08-08 | Stop reason: DRUGHIGH

## 2021-08-08 RX ORDER — TRAMADOL HYDROCHLORIDE 50 MG/1
50 TABLET ORAL
Status: DISCONTINUED | OUTPATIENT
Start: 2021-08-08 | End: 2021-08-12 | Stop reason: HOSPADM

## 2021-08-08 RX ORDER — CANAGLIFLOZIN 100 MG/1
100 TABLET, FILM COATED ORAL
COMMUNITY
Start: 2021-08-02 | End: 2021-08-12

## 2021-08-08 RX ORDER — BUDESONIDE 0.5 MG/2ML
1000 INHALANT ORAL 2 TIMES DAILY
Status: DISCONTINUED | OUTPATIENT
Start: 2021-08-08 | End: 2021-08-12

## 2021-08-08 RX ORDER — AMLODIPINE BESYLATE 10 MG/1
10 TABLET ORAL
COMMUNITY

## 2021-08-08 RX ORDER — INSULIN GLARGINE 100 [IU]/ML
0.2 INJECTION, SOLUTION SUBCUTANEOUS DAILY
Status: DISCONTINUED | OUTPATIENT
Start: 2021-08-08 | End: 2021-08-08

## 2021-08-08 RX ORDER — ASPIRIN 81 MG/1
81 TABLET ORAL DAILY
COMMUNITY

## 2021-08-08 RX ORDER — FLUCONAZOLE 150 MG/1
150 TABLET ORAL ONCE
Status: COMPLETED | OUTPATIENT
Start: 2021-08-08 | End: 2021-08-08

## 2021-08-08 RX ORDER — IPRATROPIUM BROMIDE AND ALBUTEROL SULFATE 2.5; .5 MG/3ML; MG/3ML
3 SOLUTION RESPIRATORY (INHALATION)
Status: DISCONTINUED | OUTPATIENT
Start: 2021-08-08 | End: 2021-08-12 | Stop reason: HOSPADM

## 2021-08-08 RX ORDER — TORSEMIDE 20 MG/1
20 TABLET ORAL 2 TIMES DAILY
Status: ON HOLD | COMMUNITY
Start: 2021-06-07 | End: 2021-08-12 | Stop reason: SDUPTHER

## 2021-08-08 RX ORDER — ASPIRIN 81 MG/1
81 TABLET ORAL DAILY
Status: DISCONTINUED | OUTPATIENT
Start: 2021-08-09 | End: 2021-08-12 | Stop reason: HOSPADM

## 2021-08-08 RX ORDER — BUMETANIDE 0.25 MG/ML
1 INJECTION INTRAMUSCULAR; INTRAVENOUS 2 TIMES DAILY
Status: DISCONTINUED | OUTPATIENT
Start: 2021-08-08 | End: 2021-08-10

## 2021-08-08 RX ORDER — FLUTICASONE PROPIONATE 50 MCG
SPRAY, SUSPENSION (ML) NASAL
COMMUNITY
Start: 2020-10-24

## 2021-08-08 RX ORDER — METFORMIN HYDROCHLORIDE 500 MG/1
500 TABLET, FILM COATED, EXTENDED RELEASE ORAL 3 TIMES DAILY
COMMUNITY
End: 2021-08-12

## 2021-08-08 RX ORDER — FLUTICASONE PROPIONATE 220 UG/1
2 AEROSOL, METERED RESPIRATORY (INHALATION) 2 TIMES DAILY
COMMUNITY
Start: 2021-08-02

## 2021-08-08 RX ADMIN — ENOXAPARIN SODIUM 40 MG: 40 INJECTION SUBCUTANEOUS at 08:33

## 2021-08-08 RX ADMIN — BUMETANIDE 1 MG: 0.25 INJECTION INTRAMUSCULAR; INTRAVENOUS at 17:31

## 2021-08-08 RX ADMIN — INSULIN LISPRO 2 UNITS: 100 INJECTION, SOLUTION INTRAVENOUS; SUBCUTANEOUS at 08:34

## 2021-08-08 RX ADMIN — NYSTATIN: 100000 POWDER TOPICAL at 17:32

## 2021-08-08 RX ADMIN — Medication 10 ML: at 00:00

## 2021-08-08 RX ADMIN — INSULIN LISPRO 2 UNITS: 100 INJECTION, SOLUTION INTRAVENOUS; SUBCUTANEOUS at 12:45

## 2021-08-08 RX ADMIN — TRAMADOL HYDROCHLORIDE 50 MG: 50 TABLET, FILM COATED ORAL at 23:06

## 2021-08-08 RX ADMIN — FUROSEMIDE 40 MG: 10 INJECTION, SOLUTION INTRAVENOUS at 08:33

## 2021-08-08 RX ADMIN — BUMETANIDE 1 MG: 0.25 INJECTION INTRAMUSCULAR; INTRAVENOUS at 10:23

## 2021-08-08 RX ADMIN — GABAPENTIN 700 MG: 100 CAPSULE ORAL at 12:47

## 2021-08-08 RX ADMIN — INSULIN LISPRO 2 UNITS: 100 INJECTION, SOLUTION INTRAVENOUS; SUBCUTANEOUS at 21:16

## 2021-08-08 RX ADMIN — Medication 10 ML: at 21:16

## 2021-08-08 RX ADMIN — HEPARIN SODIUM 7500 UNITS: 5000 INJECTION, SOLUTION INTRAVENOUS; SUBCUTANEOUS at 21:15

## 2021-08-08 RX ADMIN — INSULIN GLARGINE 20 UNITS: 100 INJECTION, SOLUTION SUBCUTANEOUS at 11:25

## 2021-08-08 RX ADMIN — INSULIN LISPRO 3 UNITS: 100 INJECTION, SOLUTION INTRAVENOUS; SUBCUTANEOUS at 17:30

## 2021-08-08 RX ADMIN — FLUCONAZOLE 150 MG: 150 TABLET ORAL at 12:46

## 2021-08-08 RX ADMIN — Medication 10 ML: at 14:52

## 2021-08-08 RX ADMIN — ATORVASTATIN CALCIUM 80 MG: 40 TABLET, FILM COATED ORAL at 00:36

## 2021-08-08 RX ADMIN — NYSTATIN: 100000 POWDER TOPICAL at 12:51

## 2021-08-08 RX ADMIN — AMITRIPTYLINE HYDROCHLORIDE 50 MG: 50 TABLET, FILM COATED ORAL at 21:15

## 2021-08-08 RX ADMIN — GABAPENTIN 700 MG: 100 CAPSULE ORAL at 17:31

## 2021-08-08 RX ADMIN — METOPROLOL TARTRATE 12.5 MG: 25 TABLET, FILM COATED ORAL at 08:33

## 2021-08-08 RX ADMIN — BUDESONIDE 1000 MCG: 0.5 SUSPENSION RESPIRATORY (INHALATION) at 19:28

## 2021-08-08 RX ADMIN — ATORVASTATIN CALCIUM 80 MG: 40 TABLET, FILM COATED ORAL at 21:14

## 2021-08-08 RX ADMIN — METOPROLOL TARTRATE 12.5 MG: 25 TABLET, FILM COATED ORAL at 17:32

## 2021-08-08 RX ADMIN — TRAMADOL HYDROCHLORIDE 50 MG: 50 TABLET, FILM COATED ORAL at 12:47

## 2021-08-08 RX ADMIN — Medication 10 ML: at 06:05

## 2021-08-08 RX ADMIN — CLOPIDOGREL BISULFATE 75 MG: 75 TABLET ORAL at 08:33

## 2021-08-08 NOTE — ED NOTES
Bedside and Verbal shift change report given to [address]  (oncoming nurse) by Anita Morgan RN  (offgoing nurse). Report included the following information SBAR, Kardex, ED Summary, Intake/Output, MAR and Recent Results.

## 2021-08-08 NOTE — TELEMEDICINE
Telemed- FYI patient is not currently on tele, the tele monitors are not allowing her to be admitted. We are working on this issue right now.

## 2021-08-08 NOTE — ED NOTES
Assumed care of patient. Patient resting quietly on stretcher. A&Ox3. Skin warm and dry. Respirations even and unlabored. No complaints voiced at this time. Patient wad given Lasix. Pure Wick placed. Clear yellow urine in suction canister.

## 2021-08-08 NOTE — PROGRESS NOTES
[facility] Dosing Services: Anticoagulation    Enoxaparin has been changed to heparin for a BMI greater than 36  .    54 y.o. female  Indication: prophylaxis of  DVT. Wt Readings from Last 1 Encounters:   08/07/21 136.7 kg (301 lb 6.4 oz)       Ht Readings from Last 1 Encounters:   08/08/21 166.4 cm (65.5\")         Pharmacist made changes to the enoxaparin regimen based on:  Patient BMI = 49.39. Pharmacy automatically makes dose adjustments for patients BMI greater than 40. Plan:  Enoxaparin changed to heparin 7500 units subcutaneously every 8 hours. Previous  Regimen Enoxaparin 40 mg subcutaneously q24h   Creatinine Clearance Estimated Creatinine Clearance: 34.8 mL/min (A) (based on SCr of 2.58 mg/dL (H)). Creatinine Lab Results   Component Value Date/Time    Creatinine 2.58 (H) 08/07/2021 06:00 PM    Creatinine (POC) 0.6 04/20/2013 06:08 PM       Platelet Lab Results   Component Value Date/Time    PLATELET 551 42/38/8861 06:00 PM      H/H Lab Results   Component Value Date/Time    HGB 7.9 (L) 08/07/2021 06:00 PM          Pharmacy to monitor patients progress. Will communicate further recommendations regarding patients anticoagulation therapy with prescriber. Mary Ellen Martinez RPh.    Contact information: [phone]

## 2021-08-08 NOTE — PROGRESS NOTES
CM opened case for discharge planning. 53 y/o female admitted for worsening dyspnea over the last week. Complete assessment to follow.   TYE Nuñez/CECI  [phone]

## 2021-08-08 NOTE — PROGRESS NOTES
[facility] Medication Reconciliation    Information obtained from:  Patient interview, [address] [employer] [place] [phone], [address], [place]   RxQuery data available1: yes    Comments/recommendations:    1) The patient was interviewed regarding current PTA medication list, use and drug allergies; Yarely Mensah The patient is a good historian and was questioned regarding use of any other inhalers, topical products, over the counter medications, herbal medications, vitamin products or ophthalmic/nasal/otic medication use. 2) Medication changes to PTA list:    Added  Torsemide 20 mg po BID  Fioricet 1 tab q4h prn headache  Flovent 220 mcg inhaler  Flonase nasal spray  Removed  Furosemide 20 mg  Trazodone 50 mg  Glipizide  Adjusted  Lispro sliding scale insulin: 36 units sc at mealtime only if BG > 100  Lantus insulin 20 units sc QHS  Amlodipine to 10 mg po daily  Metformin to  mg po TID (confirmed with [facility])    3) Notes:   Regarding aspirin allergy, patient gets \"nosebleeds\" but takes aspirin 81 mg po daily. 4) The [place] Prescription Monitoring Program () was accessed to determine fill history of any controlled medications:  Ongoing Gabapentin 300 mg - take 3 capsules po TID # 270  (30-day supply)  Ongoing Tramadol 50 mg TID prn #90 (30-day supply)       1RxQuery pharmacy benefit data reflects medications filled and processed through the patient's insurance, however                this data does NOT capture whether the medication was picked up or is currently being taken by the patient. Past Medical History/Disease States:  Past Medical History:   Diagnosis Date    Arthritis     Asthma     CHF (congestive heart failure) ([facility])     Chronic back pain     Diabetes ([facility])     Diabetic retinopathy ([facility]     Hypertension     MRSA (methicillin resistant staph aureus) culture positive     labial abscess    Neuropathy          Patient allergies:    Allergies as of 08/07/2021 - Fully Reviewed 08/07/2021   Allergen Reaction Noted    Amoxicillin Nausea Only 2011    Aspirin Unknown (comments) 2011    Ciprofloxacin Hives 2013         Prior to Admission Medications   Prescriptions Last Dose Informant Patient Reported? Taking? Flovent  mcg/actuation inhaler 2021 at Unknown time Self Yes Yes   Sig: Take 2 Puffs by inhalation two (2) times a day. Invokana 100 mg tablet 2021 at Unknown time Self Yes Yes   Sig: Take 100 mg by mouth Daily (before breakfast). Lantus Solostar U-100 Insulin 100 unit/mL (3 mL) inpn  Self Yes Yes   Si Units by SubCUTAneous route nightly. albuterol (PROVENTIL HFA, VENTOLIN HFA, PROAIR HFA) 90 mcg/actuation inhaler 2021 at Unknown time Self Yes Yes   Sig: Take 2 Puffs by inhalation every six (6) hours as needed for Wheezing. amLODIPine (NORVASC) 10 mg tablet 2021 Self Yes Yes   Sig: Take 10 mg by mouth daily. amitriptyline (ELAVIL) 50 mg tablet 2021 Self Yes Yes   Sig: Take 50 mg by mouth nightly. aspirin delayed-release 81 mg tablet 2021 at Unknown time  Yes Yes   Sig: Take 81 mg by mouth daily. atorvastatin (LIPITOR) 80 mg tablet 2021 at Unknown time Self No Yes   Sig: Take 1 Tab by mouth nightly. butalb/acetaminophen/caffeine (FIORICET PO)  Self Yes Yes   Si tab, PO, every 4 hours, PRN: for headache, do not take with tramadol - may use instead of tramadol prn HA, 2 Refills, Dispense: 60, tab, Pharmacy [place] [address]   clopidogrel (PLAVIX) 75 mg tab 2021 Self No Yes   Sig: Take 1 Tab by mouth daily. docusate sodium (Colace) 100 mg capsule  Self Yes Yes   Sig: Take 100 mg by mouth two (2) times daily as needed.    fluticasone propionate (Flonase Allergy Relief) 50 mcg/actuation nasal spray   Yes Yes   Si SPRAY, Each Nostril, daily, for allergy symptoms, 11 Refills, Dispense: 1, bottle, Pharmacy [place] [address]   gabapentin (NEURONTIN) 300 mg capsule 2021 at Unknown time Self Yes Yes   Sig: Take 900 mg by mouth three (3) times daily. insulin lispro (HUMALOG) 100 unit/mL injection  Self No Yes   Sig: Take 32 units with meals, only if BG > 100. Indications: HYPERGLYCEMIA, TYPE 2 DIABETES MELLITUS   Patient taking differently: 36 Units by SubCUTAneous route Before breakfast, lunch, and dinner. Take 36 units with meals, only if BG > 100. Indications: high blood sugar, type 2 diabetes mellitus   metFORMIN (GLUMETZA ER) 500 mg TG24 24 hour tablet 2021 at Unknown time Self Yes Yes   Sig: Take 500 mg by mouth three (3) times daily. metoprolol tartrate (LOPRESSOR) 25 mg tablet 2021 at Unknown time Self Yes Yes   Sig: Take 25 mg by mouth two (2) times a day. polyethylene glycol (MIRALAX) 17 gram packet 2021 at Unknown time Self No Yes   Sig: Take 1 Packet by mouth daily. Hold for diarrhea   Patient taking differently: Take 17 g by mouth daily as needed. Hold for diarrhea   spironolactone (ALDACTONE) 25 mg tablet   Yes Yes   Si mg = 1 tab each dose, PO, daily, # 90 tab, 3 Refills, Pharmacy: [place] [address]   torsemide (DEMADEX) 20 mg tablet 2021 at Unknown time Self Yes Yes   Sig: Take 20 mg by mouth two (2) times a day. traMADoL (ULTRAM) 50 mg tablet 2021 at Unknown time Self Yes Yes   Sig: Take 50 mg by mouth three (3) times daily as needed for Pain.             Thank you,  Alyssa Councilman, [facility]

## 2021-08-08 NOTE — PROGRESS NOTES
Hospitalist Progress Note    NAME: James Lee   :  1965   MRN:  [id number]   Room Number:  996/39  @ [facility]       Interim Hospital Summary: 54 y.o. female whom presented on 2021 with      Assessment / Plan:        #Acute decompensated heart failure  # Progressive SOB   # suspected underlying ASHLEY   -Last TTE with EF 32% normal systolic &  diastolic function  -BNP 1101  -Troponin trend flat  -EKG reviewed independently nonspecific T wave changes  -Chest x-ray reviewed independently increased vascular congestion  -Patient home regimen include metoprolol 25 mg twice daily Aldactone 25 mg daily and torsemide 20 mg twice daily    -Update TTE  -IV diuresis with Bumex  1 mg twice daily  -Strict ins and outs  -Daily weight  -Fluid restriction  -Low-sodium diet  -Cardiology consult  -Resume  BB       #EMMANUEL on CKD 3B  -Suspect secondary to cardiorenal syndrome type I  -Serum creatinine 2.50 baseline 1.67 per [facility] records  -Ins and outs  -Renally dose medication  -Avoid nephrotic medication      #Insulin-dependent diabetes mellitus  -A1c 8.5 per [facility] records  -Home regimen include Metformin,  canagliflozin 100 mg daily,  Lantus 20 units and aspart  -Resume Lantus   - Lispro correctional scale, FSG AC HS  - Consistent carb diet, hypoglycemia protocol. #Diabetic neuropathy  -On gabapentin 900 mg 3 times daily daily at home  -Resume    #Hypertension  On amlodipine 10 mg  And metoprolol 25 mg twice daily at home-     #History of stroke  -On aspirin Plavix and atorvastatin at home     #History of chronic back pain  -On tramadol at home as needed    #Morbid obesity  -BMI 51  - Counseled on Lifestyle modifications, AHA Diet ,weight loss strategies. Code status: Full  Prophylaxis: Lovenox  Recommended Disposition: Home w/Family     Subjective:     Chief Complaint / Reason for Physician Visit  SOB and leg swelling   Discussed with RN events overnight.      Review of Systems:  No fevers, chills, appetite change, cough, sputum production, shortness of breath, dyspnea on exertion, nausea, vomitting, diarrhea, constipation, chest pain, leg edema, abdominal pain, joint pain, rash, itching. Tolerating PT/OT. Tolerating diet. Objective:     VITALS:   Last 24hrs VS reviewed since prior progress note. Most recent are:  Patient Vitals for the past 24 hrs:   Temp Pulse Resp BP SpO2   08/08/21 0830 97.1 °F (36.2 °C) 96 17 119/60 94 %   08/08/21 0800 -- 95 -- -- --   08/08/21 0430 98.1 °F (36.7 °C) 97 16 136/68 98 %   08/08/21 0400 -- 98 -- -- --   08/07/21 2350 -- 99 -- -- --   08/07/21 2250 98 °F (36.7 °C) (!) 103 16 (!) 154/64 99 %   08/07/21 1834 -- -- -- -- 94 %   08/07/21 1659 97.7 °F (36.5 °C) 93 18 125/67 --       Intake/Output Summary (Last 24 hours) at 8/8/2021 0843  Last data filed at 8/8/2021 7749  Gross per 24 hour   Intake 600 ml   Output --   Net 600 ml        PHYSICAL EXAM:  General: WD, WN. Alert, cooperative, no acute distress    EENT:  EOMI. Anicteric sclerae. MMM  Resp:  CTA bilaterally, no wheezing or rales. No accessory muscle use  CV:  Regular  rhythm,  normal S1/S2, no murmurs rubs gallops, 3+  edema  GI:  Soft, Non distended, Non tender. +Bowel sounds  Neurologic:  Alert and oriented X 3, normal speech,   Psych:   Good insight. Not anxious nor agitated  Skin:  No rashes. No jaundice    Reviewed most current lab test results and cultures  YES  Reviewed most current radiology test results   YES  Review and summation of old records today    NO  Reviewed patient's current orders and MAR    YES  PMH/SH reviewed - no change compared to H&P  ________________________________________________________________________  Care Plan discussed with:    Comments   Patient x    Family      RN x    Care Manager     Consultant                        Multidiciplinary team rounds were held today with , nursing, pharmacist and clinical coordinator.   Patient's plan of care was discussed; medications were reviewed and discharge planning was addressed. ________________________________________________________________________  Total NON critical care TIME:  25   Minutes    Total CRITICAL CARE TIME Spent:   Minutes non procedure based      Comments   >50% of visit spent in counseling and coordination of care x    ________________________________________________________________________  Annie Best MD     Procedures: see electronic medical records for all procedures/Xrays and details which were not copied into this note but were reviewed prior to creation of Plan. LABS:  I reviewed today's most current labs and imaging studies.   Pertinent labs include:  Recent Labs     08/07/21  1800   WBC 7.5   HGB 7.9*   HCT 24.1*        Recent Labs     08/07/21  1800      K 3.9      CO2 24   *   BUN 25*   CREA 2.58*   CA 8.5   MG 1.8   ALB 2.6*   TBILI 0.3   ALT 15       Signed: Annie Best MD

## 2021-08-08 NOTE — H&P
BRIEF PHYSICIAN ADMIT NOTE    Name: Giuseppe Treviño    :   1965    MRN:   [id number]    PRIMARY PROVIDER: Pat Vieira MD    DATE AND TIME: 2021 9:19 PM    CHIEF COMPLAINT: SOB    HISTORY OF PRESENT ILLNESS:  Guillermina Baltazar is a 54 y.o.  female whom presents with complaint noted above. Patient reports worsening dyspnea over the course of the past week; patient denies active chest pain; she endorses orthopnea; no PND reported; no acute febrile episodes; no cough or URI symptoms noted; no known exposure to COVID-19. PAST MEDICAL HISTORY:   Past Medical History:   Diagnosis Date    Arthritis     Asthma     [facility]     Chronic back pain     Diabetes [facility]     Diabetic retinopathy ([facility]     Hypertension     MRSA (methicillin resistant staph aureus) culture positive     labial abscess    Neuropathy      No current facility-administered medications on file prior to encounter. Current Outpatient Medications on File Prior to Encounter   Medication Sig Dispense Refill    amLODIPine (NORVASC) 5 mg tablet Take 1 Tab by mouth daily. 30 Tab 0    linezolid (ZYVOX) 600 mg tablet Take 1 Tab by mouth every twelve (12) hours. Please continue your Zyvox and follow up with your PCP in 1 week for further evaluation. 20 Tab 0    polyethylene glycol (MIRALAX) 17 gram packet Take 1 Packet by mouth daily. Hold for diarrhea 30 Each 0    senna-docusate (PERICOLACE) 8.6-50 mg per tablet Take 2 Tabs by mouth two (2) times a day. Hold for diarrhea or loose stools 120 Tab 0    metoprolol tartrate (LOPRESSOR) 25 mg tablet Take 12.5 mg by mouth two (2) times a day.  furosemide (LASIX) 20 mg tablet Take 20 mg by mouth.  metFORMIN (GLUCOPHAGE) 500 mg tablet Take 500 mg by mouth three (3) times daily (with meals).  insulin aspart U-100 (NovoLOG U-100 Insulin aspart) 100 unit/mL injection 35 Units by SubCUTAneous route Before breakfast, lunch, and dinner.       amitriptyline (ELAVIL) 50 mg tablet Take 50 mg by mouth nightly.  docusate sodium (Colace) 100 mg capsule Take 100 mg by mouth two (2) times a day.  aspirin 81 mg chewable tablet Take 1 Tab by mouth daily. 30 Tab 0    atorvastatin (LIPITOR) 80 mg tablet Take 1 Tab by mouth nightly. 30 Tab 0    clopidogrel (PLAVIX) 75 mg tab Take 1 Tab by mouth daily. 30 Tab 0    aspirin-acetaminophen-caffeine (EXCEDRIN ES) 250-250-65 mg per tablet Take 2 Tabs by mouth every six (6) hours as needed for Headache.  gabapentin (NEURONTIN) 300 mg capsule Take 900 mg by mouth three (3) times daily.  acetaminophen (TYLENOL) 325 mg tablet Take 2 Tabs by mouth every four (4) hours as needed for Pain. 20 Tab 0    ondansetron (ZOFRAN ODT) 4 mg disintegrating tablet Take 1 Tab by mouth every eight (8) hours as needed for Nausea. 16 Tab 0    insulin lispro (HUMALOG) 100 unit/mL injection Take 32 units with meals, only if BG > 100. Indications: HYPERGLYCEMIA, TYPE 2 DIABETES MELLITUS 3 Vial 3    glipiZIDE (GLUCOTROL) 5 mg tablet Take 1 Tab by mouth two (2) times a day. 60 Tab 3    traZODone (DESYREL) 50 mg tablet Take 1 Tab by mouth nightly.  30 Tab 3     Allergies   Allergen Reactions    Amoxicillin Nausea Only    Aspirin Unknown (comments)     Patient states naproxen ok    Ciprofloxacin Hives     Social History     Socioeconomic History    Marital status:      Spouse name: Not on file    Number of children: Not on file    Years of education: Not on file    Highest education level: Not on file   Occupational History    Not on file   Tobacco Use    Smoking status: Never Smoker    Smokeless tobacco: Never Used   Substance and Sexual Activity    Alcohol use: No    Drug use: No    Sexual activity: Not on file   Other Topics Concern    Not on file   Social History Narrative    Not on file     Social Determinants of Health     Financial Resource Strain:     Difficulty of Paying Living Expenses:    Food Insecurity:     Worried About [facility] in the Last Year:    [address] in the Last Year:    Transportation Needs:     Lack of Transportation (Medical):  Lack of Transportation (Non-Medical):    Physical Activity:     Days of Exercise per Week:     Minutes of Exercise per Session:    Stress:     Feeling of Stress :    Social Connections:     Frequency of Communication with Friends and Family:     Frequency of Social Gatherings with Friends and Family:     Attends [employer] Services:     Active Member of Clubs or Organizations:     Attends Club or Organization Meetings:     Marital Status:    Intimate Partner Violence:     Fear of Current or Ex-Partner:     Emotionally Abused:     Physically Abused:     Sexually Abused:      History reviewed. No pertinent family history. Review of Systems   Respiratory: Positive for shortness of breath. Cardiovascular: Positive for orthopnea and leg swelling. Negative for chest pain. All other systems reviewed and are negative.     Visit Vitals  /67 (BP 1 Location: Left upper arm)   Pulse 93   Temp 97.7 °F (36.5 °C)   Resp 18   Wt 138.6 kg (305 lb 8 oz)   SpO2 94%   BMI 52.44 kg/m²     Recent Results (from the past 12 hour(s))   EKG, 12 LEAD, INITIAL    Collection Time: 08/07/21  5:46 PM   Result Value Ref Range    Ventricular Rate 102 BPM    Atrial Rate 102 BPM    P-R Interval 178 ms    QRS Duration 72 ms    Q-T Interval 350 ms    QTC Calculation (Bezet) 456 ms    Calculated P Axis 28 degrees    Calculated R Axis 52 degrees    Calculated T Axis 34 degrees    Diagnosis       Sinus tachycardia  Low voltage QRS  Septal infarct , age undetermined  Abnormal ECG  When compared with ECG of 17-AUG-2020 04:50,  Septal infarct is now present  Nonspecific T wave abnormality now evident in Inferior leads     CBC WITH AUTOMATED DIFF    Collection Time: 08/07/21  6:00 PM   Result Value Ref Range    WBC 7.5 3.6 - 11.0 K/uL    RBC 2.84 (L) 3.80 - 5.20 M/uL    HGB 7.9 (L) 11.5 - 16.0 g/dL    HCT 24.1 (L) 35.0 - 47.0 %    MCV 84.9 80.0 - 99.0 FL    MCH 27.8 26.0 - 34.0 PG    MCHC 32.8 30.0 - 36.5 g/dL    RDW 13.4 11.5 - 14.5 %    PLATELET 934 645 - 196 K/uL    MPV 9.2 8.9 - 12.9 FL    NRBC 0.0 0  WBC    ABSOLUTE NRBC 0.00 0.00 - 0.01 K/uL    NEUTROPHILS 66 32 - 75 %    LYMPHOCYTES 22 12 - 49 %    MONOCYTES 8 5 - 13 %    EOSINOPHILS 4 0 - 7 %    BASOPHILS 0 0 - 1 %    IMMATURE GRANULOCYTES 0 0.0 - 0.5 %    ABS. NEUTROPHILS 4.9 1.8 - 8.0 K/UL    ABS. LYMPHOCYTES 1.6 0.8 - 3.5 K/UL    ABS. MONOCYTES 0.6 0.0 - 1.0 K/UL    ABS. EOSINOPHILS 0.3 0.0 - 0.4 K/UL    ABS. BASOPHILS 0.0 0.0 - 0.1 K/UL    ABS. IMM. GRANS. 0.0 0.00 - 0.04 K/UL    DF AUTOMATED     METABOLIC PANEL, COMPREHENSIVE    Collection Time: 08/07/21  6:00 PM   Result Value Ref Range    Sodium 140 136 - 145 mmol/L    Potassium 3.9 3.5 - 5.1 mmol/L    Chloride 106 97 - 108 mmol/L    CO2 24 21 - 32 mmol/L    Anion gap 10 5 - 15 mmol/L    Glucose 206 (H) 65 - 100 mg/dL    BUN 25 (H) 6 - 20 MG/DL    Creatinine 2.58 (H) 0.55 - 1.02 MG/DL    BUN/Creatinine ratio 10 (L) 12 - 20      GFR est AA 23 (L) >60 ml/min/1.73m2    GFR est non-AA 19 (L) >60 ml/min/1.73m2    Calcium 8.5 8.5 - 10.1 MG/DL    Bilirubin, total 0.3 0.2 - 1.0 MG/DL    ALT (SGPT) 15 12 - 78 U/L    AST (SGOT) 15 15 - 37 U/L    Alk.  phosphatase 112 45 - 117 U/L    Protein, total 7.2 6.4 - 8.2 g/dL    Albumin 2.6 (L) 3.5 - 5.0 g/dL    Globulin 4.6 (H) 2.0 - 4.0 g/dL    A-G Ratio 0.6 (L) 1.1 - 2.2     TROPONIN I    Collection Time: 08/07/21  6:00 PM   Result Value Ref Range    Troponin-I, Qt. <0.05 <0.05 ng/mL   NT-PRO BNP    Collection Time: 08/07/21  6:00 PM   Result Value Ref Range    NT pro-BNP 1,190 (H) 0 - 125 PG/ML   MAGNESIUM    Collection Time: 08/07/21  6:00 PM   Result Value Ref Range    Magnesium 1.8 1.6 - 2.4 mg/dL           ASSESSMENT:      # Acute/Decompensated CHF  - Initiate diuresis with Furosemide 40mg IV DAILY  - Daily Wt, Strict I/O  - Place on FR  - EKG reviewed by EP; no acute ischemic changes noted  - Maintain on Telemetry monitoring to assess for acute arrhythmias  - Cardiac biomarkers WNL; no indication of ACS  - Echocardiogram requested; pending  - Consider Cardiology consultation if indicated based on response to above to above measures    # HTN  - Continue Metoprolol per home regimen  - Defer use of ACEi/ARB in view of EMMANUEL    # Hyperlipidemia  - Continue Statin therapy for lipid management    # DM-2  - SSi for glycemic control    # EMMANUEL on CKD  - Monitor renal metabolics serially; avoid exposure to nephrotoxic agents    # Medical records requested; reviewed in detail    # DVT/GI PPx  - Lovenox SQ      PATIENT [facility] with expected discharge or transfer of Acute Care Inpatients with in 96 hours of Admission.      ADMISSION DATE AND TIME: 8/7/2021  4:52 PM  Inpatient,2 midnights or more    PLAN DISCHARGE TRANSFER TO:  Hannah Lopez MD            Services were provided to the patient in accordance with admission requirements found in Title 42 Section 412.3 of the Code of [employer]

## 2021-08-08 NOTE — PROGRESS NOTES
Bedside report received from [facility], pt resting comfortable in bed, no pain or distress noted at this time    0800-Pt vitals stable with the exception of low blood pressure and low o2 sats, pt semi fowlers position. 0900-Pt blood pressure reassessed, elevated, medication administered. o2 sats maintaining. Pt consumed breakfast 70%, pt educated on fluid restriction 200 ml intake and 200 output    1110-300 ml output    1200-Pt has incontinent episode un measurable  X2. Educated on  Medication    1400-pt sleeping    1500-pt vitals stable with the exception of elevated blood pressure, meds given    1730-Pt consumed dinner 50%, resting comfortable    Bedside and Verbal shift change report given to [facility] (oncoming nurse) by Yeimi Cho (offgoing nurse). Report included the following information SBAR, Intake/Output, MAR, Recent Results and Med Rec Status.

## 2021-08-08 NOTE — ED NOTES
TRANSFER - OUT REPORT:    Verbal report given to Michelle RN(name) on Becky Moreno  being transferred to 202(unit) for routine progression of care       Report consisted of patients Situation, Background, Assessment and   Recommendations(SBAR). Information from the following report(s) SBAR, Kardex, ED Summary, [facility] and Recent Results was reviewed with the receiving nurse. Lines:   Peripheral IV 08/07/21 Left Hand (Active)   Site Assessment Clean, dry, & intact 08/07/21 1814   Phlebitis Assessment 0 08/07/21 1814   Infiltration Assessment 0 08/07/21 1814   Dressing Status Clean, dry, & intact 08/07/21 1814   Dressing Type Transparent 08/07/21 1814   Hub Color/Line Status Patent; Flushed;Blue 08/07/21 1814   Action Taken Blood drawn 08/07/21 1814   Alcohol Cap Used No 08/07/21 1814        Opportunity for questions and clarification was provided.       Patient transported with:   Monitor  Registered Nurse

## 2021-08-08 NOTE — PROGRESS NOTES
Problem: Breathing Pattern - Ineffective  Goal: *Absence of hypoxia  Outcome: Progressing Towards Goal  Goal: *Use of effective breathing techniques  Outcome: Progressing Towards Goal     Problem: Breathing Pattern - Ineffective  Goal: *Absence of hypoxia  Outcome: Progressing Towards Goal  Goal: *Use of effective breathing techniques  Outcome: Progressing Towards Goal  Goal: *PALLIATIVE CARE:  Alleviation of Dyspnea  Outcome: Progressing Towards Goal     Problem: Falls - Risk of  Goal: *Absence of Falls  Description: Document Rafi Fall Risk and appropriate interventions in the flowsheet. Outcome: Progressing Towards Goal  Note: Fall Risk Interventions:  Mobility Interventions: Utilize walker, cane, or other assistive device, Strengthening exercises (ROM-active/passive)              Elimination Interventions: Patient to call for help with toileting needs    History of Falls Interventions:  Investigate reason for fall

## 2021-08-08 NOTE — PROGRESS NOTES
Bedside and Verbal shift change report given to Michelle JACKSON (oncoming nurse) by All Chahal (offgoing nurse). Report included the following information SBAR, Intake/Output, MAR, Recent Results and Med Rec Status.      Patient resting quietly in bed at this time.

## 2021-08-08 NOTE — PROGRESS NOTES
TRANSFER - IN REPORT:    Verbal report received from Neida(sophia) on [address]  being received from ED(unit) for routine progression of care      Report consisted of patients Situation, Background, Assessment and   Recommendations(SBAR). Information from the following report(s) SBAR, Kardex, ED Summary, Intake/Output and Recent Results was reviewed with the receiving nurse. Opportunity for questions and clarification was provided. Assessment completed upon patients arrival to unit and care assumed.

## 2021-08-09 ENCOUNTER — APPOINTMENT (OUTPATIENT)
Dept: VASCULAR SURGERY | Age: 56
DRG: 115 | End: 2021-08-09
Attending: STUDENT IN AN ORGANIZED HEALTH CARE EDUCATION/TRAINING PROGRAM
Payer: MEDICAID

## 2021-08-09 ENCOUNTER — APPOINTMENT (OUTPATIENT)
Dept: NON INVASIVE DIAGNOSTICS | Age: 56
DRG: 115 | End: 2021-08-09
Attending: FAMILY MEDICINE
Payer: MEDICAID

## 2021-08-09 LAB
ANION GAP SERPL CALC-SCNC: 9 MMOL/L (ref 5–15)
ATRIAL RATE: 102 BPM
BUN SERPL-MCNC: 28 MG/DL (ref 6–20)
BUN/CREAT SERPL: 11 (ref 12–20)
CALCIUM SERPL-MCNC: 8.4 MG/DL (ref 8.5–10.1)
CALCULATED P AXIS, ECG09: 28 DEGREES
CALCULATED R AXIS, ECG10: 52 DEGREES
CALCULATED T AXIS, ECG11: 34 DEGREES
CHLORIDE SERPL-SCNC: 108 MMOL/L (ref 97–108)
CO2 SERPL-SCNC: 24 MMOL/L (ref 21–32)
CREAT SERPL-MCNC: 2.5 MG/DL (ref 0.55–1.02)
DIAGNOSIS, 93000: NORMAL
ECHO AO ROOT DIAM: 2.46 CM
ECHO AV AREA PLAN: 2.27 CM2
ECHO EST RA PRESSURE: 5 MMHG
ECHO LA MAJOR AXIS: 3.39 CM
ECHO LA MINOR AXIS: 1.43 CM
ECHO LV EDV A4C: 181.63 ML
ECHO LV EDV INDEX A4C: 76.6 ML/M2
ECHO LV EJECTION FRACTION A4C: 84 PERCENT
ECHO LV ESV A4C: 28.57 ML
ECHO LV ESV INDEX A4C: 12.1 ML/M2
ECHO LV INTERNAL DIMENSION DIASTOLIC: 4.6 CM (ref 3.9–5.3)
ECHO LV INTERNAL DIMENSION SYSTOLIC: 2.69 CM
ECHO LV IVSD: 1.32 CM (ref 0.6–0.9)
ECHO LV MASS 2D: 232.8 G (ref 67–162)
ECHO LV MASS INDEX 2D: 98.2 G/M2 (ref 43–95)
ECHO LV POSTERIOR WALL DIASTOLIC: 1.3 CM (ref 0.6–0.9)
ECHO LVOT DIAM: 2.23 CM
ECHO LVOT PEAK GRADIENT: 4.01 MMHG
ECHO LVOT PEAK VELOCITY: 100.11 CM/S
ECHO MV A VELOCITY: 59.1 CM/S
ECHO MV AREA PHT: 5.45 CM2
ECHO MV AREA PHT: 6.68 CM2
ECHO MV AREA PLAN: 7.55 CM2
ECHO MV E DECELERATION TIME (DT): 113.64 MS
ECHO MV E VELOCITY: 59.42 CM/S
ECHO MV E/A RATIO: 1.01
ECHO MV MAX VELOCITY: 106.9 CM/S
ECHO MV MEAN GRADIENT: 1.63 MMHG
ECHO MV PEAK GRADIENT: 4.57 MMHG
ECHO MV PRESSURE HALF TIME (PHT): 32.96 MS
ECHO MV PRESSURE HALF TIME (PHT): 40.4 MS
ECHO MV VTI: 19.74 CM
ECHO PV PEAK INSTANTANEOUS GRADIENT SYSTOLIC: 2.64 MMHG
ECHO RIGHT VENTRICULAR SYSTOLIC PRESSURE (RVSP): 34.67 MMHG
ECHO TV REGURGITANT MAX VELOCITY: 271.33 CM/S
ECHO TV REGURGITANT PEAK GRADIENT: 29.67 MMHG
GLUCOSE BLD STRIP.AUTO-MCNC: 120 MG/DL (ref 65–117)
GLUCOSE BLD STRIP.AUTO-MCNC: 132 MG/DL (ref 65–117)
GLUCOSE BLD STRIP.AUTO-MCNC: 133 MG/DL (ref 65–117)
GLUCOSE BLD STRIP.AUTO-MCNC: 206 MG/DL (ref 65–117)
GLUCOSE SERPL-MCNC: 144 MG/DL (ref 65–100)
P-R INTERVAL, ECG05: 178 MS
POTASSIUM SERPL-SCNC: 4.4 MMOL/L (ref 3.5–5.1)
Q-T INTERVAL, ECG07: 350 MS
QRS DURATION, ECG06: 72 MS
QTC CALCULATION (BEZET), ECG08: 456 MS
SERVICE CMNT-IMP: ABNORMAL
SODIUM SERPL-SCNC: 141 MMOL/L (ref 136–145)
VENTRICULAR RATE, ECG03: 102 BPM

## 2021-08-09 PROCEDURE — 74011250637 HC RX REV CODE- 250/637: Performed by: FAMILY MEDICINE

## 2021-08-09 PROCEDURE — 36415 COLL VENOUS BLD VENIPUNCTURE: CPT

## 2021-08-09 PROCEDURE — 65270000029 HC RM PRIVATE

## 2021-08-09 PROCEDURE — 74011636637 HC RX REV CODE- 636/637: Performed by: STUDENT IN AN ORGANIZED HEALTH CARE EDUCATION/TRAINING PROGRAM

## 2021-08-09 PROCEDURE — 93306 TTE W/DOPPLER COMPLETE: CPT | Performed by: INTERNAL MEDICINE

## 2021-08-09 PROCEDURE — 74011250636 HC RX REV CODE- 250/636: Performed by: STUDENT IN AN ORGANIZED HEALTH CARE EDUCATION/TRAINING PROGRAM

## 2021-08-09 PROCEDURE — 74011000250 HC RX REV CODE- 250: Performed by: STUDENT IN AN ORGANIZED HEALTH CARE EDUCATION/TRAINING PROGRAM

## 2021-08-09 PROCEDURE — 77010033678 HC OXYGEN DAILY

## 2021-08-09 PROCEDURE — C8929 TTE W OR WO FOL WCON,DOPPLER: HCPCS

## 2021-08-09 PROCEDURE — 74011250637 HC RX REV CODE- 250/637: Performed by: STUDENT IN AN ORGANIZED HEALTH CARE EDUCATION/TRAINING PROGRAM

## 2021-08-09 PROCEDURE — 94640 AIRWAY INHALATION TREATMENT: CPT

## 2021-08-09 PROCEDURE — 93970 EXTREMITY STUDY: CPT | Performed by: INTERNAL MEDICINE

## 2021-08-09 PROCEDURE — 93970 EXTREMITY STUDY: CPT

## 2021-08-09 PROCEDURE — 82962 GLUCOSE BLOOD TEST: CPT

## 2021-08-09 PROCEDURE — 80048 BASIC METABOLIC PNL TOTAL CA: CPT

## 2021-08-09 PROCEDURE — 74011636637 HC RX REV CODE- 636/637: Performed by: FAMILY MEDICINE

## 2021-08-09 RX ORDER — CEPHALEXIN 250 MG/1
500 CAPSULE ORAL EVERY 6 HOURS
Status: DISCONTINUED | OUTPATIENT
Start: 2021-08-09 | End: 2021-08-12 | Stop reason: HOSPADM

## 2021-08-09 RX ORDER — SODIUM CHLORIDE 0.9 % (FLUSH) 0.9 %
10 SYRINGE (ML) INJECTION AS NEEDED
Status: DISCONTINUED | OUTPATIENT
Start: 2021-08-09 | End: 2021-08-12 | Stop reason: SDUPTHER

## 2021-08-09 RX ADMIN — TRAMADOL HYDROCHLORIDE 50 MG: 50 TABLET, FILM COATED ORAL at 10:09

## 2021-08-09 RX ADMIN — ASPIRIN 81 MG: 81 TABLET ORAL at 09:08

## 2021-08-09 RX ADMIN — Medication 10 ML: at 14:12

## 2021-08-09 RX ADMIN — HEPARIN SODIUM 7500 UNITS: 5000 INJECTION, SOLUTION INTRAVENOUS; SUBCUTANEOUS at 12:28

## 2021-08-09 RX ADMIN — CLOPIDOGREL BISULFATE 75 MG: 75 TABLET ORAL at 09:08

## 2021-08-09 RX ADMIN — Medication 10 ML: at 14:25

## 2021-08-09 RX ADMIN — NYSTATIN: 100000 POWDER TOPICAL at 09:00

## 2021-08-09 RX ADMIN — Medication 10 ML: at 14:30

## 2021-08-09 RX ADMIN — AMITRIPTYLINE HYDROCHLORIDE 50 MG: 50 TABLET, FILM COATED ORAL at 21:39

## 2021-08-09 RX ADMIN — GABAPENTIN 700 MG: 100 CAPSULE ORAL at 09:08

## 2021-08-09 RX ADMIN — METOPROLOL TARTRATE 12.5 MG: 25 TABLET, FILM COATED ORAL at 18:44

## 2021-08-09 RX ADMIN — BUDESONIDE 1000 MCG: 0.5 SUSPENSION RESPIRATORY (INHALATION) at 07:51

## 2021-08-09 RX ADMIN — ATORVASTATIN CALCIUM 80 MG: 40 TABLET, FILM COATED ORAL at 21:39

## 2021-08-09 RX ADMIN — NYSTATIN: 100000 POWDER TOPICAL at 18:46

## 2021-08-09 RX ADMIN — CEPHALEXIN 500 MG: 250 CAPSULE ORAL at 18:45

## 2021-08-09 RX ADMIN — HEPARIN SODIUM 7500 UNITS: 5000 INJECTION, SOLUTION INTRAVENOUS; SUBCUTANEOUS at 21:38

## 2021-08-09 RX ADMIN — HEPARIN SODIUM 7500 UNITS: 5000 INJECTION, SOLUTION INTRAVENOUS; SUBCUTANEOUS at 05:50

## 2021-08-09 RX ADMIN — BUMETANIDE 1 MG: 0.25 INJECTION INTRAMUSCULAR; INTRAVENOUS at 18:45

## 2021-08-09 RX ADMIN — Medication 10 ML: at 14:00

## 2021-08-09 RX ADMIN — GABAPENTIN 700 MG: 100 CAPSULE ORAL at 18:45

## 2021-08-09 RX ADMIN — AMLODIPINE BESYLATE 10 MG: 5 TABLET ORAL at 09:08

## 2021-08-09 RX ADMIN — CEPHALEXIN 500 MG: 250 CAPSULE ORAL at 23:51

## 2021-08-09 RX ADMIN — INSULIN LISPRO 2 UNITS: 100 INJECTION, SOLUTION INTRAVENOUS; SUBCUTANEOUS at 21:38

## 2021-08-09 RX ADMIN — PERFLUTREN 2 ML: 6.52 INJECTION, SUSPENSION INTRAVENOUS at 14:26

## 2021-08-09 RX ADMIN — ACETAMINOPHEN 650 MG: 325 TABLET ORAL at 12:27

## 2021-08-09 RX ADMIN — BUMETANIDE 1 MG: 0.25 INJECTION INTRAMUSCULAR; INTRAVENOUS at 09:08

## 2021-08-09 RX ADMIN — Medication 10 ML: at 05:50

## 2021-08-09 RX ADMIN — Medication 10 ML: at 21:39

## 2021-08-09 RX ADMIN — BUDESONIDE 1000 MCG: 0.5 SUSPENSION RESPIRATORY (INHALATION) at 19:52

## 2021-08-09 RX ADMIN — CEPHALEXIN 500 MG: 250 CAPSULE ORAL at 12:27

## 2021-08-09 RX ADMIN — INSULIN GLARGINE 20 UNITS: 100 INJECTION, SOLUTION SUBCUTANEOUS at 09:07

## 2021-08-09 RX ADMIN — METOPROLOL TARTRATE 12.5 MG: 25 TABLET, FILM COATED ORAL at 09:08

## 2021-08-09 NOTE — PROGRESS NOTES
Hospitalist Progress Note    NAME: Aurea Del Rosario   :  1965   MRN:  [id number]   Room Number:  158/61  @ [facility]       Interim Hospital Summary: 54 y.o. female whom presented on 2021 with      Assessment / Plan:        #Acute decompensated heart failure improving   # Progressive SOB POA resolving   # suspected underlying ASHLEY   -Last TTE with EF 57% normal systolic &  diastolic function  -BNP 8330  -Troponin trend flat  -EKG reviewed independently nonspecific T wave changes  -Chest x-ray reviewed independently increased vascular congestion  -Patient home regimen include metoprolol 25 mg twice daily Aldactone 25 mg daily and torsemide 20 mg twice daily     -Update TTE  -IV diuresis with Bumex  1 mg twice daily  -Strict ins and outs  -Daily weight ( 305 > 302)   -Fluid restriction  -Low-sodium diet  -Cardiology consult  -Resume  BB         #EMMANUEL on CKD 3B  -Suspect secondary to cardiorenal syndrome type I  -Serum creatinine 2.50 baseline 1.67 per [facility] records  -Ins and outs  -Renally dose medication  -Avoid nephrotic medication    # LE swelling and redness   - Duplex to /ro DVT   - Kelfex 500 mg Q6 - 5 days         #Insulin-dependent diabetes mellitus  -A1c 8.4   -Home regimen include Metformin,  canagliflozin 100 mg daily,  Lantus 20 units and aspart  -Resume Lantus   - Lispro correctional scale, FSG AC HS  - Consistent carb diet, hypoglycemia protocol.      #Diabetic neuropathy  -On gabapentin 900 mg 3 times daily daily at home  -Resume renally dose      #Hypertension  On amlodipine 10 mg  And metoprolol 25 mg twice daily at home-      #History of stroke  -On aspirin, Plavix and atorvastatin at home      #History of chronic back pain  -On tramadol at home as needed     #Morbid obesity  -BMI 51  - Counseled on Lifestyle modifications, AHA Diet ,weight loss strategies.                  Code status: Full  Prophylaxis: Lovenox  Recommended Disposition: Home w/Family     Subjective: Chief Complaint / Reason for Physician Visit   leg edema  Discussed with RN events overnight. Review of Systems:  No fevers, chills, appetite change, cough, sputum production, shortness of breath, dyspnea on exertion, nausea, vomitting, diarrhea, constipation, chest pain, leg edema, abdominal pain, joint pain, rash, itching. Tolerating PT/OT. Tolerating diet. Objective:     VITALS:   Last 24hrs VS reviewed since prior progress note. Most recent are:  Patient Vitals for the past 24 hrs:   Temp Pulse Resp BP SpO2   08/09/21 0800 -- 88 -- -- --   08/09/21 0730 97.3 °F (36.3 °C) 88 18 (!) 145/74 93 %   08/09/21 0400 -- 79 -- -- --   08/09/21 0343 97.4 °F (36.3 °C) 86 16 (!) 148/72 94 %   08/09/21 0000 -- 85 -- -- --   08/08/21 2254 96.9 °F (36.1 °C) 84 18 131/62 95 %   08/08/21 2025 (!) 96.7 °F (35.9 °C) 91 17 (!) 148/49 99 %   08/08/21 2000 -- 88 -- -- --   08/08/21 1926 -- -- -- -- 91 %   08/08/21 1738 97.4 °F (36.3 °C) 95 18 138/86 94 %   08/08/21 1600 -- 93 -- -- --   08/08/21 1500 97.3 °F (36.3 °C) 93 17 (!) 141/69 94 %   08/08/21 1250 -- -- -- (!) 148/73 --   08/08/21 1242 97 °F (36.1 °C) 91 17 (!) 176/77 96 %   08/08/21 0835 -- -- -- (!) 148/76 --   08/08/21 0830 97.1 °F (36.2 °C) 96 17 119/60 94 %       Intake/Output Summary (Last 24 hours) at 8/9/2021 0809  Last data filed at 8/8/2021 2138  Gross per 24 hour   Intake --   Output 1800 ml   Net -1800 ml        PHYSICAL EXAM:  General: WD, WN. Alert, cooperative, no acute distress    EENT:  EOMI. Anicteric sclerae. MMM  Resp:  CTA bilaterally, no wheezing or rales. No accessory muscle use  CV:  Regular  rhythm,  normal S1/S2, no murmurs rubs gallops, 3+  edema  GI:  Soft, Non distended, Non tender. +Bowel sounds  Neurologic:  Alert and oriented X 3, normal speech,   Psych:   Good insight. Not anxious nor agitated  Skin:  No rashes.   No jaundice Redness LE B/L     Reviewed most current lab test results and cultures  YES  Reviewed most current radiology test results   YES  Review and summation of old records today    NO  Reviewed patient's current orders and MAR    YES  PMH/SH reviewed - no change compared to H&P  ________________________________________________________________________  Care Plan discussed with:    Comments   Patient x    Family      RN x    Care Manager x    Consultant  x Cardiology                     x Multidiciplinary team rounds were held today with , nursing, pharmacist and clinical coordinator. Patient's plan of care was discussed; medications were reviewed and discharge planning was addressed. ________________________________________________________________________  Total NON critical care TIME:30Minutes    Total CRITICAL CARE TIME Spent:   Minutes non procedure based      Comments   >50% of visit spent in counseling and coordination of care x    ________________________________________________________________________  Lesley Caba MD     Procedures: see electronic medical records for all procedures/Xrays and details which were not copied into this note but were reviewed prior to creation of Plan. LABS:  I reviewed today's most current labs and imaging studies.   Pertinent labs include:  Recent Labs     08/07/21  1800   WBC 7.5   HGB 7.9*   HCT 24.1*        Recent Labs     08/09/21  0332 08/07/21  1800    140   K 4.4 3.9    106   CO2 24 24   * 206*   BUN 28* 25*   CREA 2.50* 2.58*   CA 8.4* 8.5   MG  --  1.8   ALB  --  2.6*   TBILI  --  0.3   ALT  --  15       Signed: Lesley Caba MD

## 2021-08-09 NOTE — PROGRESS NOTES
Cardiac Monitoring on 8.8.21  from [id number]    Tele Strip on 8.9.21 at 0300 - Sinus Rhythm Patient contact and reminder given.   Extended order and also place order for the repeat HIV.

## 2021-08-09 NOTE — PROGRESS NOTES
Problem: Breathing Pattern - Ineffective  Goal: *Absence of hypoxia  Outcome: Progressing Towards Goal  Goal: *Use of effective breathing techniques  Outcome: Progressing Towards Goal     Problem: Breathing Pattern - Ineffective  Goal: *Absence of hypoxia  Outcome: Progressing Towards Goal  Goal: *Use of effective breathing techniques  Outcome: Progressing Towards Goal  Goal: *PALLIATIVE CARE:  Alleviation of Dyspnea  Outcome: Progressing Towards Goal     Problem: Falls - Risk of  Goal: *Absence of Falls  Description: Document Rafi Fall Risk and appropriate interventions in the flowsheet. Outcome: Progressing Towards Goal  Note: Fall Risk Interventions:  Mobility Interventions: Utilize walker, cane, or other assistive device         Medication Interventions: Patient to call before getting OOB    Elimination Interventions: Call light in reach    History of Falls Interventions: Door open when patient unattended         Problem:  Activity Intolerance  Goal: *Oxygen saturation during activity within specified parameters  Outcome: Progressing Towards Goal  Goal: *Able to remain out of bed as prescribed  Outcome: Progressing Towards Goal

## 2021-08-09 NOTE — PROGRESS NOTES
IDT met to discuss plan of care  RUR 23%  LOS 2d   GLOS   Barriers- None  ACP- FC, sister and nephew are healthcare decision makers  Plan-  Continues to have edema (lower legs), c/o itching (back and groin area) apply nystatin powder, waiting for results of ECHO and request in for cardiology consult. Plan is for patient to return home with family. Receives her follow-up care at [facility].   Cong Fu, TYE/CM  [phone]

## 2021-08-09 NOTE — PROGRESS NOTES
Reason for Admission:      HISTORY OF PRESENT ILLNESS:  Sherell Hammans is a 54 y.o.  female whom presents with complaint noted above. Patient reports worsening dyspnea over the course of the past week; patient denies active chest pain; she endorses orthopnea; no PND reported; no acute febrile episodes; no cough or URI symptoms noted; no known exposure to COVID-19. RUR Score: 23% Medium                 PCP: First and Last name:   Jamie Joseph MD     Name of Practice: [facility] Department of Family Medicine   Are you a current patient: Yes/No: Yes   Approximate date of last visit: a month ago   Can you participate in a virtual visit if needed: Yes    Do you (patient/family) have any concerns for transition/discharge? Not at this time. Plan for utilizing home health:       Current Advanced Directive/Advance Care Plan:  Full Code Patient wants CPR/ventilation. Healthcare decision maker is her sister, Elena Shelton [phone] and her nephew, Joanie Yuan [phone]. Patient has a son, 29, who has autism and is unable to make medical decisions. Healthcare Decision Maker:   Click here to complete [address] including selection of the Healthcare Decision Maker Relationship (ie \"Primary\")              Transition of Care Plan:     Lives with sister and her son in apartment that they moved into two months ago. Verified address and telephone number as correct. Patient uses [facility] [address] for medications. No  services at this time. Has had a rollator for the past 3 years. Along with PCP, patient has a cardiologist at [facility] and has recently started seeing a nephrologist at [facility]. Planning  1/ Schedule PCP follow-up appointment    Care Management Interventions  PCP Verified by CM:  Yes  Mode of Transport at Discharge: Self  Transition of Care Consult (CM Consult): Discharge Planning  Current Support Network: [place] [address] for Transition of Care is Related to the Following Treatment Goals :  Follow-up PCP appointment  The Patient and/or Patient Representative was Provided with a Choice of Provider and Agrees with the Discharge Plan?: Yes  Name of the Patient Representative Who was Provided with a Choice of Provider and Agrees with the Discharge Plan: Patient  Freedom of Choice List was Provided with Basic Dialogue that Supports the Patient's Individualized Plan of Care/Goals, Treatment Preferences and Shares the Quality Data Associated with the Providers?: Yes  Discharge Location  Discharge Placement: [facility]  [phone]

## 2021-08-09 NOTE — ACP (ADVANCE CARE PLANNING)
Current Advanced Directive/Advance Care Plan:  Full Code Patient wants CPR/ventilation. Healthcare decision maker is her sister, Elena Shelton [phone] and her nephew, Tran Rankin [phone]. Patient has a son, 29, who has autism and is unable to make medical decisions.     TYE Mejía/[facility]  [phone]

## 2021-08-10 ENCOUNTER — APPOINTMENT (OUTPATIENT)
Dept: ULTRASOUND IMAGING | Age: 56
DRG: 115 | End: 2021-08-10
Attending: STUDENT IN AN ORGANIZED HEALTH CARE EDUCATION/TRAINING PROGRAM
Payer: MEDICAID

## 2021-08-10 LAB
ANION GAP SERPL CALC-SCNC: 4 MMOL/L (ref 5–15)
BASOPHILS # BLD: 0 K/UL (ref 0–0.1)
BASOPHILS NFR BLD: 1 % (ref 0–1)
BUN SERPL-MCNC: 31 MG/DL (ref 6–20)
BUN/CREAT SERPL: 12 (ref 12–20)
CALCIUM SERPL-MCNC: 8.6 MG/DL (ref 8.5–10.1)
CHLORIDE SERPL-SCNC: 107 MMOL/L (ref 97–108)
CO2 SERPL-SCNC: 30 MMOL/L (ref 21–32)
CREAT SERPL-MCNC: 2.64 MG/DL (ref 0.55–1.02)
DIFFERENTIAL METHOD BLD: ABNORMAL
EOSINOPHIL # BLD: 0.3 K/UL (ref 0–0.4)
EOSINOPHIL NFR BLD: 5 % (ref 0–7)
ERYTHROCYTE [DISTWIDTH] IN BLOOD BY AUTOMATED COUNT: 13.5 % (ref 11.5–14.5)
GLUCOSE BLD STRIP.AUTO-MCNC: 143 MG/DL (ref 65–117)
GLUCOSE BLD STRIP.AUTO-MCNC: 148 MG/DL (ref 65–117)
GLUCOSE BLD STRIP.AUTO-MCNC: 189 MG/DL (ref 65–117)
GLUCOSE BLD STRIP.AUTO-MCNC: 221 MG/DL (ref 65–117)
GLUCOSE SERPL-MCNC: 140 MG/DL (ref 65–100)
HCT VFR BLD AUTO: 23.3 % (ref 35–47)
HGB BLD-MCNC: 7.4 G/DL (ref 11.5–16)
IMM GRANULOCYTES # BLD AUTO: 0 K/UL (ref 0–0.04)
IMM GRANULOCYTES NFR BLD AUTO: 1 % (ref 0–0.5)
INR PPP: 1.1 (ref 0.9–1.1)
LYMPHOCYTES # BLD: 1.6 K/UL (ref 0.8–3.5)
LYMPHOCYTES NFR BLD: 27 % (ref 12–49)
MCH RBC QN AUTO: 27.8 PG (ref 26–34)
MCHC RBC AUTO-ENTMCNC: 31.8 G/DL (ref 30–36.5)
MCV RBC AUTO: 87.6 FL (ref 80–99)
MONOCYTES # BLD: 0.5 K/UL (ref 0–1)
MONOCYTES NFR BLD: 8 % (ref 5–13)
NEUTS SEG # BLD: 3.5 K/UL (ref 1.8–8)
NEUTS SEG NFR BLD: 58 % (ref 32–75)
NRBC # BLD: 0 K/UL (ref 0–0.01)
NRBC BLD-RTO: 0 PER 100 WBC
PLATELET # BLD AUTO: 229 K/UL (ref 150–400)
PMV BLD AUTO: 9.5 FL (ref 8.9–12.9)
POTASSIUM SERPL-SCNC: 4.8 MMOL/L (ref 3.5–5.1)
PROTHROMBIN TIME: 10.8 SEC (ref 9–11.1)
RBC # BLD AUTO: 2.66 M/UL (ref 3.8–5.2)
SERVICE CMNT-IMP: ABNORMAL
SODIUM SERPL-SCNC: 141 MMOL/L (ref 136–145)
WBC # BLD AUTO: 5.9 K/UL (ref 3.6–11)

## 2021-08-10 PROCEDURE — 97161 PT EVAL LOW COMPLEX 20 MIN: CPT

## 2021-08-10 PROCEDURE — 97116 GAIT TRAINING THERAPY: CPT

## 2021-08-10 PROCEDURE — 77010033678 HC OXYGEN DAILY

## 2021-08-10 PROCEDURE — 82962 GLUCOSE BLOOD TEST: CPT

## 2021-08-10 PROCEDURE — 74011250637 HC RX REV CODE- 250/637: Performed by: STUDENT IN AN ORGANIZED HEALTH CARE EDUCATION/TRAINING PROGRAM

## 2021-08-10 PROCEDURE — 85610 PROTHROMBIN TIME: CPT

## 2021-08-10 PROCEDURE — 85025 COMPLETE CBC W/AUTO DIFF WBC: CPT

## 2021-08-10 PROCEDURE — 94640 AIRWAY INHALATION TREATMENT: CPT

## 2021-08-10 PROCEDURE — 74011250637 HC RX REV CODE- 250/637: Performed by: FAMILY MEDICINE

## 2021-08-10 PROCEDURE — 74011636637 HC RX REV CODE- 636/637: Performed by: FAMILY MEDICINE

## 2021-08-10 PROCEDURE — 76770 US EXAM ABDO BACK WALL COMP: CPT

## 2021-08-10 PROCEDURE — 74011636637 HC RX REV CODE- 636/637: Performed by: STUDENT IN AN ORGANIZED HEALTH CARE EDUCATION/TRAINING PROGRAM

## 2021-08-10 PROCEDURE — 74011000250 HC RX REV CODE- 250: Performed by: STUDENT IN AN ORGANIZED HEALTH CARE EDUCATION/TRAINING PROGRAM

## 2021-08-10 PROCEDURE — 80048 BASIC METABOLIC PNL TOTAL CA: CPT

## 2021-08-10 PROCEDURE — 65270000029 HC RM PRIVATE

## 2021-08-10 PROCEDURE — 36415 COLL VENOUS BLD VENIPUNCTURE: CPT

## 2021-08-10 RX ORDER — TORSEMIDE 20 MG/1
20 TABLET ORAL 2 TIMES DAILY
Status: DISCONTINUED | OUTPATIENT
Start: 2021-08-10 | End: 2021-08-11

## 2021-08-10 RX ADMIN — AMLODIPINE BESYLATE 10 MG: 5 TABLET ORAL at 08:59

## 2021-08-10 RX ADMIN — GABAPENTIN 700 MG: 100 CAPSULE ORAL at 17:18

## 2021-08-10 RX ADMIN — NYSTATIN: 100000 POWDER TOPICAL at 09:02

## 2021-08-10 RX ADMIN — BUMETANIDE 1 MG: 0.25 INJECTION INTRAMUSCULAR; INTRAVENOUS at 09:04

## 2021-08-10 RX ADMIN — CLOPIDOGREL BISULFATE 75 MG: 75 TABLET ORAL at 09:01

## 2021-08-10 RX ADMIN — CEPHALEXIN 500 MG: 250 CAPSULE ORAL at 23:12

## 2021-08-10 RX ADMIN — CEPHALEXIN 500 MG: 250 CAPSULE ORAL at 06:14

## 2021-08-10 RX ADMIN — INSULIN LISPRO 2 UNITS: 100 INJECTION, SOLUTION INTRAVENOUS; SUBCUTANEOUS at 08:58

## 2021-08-10 RX ADMIN — CEPHALEXIN 500 MG: 250 CAPSULE ORAL at 17:20

## 2021-08-10 RX ADMIN — TORSEMIDE 20 MG: 20 TABLET ORAL at 17:18

## 2021-08-10 RX ADMIN — GABAPENTIN 700 MG: 100 CAPSULE ORAL at 08:59

## 2021-08-10 RX ADMIN — METOPROLOL TARTRATE 12.5 MG: 25 TABLET, FILM COATED ORAL at 17:19

## 2021-08-10 RX ADMIN — INSULIN LISPRO 2 UNITS: 100 INJECTION, SOLUTION INTRAVENOUS; SUBCUTANEOUS at 21:06

## 2021-08-10 RX ADMIN — TRAMADOL HYDROCHLORIDE 50 MG: 50 TABLET, FILM COATED ORAL at 21:16

## 2021-08-10 RX ADMIN — ATORVASTATIN CALCIUM 80 MG: 40 TABLET, FILM COATED ORAL at 21:06

## 2021-08-10 RX ADMIN — BUDESONIDE 1000 MCG: 0.5 SUSPENSION RESPIRATORY (INHALATION) at 07:30

## 2021-08-10 RX ADMIN — NYSTATIN: 100000 POWDER TOPICAL at 22:15

## 2021-08-10 RX ADMIN — TRAMADOL HYDROCHLORIDE 50 MG: 50 TABLET, FILM COATED ORAL at 09:30

## 2021-08-10 RX ADMIN — Medication 10 ML: at 21:12

## 2021-08-10 RX ADMIN — CEPHALEXIN 500 MG: 250 CAPSULE ORAL at 12:25

## 2021-08-10 RX ADMIN — Medication 10 ML: at 14:00

## 2021-08-10 RX ADMIN — INSULIN LISPRO 2 UNITS: 100 INJECTION, SOLUTION INTRAVENOUS; SUBCUTANEOUS at 12:25

## 2021-08-10 RX ADMIN — INSULIN LISPRO 2 UNITS: 100 INJECTION, SOLUTION INTRAVENOUS; SUBCUTANEOUS at 17:17

## 2021-08-10 RX ADMIN — NYSTATIN: 100000 POWDER TOPICAL at 17:21

## 2021-08-10 RX ADMIN — METOPROLOL TARTRATE 12.5 MG: 25 TABLET, FILM COATED ORAL at 09:01

## 2021-08-10 RX ADMIN — AMITRIPTYLINE HYDROCHLORIDE 50 MG: 50 TABLET, FILM COATED ORAL at 21:06

## 2021-08-10 RX ADMIN — BUDESONIDE 1000 MCG: 0.5 SUSPENSION RESPIRATORY (INHALATION) at 20:08

## 2021-08-10 RX ADMIN — ASPIRIN 81 MG: 81 TABLET ORAL at 09:01

## 2021-08-10 RX ADMIN — INSULIN GLARGINE 20 UNITS: 100 INJECTION, SOLUTION SUBCUTANEOUS at 08:58

## 2021-08-10 RX ADMIN — Medication 10 ML: at 06:14

## 2021-08-10 NOTE — PROGRESS NOTES
Telemedicine cross cover:    Pt has significant bruising and bleeding issues suspect due to heparin administration, will hold.     - check INR, follow up on am CBC

## 2021-08-10 NOTE — PROGRESS NOTES
Hospitalist Progress Note    NAME: Cecilia Vasquez   :  1965   MRN:  [id number]   Room Number:  099/15  @ [facility]       Interim Hospital Summary: 54 y.o. female whom presented on 2021 with      Assessment / Plan:        #Fluid Overload resolving   # Progressive SOB POA resolving   # suspected underlying ASHLEY   -Last TTE in   with EF 27% normal systolic &  diastolic function  -BNP 8808  -Troponin trend flat  -EKG reviewed independently nonspecific T wave changes  -Chest x-ray reviewed independently increased vascular congestion  -Patient home regimen include metoprolol 25 mg twice daily Aldactone 25 mg daily and torsemide 20 mg twice daily TTE  This admission  w/ EF 54-49% , Normal systolic and diastolic function     -IV diuresed initially with Bumex  1 mg twice daily then  changed to home dose torsemide 20 mg BID  - resume metoprolol    -Strict ins and outs  -Daily weight ( 305 > 302)   -Fluid restriction  -Low-sodium diet  -Cardiology consulted ( case D/w Alfred)   - In house cards to see tomorrow        #EMMANUEL on CKD 3B POA   -Suspect secondary to cardiorenal syndrome type I  -Serum creatinine 2.64 today (  baseline 1.67 per [facility] records)   - Hold aldactone , change Bumex to torsemide   - check US retroperitoneum   -Ins and outs  -Renally dose medication  -Avoid nephrotic medication    # LE swelling and redness   - Duplex to /ro DVT unremarkable   - Kelfex 500 mg Q6 - 5 days         #Insulin-dependent diabetes mellitus  -A1c 8.4   -Home regimen include Metformin,  canagliflozin 100 mg daily,  Lantus 20 units and aspart  -Resume Lantus   - Lispro correctional scale, FSG AC HS  - Consistent carb diet, hypoglycemia protocol.      #Diabetic neuropathy  -On gabapentin 900 mg 3 times daily daily at home  -Resume renally dose      #Hypertension  On amlodipine 10 mg and metoprolol 25 mg twice daily at home-      #History of stroke  -On aspirin, Plavix and atorvastatin at home    #History of chronic back pain  -On tramadol at home as needed     #Morbid obesity  -BMI 51  - Counseled on Lifestyle modifications, AHA Diet ,weight loss strategies.                Code status: Full  Prophylaxis: Lovenox  Recommended Disposition: Home w/Family     Subjective:     Chief Complaint / Reason for Physician Visit   breathing improving   Discussed with RN events overnight. Review of Systems:  No fevers, chills, appetite change, cough, sputum production, shortness of breath, dyspnea on exertion, nausea, vomitting, diarrhea, constipation, chest pain, leg edema, abdominal pain, joint pain, rash, itching. Tolerating PT/OT. Tolerating diet. Objective:     VITALS:   Last 24hrs VS reviewed since prior progress note. Most recent are:  Patient Vitals for the past 24 hrs:   Temp Pulse Resp BP SpO2   08/10/21 0812 (!) 95.7 °F (35.4 °C) 76 18 137/66 99 %   08/10/21 0327 97 °F (36.1 °C) 74 18 139/68 98 %   08/09/21 2351 96.9 °F (36.1 °C) 76 18 (!) 141/63 96 %   08/09/21 1948 97.9 °F (36.6 °C) 81 18 (!) 128/57 99 %   08/09/21 1600 98 °F (36.7 °C) 83 18 124/63 96 %   08/09/21 1318 98 °F (36.7 °C) 84 19 (!) 146/71 93 %   08/09/21 1200 -- 84 -- -- --       Intake/Output Summary (Last 24 hours) at 8/10/2021 0916  Last data filed at 8/10/2021 9916  Gross per 24 hour   Intake --   Output 1250 ml   Net -1250 ml        PHYSICAL EXAM:  General: WD, WN. Alert, cooperative, no acute distress    EENT:  EOMI. Anicteric sclerae. MMM  Resp:  CTA bilaterally, no wheezing or rales. No accessory muscle use  CV:  Regular  rhythm,  normal S1/S2, no murmurs rubs gallops, 2+  edema  GI:  Soft, Non distended, Non tender. +Bowel sounds  Neurologic:  Alert and oriented X 3, normal speech,   Psych:   Good insight. Not anxious nor agitated  Skin:  No rashes.   No jaundice Redness LE B/L     Reviewed most current lab test results and cultures  YES  Reviewed most current radiology test results   YES  Review and summation of old records today    NO  Reviewed patient's current orders and MAR    YES  PMH/SH reviewed - no change compared to H&P  ________________________________________________________________________  Care Plan discussed with:    Comments   Patient x    Family      RN x    Care Manager x    Consultant  x Cardiology                     x Multidiciplinary team rounds were held today with , nursing, pharmacist and clinical coordinator. Patient's plan of care was discussed; medications were reviewed and discharge planning was addressed. ________________________________________________________________________  Total NON critical care TIME:30Minutes    Total CRITICAL CARE TIME Spent:   Minutes non procedure based      Comments   >50% of visit spent in counseling and coordination of care x    ________________________________________________________________________  Ashley Mike MD     Procedures: see electronic medical records for all procedures/Xrays and details which were not copied into this note but were reviewed prior to creation of Plan. LABS:  I reviewed today's most current labs and imaging studies.   Pertinent labs include:  Recent Labs     08/10/21  0315 08/07/21  1800   WBC 5.9 7.5   HGB 7.4* 7.9*   HCT 23.3* 24.1*    231     Recent Labs     08/10/21  0607 08/10/21  0315 08/09/21  0332 08/07/21  1800   NA  --  141 141 140   K  --  4.8 4.4 3.9   CL  --  107 108 106   CO2  --  30 24 24   GLU  --  140* 144* 206*   BUN  --  31* 28* 25*   CREA  --  2.64* 2.50* 2.58*   CA  --  8.6 8.4* 8.5   MG  --   --   --  1.8   ALB  --   --   --  2.6*   TBILI  --   --   --  0.3   ALT  --   --   --  15   INR 1.1  --   --   --        Signed: Ashley Mike MD

## 2021-08-10 NOTE — PROGRESS NOTES
Bedside and Verbal shift change report given to Brooke Solis (oncoming nurse) by Jerel Campoverde (offgoing nurse). Report included the following information SBAR, Kardex, Procedure Summary, Intake/Output, MAR and Recent Results.

## 2021-08-10 NOTE — PROGRESS NOTES
0730 Bedside shift report given to Goldie Lebron RN (oncoming) by Andra Sanchez RN (offgoing). Report included the following: SBAR, MAR, Kardex, Intake/Ouput, Recent Results and Cardiac Rhythm of NSR.     0812 VS stable. Temp low at 95.7F, pt stated normal    0903 Scheduled meds given     0930 PRN Tramadol admin per pt stated pain 7/10 inn bilat legs     1330 Incontinence care provided. Bed linens changed. Purewick replaced     4802 Pt up to chair by PT. Denies any needs at this time     1601 Vs stable, reassessment complete. 1717 Insulin coverage admin     1844 Bladder scan per Dr. Bakari Escobar orders. Rechecked 3x w/ max being 33mL    1915 Bedside shift report given to Andra Sanchez RN (oncoming) by Goldie Lebron RN (offgoing).  Report included the following: SBAR, MAR, Kardex, Intake/Ouput, Recent Results and Cardiac Rhythm of NSR

## 2021-08-10 NOTE — PROGRESS NOTES
ERROL  RAAT- 20%    CM called PCP office to schedule f/u appointment which is scheduled for 8/17 at 1:20pm. Put on AVS.    CM called [phone] Cardiology to schedule a f/u appointment. Patient's follow-up appointment was already scheduled for 10/20.  stated that she would seen message to clinic and clinic will call patient with day/time of appointment. Put on AVS to call office on 8/13 by 12 noon if she has not heard from office.     TYE [place]/[facility]  [phone]

## 2021-08-10 NOTE — PROGRESS NOTES
ERROL   RUR 20 %   GLOS 3/1   :LOS 2.17   ELOS tomorrow if medically stable    ACP NOK listed   Nursing to provide education and reinforcement for CHF   Po Románmex   Discussed changes with medications   Cardiology and PT consult in progress   CM is arranging PCP and Cardiology appointments     Sondra Cooper MSW RN   [phone]

## 2021-08-10 NOTE — PROGRESS NOTES
Problem: Mobility Impaired (Adult and Pediatric)  Goal: *Acute Goals and Plan of Care (Insert Text)  Description: FUNCTIONAL STATUS PRIOR TO ADMISSION: Patient was modified independent using a rollator for functional mobility. Patient was not on home O2 PTA. HOME SUPPORT PRIOR TO ADMISSION: The patient lived with family members but did not require assist. Lives with her sister, brother in-law, and autistic son. Physical Therapy Goals  Initiated 8/10/2021  1. Patient will move from supine to sit and sit to supine  in bed with modified independence within 7 day(s). 2.  Patient will transfer from bed to chair and chair to bed with modified independence using the least restrictive device within 7 day(s). 3.  Patient will perform sit to stand with supervision/set-up within 7 day(s). 4.  Patient will ambulate with modified independence for 50 feet with the least restrictive device within 7 day(s). Outcome: Progressing Towards Goal   PHYSICAL THERAPY EVALUATION  Patient: James Lee (9 y.o. female)  Date: 8/10/2021  Primary Diagnosis: Acute exacerbation of CHF (congestive heart failure) ([facility]) [I50.9]        Precautions:        ASSESSMENT  Based on the objective data described below, the patient presents with near baseline ROM, strength, and balance but with decreased endurance and need for supplemental O2 following admission for CHF. The patient uses a rollator for mobility at baseline but does not need O2. Received on 2L O2 via NC and SpO2 96% at rest. Mobilized on RA and noted PENA with SpO2 decreasing to 89-90%. Gait training x25' using a RW and requiring CGA. She was most impaired by sit to stand requiring moderate assist but also reports that her bed at home is higher than the hospital bed. Anticipate good progress towards goals and discussed HHPT services. She reports near baseline functional status and having had HHPT in the past with minimal perceived benefit.  Recommend continued O2 assessment to monitor for need at the time of discharge. Current Level of Function Impacting Discharge (mobility/balance): moderate assist sit to stand from low bed height         Patient will benefit from skilled therapy intervention to address the above noted impairments. PLAN :  Recommendations and Planned Interventions: bed mobility training, transfer training, gait training, therapeutic exercises, and neuromuscular re-education      Frequency/Duration: Patient will be followed by physical therapy:  2 times a week to address goals. Recommendation for discharge: (in order for the patient to meet his/her long term goals)  No skilled physical therapy/ follow up rehabilitation needs identified at this time. IF patient discharges home will need the following DME: None         SUBJECTIVE:   Patient stated I need a recliner at home. It stinks staying in the bedroom all the time.     OBJECTIVE DATA SUMMARY:   HISTORY:    Past Medical History:   Diagnosis Date    Arthritis     Asthma     CHF (congestive heart failure) (HCC)     Chronic back pain     Diabetes ([facility]     Diabetic retinopathy ([facility]     Hypertension     MRSA (methicillin resistant staph aureus) culture positive     labial abscess    Neuropathy      Past Surgical History:   Procedure Laterality Date    HX HEENT      tonsillectomy    HX ORTHOPAEDIC      left ft bunionectomy    HX OTHER SURGICAL      lanced labial abscess       Personal factors and/or comorbidities impacting plan of care:     Home Situation  Home Environment: Apartment  One/Two Story Residence: One story  Living Alone: No  Support Systems: Child(jesus manuel) (autistic child, sister and brother in-law)  Patient Expects to be Discharged to[de-identified] Apartment  Current DME Used/Available at Home: skip Sal (3 in 1 commode)    EXAMINATION/PRESENTATION/DECISION MAKING:   Critical Behavior:              Hearing:   Auditory  Auditory Impairment: None  Skin:    Edema:   Range Of Motion:  AROM: Generally decreased, functional                       Strength:    Strength: Generally decreased, functional                    Tone & Sensation:   Tone: Normal              Sensation: Impaired (neuropathy mid shin to foot)               Coordination:  Coordination: Generally decreased, functional  Vision:      Functional Mobility:  Bed Mobility:  Rolling: Contact guard assistance  Supine to Sit: Stand-by assistance; Additional time     Scooting: Supervision  Transfers:  Sit to Stand: Moderate assistance  Stand to Sit: Contact guard assistance  Stand Pivot Transfers: Contact guard assistance                    Balance:   Sitting: Intact  Standing: With support; Impaired  Standing - Static: Fair  Standing - Dynamic : Fair  Ambulation/Gait Training:  Distance (ft): 25 Feet (ft)  Assistive Device: Gait belt;Walker, rolling  Ambulation - Level of Assistance: Contact guard assistance     Gait Description (WDL): Exceptions to WDL  Gait Abnormalities: Decreased step clearance;Trunk sway increased        Base of Support: Widened     Speed/Vangie: Pace decreased (<100 feet/min)          Physical Therapy Evaluation Charge Determination   History Examination Presentation Decision-Making   MEDIUM  Complexity : 1-2 comorbidities / personal factors will impact the outcome/ POC  LOW Complexity : 1-2 Standardized tests and measures addressing body structure, function, activity limitation and / or participation in recreation  LOW Complexity : Stable, uncomplicated  LOW Complexity : FOTO score of       Based on the above components, the patient evaluation is determined to be of the following complexity level: LOW     Pain Rating:      Activity Tolerance:   Fair and observed SOB with activity; SpO2 borderline between 89-90% on RA    After treatment patient left in no apparent distress:   Sitting in chair and Call bell within reach    COMMUNICATION/EDUCATION:   The patients plan of care was discussed with: Registered nurse. Fall prevention education was provided and the patient/caregiver indicated understanding., Patient/family have participated as able in goal setting and plan of care. , and Patient/family agree to work toward stated goals and plan of care.     Thank you for this referral.  Yahir Moss, PT, DPT   Time Calculation: 27 mins

## 2021-08-10 NOTE — PROGRESS NOTES
Spiritual Care Partner Volunteer visited patient in [address] on 8.10.21.     Documented by Catarina Carrillo, Staff , on 8.10.21  Request  Support/Spiritual Care Services via [facility]

## 2021-08-10 NOTE — PROGRESS NOTES
Physician Progress Note      Trena Gomez  CSN #:                  [id number]  :                       1965  ADMIT DATE:       2021 4:52 PM  [address] [place] DATE:  RESPONDING  PROVIDER #:        Yasmin Dominique MD          QUERY TEXT:    Pt admitted with SOB and peripheral edema and has Acute/Decompensated CHF documented in H&P by IM dated . If possible, can the diagnosis be further specified in progress notes and discharge summary regarding the type of CHF:    The medical record reflects the following:  Risk Factors: Hx:  asthma, CHF, diabetes, hypertension,  Clinical Indicators:  93 18 125/67 94%. ..  Bun/Cr: 25/2.58. ..  Tnl:<0.05. .. pBNP: 1,190. ...  CXR: Findings may represent effusion or opacity; Diffuse interstitial prominence suggesting pulmonary vascular congestion, accentuated by technique. ..  Echo: LV: Estimated LVEF is 60 - 65%. Normal cavity size, systolic function (ejection fraction normal) and diastolic function. Mild concentric hypertrophy. Wall motion: normal...ED noted:  Decreased breath sounds and wheezing present  Treatment: CXR; Tnl; pBNP; Echo; IV Lasix; Duo-neb; Daily weight; Strict I/Os; Tele monitoring; Fluid restriction; Low-sodium diet; Cardiology consult    Thank you,    Esme Rubio  [facility]  [phone]  Options provided:  -- Acute on Chronic Diastolic CHF/HFpEF  -- Acute Diastolic CHF/HFpEF  -- Other - I will add my own diagnosis  -- Disagree - Not applicable / Not valid  -- Disagree - Clinically unable to determine / Unknown  -- Refer to Clinical Documentation Reviewer    PROVIDER RESPONSE TEXT:    Provider disagreed with this query.   ECHo w/o HF    Query created by: Richard Taylor on 8/10/2021 1:14 PM      Electronically signed by:  Yasmin Dominique MD 8/10/2021 1:17 PM

## 2021-08-10 NOTE — PROGRESS NOTES
Comprehensive Nutrition Assessment    Type and Reason for Visit: (P) Initial    Nutrition Recommendations/Plan: CC diet as tolerated. May increase to 4 CHO choices per meal if pt continues to request additional food. Nutrition Assessment:  (P) Pt admitted with decompensated heart failure, acute exac CHF, resolving. BLE redness, swelling noted. Hx notable for HLD, T2DM with A1c 8.4, HTN, lymphoma, vit D deficiency, TIA/stroke, super morvid obesity. Estimated Daily Nutrient Needs:  Energy (kcal): (P) 2255; Weight Used for Energy Requirements: (P) Adjusted  Protein (g): (P) 84.5 (.6 g/kg CBW); Weight Used for Protein Requirements: (P) Adjusted  Fluid (ml/day): (P) 1500; Method Used for Fluid Requirements: (P) Other (comment) (MD order)      Nutrition Related Findings:  (P) Pt tolerating diet      Wounds:    (P) Moisture associate skin damage       Current Nutrition Therapies:  ADULT DIET Regular; 3 carb choices (45 gm/meal);  Low Fat/Low Chol/High Fiber/2 gm Na; 1200 ml    Anthropometric Measures:  Height:  (P) 5' 5.5\" (166.4 cm)  Current Body Wt:  (P) 137.2 kg (302 lb 8 oz)   Admission Body Wt:       Usual Body Wt:        Ideal Body Wt:  (P) 128 lbs:  (P) 236.3 %   Adjusted Body Weight:   ; Weight Adjustment for: (P)  (ABW 98 kg)   Adjusted BMI:       BMI Category:  (P) Obese class 3 (BMI 40.0 or greater)       Nutrition Diagnosis:   (P) Overweight/obesity, Not ready for diet/lifestyle change related to (P) excessive energy intake as evidenced by (P) BMI    Nutrition Interventions:   Food and/or Nutrient Delivery: (P) Continue current diet  Nutrition Education and Counseling: (P) Education declined  Coordination of Nutrition Care: (P) Continue to monitor while inpatient    Goals:  (P) PO intake > 75% most meals next 5-8 days; optimize glycemic control       Nutrition Monitoring and Evaluation:   Behavioral-Environmental Outcomes: (P) Beliefs and attitudes, Readiness for change  Food/Nutrient Intake Outcomes: (P) Food and nutrient intake  Physical Signs/Symptoms Outcomes: (P) Weight, Meal time behavior    Discharge Planning:    (P) Continue current diet     Electronically signed by Laura Bishop, PhD, RD, [address] [place]  on 8/10/2021 at 9:31 AM    Contact: [phone]

## 2021-08-11 DIAGNOSIS — R06.83 SNORING: Primary | ICD-10-CM

## 2021-08-11 LAB
ANION GAP SERPL CALC-SCNC: 5 MMOL/L (ref 5–15)
BNP SERPL-MCNC: 1289 PG/ML (ref 0–125)
BUN SERPL-MCNC: 33 MG/DL (ref 6–20)
BUN/CREAT SERPL: 13 (ref 12–20)
CALCIUM SERPL-MCNC: 8.8 MG/DL (ref 8.5–10.1)
CHLORIDE SERPL-SCNC: 107 MMOL/L (ref 97–108)
CO2 SERPL-SCNC: 30 MMOL/L (ref 21–32)
CREAT SERPL-MCNC: 2.55 MG/DL (ref 0.55–1.02)
GLUCOSE BLD STRIP.AUTO-MCNC: 144 MG/DL (ref 65–117)
GLUCOSE BLD STRIP.AUTO-MCNC: 151 MG/DL (ref 65–117)
GLUCOSE BLD STRIP.AUTO-MCNC: 202 MG/DL (ref 65–117)
GLUCOSE BLD STRIP.AUTO-MCNC: 393 MG/DL (ref 65–117)
GLUCOSE SERPL-MCNC: 165 MG/DL (ref 65–100)
POTASSIUM SERPL-SCNC: 5.1 MMOL/L (ref 3.5–5.1)
SERVICE CMNT-IMP: ABNORMAL
SODIUM SERPL-SCNC: 142 MMOL/L (ref 136–145)

## 2021-08-11 PROCEDURE — 74011636637 HC RX REV CODE- 636/637: Performed by: STUDENT IN AN ORGANIZED HEALTH CARE EDUCATION/TRAINING PROGRAM

## 2021-08-11 PROCEDURE — 94640 AIRWAY INHALATION TREATMENT: CPT

## 2021-08-11 PROCEDURE — 74011250637 HC RX REV CODE- 250/637: Performed by: STUDENT IN AN ORGANIZED HEALTH CARE EDUCATION/TRAINING PROGRAM

## 2021-08-11 PROCEDURE — 82962 GLUCOSE BLOOD TEST: CPT

## 2021-08-11 PROCEDURE — 74011250637 HC RX REV CODE- 250/637: Performed by: FAMILY MEDICINE

## 2021-08-11 PROCEDURE — 99223 1ST HOSP IP/OBS HIGH 75: CPT | Performed by: NURSE PRACTITIONER

## 2021-08-11 PROCEDURE — 80048 BASIC METABOLIC PNL TOTAL CA: CPT

## 2021-08-11 PROCEDURE — 74011636637 HC RX REV CODE- 636/637: Performed by: FAMILY MEDICINE

## 2021-08-11 PROCEDURE — 83880 ASSAY OF NATRIURETIC PEPTIDE: CPT

## 2021-08-11 PROCEDURE — 65270000029 HC RM PRIVATE

## 2021-08-11 PROCEDURE — 36415 COLL VENOUS BLD VENIPUNCTURE: CPT

## 2021-08-11 PROCEDURE — 74011000250 HC RX REV CODE- 250: Performed by: STUDENT IN AN ORGANIZED HEALTH CARE EDUCATION/TRAINING PROGRAM

## 2021-08-11 RX ORDER — NALOXONE HYDROCHLORIDE 0.4 MG/ML
0.4 INJECTION, SOLUTION INTRAMUSCULAR; INTRAVENOUS; SUBCUTANEOUS
Status: DISCONTINUED | OUTPATIENT
Start: 2021-08-11 | End: 2021-08-12 | Stop reason: HOSPADM

## 2021-08-11 RX ORDER — TORSEMIDE 20 MG/1
40 TABLET ORAL DAILY
Status: DISCONTINUED | OUTPATIENT
Start: 2021-08-11 | End: 2021-08-12 | Stop reason: HOSPADM

## 2021-08-11 RX ORDER — TORSEMIDE 20 MG/1
20 TABLET ORAL EVERY EVENING
Status: DISCONTINUED | OUTPATIENT
Start: 2021-08-11 | End: 2021-08-12 | Stop reason: HOSPADM

## 2021-08-11 RX ADMIN — CEPHALEXIN 500 MG: 250 CAPSULE ORAL at 11:57

## 2021-08-11 RX ADMIN — BUDESONIDE 500 MCG: 0.5 SUSPENSION RESPIRATORY (INHALATION) at 09:02

## 2021-08-11 RX ADMIN — INSULIN LISPRO 10 UNITS: 100 INJECTION, SOLUTION INTRAVENOUS; SUBCUTANEOUS at 11:58

## 2021-08-11 RX ADMIN — Medication 10 ML: at 05:42

## 2021-08-11 RX ADMIN — NYSTATIN: 100000 POWDER TOPICAL at 09:03

## 2021-08-11 RX ADMIN — IPRATROPIUM BROMIDE AND ALBUTEROL SULFATE 3 ML: .5; 3 SOLUTION RESPIRATORY (INHALATION) at 09:01

## 2021-08-11 RX ADMIN — CEPHALEXIN 500 MG: 250 CAPSULE ORAL at 05:42

## 2021-08-11 RX ADMIN — ASPIRIN 81 MG: 81 TABLET ORAL at 08:50

## 2021-08-11 RX ADMIN — GABAPENTIN 700 MG: 100 CAPSULE ORAL at 17:52

## 2021-08-11 RX ADMIN — INSULIN LISPRO 8 UNITS: 100 INJECTION, SOLUTION INTRAVENOUS; SUBCUTANEOUS at 08:18

## 2021-08-11 RX ADMIN — INSULIN GLARGINE 20 UNITS: 100 INJECTION, SOLUTION SUBCUTANEOUS at 08:19

## 2021-08-11 RX ADMIN — TORSEMIDE 20 MG: 20 TABLET ORAL at 17:41

## 2021-08-11 RX ADMIN — Medication 10 ML: at 13:50

## 2021-08-11 RX ADMIN — METOPROLOL TARTRATE 12.5 MG: 25 TABLET, FILM COATED ORAL at 18:00

## 2021-08-11 RX ADMIN — NYSTATIN: 100000 POWDER TOPICAL at 17:52

## 2021-08-11 RX ADMIN — CLOPIDOGREL BISULFATE 75 MG: 75 TABLET ORAL at 08:50

## 2021-08-11 RX ADMIN — CEPHALEXIN 500 MG: 250 CAPSULE ORAL at 17:54

## 2021-08-11 RX ADMIN — GABAPENTIN 700 MG: 100 CAPSULE ORAL at 08:50

## 2021-08-11 RX ADMIN — INSULIN LISPRO 2 UNITS: 100 INJECTION, SOLUTION INTRAVENOUS; SUBCUTANEOUS at 17:49

## 2021-08-11 RX ADMIN — AMITRIPTYLINE HYDROCHLORIDE 50 MG: 50 TABLET, FILM COATED ORAL at 21:57

## 2021-08-11 RX ADMIN — METOPROLOL TARTRATE 12.5 MG: 25 TABLET, FILM COATED ORAL at 08:51

## 2021-08-11 RX ADMIN — Medication 10 ML: at 21:58

## 2021-08-11 RX ADMIN — ATORVASTATIN CALCIUM 80 MG: 40 TABLET, FILM COATED ORAL at 21:57

## 2021-08-11 RX ADMIN — TORSEMIDE 40 MG: 20 TABLET ORAL at 11:57

## 2021-08-11 RX ADMIN — INSULIN LISPRO 2 UNITS: 100 INJECTION, SOLUTION INTRAVENOUS; SUBCUTANEOUS at 21:58

## 2021-08-11 NOTE — CONSULTS
[facility]  [address] [facility], [address]  Office Phone [phone]      CARDIOLOGY CONSULTATION       Date of  Admission: 8/7/2021  4:52 PM     Admission type:Emergency   Primary Care Óscar Palomares MD     Attending Provider: Kristi Villarreal MD  Cardiology Provider: Susan Orellana DNP, ANP-BC    CC/REASON FOR CONSULT: Congestive heart failure     Subjective:     Zeus Sher is a 54 y.o. female with a history of CHF, hypertension, diabetes, asthma, CKD, arthritis admitted for Acute exacerbation of CHF (congestive heart failure) ([facility] [I50.9]. CHF followed by [facility] cardiology. Was pending a Nephrology consult at [facility]. Pt reports having increased LE edema for several weeks prior to admission, once dyspnea evolved this prompted her to seek emergent care. States she had maintained diuretic therapy as well as her usual HTN/DM medications. Presents sitting 30 degrees in bed NAD. No overnight issues, not reporting orthopnea or PND. Denies chest pain. Dyspnea,N/V/D. Admitting proBNP 1190, 8/11/2020 1289.   Creatinine 2.5 BUN 33    Patient Active Problem List    Diagnosis Date Noted    Acute exacerbation of CHF (congestive heart failure) ([facility] 08/16/2020    TIA (transient ischemic attack) 12/20/2018    Vitamin D deficiency 09/08/2015    Atopic rhinitis 09/02/2015    Disorder of liver 09/02/2015    Lymphoma ([facility] 09/02/2015    Lymphadenopathy 09/02/2015    Morbid obesity ([facility] 09/02/2015    Hypercholesteremia 06/25/2015    Episodic tension-type headache, not intractable 06/25/2015    Type 2 diabetes mellitus with peripheral neuropathy ([facility] 06/25/2015    Essential hypertension 06/25/2015    DDD (degenerative disc disease), lumbar 06/25/2015    Cervical spondylosis 06/25/2015    Morbid obesity with BMI of 45.0-49.9, adult ([facility] 06/25/2015      Elisa Doherty MD  Past Medical History:   Diagnosis Date Arthritis     Asthma     CHF (congestive heart failure) (HCC)     Chronic back pain     Diabetes (HCC)     Diabetic retinopathy (HCC)     Hypertension     MRSA (methicillin resistant staph aureus) culture positive     labial abscess    Neuropathy       Social History     Socioeconomic History    Marital status:      Spouse name: Not on file    Number of children: Not on file    Years of education: Not on file    Highest education level: Not on file   Tobacco Use    Smoking status: Never Smoker    Smokeless tobacco: Never Used   Substance and Sexual Activity    Alcohol use: No    Drug use: No     Social Determinants of Health     Financial Resource Strain:     Difficulty of Paying Living Expenses:    Food Insecurity:     Worried About Running Out of Food in the Last Year:     Ran Out of Food in the Last Year:    Transportation Needs:     Lack of Transportation (Medical):     Lack of Transportation (Non-Medical):    Physical Activity:     Days of Exercise per Week:     Minutes of Exercise per Session:    Stress:     Feeling of Stress :    Social Connections:     Frequency of Communication with Friends and Family:     Frequency of Social Gatherings with Friends and Family:     Attends Gnosticism Services: Active Member of Clubs or Organizations:     Attends Club or Organization Meetings:     Marital Status:      Allergies   Allergen Reactions    Amoxicillin Nausea Only    Aspirin Other (comments)     \"nosebleeds\" but patient takes daily aspirin 81 mg at home. Patient states naproxen ok    Ciprofloxacin Hives      History reviewed. No pertinent family history.    Current Facility-Administered Medications   Medication Dose Route Frequency    torsemide (DEMADEX) tablet 20 mg  20 mg Oral BID    cephALEXin (KEFLEX) capsule 500 mg  500 mg Oral Q6H    sodium chloride (NS) flush 10 mL  10 mL IntraVENous PRN    insulin glargine (LANTUS) injection 20 Units  20 Units SubCUTAneous DAILY    nystatin (MYCOSTATIN) 100,000 unit/gram powder   Topical BID    traMADoL (ULTRAM) tablet 50 mg  50 mg Oral Q6H PRN    amitriptyline (ELAVIL) tablet 50 mg  50 mg Oral QHS    [Held by provider] heparin (porcine) injection 7,500 Units  7,500 Units SubCUTAneous Q8H    gabapentin (NEURONTIN) capsule 700 mg  700 mg Oral BID    aspirin delayed-release tablet 81 mg  81 mg Oral DAILY    budesonide (PULMICORT) 500 mcg/2 ml nebulizer suspension  1,000 mcg Nebulization BID    albuterol-ipratropium (DUO-NEB) 2.5 MG-0.5 MG/3 ML  3 mL Nebulization Q4H PRN    amLODIPine (NORVASC) tablet 10 mg  10 mg Oral DAILY    atorvastatin (LIPITOR) tablet 80 mg  80 mg Oral QHS    clopidogreL (PLAVIX) tablet 75 mg  75 mg Oral DAILY    metoprolol tartrate (LOPRESSOR) tablet 12.5 mg  12.5 mg Oral BID    sodium chloride (NS) flush 5-40 mL  5-40 mL IntraVENous Q8H    sodium chloride (NS) flush 5-40 mL  5-40 mL IntraVENous PRN    acetaminophen (TYLENOL) tablet 650 mg  650 mg Oral Q6H PRN    Or    acetaminophen (TYLENOL) suppository 650 mg  650 mg Rectal Q6H PRN    polyethylene glycol (MIRALAX) packet 17 g  17 g Oral DAILY PRN    ondansetron (ZOFRAN ODT) tablet 4 mg  4 mg Oral Q8H PRN    Or    ondansetron (ZOFRAN) injection 4 mg  4 mg IntraVENous Q6H PRN    glucose chewable tablet 16 g  4 Tablet Oral PRN    dextrose (D50W) injection syrg 12.5-25 g  25-50 mL IntraVENous PRN    glucagon (GLUCAGEN) injection 1 mg  1 mg IntraMUSCular PRN    insulin lispro (HUMALOG) injection   SubCUTAneous AC&HS        Review of Symptoms:   11 systems reviewed, negative other than as stated in the HPI        Objective:      Visit Vitals  BP (!) 121/57   Pulse 74   Temp 97.8 °F (36.6 °C)   Resp 18   Ht 5' 5.5\" (1.664 m)   Wt 302 lb 8 oz (137.2 kg)   SpO2 96%   BMI 49.57 kg/m²       Physical Exam     General: Well developed,obese  in no acute distress, cooperative and alert  HEENT: No carotid bruits, no JVD, trach is midline. Neck Supple, PERRL, EOM intact. Heart:  Normal S1/S2 negative S3 or S4. Regular, no murmur, gallop or rub. Respiratory: Diffusely diminished w/ mild Exp wheeze, no rales. Abdomen:   Soft, non-tender, no masses, bowel sounds are active. Extremities:  2+ pitting edema pedal extending to mid thigh bilaterally  normal cap refill, no cyanosis, atraumatic. Neuro: A&Ox3, speech clear, MAEx4   Skin: Skin color is normal. No rashes. Non diaphoretic  Vascular: 2+ pulses symmetric in all extremities    Data Review:   Recent Labs     08/10/21  0315   WBC 5.9   HGB 7.4*   HCT 23.3*        Recent Labs     08/11/21  0315 08/10/21  0607 08/10/21  0315 08/09/21  0332     --  141 141   K 5.1  --  4.8 4.4     --  107 108   CO2 30  --  30 24   *  --  140* 144*   BUN 33*  --  31* 28*   CREA 2.55*  --  2.64* 2.50*   CA 8.8  --  8.6 8.4*   INR  --  1.1  --   --        No results for input(s): TROIQ, CPK, CKMB in the last 72 hours. Intake/Output Summary (Last 24 hours) at 8/11/2021 0817  Last data filed at 8/11/2021 0553  Gross per 24 hour   Intake --   Output 1000 ml   Net -1000 ml        Cardiographics    Telemetry:   ECG:     Echocardiogram:   Echo Findings    Left Ventricle Normal cavity size, systolic function (ejection fraction normal) and diastolic function. Mild concentric hypertrophy. Wall motion: normal. The estimated EF is 60 - 65%. Wall Scoring The left ventricular wall motion is normal.            Left Atrium Normal cavity size. Right Ventricle Normal cavity size and global systolic function. Right Atrium Normal cavity size. Aortic Valve Normal valve structure, no stenosis and no regurgitation. Mitral Valve Normal valve structure, no stenosis and no regurgitation. Tricuspid Valve Normal valve structure and no stenosis. Trace regurgitation. Pulmonic Valve Normal valve structure, no stenosis and no regurgitation. Aorta Normal aortic root. Pulmonary Artery Pulmonary hypertension not suggested by Doppler findings.    Pericardium No evidence of pericardial effusion. CXRAY:  1. Limited exam. Left basilar opacity which could be entirely explained by  technique and body habitus. Findings may represent effusion or opacity. Diffuse  interstitial prominence suggesting pulmonary vascular congestion, accentuated by  technique. Consider PA and lateral exam when clinically appropriate. Assessment:       Active Problems:    Acute exacerbation of CHF (congestive heart failure) ([facility] (8/16/2020)         Plan:     1. Heart failure preserved ejection fraction: EF 65% admitting proBNP 1190 today at 1289, on Demadex 20mg BID baseline CKD III, 2+ bilateral edema recommend increasing to Demadex 40mg Am and continue 20mg in PM. Cardio renal syndrome type I at admission probable type II/V as baseline. 2.  Hypertension: Presents normotensive, recommend reducing Amlodipine to 5mg daily as this can precipitate LE edema. Can uptitrate betablocker as needed for HTN management. 3. High Cholesterol: Continue Lipitor 80mg     4. Type II IDDM: Per hospital protocol. 5. CKD: EMMANUEL on admission baseline IIIb, will need outpatient nephrology consult and management, has pending at [facility]    6. Suspect ASHLEY: Will need outpatient sleep study, I will place the order. Will continue to follow. Sidney Lea DNP, ANP-BC  Chart and note reviewed, discussed with advanced practioner and agree with recommendations and treatment plan as outlined.   MD Sera Greene MD

## 2021-08-11 NOTE — PROGRESS NOTES
IDR's held today to discuss patient's care  RUR: 21- medium  GLOS:   LOS: 4d   ELOS: 2 more days     Extended care needs are:  Barrier to discharge: none  MD for :   Possible follow-up: VS stable, BS stable, monitor cellulitis and urine retention, up yesterday with PT. There recommended no HH or DME at this time.     Maryana Mendoza, TYE/  [phone]

## 2021-08-11 NOTE — PROGRESS NOTES
Bedside and Verbal shift change report given to Pk Carey (oncoming nurse) by Nupur Hdez (offgoing nurse). Report included the following information Kardex, Intake/Output, MAR and Recent Results.

## 2021-08-11 NOTE — PROGRESS NOTES
Bedside report received from [facility], pt resting in bed comfortable at this time. 0830-Pt vitals stable, blood sugar stable with correction and long acting administered. Pt lungs are clear, accepted neb treatment. Pt verbalize pain that is chronic in legs and back, no intervention needed at this time. 1010-Pt resting watching tv    1205-Blood sugar elevated, physician notified, 10 units correction administered, with education given. Perineal area cleaned. 1430-Pt resting comfortable    1533-Pt adls completed puriwick changed    1700-blood sugar stable with correction administered        Bedside and Verbal shift change report given to Ольга Cueva rn (oncoming nurse) by Adela Buenrostro (offgoing nurse). Report included the following information SBAR, Intake/Output, MAR, Recent Results and Med Rec Status.

## 2021-08-11 NOTE — PROGRESS NOTES
Hospitalist Progress Note    NAME: Angel Luis Flowers   :  1965   MRN:  [id number]   Room Number:  59  @ [facility]       Interim Hospital Summary: 54 y.o. female whom presented on 2021 with      Assessment / Plan:    Fluid Overload resolving   Progressive SOB POA resolving   suspected underlying ASHLEY   BNP elevated. Troponin trend flat. EKG reviewed independently nonspecific T wave changes. Chest x-ray reviewed independently increased vascular congestion. Patient home regimen include metoprolol 25 mg twice daily Aldactone 25 mg daily and torsemide 20 mg twice daily TTE reviewed independently and discussed with cardiology NP  EF 45-88% , Normal systolic and diastolic function      -Torsemide 40 mg QAM, 20 mg QPM  - c/w metoprolol    -Strict ins and outs  -Daily weight  -Fluid restriction  -Low-sodium diet  -Cardiology following          EMMANUEL on CKD 3B POA   Suspect secondary to cardiorenal syndrome type I, Serum creatinine 2.55 today (  baseline 1.67 per [facility] records).  US retroperitoneum reviewed - no evidence of hydronephrosis, or calculi    - Hold aldactone   -Ins and outs  -Renally dose medication  -Avoid nephrotic medication     LE swelling and redness   - Duplex to /ro DVT unremarkable   - Kelfex 500 mg Q6 - 5 days         Insulin-dependent diabetes mellitus  Lab Results   Component Value Date/Time    Hemoglobin A1c 8.4 (H) 2021 12:32 AM    Hemoglobin A1c (POC) 10.2 2015 11:01 AM   Home regimen include Metformin,  canagliflozin 100 mg daily,  Lantus 20 units and aspart    -Resume Lantus   - Lispro correctional scale, FSG AC HS  - Consistent carb diet, hypoglycemia protocol.      Diabetic neuropathy  -On gabapentin 900 mg 3 times daily daily at home  -Resume renally dose      Hypertension  On amlodipine 10 mg and metoprolol 25 mg twice daily at home-      History of stroke  -On aspirin, Plavix and atorvastatin at home      History of chronic back pain  -On tramadol at home as needed     Morbid obesity  -BMI 51  - Counseled on Lifestyle modifications, AHA Diet ,weight loss strategies.                Code status: Full  Prophylaxis: Lovenox  Recommended Disposition: Home w/Family           Subjective:     Chief Complaint / Reason for Physician Visit  \"shortness of breath is better. ... im still very swollen but its coming down\". Discussed with RN events overnight. Review of Systems:  + PENA, edema. No fevers, chills, appetite change, cough, sputum production,  nausea, vomitting, diarrhea, constipation, chest pain,  abdominal pain, joint pain, rash, itching. Tolerating PT/OT. Tolerating diet. Objective:     VITALS:   Last 24hrs VS reviewed since prior progress note. Most recent are:  Patient Vitals for the past 24 hrs:   Temp Pulse Resp BP SpO2   08/11/21 1214 97.1 °F (36.2 °C) -- -- -- --   08/11/21 0846 97.7 °F (36.5 °C) 96 17 (!) 133/56 --   08/11/21 0405 97.8 °F (36.6 °C) 74 18 (!) 121/57 96 %   08/10/21 2354 97.3 °F (36.3 °C) 74 18 (!) 121/59 98 %   08/10/21 2021 97.9 °F (36.6 °C) 80 19 134/62 95 %       Intake/Output Summary (Last 24 hours) at 8/11/2021 1607  Last data filed at 8/11/2021 0553  Gross per 24 hour   Intake --   Output 1000 ml   Net -1000 ml        PHYSICAL EXAM:  General:  Alert, cooperative, no acute distress    EENT:  EOMI. Anicteric sclerae. MMM  Resp:  CTA bilaterally, no wheezing or rales. No accessory muscle use  CV:  Regular  rhythm,  normal S1/S2, no murmurs rubs gallops,3+ LE edema extending to thighs  GI:  Soft, Non distended, Non tender. +Bowel sounds  Neurologic:  Alert and oriented X 3, normal speech,   Psych:   Good insight. Not anxious nor agitated  Skin:  No rashes.   No jaundice    Reviewed most current lab test results and cultures  YES  Reviewed most current radiology test results   YES  Review and summation of old records today    NO  Reviewed patient's current orders and MAR    YES  PMH/SH reviewed - no change compared to H&P  ________________________________________________________________________  Care Plan discussed with:    Comments   Patient x    Family      RN x    Care Manager x    Consultant  x                     x Multidiciplinary team rounds were held today with , nursing, pharmacist and clinical coordinator. Patient's plan of care was discussed; medications were reviewed and discharge planning was addressed. ________________________________________________________________________  Total NON critical care TIME:  35  Minutes    Total CRITICAL CARE TIME Spent:   Minutes non procedure based      Comments   >50% of visit spent in counseling and coordination of care x    ________________________________________________________________________  Amita Franz MD     Procedures: see electronic medical records for all procedures/Xrays and details which were not copied into this note but were reviewed prior to creation of Plan. LABS:  I reviewed today's most current labs and imaging studies.   Pertinent labs include:  Recent Labs     08/10/21  0315   WBC 5.9   HGB 7.4*   HCT 23.3*        Recent Labs     08/11/21 0315 08/10/21  0607 08/10/21  0315 08/09/21  0332     --  141 141   K 5.1  --  4.8 4.4     --  107 108   CO2 30  --  30 24   *  --  140* 144*   BUN 33*  --  31* 28*   CREA 2.55*  --  2.64* 2.50*   CA 8.8  --  8.6 8.4*   INR  --  1.1  --   --        Signed: Amita Franz MD

## 2021-08-12 VITALS
OXYGEN SATURATION: 95 % | HEART RATE: 84 BPM | HEIGHT: 66 IN | BODY MASS INDEX: 47.09 KG/M2 | SYSTOLIC BLOOD PRESSURE: 149 MMHG | TEMPERATURE: 97.8 F | DIASTOLIC BLOOD PRESSURE: 63 MMHG | RESPIRATION RATE: 16 BRPM | WEIGHT: 293 LBS

## 2021-08-12 LAB
ANION GAP SERPL CALC-SCNC: 7 MMOL/L (ref 5–15)
BASOPHILS # BLD: 0 K/UL (ref 0–0.1)
BASOPHILS NFR BLD: 1 % (ref 0–1)
BUN SERPL-MCNC: 36 MG/DL (ref 6–20)
BUN/CREAT SERPL: 13 (ref 12–20)
CALCIUM SERPL-MCNC: 8.6 MG/DL (ref 8.5–10.1)
CHLORIDE SERPL-SCNC: 105 MMOL/L (ref 97–108)
CO2 SERPL-SCNC: 28 MMOL/L (ref 21–32)
CREAT SERPL-MCNC: 2.69 MG/DL (ref 0.55–1.02)
DIFFERENTIAL METHOD BLD: ABNORMAL
EOSINOPHIL # BLD: 0.3 K/UL (ref 0–0.4)
EOSINOPHIL NFR BLD: 4 % (ref 0–7)
ERYTHROCYTE [DISTWIDTH] IN BLOOD BY AUTOMATED COUNT: 13.6 % (ref 11.5–14.5)
GLUCOSE BLD STRIP.AUTO-MCNC: 171 MG/DL (ref 65–117)
GLUCOSE BLD STRIP.AUTO-MCNC: 192 MG/DL (ref 65–117)
GLUCOSE BLD STRIP.AUTO-MCNC: 211 MG/DL (ref 65–117)
GLUCOSE SERPL-MCNC: 207 MG/DL (ref 65–100)
HCT VFR BLD AUTO: 23.5 % (ref 35–47)
HGB BLD-MCNC: 7.5 G/DL (ref 11.5–16)
IMM GRANULOCYTES # BLD AUTO: 0.1 K/UL (ref 0–0.04)
IMM GRANULOCYTES NFR BLD AUTO: 1 % (ref 0–0.5)
LYMPHOCYTES # BLD: 1.4 K/UL (ref 0.8–3.5)
LYMPHOCYTES NFR BLD: 22 % (ref 12–49)
MAGNESIUM SERPL-MCNC: 1.9 MG/DL (ref 1.6–2.4)
MCH RBC QN AUTO: 27.9 PG (ref 26–34)
MCHC RBC AUTO-ENTMCNC: 31.9 G/DL (ref 30–36.5)
MCV RBC AUTO: 87.4 FL (ref 80–99)
MONOCYTES # BLD: 0.4 K/UL (ref 0–1)
MONOCYTES NFR BLD: 7 % (ref 5–13)
NEUTS SEG # BLD: 4 K/UL (ref 1.8–8)
NEUTS SEG NFR BLD: 65 % (ref 32–75)
NRBC # BLD: 0 K/UL (ref 0–0.01)
NRBC BLD-RTO: 0 PER 100 WBC
PHOSPHATE SERPL-MCNC: 5.1 MG/DL (ref 2.6–4.7)
PLATELET # BLD AUTO: 228 K/UL (ref 150–400)
PMV BLD AUTO: 9.7 FL (ref 8.9–12.9)
POTASSIUM SERPL-SCNC: 4.7 MMOL/L (ref 3.5–5.1)
RBC # BLD AUTO: 2.69 M/UL (ref 3.8–5.2)
SERVICE CMNT-IMP: ABNORMAL
SODIUM SERPL-SCNC: 140 MMOL/L (ref 136–145)
WBC # BLD AUTO: 6.2 K/UL (ref 3.6–11)

## 2021-08-12 PROCEDURE — 74011250637 HC RX REV CODE- 250/637: Performed by: INTERNAL MEDICINE

## 2021-08-12 PROCEDURE — 94640 AIRWAY INHALATION TREATMENT: CPT

## 2021-08-12 PROCEDURE — 85025 COMPLETE CBC W/AUTO DIFF WBC: CPT

## 2021-08-12 PROCEDURE — 74011636637 HC RX REV CODE- 636/637: Performed by: STUDENT IN AN ORGANIZED HEALTH CARE EDUCATION/TRAINING PROGRAM

## 2021-08-12 PROCEDURE — 82962 GLUCOSE BLOOD TEST: CPT

## 2021-08-12 PROCEDURE — 74011636637 HC RX REV CODE- 636/637: Performed by: FAMILY MEDICINE

## 2021-08-12 PROCEDURE — 80048 BASIC METABOLIC PNL TOTAL CA: CPT

## 2021-08-12 PROCEDURE — 74011250637 HC RX REV CODE- 250/637: Performed by: STUDENT IN AN ORGANIZED HEALTH CARE EDUCATION/TRAINING PROGRAM

## 2021-08-12 PROCEDURE — 83735 ASSAY OF MAGNESIUM: CPT

## 2021-08-12 PROCEDURE — 97116 GAIT TRAINING THERAPY: CPT | Performed by: PHYSICAL THERAPIST

## 2021-08-12 PROCEDURE — 84100 ASSAY OF PHOSPHORUS: CPT

## 2021-08-12 PROCEDURE — 99232 SBSQ HOSP IP/OBS MODERATE 35: CPT | Performed by: NURSE PRACTITIONER

## 2021-08-12 PROCEDURE — 77010033678 HC OXYGEN DAILY

## 2021-08-12 PROCEDURE — 74011250637 HC RX REV CODE- 250/637: Performed by: FAMILY MEDICINE

## 2021-08-12 PROCEDURE — 36415 COLL VENOUS BLD VENIPUNCTURE: CPT

## 2021-08-12 PROCEDURE — 94760 N-INVAS EAR/PLS OXIMETRY 1: CPT

## 2021-08-12 PROCEDURE — 74011000250 HC RX REV CODE- 250: Performed by: STUDENT IN AN ORGANIZED HEALTH CARE EDUCATION/TRAINING PROGRAM

## 2021-08-12 RX ORDER — BUDESONIDE 0.5 MG/2ML
500 INHALANT ORAL 2 TIMES DAILY
Status: DISCONTINUED | OUTPATIENT
Start: 2021-08-12 | End: 2021-08-12 | Stop reason: HOSPADM

## 2021-08-12 RX ORDER — INSULIN LISPRO 100 [IU]/ML
10 INJECTION, SOLUTION INTRAVENOUS; SUBCUTANEOUS
Qty: 1 VIAL | Refills: 0 | Status: SHIPPED
Start: 2021-08-12 | End: 2021-08-28

## 2021-08-12 RX ORDER — NYSTATIN 100000 [USP'U]/G
POWDER TOPICAL 2 TIMES DAILY
Qty: 1 BOTTLE | Refills: 0 | Status: SHIPPED | OUTPATIENT
Start: 2021-08-12 | End: 2021-12-01

## 2021-08-12 RX ORDER — CEPHALEXIN 500 MG/1
500 CAPSULE ORAL EVERY 6 HOURS
Qty: 8 CAPSULE | Refills: 0 | Status: SHIPPED | OUTPATIENT
Start: 2021-08-12 | End: 2021-08-14

## 2021-08-12 RX ORDER — METOPROLOL TARTRATE 25 MG/1
12.5 TABLET, FILM COATED ORAL 2 TIMES DAILY
Qty: 30 TABLET | Refills: 0 | Status: SHIPPED | OUTPATIENT
Start: 2021-08-12 | End: 2021-12-01

## 2021-08-12 RX ORDER — BUTALBITAL, ACETAMINOPHEN AND CAFFEINE 50; 325; 40 MG/1; MG/1; MG/1
1 TABLET ORAL
Status: COMPLETED | OUTPATIENT
Start: 2021-08-12 | End: 2021-08-12

## 2021-08-12 RX ORDER — GABAPENTIN 300 MG/1
900 CAPSULE ORAL 2 TIMES DAILY
Qty: 90 CAPSULE | Refills: 0 | Status: SHIPPED
Start: 2021-08-12 | End: 2021-12-01

## 2021-08-12 RX ORDER — TORSEMIDE 20 MG/1
TABLET ORAL
Qty: 90 TABLET | Refills: 0 | Status: SHIPPED
Start: 2021-08-12 | End: 2021-08-28

## 2021-08-12 RX ADMIN — CEPHALEXIN 500 MG: 250 CAPSULE ORAL at 05:06

## 2021-08-12 RX ADMIN — CEPHALEXIN 500 MG: 250 CAPSULE ORAL at 17:29

## 2021-08-12 RX ADMIN — GABAPENTIN 700 MG: 100 CAPSULE ORAL at 17:29

## 2021-08-12 RX ADMIN — CEPHALEXIN 500 MG: 250 CAPSULE ORAL at 12:20

## 2021-08-12 RX ADMIN — ASPIRIN 81 MG: 81 TABLET ORAL at 08:51

## 2021-08-12 RX ADMIN — GABAPENTIN 700 MG: 100 CAPSULE ORAL at 08:51

## 2021-08-12 RX ADMIN — INSULIN GLARGINE 20 UNITS: 100 INJECTION, SOLUTION SUBCUTANEOUS at 08:49

## 2021-08-12 RX ADMIN — NYSTATIN: 100000 POWDER TOPICAL at 12:25

## 2021-08-12 RX ADMIN — INSULIN LISPRO 2 UNITS: 100 INJECTION, SOLUTION INTRAVENOUS; SUBCUTANEOUS at 08:49

## 2021-08-12 RX ADMIN — TORSEMIDE 40 MG: 20 TABLET ORAL at 08:50

## 2021-08-12 RX ADMIN — CLOPIDOGREL BISULFATE 75 MG: 75 TABLET ORAL at 08:51

## 2021-08-12 RX ADMIN — INSULIN LISPRO 3 UNITS: 100 INJECTION, SOLUTION INTRAVENOUS; SUBCUTANEOUS at 12:19

## 2021-08-12 RX ADMIN — NYSTATIN: 100000 POWDER TOPICAL at 17:32

## 2021-08-12 RX ADMIN — TORSEMIDE 20 MG: 20 TABLET ORAL at 17:28

## 2021-08-12 RX ADMIN — Medication 10 ML: at 05:06

## 2021-08-12 RX ADMIN — TRAMADOL HYDROCHLORIDE 50 MG: 50 TABLET, FILM COATED ORAL at 16:34

## 2021-08-12 RX ADMIN — METOPROLOL TARTRATE 12.5 MG: 25 TABLET, FILM COATED ORAL at 17:29

## 2021-08-12 RX ADMIN — BUTALBITAL, ACETAMINOPHEN, AND CAFFEINE 1 TABLET: 50; 325; 40 TABLET ORAL at 05:06

## 2021-08-12 RX ADMIN — METOPROLOL TARTRATE 12.5 MG: 25 TABLET, FILM COATED ORAL at 08:50

## 2021-08-12 RX ADMIN — INSULIN LISPRO 2 UNITS: 100 INJECTION, SOLUTION INTRAVENOUS; SUBCUTANEOUS at 17:28

## 2021-08-12 RX ADMIN — BUDESONIDE 1000 MCG: 0.5 SUSPENSION RESPIRATORY (INHALATION) at 08:00

## 2021-08-12 RX ADMIN — AMLODIPINE BESYLATE 10 MG: 5 TABLET ORAL at 08:51

## 2021-08-12 RX ADMIN — CEPHALEXIN 500 MG: 250 CAPSULE ORAL at 00:37

## 2021-08-12 NOTE — PROGRESS NOTES
Problem: Mobility Impaired (Adult and Pediatric)  Goal: *Acute Goals and Plan of Care (Insert Text)  Description: FUNCTIONAL STATUS PRIOR TO ADMISSION: Patient was modified independent using a rollator for functional mobility. Patient was not on home O2 PTA. HOME SUPPORT PRIOR TO ADMISSION: The patient lived with family members but did not require assist. Lives with her sister, brother in-law, and autistic son. Physical Therapy Goals  Initiated 8/10/2021  1. Patient will move from supine to sit and sit to supine  in bed with modified independence within 7 day(s). 2.  Patient will transfer from bed to chair and chair to bed with modified independence using the least restrictive device within 7 day(s). 3.  Patient will perform sit to stand with supervision/set-up within 7 day(s). 4.  Patient will ambulate with modified independence for 50 feet with the least restrictive device within 7 day(s). Outcome: Progressing Towards Goal    PHYSICAL THERAPY TREATMENT  Patient: Angel Luis Flowers (43 y.o. female)  Date: 8/12/2021  Diagnosis: Acute exacerbation of CHF (congestive heart failure) [facility] [I50.9] <principal problem not specified>       Precautions:    Chart, physical therapy assessment, plan of care and goals were reviewed. ASSESSMENT  Patient continues with skilled PT services and is progressing towards goals. Patient performed bed mobility and transfers with SBA. Patient ambulated 25 feet with walker and CGA during first gait trial. No loss of balance noted. Co-treat with respiratory therapy for second gait trial of 25 feet x2 with walker and CGA. One seated rest break required. Patient reports that she ambulates short household distance with her rollator at baseline. Current Level of Function Impacting Discharge (mobility/balance):  CGA for ambulation    Other factors to consider for discharge: shortness of breath         PLAN :  Patient continues to benefit from skilled intervention to address the above impairments. Continue treatment per established plan of care. to address goals. Recommendation for discharge: (in order for the patient to meet his/her long term goals)  Physical therapy at least 2 days/week in the home     This discharge recommendation:  Has been made in collaboration with the attending provider and/or case management    IF patient discharges home will need the following DME: transfer bench and rollator       SUBJECTIVE:   Patient stated I don't have to walk very far at home.     OBJECTIVE DATA SUMMARY:   Critical Behavior:   Alert and oriented x4    Functional Mobility Training:  Bed Mobility:     Supine to Sit: Stand-by assistance; Additional time  Scooting: Modified independent    Transfers:  Sit to Stand: Stand-by assistance  Stand to Sit: Stand-by assistance     Balance:  Sitting: Intact  Standing: Impaired; With support  Standing - Static: Good;Constant support  Standing - Dynamic : Constant support;Good;Fair    Ambulation/Gait Training:  Distance (ft): 25 Feet (ft) (x2)  Assistive Device: Gait belt;Walker, rolling  Ambulation - Level of Assistance: Contact guard assistance  Gait Abnormalities: Decreased step clearance  Base of Support: Widened  Speed/Vangie: Pace decreased (<100 feet/min)    Pain Rating:  No pain    Activity Tolerance:   Fair    After treatment patient left in no apparent distress:   Sitting in chair and Call bell within reach    COMMUNICATION/COLLABORATION:   The patients plan of care was discussed with: Registered nurse, Physician, Case management, and Respiratory therapist.     Jac Fonseca, PT   Time Calculation: 30 mins

## 2021-08-12 NOTE — PROGRESS NOTES
Pharmacist Discharge Medication Reconciliation    Discharge Provider:  Rosella Crigler       Discharge Medications:      My Medications        START taking these medications        Instructions Each Dose to Equal Morning Noon Evening Bedtime   cephALEXin 500 mg capsule  Commonly known as: [address] 232 last dose was: Your next dose is: Take 1 Capsule by mouth every six (6) hours for 2 days. 500 mg                 nystatin powder  Commonly known as: MYCOSTATIN    Your last dose was: Your next dose is:         Apply  to affected area two (2) times a day. CHANGE how you take these medications        Instructions Each Dose to Equal Morning Noon Evening Bedtime   gabapentin 300 mg capsule  Commonly known as: NEURONTIN  What changed: when to take this    Your last dose was: Your next dose is: Take 3 Capsules by mouth two (2) times a day. Max Daily Amount: 1,800 mg.   900 mg                 insulin lispro 100 unit/mL injection  Commonly known as: HumaLOG U-100 Insulin  What changed:   how much to take  how to take this  when to take this  additional instructions    Your last dose was: Your next dose is:         10 Units by SubCUTAneous route Before breakfast, lunch, and dinner. Indications: high blood sugar, type 2 diabetes mellitus   10 Units                 metoprolol tartrate 25 mg tablet  Commonly known as: LOPRESSOR  What changed: how much to take    Your last dose was: Your next dose is: Take 0.5 Tablets by mouth two (2) times a day. 12.5 mg                 polyethylene glycol 17 gram packet  Commonly known as: MIRALAX  What changed:   when to take this  reasons to take this    Your last dose was: Your next dose is: Take 1 Packet by mouth daily.  Hold for diarrhea   17 g                 torsemide 20 mg tablet  Commonly known as: DEMADEX  What changed:   how much to take  how to take this  when to take this  additional instructions    Your last dose was: Your next dose is: Take 40 mg (2 tablets) in the morning, 20 mg (1 tablet) in the evening. CONTINUE taking these medications        Instructions Each Dose to Equal Morning Noon Evening Bedtime   albuterol 90 mcg/actuation inhaler  Commonly known as: PROVENTIL HFA, VENTOLIN HFA, PROAIR HFA    Your last dose was: Your next dose is: Take 2 Puffs by inhalation every six (6) hours as needed for Wheezing. 2 Puff                 amitriptyline 50 mg tablet  Commonly known as: ELAVIL    Your last dose was: Your next dose is: Take 50 mg by mouth nightly. 50 mg                 amLODIPine 10 mg tablet  Commonly known as: NORVASC    Your last dose was: Your next dose is: Take 10 mg by mouth daily. 10 mg                 aspirin delayed-release 81 mg tablet    Your last dose was: Your next dose is: Take 81 mg by mouth daily. 81 mg                 atorvastatin 80 mg tablet  Commonly known as: LIPITOR    Your last dose was: Your next dose is: Take 1 Tab by mouth nightly. 80 mg                 clopidogreL 75 mg Tab  Commonly known as: PLAVIX    Your last dose was: Your next dose is: Take 1 Tab by mouth daily. 75 mg                 Colace 100 mg capsule  Generic drug: docusate sodium    Your last dose was: Your next dose is: Take 100 mg by mouth two (2) times daily as needed. 100 mg                 FIORICET PO    Your last dose was: Your next dose is:         1 tab, PO, every 4 hours, PRN: for headache, do not take with tramadol - may use instead of tramadol prn HA, 2 Refills, Dispense: 60, tab, [facility]                  * Flonase Allergy Relief 50 mcg/actuation nasal spray  Generic drug: fluticasone propionate    Your last dose was:      Your next dose is:         2 SPRAY, Each Nostril, daily, for allergy symptoms, 11 Refills, Dispense: 1, bottle, [facility]                  * Flovent  mcg/actuation inhaler  Generic drug: fluticasone propionate    Your last dose was: Your next dose is: Take 2 Puffs by inhalation two (2) times a day. 2 Puff                 Lantus Solostar U-100 Insulin 100 unit/mL (3 mL) Inpn  Generic drug: insulin glargine    Your last dose was: Your next dose is:         20 Units by SubCUTAneous route nightly. 20 Units                 traMADoL 50 mg tablet  Commonly known as: ULTRAM    Your last dose was: Your next dose is: Take 50 mg by mouth three (3) times daily as needed for Pain. 50 mg                       * This list has 2 medication(s) that are the same as other medications prescribed for you. Read the directions carefully, and ask your doctor or other care provider to review them with you. STOP taking these medications      Invokana 100 mg tablet  Generic drug: canagliflozin        metFORMIN 500 mg Tg24 24 hour tablet  Commonly known as: GLUMETZA ER        spironolactone 25 mg tablet  Commonly known as: ALDACTONE                  Where to Get Your Medications        These medications were sent to [facility] [id number] [facility], [address] [place] [address], [address] [address]      Phone: [phone]   cephALEXin 500 mg capsule  metoprolol tartrate 25 mg tablet  nystatin powder  torsemide 20 mg tablet       Information on where to get these meds will be given to you by the nurse or doctor.     Ask your nurse or doctor about these medications  gabapentin 300 mg capsule  insulin lispro 100 unit/mL injection          The patient's chart, MAR, and AVS were reviewed by   Ric Gamez, PHARMD, BCPS

## 2021-08-12 NOTE — PROGRESS NOTES
08/12/21 1030 08/12/21 1031 08/12/21 1032   RT Walking Oximetry   Stage Resting (Room Air) During Walk (Room Air) After Walk   SpO2 93 % 90 % (!) 88 %   HR 88 bpm 92 bpm 95 bpm   Rate of Dyspnea 1 2 3   Symptoms Dizziness Dizziness;Leg pain; Shortness of Breath Dizziness;Leg pain; Shortness of Breath   O2 Device  --   --  None (Room air)   Walk/Assistive Device  --  Walker  --    Comments  --  Patient became unsteady on her feet and was returned to bed.   --

## 2021-08-12 NOTE — PROGRESS NOTES
3491: Obtained AM VS. Notified RN of /53 and pain. Pt in bed, resting quietly. 1000: In bed, resting quietly. Denies additional needs at this time. 1400: In bed, resting quietly. Watching TV. Denies additional needs at this time. 1600: In bed, resting quietly. Denies additional needs at this time. 1700: Assisted DELMAR Kasper with incontinence care. Pt in bed, resting quietly. Denies additional needs at this time. 1745: Assisted in gathering pt belongings for discharge. Provided fresh pitcher of ice per pt request. Joe Velázquez RN and MJ, PCT present at bedside.

## 2021-08-12 NOTE — PROGRESS NOTES
0715 Bedside shift report given to Ella Dudley (oncoming) from MANUEL Sevilla (offgoing). Report included the following: SBAR, MAR, Kardex, Intake/Ouput, Recent Results and Cardiac Rhythm of NSR.     0729 VS stable, taken by DEBBY Tripp    0848 Incontinence care provided by students     0848 Morning assessment complete. Morning meds accepted     1310 VS stable     1628 VS stable, reassessment complete. 1728 Insulin coverage & meds admin. IV access & tele monitor removed. Home oxygen arrived & at bedside     1745 Discharge education complete. Pt taught on medications to start, stop and continue taking. Pt also educated on heart failure & opioid education.  Pt verbalized understanding and denied any further questions    1750 Pt left floor via wheelchair w/ Sirisha Guillen, DELMAR & MJ, PCT

## 2021-08-12 NOTE — PROGRESS NOTES
Discharge Date- Today    IDR's held today to discuss patient's care  RUR: 24  GLOS: 2d  LOS: 2d   ELOS: 2d   Barriers: None  ACP- FC, sister is healthcare decision maker     Extended care needs are: Home health, home oxygen  Barrier to discharge: None  MD for : Orders for [facility] and DME  Possible follow-up:  Ready for d/c today. Respitory to work with patient to see if she needs oxygen. PT to reevaluate for any needs.         Nikhil Devoid

## 2021-08-12 NOTE — PROGRESS NOTES
[facility] [address] [facility], [address]  Office Phone [phone]    CARDIOLOGY PROGRESS NOTE    8/12/2021 8:42 AM    Admit Date: 8/7/2021    Admit Diagnosis:   Acute exacerbation of CHF (congestive heart failure) ([address] [I50.9]    Subjective:     Bev Carlton presents in bed this morning initially supine, no visible JVD sat upright with assistance. No overnight issues states increased diuresis yesterday after increasing Demadex. No cramping. Denies orthopnea or PND. She is wearing oxygen this morning stating that she feels better at times wearing it.       Creatinine this morning 2.69  Visit Vitals  BP (!) 134/53 (BP 1 Location: Left upper arm, BP Patient Position: Semi fowlers) Comment: RN notified   Pulse 80   Temp 98.1 °F (36.7 °C)   Resp 18   Ht 5' 5.5\" (1.664 m)   Wt 302 lb 12.8 oz (137.3 kg)   LMP 05/01/2013   SpO2 98%   BMI 49.62 kg/m²       Current Facility-Administered Medications   Medication Dose Route Frequency    budesonide (PULMICORT) 500 mcg/2 ml nebulizer suspension  500 mcg Nebulization BID    naloxone (NARCAN) injection 0.4 mg  0.4 mg IntraVENous EVERY 2 MINUTES AS NEEDED    torsemide (DEMADEX) tablet 40 mg  40 mg Oral DAILY    torsemide (DEMADEX) tablet 20 mg  20 mg Oral QPM    cephALEXin (KEFLEX) capsule 500 mg  500 mg Oral Q6H    sodium chloride (NS) flush 10 mL  10 mL IntraVENous PRN    insulin glargine (LANTUS) injection 20 Units  20 Units SubCUTAneous DAILY    nystatin (MYCOSTATIN) 100,000 unit/gram powder   Topical BID    traMADoL (ULTRAM) tablet 50 mg  50 mg Oral Q6H PRN    amitriptyline (ELAVIL) tablet 50 mg  50 mg Oral QHS    [Held by provider] heparin (porcine) injection 7,500 Units  7,500 Units SubCUTAneous Q8H    gabapentin (NEURONTIN) capsule 700 mg  700 mg Oral BID    aspirin delayed-release tablet 81 mg  81 mg Oral DAILY    albuterol-ipratropium (DUO-NEB) 2.5 MG-0.5 MG/3 ML 3 mL Nebulization Q4H PRN    amLODIPine (NORVASC) tablet 10 mg  10 mg Oral DAILY    atorvastatin (LIPITOR) tablet 80 mg  80 mg Oral QHS    clopidogreL (PLAVIX) tablet 75 mg  75 mg Oral DAILY    metoprolol tartrate (LOPRESSOR) tablet 12.5 mg  12.5 mg Oral BID    sodium chloride (NS) flush 5-40 mL  5-40 mL IntraVENous Q8H    sodium chloride (NS) flush 5-40 mL  5-40 mL IntraVENous PRN    acetaminophen (TYLENOL) tablet 650 mg  650 mg Oral Q6H PRN    Or    acetaminophen (TYLENOL) suppository 650 mg  650 mg Rectal Q6H PRN    polyethylene glycol (MIRALAX) packet 17 g  17 g Oral DAILY PRN    ondansetron (ZOFRAN ODT) tablet 4 mg  4 mg Oral Q8H PRN    Or    ondansetron (ZOFRAN) injection 4 mg  4 mg IntraVENous Q6H PRN    glucose chewable tablet 16 g  4 Tablet Oral PRN    dextrose (D50W) injection syrg 12.5-25 g  25-50 mL IntraVENous PRN    glucagon (GLUCAGEN) injection 1 mg  1 mg IntraMUSCular PRN    insulin lispro (HUMALOG) injection   SubCUTAneous AC&HS         Objective:      Physical Exam    General: Well developed, obese in no acute distress, cooperative and alert  HEENT: No carotid bruits, no JVD, trach is midline. Neck Supple,  Heart:  Normal S1/S2 negative S3 or S4. Regular, no murmur, gallop or rub. Respiratory: Diffusely diminished at the bases, no rales or rhonchi. Abdomen:   Soft, non-tender, no masses, bowel sounds are active. Extremities: 2+ pitting edema extending bilateral patellas improved since yesterday skin less taut, normal cap refill, no cyanosis, atraumatic. Neuro: A&Ox3, speech clear, gait stable. Skin: Skin color is normal. No rashes.  Non diaphoretic      Data Review:   Recent Labs     08/12/21  0301 08/10/21  0315   WBC 6.2 5.9   HGB 7.5* 7.4*   HCT 23.5* 23.3*    229     Recent Labs     08/12/21  0301 08/11/21  0315 08/10/21  0607 08/10/21  0315    142  --  141   K 4.7 5.1  --  4.8    107  --  107   CO2 28 30  --  30   * 165*  --  140*   BUN 36* 33*  --  31* CREA 2.69* 2.55*  --  2.64*   CA 8.6 8.8  --  8.6   MG 1.9  --   --   --    PHOS 5.1*  --   --   --    INR  --   --  1.1  --        No results for input(s): TROIQ, CPK, CKMB in the last 72 hours. No intake or output data in the 24 hours ending 08/12/21 0842     Telemetry: Sinus rhythm      Assessment:     Active Problems:    Acute exacerbation of CHF (congestive heart failure) ([facility] (8/16/2020)        Plan:     Presents this morning with compensated heart failure with preserved ejection fraction. Stable from cardiac standpoint for discharge. Torsemide 40 mg in the a.m. 20 mg in the p.m. No spironolactone, no nephrotoxic medications. I have already placed order for outpatient sleep study. She can follow-up with me in 1 week, will repeat labs at that time. She will need to be established with nephrology prefers not affiliated with [facility] due to travel time, will refer to Dr. Maya Valladares when I see her in outpatient. Was wearing oxygen this morning recommend 6-minute walk test with pulse ox to determine if home oxygen is truly needed. Thank you for this consultation, cardiology signing off. Susi Costello [facility]-ANP-BC  Chart and note reviewed, discussed with advanced practioner and agree with recommendations and treatment plan as outlined.   Burnetta Kehr, MD

## 2021-08-12 NOTE — PROGRESS NOTES
ERROL   RUR 24 %     IDR with MD and team this am .   Plans for home today. Patient sats dropped with mobility  Resp did 6 min test could not complete the full test. 88 % on RA  Met with patient in the room to discuss discharge plans at this time. MD orders for Home Oxygen. Discussed Freedom of choice. Patient agreeable to find an agency that accepts the insurance . Orders being sent to Serafin and Charanjit Dyson to see who will be able to get Authorization . Patient indicates she lives at home with her sister, her friend and 29year old son. They lives in an apartment  She indicates everything is near by- she has very little distance to walker. She has a Rollator and Shower Chair. PT did evaluation - recommend Home Health , Transfer Bench. Patient requesting a new Rollator - PT recommend for wheels. Talked to patient about the above-Her current Harjinder Arshad is lest than 5 years. Hold on Bench so the Home Health Therapist to review and make recommendations. We dicussed Home Health and choice of agency. She does not remember the agency since it has been a while. It has been challenging to find providers. We will send out information to several agencies to see who  Can accept for services and takes her insurance plan. [employer] referrals sent and to check in about an hour to responses to the referral.     PCP appointment on AVS,   Left message for Dr. Annabella Leger office to [facility] for Cardiology appointment.    His note indicates he will refer for Sleep Study and Nephrology     [facility] MSW RN   [phone]

## 2021-08-12 NOTE — FACE TO FACE
Face to Face Order for Home oxygen            Pt Name  Becky Moreno   Date of Birth 1965   Age  54 y.o. Medical Record Number  [id number]   Room Number  [facility]/92   Admit date:  8/7/2021   Date of Face to Face: 8/12/2021     Admission Diagnosis:   Hypoxic respiratory Failure     Diagnoses POA   HFpEF Exacerbation   Hypoxic respiratory failure    Past Medical History:   Diagnosis Date    Arthritis     Asthma     [facility]congest[facility]) [facility]     Chronic back pain     Diabetes [facility]     Diabetic retinopathy ([facility]     Hypertension     MRSA (methicillin resistant staph aureus) culture positive     labial abscess    Neuropathy       Visit Vitals  BP (!) 134/53 (BP 1 Location: Left upper arm, BP Patient Position: Semi fowlers)   Pulse 80   Temp 98.1 °F (36.7 °C)   Resp 18   Ht 5' 5.5\" (1.664 m)   Wt 137.3 kg (302 lb 12.8 oz)   SpO2 98%   BMI 49.62 kg/m²           Allergies   Allergen Reactions    Amoxicillin Nausea Only    Aspirin Other (comments)     \"nosebleeds\" but patient takes daily aspirin 81 mg at home. Patient states naproxen ok    Ciprofloxacin Hives             I certify that the patient needs home oxygen as prescribed below and I will not be following this patient in the Community.  Dr. Yonas Freitas MD  will be responsible for signing the Plan of Care and will follow/coordinate ongoing care.  Initial Orders for Care:  - Home oxygen 1 LPM on exertion   - Report to Corinne Levi MD     I certify that I am taking care of the patient today (Please see hospital Discharge Records for details of clinical issues pertaining to this order).       ________________________________________________________________________  Azam Matute MD

## 2021-08-12 NOTE — PROGRESS NOTES
ERROL  RUR 23%    Patient being discharged today. 1/  PCP appointment has been scheduled for 8/17 at 1:20pm.  2/  Medications need to be called into [facility].  3/ Patient has her own transportation home. Ask how to get these medications  1 gabapentin  2 insulin lispro  Icon do   Do   Icon Checkbox     these medications from [facility] [id number] - [place], [address] [facility]  1 cephALEXin  2 metoprolol tartrate  3 nystatin  4 torsemide   Icon Checkbox    Follow up with [facility] on 8/13/2021   [facility]   Case Management has called office to scheduled appointment. Laverne Bailey will be calling you with date/time for appointment.  If you do not hear from office by Friday, August 18 at 12noon, please call office to verify day/time for appointmenr. Icon Checkbox    Follow up with Jennifer Ann MD on 8/17/2021   Specialty: Family Medicine   [address] [facility]   [phone]   Your appointment time is 1:20pm.  , Bring ins card, picture id, and discharge papers, Please keep this appointment     Care Management Interventions  PCP Verified by CM: Yes  Mode of Transport at Discharge: Self  Transition of Care Consult (CM Consult): Discharge Planning  Current Support Network: [address] for Transition of Care is Related to the Following Treatment Goals :  Follow-up PCP appointment, Cardiology appointment  The Patient and/or Patient Representative was Provided with a Choice of Provider and Agrees with the Discharge Plan?: Yes  Name of the Patient Representative Who was Provided with a Choice of Provider and Agrees with the Discharge Plan: Patient  Freedom of Choice List was Provided with Basic Dialogue that Supports the Patient's Individualized Plan of Care/Goals, Treatment Preferences and Shares the Quality Data Associated with the Providers?: Yes  Discharge Location  Discharge Placement: Home Maryana Mendoza, BSW/  [phone]

## 2021-08-12 NOTE — PROGRESS NOTES
Telemed: Pt states she has a migraine. Requesting Fioricet which she uses at home.     Plan: Fioricet ordered once PRN headache

## 2021-08-12 NOTE — PROGRESS NOTES
Bedside and Verbal shift change report given to Tenzin Flanagan rn (oncoming nurse) by Alex Briones (offgoing nurse). Report included the following information SBAR, Intake/Output, MAR, Recent Results and Med Rec Status. Pt in bed watching TV.  No complaints

## 2021-08-12 NOTE — DISCHARGE SUMMARY
Hospitalist Discharge Summary     Patient ID:  Giuseppe Treviño  [id number]  12 y.o.  1965    PCP on record: Sarwat Harmon MD    Admit date: 8/7/2021  Discharge date and time: 8/12/2021      Admission Diagnoses: Acute exacerbation of CHF (congestive heart failure) ([facility] [I50.9]    Discharge Diagnoses: Active Problems:    Acute exacerbation of CHF (congestive heart failure) ([facility] (8/16/2020)           Hospital Course:     Fluid Overload   Hypoxic respiratory failure POA  Progressive SOB POA   suspected underlying ASHLEY   BNP elevated. Troponin trend flat. EKG reviewed independently nonspecific T wave changes. Chest x-ray reviewed independently increased vascular congestion. Patient home regimen include metoprolol 25 mg twice daily Aldactone 25 mg daily and torsemide 20 mg twice daily TTE reviewed independently and discussed with cardiology NP  EF 02-38% , Normal systolic and diastolic function   Torsemide 40 mg QAM, 20 mg QPM  metoprolol    -Fluid restriction  -Low-sodium diet  - Needs home oxygen 1 LPM on exertion per 6 min walk test  -Sleep study as outpatient             EMMANUEL on CKD 3B POA   Suspect secondary to cardiorenal syndrome type I, Serum creatinine 2.55 today (  baseline 1.67 per [facility] records).  US retroperitoneum reviewed - no evidence of hydronephrosis, or calculi  Discontinue aldactone        LE swelling and redness   - Duplex to /ro DVT unremarkable   - Kelfex 500 mg Q6 - 5 days         Insulin-dependent diabetes mellitus    Hemoglobin A1c 8.4 (H) 08/08/2021 12:32 AM  Home regimen include Metformin,  canagliflozin 100 mg daily,  Lantus 20 units and aspart  -Resumed Lantus   -Discontinue invokana and metformin  - Lispro correctional scale, FSG AC HS  - Consistent carb diet, hypoglycemia protocol.      Diabetic neuropathy  -On gabapentin 900 mg 3 times daily daily at home  -Resume renally dose      Hypertension  On amlodipine 10 mg and metoprolol 25 mg twice daily at home-    History of stroke  -On aspirin, Plavix and atorvastatin at home      History of chronic back pain  -On tramadol at home as needed     Morbid obesity  -BMI 51  - Counseled on Lifestyle modifications, AHA Diet ,weight loss strategies.             CONSULTATIONS:  IP CONSULT TO CARDIOLOGY    Excerpted HPI from H&P of Mary Freeman MD:  54 y.o.  female whom presents with complaint noted above. Patient reports worsening dyspnea over the course of the past week; patient denies active chest pain; she endorses orthopnea; no PND reported; no acute febrile episodes; no cough or URI symptoms noted; no known exposure to COVID-19.    ______________________________________________________________________  DISCHARGE SUMMARY/HOSPITAL COURSE:  for full details see H&P, daily progress notes, labs, consult notes. Visit Vitals  BP (!) 134/53 (BP 1 Location: Left upper arm, BP Patient Position: Semi fowlers)   Pulse 80   Temp 98.1 °F (36.7 °C)   Resp 18   Ht 5' 5.5\" (1.664 m)   Wt 137.3 kg (302 lb 12.8 oz)   SpO2 98%   BMI 49.62 kg/m²       _______________________________________________________________________  Patient seen and examined by me on discharge day. Pertinent Findings:  Gen:    Not in distress  Chest: Clear lungs  CVS:   Regular rhythm. 2+ edema  Abd:  Soft, not distended, not tender  Neuro:  Alert with good insight. Oriented to person, place, and time   _______________________________________________________________________  DISCHARGE MEDICATIONS:   Current Discharge Medication List      START taking these medications    Details   cephALEXin (KEFLEX) 500 mg capsule Take 1 Capsule by mouth every six (6) hours for 2 days. Qty: 8 Capsule, Refills: 0  Start date: 8/12/2021, End date: 8/14/2021      nystatin (MYCOSTATIN) powder Apply  to affected area two (2) times a day.   Qty: 1 Bottle, Refills: 0  Start date: 8/12/2021         CONTINUE these medications which have CHANGED    Details   torsemide (DEMADEX) 20 mg tablet Take 40 mg (2 tablets) in the morning, 20 mg (1 tablet) in the evening. Qty: 90 Tablet, Refills: 0  Start date: 8/12/2021      metoprolol tartrate (LOPRESSOR) 25 mg tablet Take 0.5 Tablets by mouth two (2) times a day. Qty: 30 Tablet, Refills: 0  Start date: 8/12/2021      gabapentin (NEURONTIN) 300 mg capsule Take 3 Capsules by mouth two (2) times a day. Max Daily Amount: 1,800 mg. Qty: 90 Capsule, Refills: 0  Start date: 8/12/2021    Associated Diagnoses: DDD (degenerative disc disease), lumbar; Cervical spondylosis      insulin lispro (HumaLOG U-100 Insulin) 100 unit/mL injection 10 Units by SubCUTAneous route Before breakfast, lunch, and dinner. Indications: high blood sugar, type 2 diabetes mellitus  Qty: 1 Vial, Refills: 0  Start date: 8/12/2021    Associated Diagnoses: Type 2 diabetes mellitus with peripheral neuropathy ([facility]         CONTINUE these medications which have NOT CHANGED    Details   amLODIPine (NORVASC) 10 mg tablet Take 10 mg by mouth daily. Flovent  mcg/actuation inhaler Take 2 Puffs by inhalation two (2) times a day. albuterol (PROVENTIL HFA, VENTOLIN HFA, PROAIR HFA) 90 mcg/actuation inhaler Take 2 Puffs by inhalation every six (6) hours as needed for Wheezing. traMADoL (ULTRAM) 50 mg tablet Take 50 mg by mouth three (3) times daily as needed for Pain. butalb/acetaminophen/caffeine (FIORICET PO) 1 tab, PO, every 4 hours, PRN: for headache, do not take with tramadol - may use instead of tramadol prn HA, 2 Refills, Dispense: 60, tab, [facility]      Lantus Solostar U-100 Insulin 100 unit/mL (3 mL) inpn 20 Units by SubCUTAneous route nightly. fluticasone propionate (Flonase Allergy Relief) 50 mcg/actuation nasal spray 2 SPRAY, Each Nostril, daily, for allergy symptoms, 11 Refills, Dispense: 1, bottle, [facility]      aspirin delayed-release 81 mg tablet Take 81 mg by mouth daily.       polyethylene glycol (MIRALAX) 17 gram packet Take 1 Packet by mouth daily. Hold for diarrhea  Qty: 30 Each, Refills: 0      amitriptyline (ELAVIL) 50 mg tablet Take 50 mg by mouth nightly. docusate sodium (Colace) 100 mg capsule Take 100 mg by mouth two (2) times daily as needed. atorvastatin (LIPITOR) 80 mg tablet Take 1 Tab by mouth nightly. Qty: 30 Tab, Refills: 0      clopidogrel (PLAVIX) 75 mg tab Take 1 Tab by mouth daily. Qty: 30 Tab, Refills: 0         STOP taking these medications       Invokana 100 mg tablet Comments:   Reason for Stopping:         metFORMIN (GLUMETZA ER) 500 mg TG24 24 hour tablet Comments:   Reason for Stopping:         spironolactone (ALDACTONE) 25 mg tablet Comments:   Reason for Stopping:         metFORMIN (GLUCOPHAGE) 500 mg tablet Comments:   Reason for Stopping:               My Recommended Diet, Activity, Wound Care, and follow-up labs are listed in the patient's Discharge Insturctions which I have personally completed and reviewed. _______________________________________________________________________  DISPOSITION:     Home with Family:    Home with HH/PT/OT/RN: x   SNF/LTC:    AUGUSTUS:    OTHER:        Condition at Discharge:  Stable  _______________________________________________________________________  Follow up with:   PCP : Erin Worley MD  Follow-up Information     Follow up With Specialties Details Why Contact Info    Erin Worley MD Family Medicine On 8/17/2021 Your appointment time is 1:20pm.  , Bring ins card, picture id, and discharge papers, Please keep this appointment [address] [address]  [phone]      [facility]  On 10/20/2021 Your next appointment is not until October 20. Please call office to schedule an earlier appointment .  Queenie Hernandez 103    Fermin Liz MD Cardiology On 8/16/2021 Cardiology - Left message for the office to call if you do not hear back please call the office  [address] [facility]  [phone]                Total time in minutes spent coordinating this discharge (includes going over instructions, follow-up, prescriptions, and preparing report for sign off to her PCP) : 35 minutes    Signed:  Hector Handy MD

## 2021-08-12 NOTE — PROGRESS NOTES
ERROL  RAAT 24%  Patient scheduled for d/c home today. The following arrangement have been made:  1/ PCP follow-up appointment scheduled for 8/17 at 1:20pm  2/  Next [facility] cardiology appointment is 10/20. Patient instructed to call office for a sooner appointment  3/  Appointment scheduled with Dr. Kelly Hogan (cardiology) sepideh 8/16  4/ [address] to begin OT/PT services  5/ Home oxygen ordered through [facility]    Patient's sister to provide transportation home. CM talk with patient about [facility] for [facility] and DME. Discussed choice and provided a list of agencies. She did not indicate any preference in agencies. Signed [employer] of choice letter which was put in chart along with copy of agencies.  these medications from [address]  1 cephALEXin  2 metoprolol tartrate  3 nystatin  4 torsemide   Icon Checkbox    Follow up with [facility]. on 8/12/2021   Specialty: [address] [facility], [address] [id number]   [phone]   [facility] will be calling you with a day/time to begin services. Icon Checkbox    Follow up with [facility] on 8/12/2021   [address]   [place] [address]   [phone]   [facility] will be providing your oxygen.  Please call [facility] at [phone] if you have any questions about equipment.    Icon Checkbox    Follow up with Vivian Medina MD on 8/16/2021   Specialty: Cardiology   [facility]   [phone]   Cardiology - Left message for the office to call if you do not hear back please call the office    Icon Checkbox    Follow up with Katie Mckenna MD on 8/17/2021   Specialty: Family Medicine   [address]   [phone]   Your appointment time is 1:20pm.  , Bring ins card, picture id, and discharge papers, Please keep this appointment   Icon Checkbox    Follow up with u Cardiology on 10/20/2021   [address] [facility]   Your next appointment is not until October 20.  Please call office to schedule an earlier appointment . Care Management Interventions  PCP Verified by CM: Yes  Mode of Transport at Discharge: Self  Transition of Care Consult (CM Consult): Discharge Planning, [address]: No  Reason Outside [place]: Out of service area  Discharge Durable Medical Equipment: Yes (Home Oxygen from [place] [facility])  Physical Therapy Consult: Yes  Current Support Network: Relative's Home  Confirm Follow Up Transport: Self (Sister to provide transportation home.)  The Plan for Transition of Care is Related to the Following Treatment Goals :  Follow-up PCP appointment, Cardiology Appointment, Located within [facility], DME (oxygen)  The Patient and/or Patient Representative was Provided with a Choice of Provider and Agrees with the Discharge Plan?: Yes  Name of the Patient Representative Who was Provided with a Choice of Provider and Agrees with the Discharge Plan: Patient  Freedom of Choice List was Provided with Basic Dialogue that Supports the Patient's Individualized Plan of Care/Goals, Treatment Preferences and Shares the Quality Data Associated with the Providers?: Yes  Discharge Location  Discharge Placement: Home with home health     TYE Mercer/CM

## 2021-08-13 ENCOUNTER — TELEPHONE (OUTPATIENT)
Dept: CASE MANAGEMENT | Age: 56
End: 2021-08-13

## 2021-08-13 NOTE — TELEPHONE ENCOUNTER
CM called patient for the purpose of follow up to inpatient admission to check on environmental challenges/medications/appointment follow up/and questions/concerns. The call was answered by patient VM CM left message to return call. Patient wnet home with HH and Oxygen. CM will attempt a 2nd call.     [address] [place] [address]  [phone]

## 2021-08-18 ENCOUNTER — OFFICE VISIT (OUTPATIENT)
Dept: FAMILY MEDICINE CLINIC | Age: 56
End: 2021-08-18
Payer: MEDICAID

## 2021-08-18 ENCOUNTER — APPOINTMENT (OUTPATIENT)
Dept: GENERAL RADIOLOGY | Age: 56
End: 2021-08-18
Attending: EMERGENCY MEDICINE
Payer: MEDICAID

## 2021-08-18 ENCOUNTER — HOSPITAL ENCOUNTER (EMERGENCY)
Age: 56
Discharge: HOME | End: 2021-08-19
Attending: EMERGENCY MEDICINE
Payer: MEDICAID

## 2021-08-18 VITALS
HEART RATE: 101 BPM | HEIGHT: 66 IN | WEIGHT: 293 LBS | RESPIRATION RATE: 18 BRPM | SYSTOLIC BLOOD PRESSURE: 142 MMHG | OXYGEN SATURATION: 96 % | TEMPERATURE: 96.9 F | DIASTOLIC BLOOD PRESSURE: 61 MMHG | BODY MASS INDEX: 47.09 KG/M2

## 2021-08-18 DIAGNOSIS — Z79.4 TYPE 2 DIABETES MELLITUS WITH STAGE 4 CHRONIC KIDNEY DISEASE, WITH LONG-TERM CURRENT USE OF INSULIN (HCC): ICD-10-CM

## 2021-08-18 DIAGNOSIS — I50.9 ACUTE ON CHRONIC CONGESTIVE HEART FAILURE, UNSPECIFIED HEART FAILURE TYPE (HCC): Primary | ICD-10-CM

## 2021-08-18 DIAGNOSIS — R29.898 WEAKNESS OF BOTH LEGS: ICD-10-CM

## 2021-08-18 DIAGNOSIS — E11.22 TYPE 2 DIABETES MELLITUS WITH STAGE 4 CHRONIC KIDNEY DISEASE, WITH LONG-TERM CURRENT USE OF INSULIN (HCC): ICD-10-CM

## 2021-08-18 DIAGNOSIS — N18.4 CKD (CHRONIC KIDNEY DISEASE) STAGE 4, GFR 15-29 ML/MIN (HCC): ICD-10-CM

## 2021-08-18 DIAGNOSIS — I50.32 DIASTOLIC CHF, CHRONIC (HCC): ICD-10-CM

## 2021-08-18 DIAGNOSIS — R06.09 DOE (DYSPNEA ON EXERTION): Primary | ICD-10-CM

## 2021-08-18 DIAGNOSIS — N18.4 TYPE 2 DIABETES MELLITUS WITH STAGE 4 CHRONIC KIDNEY DISEASE, WITH LONG-TERM CURRENT USE OF INSULIN (HCC): ICD-10-CM

## 2021-08-18 LAB
ALBUMIN SERPL-MCNC: 3 G/DL (ref 3.5–5)
ALBUMIN/GLOB SERPL: 0.7 {RATIO} (ref 1.1–2.2)
ALP SERPL-CCNC: 90 U/L (ref 45–117)
ALT SERPL-CCNC: 18 U/L (ref 12–78)
ANION GAP SERPL CALC-SCNC: 9 MMOL/L (ref 5–15)
AST SERPL-CCNC: 20 U/L (ref 15–37)
BASOPHILS # BLD: 0 K/UL (ref 0–0.1)
BASOPHILS NFR BLD: 0 % (ref 0–1)
BILIRUB SERPL-MCNC: 0.3 MG/DL (ref 0.2–1)
BNP SERPL-MCNC: 1295 PG/ML (ref 0–125)
BUN SERPL-MCNC: 34 MG/DL (ref 6–20)
BUN/CREAT SERPL: 12 (ref 12–20)
CALCIUM SERPL-MCNC: 9.1 MG/DL (ref 8.5–10.1)
CHLORIDE SERPL-SCNC: 105 MMOL/L (ref 97–108)
CO2 SERPL-SCNC: 27 MMOL/L (ref 21–32)
CREAT SERPL-MCNC: 2.83 MG/DL (ref 0.55–1.02)
DIFFERENTIAL METHOD BLD: ABNORMAL
EOSINOPHIL # BLD: 0.1 K/UL (ref 0–0.4)
EOSINOPHIL NFR BLD: 1 % (ref 0–7)
ERYTHROCYTE [DISTWIDTH] IN BLOOD BY AUTOMATED COUNT: 13.8 % (ref 11.5–14.5)
GLOBULIN SER CALC-MCNC: 4.5 G/DL (ref 2–4)
GLUCOSE SERPL-MCNC: 73 MG/DL (ref 65–100)
HCT VFR BLD AUTO: 26.2 % (ref 35–47)
HGB BLD-MCNC: 8.3 G/DL (ref 11.5–16)
IMM GRANULOCYTES # BLD AUTO: 0.1 K/UL (ref 0–0.04)
IMM GRANULOCYTES NFR BLD AUTO: 1 % (ref 0–0.5)
LYMPHOCYTES # BLD: 0.9 K/UL (ref 0.8–3.5)
LYMPHOCYTES NFR BLD: 10 % (ref 12–49)
MCH RBC QN AUTO: 27.2 PG (ref 26–34)
MCHC RBC AUTO-ENTMCNC: 31.7 G/DL (ref 30–36.5)
MCV RBC AUTO: 85.9 FL (ref 80–99)
MONOCYTES # BLD: 0.6 K/UL (ref 0–1)
MONOCYTES NFR BLD: 6 % (ref 5–13)
NEUTS SEG # BLD: 7.8 K/UL (ref 1.8–8)
NEUTS SEG NFR BLD: 82 % (ref 32–75)
NRBC # BLD: 0 K/UL (ref 0–0.01)
NRBC BLD-RTO: 0 PER 100 WBC
PLATELET # BLD AUTO: 227 K/UL (ref 150–400)
PMV BLD AUTO: 9.3 FL (ref 8.9–12.9)
POTASSIUM SERPL-SCNC: 4.2 MMOL/L (ref 3.5–5.1)
PROT SERPL-MCNC: 7.5 G/DL (ref 6.4–8.2)
RBC # BLD AUTO: 3.05 M/UL (ref 3.8–5.2)
SODIUM SERPL-SCNC: 141 MMOL/L (ref 136–145)
TROPONIN I SERPL-MCNC: <0.05 NG/ML
WBC # BLD AUTO: 9.4 K/UL (ref 3.6–11)

## 2021-08-18 PROCEDURE — 99285 EMERGENCY DEPT VISIT HI MDM: CPT

## 2021-08-18 PROCEDURE — 84484 ASSAY OF TROPONIN QUANT: CPT

## 2021-08-18 PROCEDURE — 97161 PT EVAL LOW COMPLEX 20 MIN: CPT | Performed by: PHYSICAL THERAPIST

## 2021-08-18 PROCEDURE — 36415 COLL VENOUS BLD VENIPUNCTURE: CPT

## 2021-08-18 PROCEDURE — 83880 ASSAY OF NATRIURETIC PEPTIDE: CPT

## 2021-08-18 PROCEDURE — 3052F HG A1C>EQUAL 8.0%<EQUAL 9.0%: CPT | Performed by: STUDENT IN AN ORGANIZED HEALTH CARE EDUCATION/TRAINING PROGRAM

## 2021-08-18 PROCEDURE — 73080 X-RAY EXAM OF ELBOW: CPT

## 2021-08-18 PROCEDURE — 97116 GAIT TRAINING THERAPY: CPT | Performed by: PHYSICAL THERAPIST

## 2021-08-18 PROCEDURE — 93005 ELECTROCARDIOGRAM TRACING: CPT

## 2021-08-18 PROCEDURE — 71045 X-RAY EXAM CHEST 1 VIEW: CPT

## 2021-08-18 PROCEDURE — 80053 COMPREHEN METABOLIC PANEL: CPT

## 2021-08-18 PROCEDURE — 85025 COMPLETE CBC W/AUTO DIFF WBC: CPT

## 2021-08-18 PROCEDURE — 99204 OFFICE O/P NEW MOD 45 MIN: CPT | Performed by: STUDENT IN AN ORGANIZED HEALTH CARE EDUCATION/TRAINING PROGRAM

## 2021-08-18 PROCEDURE — 73562 X-RAY EXAM OF KNEE 3: CPT

## 2021-08-18 RX ORDER — METFORMIN HYDROCHLORIDE 500 MG/1
500 TABLET ORAL
COMMUNITY
End: 2021-08-18 | Stop reason: SINTOL

## 2021-08-18 RX ORDER — GLIPIZIDE 5 MG/1
5 TABLET ORAL DAILY
COMMUNITY
End: 2022-04-08

## 2021-08-18 RX ORDER — LANOLIN ALCOHOL/MO/W.PET/CERES
400 CREAM (GRAM) TOPICAL DAILY
COMMUNITY
End: 2022-04-08

## 2021-08-18 NOTE — ED NOTES
Emergency Department Nursing Plan of Care       The Nursing Plan of Care is developed from the Nursing assessment and Emergency Department Attending provider initial evaluation. The plan of care may be reviewed in the ED Provider note.     The Plan of Care was developed with the following considerations:   Patient / Family readiness to learn indicated by:verbalized understanding  Persons(s) to be included in education: patient  Barriers to Learning/Limitations:No    Signed     Carissa Gatica    8/18/2021   2:28 PM

## 2021-08-18 NOTE — PROGRESS NOTES
PHYSICAL THERAPY EMERGENCY DEPARTMENT EVALUATION WITH DISCHARGE  Patient: Nona Dumont (89 y.o. female)  Date: 8/18/2021  Primary Diagnosis: No admission diagnoses are documented for this encounter. Precautions: fall risk       ASSESSMENT  Based on the objective data described below, the patient presents with decreased balance and dyspnea on exertion. Patient was able to transfer from supine to sit without assistance, but noted difficulty with moving LEs. She ambulate with RW to and from commode for toileting with CGA while maintain O2 >96% and HR <80. She required Homero of LEs to get back into bed. Patient states that she was in the process of getting a new rollator through her insurance, but had not scheduled with PT for follow up treatment and determination of transfer bench since her last hospital stay that concluded on 8/12/21. Patient will benefit from continued therapy intervention in home, but does not appear to be a SNF candidate at this time. Functional Outcome Measure: The patient scored 11 on the Gunter outcome measure which is indicative of high fall risk. Other factors to consider for discharge: level of assistance/supervision at home     Further skilled acute physical therapy is not indicated at this time. PLAN :  Recommendation for discharge: (in order for the patient to meet his/her long term goals)  Physical therapy at least 2 days/week in the home AND ensure assist and/or supervision for safety with transfers    This discharge recommendation:  Has been made in collaboration with the attending provider and/or case management    Equipment recommendations for successful discharge: portable oxygen, transfer bench and walker: rollator     SUBJECTIVE:   Patient stated I should've just gone to the SNF last time. I get tried when I walk at home.     OBJECTIVE DATA SUMMARY:   HISTORY:    Past Medical History:   Diagnosis Date    Arthritis     Asthma     [facility]     Chronic back pain     Diabetes [facility]     [facility]     Hypertension     MRSA (methicillin resistant staph aureus) culture positive     labial abscess    Neuropathy      Past Surgical History:   Procedure Laterality Date    HX HEENT      tonsillectomy    HX ORTHOPAEDIC      left ft bunionectomy    HX OTHER SURGICAL      lanced labial abscess     Prior Level of Function/Home Situation: living in an apartment with family  Personal factors and/or comorbidities impacting plan of care: fear of falling/moving     EXAMINATION/PRESENTATION/DECISION MAKING:   Hearing: Auditory  Auditory Impairment: None    Range Of Motion:  AROM: Generally decreased, functional     Strength:    Strength: Generally decreased, functional     Functional Mobility:  Bed Mobility:  Rolling: Modified independent; Additional time  Supine to Sit: Modified independent; Additional time  Sit to Supine: Minimum assistance;Assist x1 (needs help with LEs)  Scooting: Modified independent; Additional time  Transfers:  Sit to Stand: Modified independent; Additional time; Adaptive equipment  Stand to Sit: Modified independent; Additional time; Adaptive equipment         Balance:   Sitting: Intact; Without support  Standing: Intact; With support  Ambulation/Gait Training:  Distance (ft): 10 Feet (ft) (x2)  Assistive Device: Walker, rolling;Gait belt  Ambulation - Level of Assistance: Contact guard assistance  Gait Description (WDL): Exceptions to WDL   Base of Support: Widened  Speed/Vangie: Pace decreased (<100 feet/min)  Step Length: Left shortened;Right shortened     Functional Measure:  Morenita Muñiz 11/56     Physical Therapy Evaluation Charge Determination   History Examination Presentation Decision-Making   LOW Complexity : Zero comorbidities / personal factors that will impact the outcome / POC LOW Complexity : 1-2 Standardized tests and measures addressing body structure, function, activity limitation and / or participation in recreation  LOW Complexity : Stable, uncomplicated  Other outcome measures Gunter  LOW       Based on the above components, the patient evaluation is determined to be of the following complexity level: LOW     Pain:  8/10 in LEs ED staff aware  Activity Tolerance:   Good and requires rest breaks    After treatment patient left in no apparent distress:   Supine in bed and Call bell within reach    COMMUNICATION/EDUCATION:   Communication/Collaboration:  Fall prevention education was provided and the patient/caregiver indicated understanding., Patient/family have participated as able in goal setting and plan of care. and Patient/family agree to work toward stated goals and plan of care. Findings and recommendations were discussed with: MD physician and Physical therapist and Physician.      Thank you for this referral.  Luma Dunne, PT   Time Calculation: 24 mins

## 2021-08-18 NOTE — PROGRESS NOTES
Received a call from ED about patient now wanting to go to rehab.   Patient was recently discharged from the hospital.    Per chart review  -Chest x-ray clear no signs of pulmonary edema  -BNP 1295 unchanged from last admission  -Troponin negative  -EKG reviewed without any acute ischemic changes  -Patient weight  136.5 (unchanged from last admission)   -TTE on 8/9/2021 EF 60- 36% normal diastolic and systolic functions  -Duplex lower extremity was done without any DVT      -Discussed with ED physician, will ask physical therapy to assess the patient to see if patient is appropriate for rehab as per their last assessment on 8/12/2021 they recommended home PT with home health  -I have discussed with case management to work on her case in providing oxygen and making sure she received DME needs include Rollator and transfer bench    -In case PT recommended rehab and there is is a difficulty in placement of patient from ED will admit to the patient to medical floor      Rafy Rodriguez MD

## 2021-08-18 NOTE — LETTER
8/18/2021 12:08 PM    Ms. Keya Ambrosio Kelly  [address] [facility] [address] [address]      Current Outpatient Medications:     glipiZIDE (GLUCOTROL) 5 mg tablet, Take 5 mg by mouth daily. Take One Tablet By Mouth Daily, Disp: , Rfl:     magnesium oxide (MAG-OX) 400 mg tablet, Take 400 mg by mouth daily. , Disp: , Rfl:     canagliflozin (INVOKANA) 100 mg tablet, Take 100 mg by mouth daily. Take one tablet by mouth daily, Disp: , Rfl:     metFORMIN (GLUCOPHAGE) 500 mg tablet, Take 500 mg by mouth three (3) times daily (with meals). , Disp: , Rfl:     torsemide (DEMADEX) 20 mg tablet, Take 40 mg (2 tablets) in the morning, 20 mg (1 tablet) in the evening., Disp: 90 Tablet, Rfl: 0    metoprolol tartrate (LOPRESSOR) 25 mg tablet, Take 0.5 Tablets by mouth two (2) times a day., Disp: 30 Tablet, Rfl: 0    gabapentin (NEURONTIN) 300 mg capsule, Take 3 Capsules by mouth two (2) times a day. Max Daily Amount: 1,800 mg., Disp: 90 Capsule, Rfl: 0    nystatin (MYCOSTATIN) powder, Apply  to affected area two (2) times a day., Disp: 1 Bottle, Rfl: 0    insulin lispro (HumaLOG U-100 Insulin) 100 unit/mL injection, 10 Units by SubCUTAneous route Before breakfast, lunch, and dinner. Indications: high blood sugar, type 2 diabetes mellitus, Disp: 1 Vial, Rfl: 0    amLODIPine (NORVASC) 10 mg tablet, Take 10 mg by mouth daily. , Disp: , Rfl:     Flovent  mcg/actuation inhaler, Take 2 Puffs by inhalation two (2) times a day., Disp: , Rfl:     albuterol (PROVENTIL HFA, VENTOLIN HFA, PROAIR HFA) 90 mcg/actuation inhaler, Take 2 Puffs by inhalation every six (6) hours as needed for Wheezing., Disp: , Rfl:     traMADoL (ULTRAM) 50 mg tablet, Take 50 mg by mouth three (3) times daily as needed for Pain., Disp: , Rfl:     butalb/acetaminophen/caffeine (FIORICET PO), 1 tab, PO, every 4 hours, PRN: for headache, do not take with tramadol - may use instead of tramadol prn HA, 2 Refills, Dispense: 60, tab, [facility], Disp: , Rfl:     Lantus Solostar U-100 Insulin 100 unit/mL (3 mL) inpn, 20 Units by SubCUTAneous route nightly., Disp: , Rfl:     fluticasone propionate (Flonase Allergy Relief) 50 mcg/actuation nasal spray, 2 SPRAY, Each Nostril, daily, for allergy symptoms, 11 Refills, Dispense: 1, bottle, Pharmacy [facility], Disp: , Rfl:     aspirin delayed-release 81 mg tablet, Take 81 mg by mouth daily. , Disp: , Rfl:     polyethylene glycol (MIRALAX) 17 gram packet, Take 1 Packet by mouth daily. Hold for diarrhea (Patient taking differently: Take 17 g by mouth daily as needed. Hold for diarrhea), Disp: 30 Each, Rfl: 0    amitriptyline (ELAVIL) 50 mg tablet, Take 50 mg by mouth nightly., Disp: , Rfl:     docusate sodium (Colace) 100 mg capsule, Take 100 mg by mouth two (2) times daily as needed. , Disp: , Rfl:     atorvastatin (LIPITOR) 80 mg tablet, Take 1 Tab by mouth nightly., Disp: 30 Tab, Rfl: 0    clopidogrel (PLAVIX) 75 mg tab, Take 1 Tab by mouth daily. , Disp: 30 Tab, Rfl: 0     Allergies   Allergen Reactions    Amoxicillin Nausea Only    Aspirin Other (comments)     \"nosebleeds\" but patient takes daily aspirin 81 mg at home.  Patient states naproxen ok    Ciprofloxacin Hives             Sincerely,      Denzel Smith MD

## 2021-08-18 NOTE — PROGRESS NOTES
ERROL     Talked to Dr. Jim Amin several times - no medical reason to admit patent at time     Reviewed  PT note still recommends [place]   Patient has Medicaid [facility]- She dose not have traditional Skilled Care Benefits - So may not qualify for skilled  PT and OT in Nursing Home. With some of the Medicaid Plan I heard they may offer a few days of rehab but the specific will need to negotiate     I did send information to some Nursing Home but no decision at this time. [address] [facility], [address] of [facility]. Waiting for call back Sent via [employer]      Talked to Cody Miller [phone]  She wanted me to fax the information to her [phone]. Addendum:  Ms. Shayne Dangelo and I met with patient in the room. Also called the sister back. Let her clinton BRAVO. Dicussed options with patient. Reviewed MD not recommending inpatient at this time  Reviewed PT evaluation. Reviewed what happen with her 2 falls. She said [place] and once was in [facility]. Short distances to her BR. She said her knees buckle - she indicates she gets dizzy at times. She was not able to sit on her bench on her Rollator. Explained the above- [address] with [facility] She had asked them to start on 8-19-21 will notify them of her not being admitted   Discussed the Concentrator - gave sister's # as a back-up for delivery. Discussed Nursing Facility - She indicates she was at [facility]. She was ok with considering this option again. She is aware that she may not be approved- the Admissions office agreed to review and get back with us. This may not happen tonight. Discussed Acute Rehab possible pulmonary - she is aware they will need to review her paper work and make a decision. Discussed Medicaid Personal Care - she is open to this option also- if not approved for placement short-term. Addendum: 5:30PM    The sister called back. Explained the above.  She is aware if not approved for placement this evening- patient is coming home - she indicates she has not talked to anyone abut the oxygen yet. She indicates patient's son has an appointment tomorrow. She has to take hm at 1:40PM.     Plans:  Home with outpatient services at this time-- to follow-up with patient tomorrow. Sharon Hirsch MSW RN   [phone]    Addendum: 6:20PM  Talked to Emily Hernandez from [facility] and she has not been able to verify if her Medicaid will auth placement. She is not sure she will get a decision tonight. She did agree to follow-up with me tomorrow. If approved can admit from home. [facility] -  [phone] Option 1       For Transportation  Home this evening- Reference #  [id number]  They will call the ER when they have Confirmed what time the Ambulance will come. Most likely [facility]. The sister is at home to receive patient - If  [facility] Calls give them pateint's sister # [phone]  PCS & Fax sheet fax     Addendum: 8:03PM   Received call from [facility]   They have approved patient - they cannot accept tonight -plans to accept in the am.  Will need to make sure they have necessary equipment ( Bariatric)  They will call me in the am and I will follow-up in the am  [phone].   [facility] screening updated for Nursing Facility placement

## 2021-08-18 NOTE — ED NOTES
Bedside and Verbal shift change report given to Carmina JACKSON (oncoming nurse) by Aroldo Manjarrez (offgoing nurse). Report included the following information SBAR, Kardex, ED Summary, Intake/Output and MAR.

## 2021-08-18 NOTE — ED TRIAGE NOTES
Patient received to room 7 from EMS. EMS reports the patient was seen at her PCP's office and sent here for CHF exacerbation. Patient reports continued shortness of increasing edema in her lower extremities. Vijaya Rued

## 2021-08-18 NOTE — PROGRESS NOTES
[address] [address] CATHERINE De La Garza. [place], [address] [place] [address]  [phone]    C/C: SOB, Weakness, DM II and CKD     HPI:    Janine Bond is a 54 y.o. female who presents to clinic today for evaluation of the issues listed above. Pt is new to the practice . Previous pcp: Dr. Peyton Barajas ([facility]). Pt came with her sister to this visit. PMHx: Reviewed. Hospital Discharge:  Recently discharged from [facility] (8/7/2021 - 8/12/2021) where she was treated for CHF exacerbation. Upon discharge, pt was offered to go to rehab but she declined the offer. Now insists she would like to go to rehab as she has not being doing well since her discharge. She has not called to set up appointment with cards as recommended upon discharge. PENA:  Echo with LVEF 60-65%. Discharged with home O2, 1L. Pt states that she ran out of oxygen 4 days ago and it has since not been refilled. Reports persistent PENA and worsening Morgan LE edema. Takes Torsemide but states that it does not help as she still feels fluid overloaded. Cardiologist at [facility]. Pt would also like to switch practices. Referred to see Dr. Brianne Curiel upon discharge and appointment was to be on 8/16/2021 but she said she was not aware of that. Bilateral LE Weakness:  Pt felt twice over the weekend. Sister had to call the rescue squad to come lift her up during the 1st fall. Neighbors were called to their home when she fell the 2nd time. Did not injure herself but does not feel safe at home. CKD III:  Cr 2.69 (baseline ~1.67)  Aldactone was discontinued during admission  Does not have a Nephrologist.   States that she had one years ago at [facility] whom she spoke to over the phone. DM type II with neuropathy:    A1C 8.5% (3/15/21)  Per discharge notes, she was suppose to be on Lantus 20 units daily and Humalog 10 units TID with meals but pt states that she has mistakenly been taking Humalog 36units TID?     Also on Metformin and Invokana. Pt denies any  fever, chill, night sweats, chest pain, pressure. Allergies- reviewed: Allergies   Allergen Reactions    Amoxicillin Nausea Only    Aspirin Other (comments)     \"nosebleeds\" but patient takes daily aspirin 81 mg at home. Patient states naproxen ok    Ciprofloxacin Hives       Past Medical History- reviewed:  Past Medical History:   Diagnosis Date    Arthritis     Asthma     CHF (congestive heart failure) (formerly [facility])     Chronic back pain     [facility]     [facility]abetic retinopathy ([facility]     Hypertension     MRSA (methicillin resistant staph aureus) culture positive     labial abscess    Neuropathy        Family History - reviewed:  No family history on file. Social History - reviewed:  Social History     Socioeconomic History    Marital status:      Spouse name: Not on file    Number of children: Not on file    Years of education: Not on file    Highest education level: Not on file   Occupational History    Not on file   Tobacco Use    Smoking status: Never Smoker    Smokeless tobacco: Never Used   Substance and Sexual Activity    Alcohol use: No    Drug use: No    Sexual activity: Not on file   Other Topics Concern    Not on file   Social History Narrative    Not on file     Social Determinants of Health     Financial Resource Strain:     Difficulty of Paying Living Expenses:    Food Insecurity:     Worried About Running Out of Food in the Last Year:     [address] in the Last Year:    Transportation Needs:     Lack of Transportation (Medical):      Lack of Transportation (Non-Medical):    Physical Activity:     Days of Exercise per Week:     Minutes of Exercise per Session:    Stress:     Feeling of Stress :    Social Connections:     Frequency of Communication with Friends and Family:     Frequency of Social Gatherings with Friends and Family:     Attends Restoration[employer] Services:     Active Member of Clubs or Organizations:     Attends [employer] or Organization Meetings:     Marital Status:    Intimate Partner Violence:     Fear of Current or Ex-Partner:     Emotionally Abused:     Physically Abused:     Sexually Abused:        Review of systems:     A comprehensive review of systems was negative except for that written in the History of Present Illness. Visit Vitals  BP (!) 142/61 (BP 1 Location: Right arm, BP Patient Position: Sitting, BP Cuff Size: Adult)   Pulse (!) 101   Temp 96.9 °F (36.1 °C) (Oral)   Resp 18   Ht 5' 5.5\" (1.664 m)   Wt 302 lb (137 kg)   LMP 05/01/2013   SpO2 96%   BMI 49.49 kg/m²       General: Alert and oriented, in no acute distress. Well nourished. Obese  EYE: PERRL. Sclera and conjuctival clear. Extraocular movements intact. EARS: External normal, canals clear, tympanic membranes normal.   NOSE: Mucosa healthy without drainage or ulceration. OROPHARYNX: No suspicious lesions, normal dentition, pharynx, tongue and tonsils normal.  NECK: Supple; no masses; thyroid normal.  LUNGS: Respirations unlabored; minimal crackles to auscultation bilaterally. CARDIOVASCULAR: Regular, rate, and rhythm without murmurs, gallops or rubs. ABDOMEN: Soft; nontender; nondistended; normoactive bowel sounds; no masses or organomegaly. MUSCULOSKELETAL:On wheelchair  EXT: 2+ bilateral LE pitting edema. Neuro: Mental Status: Pt is alert and oriented to person, place, and time. Assessment/Plan       ICD-10-CM ICD-9-CM    1. PENA (dyspnea on exertion)  R06.00 786.09    2. Weakness of both legs  R29.898 729.89    3. Diastolic CHF, chronic ([facility])  I50.32 428.32 REFERRAL TO CARDIOLOGY     428.0    4. CKD (chronic kidney disease) stage 4, GFR 15-29 ml/min ([facility])  N18.4 585.4 REFERRAL TO NEPHROLOGY   5.  Type 2 diabetes mellitus with stage 4 chronic kidney disease, with long-term current use of insulin ([facility])  E11.22 250.40     N18.4 585.4     Z79.4 V58.67        Given constellation of symptoms and patient recent falls, I think it is necessary that pt gets evaluated in the ER. May benefit from being placed into a rehab facility given concerns of recurrent falls and PENA. They are not interested in home PT which I offered. Patient agrees to go to ER  for evaluation. I called and notified provider at [facility]    1. PENA (dyspnea on exertion)    Sent to the ER    2. Weakness of both legs  Sent to ER    3. Diastolic CHF, chronic ([facility])  - REFERRAL TO CARDIOLOGY    4. CKD (chronic kidney disease) stage 4, GFR 15-29 ml/min ([facility])  - REFERRAL TO NEPHROLOGY    5. Type 2 diabetes mellitus with stage 4 chronic kidney disease, with long-term current use of insulin ([facility])  STOP Metformin based on worsening kidney function  Continue Insulin therapy; Lantus 20 units daily and Humalog 10 units TID with meals  Will resume Invokana upon discharge from hospital.    Follow up with patient when she returns    On this date 08/18/21 I have spent 45 minutes reviewing previous notes, test results and face to face  with the patient discussing the diagnosis and importance of compliance with the treatment plan as well as documenting on the day of the visit. I have discussed the diagnosis with the patient and the intended plan as seen in the above orders. The patient has received an after-visit summary and questions were answered concerning future plans. I have discussed medication side effects and warnings with the patient as well. Informed patient to return to the office if new symptoms arise.     Signed By: Kirstie Art MD     August 18, 2021

## 2021-08-18 NOTE — PROGRESS NOTES
Date of previous inpatient admission/ ED visit? 8-7-21 to 8-12-21  What brought the patient back to ED? Patient received to room 7 from EMS. EMS reports the patient was seen at her PCP's office and sent here for CHF exacerbation. Patient reports continued shortness of increasing edema in her lower extremities. .      Did patient decline recommended services during last admission/ ED visit (if yes, what)? Yes, denied going to rehab    Has patient seen a provider since their last inpatient admission/ED visit (if yes, when)? PCP: First and Last name:   Name of Practice:    Are you a current patient: Yes/No:    Approximate date of last visit:    CM Interventions:  From previous inpatient admission/ED visit: [facility] PT, DME ordered  From current inpatient admission/ED visit    Patient back in ER today. CM assessed patient in ED. She is back for SOB. At previous admission therapy recommended patient going to rehab and patient declined that service. She was sent home with [facility] and oxygen with [facility]. As per patient, [facility] never reached out to her. Neither did [employer]. She states she called [facility] for the oxygen and left a VM, but no one called her back. CM called [facility] intake to verify. They mentioned when they went for their first eval, the patient stated she was sleeping and she did not want care to start that day. Yesterday on 8/17, [facility] called to set up an appt and patient told them she had an appt on 8/18 so she wanted them to come from 8/19. CM also called [employer] [facility]. They mentioned that they were unable to get in touch with patient to deliver the concentrator. CM called patients sister, June at [phone] and informed her that [facility] will be calling her to set up a delivery time for the concentrator.      Monserrat Hinton, BSN, [facility]  [phone]

## 2021-08-18 NOTE — PATIENT INSTRUCTIONS
CARDIOLOGY:  [facility] (Dr. Janel Catherine with [facility])    [address] [facility], [address]    Tel: [address] [facility] [place], [address]  [phone]      NEPHROLOGY:    [address] [facility]  [phone]    [facility]  [phone]    [facility]:  [address] [facility], [address]  [phone]      Rehab:

## 2021-08-18 NOTE — PROGRESS NOTES
Name and  Verified. Previus PCP: Dr. Mahni Moreno ([facility])    Release Signed    Pharmacy verified    Patient was adopted so she stated taht she is unsure of family history. Chief Complaint   Patient presents with    New Patient    Establish Care       1. Have you been to the ER, urgent care clinic since your last visit? Hospitalized since your last visit? Yes  2021 - 2021 (5 days)  [facility]  CHF      2. Have you seen or consulted any other health care providers outside of the [address] [place] [address] since your last visit? Include any pap smears or colon screening.  No      Health Maintenance Due   Topic Date Due    Pneumococcal 0-64 years (1 of 4 - PCV13) Never done    Eye Exam Retinal or Dilated  Never done    PAP AKA CERVICAL CYTOLOGY  2015    Shingrix Vaccine Age 50> (1 of 2) Never done    Breast Cancer Screen Mammogram  2015    MICROALBUMIN Q1  2016    Lipid Screen  2019    Foot Exam Q1  2021    Colorectal Cancer Screening Combo  2021

## 2021-08-18 NOTE — ED PROVIDER NOTES
EMERGENCY DEPARTMENT HISTORY AND PHYSICAL EXAM      Date: 8/18/2021  Patient Name: Ara Marte    History of Presenting Illness     Chief Complaint   Patient presents with    Shortness of Breath       History Provided By: Patient    HPI: Ara Marte, 54 y.o. female with PMHx significant for diabetes, hypertension, CHF, who presents with a chief complaint of shortness of breath, lower extremity swelling, and 2 falls at home. Patient was just discharged from this facility on 8/12. She tells me that her oxygen ran out over the weekend and no one came to refill it. She did not call anyone to this morning. Went to her primary care doctor today and was referred back to the emergency department. Tells me she was offered tells me she was offered rehab while in the hospital but declined. Reports ongoing shortness of breath for the last month as well as lower extremity swelling. Tells me she has been compliant with her home diuretic. PCP: Dmitriy Christian MD    There are no other complaints, changes, or physical findings at this time. Current Outpatient Medications   Medication Sig Dispense Refill    torsemide (DEMADEX) 20 mg tablet Take 40 mg (2 tablets) in the morning, 20 mg (1 tablet) in the evening. 90 Tablet 0    metoprolol tartrate (LOPRESSOR) 25 mg tablet Take 0.5 Tablets by mouth two (2) times a day. 30 Tablet 0    gabapentin (NEURONTIN) 300 mg capsule Take 3 Capsules by mouth two (2) times a day. Max Daily Amount: 1,800 mg. 90 Capsule 0    nystatin (MYCOSTATIN) powder Apply  to affected area two (2) times a day. 1 Bottle 0    insulin lispro (HumaLOG U-100 Insulin) 100 unit/mL injection 10 Units by SubCUTAneous route Before breakfast, lunch, and dinner. Indications: high blood sugar, type 2 diabetes mellitus 1 Vial 0    polyethylene glycol (MIRALAX) 17 gram packet Take 1 Packet by mouth daily. Hold for diarrhea (Patient taking differently: Take 17 g by mouth daily as needed.  Hold for diarrhea) 30 Each 0    atorvastatin (LIPITOR) 80 mg tablet Take 1 Tab by mouth nightly. 30 Tab 0    clopidogrel (PLAVIX) 75 mg tab Take 1 Tab by mouth daily. 30 Tab 0    glipiZIDE (GLUCOTROL) 5 mg tablet Take 5 mg by mouth daily. Take One Tablet By Mouth Daily      magnesium oxide (MAG-OX) 400 mg tablet Take 400 mg by mouth daily.  canagliflozin (INVOKANA) 100 mg tablet Take 100 mg by mouth daily. Take one tablet by mouth daily      amLODIPine (NORVASC) 10 mg tablet Take 10 mg by mouth daily.  Flovent  mcg/actuation inhaler Take 2 Puffs by inhalation two (2) times a day.  albuterol (PROVENTIL HFA, VENTOLIN HFA, PROAIR HFA) 90 mcg/actuation inhaler Take 2 Puffs by inhalation every six (6) hours as needed for Wheezing.  traMADoL (ULTRAM) 50 mg tablet Take 50 mg by mouth three (3) times daily as needed for Pain.  butalb/acetaminophen/caffeine (FIORICET PO) 1 tab, PO, every 4 hours, PRN: for headache, do not take with tramadol - may use instead of tramadol prn HA, 2 Refills, Dispense: 60, tab, [facility] [place] [id number]      Lantus Solostar U-100 Insulin 100 unit/mL (3 mL) inpn 20 Units by SubCUTAneous route nightly.  fluticasone propionate (Flonase Allergy Relief) 50 mcg/actuation nasal spray 2 SPRAY, Each Nostril, daily, for allergy symptoms, 11 Refills, Dispense: 1, bottle, [facility]      aspirin delayed-release 81 mg tablet Take 81 mg by mouth daily.  amitriptyline (ELAVIL) 50 mg tablet Take 50 mg by mouth nightly.  docusate sodium (Colace) 100 mg capsule Take 100 mg by mouth two (2) times daily as needed.        Past History     Past Medical History:  Past Medical History:   Diagnosis Date    Arthritis     Asthma     CHF (congestive heart failure) (HCC)     Chronic back pain     Diabetes ([facility] [address] [facility]     Di[facility]     Hypertension     MRSA (methicillin resistant staph aureus) culture positive     labial abscess    Neuropathy      Past Surgical History:  Past Surgical History:   Procedure Laterality Date    HX HEENT      tonsillectomy    HX ORTHOPAEDIC      left ft bunionectomy    HX OTHER SURGICAL      lanced labial abscess     Family History:  History reviewed. No pertinent family history. Social History:  Social History     Tobacco Use    Smoking status: Never Smoker    Smokeless tobacco: Never Used   Substance Use Topics    Alcohol use: No    Drug use: No     Allergies: Allergies   Allergen Reactions    Amoxicillin Nausea Only    Aspirin Other (comments)     \"nosebleeds\" but patient takes daily aspirin 81 mg at home. Patient states naproxen ok    Ciprofloxacin Hives     Review of Systems   Review of Systems   Constitutional: Negative for chills and fever. HENT: Negative for congestion, rhinorrhea and sore throat. Respiratory: Positive for shortness of breath. Negative for cough. Cardiovascular: Positive for leg swelling. Negative for chest pain. Gastrointestinal: Negative for abdominal pain, nausea and vomiting. Genitourinary: Negative for dysuria and urgency. Skin: Negative for rash. Neurological: Negative for dizziness, light-headedness and headaches. All other systems reviewed and are negative. Physical Exam   Physical Exam  Vitals and nursing note reviewed. Constitutional:       General: She is not in acute distress. Appearance: She is well-developed. She is obese. Comments: Chronically ill-appearing   HENT:      Head: Normocephalic and atraumatic. Eyes:      Conjunctiva/sclera: Conjunctivae normal.      Pupils: Pupils are equal, round, and reactive to light. Cardiovascular:      Rate and Rhythm: Normal rate and regular rhythm. Pulmonary:      Effort: Pulmonary effort is normal. No respiratory distress. Breath sounds: Normal breath sounds. No stridor. Comments: Able to speak in complete sentences  Abdominal:      General: There is no distension. Palpations: Abdomen is soft. Tenderness: There is no abdominal tenderness. Musculoskeletal:         General: Normal range of motion. Cervical back: Normal range of motion. Right lower le+ Pitting Edema present. Left lower le+ Pitting Edema present. Skin:     General: Skin is warm and dry. Neurological:      Mental Status: She is alert and oriented to person, place, and time. Diagnostic Study Results   Labs -     Recent Results (from the past 12 hour(s))   CBC WITH AUTOMATED DIFF    Collection Time: 21  1:08 PM   Result Value Ref Range    WBC 9.4 3.6 - 11.0 K/uL    RBC 3.05 (L) 3.80 - 5.20 M/uL    HGB 8.3 (L) 11.5 - 16.0 g/dL    HCT 26.2 (L) 35.0 - 47.0 %    MCV 85.9 80.0 - 99.0 FL    MCH 27.2 26.0 - 34.0 PG    MCHC 31.7 30.0 - 36.5 g/dL    RDW 13.8 11.5 - 14.5 %    PLATELET 311 085 - 065 K/uL    MPV 9.3 8.9 - 12.9 FL    NRBC 0.0 0  WBC    ABSOLUTE NRBC 0.00 0.00 - 0.01 K/uL    NEUTROPHILS 82 (H) 32 - 75 %    LYMPHOCYTES 10 (L) 12 - 49 %    MONOCYTES 6 5 - 13 %    EOSINOPHILS 1 0 - 7 %    BASOPHILS 0 0 - 1 %    IMMATURE GRANULOCYTES 1 (H) 0.0 - 0.5 %    ABS. NEUTROPHILS 7.8 1.8 - 8.0 K/UL    ABS. LYMPHOCYTES 0.9 0.8 - 3.5 K/UL    ABS. MONOCYTES 0.6 0.0 - 1.0 K/UL    ABS. EOSINOPHILS 0.1 0.0 - 0.4 K/UL    ABS. BASOPHILS 0.0 0.0 - 0.1 K/UL    ABS. IMM.  GRANS. 0.1 (H) 0.00 - 0.04 K/UL    DF AUTOMATED     METABOLIC PANEL, COMPREHENSIVE    Collection Time: 21  1:08 PM   Result Value Ref Range    Sodium 141 136 - 145 mmol/L    Potassium 4.2 3.5 - 5.1 mmol/L    Chloride 105 97 - 108 mmol/L    CO2 27 21 - 32 mmol/L    Anion gap 9 5 - 15 mmol/L    Glucose 73 65 - 100 mg/dL    BUN 34 (H) 6 - 20 MG/DL    Creatinine 2.83 (H) 0.55 - 1.02 MG/DL    BUN/Creatinine ratio 12 12 - 20      GFR est AA 21 (L) >60 ml/min/1.73m2    GFR est non-AA 17 (L) >60 ml/min/1.73m2    Calcium 9.1 8.5 - 10.1 MG/DL    Bilirubin, total 0.3 0.2 - 1.0 MG/DL    ALT (SGPT) 18 12 - 78 U/L    AST (SGOT) 20 15 - 37 U/L    Alk. phosphatase 90 45 - 117 U/L    Protein, total 7.5 6.4 - 8.2 g/dL    Albumin 3.0 (L) 3.5 - 5.0 g/dL    Globulin 4.5 (H) 2.0 - 4.0 g/dL    A-G Ratio 0.7 (L) 1.1 - 2.2     NT-PRO BNP    Collection Time: 08/18/21  1:08 PM   Result Value Ref Range    NT pro-BNP 1,295 (H) 0 - 125 PG/ML   TROPONIN I    Collection Time: 08/18/21  1:08 PM   Result Value Ref Range    Troponin-I, Qt. <0.05 <0.05 ng/mL   EKG, 12 LEAD, INITIAL    Collection Time: 08/18/21  1:17 PM   Result Value Ref Range    Ventricular Rate 85 BPM    Atrial Rate 85 BPM    P-R Interval 222 ms    QRS Duration 78 ms    Q-T Interval 356 ms    QTC Calculation (Bezet) 423 ms    Calculated P Axis 38 degrees    Calculated R Axis 25 degrees    Calculated T Axis 35 degrees    Diagnosis       Sinus rhythm with 1st degree AV block  Low voltage QRS  Cannot rule out Anteroseptal infarct (cited on or before 18-AUG-2021)  Abnormal ECG  When compared with ECG of 07-AUG-2021 17:46,  IA interval has increased         Radiologic Studies -   XR CHEST PORT   Final Result   1. No acute disease            XR CHEST PORT    Result Date: 8/18/2021  1. No acute disease      Medical Decision Making   I am the first provider for this patient. I reviewed the vital signs, available nursing notes, past medical history, past surgical history, family history and social history. Vital Signs-Reviewed the patient's vital signs. Patient Vitals for the past 12 hrs:   Temp Pulse Resp BP SpO2   08/18/21 1700    (!) 157/75    08/18/21 1600    (!) 148/66    08/18/21 1500    (!) 131/58 97 %   08/18/21 1400    (!) 142/60    08/18/21 1240 97.5 °F (36.4 °C) 84 20 (!) 148/64 98 %       Pulse Oximetry Analysis - 98% on ra      Records Reviewed: Nursing Notes and Old Medical Records    Provider Notes (Medical Decision Making):   Patient presents with chief complaint of ongoing shortness of breath as well as 2 falls at home. Recently admitted.   Unclear exactly what happened and why her oxygen ran out. Will check basic lab work, BNP, chest x-ray. Will discuss with hospitalist for possible admission    ED Course:   Initial assessment performed. The patients presenting problems have been discussed, and they are in agreement with the care plan formulated and outlined with them. I have encouraged them to ask questions as they arise throughout their visit. On review of records, PT recommended home health for the patient. Was evaluated by physical therapy who continues to recommend home health. Case management saw the patient, they state that the patient had home health set up but the patient requested the, so it does not start till tomorrow. ED Course as of Aug 20 1348   Wed Aug 18, 2021   1356 Spoke with Dr. Bobbi Torres, hospitalist.  Based on last PT note it sounds as though home PT was recommended. He recommends PT evaluation in the ED and if they recommend rehab placement they will admit the patient, otherwise case management for assistance at home. [FI]   1556 Patient is been evaluated by physical therapy. States the patient needs home PT but does not recommend rehab. Message left for case management regarding this. [VICENTE]   2131 I was called to bedside after patient lost her footing and was lowered slowly to the ground by RN when she tried to use bedside commode. She was as helped back into bed by multiple staff members. Complaining of left knee pain. Tender in the lateral portion of her knee. Patient states she did not hit it. Nurse who is with the patient during the incident states she did not have either. Suspect sprain, possibly LCL.    [SS]   2135 Was called to bedside at time, after the patient developed left elbow pain after I was in the room assessing her knee. She was pulled up by both wrist when we got her up off the floor. High suspicion sprain/strain. She is pointing tenderness at the lateral epicondyles.   Patient is very concerned and feel she needs an x-ray. Will x-ray prior to discharge. As long as films are being obtained, will x-ray knee to exclude occult bony injury as well    [SS]      ED Course User Chava Marsh MD  [SS] Kiara Bruce MD       Procedures:  Procedures    Critical Care:  none    Disposition:  Discharge Note:  The patient has been re-evaluated and is ready for discharge. Reviewed available results with patient. Counseled patient on diagnosis and care plan. Patient has expressed understanding, and all questions have been answered. Patient agrees with plan and agrees to follow up as recommended, or to return to the ED if their symptoms worsen. Discharge instructions have been provided and explained to the patient, along with reasons to return to the ED. PLAN:  1. Current Discharge Medication List        2. Follow-up Information     Follow up With Specialties Details Why Contact Info    Dante Lerma MD Family Medicine Schedule an appointment as soon as possible for a visit   [address] [facility] [address] [id number] [phone]      [facility] EMERGENCY DEPT Emergency Medicine  As needed, If symptoms worsen [address] [facility]    They will deliver your concentrator to the home.  -  [address]  [address] [place] [address] to see you at home for Therapy  [address] [facility], [address] [facility] [address] [facility]  they are reviewing your information to see if you meet criteria for [address]- to call tomorow with final decision -if approve i can arrange transportation from  home  Karen Barlow     Please Call me for questions or Concerns regarding the plan we discussed [phone]         Return to ED if worse     Diagnosis     Clinical Impression:   1. Acute on chronic congestive heart failure, unspecified heart failure type [facility])            Please note that this dictation was completed with [employer]ambala, the computer voice recognition software. Quite often unanticipated grammatical, syntax, homophones, and other interpretive errors are inadvertently transcribed by the computer software. Please disregard these errors.   Please excuse any errors that have escaped final proofreading

## 2021-08-19 ENCOUNTER — TELEPHONE (OUTPATIENT)
Dept: CASE MANAGEMENT | Age: 56
End: 2021-08-19

## 2021-08-19 VITALS
RESPIRATION RATE: 22 BRPM | WEIGHT: 293 LBS | BODY MASS INDEX: 48.82 KG/M2 | HEIGHT: 65 IN | TEMPERATURE: 97.4 F | DIASTOLIC BLOOD PRESSURE: 66 MMHG | HEART RATE: 80 BPM | SYSTOLIC BLOOD PRESSURE: 133 MMHG | OXYGEN SATURATION: 100 %

## 2021-08-19 LAB
ATRIAL RATE: 85 BPM
CALCULATED P AXIS, ECG09: 38 DEGREES
CALCULATED R AXIS, ECG10: 25 DEGREES
CALCULATED T AXIS, ECG11: 35 DEGREES
DIAGNOSIS, 93000: NORMAL
P-R INTERVAL, ECG05: 222 MS
Q-T INTERVAL, ECG07: 356 MS
QRS DURATION, ECG06: 78 MS
QTC CALCULATION (BEZET), ECG08: 423 MS
VENTRICULAR RATE, ECG03: 85 BPM

## 2021-08-19 NOTE — TELEPHONE ENCOUNTER
Hospital follow up  Case Management follow up call    CM called patients sister, June to follow up on patients oxygen tank. Sister states that was delivered yesterday evening. Also confirmed with sister if family and patient are still confirmed with patient going to a facility. Sister agrees. Will follow up with the facilities and keep sister in loop.      Mayte Saini, GEMN, [facility]  [phone]

## 2021-08-19 NOTE — ED NOTES
[facility] came for transport. Pt requested to use the [facility]. RN, who is pregnant, assisted pt. No tech on duty today. Pt stood up and she slowly slipped on her bottom. Pt did not hit head, loc. Pt was assisted to bed with 2 [facility] EMTs and 2 RNs. Pt says her R knee hurt from the fall and her L elbow hurt for being helped up. XR and MD assessment done of areas. Pt resting in bed. [facility] to return to help transport around 0100. Pt later assisted to [facility] with 2 RN's. [employer] placed.

## 2021-08-19 NOTE — PROGRESS NOTES
ERROL    On 8/18/21 patient was discharged to home from ED. Patient has agreed on going to acute rehab/SNF. Referrals to were sent to multiple facilities. Patients oxygen was delivered home on 8/18 by [facility]. [facility] will resume services on 8/20. [address] to ask if they can fax over patients PT eval to [facility] with atthollie Mckeon. Gave them Veronica Mckeon number and fax number. Called Veronica Mckeon to inform her that the patient is going to wait for their decision. Informed Veronica Mckeon that [facility] will fax over PT eval, then they can follow up with C/Damian Horton, [facility] [address] Paola Falk number.      Will follow up    TERI Desai, [facility], [address] [place], [place] [address]   [phone]

## 2021-08-19 NOTE — ED NOTES
2 RN's assisted pt to [facility]. [facility] assisting pt to stretcher. Pt leaving in no distress.

## 2021-08-25 ENCOUNTER — HOSPITAL ENCOUNTER (OUTPATIENT)
Age: 56
Setting detail: OBSERVATION
Discharge: NURSING, REHAB OR LONG-TERM CARE | End: 2021-08-28
Attending: EMERGENCY MEDICINE | Admitting: INTERNAL MEDICINE
Payer: MEDICAID

## 2021-08-25 ENCOUNTER — APPOINTMENT (OUTPATIENT)
Dept: GENERAL RADIOLOGY | Age: 56
End: 2021-08-25
Attending: EMERGENCY MEDICINE
Payer: MEDICAID

## 2021-08-25 DIAGNOSIS — E78.00 HYPERCHOLESTEREMIA: Chronic | ICD-10-CM

## 2021-08-25 DIAGNOSIS — I50.33 ACUTE ON CHRONIC DIASTOLIC (CONGESTIVE) HEART FAILURE (HCC): ICD-10-CM

## 2021-08-25 DIAGNOSIS — N17.9 AKI (ACUTE KIDNEY INJURY) (HCC): ICD-10-CM

## 2021-08-25 DIAGNOSIS — I50.9 ACUTE ON CHRONIC CONGESTIVE HEART FAILURE, UNSPECIFIED HEART FAILURE TYPE (HCC): Primary | ICD-10-CM

## 2021-08-25 DIAGNOSIS — I10 ESSENTIAL HYPERTENSION: Chronic | ICD-10-CM

## 2021-08-25 DIAGNOSIS — E66.01 MORBID OBESITY (HCC): Chronic | ICD-10-CM

## 2021-08-25 DIAGNOSIS — E66.01 MORBID OBESITY WITH BMI OF 45.0-49.9, ADULT (HCC): ICD-10-CM

## 2021-08-25 DIAGNOSIS — E11.42 TYPE 2 DIABETES MELLITUS WITH PERIPHERAL NEUROPATHY (HCC): Chronic | ICD-10-CM

## 2021-08-25 DIAGNOSIS — M51.36 DDD (DEGENERATIVE DISC DISEASE), LUMBAR: ICD-10-CM

## 2021-08-25 LAB
ALBUMIN SERPL-MCNC: 2.7 G/DL (ref 3.5–5)
ALBUMIN/GLOB SERPL: 0.6 {RATIO} (ref 1.1–2.2)
ALP SERPL-CCNC: 104 U/L (ref 45–117)
ALT SERPL-CCNC: 13 U/L (ref 12–78)
ANION GAP SERPL CALC-SCNC: 5 MMOL/L (ref 5–15)
AST SERPL-CCNC: 14 U/L (ref 15–37)
BASOPHILS # BLD: 0 K/UL (ref 0–0.1)
BASOPHILS NFR BLD: 0 % (ref 0–1)
BILIRUB SERPL-MCNC: 0.4 MG/DL (ref 0.2–1)
BNP SERPL-MCNC: 2031 PG/ML
BUN SERPL-MCNC: 19 MG/DL (ref 6–20)
BUN/CREAT SERPL: 8 (ref 12–20)
CALCIUM SERPL-MCNC: 8.4 MG/DL (ref 8.5–10.1)
CHLORIDE SERPL-SCNC: 108 MMOL/L (ref 97–108)
CO2 SERPL-SCNC: 26 MMOL/L (ref 21–32)
COVID-19 RAPID TEST, COVR: NOT DETECTED
CREAT SERPL-MCNC: 2.31 MG/DL (ref 0.55–1.02)
DIFFERENTIAL METHOD BLD: ABNORMAL
EOSINOPHIL # BLD: 0.3 K/UL (ref 0–0.4)
EOSINOPHIL NFR BLD: 4 % (ref 0–7)
ERYTHROCYTE [DISTWIDTH] IN BLOOD BY AUTOMATED COUNT: 13.8 % (ref 11.5–14.5)
GLOBULIN SER CALC-MCNC: 4.4 G/DL (ref 2–4)
GLUCOSE BLD STRIP.AUTO-MCNC: 155 MG/DL (ref 65–117)
GLUCOSE BLD STRIP.AUTO-MCNC: 189 MG/DL (ref 65–117)
GLUCOSE SERPL-MCNC: 169 MG/DL (ref 65–100)
HCT VFR BLD AUTO: 24.9 % (ref 35–47)
HGB BLD-MCNC: 8.1 G/DL (ref 11.5–16)
IMM GRANULOCYTES # BLD AUTO: 0 K/UL (ref 0–0.04)
IMM GRANULOCYTES NFR BLD AUTO: 0 % (ref 0–0.5)
LYMPHOCYTES # BLD: 1.2 K/UL (ref 0.8–3.5)
LYMPHOCYTES NFR BLD: 20 % (ref 12–49)
MCH RBC QN AUTO: 27.7 PG (ref 26–34)
MCHC RBC AUTO-ENTMCNC: 32.5 G/DL (ref 30–36.5)
MCV RBC AUTO: 85.3 FL (ref 80–99)
MONOCYTES # BLD: 0.4 K/UL (ref 0–1)
MONOCYTES NFR BLD: 7 % (ref 5–13)
NEUTS SEG # BLD: 4 K/UL (ref 1.8–8)
NEUTS SEG NFR BLD: 69 % (ref 32–75)
NRBC # BLD: 0 K/UL (ref 0–0.01)
NRBC BLD-RTO: 0 PER 100 WBC
PLATELET # BLD AUTO: 165 K/UL (ref 150–400)
PMV BLD AUTO: 9 FL (ref 8.9–12.9)
POTASSIUM SERPL-SCNC: 4.1 MMOL/L (ref 3.5–5.1)
PROT SERPL-MCNC: 7.1 G/DL (ref 6.4–8.2)
RBC # BLD AUTO: 2.92 M/UL (ref 3.8–5.2)
SERVICE CMNT-IMP: ABNORMAL
SERVICE CMNT-IMP: ABNORMAL
SODIUM SERPL-SCNC: 139 MMOL/L (ref 136–145)
SOURCE, COVRS: NORMAL
TROPONIN I SERPL-MCNC: <0.05 NG/ML
WBC # BLD AUTO: 5.9 K/UL (ref 3.6–11)

## 2021-08-25 PROCEDURE — 85025 COMPLETE CBC W/AUTO DIFF WBC: CPT

## 2021-08-25 PROCEDURE — 73630 X-RAY EXAM OF FOOT: CPT

## 2021-08-25 PROCEDURE — 83880 ASSAY OF NATRIURETIC PEPTIDE: CPT

## 2021-08-25 PROCEDURE — 74011250636 HC RX REV CODE- 250/636: Performed by: EMERGENCY MEDICINE

## 2021-08-25 PROCEDURE — 36415 COLL VENOUS BLD VENIPUNCTURE: CPT

## 2021-08-25 PROCEDURE — 96374 THER/PROPH/DIAG INJ IV PUSH: CPT

## 2021-08-25 PROCEDURE — 97161 PT EVAL LOW COMPLEX 20 MIN: CPT

## 2021-08-25 PROCEDURE — 99284 EMERGENCY DEPT VISIT MOD MDM: CPT

## 2021-08-25 PROCEDURE — 99223 1ST HOSP IP/OBS HIGH 75: CPT | Performed by: INTERNAL MEDICINE

## 2021-08-25 PROCEDURE — 71045 X-RAY EXAM CHEST 1 VIEW: CPT

## 2021-08-25 PROCEDURE — 82962 GLUCOSE BLOOD TEST: CPT

## 2021-08-25 PROCEDURE — 87635 SARS-COV-2 COVID-19 AMP PRB: CPT

## 2021-08-25 PROCEDURE — 96376 TX/PRO/DX INJ SAME DRUG ADON: CPT

## 2021-08-25 PROCEDURE — 74011250637 HC RX REV CODE- 250/637: Performed by: INTERNAL MEDICINE

## 2021-08-25 PROCEDURE — 74011636637 HC RX REV CODE- 636/637: Performed by: INTERNAL MEDICINE

## 2021-08-25 PROCEDURE — 73620 X-RAY EXAM OF FOOT: CPT

## 2021-08-25 PROCEDURE — 73600 X-RAY EXAM OF ANKLE: CPT

## 2021-08-25 PROCEDURE — 80053 COMPREHEN METABOLIC PANEL: CPT

## 2021-08-25 PROCEDURE — 84484 ASSAY OF TROPONIN QUANT: CPT

## 2021-08-25 PROCEDURE — 74011250636 HC RX REV CODE- 250/636: Performed by: INTERNAL MEDICINE

## 2021-08-25 PROCEDURE — 65660000000 HC RM CCU STEPDOWN

## 2021-08-25 PROCEDURE — 97116 GAIT TRAINING THERAPY: CPT

## 2021-08-25 RX ORDER — LANOLIN ALCOHOL/MO/W.PET/CERES
400 CREAM (GRAM) TOPICAL DAILY
Status: DISCONTINUED | OUTPATIENT
Start: 2021-08-26 | End: 2021-08-28 | Stop reason: HOSPADM

## 2021-08-25 RX ORDER — DEXTROSE 50 % IN WATER (D50W) INTRAVENOUS SYRINGE
12.5-25 AS NEEDED
Status: DISCONTINUED | OUTPATIENT
Start: 2021-08-25 | End: 2021-08-28 | Stop reason: HOSPADM

## 2021-08-25 RX ORDER — INSULIN GLARGINE 100 [IU]/ML
20 INJECTION, SOLUTION SUBCUTANEOUS
Status: DISCONTINUED | OUTPATIENT
Start: 2021-08-25 | End: 2021-08-28 | Stop reason: HOSPADM

## 2021-08-25 RX ORDER — TRAMADOL HYDROCHLORIDE 50 MG/1
50 TABLET ORAL
Status: DISCONTINUED | OUTPATIENT
Start: 2021-08-25 | End: 2021-08-28 | Stop reason: HOSPADM

## 2021-08-25 RX ORDER — ACETAMINOPHEN 325 MG/1
650 TABLET ORAL
Status: DISCONTINUED | OUTPATIENT
Start: 2021-08-25 | End: 2021-08-28 | Stop reason: HOSPADM

## 2021-08-25 RX ORDER — ACETAMINOPHEN 650 MG/1
650 SUPPOSITORY RECTAL
Status: DISCONTINUED | OUTPATIENT
Start: 2021-08-25 | End: 2021-08-28 | Stop reason: HOSPADM

## 2021-08-25 RX ORDER — ENOXAPARIN SODIUM 100 MG/ML
40 INJECTION SUBCUTANEOUS EVERY 12 HOURS
Status: DISCONTINUED | OUTPATIENT
Start: 2021-08-26 | End: 2021-08-28 | Stop reason: HOSPADM

## 2021-08-25 RX ORDER — POLYETHYLENE GLYCOL 3350 17 G/17G
17 POWDER, FOR SOLUTION ORAL DAILY PRN
Status: DISCONTINUED | OUTPATIENT
Start: 2021-08-25 | End: 2021-08-28 | Stop reason: HOSPADM

## 2021-08-25 RX ORDER — INSULIN LISPRO 100 [IU]/ML
INJECTION, SOLUTION INTRAVENOUS; SUBCUTANEOUS
Status: DISCONTINUED | OUTPATIENT
Start: 2021-08-25 | End: 2021-08-28 | Stop reason: HOSPADM

## 2021-08-25 RX ORDER — FUROSEMIDE 10 MG/ML
60 INJECTION INTRAMUSCULAR; INTRAVENOUS 2 TIMES DAILY
Status: DISCONTINUED | OUTPATIENT
Start: 2021-08-25 | End: 2021-08-27

## 2021-08-25 RX ORDER — ONDANSETRON 2 MG/ML
4 INJECTION INTRAMUSCULAR; INTRAVENOUS
Status: DISCONTINUED | OUTPATIENT
Start: 2021-08-25 | End: 2021-08-28 | Stop reason: HOSPADM

## 2021-08-25 RX ORDER — CLOPIDOGREL BISULFATE 75 MG/1
75 TABLET ORAL DAILY
Status: DISCONTINUED | OUTPATIENT
Start: 2021-08-26 | End: 2021-08-28 | Stop reason: HOSPADM

## 2021-08-25 RX ORDER — FLUTICASONE PROPIONATE 50 MCG
2 SPRAY, SUSPENSION (ML) NASAL DAILY
Status: DISCONTINUED | OUTPATIENT
Start: 2021-08-26 | End: 2021-08-28 | Stop reason: HOSPADM

## 2021-08-25 RX ORDER — ONDANSETRON 4 MG/1
4 TABLET, ORALLY DISINTEGRATING ORAL
Status: DISCONTINUED | OUTPATIENT
Start: 2021-08-25 | End: 2021-08-28 | Stop reason: HOSPADM

## 2021-08-25 RX ORDER — SODIUM CHLORIDE 0.9 % (FLUSH) 0.9 %
5-40 SYRINGE (ML) INJECTION EVERY 8 HOURS
Status: DISCONTINUED | OUTPATIENT
Start: 2021-08-25 | End: 2021-08-28 | Stop reason: HOSPADM

## 2021-08-25 RX ORDER — FUROSEMIDE 10 MG/ML
120 INJECTION INTRAMUSCULAR; INTRAVENOUS
Status: COMPLETED | OUTPATIENT
Start: 2021-08-25 | End: 2021-08-25

## 2021-08-25 RX ORDER — INSULIN LISPRO 100 [IU]/ML
10 INJECTION, SOLUTION INTRAVENOUS; SUBCUTANEOUS
Status: DISCONTINUED | OUTPATIENT
Start: 2021-08-25 | End: 2021-08-28 | Stop reason: HOSPADM

## 2021-08-25 RX ORDER — ATORVASTATIN CALCIUM 40 MG/1
80 TABLET, FILM COATED ORAL
Status: DISCONTINUED | OUTPATIENT
Start: 2021-08-25 | End: 2021-08-28 | Stop reason: HOSPADM

## 2021-08-25 RX ORDER — ASPIRIN 81 MG/1
81 TABLET ORAL DAILY
Status: DISCONTINUED | OUTPATIENT
Start: 2021-08-26 | End: 2021-08-28 | Stop reason: HOSPADM

## 2021-08-25 RX ORDER — ALBUTEROL SULFATE 90 UG/1
2 AEROSOL, METERED RESPIRATORY (INHALATION)
Status: DISCONTINUED | OUTPATIENT
Start: 2021-08-25 | End: 2021-08-28 | Stop reason: HOSPADM

## 2021-08-25 RX ORDER — METOPROLOL TARTRATE 25 MG/1
12.5 TABLET, FILM COATED ORAL 2 TIMES DAILY
Status: DISCONTINUED | OUTPATIENT
Start: 2021-08-25 | End: 2021-08-26

## 2021-08-25 RX ORDER — SODIUM CHLORIDE 0.9 % (FLUSH) 0.9 %
5-40 SYRINGE (ML) INJECTION AS NEEDED
Status: DISCONTINUED | OUTPATIENT
Start: 2021-08-25 | End: 2021-08-28 | Stop reason: HOSPADM

## 2021-08-25 RX ORDER — GLIPIZIDE 5 MG/1
5 TABLET ORAL DAILY
Status: DISCONTINUED | OUTPATIENT
Start: 2021-08-26 | End: 2021-08-28 | Stop reason: HOSPADM

## 2021-08-25 RX ORDER — AMLODIPINE BESYLATE 5 MG/1
10 TABLET ORAL DAILY
Status: DISCONTINUED | OUTPATIENT
Start: 2021-08-26 | End: 2021-08-28 | Stop reason: HOSPADM

## 2021-08-25 RX ORDER — MAGNESIUM SULFATE 100 %
4 CRYSTALS MISCELLANEOUS AS NEEDED
Status: DISCONTINUED | OUTPATIENT
Start: 2021-08-25 | End: 2021-08-28 | Stop reason: HOSPADM

## 2021-08-25 RX ORDER — AMITRIPTYLINE HYDROCHLORIDE 50 MG/1
50 TABLET, FILM COATED ORAL
Status: DISCONTINUED | OUTPATIENT
Start: 2021-08-25 | End: 2021-08-28 | Stop reason: HOSPADM

## 2021-08-25 RX ORDER — POLYETHYLENE GLYCOL 3350 17 G/17G
17 POWDER, FOR SOLUTION ORAL
COMMUNITY
End: 2021-12-01

## 2021-08-25 RX ORDER — GABAPENTIN 300 MG/1
900 CAPSULE ORAL 2 TIMES DAILY
Status: DISCONTINUED | OUTPATIENT
Start: 2021-08-25 | End: 2021-08-28 | Stop reason: HOSPADM

## 2021-08-25 RX ADMIN — INSULIN GLARGINE 20 UNITS: 100 INJECTION, SOLUTION SUBCUTANEOUS at 22:53

## 2021-08-25 RX ADMIN — INSULIN LISPRO 10 UNITS: 100 INJECTION, SOLUTION INTRAVENOUS; SUBCUTANEOUS at 19:32

## 2021-08-25 RX ADMIN — GABAPENTIN 900 MG: 300 CAPSULE ORAL at 19:32

## 2021-08-25 RX ADMIN — FUROSEMIDE 60 MG: 10 INJECTION, SOLUTION INTRAMUSCULAR; INTRAVENOUS at 19:32

## 2021-08-25 RX ADMIN — TRAMADOL HYDROCHLORIDE 50 MG: 50 TABLET, FILM COATED ORAL at 22:55

## 2021-08-25 RX ADMIN — AMITRIPTYLINE HYDROCHLORIDE 50 MG: 50 TABLET, FILM COATED ORAL at 22:53

## 2021-08-25 RX ADMIN — Medication 10 ML: at 22:53

## 2021-08-25 RX ADMIN — METOPROLOL TARTRATE 12.5 MG: 25 TABLET, FILM COATED ORAL at 19:32

## 2021-08-25 RX ADMIN — ATORVASTATIN CALCIUM 80 MG: 40 TABLET, FILM COATED ORAL at 22:52

## 2021-08-25 RX ADMIN — Medication 10 ML: at 22:54

## 2021-08-25 RX ADMIN — FUROSEMIDE 120 MG: 10 INJECTION, SOLUTION INTRAMUSCULAR; INTRAVENOUS at 12:17

## 2021-08-25 NOTE — PROGRESS NOTES
Problem: Mobility Impaired (Adult and Pediatric)  Goal: *Acute Goals and Plan of Care (Insert Text)  Description: FUNCTIONAL STATUS PRIOR TO ADMISSION: Pt lives in an apartment with her sister, sister's boyfriend and her son who is autistic. She reports 6 month decline in function, swelling increase in LEs over last 2 months and significant fall hx with 6 falls this past week but also average of 2 falls per week prior. She amb with a rollator. Pt states her legs get weak when she walks with the swelling and that's why she falls but also states that she does not like to request assist for anything and does not ask for help at home. HOME SUPPORT PRIOR TO ADMISSION: The patient lived with family. Physical Therapy Goals  Initiated 8/25/2021  1. Patient will move from supine to sit and sit to supine , scoot up and down, and roll side to side in bed with modified independence within 7 day(s). 2.  Patient will transfer from bed to chair and chair to bed with modified independence using the least restrictive device within 7 day(s). 3.  Patient will perform sit to stand with modified independence within 7 day(s). 4.  Patient will ambulate with modified independence for 100 feet with the least restrictive device within 7 day(s). Outcome: Not Met   PHYSICAL THERAPY EMERGENCY DEPARTMENT EVALUATION     Patient: Sofie Aguilar (57 y.o. female)  Date: 8/25/2021  Primary Diagnosis: No admission diagnoses are documented for this encounter. Precautions: fall       ASSESSMENT  Based on the objective data described below, the patient presents with decline in function over last 6 months with frequent falls average of 2/week but 6 falls this week. She states she does not like to ask for assist at home. She states she needed some help with her pants today for dressing. Pt c/o L foot pain from falls but xrays neg. She is able to come to sitting on edge of stretcher with CGA.  She stands with CGA to rolling walker. She amb with rolling walker with CGA with slow pace and decreased foot clearance, tolerating ~35' and showing some increased difficulty with legs as distance increased but still CGA. She required max A of 2 to get back up onto high stretcher. Vitals at rest: on RA: 98%, HR 94, /61            Post amb: on RA: 94%, , /72  Current Level of Function Impacting Discharge (mobility/balance): Overall CGA (except back onto higher stretcher), slow movement, hx falls, chronic decline in function over last 6 months    Functional Outcome Measure: The patient scored 45/100 on the barthel outcome measure which is indicative of 55% functional impairment. Other factors to consider for discharge: Pt was seen on 8/10 and 8/18 for salvador, tx session on 8/12; Her function is similar to slightly decreased with the increased falls at home     Patient will benefit from continued physical therapy if admitted. PLAN :  Recommendations and Planned Interventions: bed mobility training, transfer training, gait training, therapeutic exercises, patient and family training/education, and therapeutic activities      Frequency/Duration: Patient will be followed by physical therapy:  4 times a week to address goals. Recommendation for discharge: (in order for the patient to meet his/her long term goals)  To be determined: Pt may benefit from short term rehab prior to return home given her recent increase in falls, however decline seems to be over a period of time and pt may need more assist at home or may need to ok with accepting more assist at home to decrease her number of falls. She would benefit from use of rolling walker at this time to give increased stability.     This discharge recommendation:  Has been made in collaboration with the attending provider and/or case management    Equipment recommendations for successful discharge (if) home: walker: rolling     SUBJECTIVE:   Patient stated My L foot hurts.    OBJECTIVE DATA SUMMARY:   HISTORY:    Past Medical History:   Diagnosis Date    Arthritis     Asthma     CHF (congestive heart failure) (HCC)     Chronic back pain     Diabetes (HCC)     Diabetic retinopathy (HCC)     Hypertension     MRSA (methicillin resistant staph aureus) culture positive     labial abscess    Neuropathy      Past Surgical History:   Procedure Laterality Date    HX HEENT      tonsillectomy    HX ORTHOPAEDIC      left ft bunionectomy    HX OTHER SURGICAL      lanced labial abscess       Home Situation  Home Environment: Apartment  # Steps to Enter: 0  Wheelchair Ramp: Yes  One/Two Story Residence: One story  Living Alone: No  Support Systems: Child(jesus manuel), Family member(s)  Current DME Used/Available at Home: Commode, bedside, Grab bars, Walker, rollator  Tub or Shower Type: Tub/Shower combination    EXAMINATION/PRESENTATION/DECISION MAKING:   Critical Behavior:  Neurologic State: Alert  Orientation Level: Oriented X4  Cognition: Follows commands     Hearing: Auditory  Auditory Impairment: None    Range Of Motion:  AROM: Generally decreased, functional           PROM: Generally decreased, functional (limited LEs by swelling and adipose tissue)           Strength:    Strength: Generally decreased, functional                    Tone & Sensation:   Tone: Normal              Sensation: Impaired (with swelling LEs)               Coordination:  Coordination: Within functional limits           Functional Mobility:  Bed Mobility:     Supine to Sit: Contact guard assistance  Sit to Supine: Maximum assistance;Assist x2; Other (comment) (due to height of stretcher in ED)  Scooting: Contact guard assistance  Transfers:  Sit to Stand: Contact guard assistance; Other (comment) (from ED stretcher)  Stand to Sit: Contact guard assistance                       Balance:   Sitting: Intact  Standing: Impaired  Standing - Static: Fair;Constant support; Other (comment) (RW)  Standing - Dynamic : Fair;Constant support; Other (comment) (RW)  Ambulation/Gait Training:  Distance (ft): 35 Feet (ft)  Assistive Device: Gait belt;Walker, rolling  Ambulation - Level of Assistance: Contact guard assistance        Gait Abnormalities: Decreased step clearance        Base of Support: Widened     Speed/Vangie: Slow;Pace decreased (<100 feet/min)  Step Length: Left shortened;Right shortened             Functional Measure:  Barthel Index:    Bathin  Bladder: 5  Bowels: 10  Groomin  Dressin  Feeding: 10  Mobility: 0  Stairs: 0  Toilet Use: 5  Transfer (Bed to Chair and Back): 10  Total: 45/100       The Barthel ADL Index: Guidelines  1. The index should be used as a record of what a patient does, not as a record of what a patient could do. 2. The main aim is to establish degree of independence from any help, physical or verbal, however minor and for whatever reason. 3. The need for supervision renders the patient not independent. 4. A patient's performance should be established using the best available evidence. Asking the patient, friends/relatives and nurses are the usual sources, but direct observation and common sense are also important. However direct testing is not needed. 5. Usually the patient's performance over the preceding 24-48 hours is important, but occasionally longer periods will be relevant. 6. Middle categories imply that the patient supplies over 50 per cent of the effort. 7. Use of aids to be independent is allowed. Marlene Maddox., Barthel, DMckinleyW. (). Functional evaluation: the Barthel Index. [address] [facility] (14)2. Thong Whitney dwayne YARELI ThorneJBRITTNEY, Karen Burnett., Delorese Jodeeain., [place], [address] [place] [address] (). Measuring the change indisability after inpatient rehabilitation; comparison of the responsiveness of the Barthel Index and Functional [place] Measure. Journal of Neurology, Neurosurgery, and Psychiatry, 66(4), 107-337.   Rody Dominique NMckinleyJJS, KAMILLA Ramirez, Haynes Snellen, M.A. (2004.) Assessment of post-stroke quality of life in cost-effectiveness studies: The usefulness of the Barthel Index and the EuroQoL-5D. Quality of Life Research, 15, 946-29           Physical Therapy Evaluation Charge Determination   History Examination Presentation Decision-Making   HIGH Complexity :3+ comorbidities / personal factors will impact the outcome/ POC  HIGH Complexity : 4+ Standardized tests and measures addressing body structure, function, activity limitation and / or participation in recreation  MEDIUM Complexity : Evolving with changing characteristics  LOW Complexity : FOTO score of       Based on the above components, the patient evaluation is determined to be of the following complexity level: LOW      Pain:  Pain Scale 1: Numeric (0 - 10)  Pain Intensity 1: 7  Pain Location 1: Elbow; Foot  Activity Tolerance:   Fair    After treatment patient left in no apparent distress:   Supine in bed, Call bell within reach, and stretcher rails up    COMMUNICATION/EDUCATION:   Communication/Collaboration:  Fall prevention education was provided and the patient/caregiver indicated understanding., Patient/family have participated as able in goal setting and plan of care. , and Patient/family agree to work toward stated goals and plan of care. Findings and recommendations were discussed with: MD physician and Case management.      Thank you for this referral.  Abdoulaye Samuel, PT   Time Calculation: 20 mins

## 2021-08-25 NOTE — H&P
Hospitalist Admission Note    NAME: Julia Abdalla   :  1965   MRN:  [id number]     Date/Time:  2021 2:02 PM    Patient PCP: Manuel Harden MD cardiology= Dr Becka Dennis  ______________________________________________________________________  Given the patient's current clinical presentation, I have a high level of concern for decompensation if discharged from the emergency department. Complex decision making was performed, which includes reviewing the patient's available past medical records, laboratory results, and x-ray films. My assessment of this patient's clinical condition and my plan of care is as follows.     Assessment / Plan:  Acute on chronic diastolic heart failure POA  Causing mild pulmonary edema POA   troponin negative  proBNP   Rapid Covid test negative  Recent 2D echo at [facility] EF 60 to 65%, mild LVH  EKG sinus rhythm with first-degree AV block 85 bpm  Chest x-ray= mild pulmonary edema likely    Admit to remote telemetry bed  Oxygen to keep sats greater than 89%, currently on room air oxygen in ER at rest  Status post IV Lasix 120 mg x 1 in the ED-patient has been putting out good urine output since  Cardiac diet with salt and fluid restrictions with diabetic restrictions  Strict I's and O's  Daily weight  Inpatient cardiology consulted for further recommendations  Will defer 2D echo as it was just done 2 weeks ago at [facility]  Continue metoprolol, aspirin  Not able to start ACE/ARB due to CKD    EMMANUEL POA-creatinine 2.3 in ER  CKD stage 3/4 POA-baseline creatinine ~ 1.4  Watch creatinine/BMP closely with aggressive IV diuresis  Inpatient nephrology consult for further recommendations and guidance  Patient recently taken off Aldactone    Diabetes type 2 uncontrolled with hyperglycemia POA  Continue home Lantus 20 units nightly  Continue home lispro 10 units before every meal  Sliding scale lispro for correction scale as needed with fingersticks before every meal and at bedtime    Generalized weakness with frequent falls at home POA  Left ankle x-ray negative for acute fracture  Left foot x-ray negative for any acute injuries    Inpatient PT OT eval for DC planning likely needs placement  Case management consulted for DC planning    Other comorbidities:  Hypertension  Hyperlipidemia  History of stroke  Morbid obesity      Code Status: Full code  Surrogate Decision Maker: Sister    DVT Prophylaxis: Subcu heparin  GI Prophylaxis: not indicated    Baseline: Patient lives with sister and rest of the family has been falling around a lot at home in the past few days, sent by home health care nurse to ED for likely placement      Subjective:   CHIEF COMPLAINT: Generalized weakness with worsening lower extremity edema x1 week    HISTORY OF PRESENT ILLNESS:     Winfred Homans is a 54 y.o.  female who presents with above complaints from home via EMS. Patient was sent from home at the direction of home health care nurse after patient was found to be having worsening lower extremity edema with generalized weakness and multiple falls at home in past 1 week. Patient denies any history of chest pain, shortness of breath, nausea vomiting diarrhea or any other urinary complaints suggestive of an infective process  History of fully vaccinated for COVID-19  Denies any history of contact with COVID-19 positive case  History of recent admission at [facility] where she was aggressively diuresed in the hospital and was sent home with home health care services. We were asked to admit for work up and evaluation of the above problems.      Past Medical History:   Diagnosis Date    Arthritis     Asthma     CHF (congestive heart failure) (HCC)     Chronic back pain     Diabetes [facility]     Diabetic retinopathy ([facility]     Hypertension     MRSA (methicillin resistant staph aureus) culture positive     labial abscess    Neuropathy         Past Surgical History:   Procedure Laterality Date    HX HEENT      tonsillectomy    HX ORTHOPAEDIC      left ft bunionectomy    HX OTHER SURGICAL      lanced labial abscess       Social History     Tobacco Use    Smoking status: Never Smoker    Smokeless tobacco: Never Used   Substance Use Topics    Alcohol use: No      Family history  History of diabetes and heart disease in family      Allergies   Allergen Reactions    Amoxicillin Nausea Only    Aspirin Other (comments)     \"nosebleeds\" but patient takes daily aspirin 81 mg at home. Patient states naproxen ok    Ciprofloxacin Hives        Prior to Admission medications    Medication Sig Start Date End Date Taking? Authorizing Provider   glipiZIDE (GLUCOTROL) 5 mg tablet Take 5 mg by mouth daily. Take One Tablet By Mouth Daily    Provider, Historical   magnesium oxide (MAG-OX) 400 mg tablet Take 400 mg by mouth daily. Provider, Historical   canagliflozin (INVOKANA) 100 mg tablet Take 100 mg by mouth daily. Take one tablet by mouth daily    Provider, Historical   torsemide (DEMADEX) 20 mg tablet Take 40 mg (2 tablets) in the morning, 20 mg (1 tablet) in the evening. 8/12/21   Dragan Wetzel MD   metoprolol tartrate (LOPRESSOR) 25 mg tablet Take 0.5 Tablets by mouth two (2) times a day. 8/12/21   Dragan Wetzel MD   gabapentin (NEURONTIN) 300 mg capsule Take 3 Capsules by mouth two (2) times a day. Max Daily Amount: 1,800 mg. 8/12/21   Dragan Wetzel MD   nystatin (MYCOSTATIN) powder Apply  to affected area two (2) times a day. 8/12/21   Dragan Wetzel MD   insulin lispro (HumaLOG U-100 Insulin) 100 unit/mL injection 10 Units by SubCUTAneous route Before breakfast, lunch, and dinner. Indications: high blood sugar, type 2 diabetes mellitus 8/12/21   Dragan Wetzel MD   amLODIPine (NORVASC) 10 mg tablet Take 10 mg by mouth daily.     Provider, Historical   Flovent  mcg/actuation inhaler Take 2 Puffs by inhalation two (2) times a day. 8/2/21   Provider, Historical   albuterol (PROVENTIL HFA, VENTOLIN HFA, PROAIR HFA) 90 mcg/actuation inhaler Take 2 Puffs by inhalation every six (6) hours as needed for Wheezing. Provider, Historical   traMADoL (ULTRAM) 50 mg tablet Take 50 mg by mouth three (3) times daily as needed for Pain. 2/26/21   Provider, Historical   butalb/acetaminophen/caffeine (FIORICET PO) 1 tab, PO, every 4 hours, PRN: for headache, do not take with tramadol - may use instead of tramadol prn HA, 2 Refills, Dispense: 60, tab, [facility] 12/16/20   Provider, Historical   Lantus Solostar U-100 Insulin 100 unit/mL (3 mL) inpn 20 Units by SubCUTAneous route nightly. 6/8/21   Provider, Historical   fluticasone propionate (Flonase Allergy Relief) 50 mcg/actuation nasal spray 2 SPRAY, Each Nostril, daily, for allergy symptoms, 11 Refills, Dispense: 1, bottle, [facility] 10/24/20   Provider, Historical   aspirin delayed-release 81 mg tablet Take 81 mg by mouth daily. Provider, Historical   polyethylene glycol (MIRALAX) 17 gram packet Take 1 Packet by mouth daily. Hold for diarrhea  Patient taking differently: Take 17 g by mouth daily as needed. Hold for diarrhea 8/18/20   Laury Oviedo MD   amitriptyline (ELAVIL) 50 mg tablet Take 50 mg by mouth nightly. Provider, Historical   docusate sodium (Colace) 100 mg capsule Take 100 mg by mouth two (2) times daily as needed. Provider, Historical   atorvastatin (LIPITOR) 80 mg tablet Take 1 Tab by mouth nightly. 12/22/18   Kenyatta DEWITT DO   clopidogrel (PLAVIX) 75 mg tab Take 1 Tab by mouth daily.  12/23/18   Kenyatta DEWITT DO       REVIEW OF SYSTEMS:         Total of 12 systems reviewed as follows:       POSITIVE= underlined text  Negative = text not underlined  General:  fever, chills, sweats, generalized weakness, weight loss/gain,      loss of appetite   Eyes:    blurred vision, eye pain, loss of vision, double vision  ENT: rhinorrhea, pharyngitis   Respiratory:   cough, sputum production, SOB, PENA, wheezing, pleuritic pain   Cardiology:   chest pain, palpitations, orthopnea, PND, edema, syncope   Gastrointestinal:  abdominal pain , N/V, diarrhea, dysphagia, constipation, bleeding   Genitourinary:  frequency, urgency, dysuria, hematuria, incontinence   Muskuloskeletal :  arthralgia, myalgia, back pain  Hematology:  easy bruising, nose or gum bleeding, lymphadenopathy   Dermatological: rash, ulceration, pruritis, color change / jaundice  Endocrine:   hot flashes or polydipsia   Neurological:  headache, dizziness, confusion, focal weakness, paresthesia,     Speech difficulties, memory loss, gait difficulty  Psychological: Feelings of anxiety, depression, agitation    Objective:   VITALS:    Visit Vitals  BP (!) 165/66   Pulse 95   Temp 98.2 °F (36.8 °C)   Resp 18   Ht 5' 5\" (1.651 m)   Wt 132.3 kg (291 lb 10.7 oz)   SpO2 98%   BMI 48.54 kg/m²       PHYSICAL EXAM:    General:    Alert, cooperative, no distress, appears stated age. Morbidly obese+  HEENT: Atraumatic, anicteric sclerae, pink conjunctivae     No oral ulcers, mucosa moist, throat clear, dentition fair  Neck:  Supple, symmetrical,  thyroid: non tender  Lungs:   Clear to auscultation bilaterally. No Wheezing or Rhonchi. No rales. Chest wall:  No tenderness  No Accessory muscle use. Heart:   Regular  rhythm,  No  murmur   3+ pitting lower extremity edema noted+  Abdomen:   Soft, non-tender. Not distended. Bowel sounds normal  Extremities: No cyanosis. No clubbing,      Skin turgor normal, Capillary refill normal, Radial dial pulse 2+  Skin:     Not pale. Not Jaundiced  No rashes   Psych:  Good insight. Not depressed. Not anxious or agitated. Neurologic: EOMs intact. No facial asymmetry. No aphasia or slurred speech. Symmetrical strength, Sensation grossly intact. Alert and oriented X 4. _______________________________________________________________________  Care Plan discussed with:    Comments   Patient x    Family      RN x    Care Manager                    Consultant:      _______________________________________________________________________  Expected  Disposition:   Home with Family    HH/PT/OT/RN    SNF/LTC x   [facility]    ________________________________________________________________________  TOTAL TIME:  46 Minutes    Critical Care Provided     Minutes non procedure based      Comments    x Reviewed previous records   >50% of visit spent in counseling and coordination of care x Discussion with patient and questions answered       ________________________________________________________________________  Signed: Rogelio Miranda MD    Procedures: see electronic medical records for all procedures/Xrays and details which were not copied into this note but were reviewed prior to creation of Plan. LAB DATA REVIEWED:    Recent Results (from the past 24 hour(s))   CBC WITH AUTOMATED DIFF    Collection Time: 08/25/21 11:59 AM   Result Value Ref Range    WBC 5.9 3.6 - 11.0 K/uL    RBC 2.92 (L) 3.80 - 5.20 M/uL    HGB 8.1 (L) 11.5 - 16.0 g/dL    HCT 24.9 (L) 35.0 - 47.0 %    MCV 85.3 80.0 - 99.0 FL    MCH 27.7 26.0 - 34.0 PG    MCHC 32.5 30.0 - 36.5 g/dL    RDW 13.8 11.5 - 14.5 %    PLATELET 944 244 - 216 K/uL    MPV 9.0 8.9 - 12.9 FL    NRBC 0.0 0  WBC    ABSOLUTE NRBC 0.00 0.00 - 0.01 K/uL    NEUTROPHILS 69 32 - 75 %    LYMPHOCYTES 20 12 - 49 %    MONOCYTES 7 5 - 13 %    EOSINOPHILS 4 0 - 7 %    BASOPHILS 0 0 - 1 %    IMMATURE GRANULOCYTES 0 0.0 - 0.5 %    ABS. NEUTROPHILS 4.0 1.8 - 8.0 K/UL    ABS. LYMPHOCYTES 1.2 0.8 - 3.5 K/UL    ABS. MONOCYTES 0.4 0.0 - 1.0 K/UL    ABS. EOSINOPHILS 0.3 0.0 - 0.4 K/UL    ABS. BASOPHILS 0.0 0.0 - 0.1 K/UL    ABS. IMM.  GRANS. 0.0 0.00 - 0.04 K/UL    DF AUTOMATED     METABOLIC PANEL, COMPREHENSIVE    Collection Time: 08/25/21 11:59 AM   Result Value Ref Range    Sodium 139 136 - 145 mmol/L    Potassium 4.1 3.5 - 5.1 mmol/L    Chloride 108 97 - 108 mmol/L    CO2 26 21 - 32 mmol/L    Anion gap 5 5 - 15 mmol/L    Glucose 169 (H) 65 - 100 mg/dL    BUN 19 6 - 20 MG/DL    Creatinine 2.31 (H) 0.55 - 1.02 MG/DL    BUN/Creatinine ratio 8 (L) 12 - 20      GFR est AA 27 (L) >60 ml/min/1.73m2    GFR est non-AA 22 (L) >60 ml/min/1.73m2    Calcium 8.4 (L) 8.5 - 10.1 MG/DL    Bilirubin, total 0.4 0.2 - 1.0 MG/DL    ALT (SGPT) 13 12 - 78 U/L    AST (SGOT) 14 (L) 15 - 37 U/L    Alk.  phosphatase 104 45 - 117 U/L    Protein, total 7.1 6.4 - 8.2 g/dL    Albumin 2.7 (L) 3.5 - 5.0 g/dL    Globulin 4.4 (H) 2.0 - 4.0 g/dL    A-G Ratio 0.6 (L) 1.1 - 2.2     NT-PRO BNP    Collection Time: 08/25/21 11:59 AM   Result Value Ref Range    NT pro-BNP 2,031 (H) <125 PG/ML   TROPONIN I    Collection Time: 08/25/21 11:59 AM   Result Value Ref Range    Troponin-I, Qt. <0.05 <0.05 ng/mL

## 2021-08-25 NOTE — PROGRESS NOTES
Physical Therapy    Received new PT order. Pt seen earlier in day in ED for PT eval. Please see for details.     Amy Calixto, PT

## 2021-08-25 NOTE — PROGRESS NOTES
Today we completed Advanced Medical Directive for Healthcare. Pt has elected for sister, Elena Weir, to be primary decision maker and for nephew, Jefferson Rodriguez to be secondary decision maker. New AMD has been placed on bedside chart to be scanned into electronic medical record. Decision Makers have been updated in chart.     Luanne Sahu [address], [facility]

## 2021-08-25 NOTE — ED PROVIDER NOTES
EMERGENCY DEPARTMENT HISTORY AND PHYSICAL EXAM      Date: 8/25/2021  Patient Name: Jennifer Ray  Patient Age and Sex: 54 y.o. female     History of Presenting Illness     Chief Complaint   Patient presents with    Leg Swelling       History Provided By: Patient    HPI: Jennifer Ray is a 54year old history CHF, DM, HTN, CKD presenting with leg swelling, falls, and decreased ability to ambulate at home. Patient reports she has had decreased ability to ambulate at home, 6 falls in the past week. She is able to ambulate the walker which has been ongoing for several months. She reports associated with this decreased ability to ambulate she has had worsening leg swelling and abdominal swelling as well as dyspnea. The shortness of breath is worse with lying flat and she endorses paroxysmal nocturnal dyspnea. She is on 2 L nasal cannula at home at baseline for his CHF. She takes torsemide 40 mg twice a day which she reports she is compliant with. She reports that she has never struck her head during the fall also reports left elbow pain and left foot pain following a fall recently. She was evaluated at [facility] for her left elbow pain with a negative x-ray. She denies chest pain. Patient reports that she was evaluated by home physical therapy today who deemed her more appropriate for inpatient physical therapy. This is corroborated by her home health nurse, per EMS report. There are no other complaints, changes, or physical findings at this time. PCP: Conrad Torres MD    No current facility-administered medications on file prior to encounter. Current Outpatient Medications on File Prior to Encounter   Medication Sig Dispense Refill    polyethylene glycol (Miralax) 17 gram packet Take 17 g by mouth daily as needed for Constipation.  magnesium oxide (MAG-OX) 400 mg tablet Take 400 mg by mouth daily.       canagliflozin (INVOKANA) 100 mg tablet Take 100 mg by mouth daily. Take one tablet by mouth daily      torsemide (DEMADEX) 20 mg tablet Take 40 mg (2 tablets) in the morning, 20 mg (1 tablet) in the evening. 90 Tablet 0    metoprolol tartrate (LOPRESSOR) 25 mg tablet Take 0.5 Tablets by mouth two (2) times a day. 30 Tablet 0    gabapentin (NEURONTIN) 300 mg capsule Take 3 Capsules by mouth two (2) times a day. Max Daily Amount: 1,800 mg. 90 Capsule 0    nystatin (MYCOSTATIN) powder Apply  to affected area two (2) times a day. 1 Bottle 0    amLODIPine (NORVASC) 10 mg tablet Take 10 mg by mouth daily.  Flovent  mcg/actuation inhaler Take 2 Puffs by inhalation two (2) times a day.  albuterol (PROVENTIL HFA, VENTOLIN HFA, PROAIR HFA) 90 mcg/actuation inhaler Take 2 Puffs by inhalation every six (6) hours as needed for Wheezing.  traMADoL (ULTRAM) 50 mg tablet Take 50 mg by mouth three (3) times daily as needed for Pain.  butalb/acetaminophen/caffeine (FIORICET PO) 1 tab, PO, every 4 hours, PRN: for headache, do not take with tramadol - may use instead of tramadol prn HA, 2 Refills, Dispense: 60, tab, Pharmacy [place] [id number]      Lantus Solostar U-100 Insulin 100 unit/mL (3 mL) inpn 20 Units by SubCUTAneous route nightly.  fluticasone propionate (Flonase Allergy Relief) 50 mcg/actuation nasal spray 2 SPRAY, Each Nostril, daily, for allergy symptoms, 11 Refills, Dispense: 1, bottle, Pharmacy [place] [id number]      aspirin delayed-release 81 mg tablet Take 81 mg by mouth daily.  amitriptyline (ELAVIL) 50 mg tablet Take 50 mg by mouth nightly.  docusate sodium (Colace) 100 mg capsule Take 100 mg by mouth two (2) times daily as needed.  atorvastatin (LIPITOR) 80 mg tablet Take 1 Tab by mouth nightly. 30 Tab 0    clopidogrel (PLAVIX) 75 mg tab Take 1 Tab by mouth daily. 30 Tab 0    glipiZIDE (GLUCOTROL) 5 mg tablet Take 5 mg by mouth daily.  Take One Tablet By Mouth Daily      insulin lispro (HumaLOG U-100 Insulin) 100 unit/mL injection 10 Units by SubCUTAneous route Before breakfast, lunch, and dinner. Indications: high blood sugar, type 2 diabetes mellitus 1 Vial 0    [DISCONTINUED] polyethylene glycol (MIRALAX) 17 gram packet Take 1 Packet by mouth daily. Hold for diarrhea (Patient taking differently: Take 17 g by mouth daily as needed. Hold for diarrhea) 30 Each 0       Past History     Past Medical History:  Past Medical History:   Diagnosis Date    Arthritis     Asthma     [facility]     Chronic back pain     Diabetes ([facility] [address]     Diabetic retinopathy ([facility] [address]     Hypertension     MRSA (methicillin resistant staph aureus) culture positive     labial abscess    Neuropathy        Past Surgical History:  Past Surgical History:   Procedure Laterality Date    HX HEENT      tonsillectomy    HX ORTHOPAEDIC      left ft bunionectomy    HX OTHER SURGICAL      lanced labial abscess       Family History:  No family history on file. Social History:  Social History     Tobacco Use    Smoking status: Never Smoker    Smokeless tobacco: Never Used   Substance Use Topics    Alcohol use: No    Drug use: No       Allergies: Allergies   Allergen Reactions    Amoxicillin Nausea Only    Aspirin Other (comments)     \"nosebleeds\" but patient takes daily aspirin 81 mg at home. Patient states naproxen ok    Ciprofloxacin Hives         Review of Systems   Review of Systems   Constitutional: Positive for appetite change. Respiratory: Positive for shortness of breath. Cardiovascular: Positive for leg swelling. Gastrointestinal: Positive for abdominal distention. All other systems reviewed and are negative.      Physical Exam   Physical Exam general: Alert, no acute distress  Skin: Warm, dry  Head: Normocephalic, atraumatic  Eye: EOMI, normal conjunctiva  Ears, Nose, Mouth and Throat: Oral mucosa moist, no pharyngeal erythema or exudate  Cardiovascular: No murmur, normal peripheral perfusion, regular rhythm, 2+ pitting edema bilateral lower extremities up to thighs  Pulmonary: Normal respiratory effort, normal breath sounds, exam limited by habitus  Gastrointestinal: Soft, nontender, Mild abdominal distention, exam limited by habitus  Musculoskeletal:  no deformity. Tenderness palpation of dorsum of left foot with no deformity. Neurological: Alert and oriented to person, place, situation. Normal speech observed. Moving all extremities symmetrically. Psychiatric: Cooperate, appropriate affect     Diagnostic Study Results     Labs -     Recent Results (from the past 12 hour(s))   CBC WITH AUTOMATED DIFF    Collection Time: 08/25/21 11:59 AM   Result Value Ref Range    WBC 5.9 3.6 - 11.0 K/uL    RBC 2.92 (L) 3.80 - 5.20 M/uL    HGB 8.1 (L) 11.5 - 16.0 g/dL    HCT 24.9 (L) 35.0 - 47.0 %    MCV 85.3 80.0 - 99.0 FL    MCH 27.7 26.0 - 34.0 PG    MCHC 32.5 30.0 - 36.5 g/dL    RDW 13.8 11.5 - 14.5 %    PLATELET 150 442 - 376 K/uL    MPV 9.0 8.9 - 12.9 FL    NRBC 0.0 0  WBC    ABSOLUTE NRBC 0.00 0.00 - 0.01 K/uL    NEUTROPHILS 69 32 - 75 %    LYMPHOCYTES 20 12 - 49 %    MONOCYTES 7 5 - 13 %    EOSINOPHILS 4 0 - 7 %    BASOPHILS 0 0 - 1 %    IMMATURE GRANULOCYTES 0 0.0 - 0.5 %    ABS. NEUTROPHILS 4.0 1.8 - 8.0 K/UL    ABS. LYMPHOCYTES 1.2 0.8 - 3.5 K/UL    ABS. MONOCYTES 0.4 0.0 - 1.0 K/UL    ABS. EOSINOPHILS 0.3 0.0 - 0.4 K/UL    ABS. BASOPHILS 0.0 0.0 - 0.1 K/UL    ABS. IMM.  GRANS. 0.0 0.00 - 0.04 K/UL    DF AUTOMATED     METABOLIC PANEL, COMPREHENSIVE    Collection Time: 08/25/21 11:59 AM   Result Value Ref Range    Sodium 139 136 - 145 mmol/L    Potassium 4.1 3.5 - 5.1 mmol/L    Chloride 108 97 - 108 mmol/L    CO2 26 21 - 32 mmol/L    Anion gap 5 5 - 15 mmol/L    Glucose 169 (H) 65 - 100 mg/dL    BUN 19 6 - 20 MG/DL    Creatinine 2.31 (H) 0.55 - 1.02 MG/DL    BUN/Creatinine ratio 8 (L) 12 - 20      GFR est AA 27 (L) >60 ml/min/1.73m2    GFR est non-AA 22 (L) >60 ml/min/1.73m2    Calcium 8.4 (L) 8.5 - 10.1 MG/DL    Bilirubin, total 0.4 0.2 - 1.0 MG/DL    ALT (SGPT) 13 12 - 78 U/L    AST (SGOT) 14 (L) 15 - 37 U/L    Alk. phosphatase 104 45 - 117 U/L    Protein, total 7.1 6.4 - 8.2 g/dL    Albumin 2.7 (L) 3.5 - 5.0 g/dL    Globulin 4.4 (H) 2.0 - 4.0 g/dL    A-G Ratio 0.6 (L) 1.1 - 2.2     NT-PRO BNP    Collection Time: 08/25/21 11:59 AM   Result Value Ref Range    NT pro-BNP 2,031 (H) <125 PG/ML   TROPONIN I    Collection Time: 08/25/21 11:59 AM   Result Value Ref Range    Troponin-I, Qt. <0.05 <0.05 ng/mL   COVID-19 RAPID TEST    Collection Time: 08/25/21  4:54 PM   Result Value Ref Range    Specimen source Nasopharyngeal      COVID-19 rapid test Not detected NOTD         Radiologic Studies -   XR CHEST PORT   Final Result   Mild diffuse interstitial prominence is now noted and may represent   mild edema or atypical viral pneumonia. XR ANKLE LT AP/LAT   Final Result   Diffuse lower extremity soft tissue edema without acute osseous   abnormality. XR FOOT LT AP/LAT   Final Result   Diffuse soft tissue edema. No acute osseous abnormality. CT Results  (Last 48 hours)    None        CXR Results  (Last 48 hours)               08/25/21 1135  XR CHEST PORT Final result    Impression:  Mild diffuse interstitial prominence is now noted and may represent   mild edema or atypical viral pneumonia. Narrative: Indication: Cough       Comparison: 8/18/2021       Portable exam of the chest obtained at 1135 demonstrates normal heart size. Mild   diffuse interstitial prominence is now noted. The patient is status post ORIF of   the right humerus. Medical Decision Making   I am the first provider for this patient. I reviewed the vital signs, available nursing notes, past medical history, past surgical history, family history and social history. Vital Signs-Reviewed the patient's vital signs.   Patient Vitals for the past 12 hrs:   Temp Pulse Resp BP SpO2   08/25/21 1547 98.5 °F (36.9 °C) 95 16 (!) 185/74 96 %   08/25/21 1517  96 18 (!) 174/75 96 %   08/25/21 1447  96 14 (!) 157/65 97 %   08/25/21 1417  95 21 (!) 160/60 97 %   08/25/21 1217  95  (!) 165/66    08/25/21 1147     98 %   08/25/21 1111 98.2 °F (36.8 °C) 95 18 (!) 179/69 98 %       Records Reviewed: Nursing Notes and Old Medical Records    Provider Notes (Medical Decision Making):   Patient is a 77-year-old history of CHF diabetes hypertension presenting with worsening dyspnea leg swelling and abdominal swelling likely related to CHF exacerbation    She is breathing comfortably on room air off of her baseline nasal cannula. IV access established and patient given 120 mg of IV Lasix for acute diuresis. Will do lab work to evaluate for electrolyte derangements including CMP, CBC to evaluate for possible anemia exacerbating her CHF. Will obtain chest x-ray to evaluate for fluid burden around lungs. Will obtain EKG and troponin and BNP to evaluate heart strain. Case management consulted to help in arranging best care for this patient and inpatient physical therapy also consulted to evaluate patient in ED for appropriateness for inpatient versus outpatient physical therapy management. ED Course:   Initial assessment performed. The patients presenting problems have been discussed, and they are in agreement with the care plan formulated and outlined with them. I have encouraged them to ask questions as they arise throughout their visit. ED Course as of Aug 25 1839   Wed Aug 25, 2021   1224 CBC with hemoglobin of 8 which is stable from recent CBC, no white count, normal platelets    [WB]   3572 Foot and ankle x-rays with soft tissue edema, no osseous abnormality.   Chest x-ray demonstrates likely mild pulmonary edema.    [WB]   1244 BMP significant elevated at greater than 2000 which is consistent with her clinical presentation of CHF exacerbation    [WB]   1244 Creatinine 2.3, stable CKD with normal electrolytes apart from mild hyperglycemia 169    [WB]   1247 Still determining disposition with help of inpatient occupational/physical therapy as well as case management    [WB]   1332 Seen by PT today, maybe benefit from inpatient rehab, would have to send to rehab, requiring authorization for insurance. Given that she was sent here by home health and PT, will admit for CHF exacerbation.    [WB]   1334 Hospitalist paged for admission    [WB]      ED Course User Index  [WB] Es Huffman MD     Disposition:    Admission Note:  Patient is being admitted to the hospital by Dr. Caren Morris, Service: Hospitalist.  The results of their tests and reasons for their admission have been discussed with them and available family. They convey agreement and understanding for the need to be admitted and for their admission diagnosis. Diagnosis     Clinical Impression:   1. Acute on chronic congestive heart failure, unspecified heart failure type (HCC)        Attestations:    Aleyda Baltazar M.D. Please note that this dictation was completed with [employer], the computer voice recognition software. Quite often unanticipated grammatical, syntax, homophones, and other interpretive errors are inadvertently transcribed by the computer software. Please disregard these errors. Please excuse any errors that have escaped final proofreading. Thank you.

## 2021-08-25 NOTE — CONSULTS
[facility]     Date of  Admission: 8/25/2021 10:52 AM     Admission type:Emergency    Referral for: DHF exacerbation   Referral by: Dr. Jonah Linton      Subjective:     James Lee is a 54 y.o. female  With a PMHx significant for DHF, HTN, DM II, Asthma, CKD, arthritis admitted for CHF (congestive heart failure) ([facility] [I50.9] exacerbation. Per chart review, patient has had similar recent admission at [facility] in early August, just discharged on 8/12/2021 after a 5 day stay. Her admitting BNP was 1190, Cr 2.5/BUN 33. Her chief complaint this ED visit is for bilateral LE edema and weakness which started 7 days ago. She was followed by home health, who recommended the patient come be seen in ED. Upon assessment, the patient endorses the above. She denies any CP, N/V/D, or recent exposure to Covid-19. She reports medication compliance, but states \"I barely put any salt or pepper in my food\", when asked about sodium intake. She states she received the pfizer vaccine months ago, both doses and resides with son, sister, and her boyfriend and all are vaccinated. She does state she has been told to try using Ms. Dash instead. She endorses BLE edema, orthopnea, PENA. She states she has \"talked with a nephrologist at [address] [place] [address] a few weeks ago and that he mentioned dialysis is a possibility\". She denies smoking, but reports she lives with smokers. Denies illicit drug use. Patient sees cardiologist at [address] [place] [address], also had initial nephrology consult at [address] [place] [address] for her CKD a few weeks ago. Echocardiogram: 8/9/2021  Echo Findings     Left Ventricle Normal cavity size, systolic function (ejection fraction normal) and diastolic function. Mild concentric hypertrophy. Wall motion: normal. The estimated EF is 60 - 65%. Wall Scoring The left ventricular wall motion is normal.             Left Atrium Normal cavity size. Right Ventricle Normal cavity size and global systolic function. Right Atrium Normal cavity size. Aortic Valve Normal valve structure, no stenosis and no regurgitation. Mitral Valve Normal valve structure, no stenosis and no regurgitation. Tricuspid Valve Normal valve structure and no stenosis. Trace regurgitation. Pulmonic Valve Normal valve structure, no stenosis and no regurgitation. Aorta Normal aortic root. Pulmonary Artery Pulmonary hypertension not suggested by Doppler findings. Pericardium No evidence of pericardial effusion. Cardiac risk factors: family history, dyslipidemia, diabetes mellitus, obesity, hypertension.     Patient Active Problem List    Diagnosis Date Noted    CHF (congestive heart failure) ([facility] 08/25/2021    EMMANUEL (acute kidney injury) ([facility] 08/25/2021    Acute exacerbation of CHF (congestive heart failure) ([facility] 08/16/2020    TIA (transient ischemic attack) 12/20/2018    Vitamin D deficiency 09/08/2015    Atopic rhinitis 09/02/2015    Disorder of liver 09/02/2015    Lymphoma ([facility] 09/02/2015    Lymphadenopathy 09/02/2015    Morbid obesity ([facility] 09/02/2015    Hypercholesteremia 06/25/2015    Episodic tension-type headache, not intractable 06/25/2015    Type 2 diabetes mellitus with peripheral neuropathy ([facility] 06/25/2015    Essential hypertension 06/25/2015    DDD (degenerative disc disease), lumbar 06/25/2015    Cervical spondylosis 06/25/2015    Morbid obesity with BMI of 45.0-49.9, adult ([facility] 06/25/2015      Yan Lerma MD  Past Medical History:   Diagnosis Date    Arthritis     Asthma     CHF (congestive heart failure) ([facility]     Chronic back pain     Diabetes ([facility]     Diabetic retinopathy ([facility]     Hypertension     MRSA (methicillin resistant staph aureus) culture positive     labial abscess    Neuropathy       Social History     Socioeconomic History    Marital status:      Spouse name: Not on file    Number of children: Not on file    Years of education: Not on file    Highest education level: Not on file Tobacco Use    Smoking status: Never Smoker    Smokeless tobacco: Never Used   Substance and Sexual Activity    Alcohol use: No    Drug use: No     Social Determinants of Health     Financial Resource Strain:     Difficulty of Paying Living Expenses:    Food Insecurity:     Worried About Running Out of Food in the Last Year:     Ran Out of Food in the Last Year:    Transportation Needs:     Lack of Transportation (Medical):     Lack of Transportation (Non-Medical):    Physical Activity:     Days of Exercise per Week:     Minutes of Exercise per Session:    Stress:     Feeling of Stress :    Social Connections:     Frequency of Communication with Friends and Family:     Frequency of Social Gatherings with Friends and Family:     Attends [employer] Services: Active Member of Clubs or Organizations:     Attends Club or Organization Meetings:     Marital Status:      Allergies   Allergen Reactions    Amoxicillin Nausea Only    Aspirin Other (comments)     \"nosebleeds\" but patient takes daily aspirin 81 mg at home. Patient states naproxen ok    Ciprofloxacin Hives      No family history on file.    Current Facility-Administered Medications   Medication Dose Route Frequency    albuterol (PROVENTIL HFA, VENTOLIN HFA, PROAIR HFA) inhaler 2 Puff  2 Puff Inhalation Q6H PRN    amitriptyline (ELAVIL) tablet 50 mg  50 mg Oral QHS    [START ON 8/26/2021] amLODIPine (NORVASC) tablet 10 mg  10 mg Oral DAILY    [START ON 8/26/2021] aspirin delayed-release tablet 81 mg  81 mg Oral DAILY    atorvastatin (LIPITOR) tablet 80 mg  80 mg Oral QHS    [START ON 8/26/2021] clopidogreL (PLAVIX) tablet 75 mg  75 mg Oral DAILY    [START ON 8/26/2021] fluticasone propionate (FLONASE) 50 mcg/actuation nasal spray 2 Spray  2 Spray Both Nostrils DAILY    gabapentin (NEURONTIN) capsule 900 mg  900 mg Oral BID    [START ON 8/26/2021] glipiZIDE (GLUCOTROL) tablet 5 mg  5 mg Oral DAILY    insulin lispro (HUMALOG) injection 10 Units  10 Units SubCUTAneous TIDAC    insulin glargine (LANTUS) injection 20 Units  20 Units SubCUTAneous QHS    [START ON 8/26/2021] magnesium oxide (MAG-OX) tablet 400 mg  400 mg Oral DAILY    metoprolol tartrate (LOPRESSOR) tablet 12.5 mg  12.5 mg Oral BID    furosemide (LASIX) injection 60 mg  60 mg IntraVENous BID    traMADoL (ULTRAM) tablet 50 mg  50 mg Oral TID PRN    sodium chloride (NS) flush 5-40 mL  5-40 mL IntraVENous Q8H    sodium chloride (NS) flush 5-40 mL  5-40 mL IntraVENous PRN    acetaminophen (TYLENOL) tablet 650 mg  650 mg Oral Q6H PRN    Or    acetaminophen (TYLENOL) suppository 650 mg  650 mg Rectal Q6H PRN    polyethylene glycol (MIRALAX) packet 17 g  17 g Oral DAILY PRN    ondansetron (ZOFRAN ODT) tablet 4 mg  4 mg Oral Q8H PRN    Or    ondansetron (ZOFRAN) injection 4 mg  4 mg IntraVENous Q6H PRN    [START ON 8/26/2021] enoxaparin (LOVENOX) injection 40 mg  40 mg SubCUTAneous Q12H     Current Outpatient Medications   Medication Sig    polyethylene glycol (Miralax) 17 gram packet Take 17 g by mouth daily as needed for Constipation. magnesium oxide (MAG-OX) 400 mg tablet Take 400 mg by mouth daily. canagliflozin (INVOKANA) 100 mg tablet Take 100 mg by mouth daily. Take one tablet by mouth daily    torsemide (DEMADEX) 20 mg tablet Take 40 mg (2 tablets) in the morning, 20 mg (1 tablet) in the evening. metoprolol tartrate (LOPRESSOR) 25 mg tablet Take 0.5 Tablets by mouth two (2) times a day.    gabapentin (NEURONTIN) 300 mg capsule Take 3 Capsules by mouth two (2) times a day. Max Daily Amount: 1,800 mg.    nystatin (MYCOSTATIN) powder Apply  to affected area two (2) times a day. amLODIPine (NORVASC) 10 mg tablet Take 10 mg by mouth daily. Flovent  mcg/actuation inhaler Take 2 Puffs by inhalation two (2) times a day. albuterol (PROVENTIL HFA, VENTOLIN HFA, PROAIR HFA) 90 mcg/actuation inhaler Take 2 Puffs by inhalation every six (6) hours as needed for Wheezing.     traMADoL Taisha Caorlina) 50 mg tablet Take 50 mg by mouth three (3) times daily as needed for Pain. butalb/acetaminophen/caffeine (FIORICET PO) 1 tab, PO, every 4 hours, PRN: for headache, do not take with tramadol - may use instead of tramadol prn HA, 2 Refills, Dispense: 60, tab, Pharmacy [facility]    Lantus Solostar U-100 Insulin 100 unit/mL (3 mL) inpn 20 Units by SubCUTAneous route nightly. fluticasone propionate (Flonase Allergy Relief) 50 mcg/actuation nasal spray 2 SPRAY, Each Nostril, daily, for allergy symptoms, 11 Refills, Dispense: 1, bottle, Pharmacy [facility]    aspirin delayed-release 81 mg tablet Take 81 mg by mouth daily. amitriptyline (ELAVIL) 50 mg tablet Take 50 mg by mouth nightly. docusate sodium (Colace) 100 mg capsule Take 100 mg by mouth two (2) times daily as needed. atorvastatin (LIPITOR) 80 mg tablet Take 1 Tab by mouth nightly. clopidogrel (PLAVIX) 75 mg tab Take 1 Tab by mouth daily. glipiZIDE (GLUCOTROL) 5 mg tablet Take 5 mg by mouth daily. Take One Tablet By Mouth Daily    insulin lispro (HumaLOG U-100 Insulin) 100 unit/mL injection 10 Units by SubCUTAneous route Before breakfast, lunch, and dinner.  Indications: high blood sugar, type 2 diabetes mellitus          Review of Symptoms:  Constitutional: negative  Eyes: negative  Ears, nose, mouth, throat, and face: negative  Respiratory: shortness of breath   Cardiovascular: leg swelling  Gastrointestinal: negative  Genitourinary:negative  Musculoskeletal:negative  Neurological: negative  Behvioral/Psych: negative  Endocrine: negative            Objective:      Visit Vitals  BP (!) 165/66   Pulse 95   Temp 98.2 °F (36.8 °C)   Resp 18   Ht 5' 5\" (1.651 m)   Wt 132.3 kg (291 lb 10.7 oz)   SpO2 98%   BMI 48.54 kg/m²       Physical Assessment:   General Appearance:  alert, cooperative, morbidly obese  female; appears stated age  Eyes: sclera anicteric  Mouth/Throat: moist mucous membranes; oral pharynx clear  Neck: supple; no JVD or bruit  Pulmonary:  Rales bilaterally and wheezing; good effort   Cardiovascular: regular rate and rhythm; no murmur, click, rub, or gallop  Abdomen: soft, non-tender, non-distended; bowel sounds normal  Musculoskeletal: no swelling or deformity; moves all extremities  Extremities: 2-3+ BL edema; palpable distal pulses   Skin: warm and dry  Neuro: grossly normal  Psych: normal mood and affect given the setting      Data Review:   Recent Labs     08/25/21  1159   WBC 5.9   HGB 8.1*   HCT 24.9*        Recent Labs     08/25/21  1159      K 4.1      CO2 26   *   BUN 19   CREA 2.31*   CA 8.4*   ALB 2.7*   TBILI 0.4   ALT 13       Recent Labs     08/25/21  1159   TROIQ <0.05         Intake/Output Summary (Last 24 hours) at 8/25/2021 1628  Last data filed at 8/25/2021 1354  Gross per 24 hour   Intake --   Output 1100 ml   Net -1100 ml        Cardiographics    Telemetry: NSR 1 degree AVB  ECG: NSR with 1 degree AVB   Echocardiogram: see above   CXRAY: \"mild diffuse interstitial prominence, mild edema\"        Assessment:       Principal Problem:    CHF (congestive heart failure) [facility] (8/25/2021)    Active Problems:    Hypercholesteremia (6/25/2015)      Type 2 diabetes mellitus with peripheral neuropathy ([facility] (6/25/2015)      Essential hypertension (6/25/2015)      Morbid obesity ([facility] (9/2/2015)      EMMANUEL (acute kidney injury) ([facility] (8/25/2021)         Plan:   Angel Luis Flowers is a 54 y.o. female  With a PMHx significant for DHF, HTN, DM II, Asthma, CKD, arthritis admitted for CHF (congestive heart failure) ([facility] [I50.9] exacerbation. Cardiology referral for acute on chronic diastolic CHF exacerbation. Acute on Chronic HFpEF:   Fluid Overload:   Hypoxic respiratory failure:  BNP 2k. Troponin trend hegative. EKG NSR nonspecific T wave changes. Chest x-ray reviewed pulmonary edema pattern.    Recent ECHo as above, EF 60-65%, no valvular pathology, likely some DHF due to CKD. Admit to tele, under hospitalist   Continue BB, ASA, lasix 60 mg IV BID  No ACE/ARB/ARNI d/t CKD  Fluid restriction  Low-sodium diet  Strict I/O, labs, daily weights      EMMANUEL/CKD:    Suspect secondary to cardiorenal syndrome type I,   Serum creatinine 2.3, (baseline closer to 1.4-1.6)   Was on aldactone PTA, DC'd   Would recommend nephrology consult         Insulin-dependent diabetes mellitus:  A1C 8.4 (8/2021)  Manage as per internal medicine        Hypertension: elevated at presentation  Continue amlodipine 10 mg and metoprolol 12.5 mg twice daily and lasi 60 mg IV BID     History of stroke  Continue on aspirin, Plavix and atorvastatin         Morbid obesity  BMI 51  Counseled on Lifestyle modifications     Thank you for allowing us to partner in the care of this patient, will follow. Sulma Echavarria, NP   DNP, APRN, AG-ACNP-BC    Patient seen and examined by me with the above nurse practitioner. I personally performed all components of the history, physical, and medical decision making and agree with the assessment and plan with minor modifications as noted. Today the patient presents with volume overload, suspect fully due to acute on chronic diastolic heart failure, but with possible contribution from renal dysfunction. General PE  Gen:  NAD  Mental Status - Alert. General Appearance - Not in acute distress. HEENT:  PERRL, no carotid bruits or JVD  Chest and Lung Exam   Inspection: Accessory muscles - No use of accessory muscles in breathing. Auscultation:   Breath sounds: -Crackles at the bases bilaterally c  Cardiovascular   Inspection: Jugular vein - Bilateral - Inspection Normal.   Palpation/Percussion:   Apical Impulse: - Normal.   Auscultation: Rhythm - Regular. Heart Sounds - S1 WNL and S2 WNL. No S3 or S4. Murmurs & Other Heart Sounds: Auscultation of the heart reveals - No Murmurs.    Peripheral Vascular   Upper Extremity: Inspection - Bilateral - No Cyanotic nailbeds or Digital clubbing. Lower Extremity:   Palpation: Edema - Bilateral -2+ pitting edema with stasis dermatitis a  Abdomen:   Soft, non-tender, bowel sounds are active. Neuro: A&O times 3, CN and motor grossly WNL    Patient is improving with diuresis. Continue same. Other medical issues as noted above. Thanks for the consult. We appreciate the opportunity to participate in this patient's care.

## 2021-08-25 NOTE — PROGRESS NOTES
Transition of Care Plan:    RUR: 24% moderate risk for readmission  Disposition: IPR vs Home with [place] - pending referral with [facility]; please follow up. Pt will need insurance authorization for IPR  Follow up appointments: PCP  DME needed: Pt has a rollator. Pending disposition  Transportation at Discharge: To be determined pending disposition   Keys or means to access home:  Pending disposition      IM Medicare Letter: N/A  Is patient a BCPI-A Bundle: N/A           If yes, was Bundle Letter given?:     Caregiver Contact: Pt's sisterElena p) [phone]  Discharge Caregiver contacted prior to discharge? To be contacted prior to d/c.              Reason for Readmission:  Acute on chronic congestive heart failure            RUR Score/Risk Level: 24% moderate risk for readmission        PCP: First and Last name:     Stanislav Jolly*    Name of Practice:    Are you a current patient: Yes/No: Yes   Approximate date of last visit: Approx 2 months ago   Can you participate in a virtual visit with your PCP: No    Is a Care Conference indicated:  No Care conference needs identified. Did you attend your follow up appointment (s): If not, why not:  No, pt reports she did not see PCP since previous hospital d/c. Resources/supports as identified by patient/family:   Supportive sister, son       Top Challenges facing patient (as identified by patient/family and CM): Finances/Medication cost?  Pt uses [employer] on [address]   Pt's sister provides all transportation needs     Support system or lack thereof? Pt reports good support at home from her son and sister. Pt may benefit from additional support in home. Living arrangements? Lives in first floor one level apartment with her sister, Elena, sister's boyfriend, and son        Self-care/ADLs/Cognition? Pt is alert and oriented.  Needs support with some ADLs at home         Current Advanced Directive/Advance Care Plan:    Advance Care Planning     General Advance Care Planning (ACP) Conversation      Date of Conversation: 8/25/2021  Conducted with: Patient with Decision Making Capacity    Healthcare Decision Maker:   No healthcare decision makers have been documented. Click here to complete [address] including selection of the Healthcare Decision Maker Relationship (ie \"Primary\")    Today we discussed [address]. The patient is considering options. Content/Action Overview:   Has NO ACP documents/care preferences - requested patient complete ACP documents  Reviewed DNR/DNI and patient elects Full Code (Attempt Resuscitation)    Additional Comments: Pt has no AMD on file or at home. CM provided patient information re: AMD and encouraged pt to complete when ready. Length of Voluntary ACP Conversation in minutes:  <16 minutes (Non-Billable)    [address] for utilizing home health: Pending disposition               Transition of Care Plan:    Based on readmission, the patient's previous Plan of Care   has been evaluated and/or modified. The current Transition of Care Plan is:    IPR vs Home with [place] - pending referral with [facility]; please follow up. Pt will need insurance authorization for IPR         Please see previous CM note. Please update [employer] referral with H&P and OT evaluation when complete. Pt will need insurance authorization for IPR. Referral placed to pt's top choice, [facility]. If denied, pt would need to return home with resumption of [facility] services through [facility]. CM encouraged pt to talk with Medicaid  regarding caregiver support. If pt returns home, pt would likely have DME needs and be in need of LTSS screening. Care Management Interventions  PCP Verified by CM: Yes  Palliative Care Criteria Met (RRAT>21 & CHF Dx)?: No  Mode of Transport at Discharge:  Other (see comment) (TBD, anticipating BLS transport)  Transition of Care Consult (CM Consult): Discharge Planning  Discharge Durable Medical Equipment: No (Pt has a rollator)  Physical Therapy Consult: Yes  Occupational Therapy Consult: Yes  Speech Therapy Consult: No  Current Support Network: Relative's Home (Pt lives in apt with foster sister, sister's boyfriend, and her adult son)  Confirm Follow Up Transport: Family (Pt's sister)  Discharge Location  Discharge Placement: Unable to determine at this time (IPR vs Home with [facility] )    Readmission Assessment  Number of days since last admission?: 8-30 days  Previous disposition: [facility]  Who is being interviewed?: Patient  What was the patient's/caregiver's perception as to why they think they needed to return back to the hospital?: Other (Comment) (Pt was advised by home health to come to ED for medical evaluation)  Did you visit your Primary Care Physician after you left the hospital, before you returned this time?: No  Why weren't you able to visit your PCP?:  (Reason not specified)  Did you see a specialist, such as Cardiac, Pulmonary, Orthopedic Physician, etc. after you left the hospital?: No  Who advised the patient to return to the hospital?: [facility] Staff  Does the patient report anything that got in the way of taking their medications?: No  In our efforts to provide the best possible care to you and others like you, can you think of anything that we could have done to help you after you left the hospital the first time, so that you might not have needed to return so soon?: Other (Comment) (None voiced at this time)    Unit care management will continue to follow for transition of care planning needs.       Luanne Bermudez 178, [facility]

## 2021-08-25 NOTE — PROGRESS NOTES
CM informed of case by attending MD. Pt was sent by to ED by [facility]. CM followed up with [facility] who was servicing pt. Per HH, start of care was delayed due to not being able to get in touch with pt. PT went to see pt this morning, who reported numerous falls. [place] reports to this CM that pt lives with her sister, June, and does not have adequate care in the home. Pt was recommended to return to hospital for medical evaluation and evaluation for IPR/SNF. Pt is insured with Medicaid, does not have SNF benefit for skilled therapy. CM discussed this with provider who has placed PT/OT consults for pt. CM to meet with pt at bedside to further discuss what brought pt back to hospital.      Pt resides in 1 level apt with her foster sister, June, 28 y/o son, and sister's boyfriend. Pt shares that her son is autistic. She states that her son and sister are her main supports and are available to support. Pt reports that she has had 6 falls in the past week. She states that she has a hard time ambulating with her rollator due to leg swelling. She wishes to pursue placement at rehab facility. Previous hx numerous [facility] ago at [facility]. PT has seen patient and feels that pt may benefit from rehab stay, but also needs additional support in the home. CM discussed this with pt who states that she feels comfortable asking her family for assistance in home. Pt's sister prepares her meals. Pt is independent with medications. FOC offered and discussed with pt, pt has IPR preference of [facility]. FOC form completed and placed on bedside chart. CM has placed referral to [facility] via [employer] and communicated with Zia Velasquez, Admissions p) [phone].         Luanne Garduno 178, [facility]

## 2021-08-25 NOTE — PROGRESS NOTES
Pharmacy Medication Reconciliation     The patient was NOT interviewed regarding current PTA medication list, use and drug allergies;  patient discharge paperwork was held    Allergy Update: Amoxicillin, Aspirin, and Ciprofloxacin    Recommendations/Findings: The following amendments were made to the patient's active medication list on file at [address]:   1) Additions: none  2) Deletions: none  3) Changes: none  Pertinent Findings: none    Clarified PTA med list with rx query, patient 21 discharge paperwork. PTA medication list was corrected to the following:     Prior to Admission Medications   Prescriptions Last Dose Informant Taking? Flovent  mcg/actuation inhaler  Transfer Papers Yes   Sig: Take 2 Puffs by inhalation two (2) times a day. Lantus Solostar U-100 Insulin 100 unit/mL (3 mL) inpn 2021 at 2100 Transfer Papers Yes   Si Units by SubCUTAneous route nightly. albuterol (PROVENTIL HFA, VENTOLIN HFA, PROAIR HFA) 90 mcg/actuation inhaler  Transfer Papers Yes   Sig: Take 2 Puffs by inhalation every six (6) hours as needed for Wheezing. amLODIPine (NORVASC) 10 mg tablet 2021 at Unknown time Transfer Papers Yes   Sig: Take 10 mg by mouth daily. amitriptyline (ELAVIL) 50 mg tablet 2021 at 2100 Transfer Papers Yes   Sig: Take 50 mg by mouth nightly. aspirin delayed-release 81 mg tablet 2021 at Unknown time Transfer Papers Yes   Sig: Take 81 mg by mouth daily. atorvastatin (LIPITOR) 80 mg tablet 2021 at Unknown time Transfer Papers Yes   Sig: Take 1 Tab by mouth nightly. butalb/acetaminophen/caffeine (FIORICET PO)  Transfer Papers Yes   Si tab, PO, every 4 hours, PRN: for headache, do not take with tramadol - may use instead of tramadol prn HA, 2 Refills, Dispense: 60, tab, Pharmacy [facility]   canagliflozin (INVOKANA) 100 mg tablet 2021 at Unknown time Transfer Papers Yes   Sig: Take 100 mg by mouth daily.  Take one tablet by mouth daily clopidogrel (PLAVIX) 75 mg tab 2021 at Unknown time Transfer Papers Yes   Sig: Take 1 Tab by mouth daily. docusate sodium (Colace) 100 mg capsule  Transfer Papers Yes   Sig: Take 100 mg by mouth two (2) times daily as needed. fluticasone propionate (Flonase Allergy Relief) 50 mcg/actuation nasal spray 2021 at Unknown time Transfer Papers Yes   Si SPRAY, Each Nostril, daily, for allergy symptoms, 11 Refills, Dispense: 1, bottle, [facility] [facility] [place] [address]   gabapentin (NEURONTIN) 300 mg capsule 2021 at Unknown time Transfer Papers Yes   Sig: Take 3 Capsules by mouth two (2) times a day. Max Daily Amount: 1,800 mg.   glipiZIDE (GLUCOTROL) 5 mg tablet  Transfer Papers No   Sig: Take 5 mg by mouth daily. Take One Tablet By Mouth Daily   insulin lispro (HumaLOG U-100 Insulin) 100 unit/mL injection  Transfer Papers No   Sig: 10 Units by SubCUTAneous route Before breakfast, lunch, and dinner. Indications: high blood sugar, type 2 diabetes mellitus   magnesium oxide (MAG-OX) 400 mg tablet 2021 at Unknown time Transfer Papers Yes   Sig: Take 400 mg by mouth daily. metoprolol tartrate (LOPRESSOR) 25 mg tablet 2021 at Unknown time Transfer Papers Yes   Sig: Take 0.5 Tablets by mouth two (2) times a day. nystatin (MYCOSTATIN) powder 2021 at Unknown time Transfer Papers Yes   Sig: Apply  to affected area two (2) times a day. polyethylene glycol (Miralax) 17 gram packet  at . [facility] [employer] Transfer Papers Yes   Sig: Take 17 g by mouth daily as needed for Constipation. torsemide (DEMADEX) 20 mg tablet 2021 at Unknown time Transfer Papers Yes   Sig: Take 40 mg (2 tablets) in the morning, 20 mg (1 tablet) in the evening. traMADoL (ULTRAM) 50 mg tablet  Transfer Papers Yes   Sig: Take 50 mg by mouth three (3) times daily as needed for Pain.       Facility-Administered Medications: None        Thank you,  BRADEN Ward

## 2021-08-26 LAB
ANION GAP SERPL CALC-SCNC: 5 MMOL/L (ref 5–15)
BUN SERPL-MCNC: 19 MG/DL (ref 6–20)
BUN/CREAT SERPL: 8 (ref 12–20)
CALCIUM SERPL-MCNC: 8.6 MG/DL (ref 8.5–10.1)
CHLORIDE SERPL-SCNC: 107 MMOL/L (ref 97–108)
CO2 SERPL-SCNC: 29 MMOL/L (ref 21–32)
CREAT SERPL-MCNC: 2.33 MG/DL (ref 0.55–1.02)
ERYTHROCYTE [DISTWIDTH] IN BLOOD BY AUTOMATED COUNT: 13.9 % (ref 11.5–14.5)
GLUCOSE BLD STRIP.AUTO-MCNC: 193 MG/DL (ref 65–117)
GLUCOSE BLD STRIP.AUTO-MCNC: 87 MG/DL (ref 65–117)
GLUCOSE BLD STRIP.AUTO-MCNC: 93 MG/DL (ref 65–117)
GLUCOSE SERPL-MCNC: 139 MG/DL (ref 65–100)
HCT VFR BLD AUTO: 24 % (ref 35–47)
HGB BLD-MCNC: 7.9 G/DL (ref 11.5–16)
MAGNESIUM SERPL-MCNC: 2.1 MG/DL (ref 1.6–2.4)
MCH RBC QN AUTO: 27.8 PG (ref 26–34)
MCHC RBC AUTO-ENTMCNC: 32.9 G/DL (ref 30–36.5)
MCV RBC AUTO: 84.5 FL (ref 80–99)
NRBC # BLD: 0 K/UL (ref 0–0.01)
NRBC BLD-RTO: 0 PER 100 WBC
PLATELET # BLD AUTO: 166 K/UL (ref 150–400)
PMV BLD AUTO: 9.1 FL (ref 8.9–12.9)
POTASSIUM SERPL-SCNC: 4 MMOL/L (ref 3.5–5.1)
RBC # BLD AUTO: 2.84 M/UL (ref 3.8–5.2)
SERVICE CMNT-IMP: ABNORMAL
SERVICE CMNT-IMP: NORMAL
SERVICE CMNT-IMP: NORMAL
SODIUM SERPL-SCNC: 141 MMOL/L (ref 136–145)
WBC # BLD AUTO: 5.7 K/UL (ref 3.6–11)

## 2021-08-26 PROCEDURE — 74011250637 HC RX REV CODE- 250/637: Performed by: NURSE PRACTITIONER

## 2021-08-26 PROCEDURE — 97116 GAIT TRAINING THERAPY: CPT

## 2021-08-26 PROCEDURE — 96372 THER/PROPH/DIAG INJ SC/IM: CPT

## 2021-08-26 PROCEDURE — 99233 SBSQ HOSP IP/OBS HIGH 50: CPT | Performed by: INTERNAL MEDICINE

## 2021-08-26 PROCEDURE — 96376 TX/PRO/DX INJ SAME DRUG ADON: CPT

## 2021-08-26 PROCEDURE — 74011250637 HC RX REV CODE- 250/637: Performed by: INTERNAL MEDICINE

## 2021-08-26 PROCEDURE — 74011250636 HC RX REV CODE- 250/636: Performed by: INTERNAL MEDICINE

## 2021-08-26 PROCEDURE — 80048 BASIC METABOLIC PNL TOTAL CA: CPT

## 2021-08-26 PROCEDURE — 83735 ASSAY OF MAGNESIUM: CPT

## 2021-08-26 PROCEDURE — 97530 THERAPEUTIC ACTIVITIES: CPT

## 2021-08-26 PROCEDURE — 99218 HC RM OBSERVATION: CPT

## 2021-08-26 PROCEDURE — 97535 SELF CARE MNGMENT TRAINING: CPT

## 2021-08-26 PROCEDURE — 36415 COLL VENOUS BLD VENIPUNCTURE: CPT

## 2021-08-26 PROCEDURE — 85027 COMPLETE CBC AUTOMATED: CPT

## 2021-08-26 PROCEDURE — 82962 GLUCOSE BLOOD TEST: CPT

## 2021-08-26 PROCEDURE — 97165 OT EVAL LOW COMPLEX 30 MIN: CPT

## 2021-08-26 PROCEDURE — 74011636637 HC RX REV CODE- 636/637: Performed by: INTERNAL MEDICINE

## 2021-08-26 RX ORDER — METOPROLOL TARTRATE 25 MG/1
25 TABLET, FILM COATED ORAL 2 TIMES DAILY
Status: DISCONTINUED | OUTPATIENT
Start: 2021-08-26 | End: 2021-08-28 | Stop reason: HOSPADM

## 2021-08-26 RX ADMIN — AMLODIPINE BESYLATE 10 MG: 5 TABLET ORAL at 10:44

## 2021-08-26 RX ADMIN — GABAPENTIN 900 MG: 300 CAPSULE ORAL at 18:13

## 2021-08-26 RX ADMIN — INSULIN GLARGINE 20 UNITS: 100 INJECTION, SOLUTION SUBCUTANEOUS at 21:56

## 2021-08-26 RX ADMIN — GABAPENTIN 900 MG: 300 CAPSULE ORAL at 10:44

## 2021-08-26 RX ADMIN — INSULIN LISPRO 10 UNITS: 100 INJECTION, SOLUTION INTRAVENOUS; SUBCUTANEOUS at 12:54

## 2021-08-26 RX ADMIN — GLIPIZIDE 5 MG: 5 TABLET ORAL at 10:44

## 2021-08-26 RX ADMIN — METOPROLOL TARTRATE 25 MG: 25 TABLET, FILM COATED ORAL at 18:13

## 2021-08-26 RX ADMIN — ASPIRIN 81 MG: 81 TABLET, COATED ORAL at 10:43

## 2021-08-26 RX ADMIN — ENOXAPARIN SODIUM 40 MG: 40 INJECTION SUBCUTANEOUS at 10:42

## 2021-08-26 RX ADMIN — Medication 10 ML: at 21:58

## 2021-08-26 RX ADMIN — FUROSEMIDE 60 MG: 10 INJECTION, SOLUTION INTRAMUSCULAR; INTRAVENOUS at 10:43

## 2021-08-26 RX ADMIN — FLUTICASONE PROPIONATE 2 SPRAY: 50 SPRAY, METERED NASAL at 12:55

## 2021-08-26 RX ADMIN — FUROSEMIDE 60 MG: 10 INJECTION, SOLUTION INTRAMUSCULAR; INTRAVENOUS at 18:18

## 2021-08-26 RX ADMIN — ATORVASTATIN CALCIUM 80 MG: 40 TABLET, FILM COATED ORAL at 21:56

## 2021-08-26 RX ADMIN — Medication 10 ML: at 14:00

## 2021-08-26 RX ADMIN — CLOPIDOGREL BISULFATE 75 MG: 75 TABLET ORAL at 10:44

## 2021-08-26 RX ADMIN — TRAMADOL HYDROCHLORIDE 50 MG: 50 TABLET, FILM COATED ORAL at 12:55

## 2021-08-26 RX ADMIN — AMITRIPTYLINE HYDROCHLORIDE 50 MG: 50 TABLET, FILM COATED ORAL at 21:56

## 2021-08-26 RX ADMIN — TRAMADOL HYDROCHLORIDE 50 MG: 50 TABLET, FILM COATED ORAL at 22:15

## 2021-08-26 RX ADMIN — INSULIN LISPRO 10 UNITS: 100 INJECTION, SOLUTION INTRAVENOUS; SUBCUTANEOUS at 18:13

## 2021-08-26 RX ADMIN — MAGNESIUM OXIDE 400 MG (241.3 MG MAGNESIUM) TABLET 400 MG: TABLET at 10:43

## 2021-08-26 RX ADMIN — ENOXAPARIN SODIUM 40 MG: 40 INJECTION SUBCUTANEOUS at 21:57

## 2021-08-26 RX ADMIN — METOPROLOL TARTRATE 12.5 MG: 25 TABLET, FILM COATED ORAL at 10:44

## 2021-08-26 NOTE — PROGRESS NOTES
Problem: Self Care Deficits Care Plan (Adult)  Goal: *Acute Goals and Plan of Care (Insert Text)  Description:   FUNCTIONAL STATUS PRIOR TO ADMISSION: The patient was mod I for functional mobility with rollator. Has had progressive decline in function over the last several months and increased LE edema. Reports difficulty with some tasks such as LB dressing. She has significant fall history including 6 falls in the last week. HOME SUPPORT: The patient lived with her sister, sister's boyfriend and pt's son    Occupational Therapy Goals  Initiated 8/26/2021  1. Patient will perform grooming with modified independence in standing within 7 day(s). 2.  Patient will perform lower body dressing with moderate assistance  within 7 day(s). 3.  Patient will perform toilet transfers with modified independence within 7 day(s). 4.  Patient will perform all aspects of toileting with modified independence within 7 day(s). 5.  Patient will participate in upper extremity therapeutic exercise/activities with independence for 5 minutes within 7 day(s). 6.  Patient will utilize energy conservation techniques during functional activities with verbal cues within 7 day(s). Outcome: Progressing Towards Goal   OCCUPATIONAL THERAPY EVALUATION  Patient: Becky Moreno (3 y.o. female)  Date: 8/26/2021  Primary Diagnosis: CHF (congestive heart failure) ([facility] [I50.9]        Precautions:  Fall    ASSESSMENT  Based on the objective data described below, the patient presents with decreased balance, endurance and strength, impaired functional mobility, BLE edema, and L foot pain following admission for CHF. At baseline pt lives with family, uses rollator for functional mobility, has some difficulty with LB dressing. She has had multiple falls in the last week. Pt received supine in bed, agreeable to participate. Came to sitting EOB with min A, good sitting balance.  Using RW pt ambulated to bathroom and transferred to toilet with CGA, constant use of DME for support and cues for safe RW management and transfer technique. Pt unable to tolerate grooming in standing due to L foot pain, so transferred to chair and performed in sitting. Vitals monitored and stable. Pt will benefit from skilled therapy intervention to address the above noted impairments. Recommend rehab at discharge to improve functional performance. Current Level of Function Impacting Discharge (ADLs/self-care): CGA-min A transfers, independent-min A UB ADLs, mod-total A LB ADLs    Functional Outcome Measure: The patient scored 50/100 on the Barthel Index outcome measure. Other factors to consider for discharge: fall risk        PLAN :  Recommendations and Planned Interventions: self care training, functional mobility training, therapeutic exercise, balance training, therapeutic activities, endurance activities, patient education, home safety training, and family training/education    Frequency/Duration: Patient will be followed by occupational therapy 4 times a week to address goals. Recommendation for discharge: (in order for the patient to meet his/her long term goals)  Rehab    This discharge recommendation:  Has been made in collaboration with the attending provider and/or case management    IF patient discharges home will need the following DME: TBD       SUBJECTIVE:   Patient stated I can't stand that long because my foot hurts.     OBJECTIVE DATA SUMMARY:   HISTORY:   Past Medical History:   Diagnosis Date    Arthritis     Asthma     CHF (congestive heart failure) (HCC)     Chronic back pain     Diabetes ([facility]     Diabetic retinopathy ([facility]     Hypertension     MRSA (methicillin resistant staph aureus) culture positive     labial abscess    Neuropathy      Past Surgical History:   Procedure Laterality Date    HX HEENT      tonsillectomy    HX ORTHOPAEDIC      left ft bunionectomy    HX OTHER SURGICAL      lanced labial abscess       Expanded or extensive additional review of patient history:     Home Situation  Home Environment: Apartment  # Steps to Enter: 0  Wheelchair Ramp: Yes  One/Two Story Residence: One story  Living Alone: No  Support Systems: Child(jesus manuel), Family member(s)  Current DME Used/Available at Home: Commode, bedside, Grab bars, Walker, rollator  Tub or Shower Type: Tub/Shower combination    Hand dominance: Right    EXAMINATION OF PERFORMANCE DEFICITS:  Cognitive/Behavioral Status:  Neurologic State: Alert  Orientation Level: Oriented X4  Cognition: Follows commands  Perception: Appears intact  Perseveration: No perseveration noted  Safety/Judgement: Awareness of environment; Fall prevention      Hearing: Auditory  Auditory Impairment: None    Vision/Perceptual:              Acuity: Within Defined Limits         Range of Motion:  AROM: Generally decreased, functional  PROM: Generally decreased, functional       Strength:  Strength: Generally decreased, functional       Coordination:  Coordination: Within functional limits  Fine Motor Skills-Upper: Left Intact; Right Intact    Gross Motor Skills-Upper: Left Intact; Right Intact    Tone & Sensation:  Tone: Normal  Sensation: Impaired (with BLE swelling)          Balance:  Sitting: Intact  Standing: Impaired; With support (RW)  Standing - Static: Constant support; Fair  Standing - Dynamic : Constant support; Fair    Functional Mobility and Transfers for ADLs:  Bed Mobility:  Supine to Sit: Minimum assistance; Additional time; Adaptive equipment;Bed Modified (use of bed railing; HOB elevated)  Scooting: Contact guard assistance; Additional time    Transfers:  Sit to Stand: Minimum assistance; Additional time (from EOB; CGA from commode with use of grab bar)  Stand to Sit: Contact guard assistance  Toilet Transfer : Adaptive equipment; Additional time;Contact guard assistance Nanette Anderson)    ADL Assessment:  Feeding: Independent    Oral Facial Hygiene/Grooming: Setup (seated)    Bathing: Moderate assistance; Additional time    Upper Body Dressing: Minimum assistance    Lower Body Dressing: Total assistance    Toileting: Moderate assistance          ADL Intervention and task modifications:       Grooming  Position Performed: Seated in chair  Washing Hands: Set-up       Lower Body Dressing Assistance  Socks: Total assistance (dependent)    Toileting  Bladder Hygiene: Minimum assistance    Cognitive Retraining  Safety/Judgement: Awareness of environment; Fall prevention    Functional Measure:  Barthel Index:    Bathin  Bladder: 5  Bowels: 10  Groomin  Dressin  Feeding: 10  Mobility: 0  Stairs: 0  Toilet Use: 5  Transfer (Bed to Chair and Back): 10  Total: 50/100        The Barthel ADL Index: Guidelines  1. The index should be used as a record of what a patient does, not as a record of what a patient could do. 2. The main aim is to establish degree of independence from any help, physical or verbal, however minor and for whatever reason. 3. The need for supervision renders the patient not independent. 4. A patient's performance should be established using the best available evidence. Asking the patient, friends/relatives and nurses are the usual sources, but direct observation and common sense are also important. However direct testing is not needed. 5. Usually the patient's performance over the preceding 24-48 hours is important, but occasionally longer periods will be relevant. 6. Middle categories imply that the patient supplies over 50 per cent of the effort. 7. Use of aids to be independent is allowed. Sumi Sol., Barthel, D.W. (5263). Functional evaluation: the Barthel Index. [facility] (14)2. CAMERON You, Maddi John., Radha Wood., Monica, [address] [place] [address] (). Measuring the change indisability after inpatient rehabilitation; comparison of the responsiveness of the Barthel Index and Functional [place] Measure. Journal of Neurology, Neurosurgery, and Psychiatry, 66(4), 687-041.   Saniya Marcum ALEJANDRO, KAMILLA Ramirez, & Sarah Mendez M.A. (2004.) Assessment of post-stroke quality of life in cost-effectiveness studies: The usefulness of the Barthel Index and the EuroQoL-5D. Quality of Life Research, 15, 191-10        Occupational Therapy Evaluation Charge Determination   History Examination Decision-Making   LOW Complexity : Brief history review  MEDIUM Complexity : 3-5 performance deficits relating to physical, cognitive , or psychosocial skils that result in activity limitations and / or participation restrictions LOW Complexity : No comorbidities that affect functional and no verbal or physical assistance needed to complete eval tasks       Based on the above components, the patient evaluation is determined to be of the following complexity level: LOW   Pain Ratin-8/10 L foot/ankle pain    Activity Tolerance:   Fair, limited by pain    After treatment patient left in no apparent distress:    Sitting in chair and Call bell within reach    COMMUNICATION/EDUCATION:   The patients plan of care was discussed with: Physical therapist and Registered nurse. Home safety education was provided and the patient/caregiver indicated understanding., Patient/family have participated as able in goal setting and plan of care. , and Patient/family agree to work toward stated goals and plan of care. This patients plan of care is appropriate for delegation to [facility].     Thank you for this referral.  Mikayla Coronado OT  Time Calculation: 38 mins

## 2021-08-26 NOTE — PROGRESS NOTES
Problem: Mobility Impaired (Adult and Pediatric)  Goal: *Acute Goals and Plan of Care (Insert Text)  Description: FUNCTIONAL STATUS PRIOR TO ADMISSION: Pt lives in an apartment with her sister, sister's boyfriend and her son who is autistic. She reports 6 month decline in function, swelling increase in LEs over last 2 months and significant fall hx with 6 falls this past week but also average of 2 falls per week prior. She amb with a rollator. Pt states her legs get weak when she walks with the swelling and that's why she falls but also states that she does not like to request assist for anything and does not ask for help at home. HOME SUPPORT PRIOR TO ADMISSION: The patient lived with family. Physical Therapy Goals  Initiated 8/25/2021  1. Patient will move from supine to sit and sit to supine , scoot up and down, and roll side to side in bed with modified independence within 7 day(s). 2.  Patient will transfer from bed to chair and chair to bed with modified independence using the least restrictive device within 7 day(s). 3.  Patient will perform sit to stand with modified independence within 7 day(s). 4.  Patient will ambulate with modified independence for 100 feet with the least restrictive device within 7 day(s). Outcome: Not Progressing Towards Goal   PHYSICAL THERAPY TREATMENT  Patient: Graciela Portillo (89 y.o. female)  Date: 8/26/2021  Diagnosis: CHF (congestive heart failure) ([facility]) [I50.9] CHF (congestive heart failure) ([facility]       Precautions: Fall  Chart, physical therapy assessment, plan of care and goals were reviewed. ASSESSMENT  Patient continues with skilled PT services and is not progressing towards goals due to increased L ankle/foot pain.  Patient continues to demonstrate limited functional mobility and decreased independence secondary to impaired standing balance, generalized weakness, impaired gait mechanics, decreased ROM, increased L ankle pain, limited endurance, and decreased activity tolerance. Received supine in bed and agreeable to PT intervention. Patient with good effort and participation, however mobility limited due to worsening L foot/ankle pain with LLE in dependent position and with weight bearing activities. Needed instruction and cueing for technique to transfer supine>sit. Required VCs for safe hand placement with use of RW during transfers and for safe use of RW during directional changes. Gait distance limited due to pain. Frequent VCs provided for sequencing with use of RW during gait training. Pt was left sitting in bedside chair with all needs met and RN aware following therapy session. Continue to recommend patient discharge to rehab to address functional impairments as noted above as patient with decline from baseline mobility and is at an increased risk for falls. Current Level of Function Impacting Discharge (mobility/balance): min/CGA for bed mobility and transfers; CGA for gait training with RW support    Other factors to consider for discharge: increased risk for falls; decline from baseline mobility; L foot/ankle pain         PLAN :  Patient continues to benefit from skilled intervention to address the above impairments. Continue treatment per established plan of care. to address goals. Recommendation for discharge: (in order for the patient to meet his/her long term goals)  Rehab    This discharge recommendation:  Has been made in collaboration with the attending provider and/or case management    IF patient discharges home will need the following DME: to be determined (TBD)       SUBJECTIVE:   Patient stated That left ankle hurts.     OBJECTIVE DATA SUMMARY:   Critical Behavior:  Neurologic State: Alert  Orientation Level: Oriented X4  Cognition: Follows commands  Safety/Judgement: Awareness of environment, Fall prevention  Functional Mobility Training:  Bed Mobility:     Supine to Sit: Minimum assistance; Additional time; Adaptive equipment;Bed Modified (use of bed railing; HOB elevated)     Scooting: Contact guard assistance; Additional time        Transfers:  Sit to Stand: Minimum assistance; Additional time (from EOB; CGA from commode with use of grab bar)  Stand to Sit: Contact guard assistance                             Balance:  Sitting: Intact  Standing: Impaired; With support (RW)  Standing - Static: Constant support; Fair  Standing - Dynamic : Constant support; Fair  Ambulation/Gait Training:  Distance (ft): 10 Feet (ft) (x 2 with seated rest break on commode)  Assistive Device: Gait belt;Walker, rolling  Ambulation - Level of Assistance: Contact guard assistance;Assist x1;Additional time; Adaptive equipment        Gait Abnormalities: Decreased step clearance; Antalgic; Step to gait (increased trunk flexion)        Base of Support: Widened     Speed/Vangie: Slow  Step Length: Left shortened;Right shortened       Pain Ratin/10 L foot/ankle pain at rest; 8/10 L foot/ankle pain with weight bearing/gait training    Activity Tolerance:   Fair, SpO2 stable on RA, and requires rest breaks    After treatment patient left in no apparent distress:   Sitting in chair and Call bell within reach    COMMUNICATION/COLLABORATION:   The patients plan of care was discussed with: Physical therapist, Occupational therapist, and Registered nurse.      Kell Oliveira PT, DPT   Time Calculation: 33 mins

## 2021-08-26 NOTE — CONSULTS
Consultation Note    NAME: Janine Bond   :  1965   MRN:  [id number]     Date/Time:  2021 8:42 AM    I have been asked to see this patient by Dr. Marquise Lombardo  for advice/opinion re: CKD stage 4. Assessment :    Plan:  CKD stage 4  Proteinuria since '15  Anemia  HTN  DM2  Acute on chronic diastolic HF Creatinine stable at 2.3 (recent creatinine has been in the mid to high 2's); likely DM nephropathy; she has had a couple of telephone visits with [facility], but states she wants to move doctors to here. Check urine protein:creatinine ratio, recheck urine sediment (last in )    Edema is better, not gone (continue on Lasix 60 mg iv bid for now). Try to add RAAS inh if keeps f/u appt         ROEL 8/10/21  IMPRESSION     1. No hydronephrosis. 2. Mildly increased echogenicity of both kidneys consistent with the clinical  history of medical renal parenchymal disease. 3. Marked bladder distention. 4. Upper normal liver size      Subjective:   CHIEF COMPLAINT:  ckd    HISTORY OF PRESENT ILLNESS:     Arthur Liu is a 54 y.o.   female who has a history of the below. Admitted with weakness and edema. Still with edema, but it is better. Her father had DM and kidney issues ( young). No nsaids. Past Medical History:   Diagnosis Date    Arthritis     Asthma     CHF (congestive heart failure) (HCC)     Chronic back pain     Diabetes ([address]     Diabetic retinopathy ([address]     Hypertension     MRSA (methicillin resistant staph aureus) culture positive     labial abscess    Neuropathy       Past Surgical History:   Procedure Laterality Date    HX HEENT      tonsillectomy    HX ORTHOPAEDIC      left ft bunionectomy    HX OTHER SURGICAL      lanced labial abscess     Social History     Tobacco Use    Smoking status: Never Smoker    Smokeless tobacco: Never Used   Substance Use Topics    Alcohol use: No      No family history on file.    Allergies   Allergen Reactions    Amoxicillin Nausea Only    Aspirin Other (comments)     \"nosebleeds\" but patient takes daily aspirin 81 mg at home. Patient states naproxen ok    Ciprofloxacin Hives      Prior to Admission medications    Medication Sig Start Date End Date Taking? Authorizing Provider   polyethylene glycol (Miralax) 17 gram packet Take 17 g by mouth daily as needed for Constipation. Yes Provider, Historical   magnesium oxide (MAG-OX) 400 mg tablet Take 400 mg by mouth daily. Yes Provider, Historical   canagliflozin (INVOKANA) 100 mg tablet Take 100 mg by mouth daily. Take one tablet by mouth daily   Yes Provider, Historical   torsemide (DEMADEX) 20 mg tablet Take 40 mg (2 tablets) in the morning, 20 mg (1 tablet) in the evening. 8/12/21  Yes Crissy Chester MD   metoprolol tartrate (LOPRESSOR) 25 mg tablet Take 0.5 Tablets by mouth two (2) times a day. 8/12/21  Yes Crissy Chester MD   gabapentin (NEURONTIN) 300 mg capsule Take 3 Capsules by mouth two (2) times a day. Max Daily Amount: 1,800 mg. 8/12/21  Yes Crissy Chester MD   nystatin (MYCOSTATIN) powder Apply  to affected area two (2) times a day. 8/12/21  Yes Crissy Chester MD   amLODIPine (NORVASC) 10 mg tablet Take 10 mg by mouth daily. Yes Provider, Historical   Flovent  mcg/actuation inhaler Take 2 Puffs by inhalation two (2) times a day. 8/2/21  Yes Provider, Historical   albuterol (PROVENTIL HFA, VENTOLIN HFA, PROAIR HFA) 90 mcg/actuation inhaler Take 2 Puffs by inhalation every six (6) hours as needed for Wheezing. Yes Provider, Historical   traMADoL (ULTRAM) 50 mg tablet Take 50 mg by mouth three (3) times daily as needed for Pain.  2/26/21  Yes Provider, Historical   butalb/acetaminophen/caffeine (FIORICET PO) 1 tab, PO, every 4 hours, PRN: for headache, do not take with tramadol - may use instead of tramadol prn HA, 2 Refills, Dispense: 60, tab, Pharmacy [facility] [phone] 12/16/20  Yes Provider, Historical   Lanalberto Solostar U-100 Insulin 100 unit/mL (3 mL) inpn 20 Units by SubCUTAneous route nightly. 6/8/21  Yes Provider, Historical   fluticasone propionate (Flonase Allergy Relief) 50 mcg/actuation nasal spray 2 SPRAY, Each Nostril, daily, for allergy symptoms, 11 Refills, Dispense: 1, bottle, Pharmacy [facility] [phone] 10/24/20  Yes Provider, Historical   aspirin delayed-release 81 mg tablet Take 81 mg by mouth daily. Yes Provider, Historical   amitriptyline (ELAVIL) 50 mg tablet Take 50 mg by mouth nightly. Yes Provider, Historical   docusate sodium (Colace) 100 mg capsule Take 100 mg by mouth two (2) times daily as needed. Yes Provider, Historical   atorvastatin (LIPITOR) 80 mg tablet Take 1 Tab by mouth nightly. 12/22/18  Yes Sana López, DO   clopidogrel (PLAVIX) 75 mg tab Take 1 Tab by mouth daily. 12/23/18  Yes Sana López, DO   glipiZIDE (GLUCOTROL) 5 mg tablet Take 5 mg by mouth daily. Take One Tablet By Mouth Daily    Provider, Historical   insulin lispro (HumaLOG U-100 Insulin) 100 unit/mL injection 10 Units by SubCUTAneous route Before breakfast, lunch, and dinner.  Indications: high blood sugar, type 2 diabetes mellitus 8/12/21   Giuliana Gray MD     REVIEW OF SYSTEMS:     []  Unable to obtain reliable ROS due to  [] mental status  [] sedated   [] intubated   [x] Total of 12 systems reviewed as follows:  Constitutional: negative fever, negative chills, negative weight loss  Eyes:   negative visual changes  ENT:   negative sore throat, tongue or lip swelling  Respiratory:  negative cough, negative dyspnea  Cards:  negative for chest pain, palpitations,+ lower extremity edema  GI:   negative for nausea, vomiting, diarrhea, and abdominal pain  :  negative for frequency, dysuria  Integument:  negative for rash and pruritus  Heme:  negative for easy bruising and gum/nose bleeding  Musculoskel: negative for myalgias,  back pain and muscle weakness  Neuro:  negative for headaches, dizziness, vertigo  Psych:  negative for feelings of anxiety, depression   Travel?: none    Objective:   VITALS:    Visit Vitals  BP (!) 171/69 (BP 1 Location: Left upper arm, BP Patient Position: At rest)   Pulse 89   Temp 97.9 °F (36.6 °C)   Resp 17   Ht 5' 5\" (1.651 m)   Wt 132.3 kg (291 lb 10.7 oz)   SpO2 97%   BMI 48.54 kg/m²     PHYSICAL EXAM:  Gen:  []  WD []  WN  [] cachectic []  thin [x]  obese []  disheveled             []  ill apearing  []   Critical  []   Chronic    [x]  No acute distress    HEENT:   [x] NC/AT/PERRLA/EOMI    [] pink conjunctivae      [] pale conjunctivae                  PERRL  [] yes  [] no      [] moist mucosa    [] dry mucosa    hearing intact to voice [x] yes  [] No                 NECK:   supple [x] yes  [] no        masses [] yes  [] No               thyroid  []  non tender  []  tender    RESP:   [x] CTA bilaterally/no wheezing/rhonchi/rales/crackles    [] rhonchi bilaterally - no dullness  [] wheezing   [] rhonchi   [] crackles     use of accessory muscles [] yes [x] no    CARD:   [x]  regular rate and rhythm/No murmurs/rubs/gallops    murmur  [] yes ()  [] no      Rubs  [] yes  [] no       Gallops [] yes  [] no    Rate []  regular  []  irregular        carotid bruits  [] Right  []  Left                 LE edema [x] yes  [] no           JVP  []  yes   []  no    ABD:    [x] soft/non distended/non tender/+bowel sounds/no HSM    []  Rigid    tenderness [] yes [] no   Liver enlargement  []  yes []  no                Spleen enlargement  []  yes []  no     distended []  yes [] no     bowel sound  [] hypoactive   [] hyperactive    LYMPH:    Neck []  yes [x]  no       Axillae []  yes []  no    SKIN:   Rashes []  yes   [x]  no    Ulcers []  yes   []  no               [] tight to palpitation    skin turgor []  good  [] poor  [] decreased               Cyanosis/clubbing []  yes []  no    NEUR:   [x] cranial nerves II-XII grossly intact       [] Cranial nerves deficit                 []  facial droop    []  slurred speech   [] aphasic     [] Strength normal     []  weakness  []  LUE  []   RUE/ []  LLE  []   RLE    follows commands  [x]  yes []  no           PSYCH:   insight [] poor [x] good   Alert and Oriented to  [x] person  [x] place  [x]  time                    [] depressed [] anxious [] agitated  [] lethargic [] stuporous  [] sedated     LAB DATA REVIEWED:    Recent Labs     08/26/21  0435 08/25/21  1159   WBC 5.7 5.9   HGB 7.9* 8.1*   HCT 24.0* 24.9*    165     Recent Labs     08/26/21  0435 08/25/21  1159    139   K 4.0 4.1    108   CO2 29 26   BUN 19 19   CREA 2.33* 2.31*   * 169*   CA 8.6 8.4*   MG 2.1  --      Recent Labs     08/25/21  1159   ALT 13      TBILI 0.4   ALB 2.7*   GLOB 4.4*     No results for input(s): INR, PTP, APTT, INREXT in the last 72 hours. No results for input(s): FE, TIBC, PSAT, FERR in the last 72 hours. No results for input(s): PH, PCO2, PO2 in the last 72 hours. No results for input(s): CPK, CKMB in the last 72 hours. No lab exists for component: TROPONINI  Lab Results   Component Value Date/Time    Glucose (POC) 155 (H) 08/25/2021 10:46 PM    Glucose (POC) 189 (H) 08/25/2021 06:58 PM    Glucose (POC) 171 (H) 08/12/2021 04:54 PM    Glucose (POC) 211 (H) 08/12/2021 11:16 AM    Glucose (POC) 192 (H) 08/12/2021 07:52 AM       Procedures: see electronic medical records for all procedures/Xrays and details which were not copied into this note but were reviewed prior to creation of Plan.    ________________________________________________________________________       ___________________________________________________  Consulting Physician:  Micky Sill, MD

## 2021-08-26 NOTE — PROGRESS NOTES
Spiritual Care Partner Volunteer visited patient at [address] in [facility] on 8/26/2021     Documented by:  [id number] [facility] Angella Adhikari,[place], Jenna 605 Provider   Paging Service [phone])

## 2021-08-26 NOTE — PROGRESS NOTES
End of Shift Note    Bedside shift change report given to Deidre Schulz LPN (oncoming nurse) by Aleta Ramirez (offgoing nurse). Report included the following information SBAR, Kardex, OR Summary, Procedure Summary, Intake/Output and MAR    Shift worked:  day     Shift summary and any significant changes:     Patient voiding large amounts after lasix. Tele NSR. Leg swelling improving. Rehab for placement. Vitals stable      Concerns for physician to address:  see above     Zone phone for oncoming shift:   [phone]       Activity:  Activity Level: Up with Assistance  Number times ambulated in hallways past shift: 0  Number of times OOB to chair past shift: 1    Cardiac:   Cardiac Monitoring: Yes      Cardiac Rhythm: Sinus Rhythm    Access:   Current line(s): PIV     Genitourinary:   Urinary status: external catheter    Respiratory:   O2 Device: None (Room air)  Chronic home O2 use?: NO  Incentive spirometer at bedside: YES     GI:  Last Bowel Movement Date: 08/25/21  Current diet:  ADULT DIET Regular; 4 carb choices (60 gm/meal); Low Fat/Low Chol/High Fiber/2 gm Na; 2000 ml  DIET ONE TIME MESSAGE  Passing flatus: YES  Tolerating current diet: YES       Pain Management:   Patient states pain is manageable on current regimen: YES    Skin:  Guillermo Score: 18  Interventions: increase time out of bed, PT/OT consult and internal/external urinary devices    Patient Safety:  Fall Score:  Total Score: 4  Interventions: gripper socks, pt to call before getting OOB and stay with me (per policy)  High Fall Risk: Yes    Length of Stay:  Expected LOS: - - -  Actual LOS: [address] [facility]

## 2021-08-26 NOTE — PROGRESS NOTES
Bedside and Verbal shift change report given to [address] [place] [address] (oncoming nurse) by Missy Blackwell (offgoing nurse). Report included the following information SBAR, Kardex, Intake/Output, MAR and Recent Results.

## 2021-08-26 NOTE — PROGRESS NOTES
[address] [facility], [address] [place] [address] Ne, [address] [facility]  [phone]      Cardiology Progress Note      8/26/2021 0915 AM    Admit Date: 8/25/2021    Admit Diagnosis:   CHF (congestive heart failure) ([facility] [I50.9]    Subjective:     Peter Co states \"I am feeling better\" Lengthy discussion regarding diet modifications with low sodium and daily weights.      BP elevated, not on tele despite order, NSR by auscultation  H/H 7.9/24 (no overt signs of bleeding),  Cr 2.33 (stable)  Has diuresed over 6.3l overnight     Visit Vitals  BP (!) 171/69 (BP 1 Location: Left upper arm, BP Patient Position: At rest)   Pulse 89   Temp 97.9 °F (36.6 °C)   Resp 17   Ht 5' 5\" (1.651 m)   Wt 132.3 kg (291 lb 10.7 oz)   LMP 05/01/2013   SpO2 97%   BMI 48.54 kg/m²       Current Facility-Administered Medications   Medication Dose Route Frequency    albuterol (PROVENTIL HFA, VENTOLIN HFA, PROAIR HFA) inhaler 2 Puff  2 Puff Inhalation Q6H PRN    amitriptyline (ELAVIL) tablet 50 mg  50 mg Oral QHS    amLODIPine (NORVASC) tablet 10 mg  10 mg Oral DAILY    aspirin delayed-release tablet 81 mg  81 mg Oral DAILY    atorvastatin (LIPITOR) tablet 80 mg  80 mg Oral QHS    clopidogreL (PLAVIX) tablet 75 mg  75 mg Oral DAILY    fluticasone propionate (FLONASE) 50 mcg/actuation nasal spray 2 Spray  2 Spray Both Nostrils DAILY    gabapentin (NEURONTIN) capsule 900 mg  900 mg Oral BID    glipiZIDE (GLUCOTROL) tablet 5 mg  5 mg Oral DAILY    insulin lispro (HUMALOG) injection 10 Units  10 Units SubCUTAneous TIDAC    insulin glargine (LANTUS) injection 20 Units  20 Units SubCUTAneous QHS    magnesium oxide (MAG-OX) tablet 400 mg  400 mg Oral DAILY    metoprolol tartrate (LOPRESSOR) tablet 12.5 mg  12.5 mg Oral BID    furosemide (LASIX) injection 60 mg  60 mg IntraVENous BID    traMADoL (ULTRAM) tablet 50 mg  50 mg Oral TID PRN    sodium chloride (NS) flush 5-40 mL  5-40 mL IntraVENous Q8H    sodium chloride (NS) flush 5-40 mL  5-40 mL IntraVENous PRN acetaminophen (TYLENOL) tablet 650 mg  650 mg Oral Q6H PRN    Or    acetaminophen (TYLENOL) suppository 650 mg  650 mg Rectal Q6H PRN    polyethylene glycol (MIRALAX) packet 17 g  17 g Oral DAILY PRN    ondansetron (ZOFRAN ODT) tablet 4 mg  4 mg Oral Q8H PRN    Or    ondansetron (ZOFRAN) injection 4 mg  4 mg IntraVENous Q6H PRN    enoxaparin (LOVENOX) injection 40 mg  40 mg SubCUTAneous Q12H    insulin lispro (HUMALOG) injection   SubCUTAneous AC&HS    glucose chewable tablet 16 g  4 Tablet Oral PRN    dextrose (D50W) injection syrg 12.5-25 g  12.5-25 g IntraVENous PRN    glucagon (GLUCAGEN) injection 1 mg  1 mg IntraMUSCular PRN       Objective:      Physical Exam:  General Appearance:  alert, cooperative, morbidly obese  female; appears stated age  Eyes: sclera anicteric  Mouth/Throat: moist mucous membranes; oral pharynx clear  Neck: supple; no JVD or bruit  Pulmonary:  Rales bilaterally bases; good effort   Cardiovascular: regular rate and rhythm; no murmur, click, rub, or gallop  Abdomen: soft, non-tender, non-distended; bowel sounds normal  Musculoskeletal: no swelling or deformity; moves all extremities  Extremities: 2-3+ BL edema; palpable distal pulses   Skin: warm and dry, reddened lower extremities  Neuro: grossly normal  Psych: normal mood and affect given the setting    Data Review:   Recent Labs     08/26/21  0435 08/25/21  1159   WBC 5.7 5.9   HGB 7.9* 8.1*   HCT 24.0* 24.9*    165     Recent Labs     08/26/21  0435 08/25/21  1159    139   K 4.0 4.1    108   CO2 29 26   * 169*   BUN 19 19   CREA 2.33* 2.31*   CA 8.6 8.4*   MG 2.1  --    ALB  --  2.7*   TBILI  --  0.4   ALT  --  13       Recent Labs     08/25/21  1159   TROIQ <0.05         Intake/Output Summary (Last 24 hours) at 8/26/2021 1038  Last data filed at 8/26/2021 0134  Gross per 24 hour   Intake --   Output 6350 ml   Net -6350 ml      Cardiographics:     Telemetry: not on tele   EKG: NSR 1 degree AVB Cxray: \"Mild diffuse interstitial prominence is now noted and may represent  mild edema or atypical viral pneumonia. \"  ECHO: 8/9/2021 (no valvular pathology   LV: Estimated LVEF is 60 - 65%. Normal cavity size, systolic function (ejection fraction normal) and diastolic function. Mild concentric hypertrophy. Wall motion: normal.      Assessment:     Principal Problem:    CHF (congestive heart failure) ([facility] (8/25/2021)    Active Problems:    Hypercholesteremia (6/25/2015)      Type 2 diabetes mellitus with peripheral neuropathy ([facility] (6/25/2015)      Essential hypertension (6/25/2015)      Morbid obesity ([facility] (9/2/2015)      EMMANUEL (acute kidney injury) ([facility] (8/25/2021)        Plan:   Josseline Tovar is a 54 y.o. female  With a PMHx significant for DHF, HTN, DM II, Asthma, CKD, arthritis admitted for CHF (congestive heart failure) ([facility] [I50.9] exacerbation. Cardiology referral for acute on chronic diastolic CHF exacerbation. Acute on Chronic HFpEF:   Fluid Overload:   Hypoxic respiratory failure:  BNP 2k. Troponin trend negative. EKG NSR nonspecific T wave changes. Chest x-ray reviewed pulmonary edema pattern. Recent ECHo as above, EF 60-65%, no valvular pathology, likely some DHF with contribution to vol. Overload from CKD.    Monitor tele, under hospitalist service please, thank you   Continue BB, ASA, lasix 60 mg IV BID  No ACE/ARB/ARNI d/t CKD  Fluid restriction  Low-sodium diet  Strict I/O, labs, daily weights (inconsistent)       EMMANUEL/CKD:    Suspect secondary to cardiorenal syndrome type I   Serum creatinine 2.3, (baseline closer to 1.4-1.6 a few months ago)   Was on aldactone PTA, DC'd   Would recommend nephrology consult-noted they are following     Insulin-dependent diabetes mellitus:  A1C 8.4 (8/2021)  Manage as per internal medicine      Hypertension: elevated   Continue amlodipine 10 mg and increase metoprolol to 25 mg twice daily and lasix 60 mg IV BID     History of stroke  Continue on aspirin, Plavix and atorvastatin         Morbid obesity  BMI 51  Counseled on Lifestyle modifications again     She would like a new cardiologist to follow her outpatient, has seen Sharon Zamora DNP at [address] [place] [address], she would like to continue to see him, will arrange. Alford Sacks, NP   DNP, APRN, AG-ACNP-BC     Patient seen and examined by me with the above nurse practitioner. I personally performed all components of the history, physical, and medical decision making and agree with the assessment and plan with minor modifications as noted. Today the patient presents with improving volume overload secondary to probable diastolic heart failure plus or minus renal dysfunction. General PE  Gen:  NAD  Mental Status - Alert. General Appearance - Not in acute distress. HEENT:  PERRL, no carotid bruits or JVD  Chest and Lung Exam   Inspection: Accessory muscles - No use of accessory muscles in breathing. Auscultation:   Breath sounds: - Normal.   Cardiovascular   Inspection: Jugular vein - Bilateral - Inspection Normal.   Palpation/Percussion:   Apical Impulse: - Normal.   Auscultation: Rhythm - Regular. Heart Sounds - S1 WNL and S2 WNL. No S3 or S4. Murmurs & Other Heart Sounds: Auscultation of the heart reveals - No Murmurs. Peripheral Vascular   Upper Extremity: Inspection - Bilateral - No Cyanotic nailbeds or Digital clubbing. Lower Extremity:   Palpation: Edema - Bilateral - No edema. Abdomen:   Soft, non-tender, bowel sounds are active. Neuro: A&O times 3, CN and motor grossly WNL    Volume overload improving. Continue with aggressive diuresis.

## 2021-08-26 NOTE — PROGRESS NOTES
Transition of Care Plan:  RUR: 22% moderate   Disposition: [facility]- [facility] (Pt will need insurance authorization)  Follow up appointments: PCP  DME needed: n/a pt to go to rehab  Transportation at Discharge: BLS vs family   [place] or means to access home: n/a pt to go to rehab      IM Medicare Letter: N/A- medicaid insurance   Is patient a BCPI-A Bundle: N/A                      If yes, was Bundle Letter given?:     Caregiver Contact: Pt's sister, Elena Calhoun p) [phone]  Discharge Caregiver contacted prior to discharge? To be contacted prior to d/c.      1:10pm  OT notified CM that note is in pt's chart. CM has sent message to [facility] requesting insurance auth be initiated. 1:00pm  Patient accepted to [facility] for inpatient rehab. CM will request insurance authorization to be initiated once OT note in pt's chart.      Mitzi Canavan, [place], [address] [facility]

## 2021-08-26 NOTE — PROGRESS NOTES
Hospitalist Progress Note    NAME: Hailee Hermosillo   :  1965   MRN:  [id number]     Interim Hospital Summary: 54 y.o. female whom presented on 2021 with      Assessment / Plan:    Acute on chronic diastolic heart failure POA  -CXR mild pulmonary edema; trop neg, proBNP   -Rapid COVID NEG  -Echo  EF 60-65%  -continue IV lasix  -not on ACEi/ARB due to CKD  -not hypoxic. RA 98%    UR feels more appropriate for OBS admit - orders placed    CKD 4  -Renal US:  No hydronephrosis. Mildly increased echogenicity of both kidneys consistent with the clinical history of medical renal parenchymal disease. Marked bladder distention.  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 Cr has been stable and at baseline  -appreciate Nephrology input    DM2, uncontrolled with hyperglycemia  -continue lantus with premeal humalog 10  -also on glipizide    Generalized weakness with frequent falls at home, POA  -left ankle and foot xrays NEG  -PT OT eval and treat. DC planning to SNF    Hypertension  -on norvasc    Hyperlipidemia  History of stroke  Morbid obesity    40 or above Morbid obesity / Body mass index is 48.54 kg/m². Estimated discharge date:  to SNF  Barriers:    Code status: Full  Prophylaxis: Lovenox  Recommended Disposition: Home w/Family       Subjective:     Chief Complaint / Reason for Physician Visit  Follow up of CHF, EMMANUEL, DM, weakness and frequent falls  Chart reviewed in detail. Discussed with RN events overnight. Review of Systems:  Symptom Y/N Comments  Symptom Y/N Comments   Fever/Chills    Chest Pain     Poor Appetite    Edema     Cough    Abdominal Pain     Sputum    Joint Pain     SOB/PENA    Pruritis/Rash     Nausea/vomit    Tolerating PT/OT     Diarrhea    Tolerating Diet     Constipation    Other       Could NOT obtain due to:      PO intake: No data found. Objective:     VITALS:   Last 24hrs VS reviewed since prior progress note.  Most recent are:  Patient Vitals for the past 24 hrs:   Temp Pulse Resp BP SpO2   08/26/21 1510 98.2 °F (36.8 °C) 77 16 (!) 147/74 96 %   08/26/21 1149 97.8 °F (36.6 °C) 81 16 (!) 125/90 96 %   08/26/21 0804 97.9 °F (36.6 °C) 89 17 (!) 171/69 97 %   08/26/21 0651 98.3 °F (36.8 °C) 88 16 (!) 156/73 96 %   08/26/21 0440 98.3 °F (36.8 °C) 87 16 (!) 159/60 95 %   08/26/21 0100  82 16 (!) 160/61 95 %   08/25/21 2255 98.3 °F (36.8 °C) 84 17 (!) 170/94 99 %   08/25/21 1932  94  115/78        Intake/Output Summary (Last 24 hours) at 8/26/2021 1727  Last data filed at 8/26/2021 1500  Gross per 24 hour   Intake 960 ml   Output 5150 ml   Net -4190 ml        I had a face to face encounter, and independently examined this patient on 8/26/2021, as outlined below:  PHYSICAL EXAM:  General: WD, WN. Alert, cooperative, no acute distress    EENT:  EOMI. Anicteric sclerae. MMM  Resp:  Diminished BS bases. No accessory muscle use  CV:  Regular  rhythm,  (+) LE pitting edema  GI:  Soft, Non distended, Non tender. +Bowel sounds  Neurologic:  Alert and oriented X 3, normal speech,   Psych:   Good insight. Not anxious nor agitated  Skin:  No rashes. No jaundice    Reviewed most current lab test results and cultures  YES  Reviewed most current radiology test results   YES  Review and summation of old records today    NO  Reviewed patient's current orders and MAR    YES  PMH/SH reviewed - no change compared to H&P  ________________________________________________________________________  Care Plan discussed with:    Comments   Patient x    Family      RN     Care Manager     Consultant                        Multidiciplinary team rounds were held today with , nursing, pharmacist and clinical coordinator. Patient's plan of care was discussed; medications were reviewed and discharge planning was addressed.      ________________________________________________________________________  Total NON critical care TIME:  25   Minutes    Total CRITICAL CARE TIME Spent:   Minutes non procedure based      Comments   >50% of visit spent in counseling and coordination of care x     This includes time during multidisciplinary rounds if indicated above   ________________________________________________________________________  Felicia Dale MD     Procedures: see electronic medical records for all procedures/Xrays and details which were not copied into this note but were reviewed prior to creation of Plan. LABS:  I reviewed today's most current labs and imaging studies.   Pertinent labs include:  Recent Labs     08/26/21  0435 08/25/21  1159   WBC 5.7 5.9   HGB 7.9* 8.1*   HCT 24.0* 24.9*    165     Recent Labs     08/26/21  0435 08/25/21  1159    139   K 4.0 4.1    108   CO2 29 26   * 169*   BUN 19 19   CREA 2.33* 2.31*   CA 8.6 8.4*   MG 2.1  --    ALB  --  2.7*   TBILI  --  0.4   ALT  --  13

## 2021-08-27 LAB
ANION GAP SERPL CALC-SCNC: 5 MMOL/L (ref 5–15)
BNP SERPL-MCNC: 1619 PG/ML
BUN SERPL-MCNC: 25 MG/DL (ref 6–20)
BUN/CREAT SERPL: 10 (ref 12–20)
CALCIUM SERPL-MCNC: 8.8 MG/DL (ref 8.5–10.1)
CHLORIDE SERPL-SCNC: 103 MMOL/L (ref 97–108)
CO2 SERPL-SCNC: 29 MMOL/L (ref 21–32)
CREAT SERPL-MCNC: 2.39 MG/DL (ref 0.55–1.02)
GLUCOSE BLD STRIP.AUTO-MCNC: 124 MG/DL (ref 65–117)
GLUCOSE BLD STRIP.AUTO-MCNC: 130 MG/DL (ref 65–117)
GLUCOSE BLD STRIP.AUTO-MCNC: 143 MG/DL (ref 65–117)
GLUCOSE BLD STRIP.AUTO-MCNC: 96 MG/DL (ref 65–117)
GLUCOSE SERPL-MCNC: 106 MG/DL (ref 65–100)
MAGNESIUM SERPL-MCNC: 2.1 MG/DL (ref 1.6–2.4)
POTASSIUM SERPL-SCNC: 3.9 MMOL/L (ref 3.5–5.1)
SERVICE CMNT-IMP: ABNORMAL
SERVICE CMNT-IMP: NORMAL
SODIUM SERPL-SCNC: 137 MMOL/L (ref 136–145)

## 2021-08-27 PROCEDURE — 82962 GLUCOSE BLOOD TEST: CPT

## 2021-08-27 PROCEDURE — 99218 HC RM OBSERVATION: CPT

## 2021-08-27 PROCEDURE — 83880 ASSAY OF NATRIURETIC PEPTIDE: CPT

## 2021-08-27 PROCEDURE — 74011250636 HC RX REV CODE- 250/636: Performed by: INTERNAL MEDICINE

## 2021-08-27 PROCEDURE — 97110 THERAPEUTIC EXERCISES: CPT

## 2021-08-27 PROCEDURE — 74011250637 HC RX REV CODE- 250/637: Performed by: INTERNAL MEDICINE

## 2021-08-27 PROCEDURE — 99226 PR SBSQ OBSERVATION CARE/DAY 35 MINUTES: CPT | Performed by: INTERNAL MEDICINE

## 2021-08-27 PROCEDURE — 80048 BASIC METABOLIC PNL TOTAL CA: CPT

## 2021-08-27 PROCEDURE — 36415 COLL VENOUS BLD VENIPUNCTURE: CPT

## 2021-08-27 PROCEDURE — 74011250637 HC RX REV CODE- 250/637: Performed by: NURSE PRACTITIONER

## 2021-08-27 PROCEDURE — 74011636637 HC RX REV CODE- 636/637: Performed by: INTERNAL MEDICINE

## 2021-08-27 PROCEDURE — 83735 ASSAY OF MAGNESIUM: CPT

## 2021-08-27 PROCEDURE — 96372 THER/PROPH/DIAG INJ SC/IM: CPT

## 2021-08-27 PROCEDURE — 2709999900 HC NON-CHARGEABLE SUPPLY

## 2021-08-27 RX ORDER — TORSEMIDE 20 MG/1
40 TABLET ORAL 2 TIMES DAILY
Status: DISCONTINUED | OUTPATIENT
Start: 2021-08-27 | End: 2021-08-28 | Stop reason: HOSPADM

## 2021-08-27 RX ADMIN — ENOXAPARIN SODIUM 40 MG: 40 INJECTION SUBCUTANEOUS at 21:33

## 2021-08-27 RX ADMIN — Medication 5 ML: at 21:53

## 2021-08-27 RX ADMIN — TORSEMIDE 40 MG: 20 TABLET ORAL at 09:33

## 2021-08-27 RX ADMIN — METOPROLOL TARTRATE 25 MG: 25 TABLET, FILM COATED ORAL at 09:33

## 2021-08-27 RX ADMIN — AMLODIPINE BESYLATE 10 MG: 5 TABLET ORAL at 09:33

## 2021-08-27 RX ADMIN — ASPIRIN 81 MG: 81 TABLET, COATED ORAL at 09:33

## 2021-08-27 RX ADMIN — METOPROLOL TARTRATE 25 MG: 25 TABLET, FILM COATED ORAL at 17:46

## 2021-08-27 RX ADMIN — GABAPENTIN 900 MG: 300 CAPSULE ORAL at 09:32

## 2021-08-27 RX ADMIN — ACETAMINOPHEN 650 MG: 325 TABLET ORAL at 06:41

## 2021-08-27 RX ADMIN — ATORVASTATIN CALCIUM 80 MG: 40 TABLET, FILM COATED ORAL at 21:52

## 2021-08-27 RX ADMIN — GABAPENTIN 900 MG: 300 CAPSULE ORAL at 17:46

## 2021-08-27 RX ADMIN — INSULIN LISPRO 10 UNITS: 100 INJECTION, SOLUTION INTRAVENOUS; SUBCUTANEOUS at 17:46

## 2021-08-27 RX ADMIN — TRAMADOL HYDROCHLORIDE 50 MG: 50 TABLET, FILM COATED ORAL at 06:41

## 2021-08-27 RX ADMIN — INSULIN GLARGINE 20 UNITS: 100 INJECTION, SOLUTION SUBCUTANEOUS at 21:52

## 2021-08-27 RX ADMIN — TORSEMIDE 40 MG: 20 TABLET ORAL at 17:46

## 2021-08-27 RX ADMIN — GLIPIZIDE 5 MG: 5 TABLET ORAL at 09:33

## 2021-08-27 RX ADMIN — INSULIN LISPRO 10 UNITS: 100 INJECTION, SOLUTION INTRAVENOUS; SUBCUTANEOUS at 09:34

## 2021-08-27 RX ADMIN — AMITRIPTYLINE HYDROCHLORIDE 50 MG: 50 TABLET, FILM COATED ORAL at 21:52

## 2021-08-27 RX ADMIN — ENOXAPARIN SODIUM 40 MG: 40 INJECTION SUBCUTANEOUS at 09:33

## 2021-08-27 RX ADMIN — INSULIN LISPRO 10 UNITS: 100 INJECTION, SOLUTION INTRAVENOUS; SUBCUTANEOUS at 12:22

## 2021-08-27 RX ADMIN — CLOPIDOGREL BISULFATE 75 MG: 75 TABLET ORAL at 09:33

## 2021-08-27 NOTE — PROGRESS NOTES
NAME: John Rivera        :  1965        MRN:  [id number]                    Assessment   :                                               Plan:  CKD stage 4  Proteinuria since '15  Anemia  HTN  DM2  Acute on chronic diastolic HF Creatinine stable at ~ 2.3; likely DM nephropathy; she has had a couple of telephone visits with [facility], but states she wants to start seeing doctors here.     Check urine protein:creatinine ratio, recheck urine sediment (last in 2018)     Edema is better, on room air; stop IV lasix, restart Torsemide at 40 mg PO BID     Try to add RAAS inh if keeps f/u appt     Daily weight, low salt, fluid restrict         Subjective:     Chief Complaint:  Alert. Just finished breakfast (ate 100 %). Not sob. On room air. Some dependent edema. Purewick with foamy urine. We discussed the above. Review of Systems:    Symptom Y/N Comments  Symptom Y/N Comments   Fever/Chills    Chest Pain     Poor Appetite    Edema     Cough    Abdominal Pain     Sputum    Joint Pain     SOB/PENA    Pruritis/Rash     Nausea/vomit    Tolerating PT/OT     Diarrhea    Tolerating Diet     Constipation    Other       Could not obtain due to:      Objective:     VITALS:   Last 24hrs VS reviewed since prior progress note.  Most recent are:  Visit Vitals  /63   Pulse 87   Temp 97.9 °F (36.6 °C)   Resp 16   Ht 5' 5\" (1.651 m)   Wt 132.3 kg (291 lb 10.7 oz)   SpO2 96%   BMI 48.54 kg/m²       Intake/Output Summary (Last 24 hours) at 2021 0657  Last data filed at 2021 1821  Gross per 24 hour   Intake 960 ml   Output 2300 ml   Net -1340 ml      Telemetry Reviewed:     PHYSICAL EXAM:  General: NAD      Lab Data Reviewed: (see below)    Medications Reviewed: (see below)    PMH/SH reviewed - no change compared to H&P  ________________________________________________________________________  Care Plan discussed with:  Patient Family      RN     Care Manager                    Consultant:          Comments   >50% of visit spent in counseling and coordination of care       ________________________________________________________________________  Terence Araujo MD     Procedures: see electronic medical records for all procedures/Xrays and details which  were not copied into this note but were reviewed prior to creation of Plan. LABS:  Recent Labs     08/26/21  0435 08/25/21  1159   WBC 5.7 5.9   HGB 7.9* 8.1*   HCT 24.0* 24.9*    165     Recent Labs     08/27/21  0453 08/26/21  0435 08/25/21  1159    141 139   K 3.9 4.0 4.1    107 108   CO2 29 29 26   BUN 25* 19 19   CREA 2.39* 2.33* 2.31*   * 139* 169*   CA 8.8 8.6 8.4*   MG 2.1 2.1  --      Recent Labs     08/25/21  1159      TP 7.1   ALB 2.7*   GLOB 4.4*     No results for input(s): INR, PTP, APTT, INREXT in the last 72 hours. No results for input(s): FE, TIBC, PSAT, FERR in the last 72 hours. Lab Results   Component Value Date/Time    Folate 8.0 08/16/2020 02:02 PM      No results for input(s): PH, PCO2, PO2 in the last 72 hours. No results for input(s): CPK, CKMB in the last 72 hours.     No lab exists for component: TROPONINI  No components found for: [place]  Lab Results   Component Value Date/Time    Color YELLOW/STRAW 12/20/2018 04:47 PM    Appearance CLOUDY (A) 12/20/2018 04:47 PM    Specific gravity 1.029 12/20/2018 04:47 PM    Specific gravity >1.030 (H) 06/24/2018 03:18 AM    pH (UA) 6.0 12/20/2018 04:47 PM    Protein 300 (A) 12/20/2018 04:47 PM    Glucose NEGATIVE  12/20/2018 04:47 PM    Ketone TRACE (A) 12/20/2018 04:47 PM    Bilirubin NEGATIVE  12/20/2018 04:47 PM    Urobilinogen 0.2 12/20/2018 04:47 PM    Nitrites NEGATIVE  12/20/2018 04:47 PM    Leukocyte Esterase NEGATIVE  12/20/2018 04:47 PM    Epithelial cells FEW 12/20/2018 04:47 PM    Bacteria NEGATIVE  12/20/2018 04:47 PM    WBC 0-4 12/20/2018 04:47 PM    RBC 5-10 12/20/2018 04:47 PM       MEDICATIONS:  Current Facility-Administered Medications   Medication Dose Route Frequency    metoprolol tartrate (LOPRESSOR) tablet 25 mg  25 mg Oral BID    albuterol (PROVENTIL HFA, VENTOLIN HFA, PROAIR HFA) inhaler 2 Puff  2 Puff Inhalation Q6H PRN    amitriptyline (ELAVIL) tablet 50 mg  50 mg Oral QHS    amLODIPine (NORVASC) tablet 10 mg  10 mg Oral DAILY    aspirin delayed-release tablet 81 mg  81 mg Oral DAILY    atorvastatin (LIPITOR) tablet 80 mg  80 mg Oral QHS    clopidogreL (PLAVIX) tablet 75 mg  75 mg Oral DAILY    fluticasone propionate (FLONASE) 50 mcg/actuation nasal spray 2 Spray  2 Spray Both Nostrils DAILY    gabapentin (NEURONTIN) capsule 900 mg  900 mg Oral BID    glipiZIDE (GLUCOTROL) tablet 5 mg  5 mg Oral DAILY    insulin lispro (HUMALOG) injection 10 Units  10 Units SubCUTAneous TIDAC    insulin glargine (LANTUS) injection 20 Units  20 Units SubCUTAneous QHS    magnesium oxide (MAG-OX) tablet 400 mg  400 mg Oral DAILY    furosemide (LASIX) injection 60 mg  60 mg IntraVENous BID    traMADoL (ULTRAM) tablet 50 mg  50 mg Oral TID PRN    sodium chloride (NS) flush 5-40 mL  5-40 mL IntraVENous Q8H    sodium chloride (NS) flush 5-40 mL  5-40 mL IntraVENous PRN    acetaminophen (TYLENOL) tablet 650 mg  650 mg Oral Q6H PRN    Or    acetaminophen (TYLENOL) suppository 650 mg  650 mg Rectal Q6H PRN    polyethylene glycol (MIRALAX) packet 17 g  17 g Oral DAILY PRN    ondansetron (ZOFRAN ODT) tablet 4 mg  4 mg Oral Q8H PRN    Or    ondansetron (ZOFRAN) injection 4 mg  4 mg IntraVENous Q6H PRN    enoxaparin (LOVENOX) injection 40 mg  40 mg SubCUTAneous Q12H    insulin lispro (HUMALOG) injection   SubCUTAneous AC&HS    glucose chewable tablet 16 g  4 Tablet Oral PRN    dextrose (D50W) injection syrg 12.5-25 g  12.5-25 g IntraVENous PRN    glucagon (GLUCAGEN) injection 1 mg  1 mg IntraMUSCular PRN

## 2021-08-27 NOTE — PROGRESS NOTES
Hospitalist Progress Note    NAME: Brittany Shipman   :  1965   MRN:  [id number]     Interim Hospital Summary: 54 y.o. female whom presented on 2021 with      Assessment / Plan:    Acute on chronic diastolic heart failure POA  -CXR mild pulmonary edema; trop neg, proBNP   -Rapid COVID NEG  -Echo  EF 60-65%  -transitioned back to torsemide PO  -not on ACEi/ARB due to CKD  -not hypoxic. RA 98%  -PT OT eval and treat. Recommend IPR - waiting for insurance auth  -appreciate cardiology and nephrology help    CKD 4  -Renal US:  No hydronephrosis. Mildly increased echogenicity of both kidneys consistent with the clinical history of medical renal parenchymal disease. Marked bladder distention.  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 Cr has been stable and at baseline  -follow Cr    DM2, uncontrolled with hyperglycemia  -continue lantus with premeal humalog 10  -also on glipizide    Generalized weakness with frequent falls at home, POA  -left ankle and foot xrays NEG  -PT OT eval and treat. DC planning to SNF    Hypertension  -on norvasc    Hyperlipidemia  History of stroke  Morbid obesity    40 or above Morbid obesity / Body mass index is 45.98 kg/m². Estimated discharge date:  to SNF  Barriers:    Code status: Full  Prophylaxis: Lovenox  Recommended Disposition: Home w/Family       Subjective:     Chief Complaint / Reason for Physician Visit  Follow up of CHF, EMMANUEL, DM, weakness and frequent falls  Chart reviewed in detail. Discussed with RN events overnight. Review of Systems:  Symptom Y/N Comments  Symptom Y/N Comments   Fever/Chills    Chest Pain     Poor Appetite    Edema     Cough    Abdominal Pain     Sputum    Joint Pain     SOB/PENA    Pruritis/Rash     Nausea/vomit    Tolerating PT/OT     Diarrhea    Tolerating Diet     Constipation    Other       Could NOT obtain due to:      PO intake: No data found.   Objective:     VITALS:   Last 24hrs VS reviewed since prior progress note. Most recent are:  Patient Vitals for the past 24 hrs:   Temp Pulse Resp BP SpO2   08/27/21 1542 97.9 °F (36.6 °C) 74 16 127/66 95 %   08/27/21 1228 97.6 °F (36.4 °C) 82 16 (!) 143/67 96 %   08/27/21 0924 97.7 °F (36.5 °C) 82 16 138/63 95 %   08/27/21 0330 97.9 °F (36.6 °C) 87 16 130/63 96 %   08/26/21 2343 98.7 °F (37.1 °C) 77 16 (!) 133/53 95 %   08/26/21 1944 98.2 °F (36.8 °C) 72 18 (!) 145/56 95 %       Intake/Output Summary (Last 24 hours) at 8/27/2021 1630  Last data filed at 8/27/2021 1544  Gross per 24 hour   Intake 700 ml   Output 1540 ml   Net -840 ml        I had a face to face encounter, and independently examined this patient on 8/27/2021, as outlined below:  PHYSICAL EXAM:  General: WD, WN. Alert, cooperative, no acute distress    EENT:  EOMI. Anicteric sclerae. MMM  Resp:  Diminished BS bases. No accessory muscle use  CV:  Regular  rhythm,  (+) LE pitting edema  GI:  Soft, Non distended, Non tender. +Bowel sounds  Neurologic:  Alert and oriented X 3, normal speech,   Psych:   Good insight. Not anxious nor agitated  Skin:  No rashes. No jaundice    Reviewed most current lab test results and cultures  YES  Reviewed most current radiology test results   YES  Review and summation of old records today    NO  Reviewed patient's current orders and MAR    YES  PMH/ reviewed - no change compared to H&P  ________________________________________________________________________  Care Plan discussed with:    Comments   Patient x    Family      RN     Care Manager     Consultant                        Multidiciplinary team rounds were held today with , nursing, pharmacist and clinical coordinator. Patient's plan of care was discussed; medications were reviewed and discharge planning was addressed.      ________________________________________________________________________  Total NON critical care TIME:  25   Minutes    Total CRITICAL CARE TIME Spent:   Minutes non procedure based      Comments   >50% of visit spent in counseling and coordination of care x     This includes time during multidisciplinary rounds if indicated above   ________________________________________________________________________  Rai Warren MD     Procedures: see electronic medical records for all procedures/Xrays and details which were not copied into this note but were reviewed prior to creation of Plan. LABS:  I reviewed today's most current labs and imaging studies.   Pertinent labs include:  Recent Labs     08/26/21  0435 08/25/21  1159   WBC 5.7 5.9   HGB 7.9* 8.1*   HCT 24.0* 24.9*    165     Recent Labs     08/27/21  0453 08/26/21  0435 08/25/21  1159    141 139   K 3.9 4.0 4.1    107 108   CO2 29 29 26   * 139* 169*   BUN 25* 19 19   CREA 2.39* 2.33* 2.31*   CA 8.8 8.6 8.4*   MG 2.1 2.1  --    ALB  --   --  2.7*   TBILI  --   --  0.4   ALT  --   --  13

## 2021-08-27 NOTE — PROGRESS NOTES
[address]  [phone]      Cardiology Progress Note      8/27/2021 0930 AM    Admit Date: 8/25/2021    Admit Diagnosis:   CHF (congestive heart failure) ([address] [I50.9]    Subjective:     Alejandra Castillo states \"I keep feeling better\" Lengthy discussion regarding diet modifications with low sodium and daily weights. Also included water restriction as she sits in room with two large liter water pitchers at bedside.      VSS, BP improved   NT pro-BNP improved, down to 1.6k  Has diuresed over 8.1L     Visit Vitals  /63 (BP 1 Location: Left upper arm, BP Patient Position: At rest)   Pulse 82   Temp 97.7 °F (36.5 °C)   Resp 16   Ht 5' 5\" (1.651 m)   Wt 125.3 kg (276 lb 4.8 oz)   LMP 05/01/2013   SpO2 95%   BMI 45.98 kg/m²       Current Facility-Administered Medications   Medication Dose Route Frequency    torsemide (DEMADEX) tablet 40 mg  40 mg Oral BID    metoprolol tartrate (LOPRESSOR) tablet 25 mg  25 mg Oral BID    albuterol (PROVENTIL HFA, VENTOLIN HFA, PROAIR HFA) inhaler 2 Puff  2 Puff Inhalation Q6H PRN    amitriptyline (ELAVIL) tablet 50 mg  50 mg Oral QHS    amLODIPine (NORVASC) tablet 10 mg  10 mg Oral DAILY    aspirin delayed-release tablet 81 mg  81 mg Oral DAILY    atorvastatin (LIPITOR) tablet 80 mg  80 mg Oral QHS    clopidogreL (PLAVIX) tablet 75 mg  75 mg Oral DAILY    fluticasone propionate (FLONASE) 50 mcg/actuation nasal spray 2 Spray  2 Spray Both Nostrils DAILY    gabapentin (NEURONTIN) capsule 900 mg  900 mg Oral BID    glipiZIDE (GLUCOTROL) tablet 5 mg  5 mg Oral DAILY    insulin lispro (HUMALOG) injection 10 Units  10 Units SubCUTAneous TIDAC    insulin glargine (LANTUS) injection 20 Units  20 Units SubCUTAneous QHS    magnesium oxide (MAG-OX) tablet 400 mg  400 mg Oral DAILY    traMADoL (ULTRAM) tablet 50 mg  50 mg Oral TID PRN    sodium chloride (NS) flush 5-40 mL  5-40 mL IntraVENous Q8H    sodium chloride (NS) flush 5-40 mL 5-40 mL IntraVENous PRN    acetaminophen (TYLENOL) tablet 650 mg  650 mg Oral Q6H PRN    Or    acetaminophen (TYLENOL) suppository 650 mg  650 mg Rectal Q6H PRN    polyethylene glycol (MIRALAX) packet 17 g  17 g Oral DAILY PRN    ondansetron (ZOFRAN ODT) tablet 4 mg  4 mg Oral Q8H PRN    Or    ondansetron (ZOFRAN) injection 4 mg  4 mg IntraVENous Q6H PRN    enoxaparin (LOVENOX) injection 40 mg  40 mg SubCUTAneous Q12H    insulin lispro (HUMALOG) injection   SubCUTAneous AC&HS    glucose chewable tablet 16 g  4 Tablet Oral PRN    dextrose (D50W) injection syrg 12.5-25 g  12.5-25 g IntraVENous PRN    glucagon (GLUCAGEN) injection 1 mg  1 mg IntraMUSCular PRN       Objective:      Physical Exam:  General Appearance:  alert, cooperative, morbidly obese  female; appears stated age  Eyes: sclera anicteric  Mouth/Throat: moist mucous membranes; oral pharynx clear  Neck: supple; no JVD or bruit  Pulmonary:  Rales bilaterally bases; good effort   Cardiovascular: regular rate and rhythm; no murmur, click, rub, or gallop  Abdomen: soft, non-tender, non-distended; bowel sounds normal  Musculoskeletal: no swelling or deformity; moves all extremities  Extremities: 2+ BL edema; palpable distal pulses   Skin: warm and dry, reddened lower extremities  Neuro: grossly normal  Psych: normal mood and affect given the setting    Data Review:   Recent Labs     08/26/21  0435 08/25/21  1159   WBC 5.7 5.9   HGB 7.9* 8.1*   HCT 24.0* 24.9*    165     Recent Labs     08/27/21  0453 08/26/21  0435 08/25/21  1159    141 139   K 3.9 4.0 4.1    107 108   CO2 29 29 26   * 139* 169*   BUN 25* 19 19   CREA 2.39* 2.33* 2.31*   CA 8.8 8.6 8.4*   MG 2.1 2.1  --    ALB  --   --  2.7*   TBILI  --   --  0.4   ALT  --   --  13       Recent Labs     08/25/21  1159   TROIQ <0.05         Intake/Output Summary (Last 24 hours) at 8/27/2021 1118  Last data filed at 8/27/2021 0950  Gross per 24 hour   Intake 960 ml Output 2000 ml   Net -1040 ml      Cardiographics:     Telemetry: NSR 1 degree AVB   EKG: NSR 1 degree AVB   Cxray: \"Mild diffuse interstitial prominence is now noted and may represent  mild edema or atypical viral pneumonia. \"  ECHO: 8/9/2021 (no valvular pathology   · LV: Estimated LVEF is 60 - 65%. Normal cavity size, systolic function (ejection fraction normal) and diastolic function. Mild concentric hypertrophy. Wall motion: normal.      Assessment:     Principal Problem:    CHF (congestive heart failure) ([facility] (8/25/2021)    Active Problems:    Hypercholesteremia (6/25/2015)      Type 2 diabetes mellitus with peripheral neuropathy ([facility] (6/25/2015)      Essential hypertension (6/25/2015)      Morbid obesity ([facility] (9/2/2015)      EMMANUEL (acute kidney injury) ([facility] (8/25/2021)        Plan:   Mary Solorzano is a 54 y.o. female  With a PMHx significant for DHF, HTN, DM II, Asthma, CKD, arthritis admitted for CHF (congestive heart failure) ([facility] [I50.9] exacerbation. Cardiology referral for acute on chronic diastolic CHF exacerbation. Acute on Chronic HFpEF:   Fluid Overload:   Hypoxic respiratory failure:  BNP down to 1.6k. Troponin trend negative. EKG NSR nonspecific T wave changes. Chest x-ray reviewed pulmonary edema pattern. Recent ECHO as above, EF 60-65%, no valvular pathology, likely some DHF with contribution to vol. Overload from CKD. Monitor tele, under hospitalist service please, thank you   Continue JOVANNY, ASA,  Noted nephrology restarted her torsemide today PO 40 mg BID  No ACE/ARB/ARNI d/t CKD  Fluid restriction-1200 cc   Low-sodium diet  Strict I/O, labs, daily weights (inconsistent)  Documented bed weights down nearly 7 lbs, inconsistent with fluid amount documented should be around 16 lbs. Stand her for weights.       EMMANUEL/CKD:    Suspect secondary to cardiorenal syndrome type I   Serum creatinine 2.3, new baseline?  (baseline closer to 1.4-1.6 a few months ago)   Was on aldactone PTA, DC'd Nephrology following     Insulin-dependent diabetes mellitus:  A1C 8.4 (8/2021)  Manage as per internal medicine      Hypertension: controlled   Continue amlodipine 10 mg and continue metoprolol to 25 mg twice daily, torsemide 40 mg Po BID      History of stroke  Continue on aspirin, Plavix and atorvastatin         Morbid obesity  BMI 51  Counseled on Lifestyle modifications again     She would like a new cardiologist to follow her outpatient, has appointment with Dr. Elroy Diaz on Sept 2nd. Matty Gloria, NP   DNP, APRN, AG-ACNP-BC     Patient seen and examined by me with the above nurse practitioner. I personally performed all components of the history, physical, and medical decision making and agree with the assessment and plan with minor modifications as noted. Today the patient presents with improving volume overload, documented to be 8 L negative, and 7 pounds negative. General PE  Gen:  NAD  Mental Status - Alert. General Appearance - Not in acute distress. HEENT:  PERRL, no carotid bruits or JVD  Chest and Lung Exam   Inspection: Accessory muscles - No use of accessory muscles in breathing. Auscultation:   Breath sounds: -Diminished at the bases  Cardiovascular   Inspection: Jugular vein - Bilateral - Inspection Normal.   Palpation/Percussion:   Apical Impulse: - Normal.   Auscultation: Rhythm - Regular. Heart Sounds - S1 WNL and S2 WNL. No S3 or S4. Murmurs & Other Heart Sounds: Auscultation of the heart reveals - No Murmurs. Peripheral Vascular   Upper Extremity: Inspection - Bilateral - No Cyanotic nailbeds or Digital clubbing. Lower Extremity:   Palpation: Edema - Bilateral -1-2+ edema with stasis, improving. Abdomen:   Soft, non-tender, bowel sounds are active. Neuro: A&O times 3, CN and motor grossly WNL    Continue aggressive diuresis. Improving.   Counseled on CHF instructions at length including diet, sodium restriction, strict daily weights, etc.

## 2021-08-27 NOTE — PROGRESS NOTES
Transition of Care Plan:  RUR: 25% moderate   Disposition: IPR- [facility] (Pt will need insurance authorization)  Follow up appointments: PCP  DME needed: n/a pt to go to rehab  Transportation at Discharge: BLS vs family   [place] or means to access home: n/a pt to go to rehab      IM Medicare Letter: N/A- medicaid insurance   Is patient a BCPI-A Bundle: N/A                      If yes, was Bundle Letter given?:     Caregiver Contact: Pt's sister, Elena Weir p) [phone]  Discharge Caregiver contacted prior to discharge?  To be contacted prior to d/c.     Patient accepted to [facility], insurance auth pending.      Oral and Written notification given to patient and/or caregiver informing them that they are currently an Outpatient receiving care in our facility. Outpatient services include Observation Services.      [place], [address] [facility], [address] [facility]

## 2021-08-27 NOTE — PROGRESS NOTES
Problem: Self Care Deficits Care Plan (Adult)  Goal: *Acute Goals and Plan of Care (Insert Text)  Description:   FUNCTIONAL STATUS PRIOR TO ADMISSION: The patient was mod I for functional mobility with rollator. Has had progressive decline in function over the last several months and increased LE edema. Reports difficulty with some tasks such as LB dressing. She has significant fall history including 6 falls in the last week. HOME SUPPORT: The patient lived with her sister, sister's boyfriend and pt's son    Occupational Therapy Goals  Initiated 8/26/2021  1. Patient will perform grooming with modified independence in standing within 7 day(s). 2.  Patient will perform lower body dressing with moderate assistance  within 7 day(s). 3.  Patient will perform toilet transfers with modified independence within 7 day(s). 4.  Patient will perform all aspects of toileting with modified independence within 7 day(s). 5.  Patient will participate in upper extremity therapeutic exercise/activities with independence for 5 minutes within 7 day(s). 6.  Patient will utilize energy conservation techniques during functional activities with verbal cues within 7 day(s). Outcome: Progressing Towards Goal    OCCUPATIONAL THERAPY TREATMENT  Patient: Graciela Portillo (68 y.o. female)  Date: 8/27/2021  Diagnosis: CHF (congestive heart failure) ([facility]) [I50.9] CHF (congestive heart failure) ([facility] [address]       Precautions: Fall  Chart, occupational therapy assessment, plan of care, and goals were reviewed. ASSESSMENT  Patient continues with skilled OT services and is progressing towards goals. Pt declined OOB activity; however, agreeable to bed level UE strengthening, with good engagement at Supervision level against green theraband resistance.   Pt continues to benefit from skilled OT to address functional deficits during acute hospitalization, with reporting therapist believing pt to benefit from inpatient rehab upon discharge. Current Level of Function Impacting Discharge (ADLs): Max A    Other factors to consider for discharge: Max A LE ADLs         PLAN :  Patient continues to benefit from skilled intervention to address the above impairments. Continue treatment per established plan of care to address goals. Recommend with staff: Active ADL engagement, Assist x1 to/from bathroom    Recommend next OT session: POC progression    Recommendation for discharge: (in order for the patient to meet his/her long term goals)  Therapy 3 hours per day 5-7 days per week    This discharge recommendation:  Has been made in collaboration with the attending provider and/or case management    IF patient discharges home will need the following DME: TBD       SUBJECTIVE:   Patient stated i'll work on the exercises this weekend.     OBJECTIVE DATA SUMMARY:   Cognitive/Behavioral Status:  Neurologic State: Alert  Orientation Level: Appropriate for age  Cognition: Appropriate decision making  Perseveration: No perseveration noted  Safety/Judgement: Awareness of environment    ADL Intervention:  Cognitive Retraining  Safety/Judgement: Awareness of environment    Therapeutic Exercises:   Completed BUE strengthening HEP, inclined in bed, challenging functional strength/endurance required for increased ADL independence at Rollator level, with pt completing 2 sets of 7 reps of biceps flexion, triceps extension, chest press and latissimus dorsi pulls, against green theraband resistance, with Max verbal and Min tactile cues for technique (e.g. triceps extension above heart level with slow return, elbow adducted to body during biceps flexion and driving movement through elbows rather than hands during latissimus maikel pulls), as well as, cues for proper pacing and breathing.      Pain:  No c/o pain    Activity Tolerance:   Fair and requires rest breaks    After treatment patient left in no apparent distress:   Supine in bed, Call bell within reach, and Side rails x 3    COMMUNICATION/COLLABORATION:   The patients plan of care was discussed with: Registered nurse and Case management.      Jerri Lara OT  Time Calculation: 24 mins

## 2021-08-27 NOTE — PROGRESS NOTES
End of Shift Note    Bedside shift change report given to Pamela Montoya RN (oncoming nurse) by Severino Espinla LPN (offgoing nurse). Report included the following information SBAR, Kardex, Intake/Output, MAR and Recent Results    Shift worked:  7p-7:30a     Shift summary and any significant changes:          Concerns for physician to address:       Zone phone for oncoming shift:   [phone]       Activity:  Activity Level: Up with Assistance  Number times ambulated in hallways past shift: 0  Number of times OOB to chair past shift: 0    Cardiac:   Cardiac Monitoring: Yes      Cardiac Rhythm: Sinus Rhythm    Access:   Current line(s): PIV     Genitourinary:   Urinary status: voiding and external catheter    Respiratory:   O2 Device: None (Room air)  Chronic home O2 use?: NO  Incentive spirometer at bedside: NO     GI:  Last Bowel Movement Date: 08/25/21  Current diet:  ADULT DIET Regular; 4 carb choices (60 gm/meal); Low Fat/Low Chol/High Fiber/2 gm Na; 2000 ml  DIET ONE TIME MESSAGE  Passing flatus: YES  Tolerating current diet: YES       Pain Management:   Patient states pain is manageable on current regimen: YES    Skin:  Guillermo Score: 18  Interventions: float heels, increase time out of bed, limit briefs, internal/external urinary devices and nutritional support     Patient Safety:  Fall Score:  Total Score: 4  Interventions: assistive device (walker, cane, etc), gripper socks and pt to call before getting OOB  High Fall Risk: Yes    Length of Stay:  Expected LOS: - - -  Actual LOS: 1      Severino Espinal LPN

## 2021-08-28 VITALS
DIASTOLIC BLOOD PRESSURE: 69 MMHG | SYSTOLIC BLOOD PRESSURE: 115 MMHG | HEIGHT: 65 IN | HEART RATE: 75 BPM | RESPIRATION RATE: 16 BRPM | TEMPERATURE: 98.5 F | BODY MASS INDEX: 46.03 KG/M2 | WEIGHT: 276.3 LBS | OXYGEN SATURATION: 93 %

## 2021-08-28 LAB
ANION GAP SERPL CALC-SCNC: 2 MMOL/L (ref 5–15)
APPEARANCE UR: ABNORMAL
BACTERIA URNS QL MICRO: ABNORMAL /HPF
BILIRUB UR QL: NEGATIVE
BUN SERPL-MCNC: 28 MG/DL (ref 6–20)
BUN/CREAT SERPL: 10 (ref 12–20)
CALCIUM SERPL-MCNC: 8.6 MG/DL (ref 8.5–10.1)
CHLORIDE SERPL-SCNC: 102 MMOL/L (ref 97–108)
CO2 SERPL-SCNC: 31 MMOL/L (ref 21–32)
COLOR UR: ABNORMAL
CREAT SERPL-MCNC: 2.69 MG/DL (ref 0.55–1.02)
CREAT UR-MCNC: 22.7 MG/DL
EPITH CASTS URNS QL MICRO: ABNORMAL /LPF
GLUCOSE BLD STRIP.AUTO-MCNC: 166 MG/DL (ref 65–117)
GLUCOSE BLD STRIP.AUTO-MCNC: 175 MG/DL (ref 65–117)
GLUCOSE BLD STRIP.AUTO-MCNC: 211 MG/DL (ref 65–117)
GLUCOSE SERPL-MCNC: 142 MG/DL (ref 65–100)
GLUCOSE UR STRIP.AUTO-MCNC: 100 MG/DL
HGB UR QL STRIP: ABNORMAL
HYALINE CASTS URNS QL MICRO: ABNORMAL /LPF (ref 0–5)
KETONES UR QL STRIP.AUTO: NEGATIVE MG/DL
LEUKOCYTE ESTERASE UR QL STRIP.AUTO: ABNORMAL
MUCOUS THREADS URNS QL MICRO: ABNORMAL /LPF
NITRITE UR QL STRIP.AUTO: NEGATIVE
PH UR STRIP: 6 [PH] (ref 5–8)
POTASSIUM SERPL-SCNC: 4.4 MMOL/L (ref 3.5–5.1)
PROT UR STRIP-MCNC: 100 MG/DL
PROT UR-MCNC: 200 MG/DL (ref 0–11.9)
PROT/CREAT UR-RTO: 8.8
RBC #/AREA URNS HPF: ABNORMAL /HPF (ref 0–5)
SERVICE CMNT-IMP: ABNORMAL
SODIUM SERPL-SCNC: 135 MMOL/L (ref 136–145)
SP GR UR REFRACTOMETRY: 1.01 (ref 1–1.03)
UROBILINOGEN UR QL STRIP.AUTO: 0.2 EU/DL (ref 0.2–1)
WBC URNS QL MICRO: >100 /HPF (ref 0–4)

## 2021-08-28 PROCEDURE — 74011636637 HC RX REV CODE- 636/637: Performed by: INTERNAL MEDICINE

## 2021-08-28 PROCEDURE — 81001 URINALYSIS AUTO W/SCOPE: CPT

## 2021-08-28 PROCEDURE — 74011250637 HC RX REV CODE- 250/637: Performed by: NURSE PRACTITIONER

## 2021-08-28 PROCEDURE — 80048 BASIC METABOLIC PNL TOTAL CA: CPT

## 2021-08-28 PROCEDURE — 36415 COLL VENOUS BLD VENIPUNCTURE: CPT

## 2021-08-28 PROCEDURE — 99218 HC RM OBSERVATION: CPT

## 2021-08-28 PROCEDURE — 74011250637 HC RX REV CODE- 250/637: Performed by: INTERNAL MEDICINE

## 2021-08-28 PROCEDURE — 74011250636 HC RX REV CODE- 250/636: Performed by: INTERNAL MEDICINE

## 2021-08-28 PROCEDURE — 84156 ASSAY OF PROTEIN URINE: CPT

## 2021-08-28 PROCEDURE — 96372 THER/PROPH/DIAG INJ SC/IM: CPT

## 2021-08-28 PROCEDURE — 82962 GLUCOSE BLOOD TEST: CPT

## 2021-08-28 RX ORDER — INSULIN LISPRO 100 [IU]/ML
INJECTION, SOLUTION INTRAVENOUS; SUBCUTANEOUS
Qty: 1 VIAL | Refills: 0 | Status: SHIPPED
Start: 2021-08-28 | End: 2021-12-01

## 2021-08-28 RX ORDER — TRAMADOL HYDROCHLORIDE 50 MG/1
50 TABLET ORAL
Qty: 20 TABLET | Refills: 0 | Status: SHIPPED | OUTPATIENT
Start: 2021-08-28 | End: 2021-09-11

## 2021-08-28 RX ORDER — TORSEMIDE 20 MG/1
40 TABLET ORAL 2 TIMES DAILY
Qty: 120 TABLET | Refills: 0 | Status: SHIPPED
Start: 2021-08-28 | End: 2021-09-27

## 2021-08-28 RX ADMIN — TORSEMIDE 40 MG: 20 TABLET ORAL at 08:35

## 2021-08-28 RX ADMIN — INSULIN LISPRO 2 UNITS: 100 INJECTION, SOLUTION INTRAVENOUS; SUBCUTANEOUS at 12:32

## 2021-08-28 RX ADMIN — MAGNESIUM OXIDE 400 MG (241.3 MG MAGNESIUM) TABLET 400 MG: TABLET at 08:35

## 2021-08-28 RX ADMIN — ENOXAPARIN SODIUM 40 MG: 40 INJECTION SUBCUTANEOUS at 08:36

## 2021-08-28 RX ADMIN — AMLODIPINE BESYLATE 10 MG: 5 TABLET ORAL at 08:35

## 2021-08-28 RX ADMIN — Medication 10 ML: at 06:40

## 2021-08-28 RX ADMIN — ASPIRIN 81 MG: 81 TABLET, COATED ORAL at 08:35

## 2021-08-28 RX ADMIN — METOPROLOL TARTRATE 25 MG: 25 TABLET, FILM COATED ORAL at 08:35

## 2021-08-28 RX ADMIN — GABAPENTIN 900 MG: 300 CAPSULE ORAL at 08:36

## 2021-08-28 RX ADMIN — GLIPIZIDE 5 MG: 5 TABLET ORAL at 08:35

## 2021-08-28 RX ADMIN — ACETAMINOPHEN 650 MG: 325 TABLET ORAL at 08:35

## 2021-08-28 RX ADMIN — CLOPIDOGREL BISULFATE 75 MG: 75 TABLET ORAL at 08:35

## 2021-08-28 RX ADMIN — INSULIN LISPRO 10 UNITS: 100 INJECTION, SOLUTION INTRAVENOUS; SUBCUTANEOUS at 12:31

## 2021-08-28 RX ADMIN — INSULIN LISPRO 10 UNITS: 100 INJECTION, SOLUTION INTRAVENOUS; SUBCUTANEOUS at 08:36

## 2021-08-28 NOTE — PROGRESS NOTES
1900: Bedside shift change report given to Pablito Peterson (oncoming nurse) by Alex Lanier RN (offgoing nurse). Report included the following information SBAR, ED Summary, Intake/Output, MAR, Recent Results and Cardiac Rhythm NSR.

## 2021-08-28 NOTE — PROGRESS NOTES
Problem: Diabetes Self-Management  Goal: *Disease process and treatment process  Description: Define diabetes and identify own type of diabetes; list 3 options for treating diabetes. Outcome: Progressing Towards Goal  Goal: *Incorporating nutritional management into lifestyle  Description: Describe effect of type, amount and timing of food on blood glucose; list 3 methods for planning meals. Outcome: Progressing Towards Goal  Goal: *Incorporating physical activity into lifestyle  Description: State effect of exercise on blood glucose levels. Outcome: Progressing Towards Goal  Goal: *Developing strategies to promote health/change behavior  Description: Define the ABC's of diabetes; identify appropriate screenings, schedule and personal plan for screenings. Outcome: Progressing Towards Goal  Goal: *Using medications safely  Description: State effect of diabetes medications on diabetes; name diabetes medication taking, action and side effects. Outcome: Progressing Towards Goal  Goal: *Monitoring blood glucose, interpreting and using results  Description: Identify recommended blood glucose targets  and personal targets. Outcome: Progressing Towards Goal  Goal: *Prevention, detection, treatment of acute complications  Description: List symptoms of hyper- and hypoglycemia; describe how to treat low blood sugar and actions for lowering  high blood glucose level. Outcome: Progressing Towards Goal  Goal: *Prevention, detection and treatment of chronic complications  Description: Define the natural course of diabetes and describe the relationship of blood glucose levels to long term complications of diabetes.   Outcome: Progressing Towards Goal  Goal: *Developing strategies to address psychosocial issues  Description: Describe feelings about living with diabetes; identify support needed and support network  Outcome: Progressing Towards Goal  Goal: *Insulin pump training  Outcome: Progressing Towards Goal  Goal: *Sick day guidelines  Outcome: Progressing Towards Goal  Goal: *Patient Specific Goal (EDIT GOAL, INSERT TEXT)  Outcome: Progressing Towards Goal     Problem: Patient Education: Go to Patient Education Activity  Goal: Patient/Family Education  Outcome: Progressing Towards Goal     Problem: Falls - Risk of  Goal: *Absence of Falls  Description: Document Ileana Swann Fall Risk and appropriate interventions in the flowsheet.   Outcome: Progressing Towards Goal  Note: Fall Risk Interventions:  Mobility Interventions: Assess mobility with egress test, Bed/chair exit alarm, Patient to call before getting OOB, Communicate number of staff needed for ambulation/transfer         Medication Interventions: Assess postural VS orthostatic hypotension, Bed/chair exit alarm, Patient to call before getting OOB, Teach patient to arise slowly    Elimination Interventions: Bed/chair exit alarm, Call light in reach, Patient to call for help with toileting needs    History of Falls Interventions: Bed/chair exit alarm, Investigate reason for fall         Problem: Patient Education: Go to Patient Education Activity  Goal: Patient/Family Education  Outcome: Progressing Towards Goal     Problem: Patient Education: Go to Patient Education Activity  Goal: Patient/Family Education  Outcome: Progressing Towards Goal     Problem: Patient Education: Go to Patient Education Activity  Goal: Patient/Family Education  Outcome: Progressing Towards Goal     Problem: Patient Education: Go to Patient Education Activity  Goal: Patient/Family Education  Outcome: Progressing Towards Goal     Problem: Heart Failure: Day 1  Goal: Off Pathway (Use only if patient is Off Pathway)  Outcome: Progressing Towards Goal  Goal: Activity/Safety  Outcome: Progressing Towards Goal  Goal: Consults, if ordered  Outcome: Progressing Towards Goal  Goal: Diagnostic Test/Procedures  Outcome: Progressing Towards Goal  Goal: Nutrition/Diet  Outcome: Progressing Towards Goal  Goal: Discharge Planning  Outcome: Progressing Towards Goal  Goal: Medications  Outcome: Progressing Towards Goal  Goal: Respiratory  Outcome: Progressing Towards Goal  Goal: Treatments/Interventions/Procedures  Outcome: Progressing Towards Goal  Goal: Psychosocial  Outcome: Progressing Towards Goal  Goal: *Oxygen saturation within defined limits  Outcome: Progressing Towards Goal  Goal: *Hemodynamically stable  Outcome: Progressing Towards Goal  Goal: *Optimal pain control at patient's stated goal  Outcome: Progressing Towards Goal  Goal: *Anxiety reduced or absent  Outcome: Progressing Towards Goal     Problem: Heart Failure: Day 2  Goal: Off Pathway (Use only if patient is Off Pathway)  Outcome: Progressing Towards Goal  Goal: Activity/Safety  Outcome: Progressing Towards Goal  Goal: Consults, if ordered  Outcome: Progressing Towards Goal  Goal: Diagnostic Test/Procedures  Outcome: Progressing Towards Goal  Goal: Nutrition/Diet  Outcome: Progressing Towards Goal  Goal: Discharge Planning  Outcome: Progressing Towards Goal  Goal: Medications  Outcome: Progressing Towards Goal  Goal: Respiratory  Outcome: Progressing Towards Goal  Goal: Treatments/Interventions/Procedures  Outcome: Progressing Towards Goal  Goal: Psychosocial  Outcome: Progressing Towards Goal  Goal: *Oxygen saturation within defined limits  Outcome: Progressing Towards Goal  Goal: *Hemodynamically stable  Outcome: Progressing Towards Goal  Goal: *Optimal pain control at patient's stated goal  Outcome: Progressing Towards Goal  Goal: *Anxiety reduced or absent  Outcome: Progressing Towards Goal  Goal: *Demonstrates progressive activity  Outcome: Progressing Towards Goal     Problem: Heart Failure: Day 3  Goal: Off Pathway (Use only if patient is Off Pathway)  Outcome: Progressing Towards Goal  Goal: Activity/Safety  Outcome: Progressing Towards Goal  Goal: Diagnostic Test/Procedures  Outcome: Progressing Towards Goal  Goal: Nutrition/Diet  Outcome: Progressing Towards Goal  Goal: Discharge Planning  Outcome: Progressing Towards Goal  Goal: Medications  Outcome: Progressing Towards Goal  Goal: Respiratory  Outcome: Progressing Towards Goal  Goal: Treatments/Interventions/Procedures  Outcome: Progressing Towards Goal  Goal: Psychosocial  Outcome: Progressing Towards Goal  Goal: *Oxygen saturation within defined limits  Outcome: Progressing Towards Goal  Goal: *Hemodynamically stable  Outcome: Progressing Towards Goal  Goal: *Optimal pain control at patient's stated goal  Outcome: Progressing Towards Goal  Goal: *Anxiety reduced or absent  Outcome: Progressing Towards Goal  Goal: *Demonstrates progressive activity  Outcome: Progressing Towards Goal     Problem: Heart Failure: Day 4  Goal: Off Pathway (Use only if patient is Off Pathway)  Outcome: Progressing Towards Goal  Goal: Activity/Safety  Outcome: Progressing Towards Goal  Goal: Diagnostic Test/Procedures  Outcome: Progressing Towards Goal  Goal: Nutrition/Diet  Outcome: Progressing Towards Goal  Goal: Discharge Planning  Outcome: Progressing Towards Goal  Goal: Medications  Outcome: Progressing Towards Goal  Goal: Respiratory  Outcome: Progressing Towards Goal  Goal: Treatments/Interventions/Procedures  Outcome: Progressing Towards Goal  Goal: Psychosocial  Outcome: Progressing Towards Goal  Goal: *Oxygen saturation within defined limits  Outcome: Progressing Towards Goal  Goal: *Hemodynamically stable  Outcome: Progressing Towards Goal  Goal: *Optimal pain control at patient's stated goal  Outcome: Progressing Towards Goal  Goal: *Anxiety reduced or absent  Outcome: Progressing Towards Goal  Goal: *Demonstrates progressive activity  Outcome: Progressing Towards Goal     Problem: Heart Failure: Day 5  Goal: Off Pathway (Use only if patient is Off Pathway)  Outcome: Progressing Towards Goal  Goal: Activity/Safety  Outcome: Progressing Towards Goal  Goal: Diagnostic Test/Procedures  Outcome: Progressing Towards Goal  Goal: Nutrition/Diet  Outcome: Progressing Towards Goal  Goal: Discharge Planning  Outcome: Progressing Towards Goal  Goal: Medications  Outcome: Progressing Towards Goal  Goal: Respiratory  Outcome: Progressing Towards Goal  Goal: Treatments/Interventions/Procedures  Outcome: Progressing Towards Goal  Goal: Psychosocial  Outcome: Progressing Towards Goal     Problem: Heart Failure: Discharge Outcomes  Goal: *Demonstrates ability to perform prescribed activity without shortness of breath or discomfort  Outcome: Progressing Towards Goal  Goal: *Left ventricular function assessment completed prior to or during stay, or planned for post-discharge  Outcome: Progressing Towards Goal  Goal: *ACEI prescribed if LVEF less than 40% and no contraindications or ARB prescribed  Outcome: Progressing Towards Goal  Goal: *Verbalizes understanding and describes prescribed diet  Outcome: Progressing Towards Goal  Goal: *Verbalizes understanding/describes prescribed medications  Outcome: Progressing Towards Goal  Goal: *Describes available resources and support systems  Description: (eg: Home Health, Palliative Care, Advanced Medical Directive)  Outcome: Progressing Towards Goal  Goal: *Describes smoking cessation resources  Outcome: Progressing Towards Goal  Goal: *Understands and describes signs and symptoms to report to providers(Stroke Metric)  Outcome: Progressing Towards Goal  Goal: *Describes/verbalizes understanding of follow-up/return appt  Description: (eg: to physicians, diabetes treatment coordinator, and other resources  Outcome: Progressing Towards Goal  Goal: *Describes importance of continuing daily weights and changes to report to physician  Outcome: Progressing Towards Goal

## 2021-08-28 NOTE — PROGRESS NOTES
Hospitalist Progress Note    NAME: Mary Solorzano   :  1965   MRN:  [id number]     Interim Hospital Summary: 54 y.o. female whom presented on 2021 with      Assessment / Plan:    Acute on chronic diastolic heart failure POA  -CXR mild pulmonary edema; trop neg, proBNP   -Rapid COVID NEG  -Echo  EF 60-65%  -transitioned back to torsemide PO  -not on ACEi/ARB due to CKD  -not hypoxic. RA 98%  -PT OT eval and treat. Recommend IPR - waiting for insurance auth  -appreciate cardiology and nephrology help    CKD 4  -Renal US:  No hydronephrosis. Mildly increased echogenicity of both kidneys consistent with the clinical history of medical renal parenchymal disease. Marked bladder distention.  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    -follow Cr  -no LUTS. Clinically not UTI    DM2, uncontrolled with hyperglycemia  -continue lantus with premeal humalog 10  -also on glipizide    Generalized weakness with frequent falls at home, POA  -left ankle and foot xrays NEG. Left leg pain sharp, no signs of cellulitis or gout. Suspect related to fall or the edema itself. -PT OT eval and treat. DC planning to IPR    Hypertension  -on norvasc    Hyperlipidemia  History of stroke  Morbid obesity    40 or above Morbid obesity / Body mass index is 45.98 kg/m². Estimated discharge date:  to [facility]  Barriers:    Code status: Full  Prophylaxis: Lovenox       Subjective:     Chief Complaint / Reason for Physician Visit  Follow up of CHF, EMMANUEL, DM, weakness and frequent falls  Chart reviewed in detail. Discussed with RN events overnight.        Review of Systems:  Symptom Y/N Comments  Symptom Y/N Comments   Fever/Chills    Chest Pain     Poor Appetite    Edema     Cough    Abdominal Pain     Sputum    Joint Pain     SOB/PENA    Pruritis/Rash     Nausea/vomit    Tolerating PT/OT     Diarrhea    Tolerating Diet     Constipation    Other       Could NOT obtain due to:      PO intake: No data found. Objective:     VITALS:   Last 24hrs VS reviewed since prior progress note. Most recent are:  Patient Vitals for the past 24 hrs:   Temp Pulse Resp BP SpO2   08/28/21 0812 97.8 °F (36.6 °C) 79 16 109/60    08/28/21 0335 98.1 °F (36.7 °C) 71 16 (!) 133/52 97 %   08/27/21 2323 98.1 °F (36.7 °C) 74 16 (!) 129/56 93 %   08/27/21 1956 98.1 °F (36.7 °C) 77 16 (!) 126/59 94 %   08/27/21 1542 97.9 °F (36.6 °C) 74 16 127/66 95 %   08/27/21 1228 97.6 °F (36.4 °C) 82 16 (!) 143/67 96 %   08/27/21 0924 97.7 °F (36.5 °C) 82 16 138/63 95 %       Intake/Output Summary (Last 24 hours) at 8/28/2021 0644  Last data filed at 8/28/2021 0636  Gross per 24 hour   Intake 800 ml   Output 2240 ml   Net -1440 ml        I had a face to face encounter, and independently examined this patient on 8/28/2021, as outlined below:  PHYSICAL EXAM:  General: WD, WN. Alert, cooperative, no acute distress    EENT:  EOMI. Anicteric sclerae. MMM  Resp:  Diminished BS bases. No accessory muscle use  CV:  Regular  rhythm,  (+) LE pitting edema  GI:  Soft, Non distended, Non tender. +Bowel sounds  Neurologic:  Alert and oriented X 3, normal speech,   Psych:   Good insight. Not anxious nor agitated  Skin:  No rashes. No jaundice    Reviewed most current lab test results and cultures  YES  Reviewed most current radiology test results   YES  Review and summation of old records today    NO  Reviewed patient's current orders and MAR    YES  PMH/SH reviewed - no change compared to H&P  ________________________________________________________________________  Care Plan discussed with:    Comments   Patient x    Family      RN     Care Manager     Consultant                        Multidiciplinary team rounds were held today with , nursing, pharmacist and clinical coordinator. Patient's plan of care was discussed; medications were reviewed and discharge planning was addressed. ________________________________________________________________________  Total NON critical care TIME:  25   Minutes    Total CRITICAL CARE TIME Spent:   Minutes non procedure based      Comments   >50% of visit spent in counseling and coordination of care x     This includes time during multidisciplinary rounds if indicated above   ________________________________________________________________________  Katherine Dejesus MD     Procedures: see electronic medical records for all procedures/Xrays and details which were not copied into this note but were reviewed prior to creation of Plan. LABS:  I reviewed today's most current labs and imaging studies. Pertinent labs include:  Recent Labs     08/26/21  0435 08/25/21  1159   WBC 5.7 5.9   HGB 7.9* 8.1*   HCT 24.0* 24.9*    165     Recent Labs     08/28/21  0633 08/27/21  0453 08/26/21  0435 08/25/21  1159 08/25/21  1159   * 137 141   < > 139   K 4.4 3.9 4.0   < > 4.1    103 107   < > 108   CO2 31 29 29   < > 26   * 106* 139*   < > 169*   BUN 28* 25* 19   < > 19   CREA 2.69* 2.39* 2.33*   < > 2.31*   CA 8.6 8.8 8.6   < > 8.4*   MG  --  2.1 2.1  --   --    ALB  --   --   --   --  2.7*   TBILI  --   --   --   --  0.4   ALT  --   --   --   --  13    < > = values in this interval not displayed.

## 2021-08-28 NOTE — DISCHARGE INSTRUCTIONS
HOSPITALIST DISCHARGE INSTRUCTIONS    NAME: Giuseppe Treviño   :  1965   MRN:  [id number]     Date/Time:  2021 1:01 PM    ADMIT DATE: 2021   DISCHARGE DATE: 2021     Attending Physician: Portia Olivera MD        Medications: Per above medication reconciliation. Pain Management: per above medications    Recommended diet: Cardiac Diet    Recommended activity: PT/OT Eval and Treat    Wound care: None    Indwelling devices:  None    Supplemental Oxygen: None    Required Lab work: Weekly BMP    Glucose management:  Accucheck ACHS with sliding scale per SNF protocol    Code status: Full        Outside physician follow up: Follow-up Information     Follow up With Specialties Details Why Contact Info    [place], Stafford Burkitt, [address] [place] [address] [phone]                 Skilled nursing facility/ SNF MD responsible for above on discharge. Information obtained by :  I understand that if any problems occur once I am at home I am to contact my physician. I understand and acknowledge receipt of the instructions indicated above.                                                                                                                                            Physician's or R.N.'s Signature                                                                  Date/Time                                                                                                                                              Patient or Repres

## 2021-08-28 NOTE — ROUTINE PROCESS
TRANSFER - OUT REPORT:    Verbal report given to Nany Sam(name) on Ara Folds  being transferred to [facility]) for routine progression of care       Report consisted of patients Situation, Background, Assessment and   Recommendations(SBAR). Information from the following report(s) SBAR, Kardex, Intake/Output, MAR and Accordion was reviewed with the receiving nurse. Opportunity for questions and clarification was provided. 46 Pt is being transported By [facility] at this time.

## 2021-08-28 NOTE — DISCHARGE SUMMARY
Hospitalist Discharge Summary     Patient ID:  Hailee Hermosillo  [id number]  19 y.o.  1965    PCP on record: Jaimie Guido MD    Admit date: 8/25/2021  Discharge date and time: 8/28/2021      DISCHARGE DIAGNOSIS:    Acute on chronic diastolic heart failure POA  CKD 4  DM2, uncontrolled with hyperglycemia  Generalized weakness with frequent falls at home, POA  Hypertension  Hyperlipidemia  History of stroke  40 or above Morbid obesity / Body mass index is 45.98 kg/m². CONSULTATIONS:  IP CONSULT TO CARDIOLOGY  IP CONSULT TO NEPHROLOGY    Excerpted HPI from H&P of Rosendo Mcallister MD:    CHIEF COMPLAINT: Generalized weakness with worsening lower extremity edema x1 week     HISTORY OF PRESENT ILLNESS:     Niels Yang is a 54 y.o.  female who presents with above complaints from home via EMS. Patient was sent from home at the direction of home health care nurse after patient was found to be having worsening lower extremity edema with generalized weakness and multiple falls at home in past 1 week. Patient denies any history of chest pain, shortness of breath, nausea vomiting diarrhea or any other urinary complaints suggestive of an infective process  History of fully vaccinated for COVID-19  Denies any history of contact with COVID-19 positive case  History of recent admission at [facility] where she was aggressively diuresed in the hospital and was sent home with home health care services. ______________________________________________________________________  DISCHARGE SUMMARY/HOSPITAL COURSE:  for full details see H&P, daily progress notes, labs, consult notes. Acute on chronic diastolic heart failure POA  -CXR mild pulmonary edema; trop neg, proBNP 2031  -Rapid COVID NEG  -Echo 8/09 EF 60-65%  -transitioned back to torsemide PO  -not on ACEi/ARB due to CKD. -not hypoxic. RA 98%  -PT OT eval and treat.   Recommend IPR - insurance auth obtained  -appreciate cardiology and nephrology help     CKD 4  -Renal US:  No hydronephrosis. Mildly increased echogenicity of both kidneys consistent with the clinical history of medical renal parenchymal disease. Marked bladder distention.  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 Follow up with Nephrology as outpatient      DM2, uncontrolled with hyperglycemia  -continue lantus with premeal ssi   -also on glipizide     Generalized weakness with frequent falls at home, POA  -left ankle and foot xrays NEG. Left leg pain sharp, no signs of cellulitis or gout. Suspect related to fall or the edema itself. -PT OT eval and treat. DC planning to IPR     Hypertension  -on norvasc     Hyperlipidemia  History of stroke  Morbid obesity     40 or above Morbid obesity / Body mass index is 45.98 kg/m².    _______________________________________________________________________  Patient seen and examined by me on discharge day. Pertinent Findings:  Gen:    Not in distress  Chest: Diminished bases  CVS:   Regular rhythm.  + edema  Abd:  Soft, not distended, not tender  Neuro:  Alert, Oriented x 4, grossly non focal exam  _______________________________________________________________________  DISCHARGE MEDICATIONS:   Current Discharge Medication List      CONTINUE these medications which have CHANGED    Details   torsemide (DEMADEX) 20 mg tablet Take 2 Tablets by mouth two (2) times a day for 30 days. Qty: 120 Tablet, Refills: 0  Start date: 8/28/2021, End date: 9/27/2021      traMADoL (ULTRAM) 50 mg tablet Take 1 Tablet by mouth three (3) times daily as needed for Pain for up to 14 days.  Max Daily Amount: 150 mg.  Qty: 20 Tablet, Refills: 0  Start date: 8/28/2021, End date: 9/11/2021    Associated Diagnoses: DDD (degenerative disc disease), lumbar      insulin lispro (HUMALOG) 100 unit/mL injection INITIATE CORRECTIVE INSULIN PROTOCOL (YANA):  High Sensitivity (thin, ESRD):    AC (before meals), Q6H, and Q4H CORRECTIONAL SCALE only For Blood Sugar (mg/dl) of :             140-199=0 units            200-249=2 units  250-299=3 units  300-349=4 units  350 or greater = Call MD  Give in addition to basal medications. Do Not Hold for NPO    BEDTIME CORRECTIONAL sliding scale when scheduled:  200-249=1 units  250-349=2 units  350 or greater = Call MD  Give in addition to basal medications. Do Not Hold for NPO Fast Acting - Administer Immediately - or within 15 minutes of start of meal, if mealtime coverage. Qty: 1 Vial, Refills: 0  Start date: 8/28/2021         CONTINUE these medications which have NOT CHANGED    Details   polyethylene glycol (Miralax) 17 gram packet Take 17 g by mouth daily as needed for Constipation. magnesium oxide (MAG-OX) 400 mg tablet Take 400 mg by mouth daily. canagliflozin (INVOKANA) 100 mg tablet Take 100 mg by mouth daily. Take one tablet by mouth daily      metoprolol tartrate (LOPRESSOR) 25 mg tablet Take 0.5 Tablets by mouth two (2) times a day. Qty: 30 Tablet, Refills: 0      gabapentin (NEURONTIN) 300 mg capsule Take 3 Capsules by mouth two (2) times a day. Max Daily Amount: 1,800 mg. Qty: 90 Capsule, Refills: 0    Associated Diagnoses: DDD (degenerative disc disease), lumbar; Cervical spondylosis      nystatin (MYCOSTATIN) powder Apply  to affected area two (2) times a day. Qty: 1 Bottle, Refills: 0      amLODIPine (NORVASC) 10 mg tablet Take 10 mg by mouth daily. Flovent  mcg/actuation inhaler Take 2 Puffs by inhalation two (2) times a day. albuterol (PROVENTIL HFA, VENTOLIN HFA, PROAIR HFA) 90 mcg/actuation inhaler Take 2 Puffs by inhalation every six (6) hours as needed for Wheezing. Lantus Solostar U-100 Insulin 100 unit/mL (3 mL) inpn 20 Units by SubCUTAneous route nightly.       fluticasone propionate (Flonase Allergy Relief) 50 mcg/actuation nasal spray 2 SPRAY, Each Nostril, daily, for allergy symptoms, 11 Refills, Dispense: 1, bottle, [facility] aspirin delayed-release 81 mg tablet Take 81 mg by mouth daily. amitriptyline (ELAVIL) 50 mg tablet Take 50 mg by mouth nightly. docusate sodium (Colace) 100 mg capsule Take 100 mg by mouth two (2) times daily as needed. atorvastatin (LIPITOR) 80 mg tablet Take 1 Tab by mouth nightly. Qty: 30 Tab, Refills: 0      clopidogrel (PLAVIX) 75 mg tab Take 1 Tab by mouth daily. Qty: 30 Tab, Refills: 0      glipiZIDE (GLUCOTROL) 5 mg tablet Take 5 mg by mouth daily. Take One Tablet By Mouth Daily         STOP taking these medications       butalb/acetaminophen/caffeine (FIORICET PO) Comments:   Reason for Stopping:               My Recommended Diet, Activity, Wound Care, and follow-up labs are listed in the patient's Discharge Insturctions which I have personally completed and reviewed.   Risk of deterioration: Low    Condition at Discharge:  Stable  _____________________________________________________________________    Disposition  IP Rehab  ____________________________________________________________________    Care Plan discussed with:   Patient, Family, RN, Care Manager, Consultant    ____________________________________________________________________    Code Status: Full Code  ____________________________________________________________________      Condition at Discharge:  Stable  _____________________________________________________________________  Follow up with:   PCP : Evaristo Mendez MD  Follow-up Information     Follow up With Specialties Details Why Contact Info    Winsome Lerma MD Family Medicine   [address]   [phone]      Viki Orantes MD Nephrology In 3 weeks  [facility] [address]  [address] [address]  [facility] [phone]                Total time in minutes spent coordinating this discharge (includes going over instructions, follow-up, prescriptions, and preparing report for sign off to her PCP) :  35 minutes    Signed:  Tenzin Layton MD

## 2021-08-28 NOTE — PROGRESS NOTES
Transition of Care Plan:  RUR: 25% moderate   Disposition: IPR- [facility]   Follow up appointments: PCP  DME needed: n/a pt to go to rehab  Transportation at  [address] or means to access home: n/a pt to go to rehab       Medicare Letter: N/A- medicaid insurance   Is patient a BCPI-A Bundle: N/A                      If yes, was Bundle Letter given?:     Caregiver Contact: Pt's sister, Elena Weir p) [phone]  Discharge Caregiver contacted prior to discharge?  To be contacted prior to d/c.     12:47 p.m. [facility] will be here at 3:30 p.m.    12:33 p.m. The accepting physician is Yvette Russo. Pt will go into room 102. Nursing can call report to [phone].      12:27 p.m. Physician agreeable to d/c.  CM informed nursing that it is an EMTALA transfer. CM will arrange transportation. CM awaiting room # and accepting physician's info from Cristel who stated she would be putting it into [employer]. CM informed pt and she is agreeable to d/c today. CM received a call from Cristel of [facility] that they received auth on pt. CM will contact the attending physician to inform.       Delano Drake, MSW  Care Management, [address]

## 2021-08-28 NOTE — PROGRESS NOTES
NAME: Aurea Del Rosario        :  1965        MRN:  [id number]                    Assessment   :                                               Plan:  CKD stage 4  Proteinuria since '15  Anemia  HTN  DM2  Acute on chronic diastolic HF Creatinine stable at ~ 2.0-2.5; likely DM nephropathy; she has had a couple of telephone visits with [facility], but states she wants to start seeing doctors here. Inda Brittle WIll have to accept a higher creatinine to keep ou t of heart failure     Check urine protein:creatinine ratio, recheck urine sediment (last in )--don't see where this was ordered. Will order      Torsemide at 40 mg PO BID     Try to add RAAS inh if keeps f/u appt     Daily weight, low salt, fluid restrict  Will fu on Monday, call if problems arise on          Subjective:     Chief Complaint: asleep on rounds earlier this am, didn't awakem    Review of Systems:    Symptom Y/N Comments  Symptom Y/N Comments   Fever/Chills    Chest Pain     Poor Appetite    Edema     Cough    Abdominal Pain     Sputum    Joint Pain     SOB/PENA    Pruritis/Rash     Nausea/vomit    Tolerating PT/OT     Diarrhea    Tolerating Diet     Constipation    Other       Could not obtain due to:      Objective:     VITALS:   Last 24hrs VS reviewed since prior progress note.  Most recent are:  Visit Vitals  /60   Pulse 79   Temp 97.8 °F (36.6 °C)   Resp 16   Ht 5' 5\" (1.651 m)   Wt 125.3 kg (276 lb 4.8 oz)   SpO2 97%   BMI 45.98 kg/m²       Intake/Output Summary (Last 24 hours) at 2021 1225  Last data filed at 2021 0636  Gross per 24 hour   Intake 100 ml   Output 1740 ml   Net -1640 ml      Telemetry Reviewed:     PHYSICAL EXAM:  General: NAD      Lab Data Reviewed: (see below)    Medications Reviewed: (see below)    PMH/SH reviewed - no change compared to H&P  ________________________________________________________________________  Care Plan discussed with:  Patient     Family      RN     Care Manager                    Consultant:          Comments   >50% of visit spent in counseling and coordination of care       ________________________________________________________________________  Philip Mane MD     Procedures: see electronic medical records for all procedures/Xrays and details which  were not copied into this note but were reviewed prior to creation of Plan. LABS:  Recent Labs     08/26/21  0435   WBC 5.7   HGB 7.9*   HCT 24.0*        Recent Labs     08/28/21  0633 08/27/21  0453 08/26/21  0435   * 137 141   K 4.4 3.9 4.0    103 107   CO2 31 29 29   BUN 28* 25* 19   CREA 2.69* 2.39* 2.33*   * 106* 139*   CA 8.6 8.8 8.6   MG  --  2.1 2.1     No results for input(s): AP, TBIL, TP, ALB, GLOB, GGT, AML, LPSE in the last 72 hours. No lab exists for component: SGOT, GPT, AMYP, HLPSE  No results for input(s): INR, PTP, APTT, INREXT, INREXT in the last 72 hours. No results for input(s): FE, TIBC, PSAT, FERR in the last 72 hours. Lab Results   Component Value Date/Time    Folate 8.0 08/16/2020 02:02 PM      No results for input(s): PH, PCO2, PO2 in the last 72 hours. No results for input(s): CPK, CKMB in the last 72 hours.     No lab exists for component: TROPONINI  No components found for: [place]  Lab Results   Component Value Date/Time    Color YELLOW/STRAW 12/20/2018 04:47 PM    Appearance CLOUDY (A) 12/20/2018 04:47 PM    Specific gravity 1.029 12/20/2018 04:47 PM    Specific gravity >1.030 (H) 06/24/2018 03:18 AM    pH (UA) 6.0 12/20/2018 04:47 PM    Protein 300 (A) 12/20/2018 04:47 PM    Glucose NEGATIVE  12/20/2018 04:47 PM    Ketone TRACE (A) 12/20/2018 04:47 PM    Bilirubin NEGATIVE  12/20/2018 04:47 PM    Urobilinogen 0.2 12/20/2018 04:47 PM    Nitrites NEGATIVE  12/20/2018 04:47 PM    Leukocyte Esterase NEGATIVE  12/20/2018 04:47 PM    Epithelial cells FEW 12/20/2018 04:47 PM    Bacteria NEGATIVE 12/20/2018 04:47 PM    WBC 0-4 12/20/2018 04:47 PM    RBC 5-10 12/20/2018 04:47 PM       MEDICATIONS:  Current Facility-Administered Medications   Medication Dose Route Frequency    torsemide (DEMADEX) tablet 40 mg  40 mg Oral BID    metoprolol tartrate (LOPRESSOR) tablet 25 mg  25 mg Oral BID    albuterol (PROVENTIL HFA, VENTOLIN HFA, PROAIR HFA) inhaler 2 Puff  2 Puff Inhalation Q6H PRN    amitriptyline (ELAVIL) tablet 50 mg  50 mg Oral QHS    amLODIPine (NORVASC) tablet 10 mg  10 mg Oral DAILY    aspirin delayed-release tablet 81 mg  81 mg Oral DAILY    atorvastatin (LIPITOR) tablet 80 mg  80 mg Oral QHS    clopidogreL (PLAVIX) tablet 75 mg  75 mg Oral DAILY    fluticasone propionate (FLONASE) 50 mcg/actuation nasal spray 2 Spray  2 Spray Both Nostrils DAILY    gabapentin (NEURONTIN) capsule 900 mg  900 mg Oral BID    glipiZIDE (GLUCOTROL) tablet 5 mg  5 mg Oral DAILY    insulin lispro (HUMALOG) injection 10 Units  10 Units SubCUTAneous TIDAC    insulin glargine (LANTUS) injection 20 Units  20 Units SubCUTAneous QHS    magnesium oxide (MAG-OX) tablet 400 mg  400 mg Oral DAILY    traMADoL (ULTRAM) tablet 50 mg  50 mg Oral TID PRN    sodium chloride (NS) flush 5-40 mL  5-40 mL IntraVENous Q8H    sodium chloride (NS) flush 5-40 mL  5-40 mL IntraVENous PRN    acetaminophen (TYLENOL) tablet 650 mg  650 mg Oral Q6H PRN    Or    acetaminophen (TYLENOL) suppository 650 mg  650 mg Rectal Q6H PRN    polyethylene glycol (MIRALAX) packet 17 g  17 g Oral DAILY PRN    ondansetron (ZOFRAN ODT) tablet 4 mg  4 mg Oral Q8H PRN    Or    ondansetron (ZOFRAN) injection 4 mg  4 mg IntraVENous Q6H PRN    enoxaparin (LOVENOX) injection 40 mg  40 mg SubCUTAneous Q12H    insulin lispro (HUMALOG) injection   SubCUTAneous AC&HS    glucose chewable tablet 16 g  4 Tablet Oral PRN    dextrose (D50W) injection syrg 12.5-25 g  12.5-25 g IntraVENous PRN    glucagon (GLUCAGEN) injection 1 mg  1 mg IntraMUSCular PRN

## 2021-11-27 ENCOUNTER — HOSPITAL ENCOUNTER (EMERGENCY)
Age: 56
Discharge: HOME | End: 2021-11-28
Attending: EMERGENCY MEDICINE
Payer: MEDICAID

## 2021-11-27 ENCOUNTER — APPOINTMENT (OUTPATIENT)
Dept: GENERAL RADIOLOGY | Age: 56
End: 2021-11-27
Attending: EMERGENCY MEDICINE
Payer: MEDICAID

## 2021-11-27 DIAGNOSIS — I10 ACCELERATED HYPERTENSION: ICD-10-CM

## 2021-11-27 DIAGNOSIS — I50.33 ACUTE ON CHRONIC DIASTOLIC (CONGESTIVE) HEART FAILURE (HCC): ICD-10-CM

## 2021-11-27 DIAGNOSIS — G44.209 ACUTE NON INTRACTABLE TENSION-TYPE HEADACHE: Primary | ICD-10-CM

## 2021-11-27 LAB
ALBUMIN SERPL-MCNC: 2.6 G/DL (ref 3.5–5)
ALBUMIN/GLOB SERPL: 0.6 {RATIO} (ref 1.1–2.2)
ALP SERPL-CCNC: 110 U/L (ref 45–117)
ALT SERPL-CCNC: 14 U/L (ref 12–78)
ANION GAP SERPL CALC-SCNC: 8 MMOL/L (ref 5–15)
AST SERPL-CCNC: 17 U/L (ref 15–37)
BASOPHILS # BLD: 0 K/UL (ref 0–0.1)
BASOPHILS NFR BLD: 0 % (ref 0–1)
BILIRUB SERPL-MCNC: 0.6 MG/DL (ref 0.2–1)
BNP SERPL-MCNC: 6416 PG/ML
BUN SERPL-MCNC: 20 MG/DL (ref 6–20)
BUN/CREAT SERPL: 7 (ref 12–20)
CALCIUM SERPL-MCNC: 8.8 MG/DL (ref 8.5–10.1)
CHLORIDE SERPL-SCNC: 112 MMOL/L (ref 97–108)
CK SERPL-CCNC: 105 U/L (ref 26–192)
CO2 SERPL-SCNC: 22 MMOL/L (ref 21–32)
CREAT SERPL-MCNC: 2.76 MG/DL (ref 0.55–1.02)
DIFFERENTIAL METHOD BLD: ABNORMAL
EOSINOPHIL # BLD: 0.4 K/UL (ref 0–0.4)
EOSINOPHIL NFR BLD: 5 % (ref 0–7)
ERYTHROCYTE [DISTWIDTH] IN BLOOD BY AUTOMATED COUNT: 15.6 % (ref 11.5–14.5)
GLOBULIN SER CALC-MCNC: 4.6 G/DL (ref 2–4)
GLUCOSE SERPL-MCNC: 133 MG/DL (ref 65–100)
HCT VFR BLD AUTO: 26.2 % (ref 35–47)
HGB BLD-MCNC: 8.8 G/DL (ref 11.5–16)
IMM GRANULOCYTES # BLD AUTO: 0.1 K/UL (ref 0–0.04)
IMM GRANULOCYTES NFR BLD AUTO: 1 % (ref 0–0.5)
LYMPHOCYTES # BLD: 1.5 K/UL (ref 0.8–3.5)
LYMPHOCYTES NFR BLD: 20 % (ref 12–49)
MCH RBC QN AUTO: 27.7 PG (ref 26–34)
MCHC RBC AUTO-ENTMCNC: 33.6 G/DL (ref 30–36.5)
MCV RBC AUTO: 82.4 FL (ref 80–99)
MONOCYTES # BLD: 0.5 K/UL (ref 0–1)
MONOCYTES NFR BLD: 6 % (ref 5–13)
NEUTS SEG # BLD: 5.3 K/UL (ref 1.8–8)
NEUTS SEG NFR BLD: 68 % (ref 32–75)
NRBC # BLD: 0 K/UL (ref 0–0.01)
NRBC BLD-RTO: 0 PER 100 WBC
PLATELET # BLD AUTO: 127 K/UL (ref 150–400)
PMV BLD AUTO: 9.2 FL (ref 8.9–12.9)
POTASSIUM SERPL-SCNC: 3.4 MMOL/L (ref 3.5–5.1)
PROT SERPL-MCNC: 7.2 G/DL (ref 6.4–8.2)
RBC # BLD AUTO: 3.18 M/UL (ref 3.8–5.2)
SODIUM SERPL-SCNC: 142 MMOL/L (ref 136–145)
TROPONIN-HIGH SENSITIVITY: 16 NG/L (ref 0–51)
WBC # BLD AUTO: 7.8 K/UL (ref 3.6–11)

## 2021-11-27 PROCEDURE — 74011000250 HC RX REV CODE- 250: Performed by: EMERGENCY MEDICINE

## 2021-11-27 PROCEDURE — 74011250637 HC RX REV CODE- 250/637: Performed by: EMERGENCY MEDICINE

## 2021-11-27 PROCEDURE — 82550 ASSAY OF CK (CPK): CPT

## 2021-11-27 PROCEDURE — 83880 ASSAY OF NATRIURETIC PEPTIDE: CPT

## 2021-11-27 PROCEDURE — 71045 X-RAY EXAM CHEST 1 VIEW: CPT

## 2021-11-27 PROCEDURE — 99285 EMERGENCY DEPT VISIT HI MDM: CPT

## 2021-11-27 PROCEDURE — 80053 COMPREHEN METABOLIC PANEL: CPT

## 2021-11-27 PROCEDURE — 93005 ELECTROCARDIOGRAM TRACING: CPT

## 2021-11-27 PROCEDURE — 85025 COMPLETE CBC W/AUTO DIFF WBC: CPT

## 2021-11-27 PROCEDURE — 96374 THER/PROPH/DIAG INJ IV PUSH: CPT

## 2021-11-27 PROCEDURE — 84484 ASSAY OF TROPONIN QUANT: CPT

## 2021-11-27 PROCEDURE — 74011250636 HC RX REV CODE- 250/636: Performed by: EMERGENCY MEDICINE

## 2021-11-27 PROCEDURE — 96375 TX/PRO/DX INJ NEW DRUG ADDON: CPT

## 2021-11-27 PROCEDURE — 36415 COLL VENOUS BLD VENIPUNCTURE: CPT

## 2021-11-27 RX ORDER — CLONIDINE HYDROCHLORIDE 0.1 MG/1
0.2 TABLET ORAL
Status: COMPLETED | OUTPATIENT
Start: 2021-11-27 | End: 2021-11-27

## 2021-11-27 RX ORDER — OXYCODONE AND ACETAMINOPHEN 5; 325 MG/1; MG/1
1 TABLET ORAL
Status: COMPLETED | OUTPATIENT
Start: 2021-11-27 | End: 2021-11-27

## 2021-11-27 RX ORDER — FENTANYL CITRATE 50 UG/ML
50 INJECTION, SOLUTION INTRAMUSCULAR; INTRAVENOUS
Status: COMPLETED | OUTPATIENT
Start: 2021-11-27 | End: 2021-11-27

## 2021-11-27 RX ORDER — OXYCODONE AND ACETAMINOPHEN 5; 325 MG/1; MG/1
1 TABLET ORAL
Qty: 10 TABLET | Refills: 0 | Status: SHIPPED | OUTPATIENT
Start: 2021-11-27 | End: 2021-12-01

## 2021-11-27 RX ORDER — BUMETANIDE 0.25 MG/ML
1 INJECTION INTRAMUSCULAR; INTRAVENOUS ONCE
Status: COMPLETED | OUTPATIENT
Start: 2021-11-27 | End: 2021-11-27

## 2021-11-27 RX ADMIN — BUMETANIDE 1 MG: 0.25 INJECTION, SOLUTION INTRAMUSCULAR; INTRAVENOUS at 21:50

## 2021-11-27 RX ADMIN — CLONIDINE HYDROCHLORIDE 0.2 MG: 0.1 TABLET ORAL at 21:51

## 2021-11-27 RX ADMIN — FENTANYL CITRATE 50 MCG: 50 INJECTION INTRAMUSCULAR; INTRAVENOUS at 21:50

## 2021-11-27 RX ADMIN — OXYCODONE HYDROCHLORIDE AND ACETAMINOPHEN 1 TABLET: 5; 325 TABLET ORAL at 22:55

## 2021-11-28 VITALS
TEMPERATURE: 98.2 F | BODY MASS INDEX: 47.38 KG/M2 | OXYGEN SATURATION: 98 % | HEART RATE: 78 BPM | WEIGHT: 284.39 LBS | HEIGHT: 65 IN | SYSTOLIC BLOOD PRESSURE: 173 MMHG | DIASTOLIC BLOOD PRESSURE: 70 MMHG | RESPIRATION RATE: 12 BRPM

## 2021-11-28 LAB
ATRIAL RATE: 91 BPM
CALCULATED P AXIS, ECG09: 51 DEGREES
CALCULATED R AXIS, ECG10: 7 DEGREES
CALCULATED T AXIS, ECG11: 10 DEGREES
DIAGNOSIS, 93000: NORMAL
P-R INTERVAL, ECG05: 186 MS
Q-T INTERVAL, ECG07: 354 MS
QRS DURATION, ECG06: 80 MS
QTC CALCULATION (BEZET), ECG08: 435 MS
VENTRICULAR RATE, ECG03: 91 BPM

## 2021-11-28 NOTE — ED NOTES
TRANSFER - IN REPORT:    Verbal report received from [address] on [address]. Report consisted of patients Situation, Background, Assessment and   Recommendations(SBAR). Information from the following report(s) SBAR and ED Summary was reviewed with the receiving nurse. Opportunity for questions and clarification was provided. Pt has been discharged and is awaiting [facility] transportation home.   Jennifer has confirmed family is home to receive pt on arrival.

## 2021-11-28 NOTE — ED NOTES
I have reviewed written and verbal discharge instructions with the patient. The patient verbalized understanding. [facility] transport has been arranged for pt to transport back to residence.

## 2021-11-28 NOTE — DISCHARGE INSTRUCTIONS
Thank You! It was a pleasure taking care of you in our Emergency Department today. We know that when you come to [facility], you are entrusting us with your health, comfort, and safety. Our physicians and nurses honor that trust, and truly appreciate the opportunity to care for you and your loved ones. We also value your feedback. If you receive a survey about your Emergency Department experience today, please fill it out. We care about our patients' feedback, and we listen to what you have to say. Thank you. Dr. Kira Coley M.D.      ________________________________________________________________________  I have included a copy of your lab results and/or radiologic studies from today's visit so you can have them easily available at your follow-up visit. We hope you feel better and please do not hesitate to contact the ED if you have any questions at all!     Recent Results (from the past 12 hour(s))   EKG, 12 LEAD, INITIAL    Collection Time: 11/27/21  7:27 PM   Result Value Ref Range    Ventricular Rate 91 BPM    Atrial Rate 91 BPM    P-R Interval 186 ms    QRS Duration 80 ms    Q-T Interval 354 ms    QTC Calculation (Bezet) 435 ms    Calculated P Axis 51 degrees    Calculated R Axis 7 degrees    Calculated T Axis 10 degrees    Diagnosis       Normal sinus rhythm  Anteroseptal infarct (cited on or before 27-NOV-2021)  When compared with ECG of 18-AUG-2021 13:17,  KS interval has decreased  Inverted T waves have replaced nonspecific T wave abnormality in Inferior   leads  Nonspecific T wave abnormality now evident in Anterior leads     CBC WITH AUTOMATED DIFF    Collection Time: 11/27/21  7:30 PM   Result Value Ref Range    WBC 7.8 3.6 - 11.0 K/uL    RBC 3.18 (L) 3.80 - 5.20 M/uL    HGB 8.8 (L) 11.5 - 16.0 g/dL    HCT 26.2 (L) 35.0 - 47.0 %    MCV 82.4 80.0 - 99.0 FL    MCH 27.7 26.0 - 34.0 PG    MCHC 33.6 30.0 - 36.5 g/dL    RDW 15.6 (H) 11.5 - 14.5 %    PLATELET 720 (L) 150 - 400 K/uL    MPV 9.2 8.9 - 12.9 FL    NRBC 0.0 0  WBC    ABSOLUTE NRBC 0.00 0.00 - 0.01 K/uL    NEUTROPHILS 68 32 - 75 %    LYMPHOCYTES 20 12 - 49 %    MONOCYTES 6 5 - 13 %    EOSINOPHILS 5 0 - 7 %    BASOPHILS 0 0 - 1 %    IMMATURE GRANULOCYTES 1 (H) 0.0 - 0.5 %    ABS. NEUTROPHILS 5.3 1.8 - 8.0 K/UL    ABS. LYMPHOCYTES 1.5 0.8 - 3.5 K/UL    ABS. MONOCYTES 0.5 0.0 - 1.0 K/UL    ABS. EOSINOPHILS 0.4 0.0 - 0.4 K/UL    ABS. BASOPHILS 0.0 0.0 - 0.1 K/UL    ABS. IMM. GRANS. 0.1 (H) 0.00 - 0.04 K/UL    DF AUTOMATED     METABOLIC PANEL, COMPREHENSIVE    Collection Time: 11/27/21  7:30 PM   Result Value Ref Range    Sodium 142 136 - 145 mmol/L    Potassium 3.4 (L) 3.5 - 5.1 mmol/L    Chloride 112 (H) 97 - 108 mmol/L    CO2 22 21 - 32 mmol/L    Anion gap 8 5 - 15 mmol/L    Glucose 133 (H) 65 - 100 mg/dL    BUN 20 6 - 20 MG/DL    Creatinine 2.76 (H) 0.55 - 1.02 MG/DL    BUN/Creatinine ratio 7 (L) 12 - 20      GFR est AA 22 (L) >60 ml/min/1.73m2    GFR est non-AA 18 (L) >60 ml/min/1.73m2    Calcium 8.8 8.5 - 10.1 MG/DL    Bilirubin, total 0.6 0.2 - 1.0 MG/DL    ALT (SGPT) 14 12 - 78 U/L    AST (SGOT) 17 15 - 37 U/L    Alk. phosphatase 110 45 - 117 U/L    Protein, total 7.2 6.4 - 8.2 g/dL    Albumin 2.6 (L) 3.5 - 5.0 g/dL    Globulin 4.6 (H) 2.0 - 4.0 g/dL    A-G Ratio 0.6 (L) 1.1 - 2.2     CK W/ REFLX CKMB    Collection Time: 11/27/21  7:30 PM   Result Value Ref Range     26 - 192 U/L   NT-PRO BNP    Collection Time: 11/27/21  7:30 PM   Result Value Ref Range    NT pro-BNP 6,416 (H) <125 PG/ML   TROPONIN-HIGH SENSITIVITY    Collection Time: 11/27/21  7:30 PM   Result Value Ref Range    Troponin-High Sensitivity 16 0 - 51 ng/L       XR CHEST PORT   Final Result      No acute process. CT Results  (Last 48 hours)      None          The exam and treatment you received in the Emergency Department were for an urgent problem and are not intended as complete care.  It is important that you follow up with a doctor, nurse practitioner, or physician assistant for ongoing care. If your symptoms become worse or you do not improve as expected and you are unable to reach your usual health care provider, you should return to the Emergency Department. We are available 24 hours a day. Please take your discharge instructions with you when you go to your follow-up appointment. If a prescription has been provided, please have it filled as soon as possible to prevent a delay in treatment. Read the entire medication instruction sheet provided to you by the pharmacy. If you have any questions or reservations about taking the medication due to side effects or interactions with other medications, please call your primary care physician or contact the ER to speak with the charge nurse. Please make an appointment with your family doctor or the physician you were referred to for follow-up of this visit as instructed on your discharge paperwork. Return to the ER if you are unable to be seen or if you are unable to be seen in a timely manner. If you have any problem arranging the follow-up visit, contact the Emergency Department immediately.

## 2021-11-28 NOTE — ED PROVIDER NOTES
EMERGENCY DEPARTMENT HISTORY AND PHYSICAL EXAM      Please note that this dictation was completed with the assistance of \"Dragon\", the computer voice recognition software. Quite often unanticipated grammatical, syntax, homophones, and other interpretive errors are inadvertently transcribed by the computer software. Please disregard these errors and any errors that have escaped final proofreading. Thank you. Patient: Derek Elizondo  DOS: 21  : 1965  MRN: [id number]  History of Presenting Illness     Chief Complaint   Patient presents with    Headache     ED visit d/t Headache / HTN / SOB - onset of sxs, 2 wks - hx of HTN / HF / DM () - mild sob at home - Denies fevers / chills / cp    Shortness of Breath    CHF     History Provided By: Patient/family/EMS (if applicable)    HPI: Derek Elizondo, 54 y.o. female with past medical history as documented below presents to the ED with c/o of acute onset of frontal HA and SOB. Pt does have hx of HTN, CHF and diabetes. Pt reports two weeks of SOB and does have hx of CHF. Pt's last ECHO showed EF 60-65%. Pt denies any other exacerbating or ameliorating factors. Additionally, pt specifically denies any recent fever, chills, nausea, vomiting, abdominal pain, CP,   lightheadedness, dizziness, numbness, weakness, heart palpitations, urinary sxs, diarrhea, constipation, melena, hematochezia, cough, or congestion.      There are no other complaints, changes or physical findings pertinent to the HPI at this time.     PCP: Mariely Dumont MD  Past History   Past Medical History:  Past Medical History:   Diagnosis Date    Arthritis     Asthma     CHF (congestive heart failure) (HCC)     Chronic back pain     Diabetes ([facility] [address]     Diabetic retinopathy ([facility] [address]     Hypertension     MRSA (methicillin resistant staph aureus) culture positive     labial abscess    Neuropathy        Past Surgical History:  Past Surgical History:   Procedure Laterality Date    HX HEENT      tonsillectomy    HX ORTHOPAEDIC      left ft bunionectomy    HX OTHER SURGICAL      lanced labial abscess       Family History:   Family history reviewed and was non-contributory, unless specified below:  No family history on file. Social History:  Social History     Tobacco Use    Smoking status: Never Smoker    Smokeless tobacco: Never Used   Substance Use Topics    Alcohol use: No    Drug use: No       Allergies: Allergies   Allergen Reactions    Amoxicillin Nausea Only    Aspirin Other (comments)     \"nosebleeds\" but patient takes daily aspirin 81 mg at home. Patient states naproxen ok    Ciprofloxacin Hives       Current Medications:  No current facility-administered medications on file prior to encounter. Current Outpatient Medications on File Prior to Encounter   Medication Sig Dispense Refill    metoprolol tartrate (LOPRESSOR) 25 mg tablet Take 0.5 Tablets by mouth two (2) times a day. 30 Tablet 0    gabapentin (NEURONTIN) 300 mg capsule Take 3 Capsules by mouth two (2) times a day. Max Daily Amount: 1,800 mg. 90 Capsule 0    nystatin (MYCOSTATIN) powder Apply  to affected area two (2) times a day. 1 Bottle 0    amLODIPine (NORVASC) 10 mg tablet Take 10 mg by mouth daily.  albuterol (PROVENTIL HFA, VENTOLIN HFA, PROAIR HFA) 90 mcg/actuation inhaler Take 2 Puffs by inhalation every six (6) hours as needed for Wheezing.  aspirin delayed-release 81 mg tablet Take 81 mg by mouth daily.  amitriptyline (ELAVIL) 50 mg tablet Take 50 mg by mouth nightly.  atorvastatin (LIPITOR) 80 mg tablet Take 1 Tab by mouth nightly. 30 Tab 0    clopidogrel (PLAVIX) 75 mg tab Take 1 Tab by mouth daily.  30 Tab 0    insulin lispro (HUMALOG) 100 unit/mL injection INITIATE CORRECTIVE INSULIN PROTOCOL (YANA):  High Sensitivity (thin, ESRD):    AC (before meals), Q6H, and Q4H CORRECTIONAL SCALE only For Blood Sugar (mg/dl) of :             140-199=0 units            200-249=2 units  250-299=3 units  300-349=4 units  350 or greater = Call MD  Give in addition to basal medications. Do Not Hold for NPO    BEDTIME CORRECTIONAL sliding scale when scheduled:  200-249=1 units  250-349=2 units  350 or greater = Call MD  Give in addition to basal medications. Do Not Hold for NPO Fast Acting - Administer Immediately - or within 15 minutes of start of meal, if mealtime coverage. 1 Vial 0    polyethylene glycol (Miralax) 17 gram packet Take 17 g by mouth daily as needed for Constipation.  glipiZIDE (GLUCOTROL) 5 mg tablet Take 5 mg by mouth daily. Take One Tablet By Mouth Daily      magnesium oxide (MAG-OX) 400 mg tablet Take 400 mg by mouth daily.  canagliflozin (INVOKANA) 100 mg tablet Take 100 mg by mouth daily. Take one tablet by mouth daily      Flovent  mcg/actuation inhaler Take 2 Puffs by inhalation two (2) times a day.  Lantus Solostar U-100 Insulin 100 unit/mL (3 mL) inpn 20 Units by SubCUTAneous route nightly.  fluticasone propionate (Flonase Allergy Relief) 50 mcg/actuation nasal spray 2 SPRAY, Each Nostril, daily, for allergy symptoms, 11 Refills, Dispense: 1, bottle, Pharm[facility]      docusate sodium (Colace) 100 mg capsule Take 100 mg by mouth two (2) times daily as needed. Review of Systems   A complete ROS was reviewed by me today and all other systems negative, unless otherwise specified below:  Review of Systems   Constitutional: Negative. Negative for chills and fever. HENT: Negative. Negative for congestion and sore throat. Eyes: Negative. Respiratory: Positive for shortness of breath. Negative for cough, chest tightness and wheezing. Cardiovascular: Positive for leg swelling. Negative for chest pain and palpitations. Gastrointestinal: Negative. Negative for abdominal distention, abdominal pain, blood in stool, constipation, diarrhea, nausea and vomiting. Endocrine: Negative. Genitourinary: Negative. Negative for dysuria, flank pain, frequency, hematuria and urgency. Musculoskeletal: Negative. Negative for arthralgias, back pain and myalgias. Skin: Negative. Negative for color change and rash. Neurological: Positive for headaches. Negative for dizziness, syncope, speech difficulty, weakness, light-headedness and numbness. Hematological: Negative. Psychiatric/Behavioral: Negative. Negative for confusion and self-injury. The patient is not nervous/anxious. All other systems reviewed and are negative. Physical Exam   Physical Exam  Vitals and nursing note reviewed. Constitutional:       General: She is not in acute distress. Appearance: She is well-developed. She is not diaphoretic. HENT:      Head: Normocephalic and atraumatic. Mouth/Throat:      Pharynx: No oropharyngeal exudate. Eyes:      Conjunctiva/sclera: Conjunctivae normal.   Cardiovascular:      Rate and Rhythm: Normal rate and regular rhythm. Heart sounds: Normal heart sounds. Pulmonary:      Effort: Pulmonary effort is normal. No respiratory distress. Breath sounds: Normal breath sounds. No wheezing or rales. Chest:      Chest wall: No tenderness. Abdominal:      General: Bowel sounds are normal. There is no distension. Palpations: Abdomen is soft. There is no mass. Tenderness: There is no abdominal tenderness. There is no guarding or rebound. Musculoskeletal:         General: Normal range of motion. Cervical back: Normal range of motion. Right lower leg: Edema present. Left lower leg: Edema present. Skin:     General: Skin is warm. Neurological:      Mental Status: She is alert and oriented to person, place, and time. Cranial Nerves: No cranial nerve deficit. Motor: No abnormal muscle tone. Diagnostic Study Results     Laboratory Data  I have personally reviewed and interpreted all available laboratory results.    Recent Results (from the past 24 hour(s)) EKG, 12 LEAD, INITIAL    Collection Time: 11/27/21  7:27 PM   Result Value Ref Range    Ventricular Rate 91 BPM    Atrial Rate 91 BPM    P-R Interval 186 ms    QRS Duration 80 ms    Q-T Interval 354 ms    QTC Calculation (Bezet) 435 ms    Calculated P Axis 51 degrees    Calculated R Axis 7 degrees    Calculated T Axis 10 degrees    Diagnosis       Normal sinus rhythm  Anteroseptal infarct (cited on or before 27-NOV-2021)  When compared with ECG of 18-AUG-2021 13:17,  VT interval has decreased  Inverted T waves have replaced nonspecific T wave abnormality in Inferior   leads  Nonspecific T wave abnormality now evident in Anterior leads  Confirmed by Kristi Aggarwal ([id number]) on 11/28/2021 12:53:20 PM     CBC WITH AUTOMATED DIFF    Collection Time: 11/27/21  7:30 PM   Result Value Ref Range    WBC 7.8 3.6 - 11.0 K/uL    RBC 3.18 (L) 3.80 - 5.20 M/uL    HGB 8.8 (L) 11.5 - 16.0 g/dL    HCT 26.2 (L) 35.0 - 47.0 %    MCV 82.4 80.0 - 99.0 FL    MCH 27.7 26.0 - 34.0 PG    MCHC 33.6 30.0 - 36.5 g/dL    RDW 15.6 (H) 11.5 - 14.5 %    PLATELET 786 (L) 230 - 400 K/uL    MPV 9.2 8.9 - 12.9 FL    NRBC 0.0 0  WBC    ABSOLUTE NRBC 0.00 0.00 - 0.01 K/uL    NEUTROPHILS 68 32 - 75 %    LYMPHOCYTES 20 12 - 49 %    MONOCYTES 6 5 - 13 %    EOSINOPHILS 5 0 - 7 %    BASOPHILS 0 0 - 1 %    IMMATURE GRANULOCYTES 1 (H) 0.0 - 0.5 %    ABS. NEUTROPHILS 5.3 1.8 - 8.0 K/UL    ABS. LYMPHOCYTES 1.5 0.8 - 3.5 K/UL    ABS. MONOCYTES 0.5 0.0 - 1.0 K/UL    ABS. EOSINOPHILS 0.4 0.0 - 0.4 K/UL    ABS. BASOPHILS 0.0 0.0 - 0.1 K/UL    ABS. IMM.  GRANS. 0.1 (H) 0.00 - 0.04 K/UL    DF AUTOMATED     METABOLIC PANEL, COMPREHENSIVE    Collection Time: 11/27/21  7:30 PM   Result Value Ref Range    Sodium 142 136 - 145 mmol/L    Potassium 3.4 (L) 3.5 - 5.1 mmol/L    Chloride 112 (H) 97 - 108 mmol/L    CO2 22 21 - 32 mmol/L    Anion gap 8 5 - 15 mmol/L    Glucose 133 (H) 65 - 100 mg/dL    BUN 20 6 - 20 MG/DL    Creatinine 2.76 (H) 0.55 - 1.02 MG/DL BUN/Creatinine ratio 7 (L) 12 - 20      GFR est AA 22 (L) >60 ml/min/1.73m2    GFR est non-AA 18 (L) >60 ml/min/1.73m2    Calcium 8.8 8.5 - 10.1 MG/DL    Bilirubin, total 0.6 0.2 - 1.0 MG/DL    ALT (SGPT) 14 12 - 78 U/L    AST (SGOT) 17 15 - 37 U/L    Alk. phosphatase 110 45 - 117 U/L    Protein, total 7.2 6.4 - 8.2 g/dL    Albumin 2.6 (L) 3.5 - 5.0 g/dL    Globulin 4.6 (H) 2.0 - 4.0 g/dL    A-G Ratio 0.6 (L) 1.1 - 2.2     CK W/ REFLX CKMB    Collection Time: 11/27/21  7:30 PM   Result Value Ref Range     26 - 192 U/L   NT-PRO BNP    Collection Time: 11/27/21  7:30 PM   Result Value Ref Range    NT pro-BNP 6,416 (H) <125 PG/ML   TROPONIN-HIGH SENSITIVITY    Collection Time: 11/27/21  7:30 PM   Result Value Ref Range    Troponin-High Sensitivity 16 0 - 51 ng/L       Radiologic Studies   I have personally reviewed and interpreted all available imaging studies and agree with radiology interpretation. XR CHEST PORT   Final Result      No acute process. CT Results  (Last 48 hours)    None        CXR Results  (Last 48 hours)               11/27/21 1959  XR CHEST PORT Final result    Impression:      No acute process. Narrative:  EXAM:  XR CHEST PORT       INDICATION:  Shortness of breath       COMPARISON:  8/25/2021       FINDINGS:       A portable AP radiograph of the chest was obtained at 19:54 hours. The lungs are   clear. The cardiac and mediastinal contours and pulmonary vascularity are   normal.  The bones and soft tissues are unremarkable. Medical Decision Making   I am the first and primary ED physician for this patient's ED visit today. I reviewed our electronic medical record system for any past medical records that may contribute to the patient's current condition, including their past medical history, surgical history, social and family history. This also includes their most recent ED visits, previous hospitalizations and prior diagnostic data.  I have reviewed and summarized the most pertinent findings in my HPI and MDM. Vital Signs Reviewed:  Patient Vitals for the past 24 hrs:   Temp Pulse Resp BP SpO2   11/28/21 0030  78 12 (!) 173/70 98 %   11/27/21 2330  80 14 (!) 174/69 97 %   11/27/21 2200  87 17 (!) 180/87 100 %   11/27/21 2120  89 14 (!) 182/80 99 %   11/27/21 1921 98.2 °F (36.8 °C) 91 20 (!) 195/95 100 %     Pulse Oximetry Analysis: 100% on RA    Cardiac Monitor:   Rate: 91 bpm  The cardiac monitor revealed the following rhythm as interpreted by me: Normal Sinus Rhythm  Cardiac monitoring was ordered to monitor patient for signs of cardiac dysrhythmia, which they are at risk for based on their history and/or risk for cardiovascular disease and/or metabolic abnormalities. EKG interpretation:   Billable EKG reviewed by ED Physician in the absence of a cardiologist: Yes  Rhythm: normal sinus rhythm; and regular . Rate (approx.): 91; Axis: normal; P wave: normal; QRS interval: normal ; ST/T wave: normal; Other findings: normal. This EKG was interpreted by Merlin Dust, MD    Records Reviewed: Nursing Notes, Old Medical Records, Previous electrocardiograms, Previous Radiology Studies and Previous Laboratory Studies, EMS reports    Provider Notes (Medical Decision Making):   Patient presents with acute dyspnea. DDx: asthma, copd, pna, pulmonary edema, acute bronchitis, ACS, ptx, pna. Will obtain EKG, labs, C, provide O2 as needed for hypoxia, treat symptomatically and reassess. Will continue to monitor closely in ED. ED Course:   Initial assessment performed. I discussed presenting problems and concerns, and my formulated plan for today's visit with the patient and any available family members. I have encouraged them to ask questions as they arise throughout the visit.    Social History     Tobacco Use    Smoking status: Never Smoker    Smokeless tobacco: Never Used   Substance Use Topics    Alcohol use: No    Drug use: No       ED Orders Placed:  Orders Placed This Encounter    XR CHEST  Port (Per MD Order)    CBC WITH AUTOMATED DIFF    METABOLIC PANEL, COMPREHENSIVE    CK W/ REFLX CKMB    NT-PRO BNP    TROPONIN-HIGH SENSITIVITY    EKG, 12 LEAD, INITIAL    bumetanide (BUMEX) injection 1 mg    fentaNYL citrate (PF) injection 50 mcg    cloNIDine HCL (CATAPRES) tablet 0.2 mg    oxyCODONE-acetaminophen (PERCOCET) 5-325 mg per tablet 1 Tablet    oxyCODONE-acetaminophen (Percocet) 5-325 mg per tablet       ED Medications Administered During ED Course:  Medications   bumetanide (BUMEX) injection 1 mg (1 mg IntraVENous Given 11/27/21 2150)   fentaNYL citrate (PF) injection 50 mcg (50 mcg IntraVENous Given 11/27/21 2150)   cloNIDine HCL (CATAPRES) tablet 0.2 mg (0.2 mg Oral Given 11/27/21 2151)   oxyCODONE-acetaminophen (PERCOCET) 5-325 mg per tablet 1 Tablet (1 Tablet Oral Given 11/27/21 2255)        Progress Note:  I have just re-evaluated the patient. Patient reports improvement of sx's. I have reviewed Her vital signs and determined there is currently no worsening in their condition or physical exam. Results have been reviewed with them and their questions have been answered. We will continue to review further results as they come available. Progress Note:  Pt reassessed and symptoms noted to have improved significantly after ED treatment. Pt is clinically stable for discharge. Jaime Nails labs and imaging have been reviewed with her and available family. She verbally conveys understanding and agreement of the signs, symptoms, diagnosis, treatment and prognosis and additionally agrees to follow up as recommended with Dr. Kelsey Foreman MD and/or specialist as instructed. She agrees with the care plan we have created and conveys that all of her questions have been answered.  Additionally, I have put together a packet of discharge instructions for her that include: 1) educational information regarding their diagnosis, 2) how to care for their diagnosis at home, as well a 3) list of reasons why they would want to return to the ED prior to their follow-up appointment should the patient's condition change or symptoms worsen. I have answered all questions to the patient's satisfaction. Strict return precautions given. She conveyed understanding and agreement with care plan. Vital signs stable for discharge. Disposition:  DISCHARGE  The pt is ready for discharge. The pt's signs, symptoms, diagnosis, and discharge instructions have been discussed and pt has conveyed their understanding. The pt is to follow up as recommended or return to ER should their symptoms worsen. Plan has been discussed and pt is in agreement. Plan:  1. Return precautions as discussed with patient and available family/caregiver. 2.   Discharge Medication List as of 11/27/2021 10:59 PM      START taking these medications    Details   oxyCODONE-acetaminophen (Percocet) 5-325 mg per tablet Take 1 Tablet by mouth every eight (8) hours as needed for Pain for up to 5 days. Max Daily Amount: 3 Tablets. Indications: pain, Normal, Disp-10 Tablet, R-0         CONTINUE these medications which have NOT CHANGED    Details   metoprolol tartrate (LOPRESSOR) 25 mg tablet Take 0.5 Tablets by mouth two (2) times a day., Normal, Disp-30 Tablet, R-0      gabapentin (NEURONTIN) 300 mg capsule Take 3 Capsules by mouth two (2) times a day. Max Daily Amount: 1,800 mg., No Print, Disp-90 Capsule, R-0      nystatin (MYCOSTATIN) powder Apply  to affected area two (2) times a day., Normal, Disp-1 Bottle, R-0      amLODIPine (NORVASC) 10 mg tablet Take 10 mg by mouth daily. , Historical Med      albuterol (PROVENTIL HFA, VENTOLIN HFA, PROAIR HFA) 90 mcg/actuation inhaler Take 2 Puffs by inhalation every six (6) hours as needed for Wheezing., Historical Med      aspirin delayed-release 81 mg tablet Take 81 mg by mouth daily. , Historical Med      amitriptyline (ELAVIL) 50 mg tablet Take 50 mg by mouth nightly., Historical Med      atorvastatin (LIPITOR) 80 mg tablet Take 1 Tab by mouth nightly. , Print, Disp-30 Tab, R-0      clopidogrel (PLAVIX) 75 mg tab Take 1 Tab by mouth daily. , Print, Disp-30 Tab, R-0      insulin lispro (HUMALOG) 100 unit/mL injection INITIATE CORRECTIVE INSULIN PROTOCOL (YANA):  High Sensitivity (thin, ESRD):    AC (before meals), Q6H, and Q4H CORRECTIONAL SCALE only For Blood Sugar (mg/dl) of :             140-199=0 units            200-249=2 units  250-299=3 units  300-349=4 units   350 or greater = Call MD  Give in addition to basal medications. Do Not Hold for NPO    BEDTIME CORRECTIONAL sliding scale when scheduled:  200-249=1 units  250-349=2 units  350 or greater = Call MD  Give in addition to basal medications. Do Not Hold f or NPO Fast Acting - Administer Immediately - or within 15 minutes of start of meal, if mealtime coverage., No Print, Disp-1 Vial, R-0      polyethylene glycol (Miralax) 17 gram packet Take 17 g by mouth daily as needed for Constipation. , Historical Med      glipiZIDE (GLUCOTROL) 5 mg tablet Take 5 mg by mouth daily. Take One Tablet By Mouth Daily, Historical Med      magnesium oxide (MAG-OX) 400 mg tablet Take 400 mg by mouth daily. , Historical Med      canagliflozin (INVOKANA) 100 mg tablet Take 100 mg by mouth daily. Take one tablet by mouth daily, Historical Med      Flovent  mcg/actuation inhaler Take 2 Puffs by inhalation two (2) times a day., Historical Med, SONDRA      Lantus Solostar U-100 Insulin 100 unit/mL (3 mL) inpn 20 Units by SubCUTAneous route nightly., Historical Med, SONDRA      fluticasone propionate (Flonase Allergy Relief) 50 mcg/actuation nasal spray 2 SPRAY, Each Nostril, daily, for allergy symptoms, 11 Refills, Dispense: 1, bottle, Pharmacy [facility] , Historical Med      docusate sodium (Colace) 100 mg capsule Take 100 mg by mouth two (2) times daily as needed., Historical Med           3.    Follow-up Information Follow up With Specialties Details Why Contact Info    [facility] EMERGENCY DEPT Emergency Medicine  As needed, If symptoms worsen [address] [place] [address]  [phone]    Eduardo Valerio MD Family Medicine  As needed, If symptoms worsen [address] [facility] [address]  [phone]          Instructed to return to ED if worse  Diagnosis   Clinical Impression:  1. Acute non intractable tension-type headache    2. Acute on chronic diastolic (congestive) heart failure ([address]    3. Accelerated hypertension      Attestation:  Marybeth Ballard MD, am the attending of record for this patient. I personally performed the services described in this documentation on this date, 11/27/2021 for patient, Bernadette Jung. I have reviewed the chart and verified that the record is accurate and complete.

## 2021-12-01 ENCOUNTER — APPOINTMENT (OUTPATIENT)
Dept: CT IMAGING | Age: 56
DRG: 194 | End: 2021-12-01
Attending: PHYSICIAN ASSISTANT
Payer: MEDICAID

## 2021-12-01 ENCOUNTER — HOSPITAL ENCOUNTER (INPATIENT)
Age: 56
LOS: 9 days | Discharge: ANOTHER HOSPITAL | DRG: 194 | End: 2021-12-10
Attending: EMERGENCY MEDICINE | Admitting: STUDENT IN AN ORGANIZED HEALTH CARE EDUCATION/TRAINING PROGRAM
Payer: MEDICAID

## 2021-12-01 DIAGNOSIS — R51.9 ACUTE NONINTRACTABLE HEADACHE, UNSPECIFIED HEADACHE TYPE: ICD-10-CM

## 2021-12-01 DIAGNOSIS — N18.4 ACUTE RENAL FAILURE SUPERIMPOSED ON STAGE 4 CHRONIC KIDNEY DISEASE, UNSPECIFIED ACUTE RENAL FAILURE TYPE (HCC): ICD-10-CM

## 2021-12-01 DIAGNOSIS — I50.9 ACUTE ON CHRONIC CONGESTIVE HEART FAILURE, UNSPECIFIED HEART FAILURE TYPE (HCC): ICD-10-CM

## 2021-12-01 DIAGNOSIS — R11.2 NON-INTRACTABLE VOMITING WITH NAUSEA, UNSPECIFIED VOMITING TYPE: ICD-10-CM

## 2021-12-01 DIAGNOSIS — I16.1 HYPERTENSIVE EMERGENCY: Primary | ICD-10-CM

## 2021-12-01 DIAGNOSIS — N17.9 ACUTE RENAL FAILURE SUPERIMPOSED ON STAGE 4 CHRONIC KIDNEY DISEASE, UNSPECIFIED ACUTE RENAL FAILURE TYPE (HCC): ICD-10-CM

## 2021-12-01 LAB
ALBUMIN SERPL-MCNC: 2.4 G/DL (ref 3.5–5)
ALBUMIN/GLOB SERPL: 0.6 {RATIO} (ref 1.1–2.2)
ALP SERPL-CCNC: 104 U/L (ref 45–117)
ALT SERPL-CCNC: 15 U/L (ref 12–78)
ANION GAP SERPL CALC-SCNC: 8 MMOL/L (ref 5–15)
APPEARANCE UR: ABNORMAL
AST SERPL-CCNC: 21 U/L (ref 15–37)
BACTERIA URNS QL MICRO: ABNORMAL /HPF
BASOPHILS # BLD: 0 K/UL (ref 0–0.1)
BASOPHILS NFR BLD: 0 % (ref 0–1)
BILIRUB SERPL-MCNC: 0.5 MG/DL (ref 0.2–1)
BILIRUB UR QL: NEGATIVE
BNP SERPL-MCNC: 7195 PG/ML (ref 0–125)
BUN SERPL-MCNC: 22 MG/DL (ref 6–20)
BUN/CREAT SERPL: 7 (ref 12–20)
CALCIUM SERPL-MCNC: 8.2 MG/DL (ref 8.5–10.1)
CHLORIDE SERPL-SCNC: 107 MMOL/L (ref 97–108)
CO2 SERPL-SCNC: 24 MMOL/L (ref 21–32)
COLOR UR: ABNORMAL
CREAT SERPL-MCNC: 3.08 MG/DL (ref 0.55–1.02)
DIFFERENTIAL METHOD BLD: ABNORMAL
EOSINOPHIL # BLD: 0.2 K/UL (ref 0–0.4)
EOSINOPHIL NFR BLD: 2 % (ref 0–7)
EPITH CASTS URNS QL MICRO: ABNORMAL /LPF
ERYTHROCYTE [DISTWIDTH] IN BLOOD BY AUTOMATED COUNT: 15.8 % (ref 11.5–14.5)
GLOBULIN SER CALC-MCNC: 4 G/DL (ref 2–4)
GLUCOSE BLD STRIP.AUTO-MCNC: 133 MG/DL (ref 65–117)
GLUCOSE SERPL-MCNC: 127 MG/DL (ref 65–100)
GLUCOSE UR STRIP.AUTO-MCNC: >1000 MG/DL
HCT VFR BLD AUTO: 23.8 % (ref 35–47)
HGB BLD-MCNC: 8.3 G/DL (ref 11.5–16)
HGB UR QL STRIP: ABNORMAL
IMM GRANULOCYTES # BLD AUTO: 0.1 K/UL (ref 0–0.04)
IMM GRANULOCYTES NFR BLD AUTO: 1 % (ref 0–0.5)
KETONES UR QL STRIP.AUTO: ABNORMAL MG/DL
LEUKOCYTE ESTERASE UR QL STRIP.AUTO: NEGATIVE
LIPASE SERPL-CCNC: 338 U/L (ref 73–393)
LYMPHOCYTES # BLD: 1.3 K/UL (ref 0.8–3.5)
LYMPHOCYTES NFR BLD: 17 % (ref 12–49)
MCH RBC QN AUTO: 28.3 PG (ref 26–34)
MCHC RBC AUTO-ENTMCNC: 34.9 G/DL (ref 30–36.5)
MCV RBC AUTO: 81.2 FL (ref 80–99)
MONOCYTES # BLD: 0.5 K/UL (ref 0–1)
MONOCYTES NFR BLD: 6 % (ref 5–13)
NEUTS SEG # BLD: 5.7 K/UL (ref 1.8–8)
NEUTS SEG NFR BLD: 74 % (ref 32–75)
NITRITE UR QL STRIP.AUTO: NEGATIVE
NRBC # BLD: 0 K/UL (ref 0–0.01)
NRBC BLD-RTO: 0 PER 100 WBC
PH UR STRIP: 6 [PH] (ref 5–8)
PLATELET # BLD AUTO: 103 K/UL (ref 150–400)
PMV BLD AUTO: 9.1 FL (ref 8.9–12.9)
POTASSIUM SERPL-SCNC: 3.2 MMOL/L (ref 3.5–5.1)
PROT SERPL-MCNC: 6.4 G/DL (ref 6.4–8.2)
PROT UR STRIP-MCNC: >300 MG/DL
RBC # BLD AUTO: 2.93 M/UL (ref 3.8–5.2)
RBC #/AREA URNS HPF: ABNORMAL /HPF (ref 0–5)
SERVICE CMNT-IMP: ABNORMAL
SODIUM SERPL-SCNC: 139 MMOL/L (ref 136–145)
SP GR UR REFRACTOMETRY: 1.02 (ref 1–1.03)
TROPONIN-HIGH SENSITIVITY: 18 NG/L (ref 0–51)
TROPONIN-HIGH SENSITIVITY: 22 NG/L (ref 0–51)
UA: UC IF INDICATED,UAUC: ABNORMAL
UROBILINOGEN UR QL STRIP.AUTO: 0.2 EU/DL (ref 0.2–1)
WBC # BLD AUTO: 7.8 K/UL (ref 3.6–11)
WBC URNS QL MICRO: ABNORMAL /HPF (ref 0–4)

## 2021-12-01 PROCEDURE — 84484 ASSAY OF TROPONIN QUANT: CPT

## 2021-12-01 PROCEDURE — 96374 THER/PROPH/DIAG INJ IV PUSH: CPT

## 2021-12-01 PROCEDURE — 83690 ASSAY OF LIPASE: CPT

## 2021-12-01 PROCEDURE — 83880 ASSAY OF NATRIURETIC PEPTIDE: CPT

## 2021-12-01 PROCEDURE — 85025 COMPLETE CBC W/AUTO DIFF WBC: CPT

## 2021-12-01 PROCEDURE — 74011000250 HC RX REV CODE- 250: Performed by: STUDENT IN AN ORGANIZED HEALTH CARE EDUCATION/TRAINING PROGRAM

## 2021-12-01 PROCEDURE — 70450 CT HEAD/BRAIN W/O DYE: CPT

## 2021-12-01 PROCEDURE — 80053 COMPREHEN METABOLIC PANEL: CPT

## 2021-12-01 PROCEDURE — 74011250637 HC RX REV CODE- 250/637: Performed by: STUDENT IN AN ORGANIZED HEALTH CARE EDUCATION/TRAINING PROGRAM

## 2021-12-01 PROCEDURE — 65270000029 HC RM PRIVATE

## 2021-12-01 PROCEDURE — 83036 HEMOGLOBIN GLYCOSYLATED A1C: CPT

## 2021-12-01 PROCEDURE — 96375 TX/PRO/DX INJ NEW DRUG ADDON: CPT

## 2021-12-01 PROCEDURE — 74011250636 HC RX REV CODE- 250/636: Performed by: PHYSICIAN ASSISTANT

## 2021-12-01 PROCEDURE — 99285 EMERGENCY DEPT VISIT HI MDM: CPT

## 2021-12-01 PROCEDURE — 81001 URINALYSIS AUTO W/SCOPE: CPT

## 2021-12-01 PROCEDURE — 82962 GLUCOSE BLOOD TEST: CPT

## 2021-12-01 PROCEDURE — 93005 ELECTROCARDIOGRAM TRACING: CPT

## 2021-12-01 PROCEDURE — 74176 CT ABD & PELVIS W/O CONTRAST: CPT

## 2021-12-01 PROCEDURE — 36415 COLL VENOUS BLD VENIPUNCTURE: CPT

## 2021-12-01 RX ORDER — INSULIN ASPART 100 [IU]/ML
INJECTION, SOLUTION INTRAVENOUS; SUBCUTANEOUS
COMMUNITY
End: 2022-01-11

## 2021-12-01 RX ORDER — AMLODIPINE BESYLATE 5 MG/1
10 TABLET ORAL DAILY
Status: DISCONTINUED | OUTPATIENT
Start: 2021-12-01 | End: 2021-12-10 | Stop reason: HOSPADM

## 2021-12-01 RX ORDER — GUAIFENESIN 100 MG/5ML
81 LIQUID (ML) ORAL DAILY
Status: DISCONTINUED | OUTPATIENT
Start: 2021-12-02 | End: 2021-12-10 | Stop reason: HOSPADM

## 2021-12-01 RX ORDER — METOPROLOL TARTRATE 25 MG/1
25 TABLET, FILM COATED ORAL 2 TIMES DAILY
Status: DISCONTINUED | OUTPATIENT
Start: 2021-12-01 | End: 2021-12-10 | Stop reason: HOSPADM

## 2021-12-01 RX ORDER — ATORVASTATIN CALCIUM 40 MG/1
80 TABLET, FILM COATED ORAL
Status: DISCONTINUED | OUTPATIENT
Start: 2021-12-01 | End: 2021-12-10 | Stop reason: HOSPADM

## 2021-12-01 RX ORDER — METOPROLOL TARTRATE 25 MG/1
25 TABLET, FILM COATED ORAL 2 TIMES DAILY
COMMUNITY

## 2021-12-01 RX ORDER — TRAMADOL HYDROCHLORIDE 50 MG/1
50 TABLET ORAL
COMMUNITY

## 2021-12-01 RX ORDER — GABAPENTIN 100 MG/1
100 CAPSULE ORAL 2 TIMES DAILY
Status: DISCONTINUED | OUTPATIENT
Start: 2021-12-01 | End: 2021-12-10 | Stop reason: HOSPADM

## 2021-12-01 RX ORDER — GABAPENTIN 300 MG/1
900 CAPSULE ORAL 3 TIMES DAILY
Status: ON HOLD | COMMUNITY
End: 2021-12-13 | Stop reason: SDUPTHER

## 2021-12-01 RX ORDER — INSULIN LISPRO 100 [IU]/ML
INJECTION, SOLUTION INTRAVENOUS; SUBCUTANEOUS
Status: DISCONTINUED | OUTPATIENT
Start: 2021-12-01 | End: 2021-12-10 | Stop reason: HOSPADM

## 2021-12-01 RX ORDER — ONDANSETRON 2 MG/ML
4 INJECTION INTRAMUSCULAR; INTRAVENOUS
Status: COMPLETED | OUTPATIENT
Start: 2021-12-01 | End: 2021-12-01

## 2021-12-01 RX ORDER — DEXTROSE 50 % IN WATER (D50W) INTRAVENOUS SYRINGE
25-50 AS NEEDED
Status: DISCONTINUED | OUTPATIENT
Start: 2021-12-01 | End: 2021-12-10 | Stop reason: HOSPADM

## 2021-12-01 RX ORDER — METOPROLOL TARTRATE 25 MG/1
25 TABLET, FILM COATED ORAL
Status: DISCONTINUED | OUTPATIENT
Start: 2021-12-01 | End: 2021-12-01

## 2021-12-01 RX ORDER — NYSTATIN 100000 [USP'U]/G
POWDER TOPICAL
COMMUNITY

## 2021-12-01 RX ORDER — INSULIN ASPART 100 [IU]/ML
10 INJECTION, SOLUTION INTRAVENOUS; SUBCUTANEOUS
COMMUNITY

## 2021-12-01 RX ORDER — METOPROLOL TARTRATE 25 MG/1
12.5 TABLET, FILM COATED ORAL 2 TIMES DAILY
Status: DISCONTINUED | OUTPATIENT
Start: 2021-12-01 | End: 2021-12-01

## 2021-12-01 RX ORDER — BUMETANIDE 0.25 MG/ML
1 INJECTION INTRAMUSCULAR; INTRAVENOUS 2 TIMES DAILY
Status: DISCONTINUED | OUTPATIENT
Start: 2021-12-01 | End: 2021-12-05

## 2021-12-01 RX ORDER — AMITRIPTYLINE HYDROCHLORIDE 50 MG/1
50 TABLET, FILM COATED ORAL
Status: DISCONTINUED | OUTPATIENT
Start: 2021-12-01 | End: 2021-12-08

## 2021-12-01 RX ORDER — MAGNESIUM SULFATE 100 %
4 CRYSTALS MISCELLANEOUS AS NEEDED
Status: DISCONTINUED | OUTPATIENT
Start: 2021-12-01 | End: 2021-12-10 | Stop reason: HOSPADM

## 2021-12-01 RX ORDER — LABETALOL HCL 20 MG/4 ML
20 SYRINGE (ML) INTRAVENOUS
Status: COMPLETED | OUTPATIENT
Start: 2021-12-01 | End: 2021-12-01

## 2021-12-01 RX ORDER — TORSEMIDE 20 MG/1
20 TABLET ORAL 2 TIMES DAILY
Status: ON HOLD | COMMUNITY
End: 2021-12-12 | Stop reason: SDUPTHER

## 2021-12-01 RX ORDER — CLOPIDOGREL BISULFATE 75 MG/1
75 TABLET ORAL DAILY
Status: DISCONTINUED | OUTPATIENT
Start: 2021-12-02 | End: 2021-12-10 | Stop reason: HOSPADM

## 2021-12-01 RX ORDER — MORPHINE SULFATE 2 MG/ML
2 INJECTION, SOLUTION INTRAMUSCULAR; INTRAVENOUS
Status: COMPLETED | OUTPATIENT
Start: 2021-12-01 | End: 2021-12-01

## 2021-12-01 RX ADMIN — AMITRIPTYLINE HYDROCHLORIDE 50 MG: 50 TABLET, FILM COATED ORAL at 21:54

## 2021-12-01 RX ADMIN — ONDANSETRON 4 MG: 2 INJECTION INTRAMUSCULAR; INTRAVENOUS at 17:25

## 2021-12-01 RX ADMIN — MORPHINE SULFATE 2 MG: 2 INJECTION, SOLUTION INTRAMUSCULAR; INTRAVENOUS at 17:26

## 2021-12-01 RX ADMIN — BUMETANIDE 1 MG: 0.25 INJECTION INTRAMUSCULAR; INTRAVENOUS at 20:28

## 2021-12-01 RX ADMIN — LABETALOL HYDROCHLORIDE 20 MG: 5 INJECTION, SOLUTION INTRAVENOUS at 17:29

## 2021-12-01 RX ADMIN — GABAPENTIN 100 MG: 100 CAPSULE ORAL at 20:24

## 2021-12-01 RX ADMIN — ATORVASTATIN CALCIUM 80 MG: 40 TABLET, FILM COATED ORAL at 21:53

## 2021-12-01 RX ADMIN — METOPROLOL TARTRATE 25 MG: 25 TABLET, FILM COATED ORAL at 20:23

## 2021-12-01 RX ADMIN — AMLODIPINE BESYLATE 10 MG: 5 TABLET ORAL at 20:26

## 2021-12-01 NOTE — ED TRIAGE NOTES
Arrives by EMS c/o headache \"all over\" x 2.5 weeks. Pt BP very elevated in triage, pt reports she has been taking her antihypertensives as prescribed. Pt report she went to [facility] ED for this complaint over the weekend. Pt also reporting left-sided abd pain with N/V x 3-4 days, last episode of vomiting around 1330 today.

## 2021-12-01 NOTE — ED NOTES
Pt presents ambulatory to ED complaining of high blood pressure, headache x2 weeks and vomiting x3 days. Pt states she has been taking her metoprolol as prescribed. Pt is alert and oriented x 4, RR even and unlabored, skin is warm and dry. Assesment completed and pt updated on plan of care. Emergency Department Nursing Plan of Care       The Nursing Plan of Care is developed from the Nursing assessment and Emergency Department Attending provider initial evaluation. The plan of care may be reviewed in the ED Provider note.     The Plan of Care was developed with the following considerations:   Patient / Family readiness to learn indicated by:verbalized understanding  Persons(s) to be included in education: patient  Barriers to Learning/Limitations:No    Signed     Jarrod Church RN    12/1/2021   4:53 PM

## 2021-12-01 NOTE — ED PROVIDER NOTES
EMERGENCY DEPARTMENT HISTORY AND PHYSICAL EXAM      Date: 12/1/2021  Patient Name: Greg Dennison    History of Presenting Illness     Chief Complaint   Patient presents with    Headache    Vomiting     last episode 1330     History Provided By: Patient    HPI: Greg Dennison, 54 y.o. female with medical history significant for diabetes, neuropathy, nephropathy w/ CKD 4, arthritis, obesity, hypertension, asthma, CHF (EF 60 to 65% on 8/9/2021) who presents via EMS to the ED with cc of acute moderate aching generalized headache X 2 weeks. NKI or head trauma. Endorses new onset nausea and vomiting X 3 days. Associated photophobia. She is also endorsing left lower quadrant and left flank abdominal pain. Patient states that her blood pressure has been elevated at home, but she has been taking her metoprolol as prescribed; last dose this morning. She states that she always has chills, denies any fevers. Patient endorses that she was evaluated at [facility] on 11/27/2021 for similar symptoms; discharged home. She has not yet followed up with her PCP. Patient was prescribed oxycodone/acetaminophen for her pain without any relief. Endorses taking only tramadol for her pain today. Patient also states that her bilateral lower extremity swelling has improved since being home because she has been laying down and elevating her legs. Denies any new shortness of breath, wheezing, cough, URI symptoms, neck pain or stiffness, visual disturbance, numbness, tingling, focal weakness, chest pain. Patient does endorse history of migraine headaches. PCP: Jimy Pierre MD    There are no other complaints, changes, or physical findings at this time. No current facility-administered medications on file prior to encounter.      Current Outpatient Medications on File Prior to Encounter   Medication Sig Dispense Refill    oxyCODONE-acetaminophen (Percocet) 5-325 mg per tablet Take 1 Tablet by mouth every eight (8) hours as needed for Pain for up to 5 days. Max Daily Amount: 3 Tablets. Indications: pain 10 Tablet 0    insulin lispro (HUMALOG) 100 unit/mL injection INITIATE CORRECTIVE INSULIN PROTOCOL (YANA):  High Sensitivity (thin, ESRD):    AC (before meals), Q6H, and Q4H CORRECTIONAL SCALE only For Blood Sugar (mg/dl) of :             140-199=0 units            200-249=2 units  250-299=3 units  300-349=4 units  350 or greater = Call MD  Give in addition to basal medications. Do Not Hold for NPO    BEDTIME CORRECTIONAL sliding scale when scheduled:  200-249=1 units  250-349=2 units  350 or greater = Call MD  Give in addition to basal medications. Do Not Hold for NPO Fast Acting - Administer Immediately - or within 15 minutes of start of meal, if mealtime coverage. 1 Vial 0    polyethylene glycol (Miralax) 17 gram packet Take 17 g by mouth daily as needed for Constipation.  glipiZIDE (GLUCOTROL) 5 mg tablet Take 5 mg by mouth daily. Take One Tablet By Mouth Daily      magnesium oxide (MAG-OX) 400 mg tablet Take 400 mg by mouth daily.  canagliflozin (INVOKANA) 100 mg tablet Take 100 mg by mouth daily. Take one tablet by mouth daily      metoprolol tartrate (LOPRESSOR) 25 mg tablet Take 0.5 Tablets by mouth two (2) times a day. 30 Tablet 0    gabapentin (NEURONTIN) 300 mg capsule Take 3 Capsules by mouth two (2) times a day. Max Daily Amount: 1,800 mg. 90 Capsule 0    nystatin (MYCOSTATIN) powder Apply  to affected area two (2) times a day. 1 Bottle 0    amLODIPine (NORVASC) 10 mg tablet Take 10 mg by mouth daily.  Flovent  mcg/actuation inhaler Take 2 Puffs by inhalation two (2) times a day.  albuterol (PROVENTIL HFA, VENTOLIN HFA, PROAIR HFA) 90 mcg/actuation inhaler Take 2 Puffs by inhalation every six (6) hours as needed for Wheezing.  Lantus Solostar U-100 Insulin 100 unit/mL (3 mL) inpn 20 Units by SubCUTAneous route nightly.       fluticasone propionate (Flonase Allergy Relief) 50 mcg/actuation nasal spray 2 SPRAY, Each Nostril, daily, for allergy symptoms, 11 Refills, Dispense: 1, bottle, [id number]      aspirin delayed-release 81 mg tablet Take 81 mg by mouth daily.  amitriptyline (ELAVIL) 50 mg tablet Take 50 mg by mouth nightly.  docusate sodium (Colace) 100 mg capsule Take 100 mg by mouth two (2) times daily as needed.  atorvastatin (LIPITOR) 80 mg tablet Take 1 Tab by mouth nightly. 30 Tab 0    clopidogrel (PLAVIX) 75 mg tab Take 1 Tab by mouth daily. 30 Tab 0     Past History     Past Medical History:  Past Medical History:   Diagnosis Date    Arthritis     Asthma     [facility]congest[facility] ([facility])     Chronic back pain     [facility] [facility]     Diabetic retinopathy [facility] [facility]     Hypertension     MRSA (methicillin resistant staph aureus) culture positive     labial abscess    Neuropathy      Past Surgical History:  Past Surgical History:   Procedure Laterality Date    HX HEENT      tonsillectomy    HX ORTHOPAEDIC      left ft bunionectomy    HX OTHER SURGICAL      lanced labial abscess     Family History:  History reviewed. No pertinent family history. Social History:  Social History     Tobacco Use    Smoking status: Never Smoker    Smokeless tobacco: Never Used   Substance Use Topics    Alcohol use: No    Drug use: No     Allergies: Allergies   Allergen Reactions    Amoxicillin Nausea Only    Aspirin Other (comments)     \"nosebleeds\" but patient takes daily aspirin 81 mg at home. Patient states naproxen ok    Ciprofloxacin Hives     Review of Systems   Review of Systems   Constitutional: Negative for activity change, appetite change, chills, diaphoresis, fatigue and fever. HENT: Negative for congestion, dental problem, ear pain, facial swelling, mouth sores, nosebleeds, postnasal drip, rhinorrhea, sinus pressure, sneezing, sore throat, tinnitus, trouble swallowing and voice change.     Eyes: Positive for photophobia. Negative for pain, discharge, redness, itching and visual disturbance. Respiratory: Negative for apnea, cough, chest tightness, shortness of breath and wheezing. Cardiovascular: Negative for chest pain, palpitations and leg swelling. Gastrointestinal: Positive for abdominal pain, nausea and vomiting. Negative for anal bleeding, blood in stool, constipation, diarrhea and rectal pain. Genitourinary: Positive for flank pain. Negative for decreased urine volume, difficulty urinating, dysuria, frequency, hematuria, pelvic pain, urgency, vaginal bleeding and vaginal discharge. Musculoskeletal: Negative for back pain, neck pain and neck stiffness. Skin: Negative. Negative for pallor, rash and wound. Neurological: Positive for headaches. Negative for dizziness, tremors, seizures, syncope, facial asymmetry, speech difficulty, weakness, light-headedness and numbness. Psychiatric/Behavioral: Negative. Negative for confusion. The patient is not nervous/anxious. Physical Exam   Physical Exam  Vitals and nursing note reviewed. Constitutional:       General: She is not in acute distress. Appearance: She is well-developed. She is obese. She is not ill-appearing, toxic-appearing or diaphoretic. HENT:      Head: Normocephalic and atraumatic. Right Ear: External ear normal.      Left Ear: External ear normal.      Nose: Nose normal.      Mouth/Throat:      Mouth: Mucous membranes are moist.      Pharynx: No oropharyngeal exudate or posterior oropharyngeal erythema. Eyes:      General: No visual field deficit. Conjunctiva/sclera: Conjunctivae normal.      Pupils: Pupils are equal, round, and reactive to light. Cardiovascular:      Rate and Rhythm: Normal rate and regular rhythm. Pulses: Normal pulses. Heart sounds: Normal heart sounds. Pulmonary:      Effort: Pulmonary effort is normal.      Breath sounds: Normal breath sounds.    Abdominal:      General: Abdomen is protuberant. Bowel sounds are normal.      Palpations: Abdomen is soft. Tenderness: There is abdominal tenderness in the left upper quadrant and left lower quadrant. There is guarding (voluntary). There is no right CVA tenderness, left CVA tenderness or rebound. Negative signs include Liao's sign and McBurney's sign. Musculoskeletal:         General: No tenderness. Normal range of motion. Cervical back: Normal range of motion and neck supple. Right lower leg: Edema (2+) present. Left lower leg: Edema (2+) present. Skin:     General: Skin is warm and dry. Capillary Refill: Capillary refill takes less than 2 seconds. Findings: No erythema or rash. Neurological:      General: No focal deficit present. Mental Status: She is alert and oriented to person, place, and time. She is not disoriented. Cranial Nerves: No cranial nerve deficit, dysarthria or facial asymmetry. Sensory: No sensory deficit. Motor: No weakness, tremor, atrophy, abnormal muscle tone, seizure activity or pronator drift. Coordination: Coordination is intact. Deep Tendon Reflexes: Reflexes are normal and symmetric. Psychiatric:         Mood and Affect: Mood normal.         Behavior: Behavior normal.         Thought Content:  Thought content normal.         Judgment: Judgment normal.       Diagnostic Study Results   Labs -     Recent Results (from the past 12 hour(s))   CBC WITH AUTOMATED DIFF    Collection Time: 12/01/21  5:06 PM   Result Value Ref Range    WBC 7.8 3.6 - 11.0 K/uL    RBC 2.93 (L) 3.80 - 5.20 M/uL    HGB 8.3 (L) 11.5 - 16.0 g/dL    HCT 23.8 (L) 35.0 - 47.0 %    MCV 81.2 80.0 - 99.0 FL    MCH 28.3 26.0 - 34.0 PG    MCHC 34.9 30.0 - 36.5 g/dL    RDW 15.8 (H) 11.5 - 14.5 %    PLATELET 531 (L) 362 - 400 K/uL    MPV 9.1 8.9 - 12.9 FL    NRBC 0.0 0  WBC    ABSOLUTE NRBC 0.00 0.00 - 0.01 K/uL    NEUTROPHILS 74 32 - 75 %    LYMPHOCYTES 17 12 - 49 %    MONOCYTES 6 5 - 13 % EOSINOPHILS 2 0 - 7 %    BASOPHILS 0 0 - 1 %    IMMATURE GRANULOCYTES 1 (H) 0.0 - 0.5 %    ABS. NEUTROPHILS 5.7 1.8 - 8.0 K/UL    ABS. LYMPHOCYTES 1.3 0.8 - 3.5 K/UL    ABS. MONOCYTES 0.5 0.0 - 1.0 K/UL    ABS. EOSINOPHILS 0.2 0.0 - 0.4 K/UL    ABS. BASOPHILS 0.0 0.0 - 0.1 K/UL    ABS. IMM. GRANS. 0.1 (H) 0.00 - 0.04 K/UL    DF AUTOMATED     METABOLIC PANEL, COMPREHENSIVE    Collection Time: 12/01/21  5:06 PM   Result Value Ref Range    Sodium 139 136 - 145 mmol/L    Potassium 3.2 (L) 3.5 - 5.1 mmol/L    Chloride 107 97 - 108 mmol/L    CO2 24 21 - 32 mmol/L    Anion gap 8 5 - 15 mmol/L    Glucose 127 (H) 65 - 100 mg/dL    BUN 22 (H) 6 - 20 MG/DL    Creatinine 3.08 (H) 0.55 - 1.02 MG/DL    BUN/Creatinine ratio 7 (L) 12 - 20      GFR est AA 19 (L) >60 ml/min/1.73m2    GFR est non-AA 16 (L) >60 ml/min/1.73m2    Calcium 8.2 (L) 8.5 - 10.1 MG/DL    Bilirubin, total 0.5 0.2 - 1.0 MG/DL    ALT (SGPT) 15 12 - 78 U/L    AST (SGOT) 21 15 - 37 U/L    Alk.  phosphatase 104 45 - 117 U/L    Protein, total 6.4 6.4 - 8.2 g/dL    Albumin 2.4 (L) 3.5 - 5.0 g/dL    Globulin 4.0 2.0 - 4.0 g/dL    A-G Ratio 0.6 (L) 1.1 - 2.2     TROPONIN-HIGH SENSITIVITY    Collection Time: 12/01/21  5:06 PM   Result Value Ref Range    Troponin-High Sensitivity 22 0 - 51 ng/L   LIPASE    Collection Time: 12/01/21  5:06 PM   Result Value Ref Range    Lipase 338 73 - 393 U/L   NT-PRO BNP    Collection Time: 12/01/21  5:06 PM   Result Value Ref Range    NT pro-BNP 7,195 (H) 0 - 125 PG/ML   EKG, 12 LEAD, INITIAL    Collection Time: 12/01/21  5:29 PM   Result Value Ref Range    Ventricular Rate 78 BPM    Atrial Rate 78 BPM    P-R Interval 200 ms    QRS Duration 80 ms    Q-T Interval 418 ms    QTC Calculation (Bezet) 476 ms    Calculated P Axis 25 degrees    Calculated R Axis 11 degrees    Calculated T Axis 40 degrees    Diagnosis       Normal sinus rhythm  Cannot rule out Anteroseptal infarct (cited on or before 27-NOV-2021)  Abnormal ECG  When compared with ECG of 27-NOV-2021 19:27,  T wave inversion no longer evident in Inferior leads  Nonspecific T wave abnormality no longer evident in Anterolateral leads         Radiologic Studies -   CT HEAD WO CONT   Final Result      No acute intracranial abnormality on this noncontrast head CT. No intracranial   change given difference in technique. CT ABD PELV WO CONT   Final Result      1. Fluid overload versus hypoproteinemia, similar to 2020.   2. Decreased small bilateral pleural effusions. Increased small pericardial   effusion. 3. No nephrolithiasis or hydronephrosis. 4. No bowel obstruction. CT HEAD WO CONT    Result Date: 12/1/2021  No acute intracranial abnormality on this noncontrast head CT. No intracranial change given difference in technique. CT ABD PELV WO CONT    Result Date: 12/1/2021  1. Fluid overload versus hypoproteinemia, similar to 2020. 2. Decreased small bilateral pleural effusions. Increased small pericardial effusion. 3. No nephrolithiasis or hydronephrosis. 4. No bowel obstruction. Medical Decision Making   I am the first provider for this patient. I reviewed the vital signs, available nursing notes, past medical history, past surgical history, family history and social history. Vital Signs-Reviewed the patient's vital signs. Patient Vitals for the past 24 hrs:   Temp Pulse Resp BP SpO2   12/01/21 1826    (!) 168/64 95 %   12/01/21 1800  77 20 (!) 157/59 95 %   12/01/21 1729  78 18 (!) 188/74 99 %   12/01/21 1726    (!) 188/74 97 %   12/01/21 1654    (!) 147/47 99 %   12/01/21 1648 98.1 °F (36.7 °C) 78 18 (!) 189/72 99 %   12/01/21 1641    (!) 189/72      Pulse Oximetry Analysis - 99% on RA (normal)    Cardiac Monitor:   Rate: 74 bpm  NSR    EKG interpretation: (Preliminary)  Rhythm: normal sinus rhythm; and regular . Rate (approx.): 78; Axis: normal; NV interval: normal; QRS interval: normal ; ST/T wave: non-specific changes;  Other findings: non-ischemic. Records Reviewed: Nursing Notes, Old Medical Records, Previous electrocardiograms, Ambulance Run Sheet, Previous Radiology Studies and Previous Laboratory Studies    Provider Notes (Medical Decision Making):   Patient presents with headache w/ n/v x 2 weeks. Endorses associated HTN. .  DDx: htn emergency/ urgency, malignant htn, migraine, cluster HA, tension HA, dehydration/lack of proper hydration, lack of proper sleep, stress. Less likely pseudotumor cerebri, SAH, ICH, cerebral dural venous thrombosis, or meningitis given the course, story and physical exam.  Analgesics and labs/ imaging ordered. ED Course:   Initial assessment performed. The patients presenting problems have been discussed, and they are in agreement with the care plan formulated and outlined with them. I have encouraged them to ask questions as they arise throughout their visit. Disposition:  6:19 PM  Patient is being admitted to the hospital by Dr. Micaela Khan. The results of their tests and reasons for their admission have been discussed with them and/or available family. They convey agreement and understanding for the need to be admitted and for their admission diagnosis. Consultation has been made with the inpatient physician specialist for hospitalization. Diagnosis     Clinical Impression:   1. Hypertensive emergency    2. Acute renal failure superimposed on stage 4 chronic kidney disease, unspecified acute renal failure type ([address]    3. Acute on chronic congestive heart failure, unspecified heart failure type ([address]    4. Acute nonintractable headache, unspecified headache type    5. Non-intractable vomiting with nausea, unspecified vomiting type            Please note that this dictation was completed with Dragon, computer voice recognition software. Quite often unanticipated grammatical, syntax, homophones, and other interpretive errors are inadvertently transcribed by the computer software.   Please disregard these errors. Additionally, please excuse any errors that have escaped final proofreading.

## 2021-12-02 LAB
ALBUMIN SERPL-MCNC: 2 G/DL (ref 3.5–5)
ALBUMIN/GLOB SERPL: 0.6 {RATIO} (ref 1.1–2.2)
ALP SERPL-CCNC: 90 U/L (ref 45–117)
ALT SERPL-CCNC: 14 U/L (ref 12–78)
ANION GAP SERPL CALC-SCNC: 6 MMOL/L (ref 5–15)
AST SERPL-CCNC: 20 U/L (ref 15–37)
ATRIAL RATE: 78 BPM
BASOPHILS # BLD: 0 K/UL (ref 0–0.1)
BASOPHILS NFR BLD: 0 % (ref 0–1)
BILIRUB DIRECT SERPL-MCNC: 0.1 MG/DL (ref 0–0.2)
BILIRUB SERPL-MCNC: 0.2 MG/DL (ref 0.2–1)
BUN SERPL-MCNC: 23 MG/DL (ref 6–20)
BUN/CREAT SERPL: 7 (ref 12–20)
CALCIUM SERPL-MCNC: 7.9 MG/DL (ref 8.5–10.1)
CALCULATED P AXIS, ECG09: 25 DEGREES
CALCULATED R AXIS, ECG10: 11 DEGREES
CALCULATED T AXIS, ECG11: 40 DEGREES
CHLORIDE SERPL-SCNC: 109 MMOL/L (ref 97–108)
CO2 SERPL-SCNC: 25 MMOL/L (ref 21–32)
CREAT SERPL-MCNC: 3.16 MG/DL (ref 0.55–1.02)
DIAGNOSIS, 93000: NORMAL
DIFFERENTIAL METHOD BLD: ABNORMAL
EOSINOPHIL # BLD: 0.3 K/UL (ref 0–0.4)
EOSINOPHIL NFR BLD: 4 % (ref 0–7)
ERYTHROCYTE [DISTWIDTH] IN BLOOD BY AUTOMATED COUNT: 16.2 % (ref 11.5–14.5)
EST. AVERAGE GLUCOSE BLD GHB EST-MCNC: 120 MG/DL
GLOBULIN SER CALC-MCNC: 3.6 G/DL (ref 2–4)
GLUCOSE BLD STRIP.AUTO-MCNC: 115 MG/DL (ref 65–117)
GLUCOSE BLD STRIP.AUTO-MCNC: 143 MG/DL (ref 65–117)
GLUCOSE BLD STRIP.AUTO-MCNC: 185 MG/DL (ref 65–117)
GLUCOSE BLD STRIP.AUTO-MCNC: 248 MG/DL (ref 65–117)
GLUCOSE SERPL-MCNC: 106 MG/DL (ref 65–100)
HBA1C MFR BLD: 5.8 % (ref 4–5.6)
HCT VFR BLD AUTO: 22.7 % (ref 35–47)
HGB BLD-MCNC: 7.6 G/DL (ref 11.5–16)
IMM GRANULOCYTES # BLD AUTO: 0 K/UL (ref 0–0.04)
IMM GRANULOCYTES NFR BLD AUTO: 1 % (ref 0–0.5)
LYMPHOCYTES # BLD: 2 K/UL (ref 0.8–3.5)
LYMPHOCYTES NFR BLD: 27 % (ref 12–49)
MAGNESIUM SERPL-MCNC: 1.8 MG/DL (ref 1.6–2.4)
MCH RBC QN AUTO: 27.7 PG (ref 26–34)
MCHC RBC AUTO-ENTMCNC: 33.5 G/DL (ref 30–36.5)
MCV RBC AUTO: 82.8 FL (ref 80–99)
MONOCYTES # BLD: 0.5 K/UL (ref 0–1)
MONOCYTES NFR BLD: 7 % (ref 5–13)
NEUTS SEG # BLD: 4.4 K/UL (ref 1.8–8)
NEUTS SEG NFR BLD: 61 % (ref 32–75)
NRBC # BLD: 0 K/UL (ref 0–0.01)
NRBC BLD-RTO: 0 PER 100 WBC
P-R INTERVAL, ECG05: 200 MS
PHOSPHATE SERPL-MCNC: 5.2 MG/DL (ref 2.6–4.7)
PLATELET # BLD AUTO: 103 K/UL (ref 150–400)
PMV BLD AUTO: 9.3 FL (ref 8.9–12.9)
POTASSIUM SERPL-SCNC: 3.2 MMOL/L (ref 3.5–5.1)
PROT SERPL-MCNC: 5.6 G/DL (ref 6.4–8.2)
Q-T INTERVAL, ECG07: 418 MS
QRS DURATION, ECG06: 80 MS
QTC CALCULATION (BEZET), ECG08: 476 MS
RBC # BLD AUTO: 2.74 M/UL (ref 3.8–5.2)
SERVICE CMNT-IMP: ABNORMAL
SERVICE CMNT-IMP: NORMAL
SODIUM SERPL-SCNC: 140 MMOL/L (ref 136–145)
TROPONIN-HIGH SENSITIVITY: 15 NG/L (ref 0–51)
VENTRICULAR RATE, ECG03: 78 BPM
WBC # BLD AUTO: 7.2 K/UL (ref 3.6–11)

## 2021-12-02 PROCEDURE — 65270000032 HC RM SEMIPRIVATE

## 2021-12-02 PROCEDURE — 83735 ASSAY OF MAGNESIUM: CPT

## 2021-12-02 PROCEDURE — 74011250637 HC RX REV CODE- 250/637: Performed by: STUDENT IN AN ORGANIZED HEALTH CARE EDUCATION/TRAINING PROGRAM

## 2021-12-02 PROCEDURE — 85025 COMPLETE CBC W/AUTO DIFF WBC: CPT

## 2021-12-02 PROCEDURE — 97161 PT EVAL LOW COMPLEX 20 MIN: CPT | Performed by: PHYSICAL THERAPIST

## 2021-12-02 PROCEDURE — 74011250636 HC RX REV CODE- 250/636: Performed by: STUDENT IN AN ORGANIZED HEALTH CARE EDUCATION/TRAINING PROGRAM

## 2021-12-02 PROCEDURE — 74011636637 HC RX REV CODE- 636/637: Performed by: STUDENT IN AN ORGANIZED HEALTH CARE EDUCATION/TRAINING PROGRAM

## 2021-12-02 PROCEDURE — 97116 GAIT TRAINING THERAPY: CPT | Performed by: PHYSICAL THERAPIST

## 2021-12-02 PROCEDURE — 84100 ASSAY OF PHOSPHORUS: CPT

## 2021-12-02 PROCEDURE — 99222 1ST HOSP IP/OBS MODERATE 55: CPT | Performed by: NURSE PRACTITIONER

## 2021-12-02 PROCEDURE — 84484 ASSAY OF TROPONIN QUANT: CPT

## 2021-12-02 PROCEDURE — 82962 GLUCOSE BLOOD TEST: CPT

## 2021-12-02 PROCEDURE — 74011000250 HC RX REV CODE- 250: Performed by: STUDENT IN AN ORGANIZED HEALTH CARE EDUCATION/TRAINING PROGRAM

## 2021-12-02 PROCEDURE — 80076 HEPATIC FUNCTION PANEL: CPT

## 2021-12-02 PROCEDURE — 80048 BASIC METABOLIC PNL TOTAL CA: CPT

## 2021-12-02 RX ORDER — ENOXAPARIN SODIUM 100 MG/ML
40 INJECTION SUBCUTANEOUS DAILY
Status: DISCONTINUED | OUTPATIENT
Start: 2021-12-02 | End: 2021-12-03

## 2021-12-02 RX ORDER — ACETAMINOPHEN 650 MG/1
650 SUPPOSITORY RECTAL
Status: DISCONTINUED | OUTPATIENT
Start: 2021-12-02 | End: 2021-12-10 | Stop reason: HOSPADM

## 2021-12-02 RX ORDER — ONDANSETRON 4 MG/1
4 TABLET, ORALLY DISINTEGRATING ORAL
Status: DISCONTINUED | OUTPATIENT
Start: 2021-12-02 | End: 2021-12-10 | Stop reason: HOSPADM

## 2021-12-02 RX ORDER — SODIUM CHLORIDE 0.9 % (FLUSH) 0.9 %
5-40 SYRINGE (ML) INJECTION EVERY 8 HOURS
Status: DISCONTINUED | OUTPATIENT
Start: 2021-12-02 | End: 2021-12-10 | Stop reason: HOSPADM

## 2021-12-02 RX ORDER — LANOLIN ALCOHOL/MO/W.PET/CERES
400 CREAM (GRAM) TOPICAL ONCE
Status: COMPLETED | OUTPATIENT
Start: 2021-12-02 | End: 2021-12-02

## 2021-12-02 RX ORDER — POLYETHYLENE GLYCOL 3350 17 G/17G
17 POWDER, FOR SOLUTION ORAL DAILY PRN
Status: DISCONTINUED | OUTPATIENT
Start: 2021-12-02 | End: 2021-12-10 | Stop reason: HOSPADM

## 2021-12-02 RX ORDER — ONDANSETRON 2 MG/ML
4 INJECTION INTRAMUSCULAR; INTRAVENOUS
Status: DISCONTINUED | OUTPATIENT
Start: 2021-12-02 | End: 2021-12-10 | Stop reason: HOSPADM

## 2021-12-02 RX ORDER — ACETAMINOPHEN 325 MG/1
650 TABLET ORAL
Status: DISCONTINUED | OUTPATIENT
Start: 2021-12-02 | End: 2021-12-10 | Stop reason: HOSPADM

## 2021-12-02 RX ORDER — SODIUM CHLORIDE 0.9 % (FLUSH) 0.9 %
5-40 SYRINGE (ML) INJECTION AS NEEDED
Status: DISCONTINUED | OUTPATIENT
Start: 2021-12-02 | End: 2021-12-10 | Stop reason: HOSPADM

## 2021-12-02 RX ADMIN — ATORVASTATIN CALCIUM 80 MG: 40 TABLET, FILM COATED ORAL at 22:25

## 2021-12-02 RX ADMIN — GABAPENTIN 100 MG: 100 CAPSULE ORAL at 17:50

## 2021-12-02 RX ADMIN — Medication 10 ML: at 14:00

## 2021-12-02 RX ADMIN — INSULIN LISPRO 1 UNITS: 100 INJECTION, SOLUTION INTRAVENOUS; SUBCUTANEOUS at 22:25

## 2021-12-02 RX ADMIN — ASPIRIN 81 MG CHEWABLE TABLET 81 MG: 81 TABLET CHEWABLE at 09:02

## 2021-12-02 RX ADMIN — ACETAMINOPHEN 650 MG: 325 TABLET ORAL at 22:24

## 2021-12-02 RX ADMIN — Medication 10 ML: at 22:30

## 2021-12-02 RX ADMIN — POTASSIUM BICARBONATE 40 MEQ: 782 TABLET, EFFERVESCENT ORAL at 12:56

## 2021-12-02 RX ADMIN — GABAPENTIN 100 MG: 100 CAPSULE ORAL at 09:02

## 2021-12-02 RX ADMIN — POTASSIUM BICARBONATE 40 MEQ: 782 TABLET, EFFERVESCENT ORAL at 11:18

## 2021-12-02 RX ADMIN — Medication 10 ML: at 09:55

## 2021-12-02 RX ADMIN — ENOXAPARIN SODIUM 40 MG: 100 INJECTION SUBCUTANEOUS at 11:19

## 2021-12-02 RX ADMIN — CLOPIDOGREL BISULFATE 75 MG: 75 TABLET, FILM COATED ORAL at 09:02

## 2021-12-02 RX ADMIN — AMLODIPINE BESYLATE 10 MG: 5 TABLET ORAL at 09:02

## 2021-12-02 RX ADMIN — AMITRIPTYLINE HYDROCHLORIDE 50 MG: 50 TABLET, FILM COATED ORAL at 22:25

## 2021-12-02 RX ADMIN — BUMETANIDE 1 MG: 0.25 INJECTION INTRAMUSCULAR; INTRAVENOUS at 17:51

## 2021-12-02 RX ADMIN — METOPROLOL TARTRATE 25 MG: 25 TABLET, FILM COATED ORAL at 09:03

## 2021-12-02 RX ADMIN — BUMETANIDE 1 MG: 0.25 INJECTION INTRAMUSCULAR; INTRAVENOUS at 09:03

## 2021-12-02 RX ADMIN — MAGNESIUM OXIDE 400 MG (241.3 MG MAGNESIUM) TABLET 400 MG: TABLET at 11:18

## 2021-12-02 RX ADMIN — METOPROLOL TARTRATE 25 MG: 25 TABLET, FILM COATED ORAL at 17:50

## 2021-12-02 RX ADMIN — ACETAMINOPHEN 650 MG: 325 TABLET ORAL at 09:54

## 2021-12-02 NOTE — PROGRESS NOTES
Problem: Mobility Impaired (Adult and Pediatric)  Goal: *Acute Goals and Plan of Care (Insert Text)  Description: FUNCTIONAL STATUS PRIOR TO ADMISSION: Patient was modified independent using a rollator for short distance ambulation    HOME SUPPORT PRIOR TO ADMISSION: The patient lived with her sister and son with autism but did not require assist.    Physical Therapy Goals  Initiated 12/2/2021  1. Patient will move from supine to sit and sit to supine  in bed with supervision/set-up within 7 day(s). 2.  Patient will transfer from bed to chair and chair to bed with supervision/set-up using the least restrictive device within 7 day(s). 3.  Patient will perform sit to stand with supervision/set-up within 7 day(s). 4.  Patient will ambulate with supervision/set-up for 25 feet with the least restrictive device within 7 day(s). Outcome: Not Met     PHYSICAL THERAPY EVALUATION  Patient: Mandy Rubio (1 y.o. female)  Date: 12/2/2021  Primary Diagnosis: EMMANUEL (acute kidney injury) ([facility] [address] [N17.9]        Precautions:        ASSESSMENT  Based on the objective data described below, the patient presents with decreased activity tolerance and mobility. Patient performed bed mobility with SBA. Patient stood to walker with CGA. Patient performed 3 gait trials of 15 feet, 7 feet, and 8 feet with seated rest break in between each attempt. Patient required cues to take her time with transitions and pace her activity. Patient will benefit from rollator and home health PT at discharge. Current Level of Function Impacting Discharge (mobility/balance): CGA    Other factors to consider for discharge: limited activity tolerance     Patient will benefit from skilled therapy intervention to address the above noted impairments.        PLAN :  Recommendations and Planned Interventions: bed mobility training, transfer training, gait training, therapeutic exercises, neuromuscular re-education, patient and family training/education, and therapeutic activities      Frequency/Duration: Patient will be followed by physical therapy:  3 times a week to address goals. Recommendation for discharge: (in order for the patient to meet his/her long term goals)  Physical therapy at least 2 days/week in the home     This discharge recommendation:  Has been made in collaboration with the attending provider and/or case management    IF patient discharges home will need the following DME: rollator         SUBJECTIVE:   Patient stated My headache finally calmed down some.     OBJECTIVE DATA SUMMARY:   HISTORY:    Past Medical History:   Diagnosis Date    Arthritis     Asthma     CHF (congestive heart failure) (HCC)     Chronic back pain     Diabetes ([facility] [address]     Diabetic retinopathy ([facility] [address]     Hypertension     MRSA (methicillin resistant staph aureus) culture positive     labial abscess    Neuropathy      Past Surgical History:   Procedure Laterality Date    HX HEENT      tonsillectomy    HX ORTHOPAEDIC      left ft bunionectomy    HX OTHER SURGICAL      lanced labial abscess       EXAMINATION/PRESENTATION/DECISION MAKING:   Critical Behavior:  Neurologic State: Alert  Orientation Level: Oriented X4  Cognition: Appropriate decision making     Hearing: Auditory  Auditory Impairment: None    Range Of Motion:  AROM: Generally decreased, functional     PROM: Generally decreased, functional    Strength:    Strength: Generally decreased, functional    Tone & Sensation:   Tone: Normal        Coordination:  Coordination: Within functional limits    Functional Mobility:  Bed Mobility:     Supine to Sit: Stand-by assistance  Sit to Supine: Contact guard assistance  Scooting: Stand-by assistance    Transfers:  Sit to Stand: Contact guard assistance  Stand to Sit: Contact guard assistance    Balance:   Sitting: Intact  Standing: Impaired;  With support  Standing - Static: Constant support; Good  Standing - Dynamic : Constant support; Good; Fair    Ambulation/Gait Training:  Distance (ft): 15 Feet (ft) (15 ft x1, 7 ft x1, 8 ft x1)  Assistive Device: Gait belt; Walker, rolling  Ambulation - Level of Assistance: Contact guard assistance  Gait Description (WDL): Exceptions to WDL  Gait Abnormalities: Decreased step clearance  Base of Support: Widened  Speed/Vangie: Slow  Step Length: Right shortened; Left shortened    Based on the above components, the patient evaluation is determined to be of the following complexity level: LOW     Pain Rating:  No pain    Activity Tolerance:   Fair    After treatment patient left in no apparent distress:   Supine in bed, Call bell within reach, and Side rails x 3    COMMUNICATION/EDUCATION:   The patients plan of care was discussed with: Registered nurse. Fall prevention education was provided and the patient/caregiver indicated understanding., Patient/family have participated as able in goal setting and plan of care. , and Patient/family agree to work toward stated goals and plan of care.     Thank you for this referral.  Jeevan Patricia, PT   Time Calculation: 24 mins

## 2021-12-02 NOTE — ED NOTES
TRANSFER - OUT REPORT:    Verbal report given to [id number] Joi Yeung RN(name) on [address]  being transferred to 2nd floor med surg(unit) for routine progression of care       Report consisted of patients Situation, Background, Assessment and   Recommendations(SBAR). Information from the following report(s) SBAR, Kardex, ED Summary, MAR and Med Rec Status was reviewed with the receiving nurse. Lines:   Peripheral IV 12/01/21 Right Antecubital (Active)   Site Assessment Clean, dry, & intact 12/01/21 1709   Phlebitis Assessment 0 12/01/21 1709   Infiltration Assessment 0 12/01/21 1709   Dressing Status Clean, dry, & intact 12/01/21 1709   Dressing Type Transparent 12/01/21 1709   Hub Color/Line Status Pink 12/01/21 1709        Opportunity for questions and clarification was provided.       Patient transported with:   Monitor  Registered Nurse Ian Bartholomew RN

## 2021-12-02 NOTE — ED NOTES
Verbal shift change report given to [facility] and [facility] (oncoming nurse) by Michell Whatley RN (offgoing nurse). Report included the following information SBAR, Kardex, ED Summary, [facility] and Recent Results.

## 2021-12-02 NOTE — PROGRESS NOTES
Reason for Admission:   HISTORY OF PRESENT ILLNESS:     Kahlil Marie is a 54 y.o.  female with PMH of HTN, DM who presents to ED with c/o generalized headache x 2 weeks. Patient reports generalized headache for the past 2 weeks which acutely worsened over the past 2-3 days with nausea, nonbloody non billous emesis. RUR Score:    20% HIGH       PCP: First and Last name:  Vania Hardwick MD     Name of Practice:   Are you a current patient: Yes/No:Y   Approximate date of last visit: Month agp   Can you do a virtual visit with your PCP: yes             Resources/supports as identified by patient/family:  Patient lives with her sister and her son (son has disability). Top Challenges facing patient (as identified by patient/family and CM): Finances/Medication cost?      Patient has Medicaid              Transportation? Uses Medicaid transport or sister provides transportation              Support system or lack thereof? Living arrangements? Lives with son and sister           Self-care/ADLs/Cognition? Ax0x4          Current Advanced Directive/Advance Care Plan:  Full Code Patient wants CPR and ventilation. Healthcare Decision Maker (s)  Sister- June Karises [address] [facility] [phone]    Healthcare Decision Maker:   Click here to complete [address] including selection of the Healthcare Decision Maker Relationship (ie \"Primary\")      Primary Decision MakerSherfiona Casey Sister - [phone]    Secondary Decision Maker: Jm Forte - Other Relative - [phone]    Payor Source Payor: Radha Koch / Plan: Obed Herron / Product Type: Managed Care Medicaid /                           Plan for utilizing home health: PT has evaluated patient and is recommending PT HH. CM talked to patient about this and patient stated that she had home health a few months ago ([facility]).   [facility] discussed Freedom of Choice. Patient did not state a preference and said that calling [facility] was \"ok\". Patient signed her Freedom of Choice Letter which was put in patient's chart. Transition of Care Plan:     Lives with sister and son. Son is disabled. Pharmacy- [facility] [address]  DME- has bedside commode, tub bench and rollator. Patient stated that her rollator is broken. When ask patient stated that rollator was order through her insurance and she thinks it may have been less that 5 years ago. CM explained that insurance will usually not pay for DME less that 11years old. Patient stated understanding. Plan  1/ Schedule f/u PCP appointment  2/  Arrange for home health PT services    Care Management Interventions  PCP Verified by CM: Yes  Transition of Care Consult (CM Consult): Home Health  Discharge Durable Medical Equipment: Yes (Rollator)  Physical Therapy Consult: Yes  Support Systems: Child(jesus manuel), Other Family Member(s)  The Plan for Transition of Care is Related to the Following Treatment Goals : F/U PCP appointment, [facility] - YOLANDA AVELAR (patient stated rollator is broken)  The Patient and/or Patient Representative was Provided with a Choice of Provider and Agrees with the Discharge Plan?: Yes  Name of the Patient Representative Who was Provided with a Choice of Provider and Agrees with the Discharge Plan: Patient  Freedom of Choice List was Provided with Basic Dialogue that Supports the Patient's Individualized Plan of Care/Goals, Treatment Preferences and Shares the Quality Data Associated with the Providers?: Yes  Discharge Location  Discharge Placement:  (Home)       ERROL  RUR 19%  LOS 1d  ELOS 2d Discharge expected tomorrow. 1000  IDT met to discuss plan of care. Needs weight today/daily weights. PT/OT evaluations. Will need f/u PCP, Cardiology and appointment for a sleep study.     Vaughn Browne, TYE/CM  [phone]

## 2021-12-02 NOTE — PROGRESS NOTES
[facility] Dosing Services  Automatic adjustment of enoxaparin  Dr. Kristopher Dewitt  Indication: VTE prophylaxis    Wt Readings from Last 1 Encounters:   12/01/21 128.8 kg (284 lb)       Ht Readings from Last 1 Encounters:   12/01/21 165.1 cm (65\")         Pharmacist made change to enoxaparin therapy based on BMI > 40 and CrCl < 30 mL/min  Order changed to enoxaparin 40 mg SUBCUT every 24 hours    Previous Dose Enoxaparin 30 mg SUBCUT every 24 hours   Creatinine Clearance Estimated Creatinine Clearance: 26.9 mL/min (A) (based on SCr of 3.16 mg/dL (H)). Creatinine Lab Results   Component Value Date/Time    Creatinine 3.16 (H) 12/02/2021 04:52 AM    Creatinine (POC) 0.6 04/20/2013 06:08 PM       Platelet Lab Results   Component Value Date/Time    PLATELET 205 (L) 54/12/4873 04:52 AM      H/H Lab Results   Component Value Date/Time    HGB 7.6 (L) 12/02/2021 04:52 AM        Epidural Catheter? No  Other anticoagulants: None  Relevant drug interactions: None     Pharmacy to automatically make dose adjustment for renal dysfunction (creatinine clearance less than 30 mL/min)  Pharmacy to automatically make dose adjustment for obesity (BMI greater than 40) or low weight (heparin for wt < 60 kg)  Pharmacy to make dose rounding adjustments per [facility] dose adjustment scale. Pharmacy will monitor patients progress, make dose adjustment as needed per changing renal function, and communicate further recommendations regarding patients anticoagulation therapy with prescriber.     Thank you,  Karlo Mathias, PharmD, BCPS  [phone]

## 2021-12-02 NOTE — H&P
Hospitalist Admission Note    NAME: Mandy Rubio   :  1965   MRN:  [id number]   Room Number: ER10/10  @ [facility]     Date/Time:  2021 7:28 PM    Patient PCP: Ray Pruitt MD  ______________________________________________________________________  Given the patient's current clinical presentation, I have a high level of concern for decompensation if discharged from the emergency department. Complex decision making was performed, which includes reviewing the patient's available past medical records, laboratory results, and x-ray films. My assessment of this patient's clinical condition and my plan of care is as follows. Assessment / Plan: Active Problems:    EMMANUEL (acute kidney injury) ([address] (2021)          EMMANUEL on CKD 3B POA   Suspect secondary to cardiorenal syndrome type I. Clinically volume overloaded. CT A/P reviewed independently - shows no nephrolithiasis or hydronephrosis. Baseline 2.3-2.7.     - Strict Is and Os, Avoid nephrotoxic meds, renally dose medications. - Continue diuresis.       Acute on chronic diastolic heart failure POA  BNP elevated. EKG reviewed independently- normal sinus rhythm. ECHO 2021 revealed EF 60-65 %, normal cavity size and systolic function.     -Bumetanide 1 mg IV BID  -Fluid restriction  -Low-sodium diet  -Sleep study as outpatient     Hypertensive emergency POA    mm Hg. CHF exacerbation. Evidence of EMMANUEL.    Administered 20 mg IV Labetalol in ER.     - Amlodipine 10 mg daily  - Metoprolol 25 mg BID  - Bumetanide 1 mg IV BID     Insulin-dependent diabetes mellitus POA  Lab Results   Component Value Date/Time    Hemoglobin A1c 8.4 (H) 2021 12:32 AM    Hemoglobin A1c (POC) 10.2 2015 11:01 AM   Home regimen include checked immunoglobulin,  canagliflozin 100 mg daily,  Lantus 20 units and aspart    - Lispro correctional scale, FSG AC HS  - Consistent carb diet, hypoglycemia protocol.        Diabetic neuropathy  -On gabapentin 900 mg 3 times daily daily at home  -Resume renally dose        History of stroke  -On aspirin, Plavix and atorvastatin at home      History of chronic back pain  -On tramadol at home as needed     Morbid obesity  -BMI 47.26  - Counseled on Lifestyle modifications, AHA Diet ,weight loss strategies.            Body mass index is 47.26 kg/m². Code Status: full   Surrogate Decision Maker:    DVT Prophylaxis: Lovenox  GI Prophylaxis: not indicated  Baseline: ambulatory independently         Subjective:   CHIEF COMPLAINT: headache    HISTORY OF PRESENT ILLNESS:     Glen Sosa is a 54 y.o.  female with PMH of HTN, DM who presents to ED with c/o generalized headache x 2 weeks. Patient reports generalized headache for the past 2 weeks which acutely worsened over the past 2-3 days with nausea, nonbloody non billous emesis. She reports compliance with medications. Reports worsening lower extremity edema and elevated blood pressure readings at home. She has been elevating legs by laying down to reduce sweling. Patient was evaluated at [facility] on 11/27, symptoms improved wth BP control and she was discharged with outpatient follow up. We were asked to admit for work up and evaluation of the above problems. Past Medical History:   Diagnosis Date    Arthritis     Asthma     CHF (congestive heart failure) (HCC)     Chronic back pain     Diabetes ([address]     [facility] retin[facility]     Hypertension     MRSA (methicillin resistant staph aureus) culture positive     labial abscess    Neuropathy         Past Surgical History:   Procedure Laterality Date    HX HEENT      tonsillectomy    HX ORTHOPAEDIC      left ft bunionectomy    HX OTHER SURGICAL      lanced labial abscess       Social History     Tobacco Use    Smoking status: Never Smoker    Smokeless tobacco: Never Used   Substance Use Topics    Alcohol use: No        History reviewed.  No pertinent family history. Allergies   Allergen Reactions    Amoxicillin Nausea Only    Aspirin Other (comments)     \"nosebleeds\" but patient takes daily aspirin 81 mg at home. Patient states naproxen ok    Ciprofloxacin Hives        Prior to Admission medications    Medication Sig Start Date End Date Taking? Authorizing Provider   oxyCODONE-acetaminophen (Percocet) 5-325 mg per tablet Take 1 Tablet by mouth every eight (8) hours as needed for Pain for up to 5 days. Max Daily Amount: 3 Tablets. Indications: pain 11/27/21 12/2/21 Yes Deb Coelho MD   insulin lispro (HUMALOG) 100 unit/mL injection INITIATE CORRECTIVE INSULIN PROTOCOL (YANA):  High Sensitivity (thin, ESRD):    AC (before meals), Q6H, and Q4H CORRECTIONAL SCALE only For Blood Sugar (mg/dl) of :             140-199=0 units            200-249=2 units  250-299=3 units  300-349=4 units  350 or greater = Call MD  Give in addition to basal medications. Do Not Hold for NPO    BEDTIME CORRECTIONAL sliding scale when scheduled:  200-249=1 units  250-349=2 units  350 or greater = Call MD  Give in addition to basal medications. Do Not Hold for NPO Fast Acting - Administer Immediately - or within 15 minutes of start of meal, if mealtime coverage. 8/28/21  Yes Nikolay Watkins MD   polyethylene glycol (Miralax) 17 gram packet Take 17 g by mouth daily as needed for Constipation. Yes Provider, Historical   glipiZIDE (GLUCOTROL) 5 mg tablet Take 5 mg by mouth daily. Take One Tablet By Mouth Daily   Yes Provider, Historical   magnesium oxide (MAG-OX) 400 mg tablet Take 400 mg by mouth daily. Yes Provider, Historical   canagliflozin (INVOKANA) 100 mg tablet Take 100 mg by mouth daily. Take one tablet by mouth daily   Yes Provider, Historical   metoprolol tartrate (LOPRESSOR) 25 mg tablet Take 0.5 Tablets by mouth two (2) times a day. 8/12/21  Yes Ty Thurman MD   gabapentin (NEURONTIN) 300 mg capsule Take 3 Capsules by mouth two (2) times a day. Max Daily Amount: 1,800 mg. 8/12/21  Yes Ki Umanzor MD   nystatin (MYCOSTATIN) powder Apply  to affected area two (2) times a day. 8/12/21  Yes Ki Umanzor MD   amLODIPine (NORVASC) 10 mg tablet Take 10 mg by mouth daily. Yes Provider, Historical   Flovent  mcg/actuation inhaler Take 2 Puffs by inhalation two (2) times a day. 8/2/21  Yes Provider, Historical   albuterol (PROVENTIL HFA, VENTOLIN HFA, PROAIR HFA) 90 mcg/actuation inhaler Take 2 Puffs by inhalation every six (6) hours as needed for Wheezing. Yes Provider, Historical   Lantus Solostar U-100 Insulin 100 unit/mL (3 mL) inpn 20 Units by SubCUTAneous route nightly. 6/8/21  Yes Provider, Historical   fluticasone propionate (Flonase Allergy Relief) 50 mcg/actuation nasal spray 2 SPRAY, Each Nostril, daily, for allergy symptoms, 11 Refills, Dispense: 1, bottle, Pharmacy [facility] [phone] 10/24/20  Yes Provider, Historical   aspirin delayed-release 81 mg tablet Take 81 mg by mouth daily. Yes Provider, Historical   amitriptyline (ELAVIL) 50 mg tablet Take 50 mg by mouth nightly. Yes Provider, Historical   docusate sodium (Colace) 100 mg capsule Take 100 mg by mouth two (2) times daily as needed. Yes Provider, Historical   atorvastatin (LIPITOR) 80 mg tablet Take 1 Tab by mouth nightly. 12/22/18  Yes Sana López DO   clopidogrel (PLAVIX) 75 mg tab Take 1 Tab by mouth daily. 12/23/18  Yes Emilia DEWITT DO       REVIEW OF SYSTEMS:     I am not able to complete the review of systems because:    The patient is intubated and sedated    The patient has altered mental status due to his acute medical problems    The patient has baseline aphasia from prior stroke(s)    The patient has baseline dementia and is not reliable historian    The patient is in acute medical distress and unable to provide information           Total of 12 systems reviewed as follows:       POSITIVE= underlined text  Negative = text not underlined  General:  fever, chills, sweats, generalized weakness, weight loss/gain,      loss of appetite   Eyes:    blurred vision, eye pain, loss of vision, double vision  ENT:    rhinorrhea, pharyngitis   Respiratory:   cough, sputum production, SOB, PENA, wheezing, pleuritic pain   Cardiology:   chest pain, palpitations, orthopnea, PND, edema, syncope   Gastrointestinal:  abdominal pain , N/V, diarrhea, dysphagia, constipation, bleeding   Genitourinary:  frequency, urgency, dysuria, hematuria, incontinence   Muskuloskeletal :  arthralgia, myalgia, back pain  Hematology:  easy bruising, nose or gum bleeding, lymphadenopathy   Dermatological: rash, ulceration, pruritis, color change / jaundice  Endocrine:   hot flashes or polydipsia   Neurological:  headache, dizziness, confusion, focal weakness, paresthesia,     Speech difficulties, memory loss, gait difficulty  Psychological: Feelings of anxiety, depression, agitation    Objective:   VITALS:    Visit Vitals  BP (!) 168/64   Pulse 77   Temp 98.1 °F (36.7 °C)   Resp 20   Ht 5' 5\" (1.651 m)   Wt 128.8 kg (284 lb)   SpO2 95%   BMI 47.26 kg/m²     General:    Alert, cooperative, no distress, appears stated age. HEENT: Atraumatic, anicteric sclerae, pink conjunctivae     No oral ulcers, mucosa moist, throat clear, dentition fair  Neck:  Supple, symmetrical,  thyroid: non tender  Lungs:   Clear to auscultation bilaterally. No Wheezing or Rhonchi. No rales. Chest wall:  No tenderness  No Accessory muscle use. Heart:   Regular  rhythm,  No  murmur   2+ edema  Abdomen:   Soft, non-tender. Not distended. Bowel sounds normal  Extremities: No cyanosis. No clubbing,      Skin turgor normal, Capillary refill normal, Radial dial pulse 2+  Skin:     Not pale. Not Jaundiced  No rashes   Psych:  Good insight. Not depressed. Not anxious or agitated. Neurologic: EOMs intact. No facial asymmetry. No aphasia or slurred speech. Symmetrical strength, Sensation grossly intact. Alert and oriented X 4. _________  ________________________________________________________    Care Plan discussed with:  Patient/Family    Expected  Disposition:  Home w/Family  ________________________________________________________________________  TOTAL TIME:  35 Minutes    Critical Care Provided     Minutes non procedure based      Comments    x Reviewed previous records   >50% of visit spent in counseling and coordination of care x Discussion with patient and/or family and questions answered       ________________________________________________________________________  Signed: Shaniqua Yuen MD    Procedures: see electronic medical records for all procedures/Xrays and details which were not copied into this note but were reviewed prior to creation of Plan. LAB DATA REVIEWED:    Recent Results (from the past 24 hour(s))   CBC WITH AUTOMATED DIFF    Collection Time: 12/01/21  5:06 PM   Result Value Ref Range    WBC 7.8 3.6 - 11.0 K/uL    RBC 2.93 (L) 3.80 - 5.20 M/uL    HGB 8.3 (L) 11.5 - 16.0 g/dL    HCT 23.8 (L) 35.0 - 47.0 %    MCV 81.2 80.0 - 99.0 FL    MCH 28.3 26.0 - 34.0 PG    MCHC 34.9 30.0 - 36.5 g/dL    RDW 15.8 (H) 11.5 - 14.5 %    PLATELET 898 (L) 679 - 400 K/uL    MPV 9.1 8.9 - 12.9 FL    NRBC 0.0 0  WBC    ABSOLUTE NRBC 0.00 0.00 - 0.01 K/uL    NEUTROPHILS 74 32 - 75 %    LYMPHOCYTES 17 12 - 49 %    MONOCYTES 6 5 - 13 %    EOSINOPHILS 2 0 - 7 %    BASOPHILS 0 0 - 1 %    IMMATURE GRANULOCYTES 1 (H) 0.0 - 0.5 %    ABS. NEUTROPHILS 5.7 1.8 - 8.0 K/UL    ABS. LYMPHOCYTES 1.3 0.8 - 3.5 K/UL    ABS. MONOCYTES 0.5 0.0 - 1.0 K/UL    ABS. EOSINOPHILS 0.2 0.0 - 0.4 K/UL    ABS. BASOPHILS 0.0 0.0 - 0.1 K/UL    ABS. IMM.  GRANS. 0.1 (H) 0.00 - 0.04 K/UL    DF AUTOMATED     METABOLIC PANEL, COMPREHENSIVE    Collection Time: 12/01/21  5:06 PM   Result Value Ref Range    Sodium 139 136 - 145 mmol/L    Potassium 3.2 (L) 3.5 - 5.1 mmol/L    Chloride 107 97 - 108 mmol/L    CO2 24 21 - 32 mmol/L    Anion gap 8 5 - 15 mmol/L Glucose 127 (H) 65 - 100 mg/dL    BUN 22 (H) 6 - 20 MG/DL    Creatinine 3.08 (H) 0.55 - 1.02 MG/DL    BUN/Creatinine ratio 7 (L) 12 - 20      GFR est AA 19 (L) >60 ml/min/1.73m2    GFR est non-AA 16 (L) >60 ml/min/1.73m2    Calcium 8.2 (L) 8.5 - 10.1 MG/DL    Bilirubin, total 0.5 0.2 - 1.0 MG/DL    ALT (SGPT) 15 12 - 78 U/L    AST (SGOT) 21 15 - 37 U/L    Alk.  phosphatase 104 45 - 117 U/L    Protein, total 6.4 6.4 - 8.2 g/dL    Albumin 2.4 (L) 3.5 - 5.0 g/dL    Globulin 4.0 2.0 - 4.0 g/dL    A-G Ratio 0.6 (L) 1.1 - 2.2     TROPONIN-HIGH SENSITIVITY    Collection Time: 12/01/21  5:06 PM   Result Value Ref Range    Troponin-High Sensitivity 22 0 - 51 ng/L   LIPASE    Collection Time: 12/01/21  5:06 PM   Result Value Ref Range    Lipase 338 73 - 393 U/L   NT-PRO BNP    Collection Time: 12/01/21  5:06 PM   Result Value Ref Range    NT pro-BNP 7,195 (H) 0 - 125 PG/ML   EKG, 12 LEAD, INITIAL    Collection Time: 12/01/21  5:29 PM   Result Value Ref Range    Ventricular Rate 78 BPM    Atrial Rate 78 BPM    P-R Interval 200 ms    QRS Duration 80 ms    Q-T Interval 418 ms    QTC Calculation (Bezet) 476 ms    Calculated P Axis 25 degrees    Calculated R Axis 11 degrees    Calculated T Axis 40 degrees    Diagnosis       Normal sinus rhythm  Cannot rule out Anteroseptal infarct (cited on or before 27-NOV-2021)  Abnormal ECG  When compared with ECG of 27-NOV-2021 19:27,  T wave inversion no longer evident in Inferior leads  Nonspecific T wave abnormality no longer evident in Anterolateral leads

## 2021-12-02 NOTE — PROGRESS NOTES
[facility] Admission Pharmacy Medication Reconciliation     Information obtained from: Patient   RxQuery data available1:Yes     Comments/recommendations:  Medication reconciliation performed with patient via telephone. Patient was a fair historian and confirms insulin regimen as the following:     Insulin Glargine- 20 units nightly   Insulin Aspart- 10 units tid before meals and correctional    Medication changes (since last review): Added  Torsemide  Tramadol   Insulin Aspart  Removed  Miralax  Adjusted  Gabapentin- adjusted to tid   Metoprolol- adjusted to 1 tablet bid  Nystatin- adjusted to prn     The [facility] () was accessed to determine fill history of any controlled medications. Last filled:2021  Tramadol Hcl 50 Mg Tablet Qty 90; 30 DS    Last filled: 2021 Oxycodone-Acetaminophen 5-325 Qty 10; 5 DS     Last filled: 2021 Gabapentin 300 Mg Capsule Qty 270; 30 DS    1RxQuery pharmacy benefit data reflects medications filled and processed through the patient's insurance, however                this data does NOT capture whether the medication was picked up or is currently being taken by the patient. Patient allergies: Allergies as of 2021 - Fully Reviewed 2021   Allergen Reaction Noted    Amoxicillin Nausea Only 2011    Aspirin Other (comments) 2011    Ciprofloxacin Hives 2013     Prior to Admission Medications   Prescriptions Last Dose Informant Patient Reported? Taking? Flovent  mcg/actuation inhaler 2021 Transfer Papers Yes Yes   Sig: Take 2 Puffs by inhalation two (2) times a day. Lantus Solostar U-100 Insulin 100 unit/mL (3 mL) inpn 2021 at Unknown time Transfer Papers Yes Yes   Si Units by SubCUTAneous route nightly.    albuterol (PROVENTIL HFA, VENTOLIN HFA, PROAIR HFA) 90 mcg/actuation inhaler  Transfer Papers Yes Yes   Sig: Take 2 Puffs by inhalation every six (6) hours as needed for Wheezing. amLODIPine (NORVASC) 10 mg tablet 2021 at Unknown time Transfer Papers Yes Yes   Sig: Take 10 mg by mouth daily. amitriptyline (ELAVIL) 50 mg tablet 2021 at Unknown time Transfer Papers Yes Yes   Sig: Take 50 mg by mouth nightly. aspirin delayed-release 81 mg tablet 2021 at Unknown time Transfer Papers Yes Yes   Sig: Take 81 mg by mouth daily. atorvastatin (LIPITOR) 80 mg tablet 2021 at Unknown time Transfer Papers No Yes   Sig: Take 1 Tab by mouth nightly. canagliflozin (INVOKANA) 100 mg tablet 2021 at Unknown time Transfer Papers Yes Yes   Sig: Take 100 mg by mouth daily. Take one tablet by mouth daily   clopidogrel (PLAVIX) 75 mg tab 2021 at Unknown time Transfer Papers No Yes   Sig: Take 1 Tab by mouth daily. docusate sodium (Colace) 100 mg capsule 2021 at Unknown time Transfer Papers Yes Yes   Sig: Take 100 mg by mouth two (2) times daily as needed. fluticasone propionate (Flonase Allergy Relief) 50 mcg/actuation nasal spray 2021 at Unknown time Transfer Papers Yes Yes   Si SPRAY, Each Nostril, daily, for allergy symptoms, 11 Refills, Dispense: 1, bottle, [facility]   gabapentin (NEURONTIN) 300 mg capsule 2021 at Unknown time  Yes Yes   Sig: Take 900 mg by mouth three (3) times daily. glipiZIDE (GLUCOTROL) 5 mg tablet 2021 at Unknown time Transfer Papers Yes Yes   Sig: Take 5 mg by mouth daily. Take One Tablet By Mouth Daily   insulin aspart U-100 (NOVOLOG) 100 unit/mL (3 mL) inpn   Yes Yes   Sig: by SubCUTAneous route Before breakfast, lunch, and dinner. Sliding scale   insulin aspart U-100 (NovoLOG Flexpen U-100 Insulin) 100 unit/mL (3 mL) inpn   Yes Yes   Sig: 10 Units by SubCUTAneous route Before breakfast, lunch, and dinner. magnesium oxide (MAG-OX) 400 mg tablet 2021 at Unknown time Transfer Papers Yes Yes   Sig: Take 400 mg by mouth daily.    metoprolol tartrate (LOPRESSOR) 25 mg tablet Yes Yes   Sig: Take 25 mg by mouth two (2) times a day. nystatin (MYCOSTATIN) powder   Yes Yes   Sig: Apply  to affected area two (2) times daily as needed. torsemide (DEMADEX) 20 mg tablet 11/30/2021 at Unknown time  Yes Yes   Sig: Take 20 mg by mouth two (2) times a day. traMADoL (ULTRAM) 50 mg tablet 12/1/2021 at Unknown time  Yes Yes   Sig: Take 50 mg by mouth three (3) times daily as needed for Pain.       Facility-Administered Medications: None       Thank you,     Tabitha Velázquez, PharmD   Contact: [phone]

## 2021-12-02 NOTE — CONSULTS
[facility] [address], [address]  Office Phone [phone]      CARDIOLOGY CONSULTATION       Date of  Admission: 12/1/2021  4:41 PM     Admission type:Emergency   Primary Care Shawn Lockhart MD     Attending Provider: Emilee Hogue MD  Cardiology Provider: Rj Shah DNP, ANP-BC    CC/REASON FOR CONSULT: Acute on chronic diastolic heart failure     Subjective:     Micheline Lacey is a 64 y.o. female with a history of diastolic heart failure, CKD stage IV, diabetes, hypertension, history of CVA/TIA, admitted for EMMANUEL (acute kidney injury) ([facility] [N17.9]. patient reports prior to admission she had noted significant elevations in her blood pressure with associated frontal headache. Home blood pressure reading at one point 250/120mm/hg range. She reports compliance with antihypertensive therapy. Denies excessive sodium intake or excessive fluid intake. Prior to admission she had reported mild orthopnea, denies exertional chest pain.     Patient Active Problem List    Diagnosis Date Noted    CHF (congestive heart failure) ([facility] 08/25/2021    EMMANUEL (acute kidney injury) ([facility] 08/25/2021    Acute exacerbation of CHF (congestive heart failure) ([address] 08/16/2020    TIA (transient ischemic attack) 12/20/2018    Vitamin D deficiency 09/08/2015    Atopic rhinitis 09/02/2015    Disorder of liver 09/02/2015    Lymphoma ([address] 09/02/2015    Lymphadenopathy 09/02/2015    Morbid obesity ([facility] 09/02/2015    Hypercholesteremia 06/25/2015    Episodic tension-type headache, not intractable 06/25/2015    Type 2 diabetes mellitus with peripheral neuropathy ([address] 06/25/2015    Essential hypertension 06/25/2015    DDD (degenerative disc disease), lumbar 06/25/2015    Cervical spondylosis 06/25/2015    Morbid obesity with BMI of 45.0-49.9, adult ([address] 06/25/2015      Reena Cazares MD  Past Medical History: Diagnosis Date    Arthritis     Asthma     CHF (congestive heart failure) (HCC)     Chronic back pain     Diabetes (HCC)     Diabetic retinopathy (HCC)     Hypertension     MRSA (methicillin resistant staph aureus) culture positive     labial abscess    Neuropathy       Social History     Socioeconomic History    Marital status:    Tobacco Use    Smoking status: Never Smoker    Smokeless tobacco: Never Used   Substance and Sexual Activity    Alcohol use: No    Drug use: No     Allergies   Allergen Reactions    Amoxicillin Nausea Only    Aspirin Other (comments)     \"nosebleeds\" but patient takes daily aspirin 81 mg at home. Patient states naproxen ok    Ciprofloxacin Hives      History reviewed. No pertinent family history.    Current Facility-Administered Medications   Medication Dose Route Frequency    sodium chloride (NS) flush 5-40 mL  5-40 mL IntraVENous Q8H    sodium chloride (NS) flush 5-40 mL  5-40 mL IntraVENous PRN    acetaminophen (TYLENOL) tablet 650 mg  650 mg Oral Q6H PRN    Or    acetaminophen (TYLENOL) suppository 650 mg  650 mg Rectal Q6H PRN    polyethylene glycol (MIRALAX) packet 17 g  17 g Oral DAILY PRN    ondansetron (ZOFRAN ODT) tablet 4 mg  4 mg Oral Q8H PRN    Or    ondansetron (ZOFRAN) injection 4 mg  4 mg IntraVENous Q6H PRN    enoxaparin (LOVENOX) injection 30 mg  30 mg SubCUTAneous DAILY    potassium bicarb-citric acid (EFFER-K) tablet 40 mEq  40 mEq Oral Q2H    magnesium oxide (MAG-OX) tablet 400 mg  400 mg Oral ONCE    amLODIPine (NORVASC) tablet 10 mg  10 mg Oral DAILY    amitriptyline (ELAVIL) tablet 50 mg  50 mg Oral QHS    aspirin chewable tablet 81 mg  81 mg Oral DAILY    atorvastatin (LIPITOR) tablet 80 mg  80 mg Oral QHS    clopidogreL (PLAVIX) tablet 75 mg  75 mg Oral DAILY    gabapentin (NEURONTIN) capsule 100 mg  100 mg Oral BID    metoprolol tartrate (LOPRESSOR) tablet 25 mg  25 mg Oral BID    bumetanide (BUMEX) injection 1 mg  1 mg IntraVENous BID    glucose chewable tablet 16 g  4 Tablet Oral PRN    dextrose (D50W) injection syrg 12.5-25 g  25-50 mL IntraVENous PRN    glucagon (GLUCAGEN) injection 1 mg  1 mg IntraMUSCular PRN    insulin lispro (HUMALOG) injection   SubCUTAneous AC&HS        Review of Symptoms:   11 systems reviewed, negative other than as stated in the HPI        Objective:      Visit Vitals  BP (!) 153/60   Pulse 73   Temp (!) 96.1 °F (35.6 °C)   Resp 16   Ht 5' 5\" (1.651 m)   Wt 284 lb (128.8 kg)   SpO2 97%   BMI 47.26 kg/m²       Physical Exam     General: Well developed, elevated BMI in no acute distress, cooperative and alert  HEENT: No carotid bruits, no JVD, trach is midline. Neck Supple, PERRL, EOM intact. Heart:  Normal S1/S2 negative S3 or S4. Regular, no murmur, gallop or rub. Respiratory: Diminished at the bases, no crackles  Abdomen:   Soft, non-tender, no masses, bowel sounds are active. Extremities: 1 pitting ankle edema, normal cap refill, no cyanosis, atraumatic. Neuro: A&Ox3, speech clear, gait stable. Skin: Skin color is normal. No rashes or lesions. Non diaphoretic  Vascular: 2+ pulses symmetric in all extremities    Data Review:   Recent Labs     12/02/21  0452 12/01/21  1706   WBC 7.2 7.8   HGB 7.6* 8.3*   HCT 22.7* 23.8*   * 103*     Recent Labs     12/02/21  0452 12/02/21  0402 12/01/21  1706     --  139   K 3.2*  --  3.2*   *  --  107   CO2 25  --  24   *  --  127*   BUN 23*  --  22*   CREA 3.16*  --  3.08*   CA 7.9*  --  8.2*   MG 1.8  --   --    PHOS 5.2*  --   --    ALB  --  2.0* 2.4*   TBILI  --  0.2 0.5   ALT  --  14 15       No results for input(s): TROIQ, CPK, CKMB in the last 72 hours. No intake or output data in the 24 hours ending 12/02/21 1027     Cardiographics    Telemetry:   ECG:   Echocardiogram:   8/2021 echo  Echo Findings    Left Ventricle Normal cavity size, systolic function (ejection fraction normal) and diastolic function. Mild concentric hypertrophy.   Wall motion: normal. The estimated EF is 60 - 65%. Left Atrium Normal cavity size. Right Ventricle Normal cavity size and global systolic function. Right Atrium Normal cavity size. Aortic Valve Normal valve structure, no stenosis and no regurgitation. Mitral Valve Normal valve structure, no stenosis and no regurgitation. Tricuspid Valve Normal valve structure and no stenosis. Trace regurgitation. Pulmonic Valve Normal valve structure, no stenosis and no regurgitation. Aorta Normal aortic root. Pulmonary Artery Pulmonary hypertension not suggested by Doppler findings. Pericardium No evidence of pericardial effusion         CXRAY: No acute process       Assessment:       Active Problems:    EMMANUEL (acute kidney injury) ([facility] (5/51/7691)    Diastolic heart failure     Plan:     1. Diastolic heart failure: Admitting proBNP 7195, CKD stage IV GFR 18. Avoid nephrotoxic medications requires diuresis to maintain euvolemic state. Transition from IV to p.o. at hospitalist discretion. 2.  Hypertension: Trending normotensive, consider carvedilol or labetalol in place of metoprolol for hypertension management. 3.  CKD stage IV: Cardiorenal syndrome type I,    No further recommendations from cardiology at this time, follow-up outpatient within 1 to 2 weeks of hospital discharge. Carlos Chaudhry DNP, ANP-BC  Chart and note reviewed, discussed with advanced practioner and agree with recommendations and treatment plan as outlined.   MD Vesna Cohen MD

## 2021-12-02 NOTE — ED NOTES
Pt assisted to bedside commode by rosamaria Brody. Pt voided large amt.  Pt put back to bed and tele,  reconnected

## 2021-12-02 NOTE — ACP (ADVANCE CARE PLANNING)
Current Advanced Directive/Advance Care Plan:  Full Code Patient wants CPR and ventilation.   Healthcare Decision Maker (s)  Sister- Elena Weir [address] Minor [phone]

## 2021-12-02 NOTE — PROGRESS NOTES
Hospitalist Progress Note    NAME: Micheline Lacey   :  1965   MRN:  [id number]   Room Number:    @ [facility]       Interim Hospital Summary: 64 y.o. female whom presented on 2021 with      Assessment / Plan:         EMMANUEL on CKD 3B POA   Suspect secondary to cardiorenal syndrome type I. Clinically volume overloaded. CT A/P reviewed independently - shows no nephrolithiasis or hydronephrosis. Baseline 2.3-2.7.      - Strict Is and Os, Avoid nephrotoxic meds, renally dose medications. - Continue diuresis.       Acute on chronic diastolic heart failure POA  BNP elevated. EKG reviewed independently- normal sinus rhythm. ECHO 2021 revealed EF 60-65 %, normal cavity size and systolic function.      -Bumetanide 1 mg IV BID  -Fluid restriction  -Low-sodium diet  -Sleep study as outpatient     Hypertensive emergency POA    mm Hg. CHF exacerbation. Evidence of EMMANUEL. Administered 20 mg IV Labetalol in ER.      - Amlodipine 10 mg daily  - Metoprolol 25 mg BID  - Bumetanide 1 mg IV BID     Insulin-dependent diabetes mellitus POA  Lab Results  Component Value Date/Time    Hemoglobin A1c 8.4 (H) 2021 12:32 AM    Hemoglobin A1c (POC) 10.2 2015 11:01 AM  Home regimen include checked immunoglobulin,  canagliflozin 100 mg daily,  Lantus 20 units and aspart     - Lispro correctional scale, FSG AC HS  - Consistent carb diet, hypoglycemia protocol.         Diabetic neuropathy  -On gabapentin 900 mg 3 times daily daily at home  -Resume renally dose         History of stroke  -On aspirin, Plavix and atorvastatin at home      History of chronic back pain  -On tramadol at home as needed     Morbid obesity  -BMI 47.26  - Counseled on Lifestyle modifications, AHA Diet ,weight loss strategies.             Body mass index is 47.26 kg/m².   Code Status: full   Surrogate Decision Maker:     DVT Prophylaxis: Lovenox  GI Prophylaxis: not indicated  Baseline: ambulatory independently Subjective:     Chief Complaint / Reason for Physician Visit  \"still have a headache\". Discussed with RN events overnight. Review of Systems:  No fevers, chills, appetite change, cough, sputum production, shortness of breath, dyspnea on exertion, nausea, vomitting, diarrhea, constipation, chest pain, leg edema, abdominal pain, joint pain, rash, itching. Tolerating PT/OT. Tolerating diet. Objective:     VITALS:   Last 24hrs VS reviewed since prior progress note. Most recent are:  Patient Vitals for the past 24 hrs:   Temp Pulse Resp BP SpO2   12/02/21 0834 (!) 96.1 °F (35.6 °C) 73 16 (!) 153/60 97 %   12/02/21 0630  62  (!) 128/56    12/02/21 0600    (!) 131/55    12/02/21 0430 98 °F (36.7 °C) 64 16 (!) 132/47 97 %   12/02/21 0100 98 °F (36.7 °C) 65 18 (!) 130/55 96 %   12/01/21 2330  69  (!) 143/56 95 %   12/01/21 2148 97.7 °F (36.5 °C) 67 12 (!) 158/55 95 %   12/01/21 2028  76  (!) 154/90    12/01/21 2026  75  (!) 154/90    12/01/21 2023  77  (!) 154/90    12/01/21 2000     95 %   12/01/21 1826    (!) 168/64 95 %   12/01/21 1800  77 20 (!) 157/59 95 %   12/01/21 1729  78 18 (!) 188/74 99 %   12/01/21 1726    (!) 188/74 97 %   12/01/21 1654    (!) 147/47 99 %   12/01/21 1648 98.1 °F (36.7 °C) 78 18 (!) 189/72 99 %   12/01/21 1641    (!) 189/72      No intake or output data in the 24 hours ending 12/02/21 1124     PHYSICAL EXAM:  General: WD, WN. Alert, cooperative, no acute distress    EENT:  EOMI. Anicteric sclerae. MMM  Resp:  CTA bilaterally, no wheezing or rales. No accessory muscle use  CV:  Regular  rhythm,  normal S1/S2, no murmurs rubs gallops,2+edema  GI:  Soft, Non distended, Non tender. +Bowel sounds  Neurologic:  Alert and oriented X 3, normal speech,   Psych:   Good insight. Not anxious nor agitated  Skin:  No rashes.   No jaundice    Reviewed most current lab test results and cultures  YES  Reviewed most current radiology test results YES  Review and summation of old records today    NO  Reviewed patient's current orders and MAR    YES  PMH/SH reviewed - no change compared to H&P  ________________________________________________________________________  Care Plan discussed with:    Comments   Patient x    Family      RN x    Care Manager x    Consultant  x                     x Multidiciplinary team rounds were held today with , nursing, pharmacist and clinical coordinator. Patient's plan of care was discussed; medications were reviewed and discharge planning was addressed. ________________________________________________________________________  Total NON critical care TIME:  25  Minutes    Total CRITICAL CARE TIME Spent:   Minutes non procedure based      Comments   >50% of visit spent in counseling and coordination of care     ________________________________________________________________________  Terrance Mirza MD     Procedures: see electronic medical records for all procedures/Xrays and details which were not copied into this note but were reviewed prior to creation of Plan. LABS:  I reviewed today's most current labs and imaging studies.   Pertinent labs include:  Recent Labs     12/02/21  0452 12/01/21  1706   WBC 7.2 7.8   HGB 7.6* 8.3*   HCT 22.7* 23.8*   * 103*     Recent Labs     12/02/21 0452 12/02/21  0402 12/01/21  1706     --  139   K 3.2*  --  3.2*   *  --  107   CO2 25  --  24   *  --  127*   BUN 23*  --  22*   CREA 3.16*  --  3.08*   CA 7.9*  --  8.2*   MG 1.8  --   --    PHOS 5.2*  --   --    ALB  --  2.0* 2.4*   TBILI  --  0.2 0.5   ALT  --  14 15       Signed: Terrance Mirza MD

## 2021-12-03 LAB
ABO + RH BLD: NORMAL
ANION GAP SERPL CALC-SCNC: 6 MMOL/L (ref 5–15)
BASOPHILS # BLD: 0 K/UL (ref 0–0.1)
BASOPHILS NFR BLD: 0 % (ref 0–1)
BLOOD GROUP ANTIBODIES SERPL: NORMAL
BUN SERPL-MCNC: 24 MG/DL (ref 6–20)
BUN/CREAT SERPL: 7 (ref 12–20)
CALCIUM SERPL-MCNC: 7.4 MG/DL (ref 8.5–10.1)
CHLORIDE SERPL-SCNC: 108 MMOL/L (ref 97–108)
CO2 SERPL-SCNC: 26 MMOL/L (ref 21–32)
CREAT SERPL-MCNC: 3.42 MG/DL (ref 0.55–1.02)
DIFFERENTIAL METHOD BLD: ABNORMAL
EOSINOPHIL # BLD: 0.3 K/UL (ref 0–0.4)
EOSINOPHIL NFR BLD: 5 % (ref 0–7)
ERYTHROCYTE [DISTWIDTH] IN BLOOD BY AUTOMATED COUNT: 16.7 % (ref 11.5–14.5)
GLUCOSE BLD STRIP.AUTO-MCNC: 125 MG/DL (ref 65–117)
GLUCOSE BLD STRIP.AUTO-MCNC: 155 MG/DL (ref 65–117)
GLUCOSE BLD STRIP.AUTO-MCNC: 187 MG/DL (ref 65–117)
GLUCOSE SERPL-MCNC: 139 MG/DL (ref 65–100)
HCT VFR BLD AUTO: 20.8 % (ref 35–47)
HCT VFR BLD AUTO: 21 % (ref 35–47)
HGB BLD-MCNC: 6.9 G/DL (ref 11.5–16)
HGB BLD-MCNC: 7.1 G/DL (ref 11.5–16)
IMM GRANULOCYTES # BLD AUTO: 0 K/UL (ref 0–0.04)
IMM GRANULOCYTES NFR BLD AUTO: 0 % (ref 0–0.5)
LYMPHOCYTES # BLD: 2 K/UL (ref 0.8–3.5)
LYMPHOCYTES NFR BLD: 32 % (ref 12–49)
MAGNESIUM SERPL-MCNC: 1.9 MG/DL (ref 1.6–2.4)
MCH RBC QN AUTO: 27.9 PG (ref 26–34)
MCHC RBC AUTO-ENTMCNC: 33.2 G/DL (ref 30–36.5)
MCV RBC AUTO: 84.2 FL (ref 80–99)
MONOCYTES # BLD: 0.5 K/UL (ref 0–1)
MONOCYTES NFR BLD: 8 % (ref 5–13)
NEUTS SEG # BLD: 3.4 K/UL (ref 1.8–8)
NEUTS SEG NFR BLD: 55 % (ref 32–75)
NRBC # BLD: 0 K/UL (ref 0–0.01)
NRBC BLD-RTO: 0 PER 100 WBC
PHOSPHATE SERPL-MCNC: 4.6 MG/DL (ref 2.6–4.7)
PLATELET # BLD AUTO: 104 K/UL (ref 150–400)
PMV BLD AUTO: 10.3 FL (ref 8.9–12.9)
POTASSIUM SERPL-SCNC: 4 MMOL/L (ref 3.5–5.1)
RBC # BLD AUTO: 2.47 M/UL (ref 3.8–5.2)
RBC MORPH BLD: ABNORMAL
SERVICE CMNT-IMP: ABNORMAL
SODIUM SERPL-SCNC: 140 MMOL/L (ref 136–145)
SPECIMEN EXP DATE BLD: NORMAL
WBC # BLD AUTO: 6.2 K/UL (ref 3.6–11)

## 2021-12-03 PROCEDURE — 36415 COLL VENOUS BLD VENIPUNCTURE: CPT

## 2021-12-03 PROCEDURE — 83735 ASSAY OF MAGNESIUM: CPT

## 2021-12-03 PROCEDURE — 74011250637 HC RX REV CODE- 250/637: Performed by: STUDENT IN AN ORGANIZED HEALTH CARE EDUCATION/TRAINING PROGRAM

## 2021-12-03 PROCEDURE — 84100 ASSAY OF PHOSPHORUS: CPT

## 2021-12-03 PROCEDURE — 86901 BLOOD TYPING SEROLOGIC RH(D): CPT

## 2021-12-03 PROCEDURE — 85025 COMPLETE CBC W/AUTO DIFF WBC: CPT

## 2021-12-03 PROCEDURE — 74011000250 HC RX REV CODE- 250: Performed by: STUDENT IN AN ORGANIZED HEALTH CARE EDUCATION/TRAINING PROGRAM

## 2021-12-03 PROCEDURE — 82962 GLUCOSE BLOOD TEST: CPT

## 2021-12-03 PROCEDURE — 65270000032 HC RM SEMIPRIVATE

## 2021-12-03 PROCEDURE — 85018 HEMOGLOBIN: CPT

## 2021-12-03 PROCEDURE — 74011250636 HC RX REV CODE- 250/636: Performed by: STUDENT IN AN ORGANIZED HEALTH CARE EDUCATION/TRAINING PROGRAM

## 2021-12-03 PROCEDURE — 80048 BASIC METABOLIC PNL TOTAL CA: CPT

## 2021-12-03 RX ORDER — HEPARIN SODIUM 5000 [USP'U]/ML
7500 INJECTION, SOLUTION INTRAVENOUS; SUBCUTANEOUS EVERY 8 HOURS
Status: DISCONTINUED | OUTPATIENT
Start: 2021-12-04 | End: 2021-12-03

## 2021-12-03 RX ORDER — HEPARIN SODIUM 5000 [USP'U]/ML
5000 INJECTION, SOLUTION INTRAVENOUS; SUBCUTANEOUS EVERY 8 HOURS
Status: DISCONTINUED | OUTPATIENT
Start: 2021-12-04 | End: 2021-12-10 | Stop reason: HOSPADM

## 2021-12-03 RX ADMIN — METOPROLOL TARTRATE 25 MG: 25 TABLET, FILM COATED ORAL at 17:13

## 2021-12-03 RX ADMIN — AMITRIPTYLINE HYDROCHLORIDE 50 MG: 50 TABLET, FILM COATED ORAL at 21:39

## 2021-12-03 RX ADMIN — ENOXAPARIN SODIUM 40 MG: 100 INJECTION SUBCUTANEOUS at 09:35

## 2021-12-03 RX ADMIN — ATORVASTATIN CALCIUM 80 MG: 40 TABLET, FILM COATED ORAL at 21:39

## 2021-12-03 RX ADMIN — ASPIRIN 81 MG CHEWABLE TABLET 81 MG: 81 TABLET CHEWABLE at 09:35

## 2021-12-03 RX ADMIN — GABAPENTIN 100 MG: 100 CAPSULE ORAL at 09:36

## 2021-12-03 RX ADMIN — Medication 10 ML: at 17:13

## 2021-12-03 RX ADMIN — BUMETANIDE 1 MG: 0.25 INJECTION INTRAMUSCULAR; INTRAVENOUS at 17:12

## 2021-12-03 RX ADMIN — ACETAMINOPHEN 650 MG: 325 TABLET ORAL at 21:39

## 2021-12-03 RX ADMIN — AMLODIPINE BESYLATE 10 MG: 5 TABLET ORAL at 09:35

## 2021-12-03 RX ADMIN — CLOPIDOGREL BISULFATE 75 MG: 75 TABLET, FILM COATED ORAL at 09:35

## 2021-12-03 RX ADMIN — BUMETANIDE 1 MG: 0.25 INJECTION INTRAMUSCULAR; INTRAVENOUS at 09:36

## 2021-12-03 RX ADMIN — METOPROLOL TARTRATE 25 MG: 25 TABLET, FILM COATED ORAL at 09:36

## 2021-12-03 RX ADMIN — Medication 10 ML: at 21:41

## 2021-12-03 RX ADMIN — GABAPENTIN 100 MG: 100 CAPSULE ORAL at 17:12

## 2021-12-03 NOTE — PROGRESS NOTES
[facility] Dosing Services  Automatic adjustment of enoxaparin  Dr. Kennedy Gavin  Indication: VTE prophylaxis     Pharmacist made change to enoxaparin therapy based on BMI > 40 and CrCl 20-29 ml/min   Order changed to heparin 7500 units SUBCUT every 8 hours     Previous Dose Enoxaparin 40 mg SUBCUT every 24 hours      Epidural Catheter? No  Other anticoagulants: None  Relevant drug interactions: None      Pharmacy to automatically make dose adjustment for renal dysfunction (creatinine clearance less than 30 mL/min)  Pharmacy to automatically make dose adjustment for obesity (BMI greater than 40) or low weight (heparin for wt < 60 kg)  Pharmacy to make dose rounding adjustments per [facility] dose adjustment scale. Pharmacy will monitor patients progress, make dose adjustment as needed per changing renal function, and communicate further recommendations regarding patients anticoagulation therapy with prescriber.      Husam Carroll, PharmD Contact: [phone]

## 2021-12-03 NOTE — PROGRESS NOTES
Hospitalist Progress Note    NAME: Maryana Brown   :  1965   MRN:  [id number]   Room Number:    @ [facility]       Interim Hospital Summary: 64 y.o. female whom presented on 2021 with      Assessment / Plan:      EMMANUEL on CKD 3B POA   Suspect secondary to cardiorenal syndrome type I. Clinically volume overloaded. CT A/P reviewed independently - shows no nephrolithiasis or hydronephrosis. Baseline 2.3-2.7.      - Strict Is and Os, Avoid nephrotoxic meds, renally dose medications. - Continue diuresis. - Consult nephrology        Acute on chronic diastolic heart failure POA  BNP elevated. EKG reviewed independently- normal sinus rhythm. ECHO 2021 revealed EF 60-65 %, normal cavity size and systolic function.      -Bumetanide 1 mg IV BID  -Fluid restriction  -Low-sodium diet  -Sleep study as outpatient     Hypertensive emergency POA    mm Hg. CHF exacerbation. Evidence of EMMANUEL. Administered 20 mg IV Labetalol in ER.      - Amlodipine 10 mg daily  - Metoprolol 25 mg BID  - Bumetanide 1 mg IV BID     Insulin-dependent diabetes mellitus POA              Hemoglobin A1c     8.4 (H)      2021 12:32 AM              Hemoglobin A1c (POC)     10.2            2015 11:01 AM  Home regimen include checked immunoglobulin,  canagliflozin 100 mg daily,  Lantus 20 units and aspart     - Lispro correctional scale, FSG AC HS  - Consistent carb diet, hypoglycemia protocol.         Diabetic neuropathy  -On gabapentin 900 mg 3 times daily daily at home  -Resume renally dose         History of stroke  -On aspirin, Plavix and atorvastatin at home      History of chronic back pain  -On tramadol at home as needed     Morbid obesity  -BMI 47.26  - Counseled on Lifestyle modifications, AHA Diet ,weight loss strategies.     Body mass index is 47.26 kg/m².   Code Status: full   Surrogate Decision Maker:     DVT Prophylaxis: Lovenox  GI Prophylaxis: not indicated  Baseline: ambulatory independently         Subjective:     Chief Complaint / Reason for Physician Visit  \"feeling swollen\". Discussed with RN events overnight. Review of Systems:  No fevers, chills, appetite change, cough, sputum production, shortness of breath, dyspnea on exertion, nausea, vomitting, diarrhea, constipation, chest pain, leg edema, abdominal pain, joint pain, rash, itching. Tolerating PT/OT. Tolerating diet. Objective:     VITALS:   Last 24hrs VS reviewed since prior progress note. Most recent are:  Patient Vitals for the past 24 hrs:   Temp Pulse Resp BP SpO2   12/03/21 1120 97.9 °F (36.6 °C) 65 18 (!) 112/46 98 %   12/03/21 0833 98 °F (36.7 °C) 72 18 (!) 145/50 96 %   12/03/21 0331 97 °F (36.1 °C) 74 16 132/74 98 %   12/02/21 2342 98.4 °F (36.9 °C) 71 18 134/71 96 %   12/02/21 2000 98.1 °F (36.7 °C) 71 16 (!) 135/55 97 %   12/02/21 1640 98.8 °F (37.1 °C) 69 16 (!) 127/50 93 %       Intake/Output Summary (Last 24 hours) at 12/3/2021 1304  Last data filed at 12/3/2021 0900  Gross per 24 hour   Intake 300 ml   Output 1400 ml   Net -1100 ml        PHYSICAL EXAM:  General: Alert, cooperative, no acute distress    EENT:  EOMI. Anicteric sclerae. MMM  Resp:  CTA bilaterally, no wheezing or rales. No accessory muscle use  CV:  Regular  rhythm,  normal S1/S2, no murmurs rubs gallops, 2+ edema  GI:  Soft, Non distended, Non tender. +Bowel sounds  Neurologic:  Alert and oriented X 3, normal speech,   Psych:   Good insight. Not anxious nor agitated  Skin:  No rashes.   No jaundice    Reviewed most current lab test results and cultures  YES  Reviewed most current radiology test results   YES  Review and summation of old records today    NO  Reviewed patient's current orders and MAR    YES  PMH/SH reviewed - no change compared to H&P  ________________________________________________________________________  Care Plan discussed with:    Comments   Patient x    Family      RN x    Care Manager x    Consultant x Multidiciplinary team rounds were held today with , nursing, pharmacist and clinical coordinator. Patient's plan of care was discussed; medications were reviewed and discharge planning was addressed. ________________________________________________________________________  Total NON critical care TIME:  25  Minutes    Total CRITICAL CARE TIME Spent:   Minutes non procedure based      Comments   >50% of visit spent in counseling and coordination of care x    ________________________________________________________________________  Ana Johnston MD     Procedures: see electronic medical records for all procedures/Xrays and details which were not copied into this note but were reviewed prior to creation of Plan. LABS:  I reviewed today's most current labs and imaging studies. Pertinent labs include:  Recent Labs     12/03/21  1036 12/03/21  0257 12/02/21  0452 12/01/21  1706 12/01/21  1706   WBC  --  6.2 7.2  --  7.8   HGB 7.1* 6.9* 7.6*   < > 8.3*   HCT 21.0* 20.8* 22.7*   < > 23.8*   PLT  --  104* 103*  --  103*    < > = values in this interval not displayed.      Recent Labs     12/03/21  0257 12/02/21  0452 12/02/21  0402 12/01/21  1706    140  --  139   K 4.0 3.2*  --  3.2*    109*  --  107   CO2 26 25  --  24   * 106*  --  127*   BUN 24* 23*  --  22*   CREA 3.42* 3.16*  --  3.08*   CA 7.4* 7.9*  --  8.2*   MG 1.9 1.8  --   --    PHOS 4.6 5.2*  --   --    ALB  --   --  2.0* 2.4*   TBILI  --   --  0.2 0.5   ALT  --   --  14 15       Signed: Ana Johnston MD

## 2021-12-04 ENCOUNTER — APPOINTMENT (OUTPATIENT)
Dept: GENERAL RADIOLOGY | Age: 56
DRG: 194 | End: 2021-12-04
Attending: STUDENT IN AN ORGANIZED HEALTH CARE EDUCATION/TRAINING PROGRAM
Payer: MEDICAID

## 2021-12-04 LAB
ALBUMIN SERPL-MCNC: 2 G/DL (ref 3.5–5)
ALBUMIN/GLOB SERPL: 0.6 {RATIO} (ref 1.1–2.2)
ALP SERPL-CCNC: 98 U/L (ref 45–117)
ALT SERPL-CCNC: 11 U/L (ref 12–78)
ANION GAP SERPL CALC-SCNC: 6 MMOL/L (ref 5–15)
AST SERPL-CCNC: 16 U/L (ref 15–37)
BASOPHILS # BLD: 0 K/UL (ref 0–0.1)
BASOPHILS NFR BLD: 1 % (ref 0–1)
BILIRUB DIRECT SERPL-MCNC: 0.1 MG/DL (ref 0–0.2)
BILIRUB SERPL-MCNC: 0.2 MG/DL (ref 0.2–1)
BUN SERPL-MCNC: 26 MG/DL (ref 6–20)
BUN/CREAT SERPL: 7 (ref 12–20)
CALCIUM SERPL-MCNC: 7.9 MG/DL (ref 8.5–10.1)
CHLORIDE SERPL-SCNC: 107 MMOL/L (ref 97–108)
CO2 SERPL-SCNC: 25 MMOL/L (ref 21–32)
CREAT SERPL-MCNC: 3.67 MG/DL (ref 0.55–1.02)
DIFFERENTIAL METHOD BLD: ABNORMAL
EOSINOPHIL # BLD: 0.3 K/UL (ref 0–0.4)
EOSINOPHIL NFR BLD: 5 % (ref 0–7)
ERYTHROCYTE [DISTWIDTH] IN BLOOD BY AUTOMATED COUNT: 16.4 % (ref 11.5–14.5)
GLOBULIN SER CALC-MCNC: 3.5 G/DL (ref 2–4)
GLUCOSE BLD STRIP.AUTO-MCNC: 137 MG/DL (ref 65–117)
GLUCOSE BLD STRIP.AUTO-MCNC: 175 MG/DL (ref 65–117)
GLUCOSE BLD STRIP.AUTO-MCNC: 188 MG/DL (ref 65–117)
GLUCOSE BLD STRIP.AUTO-MCNC: 337 MG/DL (ref 65–117)
GLUCOSE SERPL-MCNC: 139 MG/DL (ref 65–100)
HCT VFR BLD AUTO: 23.4 % (ref 35–47)
HGB BLD-MCNC: 7.6 G/DL (ref 11.5–16)
IMM GRANULOCYTES # BLD AUTO: 0 K/UL (ref 0–0.04)
IMM GRANULOCYTES NFR BLD AUTO: 0 % (ref 0–0.5)
LYMPHOCYTES # BLD: 1.8 K/UL (ref 0.8–3.5)
LYMPHOCYTES NFR BLD: 28 % (ref 12–49)
MAGNESIUM SERPL-MCNC: 2 MG/DL (ref 1.6–2.4)
MCH RBC QN AUTO: 27.5 PG (ref 26–34)
MCHC RBC AUTO-ENTMCNC: 32.5 G/DL (ref 30–36.5)
MCV RBC AUTO: 84.8 FL (ref 80–99)
MONOCYTES # BLD: 0.5 K/UL (ref 0–1)
MONOCYTES NFR BLD: 8 % (ref 5–13)
NEUTS SEG # BLD: 3.8 K/UL (ref 1.8–8)
NEUTS SEG NFR BLD: 58 % (ref 32–75)
NRBC # BLD: 0 K/UL (ref 0–0.01)
NRBC BLD-RTO: 0 PER 100 WBC
PHOSPHATE SERPL-MCNC: 4.8 MG/DL (ref 2.6–4.7)
PLATELET # BLD AUTO: 117 K/UL (ref 150–400)
PMV BLD AUTO: 10.8 FL (ref 8.9–12.9)
POTASSIUM SERPL-SCNC: 4.1 MMOL/L (ref 3.5–5.1)
PROT SERPL-MCNC: 5.5 G/DL (ref 6.4–8.2)
RBC # BLD AUTO: 2.76 M/UL (ref 3.8–5.2)
SERVICE CMNT-IMP: ABNORMAL
SODIUM SERPL-SCNC: 138 MMOL/L (ref 136–145)
WBC # BLD AUTO: 6.5 K/UL (ref 3.6–11)

## 2021-12-04 PROCEDURE — 82962 GLUCOSE BLOOD TEST: CPT

## 2021-12-04 PROCEDURE — 36415 COLL VENOUS BLD VENIPUNCTURE: CPT

## 2021-12-04 PROCEDURE — 65270000032 HC RM SEMIPRIVATE

## 2021-12-04 PROCEDURE — 84100 ASSAY OF PHOSPHORUS: CPT

## 2021-12-04 PROCEDURE — 74011636637 HC RX REV CODE- 636/637: Performed by: STUDENT IN AN ORGANIZED HEALTH CARE EDUCATION/TRAINING PROGRAM

## 2021-12-04 PROCEDURE — 83735 ASSAY OF MAGNESIUM: CPT

## 2021-12-04 PROCEDURE — 74011000250 HC RX REV CODE- 250: Performed by: STUDENT IN AN ORGANIZED HEALTH CARE EDUCATION/TRAINING PROGRAM

## 2021-12-04 PROCEDURE — 80076 HEPATIC FUNCTION PANEL: CPT

## 2021-12-04 PROCEDURE — 85025 COMPLETE CBC W/AUTO DIFF WBC: CPT

## 2021-12-04 PROCEDURE — 71045 X-RAY EXAM CHEST 1 VIEW: CPT

## 2021-12-04 PROCEDURE — 74011250636 HC RX REV CODE- 250/636: Performed by: STUDENT IN AN ORGANIZED HEALTH CARE EDUCATION/TRAINING PROGRAM

## 2021-12-04 PROCEDURE — 80048 BASIC METABOLIC PNL TOTAL CA: CPT

## 2021-12-04 PROCEDURE — 74011250637 HC RX REV CODE- 250/637: Performed by: STUDENT IN AN ORGANIZED HEALTH CARE EDUCATION/TRAINING PROGRAM

## 2021-12-04 RX ORDER — IPRATROPIUM BROMIDE AND ALBUTEROL SULFATE 2.5; .5 MG/3ML; MG/3ML
3 SOLUTION RESPIRATORY (INHALATION)
Status: DISCONTINUED | OUTPATIENT
Start: 2021-12-04 | End: 2021-12-10 | Stop reason: HOSPADM

## 2021-12-04 RX ADMIN — METOPROLOL TARTRATE 25 MG: 25 TABLET, FILM COATED ORAL at 08:57

## 2021-12-04 RX ADMIN — Medication 10 ML: at 22:00

## 2021-12-04 RX ADMIN — INSULIN LISPRO 2 UNITS: 100 INJECTION, SOLUTION INTRAVENOUS; SUBCUTANEOUS at 22:17

## 2021-12-04 RX ADMIN — AMLODIPINE BESYLATE 10 MG: 5 TABLET ORAL at 08:57

## 2021-12-04 RX ADMIN — GABAPENTIN 100 MG: 100 CAPSULE ORAL at 17:36

## 2021-12-04 RX ADMIN — Medication 10 ML: at 06:40

## 2021-12-04 RX ADMIN — ATORVASTATIN CALCIUM 80 MG: 40 TABLET, FILM COATED ORAL at 22:10

## 2021-12-04 RX ADMIN — ACETAMINOPHEN 650 MG: 325 TABLET ORAL at 22:10

## 2021-12-04 RX ADMIN — HEPARIN SODIUM 5000 UNITS: 5000 INJECTION, SOLUTION INTRAVENOUS; SUBCUTANEOUS at 13:23

## 2021-12-04 RX ADMIN — HEPARIN SODIUM 5000 UNITS: 5000 INJECTION, SOLUTION INTRAVENOUS; SUBCUTANEOUS at 06:40

## 2021-12-04 RX ADMIN — AMITRIPTYLINE HYDROCHLORIDE 50 MG: 50 TABLET, FILM COATED ORAL at 22:11

## 2021-12-04 RX ADMIN — CLOPIDOGREL BISULFATE 75 MG: 75 TABLET, FILM COATED ORAL at 08:57

## 2021-12-04 RX ADMIN — BUMETANIDE 1 MG: 0.25 INJECTION INTRAMUSCULAR; INTRAVENOUS at 17:44

## 2021-12-04 RX ADMIN — METOPROLOL TARTRATE 25 MG: 25 TABLET, FILM COATED ORAL at 17:36

## 2021-12-04 RX ADMIN — ACETAMINOPHEN 650 MG: 325 TABLET ORAL at 09:08

## 2021-12-04 RX ADMIN — ASPIRIN 81 MG CHEWABLE TABLET 81 MG: 81 TABLET CHEWABLE at 08:57

## 2021-12-04 RX ADMIN — HEPARIN SODIUM 5000 UNITS: 5000 INJECTION, SOLUTION INTRAVENOUS; SUBCUTANEOUS at 22:11

## 2021-12-04 RX ADMIN — Medication 5 ML: at 13:23

## 2021-12-04 RX ADMIN — GABAPENTIN 100 MG: 100 CAPSULE ORAL at 08:57

## 2021-12-04 RX ADMIN — BUMETANIDE 1 MG: 0.25 INJECTION INTRAMUSCULAR; INTRAVENOUS at 09:01

## 2021-12-04 NOTE — PROGRESS NOTES
1930 - verbal shift change report received from Antoine Herbert RN to include SBAR, Kardex, [facility] and recent results. 2046 - Resting in bed watching tv. Edema noted x4 ext. Pt states she's had this edema since before admitting to [facility]. RH 2+ pitting edema and is greater than LH. BLE w/ 3+ pitting edema. Socks removed D/T significant indentations. Bilateral expiratory wheezing noted although pt denies SOB. O2 Sats WDL. 2139 - c/o H/A 8/10 - requested pain med - gave tylenol 650mg PO.  2230 - states H/A pain decreased from 8/10 to 4/10. Resting quietly in bed.   0600 - labs drawn and taken to laboratory  0640 - incontinent of urine - pure wick dislodged. Incontinence care provided, brief, enu and pure wick changed. Pt assisted w/ turning. 0730 - verbal shift change report given to Antoine Herbert RN to include SBAR, Johndex, [facility] and recent results.

## 2021-12-04 NOTE — PROGRESS NOTES
ERROL  RUR 19%  LOS 3d  ELOS Undetermined    1000  IDT met to discuss plan of care. Repeat chest x-ray and give PRN albuterol. Monitor I/O. Discharge date unknown at this time. May need to be transferred to a higher level of care.   Buck Jones, TYE/CECI  [phone]

## 2021-12-04 NOTE — PROGRESS NOTES
Chart accessed to assist primary nurse with med pass. IV Bumex given. Pt complaining of a headache rating 6/10. Tylenol given.

## 2021-12-04 NOTE — CONSULTS
[facility] Consult - [facility]  Requesting physician:   Date of consult: 12/04/21    CC: ARVIND     This is an \"e-consult\" with data obtained by chart review and by discussion with the requesting physician. I did not directly examine or interview the patient. HPI:  55yr with pmhx of HTN, DM with retinopathy A1c range 8-11% + retinopathy, nephropathy and neuropathy, CHF, CVA and CKD 3b A3 (crt of 1.67 3/21--> 2.4-2.7 in 11/21)  with long history of heavy proteinuria with high risk for progressive nephropathy per primary nephrologist Dr. Noemy Hoskins at [facility]. Pt presented to ed on 12/1/21 wks of headache followed by 3 days of n/v and abdominal pain, her presenting VS were 189/72 and noted to have Arvind with crt of 3.1--> 3.67 today, albumin of 2, hgb 7.6, plt 117 (no rbc frag on smear),  UA with protein > 300, large, blood , 5-10 wbc/rbc, few epithelial cells. Abd ct without contrast showing ,fluid overload vs hypoproteinemia similar to 2020, no hydro, head ct without acute findings and chest xray without acute findings. Renal us in 8/21 with right kidney 12cm, left 11cm c/w mrd with marked bladder distention. Her blood pressure has ranged in 140-160's on norvasc 10mg and metoprolol 25m xl and bumex 1mg iv bid.      Echo from 8/21 reviewed       PMH:  Past Medical History:   Diagnosis Date    Arthritis     Asthma     CHF (congestive heart failure) (HCC)     Chronic back pain     Diabetes ([address]     Di[facility] retinopathy [facility]     Hypertension     MRSA (methicillin resistant staph aureus) culture positive     labial abscess    Neuropathy          PSH:  Past Surgical History:   Procedure Laterality Date    HX HEENT      tonsillectomy    HX ORTHOPAEDIC      left ft bunionectomy    HX OTHER SURGICAL      lanced labial abscess       FH:  Father diet of DM and CKD      SH:  Social History     Socioeconomic History    Marital status:      Spouse name: Not on file    Number of children: Not on file    Years of education: Not on file    Highest education level: Not on file   Occupational History    Not on file   Tobacco Use    Smoking status: Never Smoker    Smokeless tobacco: Never Used   Substance and Sexual Activity    Alcohol use: No    Drug use: No    Sexual activity: Not on file   Other Topics Concern    Not on file   Social History Narrative    Not on file     Social Determinants of Health     Financial Resource Strain:     Difficulty of Paying Living Expenses: Not on file   Food Insecurity:     Worried About Running Out of Food in the Last Year: Not on file    Tena of Food in the Last Year: Not on file   Transportation Needs:     Lack of Transportation (Medical): Not on file    Lack of Transportation (Non-Medical): Not on file   Physical Activity:     Days of Exercise per Week: Not on file    Minutes of Exercise per Session: Not on file   Stress:     Feeling of Stress : Not on file   Social Connections:     Frequency of Communication with Friends and Family: Not on file    Frequency of Social Gatherings with Friends and Family: Not on file    Attends Sikhism Services: Not on file    Active Member of [address] [place] [address] or Organizations: Not on file    Attends Club or Organization Meetings: Not on file    Marital Status: Not on file   Intimate Partner Violence:     Fear of Current or Ex-Partner: Not on file    Emotionally Abused: Not on file    Physically Abused: Not on file    Sexually Abused: Not on file   Housing Stability:     Unable to Pay for Housing in the Last Year: Not on file    Number of [place] in the Last Year: Not on file    Unstable Housing in the Last Year: Not on file       Home meds:  Prior to Admission Medications   Prescriptions Last Dose Informant Patient Reported? Taking? Flovent  mcg/actuation inhaler 11/29/2021 Transfer Papers Yes Yes   Sig: Take 2 Puffs by inhalation two (2) times a day.    Lanalberto Solostar U-100 Insulin 100 unit/mL (3 mL) inpn 2021 at Unknown time Transfer Papers Yes Yes   Si Units by SubCUTAneous route nightly. albuterol (PROVENTIL HFA, VENTOLIN HFA, PROAIR HFA) 90 mcg/actuation inhaler  Transfer Papers Yes Yes   Sig: Take 2 Puffs by inhalation every six (6) hours as needed for Wheezing. amLODIPine (NORVASC) 10 mg tablet 2021 at Unknown time Transfer Papers Yes Yes   Sig: Take 10 mg by mouth daily. amitriptyline (ELAVIL) 50 mg tablet 2021 at Unknown time Transfer Papers Yes Yes   Sig: Take 50 mg by mouth nightly. aspirin delayed-release 81 mg tablet 2021 at Unknown time Transfer Papers Yes Yes   Sig: Take 81 mg by mouth daily. atorvastatin (LIPITOR) 80 mg tablet 2021 at Unknown time Transfer Papers No Yes   Sig: Take 1 Tab by mouth nightly. canagliflozin (INVOKANA) 100 mg tablet 2021 at Unknown time Transfer Papers Yes Yes   Sig: Take 100 mg by mouth daily. Take one tablet by mouth daily   clopidogrel (PLAVIX) 75 mg tab 2021 at Unknown time Transfer Papers No Yes   Sig: Take 1 Tab by mouth daily. docusate sodium (Colace) 100 mg capsule 2021 at Unknown time Transfer Papers Yes Yes   Sig: Take 100 mg by mouth two (2) times daily as needed. fluticasone propionate (Flonase Allergy Relief) 50 mcg/actuation nasal spray 2021 at Unknown time Transfer Papers Yes Yes   Si SPRAY, Each Nostril, daily, for allergy symptoms, 11 Refills, Dispense: 1, bottle, Pharmacy [facility]   gabapentin (NEURONTIN) 300 mg capsule 2021 at Unknown time  Yes Yes   Sig: Take 900 mg by mouth three (3) times daily. glipiZIDE (GLUCOTROL) 5 mg tablet 2021 at Unknown time Transfer Papers Yes Yes   Sig: Take 5 mg by mouth daily. Take One Tablet By Mouth Daily   insulin aspart U-100 (NOVOLOG) 100 unit/mL (3 mL) inpn   Yes Yes   Sig: by SubCUTAneous route Before breakfast, lunch, and dinner.  Sliding scale   insulin aspart U-100 (NovoLOG Flexpen U-100 Insulin) 100 unit/mL (3 mL) inpn   Yes Yes   Sig: 10 Units by SubCUTAneous route Before breakfast, lunch, and dinner. magnesium oxide (MAG-OX) 400 mg tablet 11/30/2021 at Unknown time Transfer Papers Yes Yes   Sig: Take 400 mg by mouth daily. metoprolol tartrate (LOPRESSOR) 25 mg tablet   Yes Yes   Sig: Take 25 mg by mouth two (2) times a day. nystatin (MYCOSTATIN) powder   Yes Yes   Sig: Apply  to affected area two (2) times daily as needed. torsemide (DEMADEX) 20 mg tablet 11/30/2021 at Unknown time  Yes Yes   Sig: Take 20 mg by mouth two (2) times a day. traMADoL (ULTRAM) 50 mg tablet 12/1/2021 at Unknown time  Yes Yes   Sig: Take 50 mg by mouth three (3) times daily as needed for Pain.       Facility-Administered Medications: None       Current inpatient medications:    Current Facility-Administered Medications:     albuterol-ipratropium (DUO-NEB) 2.5 MG-0.5 MG/3 ML, 3 mL, Nebulization, Q4H PRN, Bria Guillen MD    heparin (porcine) injection 5,000 Units, 5,000 Units, SubCUTAneous, Q8H, Bria Guillen MD, 5,000 Units at 12/04/21 0640    sodium chloride (NS) flush 5-40 mL, 5-40 mL, IntraVENous, Q8H, Bria Guillen MD, 10 mL at 12/04/21 0640    sodium chloride (NS) flush 5-40 mL, 5-40 mL, IntraVENous, PRN, Bria Guillen MD    acetaminophen (TYLENOL) tablet 650 mg, 650 mg, Oral, Q6H PRN, 650 mg at 12/04/21 0908 **OR** acetaminophen (TYLENOL) suppository 650 mg, 650 mg, Rectal, Q6H PRN, Bria Guillen MD    polyethylene glycol (MIRALAX) packet 17 g, 17 g, Oral, DAILY PRN, Bria Guillen MD    ondansetron (ZOFRAN ODT) tablet 4 mg, 4 mg, Oral, Q8H PRN **OR** ondansetron (ZOFRAN) injection 4 mg, 4 mg, IntraVENous, Q6H PRN, Bria Guillen MD    amLODIPine (NORVASC) tablet 10 mg, 10 mg, Oral, DAILY, Bria Guillen MD, 10 mg at 12/04/21 0857    amitriptyline (ELAVIL) tablet 50 mg, 50 mg, Oral, QHS, Bria Guillen MD, 50 mg at 12/03/21 2139    aspirin chewable tablet 81 mg, 81 mg, Oral, DAILY, [place] MD, 81 mg at 12/04/21 0857    atorvastatin (LIPITOR) tablet 80 mg, 80 mg, Oral, QHS, [place] MD, 80 mg at 12/03/21 2139    clopidogreL (PLAVIX) tablet 75 mg, 75 mg, Oral, DAILY, [place] MD, 75 mg at 12/04/21 0857    gabapentin (NEURONTIN) capsule 100 mg, 100 mg, Oral, BID, [place] MD, 100 mg at 12/04/21 0857    metoprolol tartrate (LOPRESSOR) tablet 25 mg, 25 mg, Oral, BID, [place] Wilson MD, 25 mg at 12/04/21 0857    bumetanide (BUMEX) injection 1 mg, 1 mg, IntraVENous, BID, [place] MD, 1 mg at 12/04/21 0901    glucose chewable tablet 16 g, 4 Tablet, Oral, PRN, Mt Wilson MD    dextrose (D50W) injection syrg 12.5-25 g, 25-50 mL, IntraVENous, PRN, Mt Wilson MD    glucagon (GLUCAGEN) injection 1 mg, 1 mg, IntraMUSCular, PRN, Mt Wilson MD    insulin lispro (HUMALOG) injection, , SubCUTAneous, AC&HS, Mt Wilson MD, 1 Units at 12/02/21 2225    Allergies: Allergies   Allergen Reactions    Amoxicillin Nausea Only    Aspirin Other (comments)     \"nosebleeds\" but patient takes daily aspirin 81 mg at home.  Patient states naproxen ok    Ciprofloxacin Hives       ROS:   Not performed    Vitals:  Patient Vitals for the past 24 hrs:   Temp Pulse Resp BP SpO2   12/04/21 1152 98.8 °F (37.1 °C) 66 18 (!) 138/54 97 %   12/04/21 0856    (!) 159/64    12/04/21 0800  72      12/04/21 0733 98.4 °F (36.9 °C) 72 18 (!) 151/60 96 %   12/04/21 0345 98 °F (36.7 °C) 70 18 139/71 97 %   12/03/21 2352 97.9 °F (36.6 °C) 72 18 (!) 142/52 96 %   12/03/21 2046 98.2 °F (36.8 °C) 73 16 (!) 136/49 99 %       I/O:    Intake/Output Summary (Last 24 hours) at 12/4/2021 1205  Last data filed at 12/4/2021 0640  Gross per 24 hour   Intake 790 ml   Output 300 ml   Net 490 ml       Physical exam:  Not performed    Labs/imaging:  Recent Results (from the past 24 hour(s))   GLUCOSE, POC    Collection Time: 12/03/21  4:38 PM   Result Value Ref Range    Glucose (POC) 155 (H) 65 - 117 mg/dL    Performed by Clifford Pool    CBC WITH AUTOMATED DIFF    Collection Time: 12/04/21  5:38 AM   Result Value Ref Range    WBC 6.5 3.6 - 11.0 K/uL    RBC 2.76 (L) 3.80 - 5.20 M/uL    HGB 7.6 (L) 11.5 - 16.0 g/dL    HCT 23.4 (L) 35.0 - 47.0 %    MCV 84.8 80.0 - 99.0 FL    MCH 27.5 26.0 - 34.0 PG    MCHC 32.5 30.0 - 36.5 g/dL    RDW 16.4 (H) 11.5 - 14.5 %    PLATELET 955 (L) 778 - 400 K/uL    MPV 10.8 8.9 - 12.9 FL    NRBC 0.0 0  WBC    ABSOLUTE NRBC 0.00 0.00 - 0.01 K/uL    NEUTROPHILS 58 32 - 75 %    LYMPHOCYTES 28 12 - 49 %    MONOCYTES 8 5 - 13 %    EOSINOPHILS 5 0 - 7 %    BASOPHILS 1 0 - 1 %    IMMATURE GRANULOCYTES 0 0.0 - 0.5 %    ABS. NEUTROPHILS 3.8 1.8 - 8.0 K/UL    ABS. LYMPHOCYTES 1.8 0.8 - 3.5 K/UL    ABS. MONOCYTES 0.5 0.0 - 1.0 K/UL    ABS. EOSINOPHILS 0.3 0.0 - 0.4 K/UL    ABS. BASOPHILS 0.0 0.0 - 0.1 K/UL    ABS. IMM.  GRANS. 0.0 0.00 - 0.04 K/UL    DF AUTOMATED     METABOLIC PANEL, BASIC    Collection Time: 12/04/21  5:38 AM   Result Value Ref Range    Sodium 138 136 - 145 mmol/L    Potassium 4.1 3.5 - 5.1 mmol/L    Chloride 107 97 - 108 mmol/L    CO2 25 21 - 32 mmol/L    Anion gap 6 5 - 15 mmol/L    Glucose 139 (H) 65 - 100 mg/dL    BUN 26 (H) 6 - 20 MG/DL    Creatinine 3.67 (H) 0.55 - 1.02 MG/DL    BUN/Creatinine ratio 7 (L) 12 - 20      GFR est AA 16 (L) >60 ml/min/1.73m2    GFR est non-AA 13 (L) >60 ml/min/1.73m2    Calcium 7.9 (L) 8.5 - 10.1 MG/DL   MAGNESIUM    Collection Time: 12/04/21  5:38 AM   Result Value Ref Range    Magnesium 2.0 1.6 - 2.4 mg/dL   PHOSPHORUS    Collection Time: 12/04/21  5:38 AM   Result Value Ref Range    Phosphorus 4.8 (H) 2.6 - 4.7 MG/DL   HEPATIC FUNCTION PANEL    Collection Time: 12/04/21  5:38 AM   Result Value Ref Range    Protein, total 5.5 (L) 6.4 - 8.2 g/dL    Albumin 2.0 (L) 3.5 - 5.0 g/dL    Globulin 3.5 2.0 - 4.0 g/dL    A-G Ratio 0.6 (L) 1.1 - 2.2      Bilirubin, total 0.2 0.2 - 1.0 MG/DL    Bilirubin, direct 0.1 0.0 - 0.2 MG/DL    Alk. phosphatase 98 45 - 117 U/L    AST (SGOT) 16 15 - 37 U/L    ALT (SGPT) 11 (L) 12 - 78 U/L   GLUCOSE, POC    Collection Time: 12/04/21  7:39 AM   Result Value Ref Range    Glucose (POC) 137 (H) 65 - 117 mg/dL    Performed by Jaden Elam (CON)    GLUCOSE, POC    Collection Time: 12/04/21 11:58 AM   Result Value Ref Range    Glucose (POC) 175 (H) 65 - 117 mg/dL    Performed by Hi Cano (CON)        A/P:  1. EMMANUEL stage 2 on CKD3b A3, non-oligouric   -in setting of uncontrolled htn with similar presentation last month. Likely progressive renal failure in setting of DMN with chronic heavy proteinuria. Crt has progressed from 1.4 in 3/21--> 2.7 in 8/21 and now with EMMANUEL. Obstruction r/o   -would get renal doppler   -repeat UA with urine protein/crt ratio, microalbumin   -progressive renal failure discussed with patient and possible access by primary nephrologist in past.   -increase Bumex to 3mg IV bid and monitor response and can repeat dose goal to keep negative as tolerated and repeat BMP this evening. If refractory will attempt Lasix drip with Chlorothiazide.   -no acute indication for RRT, however, if remains with volume overload and progressive renal failure will be needing RRT      Thank you for this consult. I will continue to follow the patient with you. Please feel free to call with questions.      Delonte Stevens M.D.  [facility] Nephrology

## 2021-12-04 NOTE — PROGRESS NOTES
Hospitalist Progress Note    NAME: Edith Shane   :  1965   MRN:  [id number]   Room Number:    @ [facility]       Interim Hospital Summary: 64 y.o. female whom presented on 2021 with      Assessment / Plan:      EMMANUEL on CKD 3B POA   Suspect secondary to cardiorenal syndrome type I. Clinically volume overloaded. CT A/P reviewed independently - shows no nephrolithiasis or hydronephrosis. Baseline 2.3-2.7.      - Strict Is and Os, Avoid nephrotoxic meds, renally dose medications. - Continue diuresis. - Consulted nephrology        Acute on chronic diastolic heart failure POA  BNP elevated. EKG reviewed independently- normal sinus rhythm. ECHO 2021 revealed EF 60-65 %, normal cavity size and systolic function.      -Bumetanide 1 mg IV BID  -Fluid restriction  -Low-sodium diet  -Sleep study as outpatient     Hypertensive emergency POA    mm Hg. CHF exacerbation. Evidence of EMMANUEL. Administered 20 mg IV Labetalol in ER.      - Amlodipine 10 mg daily  - Metoprolol 25 mg BID  - Bumetanide 1 mg IV BID     Insulin-dependent diabetes mellitus POA              Hemoglobin A1c     8.4 (H)      2021 12:32 AM              Hemoglobin A1c (POC)     10.2            2015 11:01 AM  Home regimen include checked immunoglobulin,  canagliflozin 100 mg daily,  Lantus 20 units and aspart     - Lispro correctional scale, FSG AC HS  - Consistent carb diet, hypoglycemia protocol.         Diabetic neuropathy  -On gabapentin 900 mg 3 times daily daily at home  -Resume renally dose         History of stroke  -On aspirin, Plavix and atorvastatin at home      History of chronic back pain  -On tramadol at home as needed     Morbid obesity  -BMI 47.26  - Counseled on Lifestyle modifications, AHA Diet ,weight loss strategies.     Body mass index is 47.26 kg/m².   Code Status: full   Surrogate Decision Maker:     DVT Prophylaxis: Lovenox  GI Prophylaxis: not indicated  Baseline: ambulatory independently                Subjective:     Chief Complaint / Reason for Physician Visit  \"I feel swollen\". Discussed with RN events overnight. Review of Systems:  No fevers, chills, appetite change, cough, sputum production, shortness of breath, dyspnea on exertion, nausea, vomitting, diarrhea, constipation, chest pain,  abdominal pain, joint pain, rash, itching. Tolerating PT/OT. Tolerating diet. Objective:     VITALS:   Last 24hrs VS reviewed since prior progress note. Most recent are:  Patient Vitals for the past 24 hrs:   Temp Pulse Resp BP SpO2   12/04/21 1615 98.1 °F (36.7 °C) 76 18 (!) 144/59 96 %   12/04/21 1152 98.8 °F (37.1 °C) 66 18 (!) 138/54 97 %   12/04/21 0856    (!) 159/64    12/04/21 0800  72      12/04/21 0733 98.4 °F (36.9 °C) 72 18 (!) 151/60 96 %   12/04/21 0345 98 °F (36.7 °C) 70 18 139/71 97 %   12/03/21 2352 97.9 °F (36.6 °C) 72 18 (!) 142/52 96 %   12/03/21 2046 98.2 °F (36.8 °C) 73 16 (!) 136/49 99 %       Intake/Output Summary (Last 24 hours) at 12/4/2021 1653  Last data filed at 12/4/2021 0640  Gross per 24 hour   Intake 790 ml   Output 300 ml   Net 490 ml        PHYSICAL EXAM:  General: WD, WN. Alert, cooperative, no acute distress    EENT:  EOMI. Anicteric sclerae. MMM  Resp:  CTA bilaterally, no wheezing or rales. No accessory muscle use  CV:  Regular  rhythm,  normal S1/S2, no murmurs rubs gallops, 3+ edema  GI:  Soft, Non distended, Non tender. +Bowel sounds  Neurologic:  Alert and oriented X 3, normal speech,   Psych:   Good insight. Not anxious nor agitated  Skin:  No rashes.   No jaundice    Reviewed most current lab test results and cultures  YES  Reviewed most current radiology test results   YES  Review and summation of old records today    NO  Reviewed patient's current orders and MAR    YES  PMH/SH reviewed - no change compared to H&P  ________________________________________________________________________  Care Plan discussed with:    Comments Patient x    Family      RN x    Care Manager x    Consultant  x                     x Multidiciplinary team rounds were held today with , nursing, pharmacist and clinical coordinator. Patient's plan of care was discussed; medications were reviewed and discharge planning was addressed. _______________________________________________________________________  Total NON critical care TIME:  35   Minutes    Total CRITICAL CARE TIME Spent:   Minutes non procedure based      Comments   >50% of visit spent in counseling and coordination of care     ________________________________________________________________________  Mariam Mandujano MD     Procedures: see electronic medical records for all procedures/Xrays and details which were not copied into this note but were reviewed prior to creation of Plan. LABS:  I reviewed today's most current labs and imaging studies. Pertinent labs include:  Recent Labs     12/04/21  0538 12/03/21  1036 12/03/21  0257 12/02/21  0452 12/02/21  0452   WBC 6.5  --  6.2  --  7.2   HGB 7.6* 7.1* 6.9*   < > 7.6*   HCT 23.4* 21.0* 20.8*   < > 22.7*   *  --  104*  --  103*    < > = values in this interval not displayed. Recent Labs     12/04/21  0538 12/03/21  0257 12/02/21  0452 12/02/21  0402 12/01/21  1706 12/01/21  1706    140 140  --    < > 139   K 4.1 4.0 3.2*  --    < > 3.2*    108 109*  --    < > 107   CO2 25 26 25  --    < > 24   * 139* 106*  --    < > 127*   BUN 26* 24* 23*  --    < > 22*   CREA 3.67* 3.42* 3.16*  --    < > 3.08*   CA 7.9* 7.4* 7.9*  --    < > 8.2*   MG 2.0 1.9 1.8  --   --   --    PHOS 4.8* 4.6 5.2*  --   --   --    ALB 2.0*  --   --  2.0*  --  2.4*   TBILI 0.2  --   --  0.2  --  0.5   ALT 11*  --   --  14  --  15    < > = values in this interval not displayed.        Signed: Mariam Mandujano MD

## 2021-12-05 LAB
ALBUMIN SERPL-MCNC: 1.8 G/DL (ref 3.5–5)
ALBUMIN/GLOB SERPL: 0.5 {RATIO} (ref 1.1–2.2)
ALP SERPL-CCNC: 99 U/L (ref 45–117)
ALT SERPL-CCNC: 10 U/L (ref 12–78)
ANION GAP SERPL CALC-SCNC: 5 MMOL/L (ref 5–15)
ANION GAP SERPL CALC-SCNC: 6 MMOL/L (ref 5–15)
AST SERPL-CCNC: 15 U/L (ref 15–37)
BASOPHILS # BLD: 0 K/UL (ref 0–0.1)
BASOPHILS NFR BLD: 0 % (ref 0–1)
BILIRUB DIRECT SERPL-MCNC: 0.1 MG/DL (ref 0–0.2)
BILIRUB SERPL-MCNC: 0.2 MG/DL (ref 0.2–1)
BUN SERPL-MCNC: 28 MG/DL (ref 6–20)
BUN SERPL-MCNC: 28 MG/DL (ref 6–20)
BUN/CREAT SERPL: 8 (ref 12–20)
BUN/CREAT SERPL: 8 (ref 12–20)
CALCIUM SERPL-MCNC: 8.3 MG/DL (ref 8.5–10.1)
CALCIUM SERPL-MCNC: 8.4 MG/DL (ref 8.5–10.1)
CHLORIDE SERPL-SCNC: 106 MMOL/L (ref 97–108)
CHLORIDE SERPL-SCNC: 107 MMOL/L (ref 97–108)
CO2 SERPL-SCNC: 26 MMOL/L (ref 21–32)
CO2 SERPL-SCNC: 27 MMOL/L (ref 21–32)
CREAT SERPL-MCNC: 3.65 MG/DL (ref 0.55–1.02)
CREAT SERPL-MCNC: 3.7 MG/DL (ref 0.55–1.02)
DIFFERENTIAL METHOD BLD: ABNORMAL
EOSINOPHIL # BLD: 0.3 K/UL (ref 0–0.4)
EOSINOPHIL NFR BLD: 5 % (ref 0–7)
ERYTHROCYTE [DISTWIDTH] IN BLOOD BY AUTOMATED COUNT: 16.2 % (ref 11.5–14.5)
GLOBULIN SER CALC-MCNC: 3.6 G/DL (ref 2–4)
GLUCOSE BLD STRIP.AUTO-MCNC: 127 MG/DL (ref 65–117)
GLUCOSE BLD STRIP.AUTO-MCNC: 179 MG/DL (ref 65–117)
GLUCOSE BLD STRIP.AUTO-MCNC: 191 MG/DL (ref 65–117)
GLUCOSE BLD STRIP.AUTO-MCNC: 225 MG/DL (ref 65–117)
GLUCOSE SERPL-MCNC: 131 MG/DL (ref 65–100)
GLUCOSE SERPL-MCNC: 180 MG/DL (ref 65–100)
HCT VFR BLD AUTO: 22.2 % (ref 35–47)
HGB BLD-MCNC: 7.3 G/DL (ref 11.5–16)
IMM GRANULOCYTES # BLD AUTO: 0 K/UL (ref 0–0.04)
IMM GRANULOCYTES NFR BLD AUTO: 1 % (ref 0–0.5)
LYMPHOCYTES # BLD: 1.8 K/UL (ref 0.8–3.5)
LYMPHOCYTES NFR BLD: 29 % (ref 12–49)
MAGNESIUM SERPL-MCNC: 1.9 MG/DL (ref 1.6–2.4)
MAGNESIUM SERPL-MCNC: 1.9 MG/DL (ref 1.6–2.4)
MCH RBC QN AUTO: 27.7 PG (ref 26–34)
MCHC RBC AUTO-ENTMCNC: 32.9 G/DL (ref 30–36.5)
MCV RBC AUTO: 84.1 FL (ref 80–99)
MONOCYTES # BLD: 0.5 K/UL (ref 0–1)
MONOCYTES NFR BLD: 8 % (ref 5–13)
NEUTS SEG # BLD: 3.7 K/UL (ref 1.8–8)
NEUTS SEG NFR BLD: 58 % (ref 32–75)
NRBC # BLD: 0 K/UL (ref 0–0.01)
NRBC BLD-RTO: 0 PER 100 WBC
PHOSPHATE SERPL-MCNC: 4.9 MG/DL (ref 2.6–4.7)
PHOSPHATE SERPL-MCNC: 5.1 MG/DL (ref 2.6–4.7)
PLATELET # BLD AUTO: 141 K/UL (ref 150–400)
PMV BLD AUTO: 9.9 FL (ref 8.9–12.9)
POTASSIUM SERPL-SCNC: 4.1 MMOL/L (ref 3.5–5.1)
POTASSIUM SERPL-SCNC: 4.2 MMOL/L (ref 3.5–5.1)
PROT SERPL-MCNC: 5.4 G/DL (ref 6.4–8.2)
RBC # BLD AUTO: 2.64 M/UL (ref 3.8–5.2)
SERVICE CMNT-IMP: ABNORMAL
SODIUM SERPL-SCNC: 137 MMOL/L (ref 136–145)
SODIUM SERPL-SCNC: 140 MMOL/L (ref 136–145)
WBC # BLD AUTO: 6.3 K/UL (ref 3.6–11)

## 2021-12-05 PROCEDURE — 74011636637 HC RX REV CODE- 636/637: Performed by: STUDENT IN AN ORGANIZED HEALTH CARE EDUCATION/TRAINING PROGRAM

## 2021-12-05 PROCEDURE — 80048 BASIC METABOLIC PNL TOTAL CA: CPT

## 2021-12-05 PROCEDURE — 83735 ASSAY OF MAGNESIUM: CPT

## 2021-12-05 PROCEDURE — 84100 ASSAY OF PHOSPHORUS: CPT

## 2021-12-05 PROCEDURE — 74011250636 HC RX REV CODE- 250/636: Performed by: STUDENT IN AN ORGANIZED HEALTH CARE EDUCATION/TRAINING PROGRAM

## 2021-12-05 PROCEDURE — 82962 GLUCOSE BLOOD TEST: CPT

## 2021-12-05 PROCEDURE — 80076 HEPATIC FUNCTION PANEL: CPT

## 2021-12-05 PROCEDURE — 65270000032 HC RM SEMIPRIVATE

## 2021-12-05 PROCEDURE — 74011250637 HC RX REV CODE- 250/637: Performed by: STUDENT IN AN ORGANIZED HEALTH CARE EDUCATION/TRAINING PROGRAM

## 2021-12-05 PROCEDURE — 36415 COLL VENOUS BLD VENIPUNCTURE: CPT

## 2021-12-05 PROCEDURE — 85025 COMPLETE CBC W/AUTO DIFF WBC: CPT

## 2021-12-05 PROCEDURE — 74011000250 HC RX REV CODE- 250: Performed by: STUDENT IN AN ORGANIZED HEALTH CARE EDUCATION/TRAINING PROGRAM

## 2021-12-05 RX ORDER — BUMETANIDE 0.25 MG/ML
3 INJECTION INTRAMUSCULAR; INTRAVENOUS 2 TIMES DAILY
Status: DISCONTINUED | OUTPATIENT
Start: 2021-12-05 | End: 2021-12-06

## 2021-12-05 RX ORDER — BUMETANIDE 0.25 MG/ML
2 INJECTION INTRAMUSCULAR; INTRAVENOUS ONCE
Status: COMPLETED | OUTPATIENT
Start: 2021-12-05 | End: 2021-12-05

## 2021-12-05 RX ORDER — BUMETANIDE 0.25 MG/ML
3 INJECTION INTRAMUSCULAR; INTRAVENOUS 2 TIMES DAILY
Status: DISCONTINUED | OUTPATIENT
Start: 2021-12-05 | End: 2021-12-05

## 2021-12-05 RX ADMIN — ATORVASTATIN CALCIUM 80 MG: 40 TABLET, FILM COATED ORAL at 22:43

## 2021-12-05 RX ADMIN — ASPIRIN 81 MG CHEWABLE TABLET 81 MG: 81 TABLET CHEWABLE at 08:29

## 2021-12-05 RX ADMIN — BUMETANIDE 2 MG: 0.25 INJECTION, SOLUTION INTRAMUSCULAR; INTRAVENOUS at 11:03

## 2021-12-05 RX ADMIN — AMLODIPINE BESYLATE 10 MG: 5 TABLET ORAL at 08:29

## 2021-12-05 RX ADMIN — CLOPIDOGREL BISULFATE 75 MG: 75 TABLET, FILM COATED ORAL at 08:29

## 2021-12-05 RX ADMIN — BUMETANIDE 1 MG: 0.25 INJECTION INTRAMUSCULAR; INTRAVENOUS at 08:28

## 2021-12-05 RX ADMIN — HEPARIN SODIUM 5000 UNITS: 5000 INJECTION, SOLUTION INTRAVENOUS; SUBCUTANEOUS at 22:43

## 2021-12-05 RX ADMIN — GABAPENTIN 100 MG: 100 CAPSULE ORAL at 17:26

## 2021-12-05 RX ADMIN — HEPARIN SODIUM 5000 UNITS: 5000 INJECTION, SOLUTION INTRAVENOUS; SUBCUTANEOUS at 14:28

## 2021-12-05 RX ADMIN — Medication 10 ML: at 05:40

## 2021-12-05 RX ADMIN — METOPROLOL TARTRATE 25 MG: 25 TABLET, FILM COATED ORAL at 17:26

## 2021-12-05 RX ADMIN — METOPROLOL TARTRATE 25 MG: 25 TABLET, FILM COATED ORAL at 08:29

## 2021-12-05 RX ADMIN — Medication 10 ML: at 17:26

## 2021-12-05 RX ADMIN — HEPARIN SODIUM 5000 UNITS: 5000 INJECTION, SOLUTION INTRAVENOUS; SUBCUTANEOUS at 05:39

## 2021-12-05 RX ADMIN — Medication 10 ML: at 22:00

## 2021-12-05 RX ADMIN — GABAPENTIN 100 MG: 100 CAPSULE ORAL at 08:29

## 2021-12-05 RX ADMIN — AMITRIPTYLINE HYDROCHLORIDE 50 MG: 50 TABLET, FILM COATED ORAL at 22:43

## 2021-12-05 RX ADMIN — INSULIN LISPRO 1 UNITS: 100 INJECTION, SOLUTION INTRAVENOUS; SUBCUTANEOUS at 22:51

## 2021-12-05 RX ADMIN — BUMETANIDE 3 MG: 0.25 INJECTION INTRAMUSCULAR; INTRAVENOUS at 17:26

## 2021-12-05 RX ADMIN — POLYETHYLENE GLYCOL 3350 17 G: 17 POWDER, FOR SOLUTION ORAL at 14:27

## 2021-12-05 NOTE — PROGRESS NOTES
Hospitalist Progress Note    NAME: Derek Elizondo   :  1965   MRN:  [id number]   Room Number:    @ [facility]       Interim Hospital Summary: 64 y.o. female whom presented on 2021 with      Assessment / Plan:         EMMANUEL on CKD 3B, non oliguric POA   Suspect progressive renal failure due to uncontrolled HTN, DM with chronic heavy proteinuria. Clinically volume overloaded. CT A/P reviewed independently - shows no nephrolithiasis or hydronephrosis. Baseline 2.3-2.7.      - Strict Is and Os, Avoid nephrotoxic meds, renally dose medications. - [facility] Dr Jerry Cortes following the patient   - 3 mg IV Bumex BID, will repeat BMP this evening. If no improvement will need lasix gtt with chlorthiazide  - no acute indication for RRT  But if progressive renal failure with volume overload, will need RRT   - Repeat UA with urine protein/Crt, microalbumin     Acute on chronic diastolic heart failure POA  BNP elevated. EKG reviewed independently- normal sinus rhythm. ECHO 2021 revealed EF 60-65 %, normal cavity size and systolic function.      -Bumetanide 1 mg IV BID  -Fluid restriction  -Low-sodium diet  -Sleep study as outpatient     Hypertensive emergency POA    mm Hg. CHF exacerbation. Evidence of EMMANUEL.    Administered 20 mg IV Labetalol in ER.      - Amlodipine 10 mg daily  - Metoprolol 25 mg BID  - Bumetanide 1 mg IV BID     Insulin-dependent diabetes mellitus POA              Hemoglobin A1c     8.4 (H)      2021 12:32 AM              Hemoglobin A1c (POC)     10.2            2015 11:01 AM  Home regimen include checked immunoglobulin,  canagliflozin 100 mg daily,  Lantus 20 units and aspart     - Lispro correctional scale, FSG AC HS  - Consistent carb diet, hypoglycemia protocol.         Diabetic neuropathy  -On gabapentin 900 mg 3 times daily daily at home  -Resume renally dose         History of stroke  -On aspirin, Plavix and atorvastatin at home      History of chronic back pain  -On tramadol at home as needed     Morbid obesity  -BMI 47.26  - Counseled on Lifestyle modifications, AHA Diet ,weight loss strategies.     Body mass index is 47.26 kg/m². Code Status: full   Surrogate Decision Maker:     DVT Prophylaxis: Lovenox  GI Prophylaxis: not indicated  Baseline: ambulatory independently           Subjective:     Chief Complaint / Reason for Physician Visit  \"still lot of fluid on me\". Discussed with RN events overnight. Review of Systems:  No fevers, chills, appetite change, cough, sputum production, shortness of breath, dyspnea on exertion, nausea, vomitting, diarrhea, constipation, chest pain, leg edema, abdominal pain, joint pain, rash, itching. Tolerating PT/OT. Tolerating diet. Objective:     VITALS:   Last 24hrs VS reviewed since prior progress note. Most recent are:  Patient Vitals for the past 24 hrs:   Temp Pulse Resp BP SpO2   12/05/21 0755 98 °F (36.7 °C) 71 16 (!) 154/53 96 %   12/04/21 1919 96.8 °F (36 °C) 75 16 (!) 160/64 98 %   12/04/21 1615 98.1 °F (36.7 °C) 76 18 (!) 144/59 96 %   12/04/21 1152 98.8 °F (37.1 °C) 66 18 (!) 138/54 97 %     No intake or output data in the 24 hours ending 12/05/21 1001     PHYSICAL EXAM:  General: WD, WN. Alert, cooperative, no acute distress    EENT:  EOMI. Anicteric sclerae. MMM  Resp:  wheezing No accessory muscle use  CV:  Regular  rhythm,  normal S1/S2, no murmurs rubs gallops,3+ generalzied edema  GI:  Soft, Non distended, Non tender. +Bowel sounds  Neurologic:  Alert and oriented X 3, normal speech,   Psych:   Good insight. Not anxious nor agitated  Skin:  No rashes.   No jaundice    Reviewed most current lab test results and cultures  YES  Reviewed most current radiology test results   YES  Review and summation of old records today    NO  Reviewed patient's current orders and MAR    YES  PMH/SH reviewed - no change compared to H&P  ________________________________________________________________________  Care Plan discussed with:    Comments   Patient x    Family  x Kapil Lawssoy Singh   RN x    Care Manager x    Consultant  x                     x Multidiciplinary team rounds were held today with , nursing, pharmacist and clinical coordinator. Patient's plan of care was discussed; medications were reviewed and discharge planning was addressed. ________________________________________________________________________  Total NON critical care TIME: 35  Minutes    Total CRITICAL CARE TIME Spent:   Minutes non procedure based      Comments   >50% of visit spent in counseling and coordination of care x    ________________________________________________________________________  Natacha Rothman MD     Procedures: see electronic medical records for all procedures/Xrays and details which were not copied into this note but were reviewed prior to creation of Plan. LABS:  I reviewed today's most current labs and imaging studies. Pertinent labs include:  Recent Labs     12/05/21  0505 12/04/21 0538 12/03/21  1036 12/03/21 0257 12/03/21 0257   WBC 6.3 6.5  --   --  6.2   HGB 7.3* 7.6* 7.1*   < > 6.9*   HCT 22.2* 23.4* 21.0*   < > 20.8*   * 117*  --   --  104*    < > = values in this interval not displayed.      Recent Labs     12/05/21  0505 12/04/21  0538 12/03/21  0257    138 140   K 4.1 4.1 4.0    107 108   CO2 27 25 26   * 139* 139*   BUN 28* 26* 24*   CREA 3.65* 3.67* 3.42*   CA 8.3* 7.9* 7.4*   MG 1.9 2.0 1.9   PHOS 4.9* 4.8* 4.6   ALB 1.8* 2.0*  --    TBILI 0.2 0.2  --    ALT 10* 11*  --        Signed: Natacha Rothman MD

## 2021-12-05 NOTE — PROGRESS NOTES
0446 Report received from [address], RN (offgoing nurse). Report included Kardex, SBAR, MAR and labs. 3884 Patient sleeping and easily aroused. Patient administered her scheduled medications. Patient has no other needs at this time. 6884 Patient sleeping. 1020 Patient rest comfortably with no complaints at this time. 1103 Patient resting comfortably. Patient given scheduled medications. Patient has no other needs at this time. 1220 Patient has no needs at this time. 1241 Patient eating lunch. Patient made aware that were are going to move her when she has finished her lunch. Batteries in patient's telemetry box changed. Patient has no needs at this time. 1325 Patient moved to room 226. All belongings brought with patient. Patient brief changed and pure wick placed. Patient has no other needs at this time. Patient bed in the lowest position with the call bell and phone in reach. 1345 Patient called out on call bell and said something was wrong with her IV. Upon assessment it was found that patient had accidentally pulled out her IV in her LAC. Patient was not bleeding, catheter found in the bed with the tip intact. Patient has no other needs at this time. 1422 Patient resting comfortably in bed watching television. Patient scheduled medications administered. Patient has no other needs at this time. 1533 Patient sleeping with lights dimmed. 1221 Patient resting in bed watching television. IV started on patient in [address] [place] [address], labs drawn and specimen take to lab for processing. Patient has no other needs at this time. Be in lowest position with lights dimmed and call bell in reach. 1915 Report received from [address], [address] [facility] (ongoing nurse). Report included Kardex, SBAR, MAR and labs.

## 2021-12-05 NOTE — PROGRESS NOTES
0755: Obtained AM VS and BG, RN notified of 154/43 BP and BG of 127. Pt in bed, resting quietly. Denies additional needs at this time. 1000: Pt in bed, resting quietly. Denies additional needs at this time. 1138: Obtained BG of 179, RN notified. 1200: Pt in bed, eating lunch. Denies additional needs at this time. 1325: Assisted Cristel Porter RN to move pt to room 226 and set up purwic. RN present. 1600: Pt in bed, resting quietly. Talking on the phone. 1921: Obtained PM VS, RN notified of 149/60 BP. Pt in bed, resting quietly. Denies additional needs at this time.

## 2021-12-05 NOTE — PROGRESS NOTES
U Nephrology Consult - [facility]  Requesting physician:   Date of consult: 12/4/21     CC: f/u EMMANUEL     This is an \"e-consult\" with data obtained by chart review and by discussion with the requesting physician. I did not directly examine or interview the patient. S:   Pt crt remains stable.   Bp range from 130-160's   Urine output unclear as no documentation in chart  Received bumex 2mg in am and then evening 3mg       PMH:  Past Medical History:   Diagnosis Date    Arthritis     Asthma     [facility])     Chronic back pain     Diabetes ([facility] [address]     [facility]     Hypertension     MRSA (methicillin resistant staph aureus) culture positive     labial abscess    Neuropathy            Current inpatient medications:    Current Facility-Administered Medications:     albuterol-ipratropium (DUO-NEB) 2.5 MG-0.5 MG/3 ML, 3 mL, Nebulization, Q4H PRN, Mel Guerin MD    heparin (porcine) injection 5,000 Units, 5,000 Units, SubCUTAneous, Q8H, Mel Guerin MD, 5,000 Units at 12/05/21 0539    sodium chloride (NS) flush 5-40 mL, 5-40 mL, IntraVENous, Q8H, Mel Guerin MD, 10 mL at 12/05/21 0540    sodium chloride (NS) flush 5-40 mL, 5-40 mL, IntraVENous, PRN, Mel Guerin MD    acetaminophen (TYLENOL) tablet 650 mg, 650 mg, Oral, Q6H PRN, 650 mg at 12/04/21 2210 **OR** acetaminophen (TYLENOL) suppository 650 mg, 650 mg, Rectal, Q6H PRN, Mel Guerin MD    polyethylene glycol (MIRALAX) packet 17 g, 17 g, Oral, DAILY PRN, Mel Guerin MD    ondansetron (ZOFRAN ODT) tablet 4 mg, 4 mg, Oral, Q8H PRN **OR** ondansetron (ZOFRAN) injection 4 mg, 4 mg, IntraVENous, Q6H PRN, Mel Guerin MD    amLODIPine (NORVASC) tablet 10 mg, 10 mg, Oral, DAILY, Mel Guerin MD, 10 mg at 12/04/21 0857    amitriptyline (ELAVIL) tablet 50 mg, 50 mg, Oral, QHS, Mel Guerin MD, 50 mg at 12/04/21 6677    aspirin chewable tablet 81 mg, 81 mg, Oral, DAILY, Jaswinder Dennis MD, 81 mg at 12/04/21 0857    atorvastatin (LIPITOR) tablet 80 mg, 80 mg, Oral, QHS, Jaswinder Dennis MD, 80 mg at 12/04/21 2210    clopidogreL (PLAVIX) tablet 75 mg, 75 mg, Oral, DAILY, Jaswinder Dennis MD, 75 mg at 12/04/21 0857    gabapentin (NEURONTIN) capsule 100 mg, 100 mg, Oral, BID, Jaswinder Dennis MD, 100 mg at 12/04/21 1736    metoprolol tartrate (LOPRESSOR) tablet 25 mg, 25 mg, Oral, BID, Jaswinder Dennis MD, 25 mg at 12/04/21 1736    bumetanide (BUMEX) injection 1 mg, 1 mg, IntraVENous, BID, Jaswinder Dennis MD, 1 mg at 12/04/21 1744    glucose chewable tablet 16 g, 4 Tablet, Oral, PRN, Jaswinder Dennis MD    dextrose (D50W) injection syrg 12.5-25 g, 25-50 mL, IntraVENous, PRN, Jaswinder Dennis MD    glucagon (GLUCAGEN) injection 1 mg, 1 mg, IntraMUSCular, PRN, Jaswinder Dennis MD    insulin lispro (HUMALOG) injection, , SubCUTAneous, AC&HS, Jasiwnder Dennis MD, 2 Units at 12/04/21 2217    Allergies: Allergies   Allergen Reactions    Amoxicillin Nausea Only    Aspirin Other (comments)     \"nosebleeds\" but patient takes daily aspirin 81 mg at home.  Patient states naproxen ok    Ciprofloxacin Hives       ROS:   Not performed    Vitals:  Patient Vitals for the past 24 hrs:   Temp Pulse Resp BP SpO2   12/05/21 0755 98 °F (36.7 °C) 71 16 (!) 154/53 96 %   12/04/21 1919 96.8 °F (36 °C) 75 16 (!) 160/64 98 %   12/04/21 1615 98.1 °F (36.7 °C) 76 18 (!) 144/59 96 %   12/04/21 1152 98.8 °F (37.1 °C) 66 18 (!) 138/54 97 %   12/04/21 0856    (!) 159/64        I/O:  No intake or output data in the 24 hours ending 12/05/21 6048    Physical exam:  Not performed    Labs/imaging:  Recent Results (from the past 24 hour(s))   GLUCOSE, POC    Collection Time: 12/04/21 11:58 AM   Result Value Ref Range    Glucose (POC) 175 (H) 65 - 117 mg/dL    Performed by Nimco Cano (CON)    GLUCOSE, POC    Collection Time: 12/04/21  4:55 PM   Result Value Ref Range    Glucose (POC) 188 (H) 65 - 117 mg/dL    Performed by Chon Paz (CON)    GLUCOSE, POC    Collection Time: 12/04/21 10:09 PM   Result Value Ref Range    Glucose (POC) 337 (H) 65 - 117 mg/dL    Performed by Alyssa Westbrook    CBC WITH AUTOMATED DIFF    Collection Time: 12/05/21  5:05 AM   Result Value Ref Range    WBC 6.3 3.6 - 11.0 K/uL    RBC 2.64 (L) 3.80 - 5.20 M/uL    HGB 7.3 (L) 11.5 - 16.0 g/dL    HCT 22.2 (L) 35.0 - 47.0 %    MCV 84.1 80.0 - 99.0 FL    MCH 27.7 26.0 - 34.0 PG    MCHC 32.9 30.0 - 36.5 g/dL    RDW 16.2 (H) 11.5 - 14.5 %    PLATELET 030 (L) 265 - 400 K/uL    MPV 9.9 8.9 - 12.9 FL    NRBC 0.0 0  WBC    ABSOLUTE NRBC 0.00 0.00 - 0.01 K/uL    NEUTROPHILS 58 32 - 75 %    LYMPHOCYTES 29 12 - 49 %    MONOCYTES 8 5 - 13 %    EOSINOPHILS 5 0 - 7 %    BASOPHILS 0 0 - 1 %    IMMATURE GRANULOCYTES 1 (H) 0.0 - 0.5 %    ABS. NEUTROPHILS 3.7 1.8 - 8.0 K/UL    ABS. LYMPHOCYTES 1.8 0.8 - 3.5 K/UL    ABS. MONOCYTES 0.5 0.0 - 1.0 K/UL    ABS. EOSINOPHILS 0.3 0.0 - 0.4 K/UL    ABS. BASOPHILS 0.0 0.0 - 0.1 K/UL    ABS. IMM.  GRANS. 0.0 0.00 - 0.04 K/UL    DF AUTOMATED     METABOLIC PANEL, BASIC    Collection Time: 12/05/21  5:05 AM   Result Value Ref Range    Sodium 140 136 - 145 mmol/L    Potassium 4.1 3.5 - 5.1 mmol/L    Chloride 107 97 - 108 mmol/L    CO2 27 21 - 32 mmol/L    Anion gap 6 5 - 15 mmol/L    Glucose 131 (H) 65 - 100 mg/dL    BUN 28 (H) 6 - 20 MG/DL    Creatinine 3.65 (H) 0.55 - 1.02 MG/DL    BUN/Creatinine ratio 8 (L) 12 - 20      GFR est AA 16 (L) >60 ml/min/1.73m2    GFR est non-AA 13 (L) >60 ml/min/1.73m2    Calcium 8.3 (L) 8.5 - 10.1 MG/DL   MAGNESIUM    Collection Time: 12/05/21  5:05 AM   Result Value Ref Range    Magnesium 1.9 1.6 - 2.4 mg/dL   PHOSPHORUS    Collection Time: 12/05/21  5:05 AM   Result Value Ref Range    Phosphorus 4.9 (H) 2.6 - 4.7 MG/DL   HEPATIC FUNCTION PANEL    Collection Time: 12/05/21  5:05 AM   Result Value Ref Range    Protein, total 5.4 (L) 6.4 - 8.2 g/dL    Albumin 1.8 (L) 3.5 - 5.0 g/dL    Globulin 3.6 2.0 - 4.0 g/dL    A-G Ratio 0.5 (L) 1.1 - 2.2      Bilirubin, total 0.2 0.2 - 1.0 MG/DL    Bilirubin, direct 0.1 0.0 - 0.2 MG/DL    Alk. phosphatase 99 45 - 117 U/L    AST (SGOT) 15 15 - 37 U/L    ALT (SGPT) 10 (L) 12 - 78 U/L   GLUCOSE, POC    Collection Time: 12/05/21  8:00 AM   Result Value Ref Range    Glucose (POC) 127 (H) 65 - 117 mg/dL    Performed by Kellie Taveras        A/P:  1. [place] stage 1 on CKD3b:  -? Urine output with bumex   -crt stable, ddx includes CRS in setting of progressive renal failure with hx of DMN  -no acute indication for RRT  -recommend placing [place] for accurate I/outs and daily standing weights   -start on lasix drip with chlorothiazide   -ua, upcr and microalbumin pendning   -check pth and cystatin c  -if refractory to diuretics will need to consider UF with RRT   -renally dose all medications for gfr < 15       2. CHF     3. DM:  -give gfr < 30 would hold sglt inhibitor     4. htn  -continue current regimen   -monitor with diuresis   -can not give ace-I or arb in setting of [place]       Thank you for this consult. I will continue to follow the patient with you. Please feel free to call with questions.      Dong Pitt M.D.  [facility]

## 2021-12-06 LAB
ALBUMIN SERPL-MCNC: 1.9 G/DL (ref 3.5–5)
ALBUMIN SERPL-MCNC: 2 G/DL (ref 3.5–5)
ALBUMIN/GLOB SERPL: 0.5 {RATIO} (ref 1.1–2.2)
ALBUMIN/GLOB SERPL: 0.5 {RATIO} (ref 1.1–2.2)
ALP SERPL-CCNC: 116 U/L (ref 45–117)
ALP SERPL-CCNC: 91 U/L (ref 45–117)
ALT SERPL-CCNC: 11 U/L (ref 12–78)
ALT SERPL-CCNC: 14 U/L (ref 12–78)
ANION GAP SERPL CALC-SCNC: 6 MMOL/L (ref 5–15)
ANION GAP SERPL CALC-SCNC: 7 MMOL/L (ref 5–15)
AST SERPL-CCNC: 14 U/L (ref 15–37)
AST SERPL-CCNC: 20 U/L (ref 15–37)
BASOPHILS # BLD: 0 K/UL (ref 0–0.1)
BASOPHILS NFR BLD: 1 % (ref 0–1)
BILIRUB DIRECT SERPL-MCNC: 0.1 MG/DL (ref 0–0.2)
BILIRUB DIRECT SERPL-MCNC: <0.1 MG/DL (ref 0–0.2)
BILIRUB SERPL-MCNC: 0.2 MG/DL (ref 0.2–1)
BILIRUB SERPL-MCNC: 0.2 MG/DL (ref 0.2–1)
BUN SERPL-MCNC: 28 MG/DL (ref 6–20)
BUN SERPL-MCNC: 31 MG/DL (ref 6–20)
BUN/CREAT SERPL: 8 (ref 12–20)
BUN/CREAT SERPL: 8 (ref 12–20)
CALCIUM SERPL-MCNC: 8.4 MG/DL (ref 8.5–10.1)
CALCIUM SERPL-MCNC: 8.5 MG/DL (ref 8.5–10.1)
CHLORIDE SERPL-SCNC: 104 MMOL/L (ref 97–108)
CHLORIDE SERPL-SCNC: 106 MMOL/L (ref 97–108)
CO2 SERPL-SCNC: 25 MMOL/L (ref 21–32)
CO2 SERPL-SCNC: 26 MMOL/L (ref 21–32)
CREAT SERPL-MCNC: 3.65 MG/DL (ref 0.55–1.02)
CREAT SERPL-MCNC: 3.66 MG/DL (ref 0.55–1.02)
DIFFERENTIAL METHOD BLD: ABNORMAL
EOSINOPHIL # BLD: 0.3 K/UL (ref 0–0.4)
EOSINOPHIL NFR BLD: 5 % (ref 0–7)
ERYTHROCYTE [DISTWIDTH] IN BLOOD BY AUTOMATED COUNT: 16.2 % (ref 11.5–14.5)
GLOBULIN SER CALC-MCNC: 3.5 G/DL (ref 2–4)
GLOBULIN SER CALC-MCNC: 3.8 G/DL (ref 2–4)
GLUCOSE BLD STRIP.AUTO-MCNC: 169 MG/DL (ref 65–117)
GLUCOSE BLD STRIP.AUTO-MCNC: 233 MG/DL (ref 65–117)
GLUCOSE BLD STRIP.AUTO-MCNC: 263 MG/DL (ref 65–117)
GLUCOSE BLD STRIP.AUTO-MCNC: 264 MG/DL (ref 65–117)
GLUCOSE SERPL-MCNC: 173 MG/DL (ref 65–100)
GLUCOSE SERPL-MCNC: 233 MG/DL (ref 65–100)
HCT VFR BLD AUTO: 23.4 % (ref 35–47)
HGB BLD-MCNC: 7.7 G/DL (ref 11.5–16)
IMM GRANULOCYTES # BLD AUTO: 0 K/UL (ref 0–0.04)
IMM GRANULOCYTES NFR BLD AUTO: 1 % (ref 0–0.5)
LYMPHOCYTES # BLD: 1.8 K/UL (ref 0.8–3.5)
LYMPHOCYTES NFR BLD: 27 % (ref 12–49)
MAGNESIUM SERPL-MCNC: 1.8 MG/DL (ref 1.6–2.4)
MAGNESIUM SERPL-MCNC: 1.8 MG/DL (ref 1.6–2.4)
MCH RBC QN AUTO: 28 PG (ref 26–34)
MCHC RBC AUTO-ENTMCNC: 32.9 G/DL (ref 30–36.5)
MCV RBC AUTO: 85.1 FL (ref 80–99)
MONOCYTES # BLD: 0.5 K/UL (ref 0–1)
MONOCYTES NFR BLD: 7 % (ref 5–13)
NEUTS SEG # BLD: 4 K/UL (ref 1.8–8)
NEUTS SEG NFR BLD: 59 % (ref 32–75)
NRBC # BLD: 0 K/UL (ref 0–0.01)
NRBC BLD-RTO: 0 PER 100 WBC
PHOSPHATE SERPL-MCNC: 5.1 MG/DL (ref 2.6–4.7)
PHOSPHATE SERPL-MCNC: 5.4 MG/DL (ref 2.6–4.7)
PLATELET # BLD AUTO: 155 K/UL (ref 150–400)
PMV BLD AUTO: 9.6 FL (ref 8.9–12.9)
POTASSIUM SERPL-SCNC: 4 MMOL/L (ref 3.5–5.1)
POTASSIUM SERPL-SCNC: 4 MMOL/L (ref 3.5–5.1)
PROT SERPL-MCNC: 5.4 G/DL (ref 6.4–8.2)
PROT SERPL-MCNC: 5.8 G/DL (ref 6.4–8.2)
RBC # BLD AUTO: 2.75 M/UL (ref 3.8–5.2)
SERVICE CMNT-IMP: ABNORMAL
SODIUM SERPL-SCNC: 137 MMOL/L (ref 136–145)
SODIUM SERPL-SCNC: 137 MMOL/L (ref 136–145)
WBC # BLD AUTO: 6.7 K/UL (ref 3.6–11)

## 2021-12-06 PROCEDURE — 74011636637 HC RX REV CODE- 636/637: Performed by: STUDENT IN AN ORGANIZED HEALTH CARE EDUCATION/TRAINING PROGRAM

## 2021-12-06 PROCEDURE — 99232 SBSQ HOSP IP/OBS MODERATE 35: CPT | Performed by: NURSE PRACTITIONER

## 2021-12-06 PROCEDURE — 85025 COMPLETE CBC W/AUTO DIFF WBC: CPT

## 2021-12-06 PROCEDURE — 74011000250 HC RX REV CODE- 250: Performed by: STUDENT IN AN ORGANIZED HEALTH CARE EDUCATION/TRAINING PROGRAM

## 2021-12-06 PROCEDURE — 84100 ASSAY OF PHOSPHORUS: CPT

## 2021-12-06 PROCEDURE — 74011250637 HC RX REV CODE- 250/637: Performed by: STUDENT IN AN ORGANIZED HEALTH CARE EDUCATION/TRAINING PROGRAM

## 2021-12-06 PROCEDURE — 80076 HEPATIC FUNCTION PANEL: CPT

## 2021-12-06 PROCEDURE — 74011250636 HC RX REV CODE- 250/636: Performed by: STUDENT IN AN ORGANIZED HEALTH CARE EDUCATION/TRAINING PROGRAM

## 2021-12-06 PROCEDURE — 77030040830 HC CATH URETH FOL MDII -A

## 2021-12-06 PROCEDURE — 65660000001 HC RM ICU INTERMED STEPDOWN

## 2021-12-06 PROCEDURE — 82962 GLUCOSE BLOOD TEST: CPT

## 2021-12-06 PROCEDURE — 80048 BASIC METABOLIC PNL TOTAL CA: CPT

## 2021-12-06 PROCEDURE — 36415 COLL VENOUS BLD VENIPUNCTURE: CPT

## 2021-12-06 PROCEDURE — 74011000258 HC RX REV CODE- 258: Performed by: STUDENT IN AN ORGANIZED HEALTH CARE EDUCATION/TRAINING PROGRAM

## 2021-12-06 PROCEDURE — 83735 ASSAY OF MAGNESIUM: CPT

## 2021-12-06 RX ORDER — FUROSEMIDE 10 MG/ML
100 INJECTION INTRAMUSCULAR; INTRAVENOUS ONCE
Status: COMPLETED | OUTPATIENT
Start: 2021-12-06 | End: 2021-12-06

## 2021-12-06 RX ADMIN — INSULIN LISPRO 2 UNITS: 100 INJECTION, SOLUTION INTRAVENOUS; SUBCUTANEOUS at 16:30

## 2021-12-06 RX ADMIN — INSULIN LISPRO 2 UNITS: 100 INJECTION, SOLUTION INTRAVENOUS; SUBCUTANEOUS at 21:40

## 2021-12-06 RX ADMIN — Medication 10 ML: at 21:42

## 2021-12-06 RX ADMIN — Medication 10 ML: at 06:00

## 2021-12-06 RX ADMIN — AMITRIPTYLINE HYDROCHLORIDE 50 MG: 50 TABLET, FILM COATED ORAL at 21:40

## 2021-12-06 RX ADMIN — ACETAMINOPHEN 650 MG: 325 TABLET ORAL at 09:04

## 2021-12-06 RX ADMIN — ASPIRIN 81 MG CHEWABLE TABLET 81 MG: 81 TABLET CHEWABLE at 08:47

## 2021-12-06 RX ADMIN — GABAPENTIN 100 MG: 100 CAPSULE ORAL at 08:47

## 2021-12-06 RX ADMIN — METOPROLOL TARTRATE 25 MG: 25 TABLET, FILM COATED ORAL at 08:47

## 2021-12-06 RX ADMIN — CHLOROTHIAZIDE SODIUM 500 MG: 500 INJECTION, POWDER, LYOPHILIZED, FOR SOLUTION INTRAVENOUS at 10:00

## 2021-12-06 RX ADMIN — FUROSEMIDE 20 MG/HR: 10 INJECTION INTRAMUSCULAR; INTRAVENOUS at 10:00

## 2021-12-06 RX ADMIN — HEPARIN SODIUM 5000 UNITS: 5000 INJECTION, SOLUTION INTRAVENOUS; SUBCUTANEOUS at 14:00

## 2021-12-06 RX ADMIN — METOPROLOL TARTRATE 25 MG: 25 TABLET, FILM COATED ORAL at 18:00

## 2021-12-06 RX ADMIN — Medication 10 ML: at 11:32

## 2021-12-06 RX ADMIN — AMLODIPINE BESYLATE 10 MG: 5 TABLET ORAL at 09:04

## 2021-12-06 RX ADMIN — GABAPENTIN 100 MG: 100 CAPSULE ORAL at 18:00

## 2021-12-06 RX ADMIN — FUROSEMIDE 20 MG/HR: 10 INJECTION INTRAMUSCULAR; INTRAVENOUS at 21:42

## 2021-12-06 RX ADMIN — FUROSEMIDE 100 MG: 40 INJECTION, SOLUTION INTRAMUSCULAR; INTRAVENOUS at 10:00

## 2021-12-06 RX ADMIN — HEPARIN SODIUM 5000 UNITS: 5000 INJECTION, SOLUTION INTRAVENOUS; SUBCUTANEOUS at 21:40

## 2021-12-06 RX ADMIN — CLOPIDOGREL BISULFATE 75 MG: 75 TABLET, FILM COATED ORAL at 08:48

## 2021-12-06 RX ADMIN — INSULIN LISPRO 3 UNITS: 100 INJECTION, SOLUTION INTRAVENOUS; SUBCUTANEOUS at 08:47

## 2021-12-06 RX ADMIN — HEPARIN SODIUM 5000 UNITS: 5000 INJECTION, SOLUTION INTRAVENOUS; SUBCUTANEOUS at 05:03

## 2021-12-06 RX ADMIN — ATORVASTATIN CALCIUM 80 MG: 40 TABLET, FILM COATED ORAL at 21:40

## 2021-12-06 NOTE — PROGRESS NOTES
0730: Resting comfortably. 0900: Tylenol given for HA.    1030: Rounds complete. Will insert Mena, initiate Lasix gtt, transfer pt to PCU. Labs sent. 1100: Small unmeasured urine occurrence prior to inserting Mena. Mena inserted. See flowsheets for output. 1300: Mena inserted. Ample output. Pt x2 assist with walker to bedside commode. Large, formed, hard BM. 1400: Weight taken on bed scale. Will attempt standing weight tmr AM.    1600: BP improving. Resting comfortably. 1800: Meds given. [address]: Labs sent.      1900: Report to [facility]

## 2021-12-06 NOTE — PROGRESS NOTES
Comprehensive Nutrition Assessment    Type and Reason for Visit: (P) Initial    Nutrition Recommendations/Plan: Diet as tolerated. RD added CC restrictions and supplements to meal trays. Nutrition Assessment:  (P) Pt admitted with EMMANUEL on CKD-IIIB; progressive renal failure 2' uncontr HTN and DM (A1c 8.4), MO, liver disorder, TIA, Vit D deficiency, acute on chronic diastolic heart failure      Estimated Daily Nutrient Needs:  Energy (kcal): (P) 1975; Weight Used for Energy Requirements: (P) Adjusted  Protein (g): (P) 74 (.57 g/kg (renal failure, obesity)); Weight Used for Protein Requirements: (P) Adjusted  Fluid (ml/day): (P) 1500; Method Used for Fluid Requirements: (P) Other (comment) (MD order)      Nutrition Related Findings:  (P) Pt tolerating diet, CC restrictions added      Wounds:    (P) None       Current Nutrition Therapies:  ADULT DIET Regular; 4 carb choices (60 gm/meal); Low Fat/Low Chol/High Fiber/2 gm Na; Low Sodium (2 gm); 1500 ml    Anthropometric Measures:  Height:  (P) 5' 5\" (165.1 cm)  Current Body Wt:  (P) 128.4 kg (283 lb 1.6 oz)   Admission Body Wt:       Usual Body Wt:        Ideal Body Wt:  (P) 125 lbs:  (P) 226.5 %   Adjusted Body Weight:   ; Weight Adjustment for: (P)  (82.5 kg)   Adjusted BMI:       BMI Category:  (P) Obese class 3 (BMI 40.0 or greater)       Nutrition Diagnosis:   (P) Not ready for diet/lifestyle change, Limited adherence to nutrition-related recommendations related to (P) food and nutrition related knowledge deficit as evidenced by (P) BMI    Nutrition Interventions:   Food and/or Nutrient Delivery: (P) Modify current diet, Start oral nutrition supplement  Nutrition Education and Counseling: (P) Education not indicated  Coordination of Nutrition Care: (P) Continue to monitor while inpatient    Goals:  (P) PO intake > 75% most meals and ONS next 4-7 days.   Optimize glycemic control       Nutrition Monitoring and Evaluation:   Behavioral-Environmental Outcomes: (P) Readiness for change  Food/Nutrient Intake Outcomes: (P) Food and nutrient intake, Supplement intake  Physical Signs/Symptoms Outcomes: (P) Meal time behavior, Weight    Discharge Planning:    (P) Continue current diet     Electronically signed by Tom Beebe, PhD, RD, [address] on 12/6/2021 at 7:23 AM    Contact: [phone]

## 2021-12-06 NOTE — PROGRESS NOTES
[facility] Consult - [facility] (formerly [facility])[3]  Requesting physician:   Date of consult: 12/4/21    CC: f/u for EMMANUEL     This is an \"e-consult\" with data obtained by chart review and by discussion with the requesting physician. I did not directly examine or interview the patient. S:   Pts bp range 118-150's   Urine output is not recorded accurately so unclear what patients is making, in last 24hrs urine output total listed as 1350ml.        PMH:  Past Medical History:   Diagnosis Date    Arthritis     Asthma     [facility] ([facility])     Chronic back pain     [facility]     Diabetic retinopathy ([facility]     Hypertension     MRSA (methicillin resistant staph aureus) culture positive     labial abscess    Neuropathy              Current inpatient medications:    Current Facility-Administered Medications:     bumetanide (BUMEX) injection 3 mg, 3 mg, IntraVENous, BID, Jamison Mendes MD, 3 mg at 12/05/21 1726    albuterol-ipratropium (DUO-NEB) 2.5 MG-0.5 MG/3 ML, 3 mL, Nebulization, Q4H PRN, Jamison Mendes MD    heparin (porcine) injection 5,000 Units, 5,000 Units, SubCUTAneous, Q8H, Jamison Mendes MD, 5,000 Units at 12/06/21 0503    sodium chloride (NS) flush 5-40 mL, 5-40 mL, IntraVENous, Q8H, Jonnathan Cruz MD, 10 mL at 12/06/21 0600    sodium chloride (NS) flush 5-40 mL, 5-40 mL, IntraVENous, PRN, Jamison Mendes MD    acetaminophen (TYLENOL) tablet 650 mg, 650 mg, Oral, Q6H PRN, 650 mg at 12/04/21 2210 **OR** acetaminophen (TYLENOL) suppository 650 mg, 650 mg, Rectal, Q6H PRN, Jamison Mendes MD    polyethylene glycol (MIRALAX) packet 17 g, 17 g, Oral, DAILY PRN, Jamison Mendes MD, 17 g at 12/05/21 1427    ondansetron (ZOFRAN ODT) tablet 4 mg, 4 mg, Oral, Q8H PRN **OR** ondansetron (ZOFRAN) injection 4 mg, 4 mg, IntraVENous, Q6H PRN, Jamison Mendes MD    amLODIPine (NORVASC) tablet 10 mg, 10 mg, Oral, DAILY, Jamison Mendes MD, 10 mg at 12/05/21 0829   amitriptyline (ELAVIL) tablet 50 mg, 50 mg, Oral, QHS, See Christine MD, 50 mg at 12/05/21 2243    aspirin chewable tablet 81 mg, 81 mg, Oral, DAILY, See Christine MD, 81 mg at 12/05/21 0829    atorvastatin (LIPITOR) tablet 80 mg, 80 mg, Oral, QHS, See Christine MD, 80 mg at 12/05/21 2243    clopidogreL (PLAVIX) tablet 75 mg, 75 mg, Oral, DAILY, See Christine MD, 75 mg at 12/05/21 6687    gabapentin (NEURONTIN) capsule 100 mg, 100 mg, Oral, BID, See Christine MD, 100 mg at 12/05/21 1726    metoprolol tartrate (LOPRESSOR) tablet 25 mg, 25 mg, Oral, BID, See Christine MD, 25 mg at 12/05/21 1726    glucose chewable tablet 16 g, 4 Tablet, Oral, PRN, See Christine MD    dextrose (D50W) injection syrg 12.5-25 g, 25-50 mL, IntraVENous, PRN, See Christine MD    glucagon (GLUCAGEN) injection 1 mg, 1 mg, IntraMUSCular, PRN, See Christine MD    insulin lispro (HUMALOG) injection, , SubCUTAneous, AC&HS, See Christine MD, 1 Units at 12/05/21 2251    Allergies: Allergies   Allergen Reactions    Amoxicillin Nausea Only    Aspirin Other (comments)     \"nosebleeds\" but patient takes daily aspirin 81 mg at home.  Patient states naproxen ok    Ciprofloxacin Hives       ROS:   Not performed    Vitals:  Patient Vitals for the past 24 hrs:   Temp Pulse Resp BP SpO2   12/06/21 0822 97.9 °F (36.6 °C) 74 16 (!) 152/63 94 %   12/06/21 0415 98.6 °F (37 °C) 78 18 136/74 (!) 8 %   12/05/21 2330 98 °F (36.7 °C) 78 16 (!) 148/82 97 %   12/05/21 1921 97.6 °F (36.4 °C) 77 16 (!) 149/60 97 %   12/05/21 1541 97.9 °F (36.6 °C) 74 16 (!) 145/62 100 %   12/05/21 1131 98.1 °F (36.7 °C) 68 16 (!) 118/45 96 %       I/O:    Intake/Output Summary (Last 24 hours) at 12/6/2021 0850  Last data filed at 12/5/2021 1921  Gross per 24 hour   Intake 610 ml   Output 1350 ml   Net -740 ml       Physical exam:  Not performed    Labs/imaging:  Recent Results (from the past 24 hour(s))   GLUCOSE, POC    Collection Time: 12/05/21 11:38 AM   Result Value Ref Range    Glucose (POC) 179 (H) 65 - 117 mg/dL    Performed by [address] , BASIC    Collection Time: 21  4:12 PM   Result Value Ref Range    Sodium 137 136 - 145 mmol/L    Potassium 4.2 3.5 - 5.1 mmol/L    Chloride 106 97 - 108 mmol/L    CO2 26 21 - 32 mmol/L    Anion gap 5 5 - 15 mmol/L    Glucose 180 (H) 65 - 100 mg/dL    BUN 28 (H) 6 - 20 MG/DL    Creatinine 3.70 (H) 0.55 - 1.02 MG/DL    BUN/Creatinine ratio 8 (L) 12 - 20      GFR est AA 15 (L) >60 ml/min/1.73m2    GFR est non-AA 13 (L) >60 ml/min/1.73m2    Calcium 8.4 (L) 8.5 - 10.1 MG/DL   MAGNESIUM    Collection Time: 21  4:12 PM   Result Value Ref Range    Magnesium 1.9 1.6 - 2.4 mg/dL   PHOSPHORUS    Collection Time: 21  4:12 PM   Result Value Ref Range    Phosphorus 5.1 (H) 2.6 - 4.7 MG/DL   GLUCOSE, POC    Collection Time: 21  4:45 PM   Result Value Ref Range    Glucose (POC) 191 (H) 65 - 117 mg/dL    Performed by [id number], [facility], POC    Collection Time: 21 10:41 PM   Result Value Ref Range    Glucose (POC) 225 (H) 65 - 117 mg/dL    Performed by Jen Mike    GLUCOSE, POC    Collection Time: 21  8:17 AM   Result Value Ref Range    Glucose (POC) 263 (H) 65 - 117 mg/dL    Performed by Jaky Finney (PCT)        A/P:  1. EMMANUEL stage 1 on CKD3b:  -? Urine output with bumex   -crt stable, ddx includes CRS in setting of progressive renal failure with hx of DMN  -no acute indication for RRT  -Please place [place] today  for accurate I/outs and daily standing weights   -start on lasix drip first give bolus of 100mg iv lasix and initiate drip at 20mg/hr with chlorothiazide 500mg IV.   -goal to keep negative 1.5-2 liters daily   -ua, upcr and microalbumin pendning   -check pth and cystatin c  -if refractory to diuretics will need  UF with RRT   -renally dose all medications for gfr < 15         2. CHF      3. DM:  -give gfr < 30 would hold sglt inhibitor      4. htn  -continue current regimen   -monitor with diuresis   -can not give ace-I or arb in setting of [place]         Thank you for this consult. I will continue to follow the patient with you.  Please feel free to call with questions.  Stephany Tay M.D.  [facility]

## 2021-12-06 NOTE — PROGRESS NOTES
[facility] [address], [address]  Office Phone [phone]    CARDIOLOGY PROGRESS NOTE    12/6/2021 8:19 AM    Admit Date: 12/1/2021    Admit Diagnosis:   EMMANUEL (acute kidney injury) ([facility] [address] [N17.9]    Subjective:     Minus Letters presents in bed this morning, no appreciable difference that I see in her lower extremity or hand edema. Urine output last night 150 cc. GFR further declining 15 this morning. She denies orthopnea or paroxysmal nocturnal dyspnea.     Visit Vitals  /74 (BP Patient Position: At rest)   Pulse 78   Temp 98.6 °F (37 °C)   Resp 18   Ht 5' 5\" (1.651 m)   Wt 283 lb 1.6 oz (128.4 kg)   LMP 05/01/2013   SpO2 (!) 8%   BMI 47.11 kg/m²       Current Facility-Administered Medications   Medication Dose Route Frequency    bumetanide (BUMEX) injection 3 mg  3 mg IntraVENous BID    albuterol-ipratropium (DUO-NEB) 2.5 MG-0.5 MG/3 ML  3 mL Nebulization Q4H PRN    heparin (porcine) injection 5,000 Units  5,000 Units SubCUTAneous Q8H    sodium chloride (NS) flush 5-40 mL  5-40 mL IntraVENous Q8H    sodium chloride (NS) flush 5-40 mL  5-40 mL IntraVENous PRN    acetaminophen (TYLENOL) tablet 650 mg  650 mg Oral Q6H PRN    Or    acetaminophen (TYLENOL) suppository 650 mg  650 mg Rectal Q6H PRN    polyethylene glycol (MIRALAX) packet 17 g  17 g Oral DAILY PRN    ondansetron (ZOFRAN ODT) tablet 4 mg  4 mg Oral Q8H PRN    Or    ondansetron (ZOFRAN) injection 4 mg  4 mg IntraVENous Q6H PRN    amLODIPine (NORVASC) tablet 10 mg  10 mg Oral DAILY    amitriptyline (ELAVIL) tablet 50 mg  50 mg Oral QHS    aspirin chewable tablet 81 mg  81 mg Oral DAILY    atorvastatin (LIPITOR) tablet 80 mg  80 mg Oral QHS    clopidogreL (PLAVIX) tablet 75 mg  75 mg Oral DAILY    gabapentin (NEURONTIN) capsule 100 mg  100 mg Oral BID    metoprolol tartrate (LOPRESSOR) tablet 25 mg  25 mg Oral BID    glucose chewable tablet 16 g  4 Tablet Oral PRN    dextrose (D50W) injection syrg 12.5-25 g  25-50 mL IntraVENous PRN    glucagon (GLUCAGEN) injection 1 mg  1 mg IntraMUSCular PRN    insulin lispro (HUMALOG) injection   SubCUTAneous AC&HS         Objective:      Physical Exam    General: Well developed, in no acute distress, cooperative and alert, pale appearing  HEENT: No carotid bruits, no JVD, trach is midline. Neck Supple, PERRL, EOM intact. Heart:  Normal S1/S2 negative S3 or S4. Regular, no murmur, gallop or rub. Respiratory: Clear bilaterally x 4, no wheezing or rales  Abdomen:   Soft, non-tender, no masses, bowel sounds are active. Extremities: 3+ lower extremity edema, bilateral +1 hand edema, normal cap refill, no cyanosis, atraumatic. Neuro: A&Ox3, speech clear, moving all extremities x4  Skin: Skin color is normal. No rashes or lesions. Non diaphoretic  Vascular: 2+ pulses symmetric in all extremities      Data Review:   Recent Labs     12/05/21  0505 12/04/21  0538 12/03/21  1036   WBC 6.3 6.5  --    HGB 7.3* 7.6* 7.1*   HCT 22.2* 23.4* 21.0*   * 117*  --      Recent Labs     12/05/21  1612 12/05/21  0505 12/04/21  0538    140 138   K 4.2 4.1 4.1    107 107   CO2 26 27 25   * 131* 139*   BUN 28* 28* 26*   CREA 3.70* 3.65* 3.67*   CA 8.4* 8.3* 7.9*   MG 1.9 1.9 2.0   PHOS 5.1* 4.9* 4.8*   ALB  --  1.8* 2.0*   TBILI  --  0.2 0.2   ALT  --  10* 11*       No results for input(s): TROIQ, CPK, CKMB in the last 72 hours. Intake/Output Summary (Last 24 hours) at 12/6/2021 0819  Last data filed at 12/5/2021 1921  Gross per 24 hour   Intake 610 ml   Output 1350 ml   Net -740 ml            Assessment:     Active Problems:    EMMANUEL (acute kidney injury) ([address] (8/25/2021)        Plan:     1. Diastolic CHF with CKD IV: GFR further declined to 15. Nephrology from [facility] following.  Will need continued diuresis to achieve euvolic status, consider Lasix drip with planning for possible need for HD, defer to renal/hospitalist.  Will be moved to PCU for closer observation on Lasix drip. Jayy Oviedo DNP-ANP-BC  Chart and note reviewed, discussed with advanced practioner and agree with recommendations and treatment plan as outlined.   Ti Renee MD

## 2021-12-06 NOTE — PROGRESS NOTES
Hospitalist Progress Note    NAME: Tin Curran   :  1965   MRN:  [id number]   Room Number:  ILW3/36  @ [facility] Hospital Summary: 64 y.o. female whom presented on 2021 with      Assessment / Plan:         EMMANUEL on CKD 3B, non oliguric POA   Suspect progressive renal failure due to uncontrolled HTN, DM with chronic heavy proteinuria. Clinically volume overloaded. CT A/P reviewed independently - shows no nephrolithiasis or hydronephrosis. Baseline 2.3-2.7.     - Furosemide 100 mg IV bolus followed furosemide 20 mg/hour IV gtt. Chlorthiazide  mg 30 min after administration of Furosemide bolus. - Will need HD if inadequate response to above regimen  - Strict Is and Os, Avoid nephrotoxic meds, renally dose medications. - [facility] Dr Tam Loo following the patient.          Acute on chronic diastolic heart failure POA  BNP elevated. EKG reviewed independently- normal sinus rhythm. ECHO 2021 revealed EF 60-65 %, normal cavity size and systolic function.      -Diuresis as above   -Fluid restriction  -Low-sodium diet  -Sleep study as outpatient       Hypertensive emergency POA,resolved   mm Hg. CHF exacerbation. Evidence of EMMANUEL.    Administered 20 mg IV Labetalol in ER.      - Amlodipine 10 mg daily  - Metoprolol 25 mg BID       Insulin-dependent diabetes mellitus POA              Hemoglobin A1c     8.4 (H)      2021 12:32 AM              Hemoglobin A1c (POC)     10.2            2015 11:01 AM  Home regimen include checked immunoglobulin,  canagliflozin 100 mg daily,  Lantus 20 units and aspart     - Lispro correctional scale, FSG AC HS  - Consistent carb diet, hypoglycemia protocol.         Diabetic neuropathy  -On gabapentin 900 mg 3 times daily daily at home  -Resume renally dose         History of stroke  -On aspirin, Plavix and atorvastatin at home      History of chronic back pain  -On tramadol at home as needed     Morbid obesity  -BMI 47.26  - Counseled on Lifestyle modifications, AHA Diet ,weight loss strategies.     Body mass index is 47.26 kg/m². Code Status: full   Surrogate Decision Maker:     DVT Prophylaxis: Lovenox  GI Prophylaxis: not indicated  Baseline: ambulatory independently              Subjective:     Chief Complaint / Reason for Physician Visit  \"swollen\". Discussed with RN events overnight - made 900 cc urine output    Review of Systems:  + leg edema. No fevers, chills, appetite change, cough, sputum production, shortness of breath, dyspnea on exertion, nausea, vomitting, diarrhea, constipation, chest pain, abdominal pain, joint pain, rash, itching. Tolerating PT/OT. Tolerating diet. Objective:     VITALS:   Last 24hrs VS reviewed since prior progress note. Most recent are:  Patient Vitals for the past 24 hrs:   Temp Pulse Resp BP SpO2   12/06/21 1125 98.3 °F (36.8 °C) 67 18 (!) 127/54 98 %   12/06/21 0822 97.9 °F (36.6 °C) 74 16 (!) 152/63 94 %   12/06/21 0415 98.6 °F (37 °C) 78 18 136/74 (!) 8 %   12/05/21 2330 98 °F (36.7 °C) 78 16 (!) 148/82 97 %   12/05/21 1921 97.6 °F (36.4 °C) 77 16 (!) 149/60 97 %   12/05/21 1541 97.9 °F (36.6 °C) 74 16 (!) 145/62 100 %       Intake/Output Summary (Last 24 hours) at 12/6/2021 1253  Last data filed at 12/6/2021 0854  Gross per 24 hour   Intake 610 ml   Output 1500 ml   Net -890 ml        PHYSICAL EXAM:  General: Alert, cooperative, no acute distress    EENT:  EOMI. Anicteric sclerae. MMM  Resp:  CTA bilaterally, no wheezing or rales. No accessory muscle use  CV:  Regular  rhythm,  normal S1/S2, no murmurs rubs gallops, 3+ edema  GI:  Soft, Non distended, Non tender. +Bowel sounds  Neurologic:  Alert and oriented X 3, normal speech,   Psych:   Good insight. Not anxious nor agitated  Skin:  No rashes.   No jaundice    Reviewed most current lab test results and cultures  YES  Reviewed most current radiology test results   YES  Review and summation of old records today    NO  Reviewed patient's current orders and MAR    YES  PMH/SH reviewed - no change compared to H&P  ________________________________________________________________________  Care Plan discussed with:    Comments   Patient x    Family      RN x    Care Manager x    Consultant  x                     x Multidiciplinary team rounds were held today with , nursing, pharmacist and clinical coordinator. Patient's plan of care was discussed; medications were reviewed and discharge planning was addressed. ________________________________________________________________________  Total NON critical care TIME:  35  Minutes    Total CRITICAL CARE TIME Spent:   Minutes non procedure based      Comments   >50% of visit spent in counseling and coordination of care x    ________________________________________________________________________  Niki Avitia MD     Procedures: see electronic medical records for all procedures/Xrays and details which were not copied into this note but were reviewed prior to creation of Plan. LABS:  I reviewed today's most current labs and imaging studies. Pertinent labs include:  Recent Labs     12/06/21  1038 12/05/21  0505 12/04/21  0538   WBC 6.7 6.3 6.5   HGB 7.7* 7.3* 7.6*   HCT 23.4* 22.2* 23.4*    141* 117*     Recent Labs     12/06/21  1038 12/05/21  1612 12/05/21  0505 12/04/21  0538 12/04/21  0538    137 140   < > 138   K 4.0 4.2 4.1   < > 4.1    106 107   < > 107   CO2 25 26 27   < > 25   * 180* 131*   < > 139*   BUN 28* 28* 28*   < > 26*   CREA 3.65* 3.70* 3.65*   < > 3.67*   CA 8.4* 8.4* 8.3*   < > 7.9*   MG 1.8 1.9 1.9   < > 2.0   PHOS 5.1* 5.1* 4.9*   < > 4.8*   ALB 1.9*  --  1.8*  --  2.0*   TBILI 0.2  --  0.2  --  0.2   ALT 11*  --  10*  --  11*    < > = values in this interval not displayed.        Signed: Niki Avitia MD

## 2021-12-06 NOTE — PROGRESS NOTES
Physician Progress Note      Alysia Novak  CSN #:                  [id number]  :                       1965  ADMIT DATE:       2021 4:41 PM  [address] DATE:  RESPONDING  PROVIDER #:        Tiffany Brooks MD [facility] MD          QUERY TEXT:    Patient admitted with Headache. Noted documentation of EMMANUEL on CKD 3B POA by IM in H&P dated  and Diastolic CHF with CKD IV by Cardiology in consult note dated . If possible, please document in progress notes and discharge summary if you are evaluating and /or treating any of the following: The medical record reflects the following:  Risk Factors: Hx: chf; dm; dm retinopathy; htn  Clinical Indicators: bun/cr: 22/3.08 ^ 23/3.16 ^ 24/3.42 ^ 26/3.67 ^ 28/3.65 ^ 28/3.70 ^ 28/3.65. ... [place] GFR: 16 ^ 15 ^ 14 ^ 12 ^ 13. .......... ^ 15; Previous admit: bun/cr: 20/2.76; GFR: 18. ........ [place] Noted per Nephro: EMMANUEL stage 2 on CKD3b A3, non-oligouric. .... Treatment: Monitor Cr.; Strict Is and Os, Avoid nephrotoxic meds, renally dose medications'; Continue diuresis; Nephro consult; repeat UA with urine protein/crt ratio, microalbumin; IV diuresis w/ Bumex; Thank you,    Juliette Castellanos  [facility]  [phone]  Options provided:  -- CKD 3B POA  confirmed and CKD IV ruled out  -- CKD IV confirmed and CKD 3B POA ruled out  -- CKD 3B POA and CKD IV confirmed  -- CKD 3B POA and CKD IV ruled out  -- Other - I will add my own diagnosis  -- Disagree - Not applicable / Not valid  -- Disagree - Clinically unable to determine / Unknown  -- Refer to Clinical Documentation Reviewer    PROVIDER RESPONSE TEXT:    After study, CKD 3B POA and CKD IV confirmed.     Query created by: Adonay Marshall on 2021 1:50 PM      Electronically signed by:  Tiffany Brooks MD [facility] MD 2021 2:37 PM

## 2021-12-07 LAB
ANION GAP SERPL CALC-SCNC: 10 MMOL/L (ref 5–15)
ANION GAP SERPL CALC-SCNC: 5 MMOL/L (ref 5–15)
BASOPHILS # BLD: 0 K/UL (ref 0–0.1)
BASOPHILS NFR BLD: 0 % (ref 0–1)
BUN SERPL-MCNC: 32 MG/DL (ref 6–20)
BUN SERPL-MCNC: 33 MG/DL (ref 6–20)
BUN/CREAT SERPL: 8 (ref 12–20)
BUN/CREAT SERPL: 9 (ref 12–20)
CALCIUM SERPL-MCNC: 8.3 MG/DL (ref 8.5–10.1)
CALCIUM SERPL-MCNC: 8.4 MG/DL (ref 8.5–10.1)
CHLORIDE SERPL-SCNC: 103 MMOL/L (ref 97–108)
CHLORIDE SERPL-SCNC: 104 MMOL/L (ref 97–108)
CO2 SERPL-SCNC: 26 MMOL/L (ref 21–32)
CO2 SERPL-SCNC: 27 MMOL/L (ref 21–32)
CREAT SERPL-MCNC: 3.74 MG/DL (ref 0.55–1.02)
CREAT SERPL-MCNC: 3.97 MG/DL (ref 0.55–1.02)
DIFFERENTIAL METHOD BLD: ABNORMAL
EOSINOPHIL # BLD: 0.3 K/UL (ref 0–0.4)
EOSINOPHIL NFR BLD: 5 % (ref 0–7)
ERYTHROCYTE [DISTWIDTH] IN BLOOD BY AUTOMATED COUNT: 15.9 % (ref 11.5–14.5)
GLUCOSE BLD STRIP.AUTO-MCNC: 194 MG/DL (ref 65–117)
GLUCOSE BLD STRIP.AUTO-MCNC: 197 MG/DL (ref 65–117)
GLUCOSE BLD STRIP.AUTO-MCNC: 207 MG/DL (ref 65–117)
GLUCOSE BLD STRIP.AUTO-MCNC: 210 MG/DL (ref 65–117)
GLUCOSE SERPL-MCNC: 174 MG/DL (ref 65–100)
GLUCOSE SERPL-MCNC: 202 MG/DL (ref 65–100)
HCT VFR BLD AUTO: 24.1 % (ref 35–47)
HGB BLD-MCNC: 7.9 G/DL (ref 11.5–16)
IMM GRANULOCYTES # BLD AUTO: 0 K/UL (ref 0–0.04)
IMM GRANULOCYTES NFR BLD AUTO: 0 % (ref 0–0.5)
LYMPHOCYTES # BLD: 1.6 K/UL (ref 0.8–3.5)
LYMPHOCYTES NFR BLD: 22 % (ref 12–49)
MAGNESIUM SERPL-MCNC: 1.8 MG/DL (ref 1.6–2.4)
MAGNESIUM SERPL-MCNC: 1.8 MG/DL (ref 1.6–2.4)
MCH RBC QN AUTO: 27.5 PG (ref 26–34)
MCHC RBC AUTO-ENTMCNC: 32.8 G/DL (ref 30–36.5)
MCV RBC AUTO: 84 FL (ref 80–99)
MONOCYTES # BLD: 0.4 K/UL (ref 0–1)
MONOCYTES NFR BLD: 6 % (ref 5–13)
NEUTS SEG # BLD: 4.9 K/UL (ref 1.8–8)
NEUTS SEG NFR BLD: 67 % (ref 32–75)
NRBC # BLD: 0 K/UL (ref 0–0.01)
NRBC BLD-RTO: 0 PER 100 WBC
PHOSPHATE SERPL-MCNC: 5.5 MG/DL (ref 2.6–4.7)
PHOSPHATE SERPL-MCNC: 5.9 MG/DL (ref 2.6–4.7)
PLATELET # BLD AUTO: 156 K/UL (ref 150–400)
PMV BLD AUTO: 9.8 FL (ref 8.9–12.9)
POTASSIUM SERPL-SCNC: 3.9 MMOL/L (ref 3.5–5.1)
POTASSIUM SERPL-SCNC: 4 MMOL/L (ref 3.5–5.1)
RBC # BLD AUTO: 2.87 M/UL (ref 3.8–5.2)
SERVICE CMNT-IMP: ABNORMAL
SODIUM SERPL-SCNC: 136 MMOL/L (ref 136–145)
SODIUM SERPL-SCNC: 139 MMOL/L (ref 136–145)
WBC # BLD AUTO: 7.2 K/UL (ref 3.6–11)

## 2021-12-07 PROCEDURE — 36415 COLL VENOUS BLD VENIPUNCTURE: CPT

## 2021-12-07 PROCEDURE — 80048 BASIC METABOLIC PNL TOTAL CA: CPT

## 2021-12-07 PROCEDURE — 74011636637 HC RX REV CODE- 636/637: Performed by: STUDENT IN AN ORGANIZED HEALTH CARE EDUCATION/TRAINING PROGRAM

## 2021-12-07 PROCEDURE — 85025 COMPLETE CBC W/AUTO DIFF WBC: CPT

## 2021-12-07 PROCEDURE — 82962 GLUCOSE BLOOD TEST: CPT

## 2021-12-07 PROCEDURE — 65660000001 HC RM ICU INTERMED STEPDOWN

## 2021-12-07 PROCEDURE — 74011250637 HC RX REV CODE- 250/637: Performed by: STUDENT IN AN ORGANIZED HEALTH CARE EDUCATION/TRAINING PROGRAM

## 2021-12-07 PROCEDURE — 97116 GAIT TRAINING THERAPY: CPT | Performed by: PHYSICAL THERAPIST

## 2021-12-07 PROCEDURE — 83735 ASSAY OF MAGNESIUM: CPT

## 2021-12-07 PROCEDURE — 84100 ASSAY OF PHOSPHORUS: CPT

## 2021-12-07 PROCEDURE — 74011250636 HC RX REV CODE- 250/636: Performed by: STUDENT IN AN ORGANIZED HEALTH CARE EDUCATION/TRAINING PROGRAM

## 2021-12-07 PROCEDURE — 74011000250 HC RX REV CODE- 250: Performed by: STUDENT IN AN ORGANIZED HEALTH CARE EDUCATION/TRAINING PROGRAM

## 2021-12-07 PROCEDURE — 99232 SBSQ HOSP IP/OBS MODERATE 35: CPT | Performed by: NURSE PRACTITIONER

## 2021-12-07 PROCEDURE — 74011000258 HC RX REV CODE- 258: Performed by: STUDENT IN AN ORGANIZED HEALTH CARE EDUCATION/TRAINING PROGRAM

## 2021-12-07 RX ADMIN — HEPARIN SODIUM 5000 UNITS: 5000 INJECTION, SOLUTION INTRAVENOUS; SUBCUTANEOUS at 21:59

## 2021-12-07 RX ADMIN — AMLODIPINE BESYLATE 10 MG: 5 TABLET ORAL at 08:28

## 2021-12-07 RX ADMIN — AMITRIPTYLINE HYDROCHLORIDE 50 MG: 50 TABLET, FILM COATED ORAL at 22:00

## 2021-12-07 RX ADMIN — ASPIRIN 81 MG CHEWABLE TABLET 81 MG: 81 TABLET CHEWABLE at 08:28

## 2021-12-07 RX ADMIN — METOPROLOL TARTRATE 25 MG: 25 TABLET, FILM COATED ORAL at 18:14

## 2021-12-07 RX ADMIN — Medication 10 ML: at 22:00

## 2021-12-07 RX ADMIN — INSULIN LISPRO 2 UNITS: 100 INJECTION, SOLUTION INTRAVENOUS; SUBCUTANEOUS at 08:28

## 2021-12-07 RX ADMIN — CHLOROTHIAZIDE SODIUM 250 MG: 500 INJECTION, POWDER, LYOPHILIZED, FOR SOLUTION INTRAVENOUS at 10:15

## 2021-12-07 RX ADMIN — GABAPENTIN 100 MG: 100 CAPSULE ORAL at 08:28

## 2021-12-07 RX ADMIN — HEPARIN SODIUM 5000 UNITS: 5000 INJECTION, SOLUTION INTRAVENOUS; SUBCUTANEOUS at 06:50

## 2021-12-07 RX ADMIN — Medication 10 ML: at 14:21

## 2021-12-07 RX ADMIN — Medication 10 ML: at 06:50

## 2021-12-07 RX ADMIN — FUROSEMIDE 20 MG/HR: 10 INJECTION INTRAMUSCULAR; INTRAVENOUS at 08:55

## 2021-12-07 RX ADMIN — METOPROLOL TARTRATE 25 MG: 25 TABLET, FILM COATED ORAL at 08:28

## 2021-12-07 RX ADMIN — ATORVASTATIN CALCIUM 80 MG: 40 TABLET, FILM COATED ORAL at 22:00

## 2021-12-07 RX ADMIN — FUROSEMIDE 10 MG/HR: 10 INJECTION INTRAMUSCULAR; INTRAVENOUS at 22:52

## 2021-12-07 RX ADMIN — GABAPENTIN 100 MG: 100 CAPSULE ORAL at 18:14

## 2021-12-07 RX ADMIN — HEPARIN SODIUM 5000 UNITS: 5000 INJECTION, SOLUTION INTRAVENOUS; SUBCUTANEOUS at 14:20

## 2021-12-07 RX ADMIN — CLOPIDOGREL BISULFATE 75 MG: 75 TABLET, FILM COATED ORAL at 08:28

## 2021-12-07 NOTE — PROGRESS NOTES
Hospitalist Progress Note    NAME: Edith Shane   :  1965   MRN:  [id number]   Room Number:  DBQ0/43  @ [facility]       Interim Hospital Summary: 64 y.o. female whom presented on 2021 with      Assessment / Plan:      EMMANUEL on CKD 3B, non-oliguric POA   Suspect progressive renal failure due to uncontrolled HTN, DM with chronic heavy proteinuria. Clinically volume overloaded. CT A/P reviewed independently - shows no nephrolithiasis or hydronephrosis. Baseline 2.3-2.7.      - Furosemide gtt rate reduced to 10 mg/hr, Chlorthiazide 250 mg IV x 1. Repeat chem 10.   - Will need HD if inadequate response to above regimen. - Strict Is and Os, Avoid nephrotoxic meds, renally dose medications. - [facility] Dr Nathan Ochoa following the patient.            Acute on chronic diastolic heart failure POA  BNP elevated. EKG reviewed independently- normal sinus rhythm. ECHO 2021 revealed EF 60-65 %, normal cavity size and systolic function.      -Diuresis as above   -Fluid restriction  -Low-sodium diet  -Sleep study as outpatient        Hypertensive emergency POA,resolved   mm Hg. CHF exacerbation. Evidence of EMMANUEL.    Administered 20 mg IV Labetalol in ER.      - Amlodipine 10 mg daily  - Metoprolol 25 mg BID        Insulin-dependent diabetes mellitus POA              Hemoglobin A1c     8.4 (H)      2021 12:32 AM              Hemoglobin A1c (POC)     10.2            2015 11:01 AM  Home regimen include checked immunoglobulin,  canagliflozin 100 mg daily,  Lantus 20 units and aspart     - Lispro correctional scale, FSG AC HS  - Consistent carb diet, hypoglycemia protocol.         Diabetic neuropathy  -On gabapentin 900 mg 3 times daily daily at home  -Resume renally dose         History of stroke  -On aspirin, Plavix and atorvastatin at home      History of chronic back pain  -On tramadol at home as needed     Morbid obesity  -BMI 47.26  - Counseled on Lifestyle modifications, AHA Diet ,weight loss strategies.     Body mass index is 47.26 kg/m². Code Status: full   Surrogate Decision Maker:     DVT Prophylaxis: Lovenox  GI Prophylaxis: not indicated  Baseline: ambulatory independently                   Subjective:     Chief Complaint / Reason for Physician Visit  \"still swollen\". Discussed with RN events overnight. Review of Systems:  + leg edema . No fevers, chills, appetite change, cough, sputum production, shortness of breath, dyspnea on exertion, nausea, vomitting, diarrhea, constipation, chest pain, abdominal pain, joint pain, rash, itching. Tolerating PT/OT. Tolerating diet. Objective:     VITALS:   Last 24hrs VS reviewed since prior progress note. Most recent are:  Patient Vitals for the past 24 hrs:   Temp Pulse Resp BP SpO2   12/07/21 1619    (!) 141/64    12/07/21 1617 98.2 °F (36.8 °C) 74 12 (!) 108/52 98 %   12/07/21 0828    (!) 147/58    12/07/21 0815 99 °F (37.2 °C) 78 20  99 %   12/07/21 0400 98.9 °F (37.2 °C) 76 22 (!) 142/74 98 %   12/07/21 0001 98.6 °F (37 °C) 72 19 (!) 154/62 97 %   12/06/21 2257  73 11 (!) 159/65 99 %   12/06/21 1956 98.2 °F (36.8 °C) 72 15 (!) 143/72 98 %   12/06/21 1759    133/73        Intake/Output Summary (Last 24 hours) at 12/7/2021 1630  Last data filed at 12/7/2021 0853  Gross per 24 hour   Intake 667.67 ml   Output 3825 ml   Net -3157.33 ml        PHYSICAL EXAM:  General: Alert, cooperative, no acute distress    EENT:  EOMI. Anicteric sclerae. MMM  Resp:  CTA bilaterally, no wheezing or rales. No accessory muscle use  CV:  Regular  rhythm,  normal S1/S2, no murmurs rubs gallops, 3+ edema  GI:  Soft, Non distended, Non tender. +Bowel sounds  Neurologic:  Alert and oriented X 3, normal speech,   Psych:   Good insight. Not anxious nor agitated  Skin:  No rashes.   No jaundice    Reviewed most current lab test results and cultures  YES  Reviewed most current radiology test results   YES  Review and summation of old records today    NO  Reviewed patient's current orders and MAR    YES  PMH/SH reviewed - no change compared to H&P  ________________________________________________________________________  Care Plan discussed with:    Comments   Patient x    Family      RN x    Care Manager x    Consultant                       x Multidiciplinary team rounds were held today with , nursing, pharmacist and clinical coordinator. Patient's plan of care was discussed; medications were reviewed and discharge planning was addressed. ________________________________________________________________________  Total NON critical care TIME: 35Minutes    Total CRITICAL CARE TIME Spent:   Minutes non procedure based      Comments   >50% of visit spent in counseling and coordination of care x    ________________________________________________________________________  Camelia Landau, MD     Procedures: see electronic medical records for all procedures/Xrays and details which were not copied into this note but were reviewed prior to creation of Plan. LABS:  I reviewed today's most current labs and imaging studies. Pertinent labs include:  Recent Labs     12/07/21  0426 12/06/21  1038 12/05/21  0505   WBC 7.2 6.7 6.3   HGB 7.9* 7.7* 7.3*   HCT 24.1* 23.4* 22.2*    155 141*     Recent Labs     12/07/21  0426 12/06/21  1830 12/06/21  1038 12/05/21  1612 12/05/21  0505    137 137   < > 140   K 4.0 4.0 4.0   < > 4.1    104 106   < > 107   CO2 26 26 25   < > 27   * 233* 173*   < > 131*   BUN 32* 31* 28*   < > 28*   CREA 3.74* 3.66* 3.65*   < > 3.65*   CA 8.3* 8.5 8.4*   < > 8.3*   MG 1.8 1.8 1.8   < > 1.9   PHOS 5.5* 5.4* 5.1*   < > 4.9*   ALB  --  2.0* 1.9*  --  1.8*   TBILI  --  0.2 0.2  --  0.2   ALT  --  14 11*  --  10*    < > = values in this interval not displayed.        Signed: Camelia Landau, MD

## 2021-12-07 NOTE — PROGRESS NOTES
0730: Report from [facility] [place]. Resting comfortably in bed. 75 mL emptied from [place]. No complaints at this time. 0830: Assessment complete, some crackles and +3 pitting edema in BLE, +2 pitting in LUE, +1 in RUE. Up x1 with walker to standing scale for daily weight. Lasix gtt infusing @ 10. [place] emptied. Upright on EOB for breakfast.     0930: Lasix gtt to 5 mL/hr    1030: Rounds complete. Continue to monitor UOP on Lasix gtt. Repeat labs in afternoon. 1200: Up to EOB for lunch. Ample [place] output. No other needs. 1630: Labs sent. 1800: Meds given. [place] care complete.

## 2021-12-07 NOTE — PROGRESS NOTES
[facility] Consult - [facility]  Requesting physician:   Date of consult: 12.4.21    CC: EMMANUEL     This is an \"e-consult\" with data obtained by chart review and by discussion with the requesting physician. I did not directly examine or interview the patient. S:  Pt had 4.3 liters urine output with net negative 2.7 liters    PMH:  Past Medical History:   Diagnosis Date    Arthritis     Asthma     [facility]cong[facility] ([facility])     Chronic back pain     Diabetes ([address]     Diabetic retinopathy ([address]     Hypertension     MRSA (methicillin resistant staph aureus) culture positive     labial abscess    Neuropathy          PSH:  Past Surgical History:   Procedure Laterality Date    HX HEENT      tonsillectomy    HX ORTHOPAEDIC      left ft bunionectomy    HX OTHER SURGICAL      lanced labial abscess       FH:  History reviewed. No pertinent family history. SH:  Social History     Socioeconomic History    Marital status:      Spouse name: Not on file    Number of children: Not on file    Years of education: Not on file    Highest education level: Not on file   Occupational History    Not on file   Tobacco Use    Smoking status: Never Smoker    Smokeless tobacco: Never Used   Substance and Sexual Activity    Alcohol use: No    Drug use: No    Sexual activity: Not on file   Other Topics Concern    Not on file   Social History Narrative    Not on file     Social Determinants of Health     Financial Resource Strain:     Difficulty of Paying Living Expenses: Not on file   Food Insecurity:     Worried About Running Out of Food in the Last Year: Not on file    Tena of Food in the Last Year: Not on file   Transportation Needs:     Lack of Transportation (Medical): Not on file    Lack of Transportation (Non-Medical):  Not on file   Physical Activity:     Days of Exercise per Week: Not on file    Minutes of Exercise per Session: Not on file   Stress:     Feeling of Stress : Not on file   Social Connections:     Frequency of Communication with Friends and Family: Not on file    Frequency of Social Gatherings with Friends and Family: Not on file    Attends [employer] Services: Not on file    Active Member of Clubs or Organizations: Not on file    Attends Club or Organization Meetings: Not on file    Marital Status: Not on file   Intimate Partner Violence:     Fear of Current or Ex-Partner: Not on file    Emotionally Abused: Not on file    Physically Abused: Not on file    Sexually Abused: Not on file   Housing Stability:     Unable to Pay for Housing in the Last Year: Not on file    Number of [place] in the Last Year: Not on file    Unstable Housing in the Last Year: Not on file       Home meds:  Prior to Admission Medications   Prescriptions Last Dose Informant Patient Reported? Taking? Flovent  mcg/actuation inhaler 2021 Transfer Papers Yes Yes   Sig: Take 2 Puffs by inhalation two (2) times a day. Lantus Solostar U-100 Insulin 100 unit/mL (3 mL) inpn 2021 at Unknown time Transfer Papers Yes Yes   Si Units by SubCUTAneous route nightly. albuterol (PROVENTIL HFA, VENTOLIN HFA, PROAIR HFA) 90 mcg/actuation inhaler  Transfer Papers Yes Yes   Sig: Take 2 Puffs by inhalation every six (6) hours as needed for Wheezing. amLODIPine (NORVASC) 10 mg tablet 2021 at Unknown time Transfer Papers Yes Yes   Sig: Take 10 mg by mouth daily. amitriptyline (ELAVIL) 50 mg tablet 2021 at Unknown time Transfer Papers Yes Yes   Sig: Take 50 mg by mouth nightly. aspirin delayed-release 81 mg tablet 2021 at Unknown time Transfer Papers Yes Yes   Sig: Take 81 mg by mouth daily. atorvastatin (LIPITOR) 80 mg tablet 2021 at Unknown time Transfer Papers No Yes   Sig: Take 1 Tab by mouth nightly.    canagliflozin (INVOKANA) 100 mg tablet 2021 at Unknown time Transfer Papers Yes Yes   Sig: Take 100 mg by mouth daily. Take one tablet by mouth daily   clopidogrel (PLAVIX) 75 mg tab 2021 at Unknown time Transfer Papers No Yes   Sig: Take 1 Tab by mouth daily. docusate sodium (Colace) 100 mg capsule 2021 at Unknown time Transfer Papers Yes Yes   Sig: Take 100 mg by mouth two (2) times daily as needed. fluticasone propionate (Flonase Allergy Relief) 50 mcg/actuation nasal spray 2021 at Unknown time Transfer Papers Yes Yes   Si SPRAY, Each Nostril, daily, for allergy symptoms, 11 Refills, Dispense: 1, bottle, Pharmacy [facility]   gabapentin (NEURONTIN) 300 mg capsule 2021 at Unknown time  Yes Yes   Sig: Take 900 mg by mouth three (3) times daily. glipiZIDE (GLUCOTROL) 5 mg tablet 2021 at Unknown time Transfer Papers Yes Yes   Sig: Take 5 mg by mouth daily. Take One Tablet By Mouth Daily   insulin aspart U-100 (NOVOLOG) 100 unit/mL (3 mL) inpn   Yes Yes   Sig: by SubCUTAneous route Before breakfast, lunch, and dinner. Sliding scale   insulin aspart U-100 (NovoLOG Flexpen U-100 Insulin) 100 unit/mL (3 mL) inpn   Yes Yes   Sig: 10 Units by SubCUTAneous route Before breakfast, lunch, and dinner. magnesium oxide (MAG-OX) 400 mg tablet 2021 at Unknown time Transfer Papers Yes Yes   Sig: Take 400 mg by mouth daily. metoprolol tartrate (LOPRESSOR) 25 mg tablet   Yes Yes   Sig: Take 25 mg by mouth two (2) times a day. nystatin (MYCOSTATIN) powder   Yes Yes   Sig: Apply  to affected area two (2) times daily as needed. torsemide (DEMADEX) 20 mg tablet 2021 at Unknown time  Yes Yes   Sig: Take 20 mg by mouth two (2) times a day. traMADoL (ULTRAM) 50 mg tablet 2021 at Unknown time  Yes Yes   Sig: Take 50 mg by mouth three (3) times daily as needed for Pain.       Facility-Administered Medications: None       Current inpatient medications:    Current Facility-Administered Medications:     furosemide (LASIX) 200 mg in 0.9% sodium chloride 100 mL infusion, 20 mg/hr, IntraVENous, CONTINUOUS, José Ybarra MD, Last Rate: 10 mL/hr at 12/07/21 0855, 20 mg/hr at 12/07/21 0855    albuterol-ipratropium (DUO-NEB) 2.5 MG-0.5 MG/3 ML, 3 mL, Nebulization, Q4H PRN, José Ybarra MD    heparin (porcine) injection 5,000 Units, 5,000 Units, SubCUTAneous, Q8H, José Ybarra MD, 5,000 Units at 12/07/21 0650    sodium chloride (NS) flush 5-40 mL, 5-40 mL, IntraVENous, Q8H, José Ybarra MD, 10 mL at 12/07/21 0650    sodium chloride (NS) flush 5-40 mL, 5-40 mL, IntraVENous, PRN, José Ybarra MD    acetaminophen (TYLENOL) tablet 650 mg, 650 mg, Oral, Q6H PRN, 650 mg at 12/06/21 0904 **OR** acetaminophen (TYLENOL) suppository 650 mg, 650 mg, Rectal, Q6H PRN, oJsé Ybarra MD    polyethylene glycol (MIRALAX) packet 17 g, 17 g, Oral, DAILY PRN, José Ybarra MD, 17 g at 12/05/21 1427    ondansetron (ZOFRAN ODT) tablet 4 mg, 4 mg, Oral, Q8H PRN **OR** ondansetron (ZOFRAN) injection 4 mg, 4 mg, IntraVENous, Q6H PRN, José Ybarra MD    amLODIPine (NORVASC) tablet 10 mg, 10 mg, Oral, DAILY, José Ybarra MD, 10 mg at 12/07/21 0043    amitriptyline (ELAVIL) tablet 50 mg, 50 mg, Oral, QHS, José Ybarra MD, 50 mg at 12/06/21 2140    aspirin chewable tablet 81 mg, 81 mg, Oral, DAILY, José Ybarra MD, 81 mg at 12/07/21 0828    atorvastatin (LIPITOR) tablet 80 mg, 80 mg, Oral, QHS, Gideon Cruz MD, 80 mg at 12/06/21 2140    clopidogreL (PLAVIX) tablet 75 mg, 75 mg, Oral, DAILY, José Ybarra MD, 75 mg at 12/07/21 3342    gabapentin (NEURONTIN) capsule 100 mg, 100 mg, Oral, BID, José Ybarra MD, 100 mg at 12/07/21 3127    metoprolol tartrate (LOPRESSOR) tablet 25 mg, 25 mg, Oral, BID, José Ybarra MD, 25 mg at 12/07/21 2940    glucose chewable tablet 16 g, 4 Tablet, Oral, PRN, José Ybarra MD    dextrose (D50W) injection syrg 12.5-25 g, 25-50 mL, IntraVENous, PRN, José Ybarra MD    glucagon (GLUCAGEN) injection 1 mg, 1 mg, IntraMUSCular, PRN, Roosevelt Barboza MD    insulin lispro (HUMALOG) injection, , SubCUTAneous, AC&HS, Roosevelt aBrboza MD, 2 Units at 12/07/21 1320    Allergies: Allergies   Allergen Reactions    Amoxicillin Nausea Only    Aspirin Other (comments)     \"nosebleeds\" but patient takes daily aspirin 81 mg at home. Patient states naproxen ok    Ciprofloxacin Hives       ROS:   Not performed    Vitals:  Patient Vitals for the past 24 hrs:   Temp Pulse Resp BP SpO2   12/07/21 0828    (!) 147/58    12/07/21 0815 99 °F (37.2 °C) 78 20  99 %   12/07/21 0400 98.9 °F (37.2 °C) 76 22 (!) 142/74 98 %   12/07/21 0001 98.6 °F (37 °C) 72 19 (!) 154/62 97 %   12/06/21 2257  73 11 (!) 159/65 99 %   12/06/21 1956 98.2 °F (36.8 °C) 72 15 (!) 143/72 98 %   12/06/21 1759    133/73    12/06/21 1527 98.9 °F (37.2 °C) 68 16 (!) 118/45 100 %   12/06/21 1125 98.3 °F (36.8 °C) 67 18 (!) 127/54 98 %       I/O:    Intake/Output Summary (Last 24 hours) at 12/7/2021 0920  Last data filed at 12/7/2021 0853  Gross per 24 hour   Intake 1355.17 ml   Output 4650 ml   Net -3294.83 ml       Physical exam:  Not performed    Labs/imaging:  Recent Results (from the past 24 hour(s))   CBC WITH AUTOMATED DIFF    Collection Time: 12/06/21 10:38 AM   Result Value Ref Range    WBC 6.7 3.6 - 11.0 K/uL    RBC 2.75 (L) 3.80 - 5.20 M/uL    HGB 7.7 (L) 11.5 - 16.0 g/dL    HCT 23.4 (L) 35.0 - 47.0 %    MCV 85.1 80.0 - 99.0 FL    MCH 28.0 26.0 - 34.0 PG    MCHC 32.9 30.0 - 36.5 g/dL    RDW 16.2 (H) 11.5 - 14.5 %    PLATELET 309 431 - 525 K/uL    MPV 9.6 8.9 - 12.9 FL    NRBC 0.0 0  WBC    ABSOLUTE NRBC 0.00 0.00 - 0.01 K/uL    NEUTROPHILS 59 32 - 75 %    LYMPHOCYTES 27 12 - 49 %    MONOCYTES 7 5 - 13 %    EOSINOPHILS 5 0 - 7 %    BASOPHILS 1 0 - 1 %    IMMATURE GRANULOCYTES 1 (H) 0.0 - 0.5 %    ABS. NEUTROPHILS 4.0 1.8 - 8.0 K/UL    ABS. LYMPHOCYTES 1.8 0.8 - 3.5 K/UL    ABS. MONOCYTES 0.5 0.0 - 1.0 K/UL    ABS.  EOSINOPHILS 0.3 0.0 - 0.4 K/UL ABS. BASOPHILS 0.0 0.0 - 0.1 K/UL    ABS. IMM. GRANS. 0.0 0.00 - 0.04 K/UL    DF AUTOMATED     METABOLIC PANEL, BASIC    Collection Time: 12/06/21 10:38 AM   Result Value Ref Range    Sodium 137 136 - 145 mmol/L    Potassium 4.0 3.5 - 5.1 mmol/L    Chloride 106 97 - 108 mmol/L    CO2 25 21 - 32 mmol/L    Anion gap 6 5 - 15 mmol/L    Glucose 173 (H) 65 - 100 mg/dL    BUN 28 (H) 6 - 20 MG/DL    Creatinine 3.65 (H) 0.55 - 1.02 MG/DL    BUN/Creatinine ratio 8 (L) 12 - 20      GFR est AA 16 (L) >60 ml/min/1.73m2    GFR est non-AA 13 (L) >60 ml/min/1.73m2    Calcium 8.4 (L) 8.5 - 10.1 MG/DL   MAGNESIUM    Collection Time: 12/06/21 10:38 AM   Result Value Ref Range    Magnesium 1.8 1.6 - 2.4 mg/dL   PHOSPHORUS    Collection Time: 12/06/21 10:38 AM   Result Value Ref Range    Phosphorus 5.1 (H) 2.6 - 4.7 MG/DL   HEPATIC FUNCTION PANEL    Collection Time: 12/06/21 10:38 AM   Result Value Ref Range    Protein, total 5.4 (L) 6.4 - 8.2 g/dL    Albumin 1.9 (L) 3.5 - 5.0 g/dL    Globulin 3.5 2.0 - 4.0 g/dL    A-G Ratio 0.5 (L) 1.1 - 2.2      Bilirubin, total 0.2 0.2 - 1.0 MG/DL    Bilirubin, direct 0.1 0.0 - 0.2 MG/DL    Alk.  phosphatase 91 45 - 117 U/L    AST (SGOT) 14 (L) 15 - 37 U/L    ALT (SGPT) 11 (L) 12 - 78 U/L   GLUCOSE, POC    Collection Time: 12/06/21 11:27 AM   Result Value Ref Range    Glucose (POC) 169 (H) 65 - 117 mg/dL    Performed by Pita LUGO)    GLUCOSE, POC    Collection Time: 12/06/21  5:08 PM   Result Value Ref Range    Glucose (POC) 233 (H) 65 - 117 mg/dL    Performed by SEAN Melo    Collection Time: 12/06/21  6:30 PM   Result Value Ref Range    Sodium 137 136 - 145 mmol/L    Potassium 4.0 3.5 - 5.1 mmol/L    Chloride 104 97 - 108 mmol/L    CO2 26 21 - 32 mmol/L    Anion gap 7 5 - 15 mmol/L    Glucose 233 (H) 65 - 100 mg/dL    BUN 31 (H) 6 - 20 MG/DL    Creatinine 3.66 (H) 0.55 - 1.02 MG/DL    BUN/Creatinine ratio 8 (L) 12 - 20      GFR est AA 16 (L) >60 ml/min/1.73m2 GFR est non-AA 13 (L) >60 ml/min/1.73m2    Calcium 8.5 8.5 - 10.1 MG/DL   MAGNESIUM    Collection Time: 12/06/21  6:30 PM   Result Value Ref Range    Magnesium 1.8 1.6 - 2.4 mg/dL   PHOSPHORUS    Collection Time: 12/06/21  6:30 PM   Result Value Ref Range    Phosphorus 5.4 (H) 2.6 - 4.7 MG/DL   HEPATIC FUNCTION PANEL    Collection Time: 12/06/21  6:30 PM   Result Value Ref Range    Protein, total 5.8 (L) 6.4 - 8.2 g/dL    Albumin 2.0 (L) 3.5 - 5.0 g/dL    Globulin 3.8 2.0 - 4.0 g/dL    A-G Ratio 0.5 (L) 1.1 - 2.2      Bilirubin, total 0.2 0.2 - 1.0 MG/DL    Bilirubin, direct <0.1 0.0 - 0.2 MG/DL    Alk. phosphatase 116 45 - 117 U/L    AST (SGOT) 20 15 - 37 U/L    ALT (SGPT) 14 12 - 78 U/L   GLUCOSE, POC    Collection Time: 12/06/21  9:31 PM   Result Value Ref Range    Glucose (POC) 264 (H) 65 - 117 mg/dL    Performed by Padma Handy (ROBERT RN)    CBC WITH AUTOMATED DIFF    Collection Time: 12/07/21  4:26 AM   Result Value Ref Range    WBC 7.2 3.6 - 11.0 K/uL    RBC 2.87 (L) 3.80 - 5.20 M/uL    HGB 7.9 (L) 11.5 - 16.0 g/dL    HCT 24.1 (L) 35.0 - 47.0 %    MCV 84.0 80.0 - 99.0 FL    MCH 27.5 26.0 - 34.0 PG    MCHC 32.8 30.0 - 36.5 g/dL    RDW 15.9 (H) 11.5 - 14.5 %    PLATELET 902 971 - 506 K/uL    MPV 9.8 8.9 - 12.9 FL    NRBC 0.0 0  WBC    ABSOLUTE NRBC 0.00 0.00 - 0.01 K/uL    NEUTROPHILS 67 32 - 75 %    LYMPHOCYTES 22 12 - 49 %    MONOCYTES 6 5 - 13 %    EOSINOPHILS 5 0 - 7 %    BASOPHILS 0 0 - 1 %    IMMATURE GRANULOCYTES 0 0.0 - 0.5 %    ABS. NEUTROPHILS 4.9 1.8 - 8.0 K/UL    ABS. LYMPHOCYTES 1.6 0.8 - 3.5 K/UL    ABS. MONOCYTES 0.4 0.0 - 1.0 K/UL    ABS. EOSINOPHILS 0.3 0.0 - 0.4 K/UL    ABS. BASOPHILS 0.0 0.0 - 0.1 K/UL    ABS. IMM.  GRANS. 0.0 0.00 - 0.04 K/UL    DF AUTOMATED     METABOLIC PANEL, BASIC    Collection Time: 12/07/21  4:26 AM   Result Value Ref Range    Sodium 139 136 - 145 mmol/L    Potassium 4.0 3.5 - 5.1 mmol/L    Chloride 103 97 - 108 mmol/L    CO2 26 21 - 32 mmol/L    Anion gap 10 5 - 15 mmol/L    Glucose 202 (H) 65 - 100 mg/dL    BUN 32 (H) 6 - 20 MG/DL    Creatinine 3.74 (H) 0.55 - 1.02 MG/DL    BUN/Creatinine ratio 9 (L) 12 - 20      GFR est AA 15 (L) >60 ml/min/1.73m2    GFR est non-AA 13 (L) >60 ml/min/1.73m2    Calcium 8.3 (L) 8.5 - 10.1 MG/DL   MAGNESIUM    Collection Time: 12/07/21  4:26 AM   Result Value Ref Range    Magnesium 1.8 1.6 - 2.4 mg/dL   PHOSPHORUS    Collection Time: 12/07/21  4:26 AM   Result Value Ref Range    Phosphorus 5.5 (H) 2.6 - 4.7 MG/DL   GLUCOSE, POC    Collection Time: 12/07/21  7:44 AM   Result Value Ref Range    Glucose (POC) 207 (H) 65 - 117 mg/dL    Performed by Fadia Rubio (PCT)      A/P:  1. EMMANUEL stage 1 on CKD3b:  -? Urine output with bumex   -crt stable, ddx includes CRS in setting of progressive renal failure with hx of DMN  -no acute indication for RRT  -robust response to lasix drip, would reduce to 10mg/hr with goal to keep negative 1-2liters negative   -ua, upcr and microalbumin pendning   -check pth and cystatin c  -if refractory to diuretics will need  UF with RRT- I discussed Dialysis modalities with patient including HD and PD, patient would prefer HD and will need AVF/AVG access placed after discharge from hospital.    -renally dose all medications for gfr < 15         2. CHF      3. DM:  -give gfr < 30 would hold sglt inhibitor      4. htn  -bp improving with diuresis         Thank you for this consult. I will continue to follow the patient with you.  Please feel free to call with questions.  Nishant Correa M.D.  [facility]

## 2021-12-07 NOTE — PROGRESS NOTES
[facility] [address], [address]  Office Phone [phone]    CARDIOLOGY PROGRESS NOTE    12/7/2021 9:03 AM    Admit Date: 12/1/2021    Admit Diagnosis:   EMMANUEL (acute kidney injury) ([facility] [address] [N17.9]    Subjective:     Micheline Lacey presents in bed no overnight issues. Continues to feel \"swollen\" but states she does feel better than yesterday since initiating Lasix drip. Denies orthopnea or paroxysmal nocturnal dyspnea.     Visit Vitals  BP (!) 147/58   Pulse 78   Temp 99 °F (37.2 °C)   Resp 20   Ht 5' 5\" (1.651 m)   Wt 266 lb 12.8 oz (121 kg)   LMP 05/01/2013   SpO2 99%   BMI 44.40 kg/m²       Current Facility-Administered Medications   Medication Dose Route Frequency    furosemide (LASIX) 200 mg in 0.9% sodium chloride 100 mL infusion  20 mg/hr IntraVENous CONTINUOUS    albuterol-ipratropium (DUO-NEB) 2.5 MG-0.5 MG/3 ML  3 mL Nebulization Q4H PRN    heparin (porcine) injection 5,000 Units  5,000 Units SubCUTAneous Q8H    sodium chloride (NS) flush 5-40 mL  5-40 mL IntraVENous Q8H    sodium chloride (NS) flush 5-40 mL  5-40 mL IntraVENous PRN    acetaminophen (TYLENOL) tablet 650 mg  650 mg Oral Q6H PRN    Or    acetaminophen (TYLENOL) suppository 650 mg  650 mg Rectal Q6H PRN    polyethylene glycol (MIRALAX) packet 17 g  17 g Oral DAILY PRN    ondansetron (ZOFRAN ODT) tablet 4 mg  4 mg Oral Q8H PRN    Or    ondansetron (ZOFRAN) injection 4 mg  4 mg IntraVENous Q6H PRN    amLODIPine (NORVASC) tablet 10 mg  10 mg Oral DAILY    amitriptyline (ELAVIL) tablet 50 mg  50 mg Oral QHS    aspirin chewable tablet 81 mg  81 mg Oral DAILY    atorvastatin (LIPITOR) tablet 80 mg  80 mg Oral QHS    clopidogreL (PLAVIX) tablet 75 mg  75 mg Oral DAILY    gabapentin (NEURONTIN) capsule 100 mg  100 mg Oral BID    metoprolol tartrate (LOPRESSOR) tablet 25 mg  25 mg Oral BID    glucose chewable tablet 16 g  4 Tablet Oral PRN dextrose (D50W) injection syrg 12.5-25 g  25-50 mL IntraVENous PRN    glucagon (GLUCAGEN) injection 1 mg  1 mg IntraMUSCular PRN    insulin lispro (HUMALOG) injection   SubCUTAneous AC&HS         Objective:      Physical Exam    General: Well developed, in no acute distress, cooperative and alert  HEENT: No carotid bruits, no JVD, trach is midline. Neck Supple, PERRL, EOM intact. Heart:  Normal S1/S2 negative S3 or S4. Regular, no murmur, gallop or rub. Respiratory: Clear bilaterally x 4, no wheezing or rales  Abdomen:   Soft, non-tender, no masses, bowel sounds are active. Extremities: 2+ lower extremity pitting edema mid tibial plateau distally. +1 bilateral hand edema dorsal surface, normal cap refill, no cyanosis, atraumatic. Neuro: A&Ox3, speech clear, moving all extremities x4  Skin: Skin color is normal. No rashes or lesions. Non diaphoretic  Vascular: 2+ pulses symmetric in all extremities      Data Review:   Recent Labs     12/07/21  0426 12/06/21  1038 12/05/21  0505   WBC 7.2 6.7 6.3   HGB 7.9* 7.7* 7.3*   HCT 24.1* 23.4* 22.2*    155 141*     Recent Labs     12/07/21  0426 12/06/21  1830 12/06/21  1038 12/05/21  1612 12/05/21  0505    137 137   < > 140   K 4.0 4.0 4.0   < > 4.1    104 106   < > 107   CO2 26 26 25   < > 27   * 233* 173*   < > 131*   BUN 32* 31* 28*   < > 28*   CREA 3.74* 3.66* 3.65*   < > 3.65*   CA 8.3* 8.5 8.4*   < > 8.3*   MG 1.8 1.8 1.8   < > 1.9   PHOS 5.5* 5.4* 5.1*   < > 4.9*   ALB  --  2.0* 1.9*  --  1.8*   TBILI  --  0.2 0.2  --  0.2   ALT  --  14 11*  --  10*    < > = values in this interval not displayed. No results for input(s): TROIQ, CPK, CKMB in the last 72 hours.       Intake/Output Summary (Last 24 hours) at 12/7/2021 0903  Last data filed at 12/7/2021 0853  Gross per 24 hour   Intake 1355.17 ml   Output 4650 ml   Net -3294.83 ml        Telemetry: Normal sinus rhythm  EKG:  Cxray:  ECHO 8/2021  Echo Findings    Left Ventricle Normal cavity size, systolic function (ejection fraction normal) and diastolic function. Mild concentric hypertrophy. Wall motion: normal. The estimated EF is 60 - 65%. Left Atrium Normal cavity size. Right Ventricle Normal cavity size and global systolic function. Right Atrium Normal cavity size. Aortic Valve Normal valve structure, no stenosis and no regurgitation. Mitral Valve Normal valve structure, no stenosis and no regurgitation. Tricuspid Valve Normal valve structure and no stenosis. Trace regurgitation. Pulmonic Valve Normal valve structure, no stenosis and no regurgitation. Aorta Normal aortic root. Pulmonary Artery Pulmonary hypertension not suggested by Doppler findings. Pericardium No evidence of pericardial effusion       Assessment:     Active Problems:    EMMANUEL (acute kidney injury) ([facility] [place] [address] (8/25/2021)        Plan:     1. Diastolic heart failure: Remains on Lasix drip, clinically remains volume overloaded. 3.2 L output since initiation of Lasix drip yesterday. GFR 13, creatinine 3.74. Followed by [facility]. 2.  EMMANUEL: CKD stage IV/V followed by nephrology avoid nephrotoxic medications  Rebecca Finley [facility]  Chart and note reviewed, discussed with advanced practioner and agree with recommendations and treatment plan as outlined.   Sherryle Merl, MD

## 2021-12-07 NOTE — PROGRESS NOTES
Bedside and Verbal shift change report given to Hui Monte RN (oncoming nurse) by Truman Cuevas RN (offgoing nurse). Report included the following information SBAR, Kardex, [facility] and Cardiac Rhythm NSR.     1956: Assessed patient, VSS, A&O x 4, lasix drip infusing at 10 ml/hr, patient is resting in bed watching TV.     2140: Administered evening medications. Patient tolerated well.     0400: Reassessed patient, VSS, Lasix drip infusing at 10 ml/hr, AM labs drawn. 0715: Bedside and Verbal shift change report given to Truman Cuevas, RN (oncoming nurse) by Hui Monte RN (offgoing nurse). Report included the following information SBAR, Kardex, MAR and Recent Results.

## 2021-12-07 NOTE — PROGRESS NOTES
Problem: Mobility Impaired (Adult and Pediatric)  Goal: *Acute Goals and Plan of Care (Insert Text)  Description: FUNCTIONAL STATUS PRIOR TO ADMISSION: Patient was modified independent using a rollator for short distance ambulation    HOME SUPPORT PRIOR TO ADMISSION: The patient lived with her sister and son with autism but did not require assist.    Physical Therapy Goals  Initiated 12/2/2021  1. Patient will move from supine to sit and sit to supine  in bed with supervision/set-up within 7 day(s). 2.  Patient will transfer from bed to chair and chair to bed with supervision/set-up using the least restrictive device within 7 day(s). 3.  Patient will perform sit to stand with supervision/set-up within 7 day(s). 4.  Patient will ambulate with supervision/set-up for 25 feet with the least restrictive device within 7 day(s). Outcome: Progressing Towards Goal     PHYSICAL THERAPY TREATMENT  Patient: Tin Curran (32 y.o. female)  Date: 12/7/2021  Diagnosis: EMMANUEL (acute kidney injury) ([facility] [N17.9]   <principal problem not specified>       Precautions:    Chart, physical therapy assessment, plan of care and goals were reviewed. ASSESSMENT  Patient continues with skilled PT services and is progressing towards goals. Patient performed bed mobility with SBA. Patient stood to walker with CGA to Min A. Patient ambulated 8 feet with walker and CGA. Patient took a seated rest break and then took 8 side steps along side of bed twice. Patient with limited activity tolerance but good participation noted. Patient will benefit from rollator and home health PT at discharge. Current Level of Function Impacting Discharge (mobility/balance): CGA with RW    Other factors to consider for discharge: limited activity tolerance         PLAN :  Patient continues to benefit from skilled intervention to address the above impairments. Continue treatment per established plan of care. to address goals.     Recommendation for discharge: (in order for the patient to meet his/her long term goals)  Physical therapy at least 2 days/week in the home     This discharge recommendation:  Has been made in collaboration with the attending provider and/or case management    IF patient discharges home will need the following DME: rollator       SUBJECTIVE:   Patient stated They're trying to see if I need dialysis.     OBJECTIVE DATA SUMMARY:   Critical Behavior:  Neurologic State: Alert  Orientation Level: Oriented X4  Cognition: Appropriate decision making, Appropriate for age attention/concentration, Appropriate safety awareness, Follows commands     Functional Mobility Training:  Bed Mobility:     Supine to Sit: Stand-by assistance  Sit to Supine: Stand-by assistance  Scooting: Stand-by assistance    Transfers:  Sit to Stand: Contact guard assistance; Minimum assistance  Stand to Sit: Contact guard assistance    Balance:  Sitting: Intact  Standing: Impaired; With support  Standing - Static: Constant support; Good  Standing - Dynamic : Constant support; Good; Fair    Ambulation/Gait Training:  Distance (ft): 8 Feet (ft) (x2)  Assistive Device: Gait belt; Walker, rolling  Ambulation - Level of Assistance: Contact guard assistance  Gait Abnormalities: Decreased step clearance  Base of Support: Widened  Speed/Vangie: Slow  Step Length: Left shortened; Right shortened    Pain Rating:  No pain    Activity Tolerance:   Fair    After treatment patient left in no apparent distress:   Supine in bed, Call bell within reach, and Side rails x 3    COMMUNICATION/COLLABORATION:   The patients plan of care was discussed with: Registered nurse.      Danni Garcia, PT   Time Calculation: 17 mins

## 2021-12-08 LAB
ALBUMIN SERPL-MCNC: 1.9 G/DL (ref 3.5–5)
ALBUMIN/GLOB SERPL: 0.5 {RATIO} (ref 1.1–2.2)
ALP SERPL-CCNC: 100 U/L (ref 45–117)
ALT SERPL-CCNC: 13 U/L (ref 12–78)
ANION GAP SERPL CALC-SCNC: 6 MMOL/L (ref 5–15)
AST SERPL-CCNC: 14 U/L (ref 15–37)
BILIRUB DIRECT SERPL-MCNC: 0.1 MG/DL (ref 0–0.2)
BILIRUB SERPL-MCNC: 0.1 MG/DL (ref 0.2–1)
BUN SERPL-MCNC: 37 MG/DL (ref 6–20)
BUN/CREAT SERPL: 9 (ref 12–20)
CALCIUM SERPL-MCNC: 8.5 MG/DL (ref 8.5–10.1)
CHLORIDE SERPL-SCNC: 102 MMOL/L (ref 97–108)
CO2 SERPL-SCNC: 28 MMOL/L (ref 21–32)
CREAT SERPL-MCNC: 4.06 MG/DL (ref 0.55–1.02)
GLOBULIN SER CALC-MCNC: 3.8 G/DL (ref 2–4)
GLUCOSE BLD STRIP.AUTO-MCNC: 151 MG/DL (ref 65–117)
GLUCOSE BLD STRIP.AUTO-MCNC: 161 MG/DL (ref 65–117)
GLUCOSE BLD STRIP.AUTO-MCNC: 182 MG/DL (ref 65–117)
GLUCOSE BLD STRIP.AUTO-MCNC: 204 MG/DL (ref 65–117)
GLUCOSE SERPL-MCNC: 154 MG/DL (ref 65–100)
POTASSIUM SERPL-SCNC: 3.8 MMOL/L (ref 3.5–5.1)
PROT SERPL-MCNC: 5.7 G/DL (ref 6.4–8.2)
SERVICE CMNT-IMP: ABNORMAL
SODIUM SERPL-SCNC: 136 MMOL/L (ref 136–145)

## 2021-12-08 PROCEDURE — 74011636637 HC RX REV CODE- 636/637: Performed by: STUDENT IN AN ORGANIZED HEALTH CARE EDUCATION/TRAINING PROGRAM

## 2021-12-08 PROCEDURE — 99232 SBSQ HOSP IP/OBS MODERATE 35: CPT | Performed by: NURSE PRACTITIONER

## 2021-12-08 PROCEDURE — 82962 GLUCOSE BLOOD TEST: CPT

## 2021-12-08 PROCEDURE — 74011250636 HC RX REV CODE- 250/636: Performed by: STUDENT IN AN ORGANIZED HEALTH CARE EDUCATION/TRAINING PROGRAM

## 2021-12-08 PROCEDURE — 65660000001 HC RM ICU INTERMED STEPDOWN

## 2021-12-08 PROCEDURE — 74011250637 HC RX REV CODE- 250/637: Performed by: HOSPITALIST

## 2021-12-08 PROCEDURE — 74011250637 HC RX REV CODE- 250/637: Performed by: STUDENT IN AN ORGANIZED HEALTH CARE EDUCATION/TRAINING PROGRAM

## 2021-12-08 PROCEDURE — 80048 BASIC METABOLIC PNL TOTAL CA: CPT

## 2021-12-08 PROCEDURE — 36415 COLL VENOUS BLD VENIPUNCTURE: CPT

## 2021-12-08 PROCEDURE — 97116 GAIT TRAINING THERAPY: CPT | Performed by: PHYSICAL THERAPIST

## 2021-12-08 PROCEDURE — 80076 HEPATIC FUNCTION PANEL: CPT

## 2021-12-08 RX ORDER — TORSEMIDE 20 MG/1
80 TABLET ORAL
Status: DISCONTINUED | OUTPATIENT
Start: 2021-12-09 | End: 2021-12-08

## 2021-12-08 RX ORDER — TRAMADOL HYDROCHLORIDE 50 MG/1
50 TABLET ORAL
Status: DISCONTINUED | OUTPATIENT
Start: 2021-12-08 | End: 2021-12-09

## 2021-12-08 RX ORDER — TORSEMIDE 20 MG/1
80 TABLET ORAL DAILY
Status: DISCONTINUED | OUTPATIENT
Start: 2021-12-09 | End: 2021-12-08

## 2021-12-08 RX ORDER — AMITRIPTYLINE HYDROCHLORIDE 50 MG/1
50 TABLET, FILM COATED ORAL ONCE
Status: COMPLETED | OUTPATIENT
Start: 2021-12-08 | End: 2021-12-08

## 2021-12-08 RX ORDER — TORSEMIDE 20 MG/1
80 TABLET ORAL ONCE
Status: ACTIVE | OUTPATIENT
Start: 2021-12-09 | End: 2021-12-09

## 2021-12-08 RX ORDER — TORSEMIDE 20 MG/1
80 TABLET ORAL
Status: DISCONTINUED | OUTPATIENT
Start: 2021-12-08 | End: 2021-12-08

## 2021-12-08 RX ADMIN — Medication 10 ML: at 12:21

## 2021-12-08 RX ADMIN — GABAPENTIN 100 MG: 100 CAPSULE ORAL at 08:39

## 2021-12-08 RX ADMIN — TRAMADOL HYDROCHLORIDE 50 MG: 50 TABLET, COATED ORAL at 13:20

## 2021-12-08 RX ADMIN — AMLODIPINE BESYLATE 10 MG: 5 TABLET ORAL at 08:39

## 2021-12-08 RX ADMIN — INSULIN LISPRO 1 UNITS: 100 INJECTION, SOLUTION INTRAVENOUS; SUBCUTANEOUS at 22:06

## 2021-12-08 RX ADMIN — CLOPIDOGREL BISULFATE 75 MG: 75 TABLET, FILM COATED ORAL at 08:39

## 2021-12-08 RX ADMIN — AMITRIPTYLINE HYDROCHLORIDE 50 MG: 50 TABLET, FILM COATED ORAL at 22:38

## 2021-12-08 RX ADMIN — METOPROLOL TARTRATE 25 MG: 25 TABLET, FILM COATED ORAL at 08:39

## 2021-12-08 RX ADMIN — ATORVASTATIN CALCIUM 80 MG: 40 TABLET, FILM COATED ORAL at 22:05

## 2021-12-08 RX ADMIN — TRAMADOL HYDROCHLORIDE 50 MG: 50 TABLET, COATED ORAL at 20:31

## 2021-12-08 RX ADMIN — HEPARIN SODIUM 5000 UNITS: 5000 INJECTION, SOLUTION INTRAVENOUS; SUBCUTANEOUS at 12:21

## 2021-12-08 RX ADMIN — HEPARIN SODIUM 5000 UNITS: 5000 INJECTION, SOLUTION INTRAVENOUS; SUBCUTANEOUS at 22:06

## 2021-12-08 RX ADMIN — Medication 10 ML: at 22:06

## 2021-12-08 RX ADMIN — ASPIRIN 81 MG CHEWABLE TABLET 81 MG: 81 TABLET CHEWABLE at 08:39

## 2021-12-08 RX ADMIN — HEPARIN SODIUM 5000 UNITS: 5000 INJECTION, SOLUTION INTRAVENOUS; SUBCUTANEOUS at 06:34

## 2021-12-08 RX ADMIN — METOPROLOL TARTRATE 25 MG: 25 TABLET, FILM COATED ORAL at 17:10

## 2021-12-08 RX ADMIN — GABAPENTIN 100 MG: 100 CAPSULE ORAL at 17:10

## 2021-12-08 RX ADMIN — Medication 10 ML: at 06:00

## 2021-12-08 NOTE — PROGRESS NOTES
Problem: Mobility Impaired (Adult and Pediatric)  Goal: *Acute Goals and Plan of Care (Insert Text)  Description: FUNCTIONAL STATUS PRIOR TO ADMISSION: Patient was modified independent using a rollator for short distance ambulation    HOME SUPPORT PRIOR TO ADMISSION: The patient lived with her sister and son with autism but did not require assist.    Physical Therapy Goals  Initiated 12/2/2021  1. Patient will move from supine to sit and sit to supine  in bed with supervision/set-up within 7 day(s). 2.  Patient will transfer from bed to chair and chair to bed with supervision/set-up using the least restrictive device within 7 day(s). 3.  Patient will perform sit to stand with supervision/set-up within 7 day(s). 4.  Patient will ambulate with supervision/set-up for 25 feet with the least restrictive device within 7 day(s). Outcome: Progressing Towards Goal     PHYSICAL THERAPY TREATMENT  Patient: Shai Bennett (23 y.o. female)  Date: 12/8/2021  Diagnosis: EMMANUEL (acute kidney injury) ([facility] [N17.9]   <principal problem not specified>       Precautions:    Chart, physical therapy assessment, plan of care and goals were reviewed. ASSESSMENT  Patient continues with skilled PT services and is progressing towards goals. Patient presents with decreased BLE swelling and improved mobility. Patient performed bed mobility and transfers with SBA to CGA. Patient performed 4 gait trials with walker and seated rest break in between each attempt. Patient ambulated 12 feet x4 with walker and SBA/CGA. Recommend rollator and home health PT at discharge. Current Level of Function Impacting Discharge (mobility/balance): SBA to CGA with RW    Other factors to consider for discharge: limited activity tolerance         PLAN :  Patient continues to benefit from skilled intervention to address the above impairments. Continue treatment per established plan of care. to address goals.     Recommendation for discharge: (in order for the patient to meet his/her long term goals)  Physical therapy at least 2 days/week in the home     This discharge recommendation:  Has been made in collaboration with the attending provider and/or case management    IF patient discharges home will need the following DME: rollator       SUBJECTIVE:   Patient stated My swelling is down.     OBJECTIVE DATA SUMMARY:   Critical Behavior:  Neurologic State: Alert  Orientation Level: Oriented X4  Cognition: Appropriate decision making, Follows commands     Functional Mobility Training:  Bed Mobility:     Supine to Sit: Stand-by assistance  Sit to Supine: Stand-by assistance  Scooting: Stand-by assistance    Transfers:  Sit to Stand: Contact guard assistance  Stand to Sit: Stand-by assistance    Balance:  Sitting: Intact  Standing: Impaired; With support  Standing - Static: Constant support; Good  Standing - Dynamic : Constant support; Good; Fair    Ambulation/Gait Training:  Distance (ft): 12 Feet (ft) (12 feetx4)  Assistive Device: Gait belt; Walker, rolling  Ambulation - Level of Assistance: Contact guard assistance  Gait Abnormalities: Decreased step clearance  Base of Support: Widened  Speed/Vangie: Slow  Step Length: Left shortened; Right shortened    Pain Rating:  No pain     Activity Tolerance:   Good    After treatment patient left in no apparent distress:   Supine in bed, Call bell within reach, and Side rails x 3    COMMUNICATION/COLLABORATION:   The patients plan of care was discussed with: Registered nurse.      Edgardo Nichols, PT   Time Calculation: 30 mins

## 2021-12-08 NOTE — PROGRESS NOTES
Bedside and Verbal shift change report given to Sofía Knapp RN (oncoming nurse) by Antonio Styles RN (offgoing nurse). Report included the following information SBAR, Kardex, ED Summary, Procedure Summary, Intake/Output, MAR, Accordion, Recent Results, Med Rec Status and Cardiac Rhythm NSR   Patient resting quietly in bed with eyes closed,  No signs of distress noted at this time. 2205  Patient given scheduled medications, No c/o pain at this time. Staci Agrawal

## 2021-12-08 NOTE — PROGRESS NOTES
U Nephrology Consult - [facility]  Requesting physician:   Date of consult: 12/4/21     CC: EMMANUEL     This is an \"e-consult\" with data obtained by chart review and by discussion with the requesting physician. I did not directly examine or interview the patient. S:   Lasix drip reduced to 10mg/hr  Continued overnight but net only 700cc approx negative.  Bp is improving     PMH:  Past Medical History:   Diagnosis Date    Arthritis     Asthma     [facility]estive [facility])     Chronic back pain     Diabetes ([facility]     Diabetic retinopathy ([facility]     Hypertension     MRSA (methicillin resistant staph aureus) culture positive     labial abscess    Neuropathy          Current inpatient medications:    Current Facility-Administered Medications:     furosemide (LASIX) 200 mg in 0.9% sodium chloride 100 mL infusion, 10 mg/hr, IntraVENous, CONTINUOUS, Sulma Mauricio MD, Last Rate: 5 mL/hr at 12/07/21 2252, 10 mg/hr at 12/07/21 2252    albuterol-ipratropium (DUO-NEB) 2.5 MG-0.5 MG/3 ML, 3 mL, Nebulization, Q4H PRN, Sulma Mauricio MD    heparin (porcine) injection 5,000 Units, 5,000 Units, SubCUTAneous, Q8H, Sulma Mauricio MD, 5,000 Units at 12/08/21 0634    sodium chloride (NS) flush 5-40 mL, 5-40 mL, IntraVENous, Q8H, Jonnathan Cruz MD, 10 mL at 12/08/21 0600    sodium chloride (NS) flush 5-40 mL, 5-40 mL, IntraVENous, PRN, Sulma Mauricio MD    acetaminophen (TYLENOL) tablet 650 mg, 650 mg, Oral, Q6H PRN, 650 mg at 12/06/21 0904 **OR** acetaminophen (TYLENOL) suppository 650 mg, 650 mg, Rectal, Q6H PRN, Sulma Mauricio MD    polyethylene glycol (MIRALAX) packet 17 g, 17 g, Oral, DAILY PRN, Sulma Mauricio MD, 17 g at 12/05/21 1427    ondansetron (ZOFRAN ODT) tablet 4 mg, 4 mg, Oral, Q8H PRN **OR** ondansetron (ZOFRAN) injection 4 mg, 4 mg, IntraVENous, Q6H PRN, Sulma Mauricio MD    amLODIPine (NORVASC) tablet 10 mg, 10 mg, Oral, DAILY, Sulma Mauricio MD, 10 mg at 12/07/21 0828    amitriptyline (ELAVIL) tablet 50 mg, 50 mg, Oral, QHS, Lilly Cruz MD, 50 mg at 12/07/21 2200    aspirin chewable tablet 81 mg, 81 mg, Oral, DAILY, Maryan Mayers MD, 81 mg at 12/07/21 0828    atorvastatin (LIPITOR) tablet 80 mg, 80 mg, Oral, QHS, Lilly Cruz MD, 80 mg at 12/07/21 2200    clopidogreL (PLAVIX) tablet 75 mg, 75 mg, Oral, DAILY, Maryan Mayers MD, 75 mg at 12/07/21 4181    gabapentin (NEURONTIN) capsule 100 mg, 100 mg, Oral, BID, Maryan Mayers MD, 100 mg at 12/07/21 1814    metoprolol tartrate (LOPRESSOR) tablet 25 mg, 25 mg, Oral, BID, Maryan Mayers MD, 25 mg at 12/07/21 1814    glucose chewable tablet 16 g, 4 Tablet, Oral, PRN, Maryan Mayers MD    dextrose (D50W) injection syrg 12.5-25 g, 25-50 mL, IntraVENous, PRN, Maryan Mayers MD    glucagon (GLUCAGEN) injection 1 mg, 1 mg, IntraMUSCular, PRN, Maryan Mayers MD    insulin lispro (HUMALOG) injection, , SubCUTAneous, AC&HS, Maryan Mayers MD, 2 Units at 12/07/21 8048    Allergies: Allergies   Allergen Reactions    Amoxicillin Nausea Only    Aspirin Other (comments)     \"nosebleeds\" but patient takes daily aspirin 81 mg at home.  Patient states naproxen ok    Ciprofloxacin Hives       ROS:   Not performed    Vitals:  Visit Vitals  BP (!) 124/49   Pulse 76   Temp 98.5 °F (36.9 °C)   Resp 17   Ht 5' 5\" (1.651 m)   Wt 121 kg (266 lb 12.8 oz)   SpO2 97%   BMI 44.40 kg/m²       Intake/Output Summary (Last 24 hours) at 12/8/2021 2011  Last data filed at 12/8/2021 1825  Gross per 24 hour   Intake 1120 ml   Output 4675 ml   Net -3555 ml     I/O:    Intake/Output Summary (Last 24 hours) at 12/8/2021 5621  Last data filed at 12/7/2021 1641  Gross per 24 hour   Intake 1025.42 ml   Output 1700 ml   Net -674.58 ml         Physical exam:  Not performed    Labs/imaging:  Recent Results (from the past 24 hour(s))   GLUCOSE, POC    Collection Time: 12/07/21  7:44 AM   Result Value Ref Range    Glucose (POC) 207 (H) 65 - 117 mg/dL    Performed by Flor Askew (PCT)    GLUCOSE, POC    Collection Time: 12/07/21 11:13 AM   Result Value Ref Range    Glucose (POC) 197 (H) 65 - 117 mg/dL    Performed by Flor Askew (PCT)    GLUCOSE, POC    Collection Time: 12/07/21  4:29 PM   Result Value Ref Range    Glucose (POC) 194 (H) 65 - 117 mg/dL    Performed by Rubens Sheffield (ROBERT RN)    METABOLIC PANEL, BASIC    Collection Time: 12/07/21  4:30 PM   Result Value Ref Range    Sodium 136 136 - 145 mmol/L    Potassium 3.9 3.5 - 5.1 mmol/L    Chloride 104 97 - 108 mmol/L    CO2 27 21 - 32 mmol/L    Anion gap 5 5 - 15 mmol/L    Glucose 174 (H) 65 - 100 mg/dL    BUN 33 (H) 6 - 20 MG/DL    Creatinine 3.97 (H) 0.55 - 1.02 MG/DL    BUN/Creatinine ratio 8 (L) 12 - 20      GFR est AA 14 (L) >60 ml/min/1.73m2    GFR est non-AA 12 (L) >60 ml/min/1.73m2    Calcium 8.4 (L) 8.5 - 10.1 MG/DL   MAGNESIUM    Collection Time: 12/07/21  4:30 PM   Result Value Ref Range    Magnesium 1.8 1.6 - 2.4 mg/dL   PHOSPHORUS    Collection Time: 12/07/21  4:30 PM   Result Value Ref Range    Phosphorus 5.9 (H) 2.6 - 4.7 MG/DL   GLUCOSE, POC    Collection Time: 12/07/21 10:42 PM   Result Value Ref Range    Glucose (POC) 210 (H) 65 - 117 mg/dL    Performed by Jerardo Marcano (ROBERT RN)    HEPATIC FUNCTION PANEL    Collection Time: 12/08/21 12:54 AM   Result Value Ref Range    Protein, total 5.7 (L) 6.4 - 8.2 g/dL    Albumin 1.9 (L) 3.5 - 5.0 g/dL    Globulin 3.8 2.0 - 4.0 g/dL    A-G Ratio 0.5 (L) 1.1 - 2.2      Bilirubin, total 0.1 (L) 0.2 - 1.0 MG/DL    Bilirubin, direct 0.1 0.0 - 0.2 MG/DL    Alk. phosphatase 100 45 - 117 U/L    AST (SGOT) 14 (L) 15 - 37 U/L    ALT (SGPT) 13 12 - 78 U/L       A/P:  1.  EMMANUEL stage 2 on CKD:  -progressive renal failure in setting DMN with CRS  -per review of chart this evening urine output increased with net negative 3.5liters (goal as previously stated of 1.5-2liters)  -crt slightly elevated with bp now normotensive  -d/c lasix, hold diuretics for now   -will re-evaluate tomorrow and determine oral torsemide in am based on renal function and exam   -above was discussed with primary team   -please get standing weight tomorrow   -no acute indication for RRT today   -progressive renal failure approaching dialysis needs     Thank you for this consult. I will continue to follow the patient with you. Please feel free to call with questions.      Shree Marshall M.D.  [facility]

## 2021-12-08 NOTE — PROGRESS NOTES
Hospitalist Progress Note    NAME: Minus Letters   :  1965   MRN:  [id number]   Room Number:  DPU8/04  @ [facility] Summary: 64 y.o. female whom presented on 2021 with      Assessment / Plan:      EMMANUEL on CKD 3B, non-oliguric POA   Suspect progressive renal failure due to uncontrolled HTN, DM with chronic heavy proteinuria. Clinically volume overloaded. CT A/P reviewed independently - shows no nephrolithiasis or hydronephrosis. Baseline 2.3-2.7. Cr today 4.06      - Furosemide gtt rate 10 mg/hr D/gagandeep  And one time dose of torsemide 80 mg and re eval in the am per nephrology   - Will need HD if inadequate response to above regimen. - Strict Is and Os, Avoid nephrotoxic meds, renally dose medications. - [facility] Dr Genny Tran following the patient.            Acute on chronic diastolic heart failure POA  BNP elevated. EKG reviewed independently- normal sinus rhythm. ECHO 2021 revealed EF 60-65 %, normal cavity size and systolic function.      -Diuresis as above   -Fluid restriction  -Low-sodium diet  -Sleep study as outpatient        Hypertensive emergency POA,resolved   mm Hg. CHF exacerbation. Evidence of EMMANUEL.    Administered 20 mg IV Labetalol in ER.      - Amlodipine 10 mg daily  - Metoprolol 25 mg BID        Insulin-dependent diabetes mellitus POA              Hemoglobin A1c     8.4 (H)      2021 12:32 AM              Hemoglobin A1c (POC)     10.2            2015 11:01 AM  Home regimen include checked immunoglobulin,  canagliflozin 100 mg daily,  Lantus 20 units and aspart     - Lispro correctional scale, FSG AC HS  - Consistent carb diet, hypoglycemia protocol.         Diabetic neuropathy  -On gabapentin 900 mg 3 times daily daily at home  -Resume renally dose         History of stroke  -On aspirin, Plavix and atorvastatin at home      History of chronic back pain  -On tramadol at home as needed     Morbid obesity  -BMI 47.26  - Counseled on Lifestyle modifications, AHA Diet ,weight loss strategies.     Body mass index is 47.26 kg/m². Code Status: full   Surrogate Decision Maker:     DVT Prophylaxis: Lovenox  GI Prophylaxis: not indicated  Baseline: ambulatory independently                   Subjective:     Chief Complaint / Reason for Physician Visit  \"feels much better . Discussed with RN events overnight. Review of Systems:  . No fevers, chills, appetite change, cough, sputum production, shortness of breath, dyspnea on exertion, nausea, vomitting, diarrhea, constipation, chest pain, abdominal pain, joint pain, rash, itching. Tolerating PT/OT. Tolerating diet. Objective:     VITALS:   Last 24hrs VS reviewed since prior progress note. Most recent are:  Patient Vitals for the past 24 hrs:   Temp Pulse Resp BP SpO2   12/08/21 0826 98.7 °F (37.1 °C) 74 14 (!) 148/62 97 %   12/08/21 0815  69 16  97 %   12/08/21 0500  70 18 (!) 126/53 96 %   12/08/21 0302  74      12/07/21 2245 98.1 °F (36.7 °C) 84 19 124/60 96 %   12/07/21 1619    (!) 141/64    12/07/21 1617 98.2 °F (36.8 °C) 74 12 (!) 108/52 98 %   12/07/21 1136 98 °F (36.7 °C) 71 15 134/75 99 %       Intake/Output Summary (Last 24 hours) at 12/8/2021 1022  Last data filed at 12/8/2021 8412  Gross per 24 hour   Intake 779.42 ml   Output 4400 ml   Net -3620.58 ml        PHYSICAL EXAM:  General: Alert, cooperative, no acute distress    EENT:  EOMI. Anicteric sclerae. MMM  Resp:  CTA bilaterally, no wheezing or rales. No accessory muscle use  CV:  Regular  rhythm,  normal S1/S2, no murmurs rubs gallops, 1+ edema  GI:  Soft, Non distended, Non tender. +Bowel sounds  Neurologic:  Alert and oriented X 3, normal speech,   Psych:   Good insight. Not anxious nor agitated  Skin:  No rashes.   No jaundice    Reviewed most current lab test results and cultures  YES  Reviewed most current radiology test results   YES  Review and summation of old records today NO  Reviewed patient's current orders and MAR    YES  PMH/SH reviewed - no change compared to H&P  ________________________________________________________________________  Care Plan discussed with:    Comments   Patient x    Family      RN x    Care Manager x    Consultant  x                     x Multidiciplinary team rounds were held today with , nursing, pharmacist and clinical coordinator. Patient's plan of care was discussed; medications were reviewed and discharge planning was addressed. ________________________________________________________________________  Total NON critical care TIME: 35Minutes    Total CRITICAL CARE TIME Spent:   Minutes non procedure based      Comments   >50% of visit spent in counseling and coordination of care x    ________________________________________________________________________  Kings Kemp MD     Procedures: see electronic medical records for all procedures/Xrays and details which were not copied into this note but were reviewed prior to creation of Plan. LABS:  I reviewed today's most current labs and imaging studies. Pertinent labs include:  Recent Labs     12/07/21  0426 12/06/21  1038   WBC 7.2 6.7   HGB 7.9* 7.7*   HCT 24.1* 23.4*    155     Recent Labs     12/08/21  0054 12/07/21  1630 12/07/21  0426 12/06/21  1830 12/06/21  1038 12/06/21  1038   NA  --  136 139 137   < > 137   K  --  3.9 4.0 4.0   < > 4.0   CL  --  104 103 104   < > 106   CO2  --  27 26 26   < > 25   GLU  --  174* 202* 233*   < > 173*   BUN  --  33* 32* 31*   < > 28*   CREA  --  3.97* 3.74* 3.66*   < > 3.65*   CA  --  8.4* 8.3* 8.5   < > 8.4*   MG  --  1.8 1.8 1.8   < > 1.8   PHOS  --  5.9* 5.5* 5.4*   < > 5.1*   ALB 1.9*  --   --  2.0*  --  1.9*   TBILI 0.1*  --   --  0.2  --  0.2   ALT 13  --   --  14  --  11*    < > = values in this interval not displayed.        Signed: Kings Kemp MD

## 2021-12-08 NOTE — PROGRESS NOTES
[facility] At [address], [address]  Office Phone [phone]    CARDIOLOGY PROGRESS NOTE    12/8/2021 8:23 AM    Admit Date: 12/1/2021    Admit Diagnosis:   EMMANUEL (acute kidney injury) ([facility] [address] [N17.9]    Subjective:     Yolanda Mendoza presets in bed, NAD, no overnight issues.      Visit Vitals  BP (!) 148/62 (BP Patient Position: Semi fowlers)   Pulse 74   Temp 98.7 °F (37.1 °C)   Resp 14   Ht 5' 5\" (1.651 m)   Wt 266 lb 12.8 oz (121 kg)   LMP 05/01/2013   SpO2 97%   BMI 44.40 kg/m²       Current Facility-Administered Medications   Medication Dose Route Frequency    furosemide (LASIX) 200 mg in 0.9% sodium chloride 100 mL infusion  10 mg/hr IntraVENous CONTINUOUS    albuterol-ipratropium (DUO-NEB) 2.5 MG-0.5 MG/3 ML  3 mL Nebulization Q4H PRN    heparin (porcine) injection 5,000 Units  5,000 Units SubCUTAneous Q8H    sodium chloride (NS) flush 5-40 mL  5-40 mL IntraVENous Q8H    sodium chloride (NS) flush 5-40 mL  5-40 mL IntraVENous PRN    acetaminophen (TYLENOL) tablet 650 mg  650 mg Oral Q6H PRN    Or    acetaminophen (TYLENOL) suppository 650 mg  650 mg Rectal Q6H PRN    polyethylene glycol (MIRALAX) packet 17 g  17 g Oral DAILY PRN    ondansetron (ZOFRAN ODT) tablet 4 mg  4 mg Oral Q8H PRN    Or    ondansetron (ZOFRAN) injection 4 mg  4 mg IntraVENous Q6H PRN    amLODIPine (NORVASC) tablet 10 mg  10 mg Oral DAILY    amitriptyline (ELAVIL) tablet 50 mg  50 mg Oral QHS    aspirin chewable tablet 81 mg  81 mg Oral DAILY    atorvastatin (LIPITOR) tablet 80 mg  80 mg Oral QHS    clopidogreL (PLAVIX) tablet 75 mg  75 mg Oral DAILY    gabapentin (NEURONTIN) capsule 100 mg  100 mg Oral BID    metoprolol tartrate (LOPRESSOR) tablet 25 mg  25 mg Oral BID    glucose chewable tablet 16 g  4 Tablet Oral PRN    dextrose (D50W) injection syrg 12.5-25 g  25-50 mL IntraVENous PRN    glucagon (GLUCAGEN) injection 1 mg  1 mg IntraMUSCular PRN    insulin lispro (HUMALOG) injection   SubCUTAneous AC&HS         Objective:      Physical Exam    General: Well developed, in no acute distress, cooperative and alert  HEENT: No carotid bruits, no JVD, trach is midline. Neck Supple, PERRL, EOM intact. Heart:  Normal S1/S2 negative S3 or S4. Regular, no murmur, gallop or rub. Respiratory: Clear bilaterally x 4, no wheezing or rales  Abdomen:   Soft, non-tender, no masses, bowel sounds are active. Extremities: 2+ LE edema, trace bilateral hand edema, normal cap refill, no cyanosis, atraumatic. Data Review:   Recent Labs     12/07/21  0426 12/06/21  1038   WBC 7.2 6.7   HGB 7.9* 7.7*   HCT 24.1* 23.4*    155     Recent Labs     12/08/21  0054 12/07/21  1630 12/07/21  0426 12/06/21  1830 12/06/21  1038 12/06/21  1038   NA  --  136 139 137   < > 137   K  --  3.9 4.0 4.0   < > 4.0   CL  --  104 103 104   < > 106   CO2  --  27 26 26   < > 25   GLU  --  174* 202* 233*   < > 173*   BUN  --  33* 32* 31*   < > 28*   CREA  --  3.97* 3.74* 3.66*   < > 3.65*   CA  --  8.4* 8.3* 8.5   < > 8.4*   MG  --  1.8 1.8 1.8   < > 1.8   PHOS  --  5.9* 5.5* 5.4*   < > 5.1*   ALB 1.9*  --   --  2.0*  --  1.9*   TBILI 0.1*  --   --  0.2  --  0.2   ALT 13  --   --  14  --  11*    < > = values in this interval not displayed. No results for input(s): TROIQ, CPK, CKMB in the last 72 hours. Intake/Output Summary (Last 24 hours) at 12/8/2021 0920  Last data filed at 12/8/2021 6979  Gross per 24 hour   Intake 779.42 ml   Output 4400 ml   Net -3620.58 ml        Telemetry: Sinus   EKG:  Cxray:    Assessment:     Active Problems:    EMMANUEL (acute kidney injury) ([facility] [address] (8/25/2021)      HFPEF  Plan:   1. HFpEF: Output 845ml, remains on lasix drip. GFR further declined to 12 and crt. 3.97. Followed by [facility].  No further recommendation from Cardiology, will need high dose LOOP diuretics to maintain evoulmic state vs declaring ESRD and preparation for dialysis. Patient to follow up with cardiology when discharged. Please re-consult if any changes in patients cardiac condition. Chiquita Suarez DNP-ANP-BC  Chart and note reviewed, discussed with advanced practioner and agree with recommendations and treatment plan as outlined.   Larry Stovall MD

## 2021-12-08 NOTE — PROGRESS NOTES
0700  [facility] Report included review of SBAR, accordion report, results review, orders, meds, ROS, and POC. Itrace completed on all lines, whittington full and need of empty/record for cards rounding. All questions answered. Transfer of care complete. 1791  BG-151, no correctional insulin indicated. 9864  Am shift assessment and vs completed, MEWES=0. Pain=0. Pt repositioned with LE elevated and in semifowlers. Whittington drained, 3200 CYU recorded, Cards provider at bedside, d/w provider output. Current POC reviewed with pt with understanding verbalized. 0840  All scheduled am meds administered. 0900  In/outs recorded, pt sitting in bed, watching tv, safety rounds completed, pt denies any needs at this time. 1035  Left AC IV removed d/t signs of infiltration. New IV #22g started to anterior right wrist and lasix infusion running at 5ml/hr to newly inserted access. [id number]  PT at bedside with pt, pt able to get OOB with assistance, ambulate in room then back to bed. Will obtain standing weight with next rounding. 1217  Serum lab for BMP per nephrology request collected, HZ=873, no correctional insulin indicated. 1225  Q4H VS and reassessment completed, kpt reporting new onset pain to left lateral ankle, limb repositioned and elevated, MD to be notified per ptm request to have an order for tramadol. Scheduled heparin and PIV flush administered, right wrist PIV infusing with lasix drip therefore that access not flushed, sited infusing without issue. Whittington emptied, in/outs recorded. Lunch tray delivered, pt resting quietly in bed, watching tv and eating. 1320  Po Tramadol 50mg admin to treat pain 7/10 to left lateral distal foot. Will reassess within 1hr. Pt denies any additional needs at this time. 1405  Pt pain down to 3/10 and meeting pt pain level goals.   Pt resting quietly in bed watching tv.    1545  Safety roun ds completed, pt napping in bed, call bell in reach. 1610  Q4H Vs and reassessment completed, MEWS=0, pain down to 3/10 and at pt pain goal level. Pt family called for updates, pt provided verbal ok for this RN to call family back and discuss PMH, this RN to call family after MD provides updates on POC with nephrology input, MD awaiting nephrology return call. Pt informed that call to family won't be made until nephrology consult completed and MD updates this RN. 1710  Scheduled gabapentin and metropolol admin, whittington emptied, in/outs updated. This RN to return to pt bedside to call family after updates from MD.      1728  MM=047, no correctional insulin indicated. 1755  whittington emptied, 750ml CYU drained    1800  Family update provided to Pete Bianchi (pt's nephew)    1805  Lasix drip stopped per new orders post nephrology call back consult with Bhavani Blancas MD.  Pt updated on POC and verbalized understanding. 1920  Shift change report given to Erich Wheatley RN. Report included review of SBAR, accordion report, results review, orders, meds, ROS, and POC. Erich Wheatley RN to call pt's family member to provide updates s/p nephrology consult completed. All questions answered. Transfer of care complete.

## 2021-12-09 LAB
ANION GAP SERPL CALC-SCNC: 7 MMOL/L (ref 5–15)
ANION GAP SERPL CALC-SCNC: 8 MMOL/L (ref 5–15)
BUN SERPL-MCNC: 39 MG/DL (ref 6–20)
BUN SERPL-MCNC: 42 MG/DL (ref 6–20)
BUN/CREAT SERPL: 10 (ref 12–20)
BUN/CREAT SERPL: 9 (ref 12–20)
CALCIUM SERPL-MCNC: 8 MG/DL (ref 8.5–10.1)
CALCIUM SERPL-MCNC: 8.2 MG/DL (ref 8.5–10.1)
CHLORIDE SERPL-SCNC: 101 MMOL/L (ref 97–108)
CHLORIDE SERPL-SCNC: 102 MMOL/L (ref 97–108)
CO2 SERPL-SCNC: 28 MMOL/L (ref 21–32)
CO2 SERPL-SCNC: 29 MMOL/L (ref 21–32)
CREAT SERPL-MCNC: 4.28 MG/DL (ref 0.55–1.02)
CREAT SERPL-MCNC: 4.39 MG/DL (ref 0.55–1.02)
GLUCOSE BLD STRIP.AUTO-MCNC: 141 MG/DL (ref 65–117)
GLUCOSE BLD STRIP.AUTO-MCNC: 160 MG/DL (ref 65–117)
GLUCOSE BLD STRIP.AUTO-MCNC: 160 MG/DL (ref 65–117)
GLUCOSE BLD STRIP.AUTO-MCNC: 189 MG/DL (ref 65–117)
GLUCOSE SERPL-MCNC: 132 MG/DL (ref 65–100)
GLUCOSE SERPL-MCNC: 143 MG/DL (ref 65–100)
MAGNESIUM SERPL-MCNC: 1.7 MG/DL (ref 1.6–2.4)
PHOSPHATE SERPL-MCNC: 6.2 MG/DL (ref 2.6–4.7)
POTASSIUM SERPL-SCNC: 4 MMOL/L (ref 3.5–5.1)
POTASSIUM SERPL-SCNC: 4.2 MMOL/L (ref 3.5–5.1)
SERVICE CMNT-IMP: ABNORMAL
SODIUM SERPL-SCNC: 137 MMOL/L (ref 136–145)
SODIUM SERPL-SCNC: 138 MMOL/L (ref 136–145)

## 2021-12-09 PROCEDURE — 83735 ASSAY OF MAGNESIUM: CPT

## 2021-12-09 PROCEDURE — 74011250637 HC RX REV CODE- 250/637: Performed by: STUDENT IN AN ORGANIZED HEALTH CARE EDUCATION/TRAINING PROGRAM

## 2021-12-09 PROCEDURE — 80048 BASIC METABOLIC PNL TOTAL CA: CPT

## 2021-12-09 PROCEDURE — 36415 COLL VENOUS BLD VENIPUNCTURE: CPT

## 2021-12-09 PROCEDURE — 65660000001 HC RM ICU INTERMED STEPDOWN

## 2021-12-09 PROCEDURE — 84100 ASSAY OF PHOSPHORUS: CPT

## 2021-12-09 PROCEDURE — 74011250636 HC RX REV CODE- 250/636: Performed by: STUDENT IN AN ORGANIZED HEALTH CARE EDUCATION/TRAINING PROGRAM

## 2021-12-09 PROCEDURE — 97116 GAIT TRAINING THERAPY: CPT | Performed by: PHYSICAL THERAPIST

## 2021-12-09 PROCEDURE — 74011250637 HC RX REV CODE- 250/637: Performed by: HOSPITALIST

## 2021-12-09 PROCEDURE — 82962 GLUCOSE BLOOD TEST: CPT

## 2021-12-09 RX ORDER — METOPROLOL TARTRATE 25 MG/1
25 TABLET, FILM COATED ORAL ONCE
Status: COMPLETED | OUTPATIENT
Start: 2021-12-10 | End: 2021-12-09

## 2021-12-09 RX ORDER — TRAMADOL HYDROCHLORIDE 50 MG/1
50 TABLET ORAL
Status: DISCONTINUED | OUTPATIENT
Start: 2021-12-09 | End: 2021-12-10 | Stop reason: HOSPADM

## 2021-12-09 RX ADMIN — TRAMADOL HYDROCHLORIDE 50 MG: 50 TABLET, COATED ORAL at 15:43

## 2021-12-09 RX ADMIN — GABAPENTIN 100 MG: 100 CAPSULE ORAL at 21:04

## 2021-12-09 RX ADMIN — HEPARIN SODIUM 5000 UNITS: 5000 INJECTION, SOLUTION INTRAVENOUS; SUBCUTANEOUS at 15:35

## 2021-12-09 RX ADMIN — HEPARIN SODIUM 5000 UNITS: 5000 INJECTION, SOLUTION INTRAVENOUS; SUBCUTANEOUS at 05:35

## 2021-12-09 RX ADMIN — METOPROLOL TARTRATE 25 MG: 25 TABLET, FILM COATED ORAL at 23:22

## 2021-12-09 RX ADMIN — CLOPIDOGREL BISULFATE 75 MG: 75 TABLET, FILM COATED ORAL at 08:36

## 2021-12-09 RX ADMIN — Medication 10 ML: at 05:35

## 2021-12-09 RX ADMIN — METOPROLOL TARTRATE 25 MG: 25 TABLET, FILM COATED ORAL at 08:36

## 2021-12-09 RX ADMIN — AMLODIPINE BESYLATE 10 MG: 5 TABLET ORAL at 08:36

## 2021-12-09 RX ADMIN — TRAMADOL HYDROCHLORIDE 50 MG: 50 TABLET, COATED ORAL at 08:48

## 2021-12-09 RX ADMIN — ATORVASTATIN CALCIUM 80 MG: 40 TABLET, FILM COATED ORAL at 21:04

## 2021-12-09 RX ADMIN — GABAPENTIN 100 MG: 100 CAPSULE ORAL at 08:36

## 2021-12-09 RX ADMIN — HEPARIN SODIUM 5000 UNITS: 5000 INJECTION, SOLUTION INTRAVENOUS; SUBCUTANEOUS at 21:04

## 2021-12-09 RX ADMIN — Medication 10 ML: at 21:04

## 2021-12-09 RX ADMIN — ASPIRIN 81 MG CHEWABLE TABLET 81 MG: 81 TABLET CHEWABLE at 08:36

## 2021-12-09 RX ADMIN — Medication 20 ML: at 15:35

## 2021-12-09 NOTE — PROGRESS NOTES
Hospitalist Progress Note    NAME: Rey Avila   :  1965   MRN:  [id number]   Room Number:  TWJ3/53  @ [facility]       Interim Hospital Summary: 64 y.o. female whom presented on 2021 with      Assessment / Plan:      EMMANUEL on CKD 3B, non-oliguric POA  Worsening   Suspect progressive renal failure due to uncontrolled HTN, DM with chronic heavy proteinuria. Clinically volume overloaded. CT A/P reviewed independently - shows no nephrolithiasis or hydronephrosis. Baseline 2.3-2.7. Cr today 4.2     - Diuretics on hold  - Will need HD if inadequate response to above regimen. - Strict Is and Os, Avoid nephrotoxic meds, renally dose medications. - [facility] Dr Carine Ramirez following the patient.            Acute on chronic diastolic heart failure POA  BNP elevated. EKG reviewed independently- normal sinus rhythm. ECHO 2021 revealed EF 60-65 %, normal cavity size and systolic function.      -Fluid restriction  -Low-sodium diet  -Sleep study as outpatient        Hypertension  - Amlodipine 10 mg daily and Metoprolol 25 mg BID  -Hold given soft blood pressure        Insulin-dependent diabetes mellitus POA              Hemoglobin A1c     8.4 (H)      2021 12:32 AM              Hemoglobin A1c (POC)     10.2            2015 11:01 AM  Home regimen include checked immunoglobulin,  canagliflozin 100 mg daily,  Lantus 20 units and aspart     - Lispro correctional scale, FSG AC HS  - Consistent carb diet, hypoglycemia protocol.         Diabetic neuropathy  -On gabapentin 900 mg 3 times daily daily at home  -Resume renally dose         History of stroke  -On aspirin, Plavix and atorvastatin at home      History of chronic back pain  -On tramadol at home as needed     Morbid obesity  -BMI 47.26  - Counseled on Lifestyle modifications, AHA Diet ,weight loss strategies.     Body mass index is 47.26 kg/m².   Code Status: full   Surrogate Decision Maker:     DVT Prophylaxis: Lovenox  GI Prophylaxis: not indicated  Baseline: ambulatory independently                   Subjective:     Chief Complaint / Reason for Physician Visit  \"patient feels swollen  . Discussed with RN events overnight. Review of Systems:  . No fevers, chills, appetite change, cough, sputum production, shortness of breath, dyspnea on exertion, nausea, vomitting, diarrhea, constipation, chest pain, abdominal pain, joint pain, rash, itching. Tolerating PT/OT. Tolerating diet. Objective:     VITALS:   Last 24hrs VS reviewed since prior progress note. Most recent are:  Patient Vitals for the past 24 hrs:   Temp Pulse Resp BP SpO2   12/09/21 0830 97.8 °F (36.6 °C) 71 15 114/62 100 %   12/09/21 0815  65 12  96 %   12/09/21 0502 98.7 °F (37.1 °C) 69 13 (!) 127/49 96 %   12/08/21 2345 98.9 °F (37.2 °C) 75 16 (!) 132/50 96 %   12/08/21 2302 98.9 °F (37.2 °C) 77 16 (!) 128/53 97 %   12/08/21 2134     97 %   12/08/21 2015 99 °F (37.2 °C) 76 14 (!) 122/49 97 %   12/08/21 1800  76 17  97 %   12/08/21 1700  76 16 (!) 124/49 98 %   12/08/21 1605 98.5 °F (36.9 °C) 76 14 (!) 135/51 98 %   12/08/21 1225 98.6 °F (37 °C) 67 17 (!) 131/45 100 %   12/08/21 1215  67 17  97 %       Intake/Output Summary (Last 24 hours) at 12/9/2021 0914  Last data filed at 12/9/2021 8467  Gross per 24 hour   Intake 1690 ml   Output 3575 ml   Net -1885 ml        PHYSICAL EXAM:  General: Alert, cooperative, no acute distress    EENT:  EOMI. Anicteric sclerae. MMM  Resp:  CTA bilaterally, no wheezing or rales. No accessory muscle use  CV:  Regular  rhythm,  normal S1/S2, no murmurs rubs gallops, 2+ edema  GI:  Soft, Non distended, Non tender. +Bowel sounds  Neurologic:  Alert and oriented X 3, normal speech,   Psych:   Good insight. Not anxious nor agitated  Skin:  No rashes.   No jaundice    Reviewed most current lab test results and cultures  YES  Reviewed most current radiology test results   YES  Review and summation of old records today NO  Reviewed patient's current orders and MAR    YES  PMH/SH reviewed - no change compared to H&P  ________________________________________________________________________  Care Plan discussed with:    Comments   Patient x    Family      RN x    Care Manager x    Consultant  x                     x Multidiciplinary team rounds were held today with , nursing, pharmacist and clinical coordinator. Patient's plan of care was discussed; medications were reviewed and discharge planning was addressed. ________________________________________________________________________  Total NON critical care TIME: 35Minutes    Total CRITICAL CARE TIME Spent:   Minutes non procedure based      Comments   >50% of visit spent in counseling and coordination of care x    ________________________________________________________________________  Abebe Iglesias MD     Procedures: see electronic medical records for all procedures/Xrays and details which were not copied into this note but were reviewed prior to creation of Plan. LABS:  I reviewed today's most current labs and imaging studies.   Pertinent labs include:  Recent Labs     12/07/21  0426 12/06/21  1038   WBC 7.2 6.7   HGB 7.9* 7.7*   HCT 24.1* 23.4*    155     Recent Labs     12/09/21  0427 12/08/21  1219 12/08/21  0054 12/07/21  1630 12/07/21  0426 12/07/21  0426 12/06/21  1830 12/06/21  1830 12/06/21  1038 12/06/21  1038    136  --  136   < > 139   < > 137   < > 137   K 4.0 3.8  --  3.9   < > 4.0   < > 4.0   < > 4.0    102  --  104   < > 103   < > 104   < > 106   CO2 28 28  --  27   < > 26   < > 26   < > 25   * 154*  --  174*   < > 202*   < > 233*   < > 173*   BUN 39* 37*  --  33*   < > 32*   < > 31*   < > 28*   CREA 4.28* 4.06*  --  3.97*   < > 3.74*   < > 3.66*   < > 3.65*   CA 8.0* 8.5  --  8.4*   < > 8.3*   < > 8.5   < > 8.4*   MG 1.7  --   --  1.8  --  1.8   < > 1.8   < > 1.8   PHOS 6.2*  --   --  5.9*  --  5.5*   < > 5.4*   < > 5.1*   ALB  --   --  1.9*  --   --   --   --  2.0*  --  1.9*   TBILI  --   --  0.1*  --   --   --   --  0.2  --  0.2   ALT  --   --  13  --   --   --   --  14  --  11*    < > = values in this interval not displayed.        Signed: Jennifer Zuniga MD

## 2021-12-09 NOTE — PROGRESS NOTES
Problem: Mobility Impaired (Adult and Pediatric)  Goal: *Acute Goals and Plan of Care (Insert Text)  Description: FUNCTIONAL STATUS PRIOR TO ADMISSION: Patient was modified independent using a rollator for short distance ambulation    HOME SUPPORT PRIOR TO ADMISSION: The patient lived with her sister and son with autism but did not require assist.    Physical Therapy Goals  Initiated 12/2/2021; Reviewed 12/9/21 and goals remain appropriate:   1. Patient will move from supine to sit and sit to supine in bed with supervision/set-up within 7 day(s). 2.  Patient will transfer from bed to chair and chair to bed with supervision/set-up using the least restrictive device within 7 day(s). 3.  Patient will perform sit to stand with supervision/set-up within 7 day(s). 4.  Patient will ambulate with supervision/set-up for 25 feet with the least restrictive device within 7 day(s). Outcome: Progressing Towards Goal     PHYSICAL THERAPY TREATMENT  Weekly reassessment  Patient: Chloe Norris (58 y.o. female)  Date: 12/9/2021  Diagnosis: EMMANUEL (acute kidney injury) ([facility] [address] [N17.9]   <principal problem not specified>       Precautions:    Chart, physical therapy assessment, plan of care and goals were reviewed. ASSESSMENT  Patient continues with skilled PT services and is progressing towards goals. Patient agreeable to therapy. Patient performed bed mobility with SBA. Patient able to stand with SBA to walker after two attempts using hands. Patient ambulated 24 feet with walker and CGA. Patient required seated rest break before taking 8 side steps in each direction with walker. Patient will benefit from rollator and home health PT at discharge. Current Level of Function Impacting Discharge (mobility/balance):  SBA for bed mobility and transfers; CGA with walker    Other factors to consider for discharge: limited activity tolerance         PLAN :  Patient continues to benefit from skilled intervention to address the above impairments. Continue treatment per established plan of care. to address goals. Recommendation for discharge: (in order for the patient to meet his/her long term goals)  Physical therapy at least 2 days/week in the home     This discharge recommendation:  Has been made in collaboration with the attending provider and/or case management    IF patient discharges home will need the following DME: rollator       SUBJECTIVE:   Patient stated The swelling is up a little more today.     OBJECTIVE DATA SUMMARY:   Critical Behavior:  Neurologic State: Alert  Orientation Level: Oriented X4  Cognition: Appropriate decision making, Follows commands     Functional Mobility Training:  Bed Mobility:     Supine to Sit: Stand-by assistance  Sit to Supine: Stand-by assistance  Scooting: Stand-by assistance        Transfers:  Sit to Stand: Stand-by assistance  Stand to Sit: Stand-by assistance      Balance:  Sitting: Intact  Standing: Impaired;  With support  Standing - Static: Constant support; Good  Standing - Dynamic : Constant support; Good; Fair    Ambulation/Gait Training:  Distance (ft): 24 Feet (ft)  Assistive Device: Gait belt; Walker, rolling  Ambulation - Level of Assistance: Contact guard assistance  Gait Abnormalities: Decreased step clearance  Base of Support: Widened  Speed/Vangie: Slow  Step Length: Right shortened; Left shortened    Gunter Balance Test:    Sitting to Standin  Standing Unsupported: 3  Sitting with Back Unsupported: 4  Standing to Sittin  Transfers: 2  Standing Unsupported with Eyes Closed: 1  Standing Unsupported with Feet Together: 1  Reach Forward with Outstretched Arm: 1   Object: 2  Turn to Look Over Shoulders: 2  Turn 360 Degrees: 1  Alternate Foot on Step/Stool: 1  Standing Unsupported One Foot in Front: 0  Stand on One Le  Total:          56=Maximum possible score;   0-20=High fall risk  21-40=Moderate fall risk   41-56=Low fall risk         Therapeutic Exercises: Ankle pumps and circles x10    Pain Rating:  No pain    Activity Tolerance:   Good    After treatment patient left in no apparent distress:   Supine in bed, Call bell within reach, and Side rails x 3    COMMUNICATION/COLLABORATION:   The patients plan of care was discussed with: Registered nurse.      Bulmaro Uriostegui, PT   Time Calculation: 20 mins

## 2021-12-09 NOTE — PROGRESS NOTES
0730 [facility] Report included review of SBAR, accordion report, results review, orders, meds, ROS, and POC. All questions answered. Transfer of care complete. 0743  KQ=035, no correctional insulin indicated. [id number]  AM shift assessment and VS completed. AM scheduled meds admin. Pt requesting pain meds for pain 5/10.     0840  MD order to hold BP meds acknowledged, pt ingested am scheduled dose prior to new order to hold meds. Lab work due, pt notified and verbalized understanding. 0848  Prn tramodol admin for pain to left lateral foot 6/10. Breakfast tray delivered.

## 2021-12-09 NOTE — PROGRESS NOTES
ERROL  RUR 19%  LOS 8d  ELOS     1000  IDT met to discuss plans. Creatine is up. Labs to be taken at 12 noon. May need to transfer out for continued care.   TYE Miller/CECI  [phone]

## 2021-12-09 NOTE — PROGRESS NOTES
Bedside and Verbal shift change report given to Li Pond RN (oncoming nurse) by [employer]  Report included the following information SBAR, Kardex, ED Summary, Procedure Summary, Intake/Output, MAR, Accordion, Recent Results, Med Rec Status and Cardiac Rhythm NSR   Patient resting in bed quietly. .   2030 Patient given PO Tramadol for 5 out of 10 left foot pain. 2100  Called nephew to update him on patient's condition, Patient gave permission for me to give this information. 2215  Patient inquiring about her Elavil that she normally takes at night that was discontinued. Patient wants to know why this med was d/cd. Message sent to provider. 200 MD called and I told him that Toradol and Elavil are not compatable per the information I read in the chart. MD ordered one time dose to be given. 0000 Patients vital signs taken . Patient denies pain at this time. 0430 Patient labs drawn and taken to labs.

## 2021-12-09 NOTE — PROGRESS NOTES
[facility] - [facility]    CC: f/u for EMMANUEL     This is an \"e-consult\" with data obtained by chart review and by discussion with the requesting physician. I did not directly examine or interview the patient. S:   Lasix drip d/c yesterday   All diuretics held given increased urine out noted yesterday evening. BP has significantly improved with diuresis. sbp in 110-130's    PMH:  Past Medical History:   Diagnosis Date    Arthritis     Asthma     CHF (congestive heart failure) (HCC)     Chronic back pain     [facility] [address]     Diabetic retinopathy ([facility] [address]     Hypertension     MRSA (methicillin resistant staph aureus) culture positive     labial abscess    Neuropathy          PSH:  Past Surgical History:   Procedure Laterality Date    HX HEENT      tonsillectomy    HX ORTHOPAEDIC      left ft bunionectomy    HX OTHER SURGICAL      lanced labial abscess       FH:  History reviewed. No pertinent family history. SH:  Social History     Socioeconomic History    Marital status:      Spouse name: Not on file    Number of children: Not on file    Years of education: Not on file    Highest education level: Not on file   Occupational History    Not on file   Tobacco Use    Smoking status: Never Smoker    Smokeless tobacco: Never Used   Substance and Sexual Activity    Alcohol use: No    Drug use: No    Sexual activity: Not on file   Other Topics Concern    Not on file   Social History Narrative    Not on file     Social Determinants of Health     Financial Resource Strain:     Difficulty of Paying Living Expenses: Not on file   Food Insecurity:     Worried About Running Out of Food in the Last Year: Not on file    Tena of Food in the Last Year: Not on file   Transportation Needs:     Lack of Transportation (Medical): Not on file    Lack of Transportation (Non-Medical):  Not on file   Physical Activity:     Days of Exercise per Week: Not on file    Minutes of Exercise per Session: Not on file   Stress:     Feeling of Stress : Not on file   Social Connections:     Frequency of Communication with Friends and Family: Not on file    Frequency of Social Gatherings with Friends and Family: Not on file    Attends [employer] Services: Not on file    Active Member of Clubs or Organizations: Not on file    Attends Club or Organization Meetings: Not on file    Marital Status: Not on file   Intimate Partner Violence:     Fear of Current or Ex-Partner: Not on file    Emotionally Abused: Not on file    Physically Abused: Not on file    Sexually Abused: Not on file   Housing Stability:     Unable to Pay for Housing in the Last Year: Not on file    Number of [place] in the Last Year: Not on file    Unstable Housing in the Last Year: Not on file       Home meds:  Prior to Admission Medications   Prescriptions Last Dose Informant Patient Reported? Taking? Flovent  mcg/actuation inhaler 2021 Transfer Papers Yes Yes   Sig: Take 2 Puffs by inhalation two (2) times a day. Lantus Solostar U-100 Insulin 100 unit/mL (3 mL) inpn 2021 at Unknown time Transfer Papers Yes Yes   Si Units by SubCUTAneous route nightly. albuterol (PROVENTIL HFA, VENTOLIN HFA, PROAIR HFA) 90 mcg/actuation inhaler  Transfer Papers Yes Yes   Sig: Take 2 Puffs by inhalation every six (6) hours as needed for Wheezing. amLODIPine (NORVASC) 10 mg tablet 2021 at Unknown time Transfer Papers Yes Yes   Sig: Take 10 mg by mouth daily. amitriptyline (ELAVIL) 50 mg tablet 2021 at Unknown time Transfer Papers Yes Yes   Sig: Take 50 mg by mouth nightly. aspirin delayed-release 81 mg tablet 2021 at Unknown time Transfer Papers Yes Yes   Sig: Take 81 mg by mouth daily. atorvastatin (LIPITOR) 80 mg tablet 2021 at Unknown time Transfer Papers No Yes   Sig: Take 1 Tab by mouth nightly.    canagliflozin (INVOKANA) 100 mg tablet 2021 at Unknown time Transfer Papers Yes Yes   Sig: Take 100 mg by mouth daily. Take one tablet by mouth daily   clopidogrel (PLAVIX) 75 mg tab 2021 at Unknown time Transfer Papers No Yes   Sig: Take 1 Tab by mouth daily. docusate sodium (Colace) 100 mg capsule 2021 at Unknown time Transfer Papers Yes Yes   Sig: Take 100 mg by mouth two (2) times daily as needed. fluticasone propionate (Flonase Allergy Relief) 50 mcg/actuation nasal spray 2021 at Unknown time Transfer Papers Yes Yes   Si SPRAY, Each Nostril, daily, for allergy symptoms, 11 Refills, Dispense: 1, bottle, Pharmacy [facility]   gabapentin (NEURONTIN) 300 mg capsule 2021 at Unknown time  Yes Yes   Sig: Take 900 mg by mouth three (3) times daily. glipiZIDE (GLUCOTROL) 5 mg tablet 2021 at Unknown time Transfer Papers Yes Yes   Sig: Take 5 mg by mouth daily. Take One Tablet By Mouth Daily   insulin aspart U-100 (NOVOLOG) 100 unit/mL (3 mL) inpn   Yes Yes   Sig: by SubCUTAneous route Before breakfast, lunch, and dinner. Sliding scale   insulin aspart U-100 (NovoLOG Flexpen U-100 Insulin) 100 unit/mL (3 mL) inpn   Yes Yes   Sig: 10 Units by SubCUTAneous route Before breakfast, lunch, and dinner. magnesium oxide (MAG-OX) 400 mg tablet 2021 at Unknown time Transfer Papers Yes Yes   Sig: Take 400 mg by mouth daily. metoprolol tartrate (LOPRESSOR) 25 mg tablet   Yes Yes   Sig: Take 25 mg by mouth two (2) times a day. nystatin (MYCOSTATIN) powder   Yes Yes   Sig: Apply  to affected area two (2) times daily as needed. torsemide (DEMADEX) 20 mg tablet 2021 at Unknown time  Yes Yes   Sig: Take 20 mg by mouth two (2) times a day. traMADoL (ULTRAM) 50 mg tablet 2021 at Unknown time  Yes Yes   Sig: Take 50 mg by mouth three (3) times daily as needed for Pain.       Facility-Administered Medications: None       Current inpatient medications:    Current Facility-Administered Medications:     traMADoL (ULTRAM) tablet 50 mg, 50 mg, Oral, Q6H PRN, Charity Thurman MD, 50 mg at 12/09/21 0848    [Held by provider] torsemide (DEMADEX) tablet 80 mg, 80 mg, Oral, ONCE, Lazarus Jacobo MD    albuterol-ipratropium (DUO-NEB) 2.5 MG-0.5 MG/3 ML, 3 mL, Nebulization, Q4H PRN, Ty Thurman MD    heparin (porcine) injection 5,000 Units, 5,000 Units, SubCUTAneous, Q8H, Ty Thurman MD, 5,000 Units at 12/09/21 0535    sodium chloride (NS) flush 5-40 mL, 5-40 mL, IntraVENous, Q8H, Ty Thurman MD, 10 mL at 12/09/21 0535    sodium chloride (NS) flush 5-40 mL, 5-40 mL, IntraVENous, PRN, Ty Thurman MD    acetaminophen (TYLENOL) tablet 650 mg, 650 mg, Oral, Q6H PRN, 650 mg at 12/06/21 0904 **OR** acetaminophen (TYLENOL) suppository 650 mg, 650 mg, Rectal, Q6H PRN, Ty Thurman MD    polyethylene glycol (MIRALAX) packet 17 g, 17 g, Oral, DAILY PRN, Ty Thurman MD, 17 g at 12/05/21 1427    ondansetron (ZOFRAN ODT) tablet 4 mg, 4 mg, Oral, Q8H PRN **OR** ondansetron (ZOFRAN) injection 4 mg, 4 mg, IntraVENous, Q6H PRN, Ty Thurman MD    [Held by provider] amLODIPine (NORVASC) tablet 10 mg, 10 mg, Oral, DAILY, Ty Thurman MD, 10 mg at 12/09/21 0836    aspirin chewable tablet 81 mg, 81 mg, Oral, DAILY, Ty Thurman MD, 81 mg at 12/09/21 0836    atorvastatin (LIPITOR) tablet 80 mg, 80 mg, Oral, QHS, Ty Thurman MD, 80 mg at 12/08/21 2205    clopidogreL (PLAVIX) tablet 75 mg, 75 mg, Oral, DAILY, Ty Thurman MD, 75 mg at 12/09/21 0836    gabapentin (NEURONTIN) capsule 100 mg, 100 mg, Oral, BID, Ty Thurman MD, 100 mg at 12/09/21 0836    [Held by provider] metoprolol tartrate (LOPRESSOR) tablet 25 mg, 25 mg, Oral, BID, Ty Thurman MD, 25 mg at 12/09/21 0836    glucose chewable tablet 16 g, 4 Tablet, Oral, PRN, Ty Thurman MD    dextrose (D50W) injection syrg 12.5-25 g, 25-50 mL, IntraVENous, PRN, Ty Thurman MD    glucagon (GLUCAGEN) injection 1 mg, 1 mg, IntraMUSCular, PRN, Tunde Vizcarra MD    insulin lispro (HUMALOG) injection, , SubCUTAneous, AC&HS, Tunde Vizcarra MD, 1 Units at 12/08/21 2206    Allergies: Allergies   Allergen Reactions    Amoxicillin Nausea Only    Aspirin Other (comments)     \"nosebleeds\" but patient takes daily aspirin 81 mg at home.  Patient states naproxen ok    Ciprofloxacin Hives       ROS:   Not performed    Vitals:  Patient Vitals for the past 24 hrs:   Temp Pulse Resp BP SpO2   12/09/21 0830 97.8 °F (36.6 °C) 71 15 114/62 100 %   12/09/21 0815  65 12  96 %   12/09/21 0502 98.7 °F (37.1 °C) 69 13 (!) 127/49 96 %   12/08/21 2345 98.9 °F (37.2 °C) 75 16 (!) 132/50 96 %   12/08/21 2302 98.9 °F (37.2 °C) 77 16 (!) 128/53 97 %   12/08/21 2134     97 %   12/08/21 2015 99 °F (37.2 °C) 76 14 (!) 122/49 97 %   12/08/21 1800  76 17  97 %   12/08/21 1700  76 16 (!) 124/49 98 %   12/08/21 1605 98.5 °F (36.9 °C) 76 14 (!) 135/51 98 %   12/08/21 1225 98.6 °F (37 °C) 67 17 (!) 131/45 100 %   12/08/21 1215  67 17  97 %       I/O:    Intake/Output Summary (Last 24 hours) at 12/9/2021 0858  Last data filed at 12/9/2021 4534  Gross per 24 hour   Intake 1930 ml   Output 3575 ml   Net -1645 ml       Physical exam:  Not performed    Labs/imaging:  Recent Results (from the past 24 hour(s))   GLUCOSE, POC    Collection Time: 12/08/21 12:17 PM   Result Value Ref Range    Glucose (POC) 161 (H) 65 - 117 mg/dL    Performed by [id number], BASIC    Collection Time: 12/08/21 12:19 PM   Result Value Ref Range    Sodium 136 136 - 145 mmol/L    Potassium 3.8 3.5 - 5.1 mmol/L    Chloride 102 97 - 108 mmol/L    CO2 28 21 - 32 mmol/L    Anion gap 6 5 - 15 mmol/L    Glucose 154 (H) 65 - 100 mg/dL    BUN 37 (H) 6 - 20 MG/DL    Creatinine 4.06 (H) 0.55 - 1.02 MG/DL    BUN/Creatinine ratio 9 (L) 12 - 20      GFR est AA 14 (L) >60 ml/min/1.73m2    GFR est non-AA 11 (L) >60 ml/min/1.73m2    Calcium 8.5 8.5 - 10.1 MG/DL   GLUCOSE, POC Collection Time: 12/08/21  5:28 PM   Result Value Ref Range    Glucose (POC) 182 (H) 65 - 117 mg/dL    Performed by [address] [place] [address], POC    Collection Time: 12/08/21  9:34 PM   Result Value Ref Range    Glucose (POC) 204 (H) 65 - 117 mg/dL    Performed by Polina Reed    MAGNESIUM    Collection Time: 12/09/21  4:27 AM   Result Value Ref Range    Magnesium 1.7 1.6 - 2.4 mg/dL   METABOLIC PANEL, BASIC    Collection Time: 12/09/21  4:27 AM   Result Value Ref Range    Sodium 138 136 - 145 mmol/L    Potassium 4.0 3.5 - 5.1 mmol/L    Chloride 102 97 - 108 mmol/L    CO2 28 21 - 32 mmol/L    Anion gap 8 5 - 15 mmol/L    Glucose 132 (H) 65 - 100 mg/dL    BUN 39 (H) 6 - 20 MG/DL    Creatinine 4.28 (H) 0.55 - 1.02 MG/DL    BUN/Creatinine ratio 9 (L) 12 - 20      GFR est AA 13 (L) >60 ml/min/1.73m2    GFR est non-AA 11 (L) >60 ml/min/1.73m2    Calcium 8.0 (L) 8.5 - 10.1 MG/DL   PHOSPHORUS    Collection Time: 12/09/21  4:27 AM   Result Value Ref Range    Phosphorus 6.2 (H) 2.6 - 4.7 MG/DL   GLUCOSE, POC    Collection Time: 12/09/21  7:43 AM   Result Value Ref Range    Glucose (POC) 141 (H) 65 - 117 mg/dL    Performed by Ger Menard (PCT)        A/P:  1. EMMANUEL stage 3 on CKD3A3:   -progressive renal failure 2/2 DMN with component of CRS  -in setting of diuresis crt has risen slowly in the past 48hrs  -would hold all diuretics for now- net recorded 1.6liters    -repeat bmp this afternoon   -check daily standing weights if possible   -keep whittington for strict urine output measurement   -check orthostatics   -no acute indication for RRT   -will need outpatient close f/u with [facility] and access placement for fast approaching dialysis needs   -check pth and urine protein/crt ratio     2. HTN:  -hold all anti-hypertensive medications for today    Discussed with primary team     Thank you for this consult. I will continue to follow the patient with you. Please feel free to call with questions.      Xiomy Hollis M.D.  [facility]

## 2021-12-09 NOTE — PROGRESS NOTES
1230) Verbal shift change report given to Wesley Lui, RN (oncoming nurse) by Joycelyn Day, RN (offgoing nurse). Report included the following information SBAR, Kardex and MAR.   [phone]) pt request that the doctor call her nephew Jackson Valadez [phone]) Notify Dr. Karrie Roberts, request to speak to Jackson Valadez (nephew) [phone]) Discuss with Dr. Karrie Roberts, need orthostatics on pt.  [phone]) Discuss with pt, plan for orthostatics when done eating dinner. Pt assisted to call dietary for correct dinner tray.

## 2021-12-09 NOTE — PROGRESS NOTES
Care plan reviewed. Problem: Patient Education: Go to Patient Education Activity  Goal: Patient/Family Education  Outcome: Progressing Towards Goal     Problem: Falls - Risk of  Goal: *Absence of Falls  Description: Document Clydene Bone Fall Risk and appropriate interventions in the flowsheet. Outcome: Progressing Towards Goal  Note: Fall Risk Interventions:  Mobility Interventions: Assess mobility with egress test, Communicate number of staff needed for ambulation/transfer, OT consult for ADLs, Patient to call before getting OOB, PT Consult for mobility concerns, Utilize walker, cane, or other assistive device         Medication Interventions: Evaluate medications/consider consulting pharmacy, Patient to call before getting OOB, Teach patient to arise slowly    Elimination Interventions: Call light in reach, Patient to call for help with toileting needs, Stay With Me (per policy)              Problem: Patient Education: Go to Patient Education Activity  Goal: Patient/Family Education  Outcome: Progressing Towards Goal     Problem: Pressure Injury - Risk of  Goal: *Prevention of pressure injury  Description: Document Guillermo Scale and appropriate interventions in the flowsheet.   Outcome: Progressing Towards Goal  Note: Pressure Injury Interventions:       Moisture Interventions: Absorbent underpads, Internal/External urinary devices, Limit adult briefs, Maintain skin hydration (lotion/cream), Minimize layers    Activity Interventions: Pressure redistribution bed/mattress(bed type), PT/OT evaluation    Mobility Interventions: HOB 30 degrees or less, Pressure redistribution bed/mattress (bed type), PT/OT evaluation    Nutrition Interventions: Document food/fluid/supplement intake    Friction and Shear Interventions: Feet elevated on foot rest, HOB 30 degrees or less, Minimize layers, Transferring/repositioning devices                Problem: Patient Education: Go to Patient Education Activity  Goal: Patient/Family Education  Outcome: Progressing Towards Goal

## 2021-12-10 ENCOUNTER — APPOINTMENT (OUTPATIENT)
Dept: GENERAL RADIOLOGY | Age: 56
DRG: 194 | End: 2021-12-10
Attending: STUDENT IN AN ORGANIZED HEALTH CARE EDUCATION/TRAINING PROGRAM
Payer: MEDICAID

## 2021-12-10 ENCOUNTER — HOSPITAL ENCOUNTER (INPATIENT)
Age: 56
LOS: 3 days | Discharge: HOME HEALTH | DRG: 194 | End: 2021-12-13
Attending: INTERNAL MEDICINE | Admitting: HOSPITALIST
Payer: MEDICAID

## 2021-12-10 ENCOUNTER — APPOINTMENT (OUTPATIENT)
Dept: NON INVASIVE DIAGNOSTICS | Age: 56
DRG: 194 | End: 2021-12-10
Attending: STUDENT IN AN ORGANIZED HEALTH CARE EDUCATION/TRAINING PROGRAM
Payer: MEDICAID

## 2021-12-10 VITALS
WEIGHT: 263 LBS | HEIGHT: 65 IN | BODY MASS INDEX: 43.82 KG/M2 | HEART RATE: 84 BPM | OXYGEN SATURATION: 96 % | TEMPERATURE: 97.4 F | RESPIRATION RATE: 20 BRPM | SYSTOLIC BLOOD PRESSURE: 138 MMHG | DIASTOLIC BLOOD PRESSURE: 97 MMHG

## 2021-12-10 DIAGNOSIS — E11.42 TYPE 2 DIABETES MELLITUS WITH PERIPHERAL NEUROPATHY (HCC): Primary | Chronic | ICD-10-CM

## 2021-12-10 PROBLEM — I50.9 CHRONIC HEART FAILURE (HCC): Status: ACTIVE | Noted: 2021-12-10

## 2021-12-10 PROBLEM — N17.9 ACUTE ON CHRONIC KIDNEY FAILURE (HCC): Status: ACTIVE | Noted: 2021-12-10

## 2021-12-10 PROBLEM — N18.9 ACUTE ON CHRONIC KIDNEY FAILURE (HCC): Status: ACTIVE | Noted: 2021-12-10

## 2021-12-10 LAB
ALBUMIN SERPL-MCNC: 2.2 G/DL (ref 3.5–5)
ANION GAP SERPL CALC-SCNC: 10 MMOL/L (ref 5–15)
ANION GAP SERPL CALC-SCNC: 7 MMOL/L (ref 5–15)
BNP SERPL-MCNC: 1830 PG/ML (ref 0–125)
BUN SERPL-MCNC: 45 MG/DL (ref 6–20)
BUN SERPL-MCNC: 49 MG/DL (ref 6–20)
BUN/CREAT SERPL: 10 (ref 12–20)
BUN/CREAT SERPL: 11 (ref 12–20)
CALCIUM SERPL-MCNC: 8.4 MG/DL (ref 8.5–10.1)
CALCIUM SERPL-MCNC: 8.7 MG/DL (ref 8.5–10.1)
CALCIUM SERPL-MCNC: 8.9 MG/DL (ref 8.5–10.1)
CHLORIDE SERPL-SCNC: 101 MMOL/L (ref 97–108)
CHLORIDE SERPL-SCNC: 101 MMOL/L (ref 97–108)
CO2 SERPL-SCNC: 27 MMOL/L (ref 21–32)
CO2 SERPL-SCNC: 29 MMOL/L (ref 21–32)
CREAT SERPL-MCNC: 4.37 MG/DL (ref 0.55–1.02)
CREAT SERPL-MCNC: 4.47 MG/DL (ref 0.55–1.02)
CREAT UR-MCNC: 64 MG/DL
GLUCOSE BLD STRIP.AUTO-MCNC: 164 MG/DL (ref 65–117)
GLUCOSE BLD STRIP.AUTO-MCNC: 221 MG/DL (ref 65–117)
GLUCOSE BLD STRIP.AUTO-MCNC: 256 MG/DL (ref 65–117)
GLUCOSE BLD STRIP.AUTO-MCNC: 300 MG/DL (ref 65–117)
GLUCOSE SERPL-MCNC: 182 MG/DL (ref 65–100)
GLUCOSE SERPL-MCNC: 214 MG/DL (ref 65–100)
MAGNESIUM SERPL-MCNC: 1.8 MG/DL (ref 1.6–2.4)
POTASSIUM SERPL-SCNC: 4.2 MMOL/L (ref 3.5–5.1)
POTASSIUM SERPL-SCNC: 4.4 MMOL/L (ref 3.5–5.1)
PROT UR-MCNC: 873 MG/DL (ref 0–11.9)
PROT/CREAT UR-RTO: 13.6
PTH-INTACT SERPL-MCNC: 233.4 PG/ML (ref 18.4–88)
SERVICE CMNT-IMP: ABNORMAL
SODIUM SERPL-SCNC: 137 MMOL/L (ref 136–145)
SODIUM SERPL-SCNC: 138 MMOL/L (ref 136–145)
SODIUM UR-SCNC: 44 MMOL/L
TROPONIN-HIGH SENSITIVITY: 8 NG/L (ref 0–51)

## 2021-12-10 PROCEDURE — 84156 ASSAY OF PROTEIN URINE: CPT

## 2021-12-10 PROCEDURE — 84155 ASSAY OF PROTEIN SERUM: CPT

## 2021-12-10 PROCEDURE — 74011250637 HC RX REV CODE- 250/637: Performed by: NURSE PRACTITIONER

## 2021-12-10 PROCEDURE — 74011000250 HC RX REV CODE- 250: Performed by: STUDENT IN AN ORGANIZED HEALTH CARE EDUCATION/TRAINING PROGRAM

## 2021-12-10 PROCEDURE — 83880 ASSAY OF NATRIURETIC PEPTIDE: CPT

## 2021-12-10 PROCEDURE — 74011636637 HC RX REV CODE- 636/637: Performed by: STUDENT IN AN ORGANIZED HEALTH CARE EDUCATION/TRAINING PROGRAM

## 2021-12-10 PROCEDURE — 80048 BASIC METABOLIC PNL TOTAL CA: CPT

## 2021-12-10 PROCEDURE — 65660000000 HC RM CCU STEPDOWN

## 2021-12-10 PROCEDURE — 71045 X-RAY EXAM CHEST 1 VIEW: CPT

## 2021-12-10 PROCEDURE — 93005 ELECTROCARDIOGRAM TRACING: CPT

## 2021-12-10 PROCEDURE — C8929 TTE W OR WO FOL WCON,DOPPLER: HCPCS

## 2021-12-10 PROCEDURE — 74011250636 HC RX REV CODE- 250/636: Performed by: STUDENT IN AN ORGANIZED HEALTH CARE EDUCATION/TRAINING PROGRAM

## 2021-12-10 PROCEDURE — 82040 ASSAY OF SERUM ALBUMIN: CPT

## 2021-12-10 PROCEDURE — 83970 ASSAY OF PARATHORMONE: CPT

## 2021-12-10 PROCEDURE — 84300 ASSAY OF URINE SODIUM: CPT

## 2021-12-10 PROCEDURE — 84484 ASSAY OF TROPONIN QUANT: CPT

## 2021-12-10 PROCEDURE — 74011250637 HC RX REV CODE- 250/637: Performed by: STUDENT IN AN ORGANIZED HEALTH CARE EDUCATION/TRAINING PROGRAM

## 2021-12-10 PROCEDURE — 74011250636 HC RX REV CODE- 250/636: Performed by: NURSE PRACTITIONER

## 2021-12-10 PROCEDURE — 82962 GLUCOSE BLOOD TEST: CPT

## 2021-12-10 PROCEDURE — 36415 COLL VENOUS BLD VENIPUNCTURE: CPT

## 2021-12-10 PROCEDURE — 94640 AIRWAY INHALATION TREATMENT: CPT

## 2021-12-10 PROCEDURE — 83735 ASSAY OF MAGNESIUM: CPT

## 2021-12-10 RX ORDER — ONDANSETRON 2 MG/ML
4 INJECTION INTRAMUSCULAR; INTRAVENOUS
Status: DISCONTINUED | OUTPATIENT
Start: 2021-12-10 | End: 2021-12-13 | Stop reason: HOSPADM

## 2021-12-10 RX ORDER — ACETAMINOPHEN 650 MG/1
650 SUPPOSITORY RECTAL
Status: CANCELLED | OUTPATIENT
Start: 2021-12-10

## 2021-12-10 RX ORDER — ONDANSETRON 2 MG/ML
4 INJECTION INTRAMUSCULAR; INTRAVENOUS
Status: CANCELLED | OUTPATIENT
Start: 2021-12-10

## 2021-12-10 RX ORDER — ACETAMINOPHEN 325 MG/1
650 TABLET ORAL
Status: CANCELLED | OUTPATIENT
Start: 2021-12-10

## 2021-12-10 RX ORDER — DOCUSATE SODIUM 100 MG/1
100 CAPSULE, LIQUID FILLED ORAL
Status: DISCONTINUED | OUTPATIENT
Start: 2021-12-10 | End: 2021-12-10 | Stop reason: HOSPADM

## 2021-12-10 RX ORDER — ATORVASTATIN CALCIUM 40 MG/1
80 TABLET, FILM COATED ORAL
Status: DISCONTINUED | OUTPATIENT
Start: 2021-12-11 | End: 2021-12-13 | Stop reason: HOSPADM

## 2021-12-10 RX ORDER — MAGNESIUM SULFATE 100 %
4 CRYSTALS MISCELLANEOUS AS NEEDED
Status: DISCONTINUED | OUTPATIENT
Start: 2021-12-10 | End: 2021-12-13 | Stop reason: HOSPADM

## 2021-12-10 RX ORDER — HEPARIN SODIUM 5000 [USP'U]/ML
5000 INJECTION, SOLUTION INTRAVENOUS; SUBCUTANEOUS EVERY 8 HOURS
Status: CANCELLED | OUTPATIENT
Start: 2021-12-10

## 2021-12-10 RX ORDER — CLOPIDOGREL BISULFATE 75 MG/1
75 TABLET ORAL DAILY
Status: CANCELLED | OUTPATIENT
Start: 2021-12-11

## 2021-12-10 RX ORDER — TORSEMIDE 20 MG/1
20 TABLET ORAL 2 TIMES DAILY
Status: DISCONTINUED | OUTPATIENT
Start: 2021-12-11 | End: 2021-12-13 | Stop reason: HOSPADM

## 2021-12-10 RX ORDER — HEPARIN SODIUM 5000 [USP'U]/ML
5000 INJECTION, SOLUTION INTRAVENOUS; SUBCUTANEOUS EVERY 8 HOURS
Status: DISCONTINUED | OUTPATIENT
Start: 2021-12-11 | End: 2021-12-13 | Stop reason: HOSPADM

## 2021-12-10 RX ORDER — METOPROLOL TARTRATE 25 MG/1
25 TABLET, FILM COATED ORAL 2 TIMES DAILY
Status: DISCONTINUED | OUTPATIENT
Start: 2021-12-11 | End: 2021-12-13 | Stop reason: HOSPADM

## 2021-12-10 RX ORDER — ASPIRIN 81 MG/1
81 TABLET ORAL DAILY
Status: DISCONTINUED | OUTPATIENT
Start: 2021-12-11 | End: 2021-12-13 | Stop reason: HOSPADM

## 2021-12-10 RX ORDER — GABAPENTIN 300 MG/1
900 CAPSULE ORAL 3 TIMES DAILY
Status: DISCONTINUED | OUTPATIENT
Start: 2021-12-11 | End: 2021-12-13 | Stop reason: HOSPADM

## 2021-12-10 RX ORDER — GUAIFENESIN 100 MG/5ML
81 LIQUID (ML) ORAL DAILY
Status: CANCELLED | OUTPATIENT
Start: 2021-12-11

## 2021-12-10 RX ORDER — AMITRIPTYLINE HYDROCHLORIDE 50 MG/1
50 TABLET, FILM COATED ORAL
Status: DISCONTINUED | OUTPATIENT
Start: 2021-12-11 | End: 2021-12-13 | Stop reason: HOSPADM

## 2021-12-10 RX ORDER — IPRATROPIUM BROMIDE AND ALBUTEROL SULFATE 2.5; .5 MG/3ML; MG/3ML
3 SOLUTION RESPIRATORY (INHALATION)
Status: DISCONTINUED | OUTPATIENT
Start: 2021-12-10 | End: 2021-12-13 | Stop reason: HOSPADM

## 2021-12-10 RX ORDER — SODIUM CHLORIDE 0.9 % (FLUSH) 0.9 %
10 SYRINGE (ML) INJECTION AS NEEDED
Status: CANCELLED | OUTPATIENT
Start: 2021-12-10

## 2021-12-10 RX ORDER — POLYETHYLENE GLYCOL 3350 17 G/17G
17 POWDER, FOR SOLUTION ORAL DAILY PRN
Status: CANCELLED | OUTPATIENT
Start: 2021-12-10

## 2021-12-10 RX ORDER — SODIUM CHLORIDE 0.9 % (FLUSH) 0.9 %
10 SYRINGE (ML) INJECTION AS NEEDED
Status: DISCONTINUED | OUTPATIENT
Start: 2021-12-10 | End: 2021-12-10 | Stop reason: HOSPADM

## 2021-12-10 RX ORDER — INSULIN LISPRO 100 [IU]/ML
INJECTION, SOLUTION INTRAVENOUS; SUBCUTANEOUS
Status: CANCELLED | OUTPATIENT
Start: 2021-12-10

## 2021-12-10 RX ORDER — DEXTROSE 50 % IN WATER (D50W) INTRAVENOUS SYRINGE
12.5-25 AS NEEDED
Status: DISCONTINUED | OUTPATIENT
Start: 2021-12-10 | End: 2021-12-13 | Stop reason: HOSPADM

## 2021-12-10 RX ORDER — SODIUM CHLORIDE 0.9 % (FLUSH) 0.9 %
5-40 SYRINGE (ML) INJECTION EVERY 8 HOURS
Status: DISCONTINUED | OUTPATIENT
Start: 2021-12-11 | End: 2021-12-13 | Stop reason: HOSPADM

## 2021-12-10 RX ORDER — SODIUM CHLORIDE 0.9 % (FLUSH) 0.9 %
5-40 SYRINGE (ML) INJECTION EVERY 8 HOURS
Status: CANCELLED | OUTPATIENT
Start: 2021-12-10

## 2021-12-10 RX ORDER — INSULIN LISPRO 100 [IU]/ML
INJECTION, SOLUTION INTRAVENOUS; SUBCUTANEOUS
Status: DISCONTINUED | OUTPATIENT
Start: 2021-12-11 | End: 2021-12-13 | Stop reason: HOSPADM

## 2021-12-10 RX ORDER — IPRATROPIUM BROMIDE AND ALBUTEROL SULFATE 2.5; .5 MG/3ML; MG/3ML
3 SOLUTION RESPIRATORY (INHALATION)
Status: CANCELLED | OUTPATIENT
Start: 2021-12-10

## 2021-12-10 RX ORDER — DEXTROSE 50 % IN WATER (D50W) INTRAVENOUS SYRINGE
25-50 AS NEEDED
Status: CANCELLED | OUTPATIENT
Start: 2021-12-10

## 2021-12-10 RX ORDER — GABAPENTIN 100 MG/1
100 CAPSULE ORAL 2 TIMES DAILY
Status: CANCELLED | OUTPATIENT
Start: 2021-12-10

## 2021-12-10 RX ORDER — DOCUSATE SODIUM 100 MG/1
100 CAPSULE, LIQUID FILLED ORAL
Status: CANCELLED | OUTPATIENT
Start: 2021-12-10

## 2021-12-10 RX ORDER — ATORVASTATIN CALCIUM 40 MG/1
80 TABLET, FILM COATED ORAL
Status: CANCELLED | OUTPATIENT
Start: 2021-12-10

## 2021-12-10 RX ORDER — SODIUM CHLORIDE 0.9 % (FLUSH) 0.9 %
5-40 SYRINGE (ML) INJECTION AS NEEDED
Status: CANCELLED | OUTPATIENT
Start: 2021-12-10

## 2021-12-10 RX ORDER — ACETAMINOPHEN 650 MG/1
650 SUPPOSITORY RECTAL
Status: DISCONTINUED | OUTPATIENT
Start: 2021-12-10 | End: 2021-12-13 | Stop reason: HOSPADM

## 2021-12-10 RX ORDER — BUDESONIDE 0.5 MG/2ML
1000 INHALANT ORAL
Status: DISCONTINUED | OUTPATIENT
Start: 2021-12-10 | End: 2021-12-10 | Stop reason: HOSPADM

## 2021-12-10 RX ORDER — MAGNESIUM SULFATE 100 %
4 CRYSTALS MISCELLANEOUS AS NEEDED
Status: CANCELLED | OUTPATIENT
Start: 2021-12-10

## 2021-12-10 RX ORDER — AMLODIPINE BESYLATE 5 MG/1
10 TABLET ORAL DAILY
Status: DISCONTINUED | OUTPATIENT
Start: 2021-12-11 | End: 2021-12-13 | Stop reason: HOSPADM

## 2021-12-10 RX ORDER — BUDESONIDE 0.5 MG/2ML
1000 INHALANT ORAL
Status: CANCELLED | OUTPATIENT
Start: 2021-12-10

## 2021-12-10 RX ORDER — TRAMADOL HYDROCHLORIDE 50 MG/1
50 TABLET ORAL
Status: CANCELLED | OUTPATIENT
Start: 2021-12-10

## 2021-12-10 RX ORDER — CLOPIDOGREL BISULFATE 75 MG/1
75 TABLET ORAL DAILY
Status: DISCONTINUED | OUTPATIENT
Start: 2021-12-11 | End: 2021-12-13 | Stop reason: HOSPADM

## 2021-12-10 RX ORDER — TRAMADOL HYDROCHLORIDE 50 MG/1
50 TABLET ORAL
Status: DISCONTINUED | OUTPATIENT
Start: 2021-12-10 | End: 2021-12-13 | Stop reason: HOSPADM

## 2021-12-10 RX ORDER — SODIUM CHLORIDE 0.9 % (FLUSH) 0.9 %
5-40 SYRINGE (ML) INJECTION AS NEEDED
Status: DISCONTINUED | OUTPATIENT
Start: 2021-12-10 | End: 2021-12-13 | Stop reason: HOSPADM

## 2021-12-10 RX ORDER — ACETAMINOPHEN 325 MG/1
650 TABLET ORAL
Status: DISCONTINUED | OUTPATIENT
Start: 2021-12-10 | End: 2021-12-13 | Stop reason: HOSPADM

## 2021-12-10 RX ORDER — ONDANSETRON 4 MG/1
4 TABLET, ORALLY DISINTEGRATING ORAL
Status: CANCELLED | OUTPATIENT
Start: 2021-12-10

## 2021-12-10 RX ADMIN — INSULIN LISPRO 2 UNITS: 100 INJECTION, SOLUTION INTRAVENOUS; SUBCUTANEOUS at 12:07

## 2021-12-10 RX ADMIN — INSULIN LISPRO 3 UNITS: 100 INJECTION, SOLUTION INTRAVENOUS; SUBCUTANEOUS at 18:02

## 2021-12-10 RX ADMIN — Medication 10 ML: at 23:38

## 2021-12-10 RX ADMIN — Medication 10 ML: at 05:58

## 2021-12-10 RX ADMIN — Medication 10 ML: at 11:16

## 2021-12-10 RX ADMIN — Medication 10 ML: at 13:02

## 2021-12-10 RX ADMIN — DOCUSATE SODIUM 100 MG: 100 CAPSULE ORAL at 14:34

## 2021-12-10 RX ADMIN — TRAMADOL HYDROCHLORIDE 50 MG: 50 TABLET, COATED ORAL at 14:47

## 2021-12-10 RX ADMIN — Medication 10 ML: at 11:20

## 2021-12-10 RX ADMIN — AMITRIPTYLINE HYDROCHLORIDE 50 MG: 50 TABLET, FILM COATED ORAL at 23:37

## 2021-12-10 RX ADMIN — CLOPIDOGREL BISULFATE 75 MG: 75 TABLET, FILM COATED ORAL at 09:24

## 2021-12-10 RX ADMIN — HEPARIN SODIUM 5000 UNITS: 5000 INJECTION, SOLUTION INTRAVENOUS; SUBCUTANEOUS at 14:34

## 2021-12-10 RX ADMIN — PERFLUTREN 2 ML: 6.52 INJECTION, SUSPENSION INTRAVENOUS at 11:15

## 2021-12-10 RX ADMIN — BUDESONIDE 1000 MCG: 0.5 SUSPENSION RESPIRATORY (INHALATION) at 21:25

## 2021-12-10 RX ADMIN — ATORVASTATIN CALCIUM 80 MG: 40 TABLET, FILM COATED ORAL at 20:55

## 2021-12-10 RX ADMIN — GABAPENTIN 100 MG: 100 CAPSULE ORAL at 09:24

## 2021-12-10 RX ADMIN — ASPIRIN 81 MG CHEWABLE TABLET 81 MG: 81 TABLET CHEWABLE at 09:24

## 2021-12-10 RX ADMIN — Medication 10 ML: at 20:57

## 2021-12-10 RX ADMIN — POLYETHYLENE GLYCOL 3350 17 G: 17 POWDER, FOR SOLUTION ORAL at 13:45

## 2021-12-10 RX ADMIN — GABAPENTIN 100 MG: 100 CAPSULE ORAL at 18:02

## 2021-12-10 RX ADMIN — TRAMADOL HYDROCHLORIDE 50 MG: 50 TABLET, COATED ORAL at 20:55

## 2021-12-10 RX ADMIN — INSULIN LISPRO 2 UNITS: 100 INJECTION, SOLUTION INTRAVENOUS; SUBCUTANEOUS at 21:26

## 2021-12-10 RX ADMIN — HEPARIN SODIUM 5000 UNITS: 5000 INJECTION INTRAVENOUS; SUBCUTANEOUS at 23:37

## 2021-12-10 RX ADMIN — HEPARIN SODIUM 5000 UNITS: 5000 INJECTION, SOLUTION INTRAVENOUS; SUBCUTANEOUS at 05:58

## 2021-12-10 RX ADMIN — Medication 10 ML: at 11:15

## 2021-12-10 RX ADMIN — ATORVASTATIN CALCIUM 80 MG: 40 TABLET, FILM COATED ORAL at 23:37

## 2021-12-10 NOTE — PROGRESS NOTES
0730: Vitals obtained. Assessment complete. Pt with inspiratory and expiratory wheezes anterior and posterior. 100 mL urine output in [place]. Reports she feels worse. +3 BLE swelling with pitting. 0830: Update to Fazal. Requesting an MD update after rounds. 1145: Second update to Fazal. Trina broke and was replaced with Federico Silveira. 1410: Constipated. PRN's given. 1445: Tele showed a 10 second run of R on T PVC's. Pt asymptomatic. 1530: Labs and urine sent. 1600: Third update to Fazal.

## 2021-12-10 NOTE — PROGRESS NOTES
Cardiac Monitoring on 12.9.21  from [address] on 12.9.21 at 1900 - first degree block            *Tele Event on 12.9.21 at 2232 - [facility]            Tele Strip on 12.10.21 at 0300 - first deg block

## 2021-12-10 NOTE — PROGRESS NOTES
[facility] - [facility]  Date of consult: 12/4/21   CC:f/u for EMMANUEL on CKD       S:   Relative hypotension without antihypertensive medications in 110-120's   All diuretics held with I/o net 765 (urineoutput 1500)  crt continues to slowly trend up   Reports mild sob at rest   Orthostatic positive with sbp drop from 140--> 90s and symptomatic with mild dizziness. Reports at home able to ambulate with walker. Chronic le edema > 1 yr. Nearly 10 liters urineoutput in past 4 days. Weights are not accurate but reported on admission to be 128.4--> 119.3 kg today. Reports she is starting to feel increased tightness in her thighs and feet, but overall 50% improvement from prior to admission.        PMH:  Past Medical History:   Diagnosis Date    Arthritis     Asthma     [facility]est[facility]     Chronic back pain     Diabetes ([facility]     Di[facility] retin[facility] [address]     Hypertension     MRSA (methicillin resistant staph aureus) culture positive     labial abscess    Neuropathy          Current inpatient medications:    Current Facility-Administered Medications:     traMADoL (ULTRAM) tablet 50 mg, 50 mg, Oral, Q6H PRN, Lazarus Bowens MD    albuterol-ipratropium (DUO-NEB) 2.5 MG-0.5 MG/3 ML, 3 mL, Nebulization, Q4H PRN, Tunde Vizcarra MD    heparin (porcine) injection 5,000 Units, 5,000 Units, SubCUTAneous, Q8H, Tunde Vizcarra MD, 5,000 Units at 12/10/21 0558    sodium chloride (NS) flush 5-40 mL, 5-40 mL, IntraVENous, Q8H, Jonnathan Cruz MD, 10 mL at 12/10/21 0558    sodium chloride (NS) flush 5-40 mL, 5-40 mL, IntraVENous, PRN, Tunde Vizcarra MD    acetaminophen (TYLENOL) tablet 650 mg, 650 mg, Oral, Q6H PRN, 650 mg at 12/06/21 0904 **OR** acetaminophen (TYLENOL) suppository 650 mg, 650 mg, Rectal, Q6H PRN, Tunde Vizcarra MD    polyethylene glycol (MIRALAX) packet 17 g, 17 g, Oral, DAILY PRN, Tunde iVzcarra MD, 17 g at 12/05/21 1427    ondansetron (ZOFRAN ODT) tablet 4 mg, 4 mg, Oral, Q8H PRN **OR** ondansetron (ZOFRAN) injection 4 mg, 4 mg, IntraVENous, Q6H PRN, See Christine MD    [Held by provider] amLODIPine (NORVASC) tablet 10 mg, 10 mg, Oral, DAILY, See Christine MD, 10 mg at 12/09/21 0836    aspirin chewable tablet 81 mg, 81 mg, Oral, DAILY, See Christine MD, 81 mg at 12/09/21 0836    atorvastatin (LIPITOR) tablet 80 mg, 80 mg, Oral, QHS, See Christine MD, 80 mg at 12/09/21 2104    clopidogreL (PLAVIX) tablet 75 mg, 75 mg, Oral, DAILY, See Christine MD, 75 mg at 12/09/21 0836    gabapentin (NEURONTIN) capsule 100 mg, 100 mg, Oral, BID, See Christine MD, 100 mg at 12/09/21 2104    [Held by provider] metoprolol tartrate (LOPRESSOR) tablet 25 mg, 25 mg, Oral, BID, See Christine MD, 25 mg at 12/09/21 0836    glucose chewable tablet 16 g, 4 Tablet, Oral, PRN, See Christine MD    dextrose (D50W) injection syrg 12.5-25 g, 25-50 mL, IntraVENous, PRN, See Christine MD    glucagon (GLUCAGEN) injection 1 mg, 1 mg, IntraMUSCular, PRN, See Christine MD    insulin lispro (HUMALOG) injection, , SubCUTAneous, AC&HS, See Christine MD, 1 Units at 12/08/21 2206    Allergies: Allergies   Allergen Reactions    Amoxicillin Nausea Only    Aspirin Other (comments)     \"nosebleeds\" but patient takes daily aspirin 81 mg at home.  Patient states naproxen ok    Ciprofloxacin Hives       ROS:   Not performed    Vitals:  Patient Vitals for the past 24 hrs:   Temp Pulse Resp BP SpO2   12/10/21 0353 97.9 °F (36.6 °C) 73 16 (!) 124/45 97 %   12/10/21 0309  71      12/09/21 2322  96  (!) 123/48    12/09/21 2302 98.1 °F (36.7 °C) 71 16 (!) 123/48 96 %   12/09/21 2232  (!) 110      12/09/21 2153     96 %   12/09/21 1932  69      12/09/21 1522 98 °F (36.7 °C) 69 13 (!) 117/93 94 %   12/09/21 1302 98 °F (36.7 °C) 67 14 (!) 111/41 96 %   12/09/21 1102  65 12 (!) 117/49 97 %   12/09/21 0830 97.8 °F (36.6 °C) 71 15 114/62 100 % 12/09/21 0815  65 12  96 %       I/O:    Intake/Output Summary (Last 24 hours) at 12/10/2021 0809  Last data filed at 12/10/2021 6080  Gross per 24 hour   Intake 810 ml   Output 1575 ml   Net -765 ml       Physical exam:  Gen:nad   heent aniciteric   Neck no jvd   Chest basilar wheezing noted, no crackles   abd obese nt nd   Ext 3+ edema to abdomen   Neuro: alert and appropriate   Skin: chronic venous stasis changes noted, no rashes       Labs/imaging:  Recent Results (from the past 24 hour(s))   GLUCOSE, POC    Collection Time: 12/09/21 11:11 AM   Result Value Ref Range    Glucose (POC) 160 (H) 65 - 117 mg/dL    Performed by Kerrie Medel ([facility])    METABOLIC PANEL, BASIC    Collection Time: 12/09/21  1:55 PM   Result Value Ref Range    Sodium 137 136 - 145 mmol/L    Potassium 4.2 3.5 - 5.1 mmol/L    Chloride 101 97 - 108 mmol/L    CO2 29 21 - 32 mmol/L    Anion gap 7 5 - 15 mmol/L    Glucose 143 (H) 65 - 100 mg/dL    BUN 42 (H) 6 - 20 MG/DL    Creatinine 4.39 (H) 0.55 - 1.02 MG/DL    BUN/Creatinine ratio 10 (L) 12 - 20      GFR est AA 13 (L) >60 ml/min/1.73m2    GFR est non-AA 10 (L) >60 ml/min/1.73m2    Calcium 8.2 (L) 8.5 - 10.1 MG/DL   GLUCOSE, POC    Collection Time: 12/09/21  5:06 PM   Result Value Ref Range    Glucose (POC) 160 (H) 65 - 117 mg/dL    Performed by Jose Woodruff    GLUCOSE, POC    Collection Time: 12/09/21  9:49 PM   Result Value Ref Range    Glucose (POC) 189 (H) 65 - 117 mg/dL    Performed by Ree Cordoba PCT    METABOLIC PANEL, BASIC    Collection Time: 12/10/21  3:35 AM   Result Value Ref Range    Sodium 137 136 - 145 mmol/L    Potassium 4.4 3.5 - 5.1 mmol/L    Chloride 101 97 - 108 mmol/L    CO2 29 21 - 32 mmol/L    Anion gap 7 5 - 15 mmol/L    Glucose 182 (H) 65 - 100 mg/dL    BUN 45 (H) 6 - 20 MG/DL    Creatinine 4.47 (H) 0.55 - 1.02 MG/DL    BUN/Creatinine ratio 10 (L) 12 - 20      GFR est AA 12 (L) >60 ml/min/1.73m2    GFR est non-AA 10 (L) >60 ml/min/1.73m2    Calcium 8.7 8.5 - 10.1 MG/DL   MAGNESIUM    Collection Time: 12/10/21  3:35 AM   Result Value Ref Range    Magnesium 1.8 1.6 - 2.4 mg/dL   GLUCOSE, POC    Collection Time: 12/10/21  7:58 AM   Result Value Ref Range    Glucose (POC) 164 (H) 65 - 117 mg/dL    Performed by Durga Rivas (CON)      Chest xray 12/10 without asdz     A/P:  1. EMMANUEL stage 3 on CKD3/4A3 with NS  -progressive renal failure 2/2 DMN with component of CRS  -crt rising today despite diuretics held. Hypotension, +orthostatic bp. post diuresis suspect pre-renal injury  -IVC 1.4cm per ultrasonographer this afternoon   -hold diuretic given concern for intravascular volume depletion   - keep whittington for strict urine output measurement   -check orthostatics daily   -no acute indication for RRT but fast approaching dialysis needs, in setting of NS with significant peripheral edema.  -check pth and urine protein/crt ratio       2. CV:  -relative hypotension, + orthostatic bp with symptoms- on admission htn urgency   -hold all anti-hypertensive medications for today  -hold diuretics   -TTE , CE, BNP and EKG today   -suspect may have underlying autonomicdysfunction   -check SPEP, UPEP, COURT  -RHC   -cardiology following     3. Pulmonary:   -Nebulizer    4. DM:   -with neuropathy, retinopathy and nephropathy     5. Edema:  -chronic 3+ edema > 1 yr per patient   -in setting of nephrotic syndrome        Discussed with primary team      Thank you for this consult. I will continue to follow the patient with you.  Please feel free to call with questions.  Jay Jay Medina M.D.  [facility]

## 2021-12-10 NOTE — QM NOTE
Bedside and Verbal shift change report given to Talita Ramirez RN (oncoming nurse) by Tessa Hargrove RN (offgoing nurse). Report included the following information SBAR, Kardex, Procedure Summary, Intake/Output, MAR, Accordion, Recent Results, Med Rec Status, Cardiac Rhythm ST and Alarm Parameters    Patient resting with no signs of distress. .   3607 Patient with 4 beats of Bigeminy per Kate Soriano, Telemetry Monitor tech. MD notified and Metoprolol was ordered for Patient as a One time dose. .    0200 Patient resting in bed with no signs of distress.

## 2021-12-10 NOTE — PROGRESS NOTES
@1000 - contacted the transfer center to initiate transfer to any hospital for the start of dialysis treatment. Transfer center said that they would call around and see what they can find and give a call back.

## 2021-12-10 NOTE — PROGRESS NOTES
Message sent to [facility]:    Pts cardiac rhythm changed from 1st deg block to vent bigeminy for aprox 4 mins, then converted back to a first deg block. RN advised.

## 2021-12-10 NOTE — PROGRESS NOTES
Hospitalist Progress Note    NAME: Edith Shane   :  1965   MRN:  [id number]   Room Number:  JXS3/06  @ [facility]       Interim Hospital Summary: 64 y.o. female whom presented on 2021 with      Assessment / Plan:      EMMANUEL on CKD 3B, non-oliguric POA  Worsening   Suspect progressive renal failure due to uncontrolled HTN, DM with chronic heavy proteinuria. Clinically volume overloaded. CT A/P reviewed independently - shows no nephrolithiasis or hydronephrosis. Baseline 2.3-2.7. Cr today 4.47. Dr Nathan Ochoa assessed the patient , Hold diuretic      - patient is to be transferred to higher level of care hospital   - Will need HD if inadequate response to above regimen. - Strict Is and Os, Avoid nephrotoxic meds, renally dose medications. - [facility] Dr Nathan Ochoa following the patient.            Acute on chronic diastolic heart failure POA  BNP elevated. EKG reviewed independently- normal sinus rhythm.  ECHO 2021 revealed EF 60-65 %, normal cavity size and systolic function.      -Fluid restriction  -Low-sodium diet  -Sleep study as outpatient  -Diuretics on hold        Hypertension  - Amlodipine 10 mg daily and Metoprolol 25 mg BID  -Hold given soft blood pressure        Insulin-dependent diabetes mellitus POA              Hemoglobin A1c     8.4 (H)      2021 12:32 AM              Hemoglobin A1c (POC)     10.2            2015 11:01 AM  Home regimen include checked immunoglobulin,  canagliflozin 100 mg daily,  Lantus 20 units and aspart     - Lispro correctional scale, FSG AC HS  - Consistent carb diet, hypoglycemia protocol.         Diabetic neuropathy  -On gabapentin 900 mg 3 times daily daily at home  -Resume renally dose         History of stroke  -On aspirin, Plavix and atorvastatin at home      History of chronic back pain  -On tramadol at home as needed     Morbid obesity  -BMI 47.26  - Counseled on Lifestyle modifications, AHA Diet ,weight loss strategies.     Body mass index is 47.26 kg/m². Code Status: full   Surrogate Decision Maker:     DVT Prophylaxis: Lovenox  GI Prophylaxis: not indicated  Baseline: ambulatory independently                   Subjective:     Chief Complaint / Reason for Physician Visit  \"patient feels swollen , some SOB  . Discussed with RN events overnight. Review of Systems:  . No fevers, chills, appetite change, cough, sputum production, shortness of breath, dyspnea on exertion, nausea, vomitting, diarrhea, constipation, chest pain, abdominal pain, joint pain, rash, itching. Tolerating PT/OT. Tolerating diet. Objective:     VITALS:   Last 24hrs VS reviewed since prior progress note. Most recent are:  Patient Vitals for the past 24 hrs:   Temp Pulse Resp BP SpO2   12/10/21 0810 98.2 °F (36.8 °C) 78 18 (!) 120/50 98 %   12/10/21 0730 98.2 °F (36.8 °C)       12/10/21 0353 97.9 °F (36.6 °C) 73 16 (!) 124/45 97 %   12/10/21 0309  71      12/09/21 2322  96  (!) 123/48    12/09/21 2302 98.1 °F (36.7 °C) 71 16 (!) 123/48 96 %   12/09/21 2232  (!) 110      12/09/21 2153     96 %   12/09/21 1932  69      12/09/21 1522 98 °F (36.7 °C) 69 13 (!) 117/93 94 %   12/09/21 1302 98 °F (36.7 °C) 67 14 (!) 111/41 96 %   12/09/21 1102  65 12 (!) 117/49 97 %       Intake/Output Summary (Last 24 hours) at 12/10/2021 0853  Last data filed at 12/10/2021 0810  Gross per 24 hour   Intake 1043 ml   Output 1675 ml   Net -632 ml        PHYSICAL EXAM:  General: Alert, cooperative, no acute distress    EENT:  EOMI. Anicteric sclerae. MMM  Resp:  CTA bilaterally, no wheezing or rales. No accessory muscle use  CV:  Regular  rhythm,  normal S1/S2, no murmurs rubs gallops, 3+ edema  GI:  Soft, Non distended, Non tender. +Bowel sounds  Neurologic:  Alert and oriented X 3, normal speech,   Psych:   Good insight. Not anxious nor agitated  Skin:  No rashes.   No jaundice    Reviewed most current lab test results and cultures YES  Reviewed most current radiology test results   YES  Review and summation of old records today    NO  Reviewed patient's current orders and MAR    YES  PMH/SH reviewed - no change compared to H&P  ________________________________________________________________________  Care Plan discussed with:    Comments   Patient x    Family      RN x    Care Manager x    Consultant  x nephrology                    x Multidiciplinary team rounds were held today with , nursing, pharmacist and clinical coordinator. Patient's plan of care was discussed; medications were reviewed and discharge planning was addressed. ________________________________________________________________________  Total NON critical care TIME: 35Minutes    Total CRITICAL CARE TIME Spent:   Minutes non procedure based      Comments   >50% of visit spent in counseling and coordination of care x    ________________________________________________________________________  Chacorta Stringer MD     Procedures: see electronic medical records for all procedures/Xrays and details which were not copied into this note but were reviewed prior to creation of Plan. LABS:  I reviewed today's most current labs and imaging studies. Pertinent labs include:  No results for input(s): WBC, HGB, HCT, PLT, HGBEXT, HCTEXT, PLTEXT, HGBEXT, HCTEXT, PLTEXT in the last 72 hours.   Recent Labs     12/10/21  0335 12/09/21  1355 12/09/21  0427 12/08/21  1219 12/08/21  0054 12/07/21  1630    137 138   < >  --  136   K 4.4 4.2 4.0   < >  --  3.9    101 102   < >  --  104   CO2 29 29 28   < >  --  27   * 143* 132*   < >  --  174*   BUN 45* 42* 39*   < >  --  33*   CREA 4.47* 4.39* 4.28*   < >  --  3.97*   CA 8.7 8.2* 8.0*   < >  --  8.4*   MG 1.8  --  1.7  --   --  1.8   PHOS  --   --  6.2*  --   --  5.9*   ALB  --   --   --   --  1.9*  --    TBILI  --   --   --   --  0.1*  --    ALT  --   --   --   --  13  --     < > = values in this interval not displayed.        Signed: Haroldo Estrada MD

## 2021-12-10 NOTE — DISCHARGE SUMMARY
Hospitalist Discharge Summary     Patient ID:  Lachelle Londono  [id number]  51 y.o.  1965    PCP on record: Kelsey Foreman MD    Admit date: 12/1/2021  Discharge date and time: 12/10/2021      Admission Diagnoses: EMMANUEL (acute kidney injury) [facility]) [N17.9]    Discharge Diagnoses: Active Problems:    EMMANUEL (acute kidney injury) ([address] (8/25/2021)           Hospital Course:   EMMANUEL on CKD 3B, non-oliguric POA  w/ Nephrotic range proteinuria   Suspect progressive renal failure due to uncontrolled HTN, DM with chronic heavy proteinuria. Clinically volume overloaded. CT A/P reviewed independently - shows no nephrolithiasis or hydronephrosis. Baseline 2.3-2.7.   - Urine P/C 13   Cr  4.47> 4.37. Dr Gely Gregg assessed the patient , Hold diuretic   - Per D/w Dr Gely Gregg, difficult to assess patient volume status given she might be intravascularly depleted and third spacing given low albumin. Patient may need Right heart cath for accurate volume status.      - patient is to be transferred to higher level of care hospital for in house nephrology evaluation   - D/w Dr Vipin Mena w/ nephrology , will see the patient in consult at [facility]   - Patient to be transferred to Medicine service Dr Rod Miller , appreciate help            Acute on chronic diastolic heart failure POA  BNP elevated. EKG reviewed independently- normal sinus rhythm.  ECHO 8/9/2021 revealed EF 60-65 %, normal cavity size and systolic function.      -Fluid restriction  -Low-sodium diet  -Sleep study as outpatient  -Diuretics on hold        Hypertension  - Amlodipine 10 mg daily and Metoprolol 25 mg BID  -Hold given soft blood pressure        Insulin-dependent diabetes mellitus POA              Hemoglobin A1c     8.4 (H)      08/08/2021 12:32 AM              Hemoglobin A1c (POC)     10.2            06/25/2015 11:01 AM  Home regimen include checked immunoglobulin,  canagliflozin 100 mg daily,  Lantus 20 units and aspart     - Lispro correctional scale, FSG AC HS  - Consistent carb diet, hypoglycemia protocol.         Diabetic neuropathy  -On gabapentin 900 mg 3 times daily daily at home  -Resume renally dose         History of stroke  -On aspirin, Plavix and atorvastatin at home      History of chronic back pain  -On tramadol at home as needed      Asthma   - Duo Neb PRN   - Pulmicort Inhalation per policy     Morbid obesity  -BMI 47.26  - Counseled on Lifestyle modifications, AHA Diet ,weight loss strategies. CONSULTATIONS:  IP CONSULT TO CARDIOLOGY  IP CONSULT TO NEPHROLOGY    Excerpted HPI from H&P of Ana Johnston MD:  Benita Alvarez is a 54 y.o.  female with PMH of HTN, DM who presents to ED with c/o generalized headache x 2 weeks. Patient reports generalized headache for the past 2 weeks which acutely worsened over the past 2-3 days with nausea, nonbloody non billous emesis. She reports compliance with medications. Reports worsening lower extremity edema and elevated blood pressure readings at home. She has been elevating legs by laying down to reduce sweling. Patient was evaluated at [facility] on 11/27, symptoms improved wth BP control and she was discharged with outpatient follow up.     ______________________________________________________________________  DISCHARGE SUMMARY/HOSPITAL COURSE:  for full details see H&P, daily progress notes, labs, consult notes. _______________________________________________________________________  Patient seen and examined by me on discharge day. Pertinent Findings:  Gen:    Not in distress  Chest: crackles b/l   CVS:   Regular rhythm. 3+  edema  Abd:  Soft, not distended, not tender  Neuro:  Alert with good insight.   Oriented to person, place, and time   _______________________________________________________________________  DISCHARGE MEDICATIONS:   Current Discharge Medication List          My Recommended Diet, Activity, Wound Care, and follow-up labs are listed in the patient's Discharge Insturctions which I have personally completed and reviewed.     _______________________________________________________________________  DISPOSITION:     Home with Family:    Home with HH/PT/OT/RN:    SNF/LTC:    [facility]US:    OTHER:        Condition at Discharge:  [address]  _______________________________________________________________________  Follow up with:   PCP : Rossie Prader, MD  Follow-up Information     Follow up With Specialties Details Why Contact Info    Rossie Prader, MD Family Medicine   [address] [place] [address]  [phone]      [facility], [address], [address] [place] [address] [phone]              Total time in minutes spent coordinating this discharge (includes going over instructions, follow-up, prescriptions, and preparing report for sign off to her PCP) :  45 minutes    Signed:  Philip Melgar MD

## 2021-12-10 NOTE — PROGRESS NOTES
12/10/21 0810 12/10/21 0933   Vitals   Pulse (Heart Rate) 78 80   Heart Rate Source Monitor Monitor   Resp Rate 18  --    O2 Sat (%) 98 %  --    Level of Consciousness Alert (0)  --    BP (!) 120/50 (!) 141/51   MAP (Monitor) 67 74   MAP (Calculated) 73 81   BP 1 Location Right upper arm Right upper arm   BP 1 Method Automatic Automatic   BP Patient Position Sitting Standing       Orthostatics.

## 2021-12-10 NOTE — PROGRESS NOTES
Cardiac Monitoring on 12.10.21  from 0700 - 1100    Tele Strip on 12.10.21 at 0700 - first degree block

## 2021-12-11 LAB
ALBUMIN SERPL-MCNC: 2.1 G/DL (ref 3.5–5)
ANION GAP SERPL CALC-SCNC: 9 MMOL/L (ref 5–15)
BUN SERPL-MCNC: 48 MG/DL (ref 6–20)
BUN/CREAT SERPL: 11 (ref 12–20)
CALCIUM SERPL-MCNC: 8.8 MG/DL (ref 8.5–10.1)
CHLORIDE SERPL-SCNC: 101 MMOL/L (ref 97–108)
CO2 SERPL-SCNC: 25 MMOL/L (ref 21–32)
CREAT SERPL-MCNC: 4.34 MG/DL (ref 0.55–1.02)
ERYTHROCYTE [DISTWIDTH] IN BLOOD BY AUTOMATED COUNT: 15.6 % (ref 11.5–14.5)
GLUCOSE BLD STRIP.AUTO-MCNC: 177 MG/DL (ref 65–117)
GLUCOSE BLD STRIP.AUTO-MCNC: 190 MG/DL (ref 65–117)
GLUCOSE BLD STRIP.AUTO-MCNC: 204 MG/DL (ref 65–117)
GLUCOSE BLD STRIP.AUTO-MCNC: 215 MG/DL (ref 65–117)
GLUCOSE SERPL-MCNC: 240 MG/DL (ref 65–100)
HCT VFR BLD AUTO: 23.8 % (ref 35–47)
HGB BLD-MCNC: 7.9 G/DL (ref 11.5–16)
MAGNESIUM SERPL-MCNC: 1.8 MG/DL (ref 1.6–2.4)
MCH RBC QN AUTO: 28.1 PG (ref 26–34)
MCHC RBC AUTO-ENTMCNC: 33.2 G/DL (ref 30–36.5)
MCV RBC AUTO: 84.7 FL (ref 80–99)
NRBC # BLD: 0 K/UL (ref 0–0.01)
NRBC BLD-RTO: 0 PER 100 WBC
PHOSPHATE SERPL-MCNC: 7 MG/DL (ref 2.6–4.7)
PLATELET # BLD AUTO: 189 K/UL (ref 150–400)
PMV BLD AUTO: 10.1 FL (ref 8.9–12.9)
POTASSIUM SERPL-SCNC: 4.1 MMOL/L (ref 3.5–5.1)
RBC # BLD AUTO: 2.81 M/UL (ref 3.8–5.2)
SERVICE CMNT-IMP: ABNORMAL
SODIUM SERPL-SCNC: 135 MMOL/L (ref 136–145)
WBC # BLD AUTO: 7.5 K/UL (ref 3.6–11)

## 2021-12-11 PROCEDURE — 74011250636 HC RX REV CODE- 250/636: Performed by: NURSE PRACTITIONER

## 2021-12-11 PROCEDURE — 82962 GLUCOSE BLOOD TEST: CPT

## 2021-12-11 PROCEDURE — 80069 RENAL FUNCTION PANEL: CPT

## 2021-12-11 PROCEDURE — 83735 ASSAY OF MAGNESIUM: CPT

## 2021-12-11 PROCEDURE — 74011636637 HC RX REV CODE- 636/637: Performed by: NURSE PRACTITIONER

## 2021-12-11 PROCEDURE — 65660000000 HC RM CCU STEPDOWN

## 2021-12-11 PROCEDURE — 36415 COLL VENOUS BLD VENIPUNCTURE: CPT

## 2021-12-11 PROCEDURE — 85027 COMPLETE CBC AUTOMATED: CPT

## 2021-12-11 PROCEDURE — 74011250637 HC RX REV CODE- 250/637: Performed by: NURSE PRACTITIONER

## 2021-12-11 RX ORDER — POLYETHYLENE GLYCOL 3350 17 G/17G
17 POWDER, FOR SOLUTION ORAL
Status: DISCONTINUED | OUTPATIENT
Start: 2021-12-11 | End: 2021-12-13 | Stop reason: HOSPADM

## 2021-12-11 RX ADMIN — GABAPENTIN 900 MG: 300 CAPSULE ORAL at 16:37

## 2021-12-11 RX ADMIN — TRAMADOL HYDROCHLORIDE 50 MG: 50 TABLET ORAL at 16:37

## 2021-12-11 RX ADMIN — ACETAMINOPHEN 650 MG: 325 TABLET ORAL at 05:40

## 2021-12-11 RX ADMIN — AMLODIPINE BESYLATE 10 MG: 5 TABLET ORAL at 08:18

## 2021-12-11 RX ADMIN — ASPIRIN 81 MG: 81 TABLET, COATED ORAL at 08:18

## 2021-12-11 RX ADMIN — HEPARIN SODIUM 5000 UNITS: 5000 INJECTION INTRAVENOUS; SUBCUTANEOUS at 08:18

## 2021-12-11 RX ADMIN — METOPROLOL TARTRATE 25 MG: 25 TABLET, FILM COATED ORAL at 08:18

## 2021-12-11 RX ADMIN — Medication 10 ML: at 21:53

## 2021-12-11 RX ADMIN — CLOPIDOGREL BISULFATE 75 MG: 75 TABLET ORAL at 08:18

## 2021-12-11 RX ADMIN — INSULIN LISPRO 2 UNITS: 100 INJECTION, SOLUTION INTRAVENOUS; SUBCUTANEOUS at 08:18

## 2021-12-11 RX ADMIN — INSULIN LISPRO 2 UNITS: 100 INJECTION, SOLUTION INTRAVENOUS; SUBCUTANEOUS at 13:10

## 2021-12-11 RX ADMIN — AMITRIPTYLINE HYDROCHLORIDE 50 MG: 50 TABLET, FILM COATED ORAL at 21:51

## 2021-12-11 RX ADMIN — Medication 10 ML: at 05:01

## 2021-12-11 RX ADMIN — HEPARIN SODIUM 5000 UNITS: 5000 INJECTION INTRAVENOUS; SUBCUTANEOUS at 23:22

## 2021-12-11 RX ADMIN — HEPARIN SODIUM 5000 UNITS: 5000 INJECTION INTRAVENOUS; SUBCUTANEOUS at 16:37

## 2021-12-11 RX ADMIN — GABAPENTIN 900 MG: 300 CAPSULE ORAL at 21:51

## 2021-12-11 RX ADMIN — ATORVASTATIN CALCIUM 80 MG: 40 TABLET, FILM COATED ORAL at 21:52

## 2021-12-11 RX ADMIN — GABAPENTIN 900 MG: 300 CAPSULE ORAL at 08:18

## 2021-12-11 RX ADMIN — Medication 10 ML: at 14:00

## 2021-12-11 NOTE — PROGRESS NOTES
TRANSFER - IN REPORT:    Verbal report received from [place], RN(name) on [address]  being received from PCU(unit) for routine progression of care      Report consisted of patients Situation, Background, Assessment and   Recommendations(SBAR). Information from the following report(s) SBAR, Kardex and ED Summary was reviewed with the receiving nurse. Opportunity for questions and clarification was provided. Assessment completed upon patients arrival to unit and care assumed.

## 2021-12-11 NOTE — ACP (ADVANCE CARE PLANNING)
Advance Care Planning Note      NAME: Lachelle Londono   :  1965   MRN:  [id number]     Date/Time:  12/10/2021 11:35 PM    Active Diagnoses:  Hospital Problems  Date Reviewed: 2021          Codes Class Noted POA    Chronic heart failure ([facility] [address] ICD-10-CM: I50.9  ICD-9-CM: 428.9  12/10/2021 Unknown        Acute on chronic kidney failure [facility]) ICD-10-CM: N17.9, N18.9  ICD-9-CM: 584.9, 585.9  12/10/2021 Unknown              These active diagnoses are of sufficient risk that focused discussion on advance care planning is indicated in order to allow the patient to thoughtfully consider personal goals of care, and if situations arise that prevent the ability to personally give input, to ensure appropriate representation of their personal desires for different levels and aggressiveness of care. Discussion:   Code status addressed and wants to be a Full Code. Patient wants central line and vasopressors if needed. Patient would also want a feeding tube, if needed, for nutritional support. Patient  would like to assign Elena Weir, sister ([phone]) and Remy Gilerbach, nephew ([phone]) as the surrogate decision makers. Persons present and participating in discussion: Lidia Castaneda NP      Time Spent:   Total time spent face-to-face in education and discussion:   16  minutes.          Erick Salazar NP   Hospitalist

## 2021-12-11 NOTE — CONSULTS
[facility]    Name:  Todd Ayala  MR#:  [id number]  :  1965  ACCOUNT #:  [de-identified]  DATE OF SERVICE:  2021    REASON FOR CONSULTATION:  The patient was seen for renal failure. HISTORY OF PRESENT ILLNESS:  The patient was transferred from [facility] for further management of her kidney disease. The patient was seen at [facility] supposedly by Telemedicine, by Nephrology. She had a history of diabetes, diabetic retinopathy, and she had lot of edema with nephrotic syndrome and then we called to see as she was transferred yesterday because the patient wanted to talk to the doctor for Nephrology not doing  . Now, looking at her creatinine, her creatinine in 2020 was around 1.3-1.4. Forward 1 year after that, it was 2.5 and progressively her kidney function has been getting worse and was admitted to [facility] where we see the creatinine going up from 3-4.3, edematous. Diuretics on hold because of hypotension. As far as urine testing goes, she had 8.8 g. In the summer, she had 13.6 g of protein. Now, SPEP and free light chain was sent. She had a renal ultrasound in the summer with good size kidneys, no hydronephrosis, distended bladder. She had a CAT scan of abdomen and pelvis without contrast done showed no hydronephrosis and this is based on the admission at [facility].  Her albumin level has been quite low. As far as anemia, she is quite anemic, is stable. Platelets looks okay. PAST MEDICAL HISTORY:  1. Chronic kidney disease, stage III as of 2020.  2.  Nephrotic syndrome. 3.  Diabetes mellitus. 4.  Obesity. 5.  Hypertension. 6.  Dyslipidemia. 7.  Diastolic dysfunction. 8.  CVA on Plavix. SOCIAL HISTORY:  Reviewed. FAMILY HISTORY:  Reviewed. REVIEW OF SYSTEMS:  Look at the HPI. Rest is negative.     MEDICATIONS:  As inpatient are as follows:  Elavil, Norvasc, aspirin, Lipitor, Plavix, heparin subcu, Neurontin 900 three times a day, Demadex is on hold. ALLERGIES:  TO AMOXICILLIN, ASPIRIN AND CIPRO. PHYSICAL EXAMINATION:  GENERAL:  Not in distress. VITAL SIGNS:  /62, saturating 97% on room air. She lost weight at least 13 kilos since admission. NECK:  JVD is difficult to assess. EXTREMITIES:  +1 edema to +2 edema. LABORATORY DATA:  Hemoglobin is 7.9, platelets is 172, WBC 7.5.  UA ++ protein. It has been there for a while, 5-10 wbc's, 5-10 rbc's. Sodium is 135. BUN 48, creatinine 4.3. Bicarb is 25. Calcium is 8.8. Albumin is 2.1. IMPRESSION:  1. Chronic kidney disease, stage III, creatinine around 1.4 in 08/2020.  2.  Subacute renal failure. They doubt this is only diabetes. It is too quick for diabetic nephropathy. Her renal function deteriorated by 40% in little bit over the year and she had nephrotic syndrome with ultra low albumin. There might be another process going on. 3.  Volume overload, better. 4.  Diastolic dysfunction. 5.  Uncontrolled diabetes. 6.  Anemia. RECOMMENDATIONS:  1. Lost plenty of weight since admission at [facility].  2.  On discharge, she needs p.o. diuretics. 3.  No acute indication for dialysis. 4.  Send serology. We will send it tomorrow. 5.  She will need a kidney biopsy, but she is on aspirin and Plavix for stroke 3 years ago. We have to hold that before, hopefully we can do it as an outpatient. 6.  Close to dialysis, but not needing it now. 7.  Iron studies. 8.  Discussed at length with her and see Dr. Raffy Rm.       MD YOLI [id number]  D:  12/11/2021 9:50  T:  12/11/2021 14:12  JOB #:  [id number]

## 2021-12-11 NOTE — PROGRESS NOTES
Hospitalist Progress Note    NAME: Elizabeth Salinas   :  1965   MRN:  [id number]       Assessment / Plan:  EMMANUEL on CKD, POA (crea 4.37)  - Nephrology consulted  --To send serologies  --Will need biopsy at some point  - avoid nephrotoxins  --Cr appears to be stable around 4.3  --Trend Cr     Acute on chronic diastolic HF, POA  Pro BNP: 1830  Troponin: 8  Echo: 12/10/21 pending result (previous 21, EF 60-65%)  Hypertension, POA  ECG: Sinus rhythm with 1st degree AV block   Low voltage QRS. Septal infarct   - resume home Norvasc and Metoprolol  - diuretics per Nephrology recommendation  - GIOVANNI diet     Generalized weakness, POA  - PT/OT eval and treatment consultation     IDDM, POA  Diabetic neuropathy, POA  - BG checks with Lispro coverage  - Resume Gabapentin     Asthma, POA  - O2 support and neb treatment PRN     History of stroke  - ASA, Plavix, statin daily     Chronic pain, POA  - Tramadol PRN     Obesity, POA  - dietary counseling     Code Status: Full  Surrogate Decision Maker: Sister- Elena Weir [phone]  Nephew- Nathan Wise [phone]        DVT Prophylaxis: Heparin  GI Prophylaxis: not indicated     Baseline: home with family, uses walker with mobility      40 or above Morbid obesity / Body mass index is 43.62 kg/m². Estimated discharge date:   Barriers: Resolution of EMMANUEL         Subjective:     Chief Complaint / Reason for Physician Visit  Patient seen and examined at the bedside. She currently states that she feels well. If she had not noted that her kidney levels were elevated she would not have any complaints. She denies any chest pain or shortness of breath. She continues to make adequate urine. Discussed with RN events overnight.      Review of Systems:  Symptom Y/N Comments  Symptom Y/N Comments   Fever/Chills    Chest Pain     Poor Appetite    Edema     Cough    Abdominal Pain     Sputum    Joint Pain     SOB/PENA    Pruritis/Rash     Nausea/vomit    Tolerating PT/OT     Diarrhea    Tolerating Diet     Constipation    Other       Could NOT obtain due to:      Objective:     VITALS:   Last 24hrs VS reviewed since prior progress note. Most recent are:  Patient Vitals for the past 24 hrs:   Temp Pulse Resp BP SpO2   12/11/21 1116 98.5 °F (36.9 °C) 69 17 (!) 108/40 96 %   12/11/21 0712 98.2 °F (36.8 °C) 85 12 (!) 145/62 97 %   12/11/21 0400  84      12/11/21 0249 98 °F (36.7 °C) 84 13 (!) 153/60 98 %   12/10/21 2345  87      12/10/21 2214 98.3 °F (36.8 °C) 87 13 (!) 151/59 98 %       Intake/Output Summary (Last 24 hours) at 12/11/2021 1443  Last data filed at 12/11/2021 1317  Gross per 24 hour   Intake 340 ml   Output 1150 ml   Net -810 ml        I had a face to face encounter and independently examined this patient on 12/11/2021, as outlined below:  PHYSICAL EXAM:  General: WD, WN. Alert, cooperative, no acute distress    EENT:  EOMI. Anicteric sclerae. MMM  Resp:  CTA bilaterally, no wheezing or rales. No accessory muscle use  CV:  Regular  rhythm,  No edema  GI:  Soft, Non distended, Non tender. +Bowel sounds  Neurologic:  Alert and oriented X 3, normal speech,   Psych:   Good insight. Not anxious nor agitated  Skin:  No rashes. No jaundice    Reviewed most current lab test results and cultures  YES  Reviewed most current radiology test results   YES  Review and summation of old records today    NO  Reviewed patient's current orders and MAR    YES  PMH/SH reviewed - no change compared to H&P  ________________________________________________________________________  Care Plan discussed with:    Comments   Patient     Family      RN     Care Manager     Consultant                        Multidiciplinary team rounds were held today with , nursing, pharmacist and clinical coordinator. Patient's plan of care was discussed; medications were reviewed and discharge planning was addressed. ________________________________________________________________________  Total NON critical care TIME:  31   Minutes    Total CRITICAL CARE TIME Spent:   Minutes non procedure based      Comments   >50% of visit spent in counseling and coordination of care     ________________________________________________________________________  Toney Bill MD     Procedures: see electronic medical records for all procedures/Xrays and details which were not copied into this note but were reviewed prior to creation of Plan. LABS:  I reviewed today's most current labs and imaging studies. Pertinent labs include:  Recent Labs     12/11/21  0254   WBC 7.5   HGB 7.9*   HCT 23.8*        Recent Labs     12/11/21  0254 12/10/21  1521 12/10/21  0335 12/09/21  1355 12/09/21  0427   * 138 137   < > 138   K 4.1 4.2 4.4   < > 4.0    101 101   < > 102   CO2 25 27 29   < > 28   * 214* 182*   < > 132*   BUN 48* 49* 45*   < > 39*   CREA 4.34* 4.37* 4.47*   < > 4.28*   CA 8.8 8.9  8.4* 8.7   < > 8.0*   MG 1.8  --  1.8  --  1.7   PHOS 7.0*  --   --   --  6.2*   ALB 2.1*  --  2.2*  --   --     < > = values in this interval not displayed.        Signed: Tonye Bill MD

## 2021-12-11 NOTE — PROGRESS NOTES
Patient picked by JUDIT at 2134, EMTALA and face sheet sent, report given to [address] squad member [place]. All belongings sent with patient. Called [facility] nurse to let her know the patient was on the way and update care provided since last report.

## 2021-12-11 NOTE — CONSULTS
Seen and examined  Thanks for the consult  A/P:  CKD-3 ,creat 1.4 in 8.2020([facility]  Subacute renal failure,creatinine worsening since 8.2021  With nephrotic syndrome at 13 grams   Now poor renal function;too qucik for just diabetic nephropathy  CVA on ASA ,plavix 3 years ago  Volume overload  Better  DM  anemia    Serology ordered  Iron studies  Hold diuretics for today  She will need kidney biopsy BUT she is on plaivix. ..will be done as OP  If labs stable in AM can dc on po diuretics  Close to needing dialysis but not acute indication now  Discussed with her(40 min)

## 2021-12-11 NOTE — PROGRESS NOTES
Problem: Falls - Risk of  Goal: *Absence of Falls  Description: Document Sen Marin Fall Risk and appropriate interventions in the flowsheet.   Outcome: Progressing Towards Goal  Note: Fall Risk Interventions:  Mobility Interventions: Bed/chair exit alarm, OT consult for ADLs, Patient to call before getting OOB, PT Consult for mobility concerns, PT Consult for assist device competence, Strengthening exercises (ROM-active/passive)         Medication Interventions: Bed/chair exit alarm, Patient to call before getting OOB, Teach patient to arise slowly    Elimination Interventions: Call light in reach, Patient to call for help with toileting needs, Toileting schedule/hourly rounds

## 2021-12-11 NOTE — PROGRESS NOTES
Problem: Falls - Risk of  Goal: *Absence of Falls  Description: Document Randal Villarreal Fall Risk and appropriate interventions in the flowsheet. Outcome: Progressing Towards Goal  Note: Fall Risk Interventions:  Mobility Interventions: Bed/chair exit alarm         Medication Interventions: Patient to call before getting OOB    Elimination Interventions: Bed/chair exit alarm, Call light in reach, Patient to call for help with toileting needs              Problem: Patient Education: Go to Patient Education Activity  Goal: Patient/Family Education  Outcome: Progressing Towards Goal     Problem: Pressure Injury - Risk of  Goal: *Prevention of pressure injury  Description: Document Guillermo Scale and appropriate interventions in the flowsheet.   Outcome: Progressing Towards Goal  Note: Pressure Injury Interventions:  Sensory Interventions: Monitor skin under medical devices    Moisture Interventions: Minimize layers    Activity Interventions: Increase time out of bed    Mobility Interventions: Pressure redistribution bed/mattress (bed type)    Nutrition Interventions: Offer support with meals,snacks and hydration    Friction and Shear Interventions: Minimize layers                Problem: Patient Education: Go to Patient Education Activity  Goal: Patient/Family Education  Outcome: Progressing Towards Goal     Problem: Acute Renal Failure: Day 6  Goal: Off Pathway (Use only if patient is Off Pathway)  Outcome: Progressing Towards Goal     Problem: Acute Renal Failure: Day 6  Goal: Diagnostic Test/Procedures  Outcome: Progressing Towards Goal     Problem: Acute Renal Failure: Day 6  Goal: Activity/Safety  Outcome: Progressing Towards Goal     Problem: Patient Education: Go to Patient Education Activity  Goal: Patient/Family Education  Outcome: Progressing Towards Goal

## 2021-12-11 NOTE — PROGRESS NOTES
TRANSFER - OUT REPORT:    Verbal report given to Clive Vargas RN(name) on Indio Morales  being transferred to PCU (unit) for ordered procedure       Report consisted of patients Situation, Background, Assessment and   Recommendations(SBAR). Information from the following report(s) SBAR, Kardex, Intake/Output and Cardiac Rhythm sinus rhythm was reviewed with the receiving nurse. Lines:   Peripheral IV 12/01/21 Right Antecubital (Active)   Site Assessment Clean, dry, & intact 12/10/21 1934   Phlebitis Assessment 0 12/10/21 1934   Infiltration Assessment 0 12/10/21 1934   Dressing Status Clean, dry, & intact 12/10/21 1934   Dressing Type Tape; Transparent 12/10/21 1934   Hub Color/Line Status Pink; Capped 12/10/21 1934   Action Taken Open ports on tubing capped 12/10/21 1934   Alcohol Cap Used Yes 12/10/21 1934        Opportunity for questions and clarification was provided. Patient will transfer to [id number] [place] [address] via [facility]  time estimated at 2200.

## 2021-12-11 NOTE — PROGRESS NOTES
2020: TRANSFER - IN REPORT:    Verbal report received from MANUEL Aggarwal(name) on [address]  being received from [facility] (unit) for change in patient condition(need ofr dialysis catheter)      Report consisted of patients Situation, Background, Assessment and   Recommendations(SBAR). Information from the following report(s) SBAR, Kardex, Intake/Output, MAR and Recent Results was reviewed with the receiving nurse. Opportunity for questions and clarification was provided. Assessment completed upon patients arrival to unit and care assumed. 2214: Patient arrived from [facility]. A&OX4, VSS, Admitting physician made aware. 2214: Primary Nurse Lynda Tejada and Justin Mcdaniels RN performed a dual skin assessment on this patient No impairment noted  Guillermo score is 18    0545: PRN tylenol given for headache.     0700: End of Shift Note    Bedside shift change report given to Gabby Horne RN (oncoming nurse) by Lynda Tejada (offgoing nurse). Report included the following information SBAR, Kardex, Intake/Output, MAR and Recent Results    Shift worked:  [id number]     Shift summary and any significant changes:     [place] from [facility] for [facility] and dialysis     Concerns for physician to address:  none, [address] [place] [address] 153 to tele? Zone phone for oncoming shift:   XXX       Activity:  Activity Level: Up with Assistance  Number times ambulated in hallways past shift: 0  Number of times OOB to chair past shift: 0    Cardiac:   Cardiac Monitoring: Yes      Cardiac Rhythm: Sinus Rhythm    Access:   Current line(s): PIV     Genitourinary:   Urinary status: whittington    Respiratory:   O2 Device: None (Room air)  Chronic home O2 use?: NO  Incentive spirometer at bedside: N/A     GI:  Last Bowel Movement Date: 12/10/21  Current diet:  ADULT DIET Regular; 4 carb choices (60 gm/meal);  Low Fat/Low Chol/High Fiber/GIOVANNI; No Concentrated Sweets  Passing flatus: YES  Tolerating current diet: YES       Pain Management:   Patient states pain is manageable on current regimen: YES    Skin:  Guillermo Score: 18  Interventions: float heels, increase time out of bed and PT/OT consult    Patient Safety:  Fall Score:  Total Score: 3  Interventions: bed/chair alarm, gripper socks, pt to call before getting OOB and stay with me (per policy)  High Fall Risk: Yes    Length of Stay:  Expected LOS: - - -  Actual LOS: [facility]

## 2021-12-11 NOTE — H&P
Hospitalist Admission Note    NAME: Ricarda Bhatt   :  1965   MRN:  [id number]     Date/Time:  12/10/2021 11:09 PM    Patient PCP: Glenn Álvarez MD  ______________________________________________________________________  Given the patient's current clinical presentation, I have a high level of concern for decompensation if discharged from the emergency department. Complex decision making was performed, which includes reviewing the patient's available past medical records, laboratory results, and x-ray films. Discussed assessment of this patient's clinical condition and plan of care with Dr. Sandrine Wilson which is as follows. Assessment / Plan:    EMMANUEL on CKD, POA (crea 4.37)  - Nephrology consult  - avoid nephrotoxins    Acute on chronic diastolic HF, POA  Pro BNP: 1830  Troponin: 8  Echo: 12/10/21 pending result (previous 21, EF 60-65%)  Hypertension, POA  ECG: Sinus rhythm with 1st degree AV block   Low voltage QRS. Septal infarct   - resume home Norvasc and Metoprolol  - diuretics per Nephrology recommendation  - GIOVANNI diet    Generalized weakness, POA  - PT/OT eval and treatment consultation    IDDM, POA  Diabetic neuropathy, POA  - BG checks with Lispro coverage  - Resume Gabapentin    Asthma, POA  - O2 support and neb treatment PRN    History of stroke  - ASA, Plavix, statin daily    Chronic pain, POA  - Tramadol PRN    Obesity, POA  - dietary counseling    Code Status: Full  Surrogate Decision Maker: Sister- Elena Weir [phone]  Nephew- Dallin Levi [phone]      DVT Prophylaxis: Heparin  GI Prophylaxis: not indicated    Baseline: home with family, uses walker with mobility      Subjective:   CHIEF COMPLAINT: generalized weakness, leg swelling, nausea, vomiting, headache     HISTORY OF PRESENT ILLNESS:     Jorge Petit is a 64 y.o.    female who was initially managed for above complaints in [facility] initially on 21 and transferred here for further management of worsening kidney function. As per patient, leg swelling was initially better on diuretics and got worse when it was discontinued. Reports headache, nausea and vomiting is resolved. Reports generalized weakness, poor appetite and PENA. Denies fever, chills, CP, SOB, abdominal pain, focal weakness, melena, hematuria and diarrhea. Patient came with an indwelling whittington that was inserted when she was started on IV diuretics. Past medical history is significant for hypertension, DM, diabetic neuropathy, CHF, asthma, and stroke. Most recent hospitalization include: 1. 11/27/21 for headache and SOB, hypertension and SOB and was sent home after symptoms got better s/p Bumex, Clonidine, Fentanyl and Oxycodone. 2. 08/25-28 for generalized weakness and worsening LE edema and multiple falls and was discharged to inpatient Rehab and follow up with nephrology and PCP. 3. 08/18 for SOB, leg swelling and fall x 2 evaluated by PT in ED and was discharged home with home PT recommendation. 4. 08/07 for worsening dyspnea, leg swelling and redness and was discharged on increased dose of Torsemide and Keflex for 5 days. We were asked to admit for work up and evaluation of the above problems.      Past Medical History:   Diagnosis Date    Arthritis     Asthma     CHF (congestive heart failure) (HCC)     Chronic back pain     Diabetes ([facility]     Diabetic retinopathy ([facility]     Hypertension     MRSA (methicillin resistant staph aureus) culture positive     labial abscess    Neuropathy         Past Surgical History:   Procedure Laterality Date    HX HEENT      tonsillectomy    HX ORTHOPAEDIC      left ft bunionectomy    HX OTHER SURGICAL      lanced labial abscess       Social History     Tobacco Use    Smoking status: Never Smoker    Smokeless tobacco: Never Used   Substance Use Topics    Alcohol use: No        Family History:  Father: DM, kidney disease  Sister: DM  Allergies   Allergen Reactions    Amoxicillin Nausea Only    Aspirin Other (comments)     \"nosebleeds\" but patient takes daily aspirin 81 mg at home. Patient states naproxen ok    Ciprofloxacin Hives        Prior to Admission medications    Medication Sig Start Date End Date Taking? Authorizing Provider   torsemide (DEMADEX) 20 mg tablet Take 20 mg by mouth two (2) times a day. Provider, Historical   traMADoL (ULTRAM) 50 mg tablet Take 50 mg by mouth three (3) times daily as needed for Pain. Provider, Historical   metoprolol tartrate (LOPRESSOR) 25 mg tablet Take 25 mg by mouth two (2) times a day. Provider, Historical   insulin aspart U-100 (NovoLOG Flexpen U-100 Insulin) 100 unit/mL (3 mL) inpn 10 Units by SubCUTAneous route Before breakfast, lunch, and dinner. Provider, Historical   insulin aspart U-100 (NOVOLOG) 100 unit/mL (3 mL) inpn by SubCUTAneous route Before breakfast, lunch, and dinner. Sliding scale    Provider, Historical   nystatin (MYCOSTATIN) powder Apply  to affected area two (2) times daily as needed. Provider, Historical   gabapentin (NEURONTIN) 300 mg capsule Take 900 mg by mouth three (3) times daily. Provider, Historical   glipiZIDE (GLUCOTROL) 5 mg tablet Take 5 mg by mouth daily. Take One Tablet By Mouth Daily    Provider, Historical   magnesium oxide (MAG-OX) 400 mg tablet Take 400 mg by mouth daily. Provider, Historical   canagliflozin (INVOKANA) 100 mg tablet Take 100 mg by mouth daily. Take one tablet by mouth daily    Provider, Historical   amLODIPine (NORVASC) 10 mg tablet Take 10 mg by mouth daily. Provider, Historical   Flovent  mcg/actuation inhaler Take 2 Puffs by inhalation two (2) times a day. 8/2/21   Provider, Historical   albuterol (PROVENTIL HFA, VENTOLIN HFA, PROAIR HFA) 90 mcg/actuation inhaler Take 2 Puffs by inhalation every six (6) hours as needed for Wheezing. Provider, Historical   Lantus Solostar U-100 Insulin 100 unit/mL (3 mL) inpn 20 Units by SubCUTAneous route nightly. 6/8/21   Provider, Historical   fluticasone propionate (Flonase Allergy Relief) 50 mcg/actuation nasal spray 2 SPRAY, Each Nostril, daily, for allergy symptoms, 11 Refills, Dispense: 1, bottle, [facility] 10/24/20   Provider, Historical   aspirin delayed-release 81 mg tablet Take 81 mg by mouth daily. Provider, Historical   amitriptyline (ELAVIL) 50 mg tablet Take 50 mg by mouth nightly. Provider, Historical   docusate sodium (Colace) 100 mg capsule Take 100 mg by mouth two (2) times daily as needed. Provider, Historical   atorvastatin (LIPITOR) 80 mg tablet Take 1 Tab by mouth nightly. 12/22/18   Nury DEWITT DO   clopidogrel (PLAVIX) 75 mg tab Take 1 Tab by mouth daily. 12/23/18   Nury DEWITT DO       REVIEW OF SYSTEMS:     I am not able to complete the review of systems because:    The patient is intubated and sedated    The patient has altered mental status due to his acute medical problems    The patient has baseline aphasia from prior stroke(s)    The patient has baseline dementia and is not reliable historian    The patient is in acute medical distress and unable to provide information           Total of 12 systems reviewed as follows:       POSITIVE= bolded text  Negative = text not bolded  General:  fever, chills, sweats, generalized weakness, weight loss/gain,      loss of appetite   Eyes:    blurred vision, eye pain, loss of vision, double vision  ENT:    rhinorrhea, pharyngitis   Respiratory:   cough, sputum production, SOB, PENA, wheezing, pleuritic pain   Cardiology:   chest pain, palpitations, orthopnea, PND, edema, syncope   Gastrointestinal:  abdominal pain , N/V, diarrhea, dysphagia, constipation, bleeding   Genitourinary:  frequency, urgency, dysuria, hematuria, incontinence   Muskuloskeletal :  arthralgia, myalgia, back pain  Hematology:  easy bruising, nose or gum bleeding, lymphadenopathy   Dermatological: rash, ulceration, pruritis, color change / jaundice  Endocrine:   hot flashes or polydipsia   Neurological:  headache, dizziness, confusion, focal weakness, paresthesia,     Speech difficulties, memory loss, gait difficulty  Psychological: Feelings of anxiety, depression, agitation    Objective:   VITALS:    Visit Vitals  BP (!) 151/59 (BP 1 Location: Left upper arm, BP Patient Position: At rest)   Pulse 87   Temp 98.3 °F (36.8 °C)   Resp 13   SpO2 98%       PHYSICAL EXAM:    General:    Alert, cooperative, no distress, appears stated age. HEENT: Atraumatic, anicteric sclerae, pink conjunctivae     No oral ulcers, mucosa moist, throat clear, dentition fair  Neck:  Supple, symmetrical,  thyroid: non tender  Lungs:   Clear to auscultation bilaterally. No Wheezing or Rhonchi. No rales. Chest wall:  No tenderness  No Accessory muscle use. Heart:   Regular  rhythm,  No  Murmur,  +3 bilateral leg edema  Abdomen:   Soft, non-tender. Not distended. Bowel sounds normal  Extremities: No cyanosis. No clubbing,      Skin turgor normal, Capillary refill normal, Radial dial pulse 2+  Skin:     Not pale. Not Jaundiced  No rashes   Psych:  Good insight. Not depressed. Not anxious or agitated. Neurologic: EOMs intact. No facial asymmetry. No aphasia or slurred speech. Symmetrical strength, Sensation grossly intact.  Alert and oriented X 4.     _______________________________________________________________________  Care Plan discussed with:    Comments   Patient y    [facility]:      _______________________________________________________________________  Expected  Disposition:   Home with Family y   HH/PT/OT/RN    SNF/LTC    [facility]    ________________________________________________________________________      Comments    y Reviewed previous records   >50% of visit spent in counseling and coordination of care y Discussion with patient and/or family and questions answered ________________________________________________________________________  Signed: Georgina Lowry NP    Procedures: see electronic medical records for all procedures/Xrays and details which were not copied into this note but were reviewed prior to creation of Plan.     LAB DATA REVIEWED:    Recent Results (from the past 24 hour(s))   METABOLIC PANEL, BASIC    Collection Time: 12/10/21  3:35 AM   Result Value Ref Range    Sodium 137 136 - 145 mmol/L    Potassium 4.4 3.5 - 5.1 mmol/L    Chloride 101 97 - 108 mmol/L    CO2 29 21 - 32 mmol/L    Anion gap 7 5 - 15 mmol/L    Glucose 182 (H) 65 - 100 mg/dL    BUN 45 (H) 6 - 20 MG/DL    Creatinine 4.47 (H) 0.55 - 1.02 MG/DL    BUN/Creatinine ratio 10 (L) 12 - 20      GFR est AA 12 (L) >60 ml/min/1.73m2    GFR est non-AA 10 (L) >60 ml/min/1.73m2    Calcium 8.7 8.5 - 10.1 MG/DL   MAGNESIUM    Collection Time: 12/10/21  3:35 AM   Result Value Ref Range    Magnesium 1.8 1.6 - 2.4 mg/dL   ALBUMIN    Collection Time: 12/10/21  3:35 AM   Result Value Ref Range    Albumin 2.2 (L) 3.5 - 5.0 g/dL   GLUCOSE, POC    Collection Time: 12/10/21  7:58 AM   Result Value Ref Range    Glucose (POC) 164 (H) 65 - 117 mg/dL    Performed by Faye Varma (GRETA)    ECHO ADULT COMPLETE    Collection Time: 12/10/21 11:24 AM   Result Value Ref Range    MV E/A 0.94     LV Mass .8 67 - 162 g    LV Mass AL Index 157.6 43 - 95 g/m2    Left Atrium Minor Axis 1.64 cm    LA Vol Index 28.51 16 - 28 ml/m2    LVED Vol Index A4C 52.7 mL/m2    LVES Vol Index A4C 13.7 mL/m2    IVSd 1.22 (A) 0.6 - 0.9 cm    LVIDd 5.64 (A) 3.9 - 5.3 cm    LVIDs 3.58 cm    LVOT d 1.88 cm    LVPWd 1.57 (A) 0.6 - 0.9 cm    LV Ejection Fraction MOD 4C 74 percent    LV ED Vol A4C 117.02 mL    LV ES Vol A4C 30.47 mL    LVOT Peak Gradient 3.12 mmHg    LVOT Peak Velocity 87.27 cm/s    RVSP 48.47 mmHg    Left Atrium Major Axis 3.63 cm    LA Area 4C 22.74 cm2    LA Vol 4C 63.29 (A) 22 - 52 mL    Right Atrial Area 4C 17.02 cm2    Est. RA Pressure 5.00 mmHg    Aortic Valve Area by Planimetry 3.57 cm2    Mitral Valve Area by Planimetry 5.62 cm2    MV A David 35.49 cm/s    Mitral Valve E Wave Deceleration Time 67.69 ms    MV E David 33.21 cm/s    Mitral Valve Pressure Half-time 19.63 ms    MVA (PHT) 11.21 cm2    MR Peak Gradient 73.48 mmHg    TR Max Velocity 428.19 cm/s    MV Peak Gradient 5.07 mmHg    MV Mean Gradient 2.43 mmHg    Mitral Valve Pressure Half-time 44.04 ms    Mitral Valve Max Velocity 112.53 cm/s    Mitral Valve Annulus Velocity Time Integral 31.83 cm    MVA (PHT) 5.00 cm2    Pulmonic Valve Systolic Peak Instantaneous Gradient 3.31 mmHg    Triscuspid Valve Regurgitation Peak Gradient 43.47 mmHg    TR Max Velocity 328.08 cm/s    Ao Root D 2.37 cm    IVC proximal 1.26 cm   GLUCOSE, POC    Collection Time: 12/10/21 11:35 AM   Result Value Ref Range    Glucose (POC) 221 (H) 65 - 117 mg/dL    Performed by [employer] (CON)    EKG, 12 LEAD, SUBSEQUENT    Collection Time: 12/10/21 12:16 PM   Result Value Ref Range    Ventricular Rate 84 BPM    Atrial Rate 84 BPM    P-R Interval 228 ms    QRS Duration 82 ms    Q-T Interval 384 ms    QTC Calculation (Bezet) 453 ms    Calculated P Axis 39 degrees    Calculated R Axis 50 degrees    Calculated T Axis 34 degrees    Diagnosis       Sinus rhythm with 1st degree AV block  Low voltage QRS  Septal infarct (cited on or before 27-NOV-2021)  Abnormal ECG  When compared with ECG of 01-DEC-2021 17:29,  No significant change was found     SODIUM, UR, RANDOM    Collection Time: 12/10/21 12:24 PM   Result Value Ref Range    Sodium,urine random 44 MMOL/L   PROTEIN/CREATININE RATIO, URINE    Collection Time: 12/10/21 12:24 PM   Result Value Ref Range    Protein, urine random 873 (H) 0.0 - 11.9 mg/dL    Creatinine, urine 64.00 mg/dL    Protein/Creat.  urine Ratio 13.6     PTH INTACT    Collection Time: 12/10/21  3:21 PM   Result Value Ref Range    Calcium 8.9 8.5 - 10.1 MG/DL    PTH, Intact 233.4 (H) 18.4 - 88.0 pg/mL   NT-PRO BNP    Collection Time: 12/10/21  3:21 PM   Result Value Ref Range    NT pro-BNP 1,830 (H) 0 - 942 PG/ML   METABOLIC PANEL, BASIC    Collection Time: 12/10/21  3:21 PM   Result Value Ref Range    Sodium 138 136 - 145 mmol/L    Potassium 4.2 3.5 - 5.1 mmol/L    Chloride 101 97 - 108 mmol/L    CO2 27 21 - 32 mmol/L    Anion gap 10 5 - 15 mmol/L    Glucose 214 (H) 65 - 100 mg/dL    BUN 49 (H) 6 - 20 MG/DL    Creatinine 4.37 (H) 0.55 - 1.02 MG/DL    BUN/Creatinine ratio 11 (L) 12 - 20      GFR est AA 13 (L) >60 ml/min/1.73m2    GFR est non-AA 10 (L) >60 ml/min/1.73m2    Calcium 8.4 (L) 8.5 - 10.1 MG/DL   TROPONIN-HIGH SENSITIVITY    Collection Time: 12/10/21  3:21 PM   Result Value Ref Range    Troponin-High Sensitivity 8 0 - 51 ng/L   GLUCOSE, POC    Collection Time: 12/10/21  4:21 PM   Result Value Ref Range    Glucose (POC) 256 (H) 65 - 117 mg/dL    Performed by Sinai Demarco (GRETA)    GLUCOSE, POC    Collection Time: 12/10/21  9:22 PM   Result Value Ref Range    Glucose (POC) 300 (H) 65 - 117 mg/dL    Performed by Johnathon Hager

## 2021-12-12 LAB
ANION GAP SERPL CALC-SCNC: 9 MMOL/L (ref 5–15)
BUN SERPL-MCNC: 50 MG/DL (ref 6–20)
BUN/CREAT SERPL: 12 (ref 12–20)
CALCIUM SERPL-MCNC: 8.6 MG/DL (ref 8.5–10.1)
CHLORIDE SERPL-SCNC: 101 MMOL/L (ref 97–108)
CO2 SERPL-SCNC: 26 MMOL/L (ref 21–32)
CREAT SERPL-MCNC: 4.3 MG/DL (ref 0.55–1.02)
ECHO AO ROOT DIAM: 2.37 CM
ECHO AV AREA PLAN: 3.57 CM2
ECHO EST RA PRESSURE: 5 MMHG
ECHO IVC PROX: 1.26 CM
ECHO LA AREA 4C: 22.74 CM2
ECHO LA MAJOR AXIS: 3.63 CM
ECHO LA MINOR AXIS: 1.64 CM
ECHO LA VOL 4C: 63.29 ML (ref 22–52)
ECHO LA VOLUME INDEX A4C: 28.51 ML/M2 (ref 16–28)
ECHO LV EDV A4C: 117.02 ML
ECHO LV EDV INDEX A4C: 52.7 ML/M2
ECHO LV EJECTION FRACTION A4C: 74 PERCENT
ECHO LV ESV A4C: 30.47 ML
ECHO LV ESV INDEX A4C: 13.7 ML/M2
ECHO LV INTERNAL DIMENSION DIASTOLIC: 5.64 CM (ref 3.9–5.3)
ECHO LV INTERNAL DIMENSION SYSTOLIC: 3.58 CM
ECHO LV IVSD: 1.22 CM (ref 0.6–0.9)
ECHO LV MASS 2D: 287 G (ref 67–162)
ECHO LV MASS INDEX 2D: 129.3 G/M2 (ref 43–95)
ECHO LV POSTERIOR WALL DIASTOLIC: 1.2 CM (ref 0.6–0.9)
ECHO LVOT DIAM: 1.88 CM
ECHO LVOT PEAK GRADIENT: 3.12 MMHG
ECHO LVOT PEAK VELOCITY: 87.27 CM/S
ECHO MV A VELOCITY: 35.49 CM/S
ECHO MV AREA PHT: 11.21 CM2
ECHO MV AREA PHT: 5 CM2
ECHO MV AREA PLAN: 5.62 CM2
ECHO MV E DECELERATION TIME (DT): 67.69 MS
ECHO MV E VELOCITY: 33.21 CM/S
ECHO MV E/A RATIO: 0.94
ECHO MV MAX VELOCITY: 112.53 CM/S
ECHO MV MEAN GRADIENT: 2.43 MMHG
ECHO MV PEAK GRADIENT: 5.07 MMHG
ECHO MV PRESSURE HALF TIME (PHT): 19.63 MS
ECHO MV PRESSURE HALF TIME (PHT): 44.04 MS
ECHO MV VTI: 31.83 CM
ECHO PV PEAK INSTANTANEOUS GRADIENT SYSTOLIC: 3.31 MMHG
ECHO RA AREA 4C: 17.02 CM2
ECHO RIGHT VENTRICULAR SYSTOLIC PRESSURE (RVSP): 48.47 MMHG
ECHO TV REGURGITANT MAX VELOCITY: 328.08 CM/S
ECHO TV REGURGITANT MAX VELOCITY: 428.19 CM/S
ECHO TV REGURGITANT PEAK GRADIENT: 43.47 MMHG
FERRITIN SERPL-MCNC: 75 NG/ML (ref 8–252)
GLUCOSE BLD STRIP.AUTO-MCNC: 183 MG/DL (ref 65–117)
GLUCOSE BLD STRIP.AUTO-MCNC: 195 MG/DL (ref 65–117)
GLUCOSE BLD STRIP.AUTO-MCNC: 204 MG/DL (ref 65–117)
GLUCOSE BLD STRIP.AUTO-MCNC: 232 MG/DL (ref 65–117)
GLUCOSE SERPL-MCNC: 183 MG/DL (ref 65–100)
HIV 1+2 AB+HIV1 P24 AG SERPL QL IA: NONREACTIVE
HIV12 RESULT COMMENT, HHIVC: NORMAL
IRON SATN MFR SERPL: 24 % (ref 20–50)
IRON SERPL-MCNC: 60 UG/DL (ref 35–150)
MAGNESIUM SERPL-MCNC: 1.8 MG/DL (ref 1.6–2.4)
PHOSPHATE SERPL-MCNC: 6.9 MG/DL (ref 2.6–4.7)
POTASSIUM SERPL-SCNC: 3.9 MMOL/L (ref 3.5–5.1)
SERVICE CMNT-IMP: ABNORMAL
SODIUM SERPL-SCNC: 136 MMOL/L (ref 136–145)
TIBC SERPL-MCNC: 252 UG/DL (ref 250–450)

## 2021-12-12 PROCEDURE — 93306 TTE W/DOPPLER COMPLETE: CPT | Performed by: INTERNAL MEDICINE

## 2021-12-12 PROCEDURE — 82962 GLUCOSE BLOOD TEST: CPT

## 2021-12-12 PROCEDURE — 74011250637 HC RX REV CODE- 250/637: Performed by: NURSE PRACTITIONER

## 2021-12-12 PROCEDURE — 83540 ASSAY OF IRON: CPT

## 2021-12-12 PROCEDURE — 74011250636 HC RX REV CODE- 250/636: Performed by: NURSE PRACTITIONER

## 2021-12-12 PROCEDURE — 80048 BASIC METABOLIC PNL TOTAL CA: CPT

## 2021-12-12 PROCEDURE — 83883 ASSAY NEPHELOMETRY NOT SPEC: CPT

## 2021-12-12 PROCEDURE — 86038 ANTINUCLEAR ANTIBODIES: CPT

## 2021-12-12 PROCEDURE — 94760 N-INVAS EAR/PLS OXIMETRY 1: CPT

## 2021-12-12 PROCEDURE — 82728 ASSAY OF FERRITIN: CPT

## 2021-12-12 PROCEDURE — 97535 SELF CARE MNGMENT TRAINING: CPT

## 2021-12-12 PROCEDURE — 84100 ASSAY OF PHOSPHORUS: CPT

## 2021-12-12 PROCEDURE — 97116 GAIT TRAINING THERAPY: CPT

## 2021-12-12 PROCEDURE — 36415 COLL VENOUS BLD VENIPUNCTURE: CPT

## 2021-12-12 PROCEDURE — 86160 COMPLEMENT ANTIGEN: CPT

## 2021-12-12 PROCEDURE — 65660000000 HC RM CCU STEPDOWN

## 2021-12-12 PROCEDURE — 97165 OT EVAL LOW COMPLEX 30 MIN: CPT

## 2021-12-12 PROCEDURE — 74011636637 HC RX REV CODE- 636/637: Performed by: NURSE PRACTITIONER

## 2021-12-12 PROCEDURE — 97161 PT EVAL LOW COMPLEX 20 MIN: CPT

## 2021-12-12 PROCEDURE — 83735 ASSAY OF MAGNESIUM: CPT

## 2021-12-12 PROCEDURE — 83520 IMMUNOASSAY QUANT NOS NONAB: CPT

## 2021-12-12 PROCEDURE — 87389 HIV-1 AG W/HIV-1&-2 AB AG IA: CPT

## 2021-12-12 RX ORDER — LANOLIN ALCOHOL/MO/W.PET/CERES
1 CREAM (GRAM) TOPICAL
Status: DISCONTINUED | OUTPATIENT
Start: 2021-12-13 | End: 2021-12-13 | Stop reason: HOSPADM

## 2021-12-12 RX ORDER — TORSEMIDE 20 MG/1
40 TABLET ORAL 2 TIMES DAILY
Qty: 120 TABLET | Refills: 0 | Status: SHIPPED
Start: 2021-12-12 | End: 2022-01-11

## 2021-12-12 RX ADMIN — HEPARIN SODIUM 5000 UNITS: 5000 INJECTION INTRAVENOUS; SUBCUTANEOUS at 07:17

## 2021-12-12 RX ADMIN — CLOPIDOGREL BISULFATE 75 MG: 75 TABLET ORAL at 11:33

## 2021-12-12 RX ADMIN — Medication 10 ML: at 21:26

## 2021-12-12 RX ADMIN — GABAPENTIN 900 MG: 300 CAPSULE ORAL at 21:26

## 2021-12-12 RX ADMIN — AMITRIPTYLINE HYDROCHLORIDE 50 MG: 50 TABLET, FILM COATED ORAL at 21:26

## 2021-12-12 RX ADMIN — Medication 10 ML: at 05:21

## 2021-12-12 RX ADMIN — GABAPENTIN 900 MG: 300 CAPSULE ORAL at 11:33

## 2021-12-12 RX ADMIN — GABAPENTIN 900 MG: 300 CAPSULE ORAL at 17:40

## 2021-12-12 RX ADMIN — HEPARIN SODIUM 5000 UNITS: 5000 INJECTION INTRAVENOUS; SUBCUTANEOUS at 17:40

## 2021-12-12 RX ADMIN — Medication 10 ML: at 17:44

## 2021-12-12 RX ADMIN — AMLODIPINE BESYLATE 10 MG: 5 TABLET ORAL at 11:33

## 2021-12-12 RX ADMIN — INSULIN LISPRO 1 UNITS: 100 INJECTION, SOLUTION INTRAVENOUS; SUBCUTANEOUS at 21:26

## 2021-12-12 RX ADMIN — METOPROLOL TARTRATE 25 MG: 25 TABLET, FILM COATED ORAL at 21:26

## 2021-12-12 RX ADMIN — METOPROLOL TARTRATE 25 MG: 25 TABLET, FILM COATED ORAL at 11:33

## 2021-12-12 RX ADMIN — ASPIRIN 81 MG: 81 TABLET, COATED ORAL at 11:33

## 2021-12-12 RX ADMIN — INSULIN LISPRO 2 UNITS: 100 INJECTION, SOLUTION INTRAVENOUS; SUBCUTANEOUS at 14:23

## 2021-12-12 RX ADMIN — ATORVASTATIN CALCIUM 80 MG: 40 TABLET, FILM COATED ORAL at 21:26

## 2021-12-12 NOTE — PROGRESS NOTES
RENAL  PROGRESS NOTE        Subjective:   Feels well,might be dc in AM  Told her I talked to dr kang(who call me) from [facility] nephrology who was seeing the patient (dr Selena Hill was seeing her as OP)  Objective:   VITALS SIGNS:    Visit Vitals  /63   Pulse 71   Temp 98.3 °F (36.8 °C)   Resp 18   Wt 118.9 kg (262 lb 2 oz)   LMP 2013   SpO2 95%   BMI 43.62 kg/m²       O2 Device: None (Room air)       Temp (24hrs), Av.8 °F (36.6 °C), Min:97.2 °F (36.2 °C), Max:98.3 °F (36.8 °C)         PHYSICAL EXAM:  Some edema  NAD    DATA REVIEW:     INTAKE / OUTPUT:   Last shift:      No intake/output data recorded. Last 3 shifts: 12/10 1901 -  0700  In: 820 [P.O.:820]  Out: 3150 [Urine:3150]    Intake/Output Summary (Last 24 hours) at 2021 1440  Last data filed at 2021 3386  Gross per 24 hour   Intake 480 ml   Output 2000 ml   Net -1520 ml         LABS:   Recent Labs     21  0254   WBC 7.5   HGB 7.9*   HCT 23.8*        Recent Labs     21  0046 21  0254 12/10/21  1521 12/10/21  0335 12/10/21  0335    135* 138   < > 137   K 3.9 4.1 4.2   < > 4.4    101 101   < > 101   CO2 26 25 27   < > 29   * 240* 214*   < > 182*   BUN 50* 48* 49*   < > 45*   CREA 4.30* 4.34* 4.37*   < > 4.47*   CA 8.6 8.8 8.9  8.4*   < > 8.7   MG 1.8 1.8  --   --  1.8   PHOS 6.9* 7.0*  --   --   --    ALB  --  2.1*  --   --  2.2*    < > = values in this interval not displayed. Assessment:   CKD-3 ,creat 1.4 in 8.2020(seen by [facility]  Subacute renal failure,creatinine worsening since   With nephrotic syndrome at 13 grams   Now poor renal function;too qucik for just diabetic nephropathy  CVA on ASA ,plavix 3 years ago  Volume overload  Better  DM  Anemia;low iron     Serology ordered  po iron     She will need kidney biopsy BUT she is on plaivix. ..will be done as OP after holding her ASA,PLAVIX;she is well aware of high chance of having diabetic nephropathy on bioopsy;but need to ro other etiologies  If labs stable in AM can dc on po diuretics  Close to needing dialysis but not acute indication now  Told her we recom her to be seen by her previous nephrologist on dc otherwise will see her in office  Discussed with her  Ag Diallo MD

## 2021-12-12 NOTE — PROGRESS NOTES
Hospitalist Progress Note    NAME: Salome Ruelas   :  1965   MRN:  [id number]       Assessment / Plan:  EMMANUEL on CKD, POA (crea 4.37)  Acute on chronic diastolic HF, POA  Hypertension, POA  Admission CXR neg for acute process. probnp ~1830. Per akash reviewed, pt was clinically volume overload on admission. CT a/p neg for hydronephrosis. Pt evaluated by cardiology at [facility], [address] [place] [address] with IV diuretics, recommended to transition to PO diuretics at discharge. No procedure was recommended. During the course of the hospital, diuretics held at [facility] due to hypotension. Patient was then subsequently [facility] for nephrology consultation. I spoke to Dr Vicky Chung this AM, pt will need renal biopsy, however she would need to hold ASA/plavix for at least 5 days. Serologies has been sent which can be followed up outpt. No acute need for HD. Will do voiding trial today, plan for discharge tomorrow. Per nephrology's recs, will discharge with increase dose of torsemide 40mg BID. Hold ASA/plavix for renal biopsy at outpt f/u with nephro. F/u with cardiology in 2 weeks. PT/OT    IDDM, POA  Diabetic neuropathy, POA  Resume home medications     Asthma, POA  O2 support and neb treatment PRN     History of stroke  Normally on ASA, Plavix, statin daily (hold asa/plavix as above). Risk and benefits discussed with pt.     Chronic pain, POA  Tramadol PRN       Code Status: Full  Surrogate Decision Maker: Sister- Elena Weir [phone]  Nephew- Bayron [place] [phone]        DVT Prophylaxis: Heparin  GI Prophylaxis: not indicated   Baseline: home with family, uses walker with mobility    36 or above Morbid obesity / Body mass index is 43.62 kg/m².   Estimated discharge date:   Barriers: Resolution of EMMANUEL     Subjective:     Chief Complaint / Reason for Physician Visit  NAD. Whittington in placed. Pt states whittington placed on admission for strict I&Os. No hx of urinary retention.   Discussed with RN events overnight. Review of Systems:  Symptom Y/N Comments  Symptom Y/N Comments   Fever/Chills    Chest Pain     Poor Appetite    Edema y    Cough    Abdominal Pain     Sputum    Joint Pain     SOB/PENA y   Pruritis/Rash     Nausea/vomit    Tolerating PT/OT     Diarrhea    Tolerating Diet     Constipation    Other       Could NOT obtain due to:      Objective:     VITALS:   Last 24hrs VS reviewed since prior progress note. Most recent are:  Patient Vitals for the past 24 hrs:   Temp Pulse Resp BP SpO2   12/12/21 0757 97.6 °F (36.4 °C) 75 18 (!) 133/57 95 %   12/12/21 0235 97.5 °F (36.4 °C) 71 19 130/67 95 %   12/11/21 2358 97.2 °F (36.2 °C) 73 20 (!) 112/59 96 %   12/11/21 2008 98.1 °F (36.7 °C) 81 20 (!) 143/70 95 %   12/11/21 1646 98.2 °F (36.8 °C) 76  (!) 119/44 96 %   12/11/21 1116 98.5 °F (36.9 °C) 69 17 (!) 108/40 96 %       Intake/Output Summary (Last 24 hours) at 12/12/2021 8437  Last data filed at 12/12/2021 4431  Gross per 24 hour   Intake 580 ml   Output 2000 ml   Net -1420 ml        I had a face to face encounter and independently examined this patient on 12/12/2021, as outlined below:  PHYSICAL EXAM:  General: WD, WN. Alert, cooperative, no acute distress    EENT:  EOMI. Anicteric sclerae. MMM  Resp:  CTA bilaterally, no wheezing or rales. No accessory muscle use  CV:  Regular  rhythm,  b/l LE edema  GI:  Soft, Non distended, Non tender. +Bowel sounds  Neurologic:  Alert and oriented X 3, normal speec,   Psych:   Good insight. Not anxious nor agitated  Skin:  No rashes.   No jaundice    Reviewed most current lab test results and cultures  YES  Reviewed most current radiology test results   YES  Review and summation of old records today    NO  Reviewed patient's current orders and MAR    YES  PMH/SH reviewed - no change compared to H&P  ________________________________________________________________________  Care Plan discussed with:    Comments   Patient x    Family      RN x    Care Manager Consultant                        Multidiciplinary team rounds were held today with , nursing, pharmacist and clinical coordinator. Patient's plan of care was discussed; medications were reviewed and discharge planning was addressed. ________________________________________________________________________  Total NON critical care TIME:  35   Minutes    Total CRITICAL CARE TIME Spent:   Minutes non procedure based      Comments   >50% of visit spent in counseling and coordination of care     ________________________________________________________________________  Lazara Snow MD     Procedures: see electronic medical records for all procedures/Xrays and details which were not copied into this note but were reviewed prior to creation of Plan. LABS:  I reviewed today's most current labs and imaging studies. Pertinent labs include:  Recent Labs     12/11/21  0254   WBC 7.5   HGB 7.9*   HCT 23.8*        Recent Labs     12/12/21  0046 12/11/21  0254 12/10/21  1521 12/10/21  0335 12/10/21  0335    135* 138   < > 137   K 3.9 4.1 4.2   < > 4.4    101 101   < > 101   CO2 26 25 27   < > 29   * 240* 214*   < > 182*   BUN 50* 48* 49*   < > 45*   CREA 4.30* 4.34* 4.37*   < > 4.47*   CA 8.6 8.8 8.9  8.4*   < > 8.7   MG 1.8 1.8  --   --  1.8   PHOS 6.9* 7.0*  --   --   --    ALB  --  2.1*  --   --  2.2*    < > = values in this interval not displayed.        Signed: Lazara Snow MD

## 2021-12-12 NOTE — DISCHARGE SUMMARY
Discharge Summary      Name: Mandy Rubio  [id number]  YOB: 1965 (Age: 64 y.o.)   Date of Admission: 12/10/2021  Date of Discharge: 12/13/2021  Attending Physician: Marilee Smith MD    Discharge Diagnosis:   EMMANUEL on CKD, POA (crea 4.37)  Acute on Chronic diastolic HF, POA  Hypertension, POA  IDDM, POA  Diabetic neuropathy, POA    Consultations:  IP CONSULT TO NEPHROLOGY    Brief Admission History/Reason for Admission Per Kamari Chun MD:   Maximiliano Suarez is a 64 y.o.  female who was initially managed for above complaints in [facility] initially on 12/01/21 and transferred here for further management of worsening kidney function. As per patient, leg swelling was initially better on diuretics and got worse when it was discontinued. Reports headache, nausea and vomiting is resolved. Reports generalized weakness, poor appetite and PENA. Denies fever, chills, CP, SOB, abdominal pain, focal weakness, melena, hematuria and diarrhea. Patient came with an indwelling whittington that was inserted when she was started on IV diuretics. Past medical history is significant for hypertension, DM, diabetic neuropathy, CHF, asthma, and stroke. Most recent hospitalization include: 1. 11/27/21 for headache and SOB, hypertension and SOB and was sent home after symptoms got better s/p Bumex, Clonidine, Fentanyl and Oxycodone. 2. 08/25-28 for generalized weakness and worsening LE edema and multiple falls and was discharged to inpatient Rehab and follow up with nephrology and PCP. 3. 08/18 for SOB, leg swelling and fall x 2 evaluated by PT in ED and was discharged home with home PT recommendation. 4. 08/07 for worsening dyspnea, leg swelling and redness and was discharged on increased dose of Torsemide and Keflex for 5 days.     We were asked to admit for work up and evaluation of the above problems.     Brief Hospital Course by Main Problems:   EMMANUEL on CKD, POA (crea 4.37)  Acute on chronic diastolic HF, POA  Hypertension, POA  Admission CXR neg for acute process. probnp ~1830. Per akash reviewed, pt was clinically volume overload on admission. CT a/p neg for hydronephrosis. Pt evaluated by cardiology at [facility], [address] with IV diuretics, recommended to transition to PO diuretics at discharge. No procedure was recommended. During the course of the hospital, diuretics held at [facility] due to hypotension. Patient was then subsequently transferred to [facility] for nephrology consultation. I spoke to Dr Mac Gregory this AM, pt will need renal biopsy, however she would need to hold ASA/plavix for at least 5 days. Serologies has been sent which can be followed up outpt. No acute need for HD. Today patient is seen and examined, her vital signs are stable, hier lab work is stable and he was cleared by all consultant parties including nephrology to be discharged for outpatient follow-up. Per nephrology's recs, will discharge with increase dose of torsemide 40mg BID. Hold ASA/plavix for renal biopsy at outpt f/u with nephro. F/u with cardiology in 2 weeks. IDDM, POA  Diabetic neuropathy, POA  Resume home medications. Gabapentin is adjusted due to renal failure. Asthma, POA  O2 support and neb treatment PRN.    History of stroke  Normally on ASA, Plavix, statin daily (hold asa/plavix as above). Risk and benefits discussed with pt.     Chronic pain, POA  Tramadol PRN.     Obesity, POA  Discharge Exam:  Patient seen and examined by me on discharge day. Pertinent Findings:  Visit Vitals  /68 (BP 1 Location: Left upper arm, BP Patient Position: At rest)   Pulse 76   Temp 98.2 °F (36.8 °C)   Resp 20   Wt 120.7 kg (266 lb)   LMP 05/01/2013   SpO2 95%   BMI 44.26 kg/m²     Gen:    Not in distress  Chest: Clear lungs  CVS:   Regular rhythm.   No edema  Abd:  Soft, not distended, not tender    Discharge/Recent Laboratory Results:  Recent Labs     12/13/21  0133 12/12/21  0046 12/12/21  0046      < > 136 K 4.6   < > 3.9      < > 101   CO2 29   < > 26   BUN 52*   < > 50*   *   < > 183*   CA 8.7   < > 8.6   PHOS  --   --  6.9*   MG  --   --  1.8    < > = values in this interval not displayed. Recent Labs     12/11/21  0254   HGB 7.9*   HCT 23.8*   WBC 7.5          Discharge Medications:  Current Discharge Medication List      CONTINUE these medications which have CHANGED    Details   gabapentin (NEURONTIN) 300 mg capsule Take 2 Capsules by mouth three (3) times daily. Max Daily Amount: 1,800 mg. Qty: 30 Capsule, Refills: 0  Start date: 12/13/2021    Associated Diagnoses: Type 2 diabetes mellitus with peripheral neuropathy (HCC)      torsemide (DEMADEX) 20 mg tablet Take 2 Tablets by mouth two (2) times a day for 30 days. Qty: 120 Tablet, Refills: 0  Start date: 12/12/2021, End date: 1/11/2022         CONTINUE these medications which have NOT CHANGED    Details   traMADoL (ULTRAM) 50 mg tablet Take 50 mg by mouth three (3) times daily as needed for Pain.      metoprolol tartrate (LOPRESSOR) 25 mg tablet Take 25 mg by mouth two (2) times a day. !! insulin aspart U-100 (NovoLOG Flexpen U-100 Insulin) 100 unit/mL (3 mL) inpn 10 Units by SubCUTAneous route Before breakfast, lunch, and dinner. !! insulin aspart U-100 (NOVOLOG) 100 unit/mL (3 mL) inpn by SubCUTAneous route Before breakfast, lunch, and dinner. Sliding scale      nystatin (MYCOSTATIN) powder Apply  to affected area two (2) times daily as needed. glipiZIDE (GLUCOTROL) 5 mg tablet Take 5 mg by mouth daily. Take One Tablet By Mouth Daily      magnesium oxide (MAG-OX) 400 mg tablet Take 400 mg by mouth daily. canagliflozin (INVOKANA) 100 mg tablet Take 100 mg by mouth daily. Take one tablet by mouth daily      amLODIPine (NORVASC) 10 mg tablet Take 10 mg by mouth daily. Flovent  mcg/actuation inhaler Take 2 Puffs by inhalation two (2) times a day.       albuterol (PROVENTIL HFA, VENTOLIN HFA, PROAIR HFA) 90 mcg/actuation inhaler Take 2 Puffs by inhalation every six (6) hours as needed for Wheezing. Lantus Solostar U-100 Insulin 100 unit/mL (3 mL) inpn 20 Units by SubCUTAneous route nightly. fluticasone propionate (Flonase Allergy Relief) 50 mcg/actuation nasal spray 2 SPRAY, Each Nostril, daily, for allergy symptoms, 11 Refills, Dispense: 1, bottle, Pharm[facility]      aspirin delayed-release 81 mg tablet Take 81 mg by mouth daily. amitriptyline (ELAVIL) 50 mg tablet Take 50 mg by mouth nightly. docusate sodium (Colace) 100 mg capsule Take 100 mg by mouth two (2) times daily as needed. atorvastatin (LIPITOR) 80 mg tablet Take 1 Tab by mouth nightly. Qty: 30 Tab, Refills: 0      clopidogrel (PLAVIX) 75 mg tab Take 1 Tab by mouth daily. Qty: 30 Tab, Refills: 0       !! - Potential duplicate medications found. Please discuss with provider. DISPOSITION:    Home with Family:    Home with HH/PT/OT/RN: x   SNF/LTC:    [facility]:    OTHER:          Follow up with:   PCP : Abimael Horowitz MD  Follow-up Information     Follow up With Specialties Details Why Contact Info    Abimael Horowitz MD Family Medicine In 3 days The office will contact you directly to schedule hospital follow up appointment.  [address] [address]  [phone]      Reji Garcia MD Nephrology In 1 week  [address] [place] [address]      Natalia Harley  [facility] Vascular Surgery, Nurse Practitioner, Cardiology In 2 weeks  [address] [place] [address] [phone]              Total time in minutes spent coordinating this discharge (includes going over instructions, follow-up, prescriptions, and preparing report for sign off to her PCP) : 35 minutes

## 2021-12-12 NOTE — PROGRESS NOTES
Problem: Falls - Risk of  Goal: *Absence of Falls  Description: Document Clydene Bone Fall Risk and appropriate interventions in the flowsheet. Outcome: Progressing Towards Goal  Note: Fall Risk Interventions:  Mobility Interventions: Bed/chair exit alarm         Medication Interventions: Bed/chair exit alarm, Patient to call before getting OOB    Elimination Interventions: Bed/chair exit alarm, Call light in reach, Patient to call for help with toileting needs              Problem: Pressure Injury - Risk of  Goal: *Prevention of pressure injury  Description: Document Guillermo Scale and appropriate interventions in the flowsheet. Outcome: Progressing Towards Goal  Note: Pressure Injury Interventions:  Sensory Interventions: Assess need for specialty bed, Check visual cues for pain, Turn and reposition approx.  every two hours (pillows and wedges if needed), Use 30-degree side-lying position    Moisture Interventions: Absorbent underpads, Apply protective barrier, creams and emollients, Internal/External urinary devices, Minimize layers    Activity Interventions: Assess need for specialty bed, PT/OT evaluation, Pressure redistribution bed/mattress(bed type)    Mobility Interventions: Assess need for specialty bed, Float heels, HOB 30 degrees or less, PT/OT evaluation    Nutrition Interventions: Document food/fluid/supplement intake    Friction and Shear Interventions: Apply protective barrier, creams and emollients, Feet elevated on foot rest, HOB 30 degrees or less, Minimize layers                Problem: Patient Education: Go to Patient Education Activity  Goal: Patient/Family Education  Outcome: Progressing Towards Goal

## 2021-12-12 NOTE — PROGRESS NOTES
Problem: Self Care Deficits Care Plan (Adult)  Goal: *Acute Goals and Plan of Care (Insert Text)  Description: FUNCTIONAL STATUS PRIOR TO ADMISSION: Patient was modified independent using a rollator for functional mobility and ADL tasks. Reports her rollator has malfunctioning breaks. Uses sock aide and tub transfer bench since recent rehab admission. Reports transient impaired vision including floaters and \"seeing blood. \"  Uses reading glasses. HOME SUPPORT: The patient lived with sister and son but did not require assist with ADL tasks. Occupational Therapy Goals  Initiated 12/12/2021  1. Patient will perform grooming standing at sink with supervision/set-up within 7 day(s). 2.  Patient will perform lower body dressing with AD with supervision/set-up within 7 day(s). 3.  Patient will perform bathing with supervision/set-up within 7 day(s). 4.  Patient will perform toilet transfers with supervision/set-up within 7 day(s). 5.  Patient will perform all aspects of toileting with supervision/set-up within 7 day(s). 6.  Patient will participate in upper extremity therapeutic exercise/activities with supervision/set-up for 8 minutes within 7 day(s). 7.  Patient will utilize energy conservation techniques during functional activities with verbal cues within 7 day(s). Outcome: Progressing Towards Goal   OCCUPATIONAL THERAPY EVALUATION  Patient: Micheline Lacey (41 y.o. female)  Date: 12/12/2021  Primary Diagnosis: Acute on chronic kidney failure ([facility] [N17.9, N18.9]  Chronic heart failure ([facility] [I50.9]        Precautions: fall       ASSESSMENT  Based on the objective data described below, the patient presents with impaired higher level activity tolerance, balance and mobility necessary in ADL. She has hx of DM, diabetic retinopathy, HTN, and CHF. Admitted from [facility] for kidney failure. She completed supine > sit with SBA with good static sitting balance.   Unable to complete distal LB dressing, she uses a sock aide or has her sister's assistance at home. Sit > stand with SBA and ambulated at RW level necessary distance for toileting and self care transfers at [facility]. B foot drop noted that patient reports has been present x 3 years and she does not use braces. Left up in chair with BLE elevated and MD present. She reports visual floaters and \"seeing blood. \"  Recommend follow up with who ever provides her DM care and eye doctor. Current Level of Function Impacting Discharge (ADLs/self-care): UB care set up, socks total A, self care transfer SBA    Functional Outcome Measure: The patient scored 45/100 on the Barthel Index outcome measure which is indicative of moderate impairment with ADL tasks. Other factors to consider for discharge: Patient lives with supportive family members. Plans for dc home. Recommend rollator have service to fix brakes or get new rollator. HH for home safety assessment vs none. Patient will benefit from skilled therapy intervention to address the above noted impairments. PLAN :  Recommendations and Planned Interventions: self care training, functional mobility training, therapeutic exercise, balance training, therapeutic activities, endurance activities, patient education, home safety training and family training/education    Frequency/Duration: Patient will be followed by occupational therapy 3 times a week to address goals.     Recommendation for discharge: (in order for the patient to meet his/her long term goals)  Occupational therapy at least 2 days/week in the home AND ensure assist and/or supervision for safety with ADL tasks    This discharge recommendation:  Has been made in collaboration with the attending provider and/or case management    IF patient discharges home will need the following DME: rollator/rollator repair       SUBJECTIVE:   Patient stated .    OBJECTIVE DATA SUMMARY:   HISTORY:   Past Medical History:   Diagnosis Date    Arthritis     Asthma     CHF (congestive heart failure) (HCC)     Chronic back pain     [facility]     Diabetic retinopathy ([facility]     Hypertension     MRSA (methicillin resistant staph aureus) culture positive     labial abscess    Neuropathy      Past Surgical History:   Procedure Laterality Date    HX HEENT      tonsillectomy    HX ORTHOPAEDIC      left ft bunionectomy    HX OTHER SURGICAL      lanced labial abscess       Expanded or extensive additional review of patient history:     Home Situation  Home Environment: Apartment  # Steps to Enter: 0  One/Two Story Residence: One story  Living Alone: No  Support Systems: Child(jesus manuel) (sister)  Patient Expects to be Discharged to[de-identified] Apartment  Current DME Used/Available at Home: Katerine Vahid, rollator, Tub transfer bench  Tub or Shower Type: Tub/Shower combination    Hand dominance: Right    EXAMINATION OF PERFORMANCE DEFICITS:  Cognitive/Behavioral Status:  Neurologic State: Alert; Appropriate for age  Orientation Level: Oriented X4           Hearing: Auditory  Auditory Impairment: None    Vision/Perceptual:         Reading glasses  Intermittent floaters and \"seeing blood\"             Range of Motion:  AROM: Generally decreased, functional (B foot drop x 3 years)       Strength:  Strength: Generally decreased, functional       Coordination:  Coordination: Within functional limits  Fine Motor Skills-Upper: Left Intact; Right Intact    Gross Motor Skills-Upper: Left Intact; Right Intact    Tone & Sensation:  Tone: Normal          Balance:  Sitting: Intact  Standing: Impaired; With support  Standing - Static: Constant support; Fair  Standing - Dynamic : Constant support; Fair    Functional Mobility and Transfers for ADLs:  Bed Mobility:  Supine to Sit: Stand-by assistance;  Additional time  Scooting: Stand-by assistance    Transfers:  Sit to Stand: Stand-by assistance  Stand to Sit: Stand-by assistance  Bed to Chair: Stand-by assistance (RW)    ADL Assessment:  Feeding: Independent  Oral Facial Hygiene/Grooming: Stand-by assistance  Bathing: Maximum assistance  Upper Body Dressing: Setup  Lower Body Dressing: Maximum assistance  Toileting: Moderate assistance                ADL Intervention and task modifications:  Patient instructed and indicated understanding the benefits of maintaining activity tolerance, functional mobility, and independence with self care tasks during acute stay  to ensure safe return home and to baseline. Encouraged patient to increase frequency and duration OOB, be out of bed for all meals, perform daily ADLs (as approved by RN/MD regarding bathing etc), and performing functional mobility to/from bathroom. Provided education with patient on fall prevention for hospital and at home. This includes not getting OOB/chair/toilet without staff assistance, good lighting, good footwear, and recommended AD use. Patient with good understanding. Activated chair alarm. Lower Body Dressing Assistance  Socks: Total assistance (dependent)            Functional Measure:    Barthel Index:  Bathin  Bladder: 0  Bowels: 10  Groomin  Dressin  Feeding: 10  Mobility: 0  Stairs: 0  Toilet Use: 5  Transfer (Bed to Chair and Back): 10  Total: 45/100      The Barthel ADL Index: Guidelines  1. The index should be used as a record of what a patient does, not as a record of what a patient could do. 2. The main aim is to establish degree of independence from any help, physical or verbal, however minor and for whatever reason. 3. The need for supervision renders the patient not independent. 4. A patient's performance should be established using the best available evidence. Asking the patient, friends/relatives and nurses are the usual sources, but direct observation and common sense are also important. However direct testing is not needed.   5. Usually the patient's performance over the preceding 24-48 hours is important, but occasionally longer periods will be relevant. 6. Middle categories imply that the patient supplies over 50 per cent of the effort. 7. Use of aids to be independent is allowed. Score Interpretation (from [address])    Independent   60-79 Minimally independent   40-59 Partially dependent   20-39 Very dependent   <20 Totally dependent     -Mckayla Anderson, Barthel, D.W. (1965). Functional evaluation: the Barthel Index. [address] ([address] (1997). The Barthel activities of daily living index: self-reporting versus actual performance in the old (> or = 75 years). Journal of [address] [place] [address] 45(7), 14 [facility], CAMERON, Yanely Ying, Grace Duong. (1999). Measuring the change in disability after inpatient rehabilitation; comparison of the responsiveness of the Barthel Index and Functional [place] Measure. Journal of Neurology, Neurosurgery, and Psychiatry, 66(4), 186-242. Sarath Sanchez, N.J.A, KAMILLA Ramirez, & Nani Taylor, MJS. (2004) Assessment of post-stroke quality of life in cost-effectiveness studies: The usefulness of the Barthel Index and the EuroQoL-5D.  Quality of Life Research, 15, 040-85         Occupational Therapy Evaluation Charge Determination   History Examination Decision-Making   LOW Complexity : Brief history review  LOW Complexity : 1-3 performance deficits relating to physical, cognitive , or psychosocial skils that result in activity limitations and / or participation restrictions  LOW Complexity : No comorbidities that affect functional and no verbal or physical assistance needed to complete eval tasks       Based on the above components, the patient evaluation is determined to be of the following complexity level: LOW   Pain Rating:  No c/o pain    Activity Tolerance:   Fair    After treatment patient left in no apparent distress:    Sitting in chair and Call bell within reach    COMMUNICATION/EDUCATION:   The patients plan of care was discussed with: Physical therapist.     Home safety education was provided and the patient/caregiver indicated understanding. and Patient/family agree to work toward stated goals and plan of care. This patients plan of care is appropriate for delegation to ANNA.     Thank you for this referral.  Najma Alvarez, OT  Time Calculation: 17 mins

## 2021-12-12 NOTE — PROGRESS NOTES
Problem: Mobility Impaired (Adult and Pediatric)  Goal: *Acute Goals and Plan of Care (Insert Text)  Description: FUNCTIONAL STATUS PRIOR TO ADMISSION: Patient was modified independent using a rollator for functional mobility. HOME SUPPORT PRIOR TO ADMISSION: The patient lived with family but did not require assist.    Physical Therapy Goals  Initiated 12/12/2021  1. Patient will move from supine to sit and sit to supine  in bed with modified independence within 7 day(s). 2.  Patient will transfer from bed to chair and chair to bed with modified independence using the least restrictive device within 7 day(s). 3.  Patient will perform sit to stand with modified independence within 7 day(s). 4.  Patient will ambulate with modified independence for 75 feet with the least restrictive device within 7 day(s). Outcome: Progressing Towards Goal   PHYSICAL THERAPY EVALUATION  Patient: Maryana Brown (65 y.o. female)  Date: 12/12/2021  Primary Diagnosis: Acute on chronic kidney failure ([facility] [address] [N17.9, N18.9]  Chronic heart failure ([facility] [address] [I50.9]        Precautions:          ASSESSMENT  Based on the objective data described below, the patient presents with baseline decreased bilateral ankle dorsiflexion, impaired balance and gait s/p admission on 12/10 for elevated BP, kidney failure and was transferred from [address] [place] [address] to [address] [place] [address]. Pt received supine in bed and agreeable to therapy. Pt tolerated session well. Pt completed supine to sit EOB with standby assist, sit<>stand with RW with standby assist and tolerated gait training x 30 ft with RW with CGA-SBA. Noted increased hip flexion to clear bilaterally feet. Pt educated on safety and fall prevention. Pt remained seated in the chair with all needs met. Recommend home with HHPT upon discharge. Current Level of Function Impacting Discharge (mobility/balance): SBA-CGA gait training x 30 ft with RW    Functional Outcome Measure:   The patient scored Total: 45/100 on the Barthel Index outcome measure which is indicative of being partially dependent in basic self-care. Other factors to consider for discharge:      Patient will benefit from skilled therapy intervention to address the above noted impairments. PLAN :  Recommendations and Planned Interventions: bed mobility training, transfer training, gait training, therapeutic exercises, neuromuscular re-education, edema management/control, patient and family training/education, and therapeutic activities      Frequency/Duration: Patient will be followed by physical therapy:  4 times a week to address goals. Recommendation for discharge: (in order for the patient to meet his/her long term goals)  Physical therapy at least 2 days/week in the home     This discharge recommendation:  Has been made in collaboration with the attending provider and/or case management    IF patient discharges home will need the following DME: patient owns DME required for discharge         SUBJECTIVE:   Patient stated I have tripped because of my feet.     OBJECTIVE DATA SUMMARY:   HISTORY:    Past Medical History:   Diagnosis Date    Arthritis     Asthma     CHF (congestive heart failure) ([facility])     Chronic back pain     Diabetes ([facility]     Diabetic retinopathy ([facility] [address]     Hypertension     MRSA (methicillin resistant staph aureus) culture positive     labial abscess    Neuropathy      Past Surgical History:   Procedure Laterality Date    HX HEENT      tonsillectomy    HX ORTHOPAEDIC      left ft bunionectomy    HX OTHER SURGICAL      lanced labial abscess       Personal factors and/or comorbidities impacting plan of care:     Home Situation  Home Environment: Apartment  # Steps to Enter: 0  One/Two Story Residence: One story  Living Alone: No  Support Systems: Child(jesus manuel) (sister)  Patient Expects to be Discharged to[de-identified] Apartment  Current DME Used/Available at Home: Hilda Ibrahim, rollator, Tub transfer bench  Tub or Shower Type: Tub/Shower combination    EXAMINATION/PRESENTATION/DECISION MAKING:   Critical Behavior:  Neurologic State: Alert, Appropriate for age  Orientation Level: Oriented X4  Cognition: Appropriate decision making, Appropriate for age attention/concentration, Follows commands     Hearing: Auditory  Auditory Impairment: None  Skin:    Edema: increased LE edema  Range Of Motion:  AROM: Generally decreased, functional (B foot drop x 3 years)                       Strength:    Strength: Generally decreased, functional                    Tone & Sensation:   Tone: Normal                              Coordination:  Coordination: Within functional limits  Vision:      Functional Mobility:  Bed Mobility:     Supine to Sit: Stand-by assistance; Additional time     Scooting: Stand-by assistance  Transfers:  Sit to Stand: Stand-by assistance  Stand to Sit: Stand-by assistance        Bed to Chair: Stand-by assistance (RW)              Balance:   Sitting: Intact  Standing: Impaired; With support  Standing - Static: Constant support; Fair  Standing - Dynamic : Constant support; Fair  Ambulation/Gait Training:  Distance (ft): 30 Feet (ft)  Assistive Device: Gait belt; Walker, rolling  Ambulation - Level of Assistance: Contact guard assistance        Gait Abnormalities: Decreased step clearance        Base of Support: Widened     Speed/Vangie: Slow  Step Length: Left shortened; Right shortened                     Stairs: Therapeutic Exercises: Ankle pumps, LAQ    Functional Measure:  Barthel Index:    Bathin  Bladder: 0  Bowels: 10  Groomin  Dressin  Feeding: 10  Mobility: 0  Stairs: 0  Toilet Use: 5  Transfer (Bed to Chair and Back): 10  Total: 45/100       The Barthel ADL Index: Guidelines  1. The index should be used as a record of what a patient does, not as a record of what a patient could do.   2. The main aim is to establish degree of independence from any help, physical or verbal, however minor and for whatever reason. 3. The need for supervision renders the patient not independent. 4. A patient's performance should be established using the best available evidence. Asking the patient, friends/relatives and nurses are the usual sources, but direct observation and common sense are also important. However direct testing is not needed. 5. Usually the patient's performance over the preceding 24-48 hours is important, but occasionally longer periods will be relevant. 6. Middle categories imply that the patient supplies over 50 per cent of the effort. 7. Use of aids to be independent is allowed. Score Interpretation (from [address] [facility])    Independent   60-79 Minimally independent   40-59 Partially dependent   20-39 Very dependent   <20 Totally dependent     -Lena Anderson., Barthel, DJAROD. (1965). Functional evaluation: the Barthel Index. [address] [facility] ([address] (1997). The Barthel activities of daily living index: self-reporting versus actual performance in the old (> or = 75 years). Journal [address] 45(7), 14 [facility], JMARIBEL, Steven Marie., Khris Abraham (1999). Measuring the change in disability after inpatient rehabilitation; comparison of the responsiveness of the Barthel Index and Functional [place] Measure. Journal of Neurology, Neurosurgery, and Psychiatry, 66(4), 293-954. Kate Mehta, N.J.A, KAMILLA Ramirez, & Moo Carlton MMckinleyA. (2004) Assessment of post-stroke quality of life in cost-effectiveness studies: The usefulness of the Barthel Index and the EuroQoL-5D.  Quality of Life Research, 13, 239-27           Based on the above components, the patient evaluation is determined to be of the following complexity level: LOW     Pain Rating:  Pt denied pain    Activity Tolerance:   Good    After treatment patient left in no apparent distress:   Sitting in chair, Call bell within reach, and Side rails x 3    COMMUNICATION/EDUCATION: The patients plan of care was discussed with: Occupational therapist, Registered nurse, and Physician. Fall prevention education was provided and the patient/caregiver indicated understanding., Patient/family have participated as able in goal setting and plan of care. , and Patient/family agree to work toward stated goals and plan of care.     Thank you for this referral.  Nikhil Swift, PT, DPT   Time Calculation: 14 mins

## 2021-12-12 NOTE — PROGRESS NOTES
Bedside and Verbal shift change report given to Hood Bennett RN (oncoming nurse) by Darci Calvillo RN (offgoing nurse). Report included the following information SBAR, Kardex and ED Summary.

## 2021-12-13 VITALS
HEART RATE: 76 BPM | WEIGHT: 266 LBS | SYSTOLIC BLOOD PRESSURE: 136 MMHG | TEMPERATURE: 98.2 F | OXYGEN SATURATION: 95 % | DIASTOLIC BLOOD PRESSURE: 68 MMHG | RESPIRATION RATE: 20 BRPM | BODY MASS INDEX: 44.26 KG/M2

## 2021-12-13 LAB
ALBUMIN SERPL ELPH-MCNC: 2.5 G/DL (ref 2.9–4.4)
ALBUMIN/GLOB SERPL: 0.7 {RATIO} (ref 0.7–1.7)
ALPHA1 GLOB SERPL ELPH-MCNC: 0.3 G/DL (ref 0–0.4)
ALPHA2 GLOB SERPL ELPH-MCNC: 1.1 G/DL (ref 0.4–1)
ANA SER QL: NEGATIVE
ANION GAP SERPL CALC-SCNC: 4 MMOL/L (ref 5–15)
ATRIAL RATE: 84 BPM
B-GLOBULIN SERPL ELPH-MCNC: 0.9 G/DL (ref 0.7–1.3)
BUN SERPL-MCNC: 52 MG/DL (ref 6–20)
BUN/CREAT SERPL: 12 (ref 12–20)
CALCIUM SERPL-MCNC: 8.7 MG/DL (ref 8.5–10.1)
CALCULATED P AXIS, ECG09: 39 DEGREES
CALCULATED R AXIS, ECG10: 50 DEGREES
CALCULATED T AXIS, ECG11: 34 DEGREES
CHLORIDE SERPL-SCNC: 103 MMOL/L (ref 97–108)
CO2 SERPL-SCNC: 29 MMOL/L (ref 21–32)
CREAT SERPL-MCNC: 4.2 MG/DL (ref 0.55–1.02)
DIAGNOSIS, 93000: NORMAL
GAMMA GLOB SERPL ELPH-MCNC: 1.1 G/DL (ref 0.4–1.8)
GLOBULIN SER CALC-MCNC: 3.4 G/DL (ref 2.2–3.9)
GLUCOSE BLD STRIP.AUTO-MCNC: 235 MG/DL (ref 65–117)
GLUCOSE SERPL-MCNC: 169 MG/DL (ref 65–100)
KAPPA LC FREE SER-MCNC: 136.7 MG/L (ref 3.3–19.4)
KAPPA LC FREE/LAMBDA FREE SER: 2.06 {RATIO} (ref 0.26–1.65)
LAMBDA LC FREE SERPL-MCNC: 66.2 MG/L (ref 5.7–26.3)
M PROTEIN SERPL ELPH-MCNC: ABNORMAL G/DL
P-R INTERVAL, ECG05: 228 MS
POTASSIUM SERPL-SCNC: 4.6 MMOL/L (ref 3.5–5.1)
PROT SERPL-MCNC: 5.9 G/DL (ref 6–8.5)
Q-T INTERVAL, ECG07: 384 MS
QRS DURATION, ECG06: 82 MS
QTC CALCULATION (BEZET), ECG08: 453 MS
SERVICE CMNT-IMP: ABNORMAL
SODIUM SERPL-SCNC: 136 MMOL/L (ref 136–145)
VENTRICULAR RATE, ECG03: 84 BPM

## 2021-12-13 PROCEDURE — 74011250637 HC RX REV CODE- 250/637: Performed by: INTERNAL MEDICINE

## 2021-12-13 PROCEDURE — 51798 US URINE CAPACITY MEASURE: CPT

## 2021-12-13 PROCEDURE — 80048 BASIC METABOLIC PNL TOTAL CA: CPT

## 2021-12-13 PROCEDURE — 94760 N-INVAS EAR/PLS OXIMETRY 1: CPT

## 2021-12-13 PROCEDURE — 74011636637 HC RX REV CODE- 636/637: Performed by: NURSE PRACTITIONER

## 2021-12-13 PROCEDURE — 36415 COLL VENOUS BLD VENIPUNCTURE: CPT

## 2021-12-13 PROCEDURE — 74011250637 HC RX REV CODE- 250/637: Performed by: NURSE PRACTITIONER

## 2021-12-13 PROCEDURE — 74011250636 HC RX REV CODE- 250/636: Performed by: INTERNAL MEDICINE

## 2021-12-13 PROCEDURE — 90686 IIV4 VACC NO PRSV 0.5 ML IM: CPT | Performed by: INTERNAL MEDICINE

## 2021-12-13 PROCEDURE — 82962 GLUCOSE BLOOD TEST: CPT

## 2021-12-13 PROCEDURE — 90471 IMMUNIZATION ADMIN: CPT

## 2021-12-13 PROCEDURE — 74011250636 HC RX REV CODE- 250/636: Performed by: NURSE PRACTITIONER

## 2021-12-13 RX ORDER — GABAPENTIN 300 MG/1
600 CAPSULE ORAL 3 TIMES DAILY
Qty: 30 CAPSULE | Refills: 0 | Status: SHIPPED
Start: 2021-12-13

## 2021-12-13 RX ADMIN — HEPARIN SODIUM 5000 UNITS: 5000 INJECTION INTRAVENOUS; SUBCUTANEOUS at 09:04

## 2021-12-13 RX ADMIN — METOPROLOL TARTRATE 25 MG: 25 TABLET, FILM COATED ORAL at 09:02

## 2021-12-13 RX ADMIN — CLOPIDOGREL BISULFATE 75 MG: 75 TABLET ORAL at 09:01

## 2021-12-13 RX ADMIN — ASPIRIN 81 MG: 81 TABLET, COATED ORAL at 09:01

## 2021-12-13 RX ADMIN — HEPARIN SODIUM 5000 UNITS: 5000 INJECTION INTRAVENOUS; SUBCUTANEOUS at 00:52

## 2021-12-13 RX ADMIN — AMLODIPINE BESYLATE 10 MG: 5 TABLET ORAL at 09:01

## 2021-12-13 RX ADMIN — INSULIN LISPRO 2 UNITS: 100 INJECTION, SOLUTION INTRAVENOUS; SUBCUTANEOUS at 09:03

## 2021-12-13 RX ADMIN — INFLUENZA VIRUS VACCINE 0.5 ML: 15; 15; 15; 15 SUSPENSION INTRAMUSCULAR at 11:58

## 2021-12-13 RX ADMIN — FERROUS SULFATE TAB 325 MG (65 MG ELEMENTAL FE) 325 MG: 325 (65 FE) TAB at 09:01

## 2021-12-13 RX ADMIN — GABAPENTIN 900 MG: 300 CAPSULE ORAL at 09:01

## 2021-12-13 RX ADMIN — Medication 10 ML: at 05:03

## 2021-12-13 NOTE — PROGRESS NOTES
PT session held due to:  [x]  Pt in transport chair, for D/c  []  RN Communication/ suggestion  []  Extreme Pain  []  Dialysis treatment in progress. Will f/u, if D/c is canceled. Thank you.   Patti Watson, PTA

## 2021-12-13 NOTE — PROGRESS NOTES
Pharmacist Discharge Medication Reconciliation    Significant PMH:   Past Medical History:   Diagnosis Date    Arthritis     Asthma     CHF (congestive heart failure) (HCC)     Chronic back pain     [facility]     Diabetic retinopathy [facility]     Hypertension     MRSA (methicillin resistant staph aureus) culture positive     labial abscess    Neuropathy      Encounter Diagnoses: No diagnosis found. Allergies: Amoxicillin, Aspirin, and Ciprofloxacin    Discharge Medications:   Current Discharge Medication List      CONTINUE these medications which have CHANGED    Details   gabapentin (NEURONTIN) 300 mg capsule Take 2 Capsules by mouth three (3) times daily. Max Daily Amount: 1,800 mg. Qty: 30 Capsule, Refills: 0  Start date: 12/13/2021    Associated Diagnoses: Type 2 diabetes mellitus with peripheral neuropathy (HCC)      torsemide (DEMADEX) 20 mg tablet Take 2 Tablets by mouth two (2) times a day for 30 days. Qty: 120 Tablet, Refills: 0  Start date: 12/12/2021, End date: 1/11/2022         CONTINUE these medications which have NOT CHANGED    Details   traMADoL (ULTRAM) 50 mg tablet Take 50 mg by mouth three (3) times daily as needed for Pain.      metoprolol tartrate (LOPRESSOR) 25 mg tablet Take 25 mg by mouth two (2) times a day. !! insulin aspart U-100 (NovoLOG Flexpen U-100 Insulin) 100 unit/mL (3 mL) inpn 10 Units by SubCUTAneous route Before breakfast, lunch, and dinner. !! insulin aspart U-100 (NOVOLOG) 100 unit/mL (3 mL) inpn by SubCUTAneous route Before breakfast, lunch, and dinner. Sliding scale      nystatin (MYCOSTATIN) powder Apply  to affected area two (2) times daily as needed. glipiZIDE (GLUCOTROL) 5 mg tablet Take 5 mg by mouth daily. Take One Tablet By Mouth Daily      magnesium oxide (MAG-OX) 400 mg tablet Take 400 mg by mouth daily. canagliflozin (INVOKANA) 100 mg tablet Take 100 mg by mouth daily.  Take one tablet by mouth daily      amLODIPine (NORVASC) 10 mg tablet Take 10 mg by mouth daily. Flovent  mcg/actuation inhaler Take 2 Puffs by inhalation two (2) times a day. albuterol (PROVENTIL HFA, VENTOLIN HFA, PROAIR HFA) 90 mcg/actuation inhaler Take 2 Puffs by inhalation every six (6) hours as needed for Wheezing. Lantus Solostar U-100 Insulin 100 unit/mL (3 mL) inpn 20 Units by SubCUTAneous route nightly. fluticasone propionate (Flonase Allergy Relief) 50 mcg/actuation nasal spray 2 SPRAY, Each Nostril, daily, for allergy symptoms, 11 Refills, Dispense: 1, bottle, Pharmacy [facility]      aspirin delayed-release 81 mg tablet Take 81 mg by mouth daily. amitriptyline (ELAVIL) 50 mg tablet Take 50 mg by mouth nightly. docusate sodium (Colace) 100 mg capsule Take 100 mg by mouth two (2) times daily as needed. atorvastatin (LIPITOR) 80 mg tablet Take 1 Tab by mouth nightly. Qty: 30 Tab, Refills: 0      clopidogrel (PLAVIX) 75 mg tab Take 1 Tab by mouth daily. Qty: 30 Tab, Refills: 0       !! - Potential duplicate medications found. Please discuss with provider. The patient's chart, MAR and AVS were reviewed by Sumi Esteves RPH.     Discharging Provider: Nathaniel Sung MD    Thank you,     Sumi Esteves, [facility]

## 2021-12-13 NOTE — PROGRESS NOTES
Problem: Falls - Risk of  Goal: *Absence of Falls  Description: Document Earma Savi Fall Risk and appropriate interventions in the flowsheet. Outcome: Progressing Towards Goal  Note: Fall Risk Interventions:  Mobility Interventions: Assess mobility with egress test, Bed/chair exit alarm, Communicate number of staff needed for ambulation/transfer         Medication Interventions: Bed/chair exit alarm    Elimination Interventions: Bed/chair exit alarm, Call light in reach              Problem: Pressure Injury - Risk of  Goal: *Prevention of pressure injury  Description: Document Guillermo Scale and appropriate interventions in the flowsheet.   Outcome: Progressing Towards Goal  Note: Pressure Injury Interventions:  Sensory Interventions: Assess need for specialty bed, Assess changes in LOC, Discuss PT/OT consult with provider, Float heels, Monitor skin under medical devices, Pad between skin to skin, Pressure redistribution bed/mattress (bed type)    Moisture Interventions: Absorbent underpads, Apply protective barrier, creams and emollients, Assess need for specialty bed    Activity Interventions: Assess need for specialty bed, Increase time out of bed, PT/OT evaluation    Mobility Interventions: Float heels, HOB 30 degrees or less, Assess need for specialty bed    Nutrition Interventions: Document food/fluid/supplement intake    Friction and Shear Interventions: HOB 30 degrees or less, Apply protective barrier, creams and emollients, Feet elevated on foot rest                Problem: Patient Education: Go to Patient Education Activity  Goal: Patient/Family Education  Outcome: Progressing Towards Goal     Problem: Patient Education: Go to Patient Education Activity  Goal: Patient/Family Education  Outcome: Progressing Towards Goal

## 2021-12-13 NOTE — PROGRESS NOTES
Transition of Care Plan:    RUR: 21% high risk  Disposition: Home with Home Health pending acceptance  Follow up appointments: PCP  DME needed: No needs identified at this time  Transportation at Discharge: Pt's sister  [address] or means to access home:  Pt's sister has access       Medicare Letter: N/A  Is patient a BCPI-A Bundle:        No   If yes, was Bundle Letter given?:    Is patient a [place] and connected with the [facility]? No  If yes, was [employer] transfer form completed and VA notified? Caregiver Contact: Elena Gage ([phone])  Discharge Caregiver contacted prior to discharge? Pt contacted her sister    Reason for Admission:   Acute on chronic kidney failure, end stage renal disease, CHF                 RUR Score:     21%      PCP: First and Last name:  Farrah Valencia MD     Name of Practice:   Are you a current patient: Yes/No:  Yes   Approximate date of last visit: Unsure   Can you do a virtual visit with your PCP:  Yes             Resources/supports as identified by patient/family:   Pt's family is supportive                Top Challenges facing patient (as identified by patient/family and CM): Finances/Medication cost?      Pt has [place] Medicaid and can afford her medications. Transportation? Pt's family assists with transportation              Support system or lack thereof? Pt's sister and nephew are supportive                     Living arrangements? Pt lives in a 1 story apartment with family           Self-care/ADLs/Cognition? Pt is alert and oriented.  Pt requires some assistance with ADL/IADLs          Current Advanced Directive/Advance Care Plan:  Full Code      Healthcare Decision Maker:       Primary Decision MakerPamela Everett Sister - [phone]    Secondary Decision Maker: Jm Forte - Other Relative - [phone]    Payor Source Payor: Leia Young / Plan: Jm Simon / Product Type: Managed Care Medicaid / Plan for utilizing home health:    Referrals sent for PT/OT                 Transition of Care Plan:            Home with [facility] and family assistance    CM met with patient at the bedside to complete initial assessment. CM introduced role, verified demographics, and discussed discharge planning. Pt is a 63 yo female with an admitting diagnosis of Acute on chronic kidney failure, end stage renal disease, CHF. Pt lives in a 1 story apartment with family. Pt uses a rollator at baseline however it is broken. Pt has contact insurance however she has received it in the last 5 yrs and is not eligible for a new one yet. Pt reports her sister will transport her home at d/c. CM to send referrals for [facility] PT/OT. CM attempted to make hospital PCP f/u appt however they did not have any open appts. The PCP office will contact the pt directly and pt's AVS has been updated. Care Management Interventions  PCP Verified by CM: Yes (Dr. Kannan Kline)  Mode of Transport at Discharge:  Other (see comment) (pt's sister)  Transition of Care Consult (CM Consult): Discharge Planning  Discharge Durable Medical Equipment: No  Physical Therapy Consult: Yes  Occupational Therapy Consult: Yes  Speech Therapy Consult: No  Support Systems: Other Family Member(s)  Confirm Follow Up Transport: Family  Discharge Location  Discharge Placement: Home with home health    KALEY Montalvo  Care Manager [facility]  [phone]

## 2021-12-13 NOTE — PROGRESS NOTES
No further needs identified at this time. Patient is ready to d/c on a CM standpoint. RN aware. Transition of Care Plan:     RUR: 21% high risk  Disposition: Home with [address] [facility]  Follow up appointments: PCP  DME needed: No needs identified at this time  Transportation at Discharge: Pt's sister  [address] or means to access home:  Pt's sister has access      IM Medicare Letter: N/A  Is patient a BCPI-A Bundle:        No              If yes, was Bundle Letter given?:    Is patient a [place] and connected with the [place]? No  If yes, was [employer] transfer form completed and VA notified? Caregiver Contact: SisterElena ([phone])  Discharge Caregiver contacted prior to discharge? Pt contacted her sister    [facility] has [facility] pt for [facility] services. CM has updated pt's AVS. Pt's sister to transport pt home. Care Management Interventions  PCP Verified by CM: Yes (Dr. Jovanni Mccoy)  Mode of Transport at Discharge:  Other (see comment) (pt's sister)  Transition of Care Consult (CM Consult): Discharge Planning, [address]: No  Reason Outside [place]: Patient already serviced by other home care/hospice agency  Discharge Durable Medical Equipment: No  Physical Therapy Consult: Yes  Occupational Therapy Consult: Yes  Speech Therapy Consult: No  Support Systems: Other Family Member(s)  Confirm Follow Up Transport: Family  Discharge Location  Discharge Placement: Home with home health    KALEY Aceves  Care Manager [facility]  [phone]

## 2021-12-14 LAB
ALBUMIN MFR UR ELPH: 58.1 %
ALPHA1 GLOB MFR UR ELPH: 9.1 %
ALPHA2 GLOB 24H MFR UR ELPH: 7.3 %
B-GLOBULIN 24H MFR UR ELPH: 14.6 %
C-ANCA TITR SER IF: NORMAL TITER
C3 SERPL-MCNC: 149 MG/DL (ref 82–167)
C4 SERPL-MCNC: 30 MG/DL (ref 12–38)
GAMMA GLOB 24H MFR UR ELPH: 10.9 %
M PROTEIN MFR UR ELPH: NORMAL %
MYELOPEROXIDASE AB SER IA-ACNC: <9 U/ML (ref 0–9)
P-ANCA ATYPICAL TITR SER IF: NORMAL TITER
P-ANCA TITR SER IF: NORMAL TITER
PLEASE NOTE:, 133800: NORMAL
PROT UR-MCNC: 1266.6 MG/DL
PROTEINASE3 AB SER IA-ACNC: <3.5 U/ML (ref 0–3.5)

## 2021-12-28 ENCOUNTER — TRANSCRIBE ORDER (OUTPATIENT)
Dept: SCHEDULING | Age: 56
End: 2021-12-28

## 2021-12-28 DIAGNOSIS — N17.9 ACUTE KIDNEY FAILURE, UNSPECIFIED (HCC): Primary | ICD-10-CM

## 2022-01-05 ENCOUNTER — APPOINTMENT (OUTPATIENT)
Dept: GENERAL RADIOLOGY | Age: 57
End: 2022-01-05
Attending: EMERGENCY MEDICINE
Payer: MEDICAID

## 2022-01-05 ENCOUNTER — HOSPITAL ENCOUNTER (EMERGENCY)
Age: 57
Discharge: HOME | End: 2022-01-06
Attending: EMERGENCY MEDICINE
Payer: MEDICAID

## 2022-01-05 DIAGNOSIS — E87.79 OTHER HYPERVOLEMIA: Primary | ICD-10-CM

## 2022-01-05 DIAGNOSIS — N04.9 NEPHROTIC SYNDROME: ICD-10-CM

## 2022-01-05 LAB
ALBUMIN SERPL-MCNC: 2.1 G/DL (ref 3.5–5)
ALBUMIN/GLOB SERPL: 0.5 {RATIO} (ref 1.1–2.2)
ALP SERPL-CCNC: 80 U/L (ref 45–117)
ALT SERPL-CCNC: 10 U/L (ref 12–78)
ANION GAP SERPL CALC-SCNC: 8 MMOL/L (ref 5–15)
AST SERPL-CCNC: 16 U/L (ref 15–37)
BASOPHILS # BLD: 0 K/UL (ref 0–0.1)
BASOPHILS NFR BLD: 1 % (ref 0–1)
BILIRUB SERPL-MCNC: 0.2 MG/DL (ref 0.2–1)
BUN SERPL-MCNC: 34 MG/DL (ref 6–20)
BUN/CREAT SERPL: 9 (ref 12–20)
CALCIUM SERPL-MCNC: 9 MG/DL (ref 8.5–10.1)
CHLORIDE SERPL-SCNC: 112 MMOL/L (ref 97–108)
CO2 SERPL-SCNC: 21 MMOL/L (ref 21–32)
CREAT SERPL-MCNC: 3.85 MG/DL (ref 0.55–1.02)
DIFFERENTIAL METHOD BLD: ABNORMAL
EOSINOPHIL # BLD: 0.3 K/UL (ref 0–0.4)
EOSINOPHIL NFR BLD: 5 % (ref 0–7)
ERYTHROCYTE [DISTWIDTH] IN BLOOD BY AUTOMATED COUNT: 14.6 % (ref 11.5–14.5)
GLOBULIN SER CALC-MCNC: 4.1 G/DL (ref 2–4)
GLUCOSE SERPL-MCNC: 142 MG/DL (ref 65–100)
HCT VFR BLD AUTO: 25.4 % (ref 35–47)
HGB BLD-MCNC: 8.4 G/DL (ref 11.5–16)
IMM GRANULOCYTES # BLD AUTO: 0 K/UL (ref 0–0.04)
IMM GRANULOCYTES NFR BLD AUTO: 1 % (ref 0–0.5)
LYMPHOCYTES # BLD: 1.3 K/UL (ref 0.8–3.5)
LYMPHOCYTES NFR BLD: 20 % (ref 12–49)
MCH RBC QN AUTO: 28.9 PG (ref 26–34)
MCHC RBC AUTO-ENTMCNC: 33.1 G/DL (ref 30–36.5)
MCV RBC AUTO: 87.3 FL (ref 80–99)
MONOCYTES # BLD: 0.5 K/UL (ref 0–1)
MONOCYTES NFR BLD: 8 % (ref 5–13)
NEUTS SEG # BLD: 4.2 K/UL (ref 1.8–8)
NEUTS SEG NFR BLD: 65 % (ref 32–75)
NRBC # BLD: 0 K/UL (ref 0–0.01)
NRBC BLD-RTO: 0 PER 100 WBC
PLATELET # BLD AUTO: 196 K/UL (ref 150–400)
PMV BLD AUTO: 9.1 FL (ref 8.9–12.9)
POTASSIUM SERPL-SCNC: 4.1 MMOL/L (ref 3.5–5.1)
PROT SERPL-MCNC: 6.2 G/DL (ref 6.4–8.2)
RBC # BLD AUTO: 2.91 M/UL (ref 3.8–5.2)
SODIUM SERPL-SCNC: 141 MMOL/L (ref 136–145)
WBC # BLD AUTO: 6.3 K/UL (ref 3.6–11)

## 2022-01-05 PROCEDURE — 99285 EMERGENCY DEPT VISIT HI MDM: CPT

## 2022-01-05 PROCEDURE — 71045 X-RAY EXAM CHEST 1 VIEW: CPT

## 2022-01-05 PROCEDURE — 80053 COMPREHEN METABOLIC PANEL: CPT

## 2022-01-05 PROCEDURE — 36415 COLL VENOUS BLD VENIPUNCTURE: CPT

## 2022-01-05 PROCEDURE — 74011000250 HC RX REV CODE- 250: Performed by: EMERGENCY MEDICINE

## 2022-01-05 PROCEDURE — 96374 THER/PROPH/DIAG INJ IV PUSH: CPT

## 2022-01-05 PROCEDURE — 85025 COMPLETE CBC W/AUTO DIFF WBC: CPT

## 2022-01-05 RX ORDER — BUMETANIDE 2 MG/1
2 TABLET ORAL DAILY
Qty: 10 TABLET | Refills: 0 | Status: SHIPPED | OUTPATIENT
Start: 2022-01-05

## 2022-01-05 RX ORDER — BUMETANIDE 0.25 MG/ML
2 INJECTION INTRAMUSCULAR; INTRAVENOUS
Status: COMPLETED | OUTPATIENT
Start: 2022-01-05 | End: 2022-01-05

## 2022-01-05 RX ADMIN — BUMETANIDE 2 MG: 0.25 INJECTION INTRAMUSCULAR; INTRAVENOUS at 18:05

## 2022-01-05 NOTE — DISCHARGE INSTRUCTIONS
You were seen in the ER for your symptoms. Your creatinine is stable and your blood count is stable. Please increase your Bumex to 2 mg 2 times a day. Please limit your fluid and salt intake. Please return for new or worsening symptoms especially shortness of breath. hard copy, drawn during this pregnancy

## 2022-01-05 NOTE — ED PROVIDER NOTES
EMERGENCY DEPARTMENT HISTORY AND PHYSICAL EXAM      Date: 1/5/2022  Patient Name: Maite Higgins    History of Presenting Illness     Chief Complaint   Patient presents with    Leg Pain     Bilateral leg pain and inceased swelling        History Provided By: Patient    HPI: Maite Higgins, 64 y.o. female presents to the ED with cc of fluid overload. 26-year-old female with a history of CKD who follows with Dr. Ariel Nageotte , nephrotic syndrome, diabetes, hypertension and prior CVA on Plavix presentsto the emergency department with leg swelling. Patient reports she switch to Bumex 2 mg in the morning and 1 mg at night from torsemide. Patient reports her home health nurse advised her to be evaluated in the emergency department as she has had bilateral increased leg swelling. Reports 21 pounds of weight gain. Patient does report mild cough but denies shortness of breath, chest pain, abdominal pain, nausea, vomiting or diarrhea. There are no other complaints, changes, or physical findings at this time. PCP: Sonia Mistry MD    No current facility-administered medications on file prior to encounter. Current Outpatient Medications on File Prior to Encounter   Medication Sig Dispense Refill    gabapentin (NEURONTIN) 300 mg capsule Take 2 Capsules by mouth three (3) times daily. Max Daily Amount: 1,800 mg. 30 Capsule 0    torsemide (DEMADEX) 20 mg tablet Take 2 Tablets by mouth two (2) times a day for 30 days. 120 Tablet 0    traMADoL (ULTRAM) 50 mg tablet Take 50 mg by mouth three (3) times daily as needed for Pain.  metoprolol tartrate (LOPRESSOR) 25 mg tablet Take 25 mg by mouth two (2) times a day.  insulin aspart U-100 (NovoLOG Flexpen U-100 Insulin) 100 unit/mL (3 mL) inpn 10 Units by SubCUTAneous route Before breakfast, lunch, and dinner.  insulin aspart U-100 (NOVOLOG) 100 unit/mL (3 mL) inpn by SubCUTAneous route Before breakfast, lunch, and dinner.  Sliding scale      nystatin (MYCOSTATIN) powder Apply  to affected area two (2) times daily as needed.  glipiZIDE (GLUCOTROL) 5 mg tablet Take 5 mg by mouth daily. Take One Tablet By Mouth Daily      magnesium oxide (MAG-OX) 400 mg tablet Take 400 mg by mouth daily.  canagliflozin (INVOKANA) 100 mg tablet Take 100 mg by mouth daily. Take one tablet by mouth daily      amLODIPine (NORVASC) 10 mg tablet Take 10 mg by mouth daily.  Flovent  mcg/actuation inhaler Take 2 Puffs by inhalation two (2) times a day.  albuterol (PROVENTIL HFA, VENTOLIN HFA, PROAIR HFA) 90 mcg/actuation inhaler Take 2 Puffs by inhalation every six (6) hours as needed for Wheezing.  Lantus Solostar U-100 Insulin 100 unit/mL (3 mL) inpn 20 Units by SubCUTAneous route nightly.  fluticasone propionate (Flonase Allergy Relief) 50 mcg/actuation nasal spray 2 SPRAY, Each Nostril, daily, for allergy symptoms, 11 Refills, Dispense: 1, bottle, Pharmacy [facility]      aspirin delayed-release 81 mg tablet Take 81 mg by mouth daily.  amitriptyline (ELAVIL) 50 mg tablet Take 50 mg by mouth nightly.  docusate sodium (Colace) 100 mg capsule Take 100 mg by mouth two (2) times daily as needed.  atorvastatin (LIPITOR) 80 mg tablet Take 1 Tab by mouth nightly. 30 Tab 0    clopidogrel (PLAVIX) 75 mg tab Take 1 Tab by mouth daily.  30 Tab 0       Past History     Past Medical History:  Past Medical History:   Diagnosis Date    Arthritis     Asthma     CHF (congestive heart failure) (HCC)     Chronic back pain     [facility] [address]     Diabetic retinopathy ([facility] [address]     Hypertension     MRSA (methicillin resistant staph aureus) culture positive     labial abscess    Neuropathy        Past Surgical History:  Past Surgical History:   Procedure Laterality Date    HX HEENT      tonsillectomy    HX ORTHOPAEDIC      left ft bunionectomy    HX OTHER SURGICAL      lanced labial abscess       Family History:  No family history on file.    Social History:  Social History     Tobacco Use    Smoking status: Never Smoker    Smokeless tobacco: Never Used   Substance Use Topics    Alcohol use: No    Drug use: No       Allergies: Allergies   Allergen Reactions    Amoxicillin Nausea Only    Aspirin Other (comments)     \"nosebleeds\" but patient takes daily aspirin 81 mg at home. Patient states naproxen ok    Ciprofloxacin Hives         Review of Systems   Review of Systems   Constitutional: Positive for unexpected weight change. Negative for activity change, chills and fever. HENT: Negative for facial swelling and voice change. Eyes: Negative for redness. Respiratory: Positive for cough. Negative for shortness of breath and wheezing. Cardiovascular: Positive for leg swelling. Negative for chest pain. Gastrointestinal: Negative for abdominal distention, abdominal pain, diarrhea, nausea and vomiting. Genitourinary: Negative for decreased urine volume. Musculoskeletal: Negative for gait problem. Skin: Negative for pallor and rash. Neurological: Negative for tremors and facial asymmetry. Psychiatric/Behavioral: Negative for agitation. All other systems reviewed and are negative. Physical Exam   Physical Exam  Vitals and nursing note reviewed. Constitutional:       Comments: 59-year-old female, resting on stretcher, in no distress   HENT:      Head: Normocephalic and atraumatic. Cardiovascular:      Rate and Rhythm: Normal rate and regular rhythm. Heart sounds: No murmur heard. No friction rub. No gallop. Pulmonary:      Effort: Pulmonary effort is normal.      Breath sounds: Normal breath sounds. Abdominal:      Palpations: Abdomen is soft. Tenderness: There is no abdominal tenderness. Musculoskeletal:         General: Normal range of motion. Cervical back: Normal range of motion. Right lower leg: Edema present. Left lower leg: Edema present.       Comments: 3+ pitting edema bilaterally   Skin:     General: Skin is warm. Capillary Refill: Capillary refill takes less than 2 seconds. Neurological:      General: No focal deficit present. Mental Status: She is alert. Psychiatric:         Mood and Affect: Mood normal.         Diagnostic Study Results     Labs -     Recent Results (from the past 12 hour(s))   CBC WITH AUTOMATED DIFF    Collection Time: 01/05/22  4:27 PM   Result Value Ref Range    WBC 6.3 3.6 - 11.0 K/uL    RBC 2.91 (L) 3.80 - 5.20 M/uL    HGB 8.4 (L) 11.5 - 16.0 g/dL    HCT 25.4 (L) 35.0 - 47.0 %    MCV 87.3 80.0 - 99.0 FL    MCH 28.9 26.0 - 34.0 PG    MCHC 33.1 30.0 - 36.5 g/dL    RDW 14.6 (H) 11.5 - 14.5 %    PLATELET 820 888 - 256 K/uL    MPV 9.1 8.9 - 12.9 FL    NRBC 0.0 0  WBC    ABSOLUTE NRBC 0.00 0.00 - 0.01 K/uL    NEUTROPHILS 65 32 - 75 %    LYMPHOCYTES 20 12 - 49 %    MONOCYTES 8 5 - 13 %    EOSINOPHILS 5 0 - 7 %    BASOPHILS 1 0 - 1 %    IMMATURE GRANULOCYTES 1 (H) 0.0 - 0.5 %    ABS. NEUTROPHILS 4.2 1.8 - 8.0 K/UL    ABS. LYMPHOCYTES 1.3 0.8 - 3.5 K/UL    ABS. MONOCYTES 0.5 0.0 - 1.0 K/UL    ABS. EOSINOPHILS 0.3 0.0 - 0.4 K/UL    ABS. BASOPHILS 0.0 0.0 - 0.1 K/UL    ABS. IMM. GRANS. 0.0 0.00 - 0.04 K/UL    DF AUTOMATED     METABOLIC PANEL, COMPREHENSIVE    Collection Time: 01/05/22  4:27 PM   Result Value Ref Range    Sodium 141 136 - 145 mmol/L    Potassium 4.1 3.5 - 5.1 mmol/L    Chloride 112 (H) 97 - 108 mmol/L    CO2 21 21 - 32 mmol/L    Anion gap 8 5 - 15 mmol/L    Glucose 142 (H) 65 - 100 mg/dL    BUN 34 (H) 6 - 20 MG/DL    Creatinine 3.85 (H) 0.55 - 1.02 MG/DL    BUN/Creatinine ratio 9 (L) 12 - 20      GFR est AA 15 (L) >60 ml/min/1.73m2    GFR est non-AA 12 (L) >60 ml/min/1.73m2    Calcium 9.0 8.5 - 10.1 MG/DL    Bilirubin, total 0.2 0.2 - 1.0 MG/DL    ALT (SGPT) 10 (L) 12 - 78 U/L    AST (SGOT) 16 15 - 37 U/L    Alk.  phosphatase 80 45 - 117 U/L    Protein, total 6.2 (L) 6.4 - 8.2 g/dL    Albumin 2.1 (L) 3.5 - 5.0 g/dL    Globulin 4.1 (H) 2.0 - 4.0 g/dL    A-G Ratio 0.5 (L) 1.1 - 2.2         Radiologic Studies -   XR CHEST PORT   Final Result      Mild pulmonary edema pattern. No consolidative pneumonia. CT Results  (Last 48 hours)    None        CXR Results  (Last 48 hours)               01/05/22 1611  XR CHEST PORT Final result    Impression:      Mild pulmonary edema pattern. No consolidative pneumonia. Narrative:  EXAM:  XR CHEST PORT       INDICATION: Fluid overload       COMPARISON: Chest radiograph 12/10/2021       TECHNIQUE: Upright portable chest AP view       FINDINGS:        Cardiac mediastinal silhouette is stably mildly enlarged. Pulmonary vascular   congestion and mild edema pattern. No focal consolidation. Pleural spaces   grossly clear. Partially visualized right humeral internal fixation hardware                 Medical Decision Making   I am the first provider for this patient. I reviewed the vital signs, available nursing notes, past medical history, past surgical history, family history and social history. Vital Signs-Reviewed the patient's vital signs. Patient Vitals for the past 12 hrs:   Temp Pulse Resp BP SpO2   01/05/22 1828  77 17 (!) 148/64 98 %   01/05/22 1700  97 19 (!) 167/75 98 %   01/05/22 1600  78 18 (!) 170/73 98 %   01/05/22 1446 98.3 °F (36.8 °C) 97 17 (!) 174/91 97 %     Records Reviewed: Nursing Notes, Old Medical Records and Previous Laboratory Studies    Provider Notes (Medical Decision Making):     80-year-old female presents to the emergency department with lower extremity edema and weight gain. Patient was referred by her home health aid. She reports compliance with her Bumex regimen. She is a history of CKD and nephrotic syndrome. Question compliance with diet and fluid restriction. She was referred by home health due to weight gain and lower extremity edema. Appears fluid overloaded, but lung sounds are clear, not hypoxic, no respiratory distress.     ED Course: Initial assessment performed. The patients presenting problems have been discussed, and they are in agreement with the care plan formulated and outlined with them. I have encouraged them to ask questions as they arise throughout their visit. ED Course as of 01/05/22 2201 Wed Jan 05, 2022   1619 D/w Dr. Job Rubio, h/o nephrotic syndrome, follows with [facility] nephro. Will always have edema 2/2 proteinuria. Agrees with IV diuresis, consider increasing bumex to BID at home as patient is not hypoxic. [MB]   1726 Baseline renal function, stable anemia. CXR with mild effusions, not hypoxic, updated patient. [MB]   0918 Patient ambulatory in the ED. Will discharge with increased Bumex. Return precautions, nephro follow-up. [MB]      ED Course User Index  [MB] MD Jack Worrell MD      Disposition:    Discharged    DISCHARGE PLAN:  1. Current Discharge Medication List      START taking these medications    Details   bumetanide (BUMEX) 2 mg tablet Take 1 Tablet by mouth daily. Qty: 10 Tablet, Refills: 0  Start date: 1/5/2022           2. Follow-up Information     Follow up With Specialties Details Why Contact Info    Sonia Mistry MD Family Medicine In 3 days  [address] [facility] [address]  [phone]      [facility] EMERGENCY DEPT Emergency Medicine  If symptoms worsen [address] [place] [address]    Saritha Linton MD Nephrology  or your nephrologist at [address] [place] [address]          3. Return to ED if worse     Diagnosis     Clinical Impression:   1. Other hypervolemia    2. Nephrotic syndrome        Attestations:    Jack Steel MD    Please note that this dictation was completed with [employer], the [employer] voice recognition software. Quite often unanticipated grammatical, syntax, homophones, and other interpretive errors are inadvertently transcribed by the computer software.   Please disregard these errors. Please excuse any errors that have escaped final proofreading. Thank you.

## 2022-01-05 NOTE — ED TRIAGE NOTES
Patient arrives via [facility] with complaints of increased bilateral leg swelling and pain for the past two weeks. States leg pain is 5/10. Patient was at home receiving physical therapy today and they noticed that she has gained 23lbs in the past week. Patient also complains of shortness of breath and cough for the past week. Patient denies fever at this time. Patient following all commands at this time.

## 2022-01-06 VITALS
TEMPERATURE: 98.9 F | OXYGEN SATURATION: 96 % | HEART RATE: 97 BPM | SYSTOLIC BLOOD PRESSURE: 167 MMHG | RESPIRATION RATE: 18 BRPM | BODY MASS INDEX: 46.32 KG/M2 | DIASTOLIC BLOOD PRESSURE: 75 MMHG | HEIGHT: 65 IN | WEIGHT: 278 LBS

## 2022-01-06 PROCEDURE — 74011250637 HC RX REV CODE- 250/637: Performed by: EMERGENCY MEDICINE

## 2022-01-06 RX ORDER — ACETAMINOPHEN 325 MG/1
650 TABLET ORAL
Status: DISCONTINUED | OUTPATIENT
Start: 2022-01-06 | End: 2022-01-06 | Stop reason: HOSPADM

## 2022-01-06 RX ADMIN — ACETAMINOPHEN 650 MG: 325 TABLET ORAL at 04:49

## 2022-01-06 NOTE — ED NOTES
Bedside and Verbal shift change report given to Lachelle Neves RN (oncoming nurse) by Cherry Salmeron RN (offgoing nurse). Report included the following information SBAR, Kardex, ED Summary, Intake/Output, MAR and Recent Results.

## 2022-01-10 ENCOUNTER — HOSPITAL ENCOUNTER (OUTPATIENT)
Age: 57
Setting detail: OBSERVATION
Discharge: HOME | End: 2022-01-11
Attending: INTERNAL MEDICINE | Admitting: INTERNAL MEDICINE
Payer: MEDICAID

## 2022-01-10 ENCOUNTER — HOSPITAL ENCOUNTER (OUTPATIENT)
Dept: CT IMAGING | Age: 57
Discharge: HOME | End: 2022-01-10
Attending: INTERNAL MEDICINE | Admitting: INTERNAL MEDICINE
Payer: MEDICAID

## 2022-01-10 VITALS
RESPIRATION RATE: 18 BRPM | SYSTOLIC BLOOD PRESSURE: 153 MMHG | TEMPERATURE: 98.1 F | WEIGHT: 278 LBS | BODY MASS INDEX: 46.32 KG/M2 | OXYGEN SATURATION: 100 % | HEIGHT: 65 IN | HEART RATE: 88 BPM | DIASTOLIC BLOOD PRESSURE: 91 MMHG

## 2022-01-10 DIAGNOSIS — N17.9 ACUTE KIDNEY FAILURE, UNSPECIFIED (HCC): ICD-10-CM

## 2022-01-10 PROBLEM — R80.9 PROTEINURIA: Status: ACTIVE | Noted: 2022-01-10

## 2022-01-10 LAB
ABO + RH BLD: NORMAL
BLOOD GROUP ANTIBODIES SERPL: NORMAL
ERYTHROCYTE [DISTWIDTH] IN BLOOD BY AUTOMATED COUNT: 14.6 % (ref 11.5–14.5)
GLUCOSE BLD STRIP.AUTO-MCNC: 101 MG/DL (ref 65–117)
GLUCOSE BLD STRIP.AUTO-MCNC: 144 MG/DL (ref 65–117)
HCT VFR BLD AUTO: 26.6 % (ref 35–47)
HGB BLD-MCNC: 8.8 G/DL (ref 11.5–16)
MCH RBC QN AUTO: 28.1 PG (ref 26–34)
MCHC RBC AUTO-ENTMCNC: 33.1 G/DL (ref 30–36.5)
MCV RBC AUTO: 85 FL (ref 80–99)
NRBC # BLD: 0 K/UL (ref 0–0.01)
NRBC BLD-RTO: 0 PER 100 WBC
PLATELET # BLD AUTO: 195 K/UL (ref 150–400)
PMV BLD AUTO: 9.2 FL (ref 8.9–12.9)
RBC # BLD AUTO: 3.13 M/UL (ref 3.8–5.2)
SERVICE CMNT-IMP: ABNORMAL
SERVICE CMNT-IMP: NORMAL
SPECIMEN EXP DATE BLD: NORMAL
WBC # BLD AUTO: 7.6 K/UL (ref 3.6–11)

## 2022-01-10 PROCEDURE — 77030014115

## 2022-01-10 PROCEDURE — 2709999900 HC NON-CHARGEABLE SUPPLY

## 2022-01-10 PROCEDURE — 74011250636 HC RX REV CODE- 250/636: Performed by: STUDENT IN AN ORGANIZED HEALTH CARE EDUCATION/TRAINING PROGRAM

## 2022-01-10 PROCEDURE — 50200 RENAL BIOPSY PERQ: CPT

## 2022-01-10 PROCEDURE — 82962 GLUCOSE BLOOD TEST: CPT

## 2022-01-10 PROCEDURE — 86900 BLOOD TYPING SEROLOGIC ABO: CPT

## 2022-01-10 PROCEDURE — 74011250637 HC RX REV CODE- 250/637: Performed by: INTERNAL MEDICINE

## 2022-01-10 PROCEDURE — 88305 TISSUE EXAM BY PATHOLOGIST: CPT

## 2022-01-10 PROCEDURE — 77030040395 HC NDL BIOP COAX INTRO MRTM -B

## 2022-01-10 PROCEDURE — 77030018781

## 2022-01-10 PROCEDURE — 88313 SPECIAL STAINS GROUP 2: CPT

## 2022-01-10 PROCEDURE — 36415 COLL VENOUS BLD VENIPUNCTURE: CPT

## 2022-01-10 PROCEDURE — 88350 IMFLUOR EA ADDL 1ANTB STN PX: CPT

## 2022-01-10 PROCEDURE — 74011636637 HC RX REV CODE- 636/637: Performed by: INTERNAL MEDICINE

## 2022-01-10 PROCEDURE — 88348 ELECTRON MICROSCOPY DX: CPT

## 2022-01-10 PROCEDURE — 74011250636 HC RX REV CODE- 250/636: Performed by: NURSE PRACTITIONER

## 2022-01-10 PROCEDURE — 77030014006 HC SPNG HEMSTAT J&J -A

## 2022-01-10 PROCEDURE — G0378 HOSPITAL OBSERVATION PER HR: HCPCS

## 2022-01-10 PROCEDURE — 88346 IMFLUOR 1ST 1ANTB STAIN PX: CPT

## 2022-01-10 PROCEDURE — 77030003666 HC NDL SPINAL BD -A

## 2022-01-10 PROCEDURE — 85027 COMPLETE CBC AUTOMATED: CPT

## 2022-01-10 RX ORDER — CLONIDINE HYDROCHLORIDE 0.1 MG/1
0.1 TABLET ORAL
Status: DISCONTINUED | OUTPATIENT
Start: 2022-01-10 | End: 2022-01-11 | Stop reason: HOSPADM

## 2022-01-10 RX ORDER — AMLODIPINE BESYLATE 5 MG/1
10 TABLET ORAL DAILY
Status: DISCONTINUED | OUTPATIENT
Start: 2022-01-11 | End: 2022-01-11 | Stop reason: HOSPADM

## 2022-01-10 RX ORDER — MIDAZOLAM HYDROCHLORIDE 1 MG/ML
5 INJECTION, SOLUTION INTRAMUSCULAR; INTRAVENOUS
Status: DISCONTINUED | OUTPATIENT
Start: 2022-01-10 | End: 2022-01-10

## 2022-01-10 RX ORDER — BUMETANIDE 1 MG/1
2 TABLET ORAL DAILY
Status: DISCONTINUED | OUTPATIENT
Start: 2022-01-11 | End: 2022-01-11 | Stop reason: HOSPADM

## 2022-01-10 RX ORDER — DEXTROSE 50 % IN WATER (D50W) INTRAVENOUS SYRINGE
12.5-25 AS NEEDED
Status: DISCONTINUED | OUTPATIENT
Start: 2022-01-10 | End: 2022-01-11 | Stop reason: HOSPADM

## 2022-01-10 RX ORDER — FENTANYL CITRATE 50 UG/ML
100 INJECTION, SOLUTION INTRAMUSCULAR; INTRAVENOUS
Status: DISCONTINUED | OUTPATIENT
Start: 2022-01-10 | End: 2022-01-10

## 2022-01-10 RX ORDER — AMITRIPTYLINE HYDROCHLORIDE 50 MG/1
50 TABLET, FILM COATED ORAL
Status: DISCONTINUED | OUTPATIENT
Start: 2022-01-10 | End: 2022-01-11 | Stop reason: HOSPADM

## 2022-01-10 RX ORDER — INSULIN GLARGINE 100 [IU]/ML
10 INJECTION, SOLUTION SUBCUTANEOUS
Status: DISCONTINUED | OUTPATIENT
Start: 2022-01-10 | End: 2022-01-11 | Stop reason: HOSPADM

## 2022-01-10 RX ORDER — INSULIN LISPRO 100 [IU]/ML
INJECTION, SOLUTION INTRAVENOUS; SUBCUTANEOUS
Status: DISCONTINUED | OUTPATIENT
Start: 2022-01-10 | End: 2022-01-11 | Stop reason: HOSPADM

## 2022-01-10 RX ORDER — METOPROLOL TARTRATE 25 MG/1
25 TABLET, FILM COATED ORAL 2 TIMES DAILY
Status: DISCONTINUED | OUTPATIENT
Start: 2022-01-10 | End: 2022-01-11 | Stop reason: HOSPADM

## 2022-01-10 RX ORDER — IPRATROPIUM BROMIDE AND ALBUTEROL SULFATE 2.5; .5 MG/3ML; MG/3ML
3 SOLUTION RESPIRATORY (INHALATION)
Status: DISCONTINUED | OUTPATIENT
Start: 2022-01-10 | End: 2022-01-11 | Stop reason: HOSPADM

## 2022-01-10 RX ORDER — HYDRALAZINE HYDROCHLORIDE 20 MG/ML
10-20 INJECTION INTRAMUSCULAR; INTRAVENOUS
Status: DISCONTINUED | OUTPATIENT
Start: 2022-01-10 | End: 2022-01-10

## 2022-01-10 RX ORDER — INSULIN LISPRO 100 [IU]/ML
10 INJECTION, SOLUTION INTRAVENOUS; SUBCUTANEOUS
Status: DISCONTINUED | OUTPATIENT
Start: 2022-01-10 | End: 2022-01-11 | Stop reason: HOSPADM

## 2022-01-10 RX ORDER — SODIUM CHLORIDE 9 MG/ML
25 INJECTION, SOLUTION INTRAVENOUS CONTINUOUS
Status: DISCONTINUED | OUTPATIENT
Start: 2022-01-10 | End: 2022-01-10

## 2022-01-10 RX ORDER — NYSTATIN 100000 [USP'U]/G
POWDER TOPICAL
Status: DISCONTINUED | OUTPATIENT
Start: 2022-01-10 | End: 2022-01-11 | Stop reason: HOSPADM

## 2022-01-10 RX ORDER — GLIPIZIDE 5 MG/1
5 TABLET ORAL DAILY
Status: DISCONTINUED | OUTPATIENT
Start: 2022-01-11 | End: 2022-01-11 | Stop reason: HOSPADM

## 2022-01-10 RX ORDER — LANOLIN ALCOHOL/MO/W.PET/CERES
400 CREAM (GRAM) TOPICAL DAILY
Status: DISCONTINUED | OUTPATIENT
Start: 2022-01-11 | End: 2022-01-11 | Stop reason: HOSPADM

## 2022-01-10 RX ORDER — GABAPENTIN 300 MG/1
600 CAPSULE ORAL 3 TIMES DAILY
Status: DISCONTINUED | OUTPATIENT
Start: 2022-01-10 | End: 2022-01-11 | Stop reason: HOSPADM

## 2022-01-10 RX ORDER — DOCUSATE SODIUM 100 MG/1
100 CAPSULE, LIQUID FILLED ORAL
Status: DISCONTINUED | OUTPATIENT
Start: 2022-01-10 | End: 2022-01-11 | Stop reason: HOSPADM

## 2022-01-10 RX ORDER — MAGNESIUM SULFATE 100 %
4 CRYSTALS MISCELLANEOUS AS NEEDED
Status: DISCONTINUED | OUTPATIENT
Start: 2022-01-10 | End: 2022-01-11 | Stop reason: HOSPADM

## 2022-01-10 RX ORDER — TRAMADOL HYDROCHLORIDE 50 MG/1
50 TABLET ORAL
Status: DISCONTINUED | OUTPATIENT
Start: 2022-01-10 | End: 2022-01-11 | Stop reason: HOSPADM

## 2022-01-10 RX ORDER — FLUTICASONE PROPIONATE 50 MCG
2 SPRAY, SUSPENSION (ML) NASAL DAILY
Status: DISCONTINUED | OUTPATIENT
Start: 2022-01-11 | End: 2022-01-11 | Stop reason: HOSPADM

## 2022-01-10 RX ADMIN — MIDAZOLAM 2 MG: 1 INJECTION INTRAMUSCULAR; INTRAVENOUS at 12:35

## 2022-01-10 RX ADMIN — FENTANYL CITRATE 50 MCG: 50 INJECTION, SOLUTION INTRAMUSCULAR; INTRAVENOUS at 12:35

## 2022-01-10 RX ADMIN — SODIUM CHLORIDE 25 ML/HR: 9 INJECTION, SOLUTION INTRAVENOUS at 11:30

## 2022-01-10 RX ADMIN — Medication 10 UNITS: at 18:18

## 2022-01-10 RX ADMIN — METOPROLOL TARTRATE 25 MG: 25 TABLET, FILM COATED ORAL at 18:18

## 2022-01-10 RX ADMIN — HYDRALAZINE HYDROCHLORIDE 10 MG: 20 INJECTION INTRAMUSCULAR; INTRAVENOUS at 12:28

## 2022-01-10 NOTE — PROGRESS NOTES
End of Shift Note    Bedside shift change report given to Sinai Gonzalez (oncoming nurse) by Kiley De León RN (offgoing nurse). Report included the following information SBAR, Kardex, ED Summary, Procedure Summary, Intake/Output, MAR and Recent Results    Shift worked:  7a-7p     Shift summary and any significant changes:     patient had her kidney biopsy today. No complaints of pain accept some aches in her legs. Concerns for physician to address:  none     Zone phone for oncoming shift:   [phone]       Activity:  Activity Level: Up with Assistance  Number times ambulated in hallways past shift: 0  Number of times OOB to chair past shift: 0    Cardiac:   Cardiac Monitoring: No           Access:   Current line(s): PIV     Genitourinary:   Urinary status: voiding and external catheter    Respiratory:      Chronic home O2 use?: NO  Incentive spirometer at bedside: YES     GI:     Current diet:  ADULT DIET Regular; 4 carb choices (60 gm/meal)  Passing flatus: YES  Tolerating current diet: YES       Pain Management:   Patient states pain is manageable on current regimen: YES    Skin:  Guillermo Score: 18  Interventions: increase time out of bed and internal/external urinary devices    Patient Safety:  Fall Score:  Total Score: 4  Interventions: bed/chair alarm, assistive device (walker, cane, etc), gripper socks, pt to call before getting OOB and stay with me (per policy)  High Fall Risk: Yes    Length of Stay:  Expected LOS: - - -  Actual LOS: 0      Kiley De León RN

## 2022-01-10 NOTE — ROUTINE PROCESS
Patient arrived via wheelchair to IR for renal biopsy. A&O x 4. Patient assisted from wheelchair to stretcher.

## 2022-01-10 NOTE — PROGRESS NOTES
Problem: Pressure Injury - Risk of  Goal: *Prevention of pressure injury  Description: Document Guillermo Scale and appropriate interventions in the flowsheet. Outcome: Progressing Towards Goal  Note: Pressure Injury Interventions:       Moisture Interventions: Absorbent underpads    Activity Interventions: Increase time out of bed    Mobility Interventions: HOB 30 degrees or less    Nutrition Interventions: Offer support with meals,snacks and hydration                     Problem: Patient Education: Go to Patient Education Activity  Goal: Patient/Family Education  Outcome: Progressing Towards Goal     Problem: Falls - Risk of  Goal: *Absence of Falls  Description: Document Rafi Fall Risk and appropriate interventions in the flowsheet.   Outcome: Progressing Towards Goal  Note: Fall Risk Interventions:  Mobility Interventions: Communicate number of staff needed for ambulation/transfer,Bed/chair exit alarm         Medication Interventions: Patient to call before getting OOB,Teach patient to arise slowly    Elimination Interventions: Bed/chair exit alarm,Call light in reach,Patient to call for help with toileting needs    History of Falls Interventions: Bed/chair exit alarm         Problem: Patient Education: Go to Patient Education Activity  Goal: Patient/Family Education  Outcome: Progressing Towards Goal

## 2022-01-10 NOTE — H&P
H+P Note    NAME: Na Page   :  1965   MRN:  [id number]     Date/Time:  1/10/2022 5:31 PM     Assessment :    Plan:  Nephrotic range proteinuria  Longstanding DM  HTN   S/P renal biopsy. Strict bedrest 6 hours. Serial H/H. Follow vitals. Renal diet. DC in AM if stable. DW pt and RN. Subjective:   CHIEF COMPLAINT:  \"I feel OK. \"    HISTORY OF PRESENT ILLNESS:     Amanda Ferrell is a 64 y.o.   female who has a history of DM and HTN in obs S/P renal biopsy. She says that she tolerated the biopsy well. She has little flank pain. Ne hematuria. No N/V. Past Medical History:   Diagnosis Date    Arthritis     Asthma     [facility]     Chronic back pain     Diabetes [facility] [facility]     Diabetic retinopathy ([facility] [facility]     Hypertension     MRSA (methicillin resistant staph aureus) culture positive     labial abscess    Neuropathy       Past Surgical History:   Procedure Laterality Date    HX HEENT      tonsillectomy    HX ORTHOPAEDIC      left ft bunionectomy    HX OTHER SURGICAL      lanced labial abscess     Social History     Tobacco Use    Smoking status: Never Smoker    Smokeless tobacco: Never Used   Substance Use Topics    Alcohol use: No      No family history on file. Allergies   Allergen Reactions    Amoxicillin Nausea Only    Aspirin Other (comments)     \"nosebleeds\" but patient takes daily aspirin 81 mg at home. Patient states naproxen ok    Ciprofloxacin Hives      Prior to Admission medications    Medication Sig Start Date End Date Taking? Authorizing Provider   bumetanide (BUMEX) 2 mg tablet Take 1 Tablet by mouth daily. 22  Yes Skye Bruce MD   traMADoL Sarah Mas) 50 mg tablet Take 50 mg by mouth three (3) times daily as needed for Pain. Yes Provider, Historical   insulin aspart U-100 (NOVOLOG) 100 unit/mL (3 mL) inpn by SubCUTAneous route Before breakfast, lunch, and dinner.  Sliding scale   Yes Provider, Historical   nystatin (MYCOSTATIN) powder Apply  to affected area two (2) times daily as needed. Yes Provider, Historical   magnesium oxide (MAG-OX) 400 mg tablet Take 400 mg by mouth daily. Yes Provider, Historical   Flovent  mcg/actuation inhaler Take 2 Puffs by inhalation two (2) times a day. 8/2/21  Yes Provider, Historical   albuterol (PROVENTIL HFA, VENTOLIN HFA, PROAIR HFA) 90 mcg/actuation inhaler Take 2 Puffs by inhalation every six (6) hours as needed for Wheezing. Yes Provider, Historical   Lantus Solostar U-100 Insulin 100 unit/mL (3 mL) inpn 20 Units by SubCUTAneous route nightly. 6/8/21  Yes Provider, Historical   fluticasone propionate (Flonase Allergy Relief) 50 mcg/actuation nasal spray 2 SPRAY, Each Nostril, daily, for allergy symptoms, 11 Refills, Dispense: 1, bottle, Pharmacy [facility] [phone] 10/24/20  Yes Provider, Historical   aspirin delayed-release 81 mg tablet Take 81 mg by mouth daily. Yes Provider, Historical   amitriptyline (ELAVIL) 50 mg tablet Take 50 mg by mouth nightly. Yes Provider, Historical   docusate sodium (Colace) 100 mg capsule Take 100 mg by mouth two (2) times daily as needed. Yes Provider, Historical   clopidogrel (PLAVIX) 75 mg tab Take 1 Tab by mouth daily. 12/23/18  Yes Sana López,    gabapentin (NEURONTIN) 300 mg capsule Take 2 Capsules by mouth three (3) times daily. Max Daily Amount: 1,800 mg. 12/13/21   Juma Sher MD   torsemide (DEMADEX) 20 mg tablet Take 2 Tablets by mouth two (2) times a day for 30 days. Patient not taking: Reported on 1/10/2022 12/12/21 1/11/22  Juma Sher MD   metoprolol tartrate (LOPRESSOR) 25 mg tablet Take 25 mg by mouth two (2) times a day. Provider, Historical   insulin aspart U-100 (NovoLOG Flexpen U-100 Insulin) 100 unit/mL (3 mL) inpn 10 Units by SubCUTAneous route Before breakfast, lunch, and dinner. Provider, Historical   glipiZIDE (GLUCOTROL) 5 mg tablet Take 5 mg by mouth daily.  Take One Tablet By Mouth Daily    Provider, Historical   canagliflozin (INVOKANA) 100 mg tablet Take 100 mg by mouth daily. Take one tablet by mouth daily    Provider, Historical   amLODIPine (NORVASC) 10 mg tablet Take 10 mg by mouth daily. Provider, Historical   atorvastatin (LIPITOR) 80 mg tablet Take 1 Tab by mouth nightly. 12/22/18   Dario Mcclure DO     REVIEW OF SYSTEMS:     []  Unable to obtain reliable ROS due to  [] mental status  [] sedated   [] intubated   [x] Total of 12 systems reviewed as follows:  Constitutional: negative fever, negative chills, negative weight loss  Eyes:   negative visual changes  ENT:   negative sore throat, tongue or lip swelling  Respiratory:  negative cough, negative dyspnea  Cards:  negative for chest pain, palpitations, [pos lower extremity edema  GI:   negative for nausea, vomiting, diarrhea, and abdominal pain  :  negative for frequency, dysuria  Integument:  negative for rash and pruritus  Heme:  negative for easy bruising and gum/nose bleeding  Musculoskel: negative for myalgias,  back pain and muscle weakness  Neuro:  negative for headaches, dizziness, vertigo  Psych:  negative for feelings of anxiety, depression   Travel?: none    Objective:   VITALS:    There were no vitals taken for this visit.   PHYSICAL EXAM:  Gen:  []  WD []  WN  [] cachectic []  thin [x]  obese []  disheveled             []  ill apearing  []   Critical  []   Chronic    [x]  No acute distress    HEENT:   [x] NC/AT/PERRL    [x] pink conjunctivae      [] pale conjunctivae                  PERRL  [] yes  [] no      [] moist mucosa    [] dry mucosa    hearing intact to voice [] yes  [] No                 NECK:   supple [x] yes  [] no        masses [] yes  [] No               thyroid  []  non tender  []  tender    RESP:   [x] CTA bilaterally/no wheezing/rhonchi/rales/crackles    [] rhonchi bilaterally - no dullness  [] wheezing   [] rhonchi   [] crackles     use of accessory muscles [] yes [] no    CARD:   [x]  regular rate and rhythm/No murmurs/rubs/gallops    murmur  [] yes ()  [] no      Rubs  [] yes  [] no       Gallops [] yes  [] no    Rate []  regular  []  irregular        carotid bruits  [] Right  []  Left                 LE edema [x] yes  [] no           JVP  []  yes   []  no    ABD:    [x] soft/non distended/non tender/+bowel sounds/no HSM    []  Rigid    tenderness [] yes [] no   Liver enlargement  []  yes []  no                Spleen enlargement  []  yes []  no     distended []  yes [] no     bowel sound  [] hypoactive   [] hyperactive    LYMPH:    Neck []  yes [x]  no       Axillae []  yes [x]  no    SKIN:   Rashes []  yes   [x]  no    Ulcers []  yes   [x]  no               [] tight to palpitation    skin turgor []  good  [] poor  [] decreased               Cyanosis/clubbing []  yes []  no    NEUR:   [x] cranial nerves II-XII grossly intact       [] Cranial nerves deficit                 []  facial droop    []  slurred speech   [] aphasic     [] Strength normal     []  weakness  []  LUE  []   RUE/ []  LLE  []   RLE    follows commands  [x]  yes []  no           PSYCH:   insight [] poor [x] good   Alert and Oriented to  [x] person  [x] place  [x]  time                    [] depressed [] anxious [] agitated  [] lethargic [] stuporous  [] sedated     LAB DATA REVIEWED:    Recent Labs     01/10/22  1135   WBC 7.6   HGB 8.8*   HCT 26.6*        No results for input(s): NA, K, CL, CO2, BUN, CREA, GLU, CA, MG, PHOS, URICA in the last 72 hours. No results for input(s): ALT, AP, TBIL, TBILI, ALB, GLOB, GGT, AML, LPSE in the last 72 hours. No lab exists for component: SGOT, GPT, AMYP, HLPSE  No results for input(s): INR, PTP, APTT, INREXT in the last 72 hours. No results for input(s): FE, TIBC, PSAT, FERR in the last 72 hours. No results for input(s): PH, PCO2, PO2 in the last 72 hours. No results for input(s): CPK, CKMB in the last 72 hours.     No lab exists for component: TROPONINI  Lab Results   Component Value Date/Time Glucose (POC) 235 (H) 12/13/2021 08:38 AM    Glucose (POC) 204 (H) 12/12/2021 07:56 PM    Glucose (POC) 183 (H) 12/12/2021 04:24 PM    Glucose (POC) 232 (H) 12/12/2021 11:15 AM    Glucose (POC) 195 (H) 12/12/2021 07:15 AM       Procedures: see electronic medical records for all procedures/Xrays and details which were not copied into this note but were reviewed prior to creation of Plan.    ________________________________________________________________________       ___________________________________________________  Consulting Physician: Reina Turcios MD

## 2022-01-10 NOTE — PROGRESS NOTES
TRANSFER - OUT REPORT:    Verbal report given to Josee RN (name) on Didier Ca  being transferred to Surg Tele Rm 3233 (unit) for routine progression of care       Report consisted of patients Situation, Background, Assessment and   Recommendations(SBAR). Information from the following report(s) SBAR, Procedure Summary and MAR was reviewed with the receiving nurse. Opportunity for questions and clarification was provided.       Patient transported with:  Consent and labels

## 2022-01-10 NOTE — H&P
INTERVENTIONAL RADIOLOGY  Preoperative History and Physical      Patient:  Ghanshyam Mccoy  :  1965  Age:  64 y.o. MRN:  [id number]  Today's Date:  1/10/2022      CC / HPI   Ghanshyam Mccoy is a 64 y.o. female with a history of worsening renal failure who presents for core renal biopsy. PAST MEDICAL HISTORY  Past Medical History:   Diagnosis Date    Arthritis     Asthma     [facility]     Chronic back pain     [facility] [address]     Diabetic retinopathy ([facility] [address] [facility]     Hypertension     MRSA (methicillin resistant staph aureus) culture positive     labial abscess    Neuropathy      PAST SURGICAL HISTORY  Past Surgical History:   Procedure Laterality Date    HX HEENT      tonsillectomy    HX ORTHOPAEDIC      left ft bunionectomy    HX OTHER SURGICAL      lanced labial abscess     SOCIAL HISTORY  Social History     Socioeconomic History    Marital status:      Spouse name: Not on file    Number of children: Not on file    Years of education: Not on file    Highest education level: Not on file   Occupational History    Not on file   Tobacco Use    Smoking status: Never Smoker    Smokeless tobacco: Never Used   Substance and Sexual Activity    Alcohol use: No    Drug use: No    Sexual activity: Not on file   Other Topics Concern    Not on file   Social History Narrative    Not on file     Social Determinants of Health     Financial Resource Strain:     Difficulty of Paying Living Expenses: Not on file   Food Insecurity:     Worried About Running Out of Food in the Last Year: Not on file    Tena of Food in the Last Year: Not on file   Transportation Needs:     Lack of Transportation (Medical): Not on file    Lack of Transportation (Non-Medical):  Not on file   Physical Activity:     Days of Exercise per Week: Not on file    Minutes of Exercise per Session: Not on file   Stress:     Feeling of Stress : Not on file   Social Connections:     Frequency of Communication with Friends and Family: Not on file    Frequency of Social Gatherings with Friends and Family: Not on file    Attends Sabianism Services: Not on file    Active Member of Clubs or Organizations: Not on file    Attends Club or Organization Meetings: Not on file    Marital Status: Not on file   Intimate Partner Violence:     Fear of Current or Ex-Partner: Not on file    Emotionally Abused: Not on file    Physically Abused: Not on file    Sexually Abused: Not on file   Housing Stability:     Unable to Pay for Housing in the Last Year: Not on file    Number of [place] in the Last Year: Not on file    Unstable Housing in the Last Year: Not on file     FAMILY HISTORY  No family history on file. CURRENT MEDICATIONS  Current Outpatient Medications   Medication Sig Dispense Refill    bumetanide (BUMEX) 2 mg tablet Take 1 Tablet by mouth daily. 10 Tablet 0    gabapentin (NEURONTIN) 300 mg capsule Take 2 Capsules by mouth three (3) times daily. Max Daily Amount: 1,800 mg. 30 Capsule 0    torsemide (DEMADEX) 20 mg tablet Take 2 Tablets by mouth two (2) times a day for 30 days. 120 Tablet 0    traMADoL (ULTRAM) 50 mg tablet Take 50 mg by mouth three (3) times daily as needed for Pain.  metoprolol tartrate (LOPRESSOR) 25 mg tablet Take 25 mg by mouth two (2) times a day.  insulin aspart U-100 (NovoLOG Flexpen U-100 Insulin) 100 unit/mL (3 mL) inpn 10 Units by SubCUTAneous route Before breakfast, lunch, and dinner.  insulin aspart U-100 (NOVOLOG) 100 unit/mL (3 mL) inpn by SubCUTAneous route Before breakfast, lunch, and dinner. Sliding scale      nystatin (MYCOSTATIN) powder Apply  to affected area two (2) times daily as needed.  glipiZIDE (GLUCOTROL) 5 mg tablet Take 5 mg by mouth daily. Take One Tablet By Mouth Daily      magnesium oxide (MAG-OX) 400 mg tablet Take 400 mg by mouth daily.  canagliflozin (INVOKANA) 100 mg tablet Take 100 mg by mouth daily.  Take one tablet by mouth daily      amLODIPine (NORVASC) 10 mg tablet Take 10 mg by mouth daily.  Flovent  mcg/actuation inhaler Take 2 Puffs by inhalation two (2) times a day.  albuterol (PROVENTIL HFA, VENTOLIN HFA, PROAIR HFA) 90 mcg/actuation inhaler Take 2 Puffs by inhalation every six (6) hours as needed for Wheezing.  Lantus Solostar U-100 Insulin 100 unit/mL (3 mL) inpn 20 Units by SubCUTAneous route nightly.  fluticasone propionate (Flonase Allergy Relief) 50 mcg/actuation nasal spray 2 SPRAY, Each Nostril, daily, for allergy symptoms, 11 Refills, Dispense: 1, bottle, [facility]      aspirin delayed-release 81 mg tablet Take 81 mg by mouth daily.  amitriptyline (ELAVIL) 50 mg tablet Take 50 mg by mouth nightly.  docusate sodium (Colace) 100 mg capsule Take 100 mg by mouth two (2) times daily as needed.  atorvastatin (LIPITOR) 80 mg tablet Take 1 Tab by mouth nightly. 30 Tab 0    clopidogrel (PLAVIX) 75 mg tab Take 1 Tab by mouth daily. 30 Tab 0     ALLERGIES  Allergies   Allergen Reactions    Amoxicillin Nausea Only    Aspirin Other (comments)     \"nosebleeds\" but patient takes daily aspirin 81 mg at home. Patient states naproxen ok    Ciprofloxacin Hives     DIAGNOSTIC STUDIES   IMAGING STUDIES  I personally reviewed the following image studies:    CT Results (most recent):  Results from Hospital Encounter encounter on 12/01/21    CT ABD PELV WO CONT    Narrative  EXAM: CT ABD PELV WO CONT    INDICATION: Abdominal pain and vomiting for 3 days. COMPARISON: CT abdomen/pelvis on 3/9/2017 and 8/16/2020. CONTRAST:  None. TECHNIQUE:  Thin axial images were obtained through the abdomen and pelvis. Coronal and  sagittal reformats were generated. Oral contrast was not administered. CT dose  reduction was achieved through use of a standardized protocol tailored for this  examination and automatic exposure control for dose modulation.     The absence of intravenous contrast material reduces the sensitivity for  evaluation of the vasculature and solid organs. FINDINGS:  LOWER THORAX: Small bilateral pleural effusions are decreased since last year. No evidence of basilar pneumonia. Small pericardial effusion is increased. LIVER: Limited evaluation, smooth surface. BILIARY TREE: Gallbladder is within normal limits. CBD is not dilated. SPLEEN: within normal limits. PANCREAS: No inflammation. ADRENALS: Unremarkable. KIDNEYS/URETERS: No calculus or hydronephrosis. STOMACH: Incomplete distention, limited evaluation. SMALL BOWEL: No dilatation or wall thickening. COLON: No dilatation or wall thickening. APPENDIX: Normal.  PERITONEUM: No ascites or pneumoperitoneum. RETROPERITONEUM: No lymphadenopathy or aortic aneurysm. REPRODUCTIVE ORGANS: Uterus is not enlarged. URINARY BLADDER: Gas in the lumen most likely indicates recent catheterization. BONES: Osteopenia. No suspicious bone lesion. ABDOMINAL WALL: Patchy edema in the subcutaneous adipose tissues is similar to  2020. No drainable fluid collection. ADDITIONAL COMMENTS: Obesity. Impression  1. Fluid overload versus hypoproteinemia, similar to 2020.  2. Decreased small bilateral pleural effusions. Increased small pericardial  effusion. 3. No nephrolithiasis or hydronephrosis. 4. No bowel obstruction.     LABS  Lab Results   Component Value Date/Time    WBC 7.6 01/10/2022 11:35 AM    Hemoglobin (POC) 13.9 04/20/2013 06:08 PM    HGB 8.8 (L) 01/10/2022 11:35 AM    Hematocrit (POC) 41 04/20/2013 06:08 PM    HCT 26.6 (L) 01/10/2022 11:35 AM    PLATELET 477 74/97/8308 11:35 AM    MCV 85.0 01/10/2022 11:35 AM     Lab Results   Component Value Date/Time    Sodium 141 01/05/2022 04:27 PM    Potassium 4.1 01/05/2022 04:27 PM    Chloride 112 (H) 01/05/2022 04:27 PM    CO2 21 01/05/2022 04:27 PM    Anion gap 8 01/05/2022 04:27 PM    Glucose 142 (H) 01/05/2022 04:27 PM    BUN 34 (H) 01/05/2022 04:27 PM Creatinine 3.85 (H) 01/05/2022 04:27 PM    BUN/Creatinine ratio 9 (L) 01/05/2022 04:27 PM    GFR est AA 15 (L) 01/05/2022 04:27 PM    GFR est non-AA 12 (L) 01/05/2022 04:27 PM    Calcium 9.0 01/05/2022 04:27 PM     Lab Results   Component Value Date/Time    INR 1.1 08/10/2021 06:07 AM    Prothrombin time 10.8 08/10/2021 06:07 AM     PHYSICAL EXAM   BP (!) 155/67 (BP 1 Location: Left upper arm, BP Patient Position: Supine)   Pulse 89   Temp 98 °F (36.7 °C)   Resp 18   Ht 5' 5\" (1.651 m)   Wt 126.1 kg (278 lb)   LMP 05/01/2013   SpO2 99%   BMI 46.26 kg/m²   General:  NAD  Heart:  RRR  Lungs:  NWOB  Neurological:  AAOX3    PLAN   Procedure to be performed:  CT guided core renal biopsy  Plan for sedation:  moderate  Post procedure plan:  observation per protocol  Informed consent:  risks, benefits, and alternatives reviewed with the patient / family who agree to proceed      Bernardo Bacon NP  [facility]

## 2022-01-10 NOTE — PROGRESS NOTES
We do not use Invokana inpatient. Patient has scheduled insulin and sliding scale ordered. Will DC per protocol.

## 2022-01-10 NOTE — PROGRESS NOTES
Navin Moura NP at bedside to obtain consent for renal biopsy. Procedure explained with opportunity to ask questions. Patient states understanding of procedure prior to signing consent.

## 2022-01-10 NOTE — PROGRESS NOTES
Message left with Lana at Dr. Ludmila Camejo office to notify MD patient present for renal biopsy and will need to enter observation orders.

## 2022-01-11 VITALS
OXYGEN SATURATION: 97 % | TEMPERATURE: 97.2 F | HEART RATE: 76 BPM | SYSTOLIC BLOOD PRESSURE: 140 MMHG | DIASTOLIC BLOOD PRESSURE: 62 MMHG | RESPIRATION RATE: 16 BRPM

## 2022-01-11 LAB
ERYTHROCYTE [DISTWIDTH] IN BLOOD BY AUTOMATED COUNT: 14.6 % (ref 11.5–14.5)
GLUCOSE BLD STRIP.AUTO-MCNC: 118 MG/DL (ref 65–117)
HCT VFR BLD AUTO: 25.6 % (ref 35–47)
HGB BLD-MCNC: 8.5 G/DL (ref 11.5–16)
MCH RBC QN AUTO: 28.5 PG (ref 26–34)
MCHC RBC AUTO-ENTMCNC: 33.2 G/DL (ref 30–36.5)
MCV RBC AUTO: 85.9 FL (ref 80–99)
NRBC # BLD: 0 K/UL (ref 0–0.01)
NRBC BLD-RTO: 0 PER 100 WBC
PLATELET # BLD AUTO: 172 K/UL (ref 150–400)
PMV BLD AUTO: 9.1 FL (ref 8.9–12.9)
RBC # BLD AUTO: 2.98 M/UL (ref 3.8–5.2)
SERVICE CMNT-IMP: ABNORMAL
WBC # BLD AUTO: 8.4 K/UL (ref 3.6–11)

## 2022-01-11 PROCEDURE — 74011636637 HC RX REV CODE- 636/637: Performed by: INTERNAL MEDICINE

## 2022-01-11 PROCEDURE — 74011250637 HC RX REV CODE- 250/637: Performed by: INTERNAL MEDICINE

## 2022-01-11 PROCEDURE — G0378 HOSPITAL OBSERVATION PER HR: HCPCS

## 2022-01-11 PROCEDURE — 85027 COMPLETE CBC AUTOMATED: CPT

## 2022-01-11 PROCEDURE — 36415 COLL VENOUS BLD VENIPUNCTURE: CPT

## 2022-01-11 PROCEDURE — 82962 GLUCOSE BLOOD TEST: CPT

## 2022-01-11 RX ORDER — MAGNESIUM SULFATE 100 %
4 CRYSTALS MISCELLANEOUS AS NEEDED
Status: DISCONTINUED | OUTPATIENT
Start: 2022-01-11 | End: 2022-01-11 | Stop reason: HOSPADM

## 2022-01-11 RX ORDER — DEXTROSE 50 % IN WATER (D50W) INTRAVENOUS SYRINGE
12.5-25 AS NEEDED
Status: DISCONTINUED | OUTPATIENT
Start: 2022-01-11 | End: 2022-01-11 | Stop reason: HOSPADM

## 2022-01-11 RX ORDER — INSULIN LISPRO 100 [IU]/ML
INJECTION, SOLUTION INTRAVENOUS; SUBCUTANEOUS
Qty: 1 EACH | Refills: 1 | Status: SHIPPED
Start: 2022-01-11 | End: 2022-04-08

## 2022-01-11 RX ORDER — CLONIDINE HYDROCHLORIDE 0.1 MG/1
0.1 TABLET ORAL
Status: DISCONTINUED | OUTPATIENT
Start: 2022-01-11 | End: 2022-01-11 | Stop reason: HOSPADM

## 2022-01-11 RX ORDER — INSULIN LISPRO 100 [IU]/ML
INJECTION, SOLUTION INTRAVENOUS; SUBCUTANEOUS
Status: DISCONTINUED | OUTPATIENT
Start: 2022-01-11 | End: 2022-01-11 | Stop reason: HOSPADM

## 2022-01-11 RX ADMIN — AMLODIPINE BESYLATE 10 MG: 5 TABLET ORAL at 09:12

## 2022-01-11 RX ADMIN — GABAPENTIN 600 MG: 300 CAPSULE ORAL at 09:12

## 2022-01-11 RX ADMIN — BUMETANIDE 2 MG: 1 TABLET ORAL at 09:12

## 2022-01-11 RX ADMIN — FLUTICASONE PROPIONATE 2 SPRAY: 50 SPRAY, METERED NASAL at 09:14

## 2022-01-11 RX ADMIN — Medication 400 MG: at 09:12

## 2022-01-11 RX ADMIN — GABAPENTIN 600 MG: 300 CAPSULE ORAL at 06:16

## 2022-01-11 RX ADMIN — AMITRIPTYLINE HYDROCHLORIDE 50 MG: 50 TABLET, FILM COATED ORAL at 06:16

## 2022-01-11 RX ADMIN — GLIPIZIDE 5 MG: 5 TABLET ORAL at 09:12

## 2022-01-11 RX ADMIN — METOPROLOL TARTRATE 25 MG: 25 TABLET, FILM COATED ORAL at 09:12

## 2022-01-11 RX ADMIN — INSULIN GLARGINE 10 UNITS: 100 INJECTION, SOLUTION SUBCUTANEOUS at 06:16

## 2022-01-11 NOTE — PROGRESS NOTES
Problem: Pressure Injury - Risk of  Goal: *Prevention of pressure injury  Description: Document Guillermo Scale and appropriate interventions in the flowsheet. 1/11/2022 1135 by Eros Lazaro RN  Outcome: Resolved/Met  Note: Pressure Injury Interventions:       Moisture Interventions: Absorbent underpads    Activity Interventions: Increase time out of bed    Mobility Interventions: HOB 30 degrees or less    Nutrition Interventions: Offer support with meals,snacks and hydration                  1/11/2022 1049 by Eros Lazaro RN  Outcome: Progressing Towards Goal  Note: Pressure Injury Interventions:       Moisture Interventions: Absorbent underpads    Activity Interventions: Increase time out of bed    Mobility Interventions: HOB 30 degrees or less    Nutrition Interventions: Document food/fluid/supplement intake                     Problem: Patient Education: Go to Patient Education Activity  Goal: Patient/Family Education  1/11/2022 1135 by Eros Lazaro RN  Outcome: Resolved/Met  1/11/2022 1049 by Eros Lazaro RN  Outcome: Progressing Towards Goal     Problem: Falls - Risk of  Goal: *Absence of Falls  Description: Document Marcelina Nap Fall Risk and appropriate interventions in the flowsheet.   1/11/2022 1135 by Eros Lazaro RN  Outcome: Resolved/Met  Note: Fall Risk Interventions:  Mobility Interventions: Patient to call before getting OOB         Medication Interventions: Patient to call before getting OOB,Teach patient to arise slowly    Elimination Interventions: Call light in reach,Patient to call for help with toileting needs    History of Falls Interventions: Room close to nurse's station      1/11/2022 1049 by Eros Lazaro RN  Outcome: Progressing Towards Goal  Note: Fall Risk Interventions:  Mobility Interventions: Patient to call before getting OOB,Communicate number of staff needed for ambulation/transfer         Medication Interventions: Patient to call before getting OOB,Teach patient to arise slowly    Elimination Interventions: Call light in reach,Patient to call for help with toileting needs    History of Falls Interventions: Room close to nurse's station         Problem: Patient Education: Go to Patient Education Activity  Goal: Patient/Family Education  1/11/2022 1135 by Danielle Ziegler, RN  Outcome: Resolved/Met  1/11/2022 1049 by Danielle Ziegler, RN  Outcome: Progressing Towards Goal

## 2022-01-11 NOTE — PROGRESS NOTES
End of Shift Note    Bedside shift change report given to Abdi Kemp RN (oncoming nurse) by Al Perez RN (offgoing nurse). Report included the following information SBAR, Kardex, Intake/Output and MAR    Shift worked:  7p-7a     Shift summary and any significant changes:    Pt ambulated to the bathroom with walker and assistance once this shift   Concerns for physician to address:  None     Zone phone for oncoming shift:   [phone]       Activity:  Activity Level: Up with Assistance  Number times ambulated in hallways past shift: 0  Number of times OOB to chair past shift: 0    Cardiac:   Cardiac Monitoring: No           Access:   Current line(s): PIV     Genitourinary:   Urinary status: external catheter    Respiratory:   O2 Device: None (Room air)  Chronic home O2 use?:   Incentive spirometer at bedside:      GI:     Current diet:  DIET ONE TIME MESSAGE  ADULT DIET Regular; 4 carb choices (60 gm/meal)  DIET ONE TIME MESSAGE  Passing flatus: Tolerating current diet: YES       Pain Management:   Patient states pain is manageable on current regimen: YES    Skin:  Guillermo Score: 18  Interventions: increase time out of bed    Patient Safety:  Fall Score:  Total Score: 4  Interventions: assistive device (walker, cane, etc), gripper socks and pt to call before getting OOB  High Fall Risk: Yes    Length of Stay:  Expected LOS: - - -  Actual LOS: 0      Al Perez RN

## 2022-01-11 NOTE — DISCHARGE INSTRUCTIONS
Patient Education        Needle Biopsy of the Kidney: What to Expect at Home  Your Recovery     A kidney biopsy is a test to take a sample (biopsy) of kidney. The doctor puts a long needle through your back (flank) into the kidney. Another doctor will look at the kidney tissue with a microscope to check for problems. After the test, you will be told to lie down on your back for several hours. After this, you should avoid strenuous activity for the next 2 to 3 days. It's normal to feel some soreness in the area of the biopsy for 2 to 3 days. You may have a small amount of bleeding on the bandage after the test. You may notice some blood in your urine after the test. This should go away within 12 to 24 hours. If it doesn't, call your doctor. This care sheet gives you a general idea about how long it will take for you to recover. But each person recovers at a different pace. Follow the steps below to feel better as quickly as possible. How can you care for yourself at home? Activity    · Avoid lifting anything that would make you strain. This may include heavy grocery bags and milk containers, a heavy briefcase or backpack, cat litter or dog food bags, a vacuum , or a child.     · Avoid strenuous activities, such as bicycle riding, jogging, weight lifting, or aerobic exercise, until your doctor says it is okay.     · Try to walk each day. Start by walking a little more than you did the day before. Bit by bit, increase the amount you walk. Walking boosts blood flow and helps prevent pneumonia and constipation.     · Rest when you feel tired. Getting enough sleep will help you recover.     · Ask your doctor when it is okay for you to have sex. Diet    · You can eat your normal diet. If your stomach is upset, try bland, low-fat foods like plain rice, broiled chicken, toast, and yogurt.     · Drink plenty of fluids to avoid becoming dehydrated.    Medicines    · Your doctor will tell you if and when you can restart your medicines. He or she will also give you instructions about taking any new medicines.     · If you take aspirin or some other blood thinner, ask your doctor if and when to start taking it again. Make sure that you understand exactly what your doctor wants you to do.     · Do not take aspirin or anti-inflammatory medicines for a week after the biopsy. Incision care    · Keep a bandage over the puncture site for the first 1 or 2 days. Follow-up care is a key part of your treatment and safety. Be sure to make and go to all appointments, and call your doctor if you are having problems. It's also a good idea to know your test results and keep a list of the medicines you take. When should you call for help? Call 911 anytime you think you may need emergency care. For example, call if:    · You passed out (lost consciousness). Call your doctor now or seek immediate medical care if:    · You have signs of infection, such as:  ? Increased pain, swelling, warmth, or redness. ? Red streaks leading from the puncture site. ? Pus draining from the puncture site. ? A fever.     · Bright red blood has soaked through the bandage over the puncture site.     · You have new or worse pain at the puncture site. Watch closely for any changes in your health, and call your doctor if:    · You have blood in your urine for more than 1 day. Where can you learn more? Go to http://www.gray.com/  Enter S675 in the search box to learn more about \"Needle Biopsy of the Kidney: What to Expect at Home. \"  Current as of: December 17, 2020               Content Version: 13.0  © 2006-2021 [employer]. Care instructions adapted under license by [employer] (which disclaims liability or warranty for this information).  If you have questions about a medical condition or this instruction, always ask your healthcare professional. Tosin Fulton disclaims any warranty or liability for your use of this information.

## 2022-01-11 NOTE — PROGRESS NOTES
DISCHARGE NOTE FROM [facility] NURSE    Patient determined to be stable for discharge by attending provider. I have reviewed the discharge instructions and follow-up appointments with the patient. They verbalized understanding and all questions were answered to their satisfaction. No complaints or further questions were expressed. No new medication scripts. Appropriate educational materials and medication side effect teaching were provided. PIV were removed prior to discharge. Patient did not discharge with any line, whittington, or drain. All personal items collected during admission were returned to the patient prior to discharge. Post-op patient: No     Patient left with her son to go home.       Linn Rosado RN

## 2022-01-11 NOTE — PROGRESS NOTES
Oral and Written notification given to patient and/or caregiver informing patient of current Observation status receiving care in our facility. Copied placed on bedside chart.     Ok Candelario, Care Management Assistant

## 2022-01-11 NOTE — PROGRESS NOTES
Problem: Pressure Injury - Risk of  Goal: *Prevention of pressure injury  Description: Document Guillermo Scale and appropriate interventions in the flowsheet. Outcome: Progressing Towards Goal  Note: Pressure Injury Interventions:       Moisture Interventions: Absorbent underpads    Activity Interventions: Increase time out of bed    Mobility Interventions: HOB 30 degrees or less    Nutrition Interventions: Document food/fluid/supplement intake                     Problem: Patient Education: Go to Patient Education Activity  Goal: Patient/Family Education  Outcome: Progressing Towards Goal     Problem: Falls - Risk of  Goal: *Absence of Falls  Description: Document Rafi Fall Risk and appropriate interventions in the flowsheet.   Outcome: Progressing Towards Goal  Note: Fall Risk Interventions:  Mobility Interventions: Patient to call before getting OOB,Communicate number of staff needed for ambulation/transfer         Medication Interventions: Patient to call before getting OOB,Teach patient to arise slowly    Elimination Interventions: Call light in reach,Patient to call for help with toileting needs    History of Falls Interventions: Room close to nurse's station         Problem: Patient Education: Go to Patient Education Activity  Goal: Patient/Family Education  Outcome: Progressing Towards Goal

## 2022-01-11 NOTE — DISCHARGE SUMMARY
Discharge Summary    NAME: Constantino Childers   :  1965   MRN:  [id number]     DISCHARGE DIAGNOSIS:  proteinuria    CONSULTATIONS:  None    Follow Up: Follow-up Information     Follow up With Specialties Details Why Contact Info    Aden Christine MD Family Medicine   [address] [place] [address]  [phone]            Procedures: see electronic medical records for all procedures/Xrays and details which were not copied into this note but were reviewed prior to creation of Plan. Please follow-up tests/labs that are still pendin. Renal biopsy    PMH/SH reviewed - no change compared to H&P    DISCHARGE SUMMARY/HOSPITAL COURSE: for full details see H&P, daily progress notes, labs, consult notes. Briefly As Per HPI:   Pt in obs for renal biopsy. Tolerated well. SBP stable. .  H/H stable. The patient's hospital course was complicated by:  nothing    _________________________________________   Patient seen and examined by me on day of discharge. Pertinent findings are:  Gen:   NAD  HEENT:NCAT  Chest:CTA  Cv:RRR  Abd:ND/NT. No flank pain. Neuro:nonfocal.    See Discharge Instructions for further details. _______________________________________________________________________    Medications Reviewed:  Current Discharge Medication List      START taking these medications    Details   insulin lispro (HUMALOG) 100 unit/mL injection Fast Acting - Administer Immediately - or within 15 minutes of start of meal, if mealtime coverage. Qty: 1 Each, Refills: 1  Start date: 2022         CONTINUE these medications which have NOT CHANGED    Details   bumetanide (BUMEX) 2 mg tablet Take 1 Tablet by mouth daily. Qty: 10 Tablet, Refills: 0      traMADoL (ULTRAM) 50 mg tablet Take 50 mg by mouth three (3) times daily as needed for Pain. nystatin (MYCOSTATIN) powder Apply  to affected area two (2) times daily as needed. magnesium oxide (MAG-OX) 400 mg tablet Take 400 mg by mouth daily. Flovent  mcg/actuation inhaler Take 2 Puffs by inhalation two (2) times a day. albuterol (PROVENTIL HFA, VENTOLIN HFA, PROAIR HFA) 90 mcg/actuation inhaler Take 2 Puffs by inhalation every six (6) hours as needed for Wheezing. Lantus Solostar U-100 Insulin 100 unit/mL (3 mL) inpn 20 Units by SubCUTAneous route nightly. fluticasone propionate (Flonase Allergy Relief) 50 mcg/actuation nasal spray 2 SPRAY, Each Nostril, daily, for allergy symptoms, 11 Refills, Dispense: 1, bottle, Pharmacy [facility]      aspirin delayed-release 81 mg tablet Take 81 mg by mouth daily. amitriptyline (ELAVIL) 50 mg tablet Take 50 mg by mouth nightly. docusate sodium (Colace) 100 mg capsule Take 100 mg by mouth two (2) times daily as needed. clopidogrel (PLAVIX) 75 mg tab Take 1 Tab by mouth daily. Qty: 30 Tab, Refills: 0      gabapentin (NEURONTIN) 300 mg capsule Take 2 Capsules by mouth three (3) times daily. Max Daily Amount: 1,800 mg. Qty: 30 Capsule, Refills: 0    Associated Diagnoses: Type 2 diabetes mellitus with peripheral neuropathy (HCC)      metoprolol tartrate (LOPRESSOR) 25 mg tablet Take 25 mg by mouth two (2) times a day. insulin aspart U-100 (NovoLOG Flexpen U-100 Insulin) 100 unit/mL (3 mL) inpn 10 Units by SubCUTAneous route Before breakfast, lunch, and dinner. glipiZIDE (GLUCOTROL) 5 mg tablet Take 5 mg by mouth daily. Take One Tablet By Mouth Daily      canagliflozin (INVOKANA) 100 mg tablet Take 100 mg by mouth daily. Take one tablet by mouth daily      amLODIPine (NORVASC) 10 mg tablet Take 10 mg by mouth daily. atorvastatin (LIPITOR) 80 mg tablet Take 1 Tab by mouth nightly.   Qty: 30 Tab, Refills: 0         STOP taking these medications       torsemide (DEMADEX) 20 mg tablet Comments:   Reason for Stopping:             _______________________________________________________________________    Risk of deterioration: Low  ________________________________________________________________________    Disposition  Home with family, no needs  ________________________________________________________________________    Care Plan discussed with:   Patient, Family, RN, Care Manager, Consultant  ________________________________________________________________________    Code Status: Full Code  ________________________________________________________________________    Total time spent in discharge (min):    ________________________________________________________________________    CDMP Checked: Yes    Signed: Cheryl Salinas III, MD    This note will not be viewable in [address] [place] [address] Ave.

## 2022-01-11 NOTE — PROGRESS NOTES
[address] follow-up PCP transitional care appointment has been scheduled with Dr. Cliff Salgado on 1/21/22 at 9:40am. The office will call me if there is an earlier appointment with PCP. Pending patient discharge. Bryanna Torres Care Management Assistant     Attempted to schedule hospital follow up PCP appointment. Awaiting callback from PCP office. Pending patient discharge.  Augustin Key Management Assistant

## 2022-01-11 NOTE — PROGRESS NOTES
Transition of Care Plan:    RUR: n/a - obs   Disposition: Home with family   Follow up appointments: PCP and specialist as indicated   DME needed: None   Transportation at Discharge:Sister to transport - pt will contact as soon as blood work is complete   Keys or means to access home: Yes   IM Medicare Letter: N/A - Medicaid   Is patient a BCPI-A Bundle: No    If yes, was Bundle Letter given?:    Is patient a [place] and connected with the [facility]? No  If yes, was [employer] transfer form completed and [facility] notified? Caregiver Contact: Sister Lilly Sams - [phone]  Discharge Caregiver contacted prior to discharge? Pt to contact       Oral and Written notification given to patient and/or caregiver informing patient of current Observation status receiving care in our facility. Copied placed on bedside chart. Advance Care Planning     General Advance Care Planning (ACP) Conversation    Date of Conversation: 1/11/222  Conducted with: Patient with Decision Making Capacity    Healthcare Decision Maker:     Primary Decision Maker: Federico Morel - Sister - [phone]    Secondary Decision Maker: Jm Forte - Other Relative - [phone]  Click here to complete [place] including selection of the Healthcare Decision Maker Relationship (ie \"Primary\")      Today we documented Decision Maker(s) consistent with Legal Next of Kin hierarchy. Content/Action Overview: Has ACP document(s) on file - reflects the patient's care preferences  Reviewed DNR/DNI and patient elects Full Code (Attempt Resuscitation)    Length of Voluntary ACP Conversation in minutes:  <16 minutes (Non-Billable)    Kim Jean Baptiste     Chart reviewed. CM aware of discharge order. CM met with pt at bedside to complete assessment. Patient was alert and oriented. Pt admitted with Protienuria. Verified contact information, demographics and insurance information.  Prior to admission, pt resides with sister and son in a single level home with 0 ELLIE. At baseline, pt is independent with ADLs/IADLs. Pt owns no DME. No HHC, SNF/rehab history reported. Preferred pharmacy is [facility] on [address]. Pt sister to transport - pt will contact as soon as blood work is complete. No barriers to discharge identified. Pt is agreeable to the discharge plan and voiced no further questions/concerns regarding discharge at this time. Pt is ready for d/c from a CM standpoint. Assigned RN informed. Care Management Interventions  PCP Verified by CM: Yes  Palliative Care Criteria Met (RRAT>21 & CHF Dx)?: No  Mode of Transport at Discharge:  Other (see comment) (Sister to transport. )  Transition of Care Consult (CM Consult): Discharge Planning  Discharge Durable Medical Equipment: No  Physical Therapy Consult: No  Occupational Therapy Consult: No  Speech Therapy Consult: No  Support Systems: Child(jesus manuel),Other Family Member(s)  Confirm Follow Up Transport: Self  The Patient and/or Patient Representative was Provided with a Choice of Provider and Agrees with the Discharge Plan?: Yes  Freedom of Choice List was Provided with Basic Dialogue that Supports the Patient's Individualized Plan of Care/Goals, Treatment Preferences and Shares the Quality Data Associated with the Providers?: No   Resource Information Provided?: No  Discharge Location  Discharge Placement: Home with family assistance    Darrel Bartlett, [address] [place] [address], [facility]  [phone]

## 2022-03-17 NOTE — ED NOTES
Assumed care of pt via EMS stretcher. Pt is A&O x 4 and reports CC of bilateral lower exe swelling and weakness. Started 7 days ago. Home health nurse at home recommended she come in. Denies chest pain,SOB,N/V/D. Pt states she's been falling more often recently. Pt states she's fallen 6 times this week. This morning PT came to evaluate her and they stated she need to come to the hospital so she could be placed in a rehab facility. Hx of CHF,DM,CKD  Pt wears 2L NC at home baseline as needed. Pt states she does not need it at this time. 1345- Changed pt's bed sheets. 1630- Provided pt with ice chips    1855- Provided pt with a dinner tray. 2000- Pt in a POC with call bell in reach. 2258- Changed pt's bed sheets and emptied pure wick. 2318- Bedside shift change report given to Cordelia Lockett (oncoming nurse) by Brandon Islas (offgoing nurse). Report included the following information SBAR, ED Summary, MAR and Recent Results.
Patient is being transferred to [address], Room # 5009. Report given to [place], RN on Bev Carlton for routine progression of care. Report consisted of the following information SBAR, Kardex, Intake/Output, MAR and Recent Results. Patient transferred to receiving unit by: Tech (RN or tech name). Outstanding consults needed: Yes    Next labs due: Yes    The following personal items will be sent with the patient during transfer to the floor: All valuables:    Cardiac monitoring ordered: Yes    The following CURRENT information was reported to the receiving RN:    Code status: Full Code at time of transfer    Last set of vital signs:  Vital Signs  Level of Consciousness: Alert (0) (08/26/21 0651)  Temp: 98.3 °F (36.8 °C) (08/26/21 0651)  Temp Source: Oral (08/26/21 0651)  Pulse (Heart Rate): 88 (08/26/21 0651)  Heart Rate Source: Monitor (08/25/21 2255)  Resp Rate: 16 (08/26/21 0651)  BP: (!) 156/73 (08/26/21 0651)  MAP (Monitor): 86 (08/26/21 0100)  MAP (Calculated): 101 (08/26/21 0651)  BP 1 Location: Right upper arm (08/26/21 0651)  BP 1 Method: Automatic (08/26/21 0651)  BP Patient Position: At rest (08/26/21 0651)  MEWS Score: 1 (08/26/21 0651)         Oxygen Therapy  O2 Sat (%): 96 % (08/26/21 0651)  Pulse via Oximetry: 81 beats per minute (08/26/21 0100)  O2 Device: None (Room air) (08/25/21 1147)      Last pain assessment:  Pain 1  Pain Scale 1: Numeric (0 - 10)  Pain Intensity 1: 8  Patient Stated Pain Goal: 4  Pain Onset 1: last wednesday  Pain Location 1: Elbow, Foot  Pain Orientation 1: Left  Pain Description 1: Constant  Pain Intervention(s) 1: Therapeutic presence      Wounds: No     Urinary catheter: external catheter  Is there a whittington order: No     LDAs:       Peripheral IV 08/25/21 Right Antecubital (Active)         Opportunity for questions and clarification was provided.     Rai Quiñones RN
[Negative] : Heme/Lymph

## 2022-03-18 PROBLEM — N17.9 ACUTE ON CHRONIC KIDNEY FAILURE (HCC): Status: ACTIVE | Noted: 2021-12-10

## 2022-03-18 PROBLEM — N18.9 ACUTE ON CHRONIC KIDNEY FAILURE (HCC): Status: ACTIVE | Noted: 2021-12-10

## 2022-03-18 PROBLEM — I50.9 CHF (CONGESTIVE HEART FAILURE) (HCC): Status: ACTIVE | Noted: 2021-08-25

## 2022-03-18 PROBLEM — N17.9 ACUTE KIDNEY FAILURE, UNSPECIFIED (HCC): Status: ACTIVE | Noted: 2022-01-10

## 2022-03-19 PROBLEM — I50.9 ACUTE EXACERBATION OF CHF (CONGESTIVE HEART FAILURE) (HCC): Status: ACTIVE | Noted: 2020-08-16

## 2022-03-19 PROBLEM — R80.9 PROTEINURIA: Status: ACTIVE | Noted: 2022-01-10

## 2022-03-19 PROBLEM — I50.9 CHRONIC HEART FAILURE (HCC): Status: ACTIVE | Noted: 2021-12-10

## 2022-03-19 PROBLEM — G45.9 TIA (TRANSIENT ISCHEMIC ATTACK): Status: ACTIVE | Noted: 2018-12-20

## 2022-03-19 PROBLEM — N17.9 AKI (ACUTE KIDNEY INJURY) (HCC): Status: ACTIVE | Noted: 2021-08-25

## 2022-04-08 ENCOUNTER — HOSPITAL ENCOUNTER (OUTPATIENT)
Dept: GENERAL RADIOLOGY | Age: 57
Discharge: HOME | End: 2022-04-08
Payer: MEDICAID

## 2022-04-08 ENCOUNTER — HOSPITAL ENCOUNTER (OUTPATIENT)
Dept: PREADMISSION TESTING | Age: 57
Discharge: HOME | End: 2022-04-08
Payer: MEDICAID

## 2022-04-08 VITALS
WEIGHT: 260.8 LBS | HEART RATE: 85 BPM | HEIGHT: 66 IN | DIASTOLIC BLOOD PRESSURE: 70 MMHG | BODY MASS INDEX: 41.91 KG/M2 | SYSTOLIC BLOOD PRESSURE: 130 MMHG | TEMPERATURE: 98.2 F

## 2022-04-08 LAB
ABO + RH BLD: NORMAL
APTT PPP: 28.7 SEC (ref 22.1–31)
BASOPHILS # BLD: 0.1 K/UL (ref 0–0.1)
BASOPHILS NFR BLD: 1 % (ref 0–1)
BLOOD GROUP ANTIBODIES SERPL: NORMAL
DIFFERENTIAL METHOD BLD: ABNORMAL
EOSINOPHIL # BLD: 0.3 K/UL (ref 0–0.4)
EOSINOPHIL NFR BLD: 4 % (ref 0–7)
ERYTHROCYTE [DISTWIDTH] IN BLOOD BY AUTOMATED COUNT: 14.6 % (ref 11.5–14.5)
EST. AVERAGE GLUCOSE BLD GHB EST-MCNC: 171 MG/DL
HBA1C MFR BLD: 7.6 % (ref 4–5.6)
HCT VFR BLD AUTO: 25.6 % (ref 35–47)
HGB BLD-MCNC: 8.2 G/DL (ref 11.5–16)
IMM GRANULOCYTES # BLD AUTO: 0.1 K/UL (ref 0–0.04)
IMM GRANULOCYTES NFR BLD AUTO: 1 % (ref 0–0.5)
INR PPP: 1 (ref 0.9–1.1)
LYMPHOCYTES # BLD: 1.5 K/UL (ref 0.8–3.5)
LYMPHOCYTES NFR BLD: 19 % (ref 12–49)
MCH RBC QN AUTO: 28.3 PG (ref 26–34)
MCHC RBC AUTO-ENTMCNC: 32 G/DL (ref 30–36.5)
MCV RBC AUTO: 88.3 FL (ref 80–99)
MONOCYTES # BLD: 0.6 K/UL (ref 0–1)
MONOCYTES NFR BLD: 7 % (ref 5–13)
NEUTS SEG # BLD: 5.5 K/UL (ref 1.8–8)
NEUTS SEG NFR BLD: 68 % (ref 32–75)
NRBC # BLD: 0 K/UL (ref 0–0.01)
NRBC BLD-RTO: 0 PER 100 WBC
PLATELET # BLD AUTO: 268 K/UL (ref 150–400)
PMV BLD AUTO: 10.3 FL (ref 8.9–12.9)
PROTHROMBIN TIME: 10.9 SEC (ref 9–11.1)
RBC # BLD AUTO: 2.9 M/UL (ref 3.8–5.2)
SPECIMEN EXP DATE BLD: NORMAL
THERAPEUTIC RANGE,PTTT: NORMAL SECS (ref 58–77)
WBC # BLD AUTO: 7.9 K/UL (ref 3.6–11)

## 2022-04-08 PROCEDURE — 86900 BLOOD TYPING SEROLOGIC ABO: CPT

## 2022-04-08 PROCEDURE — 83036 HEMOGLOBIN GLYCOSYLATED A1C: CPT

## 2022-04-08 PROCEDURE — 85610 PROTHROMBIN TIME: CPT

## 2022-04-08 PROCEDURE — 71046 X-RAY EXAM CHEST 2 VIEWS: CPT

## 2022-04-08 PROCEDURE — 85025 COMPLETE CBC W/AUTO DIFF WBC: CPT

## 2022-04-08 PROCEDURE — 85730 THROMBOPLASTIN TIME PARTIAL: CPT

## 2022-04-08 NOTE — PERIOP NOTES
[address] INSTRUCTIONS    Surgery Date:   04/14/2022    [facility] staff will call you between 4 PM- 8 PM the day before surgery with your arrival time. If your surgery is on a Monday, we will call you the preceding Friday. Please call [phone] after 8 PM if you did not receive your arrival time. 1. Please report to [facility] Patient Access/Admitting on the 1st floor. Bring your insurance card, photo identification, and any copayment ( if applicable). 2. If you are going home the same day of your surgery, you must have a responsible adult to drive you home. You need to have a responsible adult to stay with you the first 24 hours after surgery and you should not drive a car for 24 hours following your surgery. 3. Nothing to eat or drink after midnight the night before surgery. This includes no water, gum, mints, coffee, juice, etc.  Please note special instructions, if applicable, below for medications. 4. Do NOT drink alcohol or smoke 24 hours before surgery. STOP smoking for 14 days prior as it helps with breathing and healing after surgery. 5. If you are being admitted to the hospital, please leave personal belongings/luggage in your car until you have an assigned hospital room number. 6. Please wear comfortable clothes. Wear your glasses instead of contacts. We ask that all money, jewelry and valuables be left at home. Wear no make up, particularly mascara, the day of surgery. 7.  All body piercings, rings, and jewelry need to be removed and left at home. Please remove any nail polish or artificial nails from your fingernails. Please wear your hair loose or down. Please no pony-tails, buns, or any metal hair accessories. If you shower the morning of surgery, please do not apply any lotions or powders afterwards. You may wear deodorant, unless having breast surgery. Do not shave any body area within 24 hours of your surgery.   8. Please follow all instructions to avoid any potential surgical cancellation. 9. Should your physical condition change, (i.e. fever, cold, flu, etc.) please notify your surgeon as soon as possible. 10. It is important to be on time. If a situation occurs where you may be delayed, please call:  [phone] / [phone] on the day of surgery. 11. The Preadmission Testing staff can be reached at [phone]. 12. Special instructions: BRING POWER OF  FORMS      Current Outpatient Medications   Medication Sig    bumetanide (BUMEX) 2 mg tablet Take 1 Tablet by mouth daily. (Patient taking differently: Take 2 mg by mouth two (2) times a day.)    gabapentin (NEURONTIN) 300 mg capsule Take 2 Capsules by mouth three (3) times daily. Max Daily Amount: 1,800 mg.  traMADoL (ULTRAM) 50 mg tablet Take 50 mg by mouth three (3) times daily as needed for Pain.  metoprolol tartrate (LOPRESSOR) 25 mg tablet Take 25 mg by mouth two (2) times a day.  insulin aspart U-100 (NovoLOG Flexpen U-100 Insulin) 100 unit/mL (3 mL) inpn 10 Units by SubCUTAneous route Before breakfast, lunch, and dinner.  nystatin (MYCOSTATIN) powder Apply  to affected area two (2) times daily as needed.  amLODIPine (NORVASC) 10 mg tablet Take 10 mg by mouth nightly.  Flovent  mcg/actuation inhaler Take 2 Puffs by inhalation two (2) times a day.  albuterol (PROVENTIL HFA, VENTOLIN HFA, PROAIR HFA) 90 mcg/actuation inhaler Take 2 Puffs by inhalation every six (6) hours as needed for Wheezing.  Lantus Solostar U-100 Insulin 100 unit/mL (3 mL) inpn 20 Units by SubCUTAneous route nightly.  fluticasone propionate (Flonase Allergy Relief) 50 mcg/actuation nasal spray 2 SPRAY, Each Nostril, daily, for allergy symptoms, 11 Refills, Dispense: 1, bottle, Pharmacy [facility]     aspirin delayed-release 81 mg tablet Take 81 mg by mouth daily.  amitriptyline (ELAVIL) 50 mg tablet Take 50 mg by mouth nightly.     docusate sodium (Colace) 100 mg capsule Take 100 mg by mouth two (2) times daily as needed.  atorvastatin (LIPITOR) 80 mg tablet Take 1 Tab by mouth nightly.  clopidogrel (PLAVIX) 75 mg tab Take 1 Tab by mouth daily. No current facility-administered medications for this encounter. 1. YOU MUST ONLY TAKE THESE MEDICATIONS THE MORNING OF SURGERY WITH A SIP OF WATER: GABAPENTIN, METOPROLOL, BUMEX, TRAMADOL,  2. MEDICATIONS TO TAKE THE MORNING OF SURGERY ONLY IF NEEDED: ALBUTEROL,   3. HOLD these prescription medications BEFORE Surgery: NO INSULIN DAY OF SURGERY. STOP PLAVIX FOR 5 DAYS PRIOR TO SURGERY AS DIRECTED BY DR VERDIN  4. Ask your surgeon/prescribing physician about when/if to STOP taking these medications: ASPIRIN  5. Stop all vitamins, herbal medicines and Aspirin containing products 7 days prior to surgery. Stop any non-steroidal anti-inflammatory drugs (i.e. Ibuprofen, Naproxen, Advil, Aleve) 3 days before surgery. You may take Tylenol. 6. If you are currently taking Plavix, Coumadin, or any other blood-thinning/anticoagulant medication contact your prescribing physician for instructions. Preventing Infections Before and After - Your Surgery    IMPORTANT INSTRUCTIONS      You play an important role in your health and preparation for surgery. To reduce the germs on your skin you will need to shower with CHG soap (Chorhexidine gluconate 4%) two times before surgery. CHG soap (Hibiclens, Hex-A-Clens or store brand)   CHG soap will be provided at your Preadmission Testing (PAT) appointment.  If you do not have a PAT appointment before surgery, you may arrange to  CHG soap from our office or purchase CHG soap at a pharmacy, grocery or department store.  You need to purchase TWO 4 ounce bottles to use for your 2 showers. Steps to follow:  1. Wash your hair with your normal shampoo and your body with regular soap and rinse well to remove shampoo and soap from your skin.   2. Wet a clean washcloth and turn off the shower. 3. Put CHG soap on washcloth and apply to your entire body from the neck down. Do not use on your head, face or private parts(genitals). Do not use CHG soap on open sores, wounds or areas of skin irritation. 4. Wash you body gently for 5 minutes. Do not wash your skin too hard. This soap does not create lather. Pay special attention to your underarms and from your belly button to your feet. 5. Turn the shower back on and rinse well to get CHG soap off your body. 6. Pat your skin dry with a clean, dry towel. Do not apply lotions or moisturizer. 7. Put on clean clothes and sleep on fresh bed sheets and do not allow pets to sleep with you. Shower with CHG soap 2 times before your surgery   The evening before your surgery   The morning of your surgery      Tips to help prevent infections after your surgery:  1. Protect your surgical wound from germs:  ? Hand washing is the most important thing you and your caregivers can do to prevent infections. ? Keep your bandage clean and dry! ? Do not touch your surgical wound. 2. Use clean, freshly washed towels and washcloths every time you shower; do not share bath linens with others. 3. Until your surgical wound is healed, wear clothing and sleep on bed linens each day that are clean and freshly washed. 4. Do not allow pets to sleep in your bed with you or touch your surgical wound. 5. Do not smoke - smoking delays wound healing. This may be a good time to stop smoking. 6. If you have diabetes, it is important for you to manage your blood sugar levels properly before your surgery as well as after your surgery. Poorly managed blood sugar levels slow down wound healing and prevent you from healing completely. Patient Information Regarding COVID Restrictions    Day of Procedure     Please park in the parking deck or any designated visitor parking lot.    [facility].   On the day of surgery, please provide the cell phone number of the person who will be waiting for you to the Patient Access representative at the time of registration.  Please wear a mask on the day of your procedure.  We are now allowing two designated visitors per stay. Pediatric patients may have 2 designated visitors. These two people may come in with you on the day of your procedure.  No visitors under the age of 13.  The designated visitor must also wear a mask.  Once your procedure and the immediate recovery period is completed, a nurse in the recovery area will contact your designated visitor to inform them of your room number or to otherwise review other pertinent information regarding your care.  Social distancing practices are to be adhered to in waiting areas and the cafeteria. The patient was contacted  in person. She verbalized understanding of all instructions does not  need reinforcement.

## 2022-04-11 NOTE — PERIOP NOTES
CINTHIA IN DR. [facility] Edge OFFICE NOTIFIED OF THE FOLLOWING:    HGB - 8.2  HCT - 25.6  RBC - 2.9  HGBA1C - 7.6    ALL LABS, CXR AND EKG FAXED TO OFFICE WITH CONFIRMATION RECEIPT RECEIVED. ABNORMAL LABS FAXED TO PCP VIA [facility]. EKG REVIEWED BY BAYLEE DEVLIN NP; NO NEED TO SEND TO ANESTHESIA FOR REVIEW.

## 2022-05-04 NOTE — PERIOP NOTES
PAT PREOP PHONE INTERVIEW COMPLETED WITH: PATIENT     FAMILY ADVISED NOT TO EAT/DRINK ANYTHING PAST MIDNIGHT EVENING PRIOR TO SURGERY,  NOTHING TO EAT/DRINK MORNING OF SURGERY UNLESS SIP OF WATER TO SWALLOW MEDICATION;   LEAVE ALL VALUABLES AT HOME;   DO BRING PICTURE ID, INSURANCE CARD AND ANY COPAY;  WEAR COMFORTABLE CLOTHING;  NO PERFUMES, POWDERS, LOTIONS;  NO ALCOHOL 24 HOURS BEFORE OR AFTER SURGERY;    WILL NEED TO BE DRIVEN HOME BY FAMILY OR FRIEND;   AVOID TAKING NSAIDS, ASPIRIN, FISH OIL, VITAMIN E OR GLUCOSAMINE/CHONDROITIN DURING THIS TIME PRIOR TO SURGERY;  MAY TAKE TYLENOL. INSTRUCTED TO REPORT  [address].

## 2022-05-10 ENCOUNTER — ANESTHESIA EVENT (OUTPATIENT)
Dept: CARDIOTHORACIC SURGERY | Age: 57
End: 2022-05-10
Payer: MEDICAID

## 2022-05-10 ENCOUNTER — ANESTHESIA (OUTPATIENT)
Dept: CARDIOTHORACIC SURGERY | Age: 57
End: 2022-05-10
Payer: MEDICAID

## 2022-05-10 ENCOUNTER — HOSPITAL ENCOUNTER (OUTPATIENT)
Age: 57
Setting detail: OUTPATIENT SURGERY
Discharge: HOME | End: 2022-05-10
Payer: MEDICAID

## 2022-05-10 VITALS
TEMPERATURE: 98.1 F | HEART RATE: 83 BPM | OXYGEN SATURATION: 96 % | BODY MASS INDEX: 41.65 KG/M2 | SYSTOLIC BLOOD PRESSURE: 153 MMHG | RESPIRATION RATE: 16 BRPM | DIASTOLIC BLOOD PRESSURE: 75 MMHG | HEIGHT: 65 IN | WEIGHT: 250 LBS

## 2022-05-10 DIAGNOSIS — N18.4 CKD (CHRONIC KIDNEY DISEASE) STAGE 4, GFR 15-29 ML/MIN (HCC): Primary | ICD-10-CM

## 2022-05-10 LAB
ABO + RH BLD: NORMAL
ANION GAP BLD CALC-SCNC: 14 MMOL/L (ref 10–20)
BLOOD GROUP ANTIBODIES SERPL: NORMAL
CA-I BLD-MCNC: 1.16 MMOL/L (ref 1.12–1.32)
CHLORIDE BLD-SCNC: 94 MMOL/L (ref 98–107)
CO2 BLD-SCNC: 27.4 MMOL/L (ref 21–32)
CREAT BLD-MCNC: 3.59 MG/DL (ref 0.6–1.3)
GLUCOSE BLD STRIP.AUTO-MCNC: 270 MG/DL (ref 65–117)
GLUCOSE BLD STRIP.AUTO-MCNC: 304 MG/DL (ref 65–117)
GLUCOSE BLD-MCNC: 296 MG/DL (ref 65–100)
POTASSIUM BLD-SCNC: 4.3 MMOL/L (ref 3.5–5.1)
SERVICE CMNT-IMP: ABNORMAL
SODIUM BLD-SCNC: 134 MMOL/L (ref 136–145)
SPECIMEN EXP DATE BLD: NORMAL

## 2022-05-10 PROCEDURE — 77030008462 HC STPLR SKN PROX J&J -A

## 2022-05-10 PROCEDURE — 74011250636 HC RX REV CODE- 250/636: Performed by: NURSE ANESTHETIST, CERTIFIED REGISTERED

## 2022-05-10 PROCEDURE — 74011250636 HC RX REV CODE- 250/636

## 2022-05-10 PROCEDURE — 77030002987 HC SUT PROL J&J -B

## 2022-05-10 PROCEDURE — 86900 BLOOD TYPING SEROLOGIC ABO: CPT

## 2022-05-10 PROCEDURE — 74011000258 HC RX REV CODE- 258: Performed by: NURSE ANESTHETIST, CERTIFIED REGISTERED

## 2022-05-10 PROCEDURE — 74011250636 HC RX REV CODE- 250/636: Performed by: ANESTHESIOLOGY

## 2022-05-10 PROCEDURE — 76210000006 HC OR PH I REC 0.5 TO 1 HR

## 2022-05-10 PROCEDURE — 2709999900 HC NON-CHARGEABLE SUPPLY

## 2022-05-10 PROCEDURE — 77030031139 HC SUT VCRL2 J&J -A

## 2022-05-10 PROCEDURE — 82962 GLUCOSE BLOOD TEST: CPT

## 2022-05-10 PROCEDURE — 76010000149 HC OR TIME 1 TO 1.5 HR

## 2022-05-10 PROCEDURE — 36415 COLL VENOUS BLD VENIPUNCTURE: CPT

## 2022-05-10 PROCEDURE — 76060000034 HC ANESTHESIA 1.5 TO 2 HR

## 2022-05-10 PROCEDURE — 76210000021 HC REC RM PH II 0.5 TO 1 HR

## 2022-05-10 PROCEDURE — 80047 BASIC METABLC PNL IONIZED CA: CPT

## 2022-05-10 RX ORDER — SODIUM CHLORIDE 9 MG/ML
50 INJECTION, SOLUTION INTRAVENOUS CONTINUOUS
Status: CANCELLED | OUTPATIENT
Start: 2022-05-10

## 2022-05-10 RX ORDER — FENTANYL CITRATE 50 UG/ML
50 INJECTION, SOLUTION INTRAMUSCULAR; INTRAVENOUS AS NEEDED
Status: DISCONTINUED | OUTPATIENT
Start: 2022-05-10 | End: 2022-05-10 | Stop reason: HOSPADM

## 2022-05-10 RX ORDER — MORPHINE SULFATE 2 MG/ML
2 INJECTION, SOLUTION INTRAMUSCULAR; INTRAVENOUS
Status: CANCELLED | OUTPATIENT
Start: 2022-05-10

## 2022-05-10 RX ORDER — SODIUM CHLORIDE 0.9 % (FLUSH) 0.9 %
5-40 SYRINGE (ML) INJECTION EVERY 8 HOURS
Status: CANCELLED | OUTPATIENT
Start: 2022-05-10

## 2022-05-10 RX ORDER — ROPIVACAINE HYDROCHLORIDE 5 MG/ML
INJECTION, SOLUTION EPIDURAL; INFILTRATION; PERINEURAL
Status: COMPLETED | OUTPATIENT
Start: 2022-05-10 | End: 2022-05-10

## 2022-05-10 RX ORDER — SODIUM CHLORIDE 9 MG/ML
50 INJECTION, SOLUTION INTRAVENOUS CONTINUOUS
Status: DISCONTINUED | OUTPATIENT
Start: 2022-05-10 | End: 2022-05-10 | Stop reason: HOSPADM

## 2022-05-10 RX ORDER — ONDANSETRON 2 MG/ML
4 INJECTION INTRAMUSCULAR; INTRAVENOUS AS NEEDED
Status: CANCELLED | OUTPATIENT
Start: 2022-05-10

## 2022-05-10 RX ORDER — FENTANYL CITRATE 50 UG/ML
25 INJECTION, SOLUTION INTRAMUSCULAR; INTRAVENOUS
Status: CANCELLED | OUTPATIENT
Start: 2022-05-10

## 2022-05-10 RX ORDER — DIPHENHYDRAMINE HYDROCHLORIDE 50 MG/ML
12.5 INJECTION, SOLUTION INTRAMUSCULAR; INTRAVENOUS AS NEEDED
Status: CANCELLED | OUTPATIENT
Start: 2022-05-10 | End: 2022-05-10

## 2022-05-10 RX ORDER — SODIUM CHLORIDE 0.9 % (FLUSH) 0.9 %
5-40 SYRINGE (ML) INJECTION AS NEEDED
Status: DISCONTINUED | OUTPATIENT
Start: 2022-05-10 | End: 2022-05-10 | Stop reason: HOSPADM

## 2022-05-10 RX ORDER — SODIUM CHLORIDE 9 MG/ML
INJECTION, SOLUTION INTRAVENOUS
Status: DISCONTINUED | OUTPATIENT
Start: 2022-05-10 | End: 2022-05-10 | Stop reason: HOSPADM

## 2022-05-10 RX ORDER — SODIUM CHLORIDE, SODIUM LACTATE, POTASSIUM CHLORIDE, CALCIUM CHLORIDE 600; 310; 30; 20 MG/100ML; MG/100ML; MG/100ML; MG/100ML
75 INJECTION, SOLUTION INTRAVENOUS CONTINUOUS
Status: DISCONTINUED | OUTPATIENT
Start: 2022-05-10 | End: 2022-05-10 | Stop reason: HOSPADM

## 2022-05-10 RX ORDER — PROPOFOL 10 MG/ML
INJECTION, EMULSION INTRAVENOUS
Status: DISCONTINUED | OUTPATIENT
Start: 2022-05-10 | End: 2022-05-10 | Stop reason: HOSPADM

## 2022-05-10 RX ORDER — FENTANYL CITRATE 50 UG/ML
INJECTION, SOLUTION INTRAMUSCULAR; INTRAVENOUS AS NEEDED
Status: DISCONTINUED | OUTPATIENT
Start: 2022-05-10 | End: 2022-05-10 | Stop reason: HOSPADM

## 2022-05-10 RX ORDER — MIDAZOLAM HYDROCHLORIDE 1 MG/ML
INJECTION, SOLUTION INTRAMUSCULAR; INTRAVENOUS AS NEEDED
Status: DISCONTINUED | OUTPATIENT
Start: 2022-05-10 | End: 2022-05-10 | Stop reason: HOSPADM

## 2022-05-10 RX ORDER — LIDOCAINE HYDROCHLORIDE 10 MG/ML
0.1 INJECTION, SOLUTION EPIDURAL; INFILTRATION; INTRACAUDAL; PERINEURAL AS NEEDED
Status: DISCONTINUED | OUTPATIENT
Start: 2022-05-10 | End: 2022-05-10 | Stop reason: HOSPADM

## 2022-05-10 RX ORDER — HYDROMORPHONE HYDROCHLORIDE 1 MG/ML
0.2 INJECTION, SOLUTION INTRAMUSCULAR; INTRAVENOUS; SUBCUTANEOUS
Status: CANCELLED | OUTPATIENT
Start: 2022-05-10

## 2022-05-10 RX ORDER — OXYCODONE AND ACETAMINOPHEN 5; 325 MG/1; MG/1
1 TABLET ORAL AS NEEDED
Status: CANCELLED | OUTPATIENT
Start: 2022-05-10

## 2022-05-10 RX ORDER — MIDAZOLAM HYDROCHLORIDE 1 MG/ML
1 INJECTION, SOLUTION INTRAMUSCULAR; INTRAVENOUS AS NEEDED
Status: DISCONTINUED | OUTPATIENT
Start: 2022-05-10 | End: 2022-05-10 | Stop reason: HOSPADM

## 2022-05-10 RX ORDER — TRAMADOL HYDROCHLORIDE 50 MG/1
50 TABLET ORAL
Qty: 15 TABLET | Refills: 0 | Status: SHIPPED | OUTPATIENT
Start: 2022-05-10 | End: 2022-05-13

## 2022-05-10 RX ORDER — SODIUM CHLORIDE, SODIUM LACTATE, POTASSIUM CHLORIDE, CALCIUM CHLORIDE 600; 310; 30; 20 MG/100ML; MG/100ML; MG/100ML; MG/100ML
75 INJECTION, SOLUTION INTRAVENOUS CONTINUOUS
Status: CANCELLED | OUTPATIENT
Start: 2022-05-10

## 2022-05-10 RX ORDER — PROPOFOL 10 MG/ML
INJECTION, EMULSION INTRAVENOUS AS NEEDED
Status: DISCONTINUED | OUTPATIENT
Start: 2022-05-10 | End: 2022-05-10 | Stop reason: HOSPADM

## 2022-05-10 RX ORDER — MIDAZOLAM HYDROCHLORIDE 1 MG/ML
0.5 INJECTION, SOLUTION INTRAMUSCULAR; INTRAVENOUS
Status: CANCELLED | OUTPATIENT
Start: 2022-05-10

## 2022-05-10 RX ORDER — PHENYLEPHRINE HCL IN 0.9% NACL 0.4MG/10ML
SYRINGE (ML) INTRAVENOUS AS NEEDED
Status: DISCONTINUED | OUTPATIENT
Start: 2022-05-10 | End: 2022-05-10 | Stop reason: HOSPADM

## 2022-05-10 RX ORDER — SODIUM CHLORIDE 0.9 % (FLUSH) 0.9 %
5-40 SYRINGE (ML) INJECTION AS NEEDED
Status: CANCELLED | OUTPATIENT
Start: 2022-05-10

## 2022-05-10 RX ORDER — SODIUM CHLORIDE 0.9 % (FLUSH) 0.9 %
5-40 SYRINGE (ML) INJECTION EVERY 8 HOURS
Status: DISCONTINUED | OUTPATIENT
Start: 2022-05-10 | End: 2022-05-10 | Stop reason: HOSPADM

## 2022-05-10 RX ADMIN — MIDAZOLAM 1 MG: 1 INJECTION INTRAMUSCULAR; INTRAVENOUS at 08:10

## 2022-05-10 RX ADMIN — FENTANYL CITRATE 25 MCG: 50 INJECTION, SOLUTION INTRAMUSCULAR; INTRAVENOUS at 08:20

## 2022-05-10 RX ADMIN — FENTANYL CITRATE 50 MCG: 50 INJECTION, SOLUTION INTRAMUSCULAR; INTRAVENOUS at 07:25

## 2022-05-10 RX ADMIN — MIDAZOLAM HYDROCHLORIDE 2 MG: 1 INJECTION, SOLUTION INTRAMUSCULAR; INTRAVENOUS at 07:25

## 2022-05-10 RX ADMIN — FENTANYL CITRATE 25 MCG: 50 INJECTION, SOLUTION INTRAMUSCULAR; INTRAVENOUS at 08:02

## 2022-05-10 RX ADMIN — MIDAZOLAM 1 MG: 1 INJECTION INTRAMUSCULAR; INTRAVENOUS at 07:56

## 2022-05-10 RX ADMIN — FENTANYL CITRATE 25 MCG: 50 INJECTION, SOLUTION INTRAMUSCULAR; INTRAVENOUS at 07:48

## 2022-05-10 RX ADMIN — ROPIVACAINE HYDROCHLORIDE 10 ML: 5 INJECTION, SOLUTION EPIDURAL; INFILTRATION; PERINEURAL at 07:25

## 2022-05-10 RX ADMIN — Medication 80 MCG: at 08:43

## 2022-05-10 RX ADMIN — PROPOFOL 25 MG: 10 INJECTION, EMULSION INTRAVENOUS at 07:55

## 2022-05-10 RX ADMIN — SODIUM CHLORIDE: 900 INJECTION, SOLUTION INTRAVENOUS at 07:23

## 2022-05-10 RX ADMIN — MIDAZOLAM 1 MG: 1 INJECTION INTRAMUSCULAR; INTRAVENOUS at 07:48

## 2022-05-10 RX ADMIN — MIDAZOLAM 1 MG: 1 INJECTION INTRAMUSCULAR; INTRAVENOUS at 08:02

## 2022-05-10 RX ADMIN — FENTANYL CITRATE 25 MCG: 50 INJECTION, SOLUTION INTRAMUSCULAR; INTRAVENOUS at 08:08

## 2022-05-10 RX ADMIN — MIDAZOLAM 1 MG: 1 INJECTION INTRAMUSCULAR; INTRAVENOUS at 08:15

## 2022-05-10 RX ADMIN — CEFAZOLIN SODIUM 3 G: 10 INJECTION, POWDER, FOR SOLUTION INTRAVENOUS at 07:58

## 2022-05-10 RX ADMIN — PROPOFOL 33 MCG/KG/MIN: 10 INJECTION, EMULSION INTRAVENOUS at 08:00

## 2022-05-10 RX ADMIN — PROPOFOL 25 MG: 10 INJECTION, EMULSION INTRAVENOUS at 08:00

## 2022-05-10 RX ADMIN — MEPIVACAINE HYDROCHLORIDE 20 ML: 15 INJECTION, SOLUTION EPIDURAL; INFILTRATION at 07:25

## 2022-05-10 NOTE — PERIOP NOTES
Pt  in PACU. Pt did not have her insulin last night. Dr Damion Senior notified. Pt to get back on her insulin when she gets home.

## 2022-05-10 NOTE — ANESTHESIA POSTPROCEDURE EVALUATION
Procedure(s):  RIGHT UPPER ARM BASILIC VEIN TRANSPOSITION FISTULA.    regional    Anesthesia Post Evaluation      Multimodal analgesia: multimodal analgesia used between 6 hours prior to anesthesia start to PACU discharge  Patient location during evaluation: PACU  Patient participation: complete - patient participated  Level of consciousness: awake  Pain management: adequate  Airway patency: patent  Anesthetic complications: no  Cardiovascular status: acceptable  Respiratory status: acceptable  Hydration status: acceptable  Comments: Seen, no complaints   Post anesthesia nausea and vomiting:  none  Final Post Anesthesia Temperature Assessment:  Normothermia (36.0-37.5 degrees C)      INITIAL Post-op Vital signs:   Vitals Value Taken Time   /61 05/10/22 0925   Temp     Pulse 82 05/10/22 0927   Resp 14 05/10/22 0927   SpO2 98 % 05/10/22 0927   Vitals shown include unvalidated device data.

## 2022-05-10 NOTE — PROGRESS NOTES
Discharge instructions and medications reviewed with patient and sister. Opportunity was given for patient and responsible party to ask questions. No further questions at this time. Responsible party and patient verbalizes understanding of discharge instructions. A Copy of discharge instructions given to patient. Patient discharged with all belongings.

## 2022-05-10 NOTE — DISCHARGE INSTRUCTIONS
DISCHARGE SUMMARY from Nurse    The following personal items collected during your admission are returned to you:   Dental Appliance: Dental Appliances: None  Vision:    Hearing Aid:    Jewelry: Jewelry: None  Clothing: Clothing: Footwear,Pants,Shirt,Socks,Undergarments  Other Valuables: Other Valuables: None  Valuables sent to safe: ALL CLOTHING/VALUABLES RETURNED TO PATIENT BEFORE D/C HOME    PATIENT INSTRUCTIONS:    The first meal should be something that is light; not greasy or spicy. If this is tolerated, then advance to regular diet as tolerated. Anesthesia Discharge Instructions    After general anesthesia or intervenous sedation, for 24 hours or while taking prescription Narcotics:  · Limit your activities  · Do not drive or operate hazardous machinery  · If you have not urinated within 8 hours after discharge, please contact your surgeon on call. · Do not make important personal or business decisions  · Do not drink alcoholic beverages    Report the following to your surgeon:  · Excessive pain, swelling, redness or odor of or around the surgical area  · Temperature over 100.5 degrees  · Nausea and vomiting lasting longer than 4 hours or if unable to take medication  · Any signs of decreased circulation or nerve impairment to extremity:  Change in color, persistent numbness, tingling, coldness or increased pain. · Any questions    Pain  · Take pain medication as directed by your doctor  ·  Call your doctor if pain is NOT relieved by medication  · DO NOT take aspirin or blood thinners until directed by your doctor       Follow-up Appointments   Procedures    FOLLOW UP VISIT Appointment in: Two Weeks     Standing Status:   Standing     Number of Occurrences:   1     Order Specific Question:   Appointment in     Answer:    Two Weeks         Patient Discharge Instructions    Brock Kline / [id number] : 1965    Admitted 5/10/2022 Discharged: 5/10/2022     Take Home Medications       · It is important that you take the medication exactly as they are prescribed. · Keep your medication in the bottles provided by the pharmacist and keep a list of the medication names, dosages, and times to be taken in your wallet. · Do not take other medications without consulting your doctor. What to do at Home    Recommended diet: Diabetic Diet,     Recommended activity: Activity as tolerated,     Additional Instructions: remove right arm dressings on Thurs and leave open    Follow-up with Dr Zulema Henson in 2 weeks, call [phone] for appt        Information obtained by :  I understand that if any problems occur once I am at home I am to contact my physician. I understand and acknowledge receipt of the instructions indicated above.                                                                                                                                            Physician's or R.N.'s Signature                                                                  Date/Time                                                                                                                                              Patient or Representative Signature                                                          Date/Time

## 2022-05-10 NOTE — BRIEF OP NOTE
Brief Postoperative Note    Patient: Marsha Paz  YOB: 1965  MRN: [id number]    Date of Procedure: 5/10/2022     Pre-Op Diagnosis: END STAGE RENAL    Post-Op Diagnosis: Same as preoperative diagnosis.       Procedure(s):  RIGHT UPPER ARM BASILIC VEIN TRANSPOSITION FISTULA    Surgeon(s):  Delfin Kayser, MD    Surgical Assistant: None    Anesthesia: Regional     Estimated Blood Loss (mL): Minimal    Complications: None    Specimens: * No specimens in log *     Implants: * No implants in log *    Drains:   [REMOVED] External Female Catheter 08/16/20 (Removed)       [REMOVED] External Urinary Catheter 12/12/21 (Removed)       Findings: none    Electronically Signed by Mychal Valadez MD on 5/10/2022 at 9:17 AM

## 2022-05-10 NOTE — ANESTHESIA PROCEDURE NOTES
Peripheral Block    Performed by: Moises Patricio DO  Authorized by: Moises Patricio DO       Pre-procedure: Indications: at surgeon's request and post-op pain management    Preanesthetic Checklist: patient identified, risks and benefits discussed, site marked, timeout performed, anesthesia consent given and patient being monitored      Block Type:   Block Type:  Supraclavicular  Laterality:  Right  Monitoring:  Standard ASA monitoring, continuous pulse ox, frequent vital sign checks, heart rate, responsive to questions and oxygen  Injection Technique:  Single shot  Procedures: ultrasound guided    Patient Position: supine  Prep: chlorhexidine    Location:  Supraclavicular  Needle Type:  Stimuplex  Needle Gauge:  22 G  Needle Localization:  Nerve stimulator and ultrasound guidance  Motor Response comment:   Motor Response: minimal motor response >0.4 mA   Medication Injected:  Mepivacaine 1.5% with epinephrine 1:200,000 injection, 20 mL (Mixture components: EPINEPHrine HCl (PF) 1 mg/mL (1 mL) Soln, . 005 mL; mepivacaine-pf 15 mg/mL (1.5 %) Soln, 1 mL)  ropivacaine (PF) (NAROPIN)(0.5%) 5 mg/mL injection, 10 mL  Med Admin Time: 5/10/2022 7:25 AM    Assessment:  Number of attempts:  1  Injection Assessment:  Incremental injection every 5 mL, local visualized surrounding nerve on ultrasound, negative aspiration for blood, no intravascular symptoms, negative aspiration for CSF, no paresthesia and ultrasound image on chart  Patient tolerance:  Patient tolerated the procedure well with no immediate complications

## 2022-05-10 NOTE — OP NOTES
[address]  OPERATIVE REPORT    Name:  José Luis Crane  MR#:  [id number]  :  1965  ACCOUNT #:  [de-identified]  DATE OF SERVICE:  05/10/2022    PREOPERATIVE DIAGNOSIS:  Renal failure. POSTOPERATIVE DIAGNOSIS:  Renal failure. PROCEDURE PERFORMED:  Right upper arm basilic vein transposition dialysis fistula. SURGEON:  Darcy Juarez MD    ASSISTANT:  Aparna Askew    ANESTHESIA:  Regional block. COMPLICATIONS:  None. SPECIMENS REMOVED:  None. IMPLANTS:  None. ESTIMATED BLOOD LOSS:  Minimal.    INDICATIONS:  The patient is a 49-year-old female with ESRD on dialysis. She will have a right arm fistula placed. PROCEDURE:  The patient's right arm was prepped and draped. A longitudinal incision was made in the distal medial upper arm. The basilic vein was identified and dissected free. In the distal upper arm, the vein branched significantly and the largest of the branch was still somewhat small, but appeared to be adequate. The vein was then dissected proximally ligating side branches with silk ties. A second interrupted incision was made in the proximal medial upper arm. The vein was dissected up to the axilla. The brachial artery was dissected free through a transverse incision at the antecubital level. The vein was then tunneled in the anteromedial upper arm using a counterincision. The vein was brought to lie alongside the brachial artery and was sewn end-to-side using running 6-0 Prolene. At the completion, there was a trace thrill and an augmentable pulse. The incisions were closed with Vicryl subcutaneous suture and skin staples. Dressings were applied and the patient was returned to the recovery room in stable condition.       Indra Oscar MD      [id number]  D:  05/10/2022 9:21  T:  05/10/2022 12:52  JOB #:  [id number]

## 2022-05-12 NOTE — PROGRESS NOTES
Pt received resting receiving breathing treatment. Bleeding at the site of heparin injection given last time . Changed the gown. Seema Lopez  Completed phase II pulmonary rehab and attended 24 sessions from 24. Nikki Caldwell is interested in maintaining optimal health and will work with Kalani PRATER MD. Nikki Caldwell has improved her endurance and stamina through regular exercise. Blood pressure is 149/75 and is not WNL. She has also improved her nutrition, Dyspnea, CAT, and PHQ-9 scores and these were reviewed with patient. Seema Lopez plans to continue exercising at home and gym and has set revised goals that include decreasing LPM needed during exercise and increasing to 30 mins of walking 3-5 days a week.   Shira Rosado, RT  5/12/2022

## 2022-09-26 NOTE — ROUTINE PROCESS
Bedside shift change report given to [facility] (ROVERTO NULL) (oncoming nurse) by Kenisha Morel RN (offgoing nurse). Report included the following information SBAR, Kardex, ED Summary, Procedure Summary, Intake/Output, MAR, Accordion, Recent Results and Cardiac Rhythm NSR.
TRANSFER - OUT REPORT:    Verbal report given to MANUEL Clark(name) on Roselia Later  being transferred to S(unit) for routine progression of care       Report consisted of patients Situation, Background, Assessment and   Recommendations(SBAR). Information from the following report(s) SBAR, ED Summary, [facility] and Recent Results was reviewed with the receiving nurse. Lines:   Peripheral IV 12/20/18 Left Antecubital (Active)   Site Assessment Clean, dry, & intact 12/20/2018  4:11 PM   Phlebitis Assessment 0 12/20/2018  4:11 PM   Infiltration Assessment 0 12/20/2018  4:11 PM   Dressing Status Clean, dry, & intact 12/20/2018  4:11 PM   Dressing Type Transparent 12/20/2018  4:11 PM   Hub Color/Line Status Pink;Flushed;Patent 12/20/2018  4:11 PM        Opportunity for questions and clarification was provided.       Patient transported with:   Monitor  Registered Nurse
show

## 2022-11-14 ENCOUNTER — HOSPITAL ENCOUNTER (EMERGENCY)
Age: 57
Discharge: HOME | End: 2022-11-14
Attending: EMERGENCY MEDICINE
Payer: MEDICAID

## 2022-11-14 ENCOUNTER — APPOINTMENT (OUTPATIENT)
Dept: CT IMAGING | Age: 57
End: 2022-11-14
Attending: EMERGENCY MEDICINE
Payer: MEDICAID

## 2022-11-14 VITALS
HEART RATE: 83 BPM | TEMPERATURE: 97.8 F | BODY MASS INDEX: 40.82 KG/M2 | RESPIRATION RATE: 18 BRPM | DIASTOLIC BLOOD PRESSURE: 70 MMHG | WEIGHT: 245 LBS | SYSTOLIC BLOOD PRESSURE: 155 MMHG | HEIGHT: 65 IN | OXYGEN SATURATION: 97 %

## 2022-11-14 DIAGNOSIS — N18.6 ESRD (END STAGE RENAL DISEASE) ON DIALYSIS (HCC): ICD-10-CM

## 2022-11-14 DIAGNOSIS — S20.211A CONTUSION OF RIB ON RIGHT SIDE, INITIAL ENCOUNTER: Primary | ICD-10-CM

## 2022-11-14 DIAGNOSIS — Z99.2 ESRD (END STAGE RENAL DISEASE) ON DIALYSIS (HCC): ICD-10-CM

## 2022-11-14 LAB
ANION GAP SERPL CALC-SCNC: 13 MMOL/L (ref 5–15)
BUN SERPL-MCNC: 62 MG/DL (ref 6–20)
BUN/CREAT SERPL: 10 (ref 12–20)
CALCIUM SERPL-MCNC: 8.2 MG/DL (ref 8.5–10.1)
CHLORIDE SERPL-SCNC: 97 MMOL/L (ref 97–108)
CO2 SERPL-SCNC: 19 MMOL/L (ref 21–32)
CREAT SERPL-MCNC: 6.19 MG/DL (ref 0.55–1.02)
GLUCOSE SERPL-MCNC: 303 MG/DL (ref 65–100)
POTASSIUM SERPL-SCNC: 4.3 MMOL/L (ref 3.5–5.1)
SODIUM SERPL-SCNC: 129 MMOL/L (ref 136–145)

## 2022-11-14 PROCEDURE — 70450 CT HEAD/BRAIN W/O DYE: CPT

## 2022-11-14 PROCEDURE — 71250 CT THORAX DX C-: CPT

## 2022-11-14 PROCEDURE — 99284 EMERGENCY DEPT VISIT MOD MDM: CPT

## 2022-11-14 PROCEDURE — 36415 COLL VENOUS BLD VENIPUNCTURE: CPT

## 2022-11-14 PROCEDURE — 80048 BASIC METABOLIC PNL TOTAL CA: CPT

## 2022-11-14 PROCEDURE — 74011250637 HC RX REV CODE- 250/637: Performed by: EMERGENCY MEDICINE

## 2022-11-14 RX ORDER — OXYCODONE AND ACETAMINOPHEN 5; 325 MG/1; MG/1
1 TABLET ORAL ONCE
Status: COMPLETED | OUTPATIENT
Start: 2022-11-14 | End: 2022-11-14

## 2022-11-14 RX ORDER — OXYCODONE AND ACETAMINOPHEN 5; 325 MG/1; MG/1
1 TABLET ORAL
Qty: 12 TABLET | Refills: 0 | Status: SHIPPED | OUTPATIENT
Start: 2022-11-14 | End: 2022-11-17

## 2022-11-14 RX ADMIN — OXYCODONE AND ACETAMINOPHEN 1 TABLET: 5; 325 TABLET ORAL at 15:54

## 2022-11-14 NOTE — ED PROVIDER NOTES
EMERGENCY DEPARTMENT HISTORY AND PHYSICAL EXAM      Date: 11/14/2022  Patient Name: Vish Jason    History of Presenting Illness     Chief Complaint   Patient presents with    Rib Pain     Pt arrives to ED via EMS with a cc of right sided rib pain secondary to a GLF that happened on Saturday; pt denies hitting head; pt does take blood thinning medication; pt is a MWF dialysis patient and did not receive treatment today d/t pain       History Provided By: Patient    HPI: Vish Jason, 64 y.o. female  presents to the ED with cc of right-sided rib pain. Patient states she fell and hit the right side of her ribs against the toilet 3 days ago. She has been having pain with movement and deep breaths. No hemoptysis. Denies any head trauma. No neck pain. Patient has been taking tramadol at home which she takes for chronic pain but this is not relieving her rib pain. Patient is on dialysis Monday Wednesday Friday. Patient states she missed today. She plans to go on Wednesday.     Past History     Past Medical History:  Past Medical History:   Diagnosis Date    Arthritis     Asthma     Cancer ([address]     CHF (congestive heart failure) ([address] 2020    Chronic back pain     Chronic kidney disease     Diabetes ([address]     Diabetic retinopathy [address] [address] [facility]     Hypertension     Kidney failure 01/2022    Liver disease     MRSA (methicillin resistant staph aureus) culture positive     labial abscess, negative test 2020    Neuropathy     Sleep apnea     Appointment made but not attended    Stroke [facility]) 2018       Past Surgical History:  Past Surgical History:   Procedure Laterality Date    HX CARPAL TUNNEL RELEASE Right 1986    HX FRACTURE TX Right     HUMERUS    HX HEENT      tonsillectomy    HX ORTHOPAEDIC      left ft bunionectomy x2    HX OTHER SURGICAL      lanced labial abscess    HX TONSILLECTOMY      HX VASCULAR ACCESS      dialysis, rt upper chest       Medications:  No current facility-administered medications on file prior to encounter. Current Outpatient Medications on File Prior to Encounter   Medication Sig Dispense Refill    bumetanide (BUMEX) 2 mg tablet Take 1 Tablet by mouth daily. (Patient taking differently: Take 2 mg by mouth two (2) times a day.) 10 Tablet 0    gabapentin (NEURONTIN) 300 mg capsule Take 2 Capsules by mouth three (3) times daily. Max Daily Amount: 1,800 mg. 30 Capsule 0    traMADoL (ULTRAM) 50 mg tablet Take 50 mg by mouth three (3) times daily as needed for Pain.      metoprolol tartrate (LOPRESSOR) 25 mg tablet Take 25 mg by mouth two (2) times a day. insulin aspart U-100 (NovoLOG Flexpen U-100 Insulin) 100 unit/mL (3 mL) inpn 10 Units by SubCUTAneous route Before breakfast, lunch, and dinner. nystatin (MYCOSTATIN) powder Apply  to affected area two (2) times daily as needed. amLODIPine (NORVASC) 10 mg tablet Take 10 mg by mouth nightly. Flovent  mcg/actuation inhaler Take 2 Puffs by inhalation two (2) times a day. albuterol (PROVENTIL HFA, VENTOLIN HFA, PROAIR HFA) 90 mcg/actuation inhaler Take 2 Puffs by inhalation every six (6) hours as needed for Wheezing. Lantus Solostar U-100 Insulin 100 unit/mL (3 mL) inpn 20 Units by SubCUTAneous route nightly. fluticasone propionate (Flonase Allergy Relief) 50 mcg/actuation nasal spray 2 SPRAY, Each Nostril, daily, for allergy symptoms, 11 Refills, Dispense: 1, bottle, Pharmacy [facility]      aspirin delayed-release 81 mg tablet Take 81 mg by mouth daily. amitriptyline (ELAVIL) 50 mg tablet Take 50 mg by mouth nightly. docusate sodium (Colace) 100 mg capsule Take 100 mg by mouth two (2) times daily as needed. atorvastatin (LIPITOR) 80 mg tablet Take 1 Tab by mouth nightly. 30 Tab 0    clopidogrel (PLAVIX) 75 mg tab Take 1 Tab by mouth daily.  27 Tab 0       Family History:  Family History   Problem Relation Age of Onset    Diabetes Father     Kidney Disease Father     No Known Problems Sister Diabetes Sister     Anesth Problems Neg Hx        Social History:  Social History     Tobacco Use    Smoking status: Never    Smokeless tobacco: Never   Vaping Use    Vaping Use: Never used   Substance Use Topics    Alcohol use: No    Drug use: No       Allergies: Allergies   Allergen Reactions    Amoxicillin Nausea Only    Aspirin Other (comments)     \"nosebleeds\" but patient takes daily aspirin 81 mg at home. Patient states naproxen ok    Ciprofloxacin Hives       All the above components of the past  history are auto-populated from the electronic record. They have been reviewed and the patient has been interviewed for any pertinent past history that pertains to the patient's chief complaint and reason for visit. Not all pre-populated components may be accurate at the time this note was generated. Review of Systems   Review of Systems   Constitutional:  Negative for chills and fever. HENT:  Negative for congestion, ear pain, rhinorrhea, sore throat and trouble swallowing. Eyes:  Negative for visual disturbance. Respiratory:  Negative for cough, chest tightness and shortness of breath. Cardiovascular:  Negative for chest pain and palpitations. Gastrointestinal:  Negative for abdominal pain, blood in stool, constipation, diarrhea, nausea and vomiting. Genitourinary:  Negative for decreased urine volume, difficulty urinating, dysuria and frequency. Musculoskeletal:  Negative for back pain and neck pain. Right rib pain   Skin:  Negative for color change and rash. Neurological:  Negative for dizziness, weakness, light-headedness and headaches. Physical Exam   Physical Exam  Vitals and nursing note reviewed. Constitutional:       General: She is not in acute distress. Appearance: She is well-developed. She is obese. She is not ill-appearing. HENT:      Head: Normocephalic.    Eyes:      Conjunctiva/sclera: Conjunctivae normal.   Cardiovascular:      Rate and Rhythm: Normal rate and regular rhythm. Pulmonary:      Effort: Pulmonary effort is normal. No accessory muscle usage or respiratory distress. Abdominal:      General: There is no distension. Musculoskeletal:      Cervical back: Normal range of motion. Skin:     General: Skin is warm and dry. Neurological:      Mental Status: She is alert and oriented to person, place, and time. Diagnostic Study Results     Labs -     Recent Results (from the past 24 hour(s))   METABOLIC PANEL, BASIC    Collection Time: 11/14/22  3:46 PM   Result Value Ref Range    Sodium 129 (L) 136 - 145 mmol/L    Potassium 4.3 3.5 - 5.1 mmol/L    Chloride 97 97 - 108 mmol/L    CO2 19 (L) 21 - 32 mmol/L    Anion gap 13 5 - 15 mmol/L    Glucose 303 (H) 65 - 100 mg/dL    BUN 62 (H) 6 - 20 MG/DL    Creatinine 6.19 (H) 0.55 - 1.02 MG/DL    BUN/Creatinine ratio 10 (L) 12 - 20      eGFR 7 (L) >60 ml/min/1.73m2    Calcium 8.2 (L) 8.5 - 10.1 MG/DL       Radiologic Studies -   CT HEAD WO CONT   Final Result         No change or acute abnormality      CT CHEST WO CONT   Final Result   1. No acute abnormality   2. 2 mm nodule left chest      Guidelines by the Fleischner society (Radiology 2017 special report) suggest   that patients with low risk for lung cancer and solid nodule(s) less than or   equal to 6 mm in diameter require no follow-up. In patients with a higher risk,   such as smokers, follow-up noncontrast chest CT should be considered at 12   months. Patients with a known malignancy are at increased risk for metastasis   and should receive a three month follow-up. .         CT Results  (Last 48 hours)                 11/14/22 1330  CT HEAD WO CONT Final result    Impression:          No change or acute abnormality       Narrative:  EXAM: CT HEAD WO CONT       INDICATION: trauma       COMPARISON: 12/1/2021. CONTRAST: None. TECHNIQUE: Unenhanced CT of the head was performed using 5 mm images.  Brain and   bone windows were generated. Coronal and sagittal reformats. CT dose reduction   was achieved through use of a standardized protocol tailored for this   examination and automatic exposure control for dose modulation. FINDINGS:   The ventricles and sulci are normal in size, shape and configuration. . Mild   low-density in the periventricular white matter unchanged. There is no   intracranial hemorrhage, extra-axial collection, or mass effect. The basilar   cisterns are open. No CT evidence of acute infarct. The bone windows demonstrate no abnormalities. The visualized portions of the   paranasal sinuses and mastoid air cells are clear. 11/14/22 1330  CT CHEST WO CONT Final result    Impression:  1. No acute abnormality   2. 2 mm nodule left chest       Guidelines by the Fleischner society (Radiology 2017 special report) suggest   that patients with low risk for lung cancer and solid nodule(s) less than or   equal to 6 mm in diameter require no follow-up. In patients with a higher risk,   such as smokers, follow-up noncontrast chest CT should be considered at 12   months. Patients with a known malignancy are at increased risk for metastasis   and should receive a three month follow-up. .        Narrative:  EXAM:  CT CHEST WO CONT   INDICATION:  fall, rib pain   COMPARISON: 4/8/2022. .   TECHNIQUE: Unenhanced multislice helical CT was performed from the thoracic   inlet to the adrenal glands without intravenous contrast administration. Contiguous 5 mm axial images were reconstructed and lung and soft tissue windows   were generated. Coronal and sagittal reformations were generated. CT dose   reduction was achieved through use of a standardized protocol tailored for this   examination and automatic exposure control for dose modulation. Talia Cline FINDINGS:   CHEST:   Chest wall/thoracic inlet: Within normal limits. Thyroid: Within normal limits. Mediastinum/seferino:  There are no pathologically enlarged mediastinal, hilar or   axillary nodes. Small hiatal hernia   Heart/vessels: Heart is normal in size. Minimal coronary artery calcifications. Aortic arch is normal in caliber. Mild vascular calcifications. Lungs/Pleura: 2 mm noncalcified nodule along the left oblique fissure series 4   image 53 no consolidation. No pleural effusion. No pneumothorax. .   ABDOMEN:   Incidentally imaged portions of the abdomen appear unremarkable. .   MSK: Degenerative change with osteopenia. A displaced fracture is not   demonstrated. .             CXR Results  (Last 48 hours)      None              Medical Decision Making     I reviewed the vital signs, available nursing notes, past medical history, past surgical history, family history and social history. Vital Signs-I have reviewed the vital signs that have been made available during the patient's emergency department visit. The vital signs auto-populated below are obtained mostly by electronic means through monitoring devices that have been downloaded into the patient's chart by the nursing staff. Some vital signs are not downloaded into the chart until after the patient has been discharged and this note has been completed, therefore some vital signs may not be available to the physician for review prior to patient's discharge or admission. The physician has reviewed the patient's triage vital signs, monitored the electronic monitoring devices remotely for any significant vital sign abnormalities, and have reviewed vital signs prior to discharge. Some vital signs reviewed at bedside or remotely utilizing electronic monitoring devices may be different than the vital signs downloaded into the electronic medical record. Some vital signs may be erroneous and inaccurate since they are obtained by electronic monitoring devices, and not all vital signs are verified for accuracy by nursing staff prior to downloading into the patient's chart.   Patient Vitals for the past 24 hrs:   Temp Pulse Resp BP SpO2   11/14/22 1419 97.8 °F (36.6 °C) 83 18 (!) 155/70 97 %   11/14/22 1144 97.8 °F (36.6 °C) 94 16 (!) 141/66 95 %         Records Reviewed: Nursing notes for today's visit have been reviewed. I have also reviewed most recent medical records pertinent to today's complaints, if available in our medical record system. I have also reviewed all labs and imaging results from previous results in comparison to results obtained today. If an EKG was obtained today, it has been compared to previous EKGs, if available. If arriving via EMS, the EMS report has been reviewed if made available to us within the patient's time in the emergency department. ED Course and Medical Decision Making:       MDM  Number of Diagnoses or Management Options  Contusion of rib on right side, initial encounter  ESRD (end stage renal disease) on dialysis [facility])  Diagnosis management comments: Patient presents 3 days after a fall with right rib pain. CT imaging of the chest does not show any rib fractures. No pneumothorax. No pulmonary contusion. No pleural effusion. Patient also has a history of end-stage renal disease on dialysis. She is missing dialysis today but her potassium was 4.3. She is not volume overloaded. Vital signs are stable. Advised patient to call her dialysis center tomorrow or in regards to getting dialyzed. Since it has been 3 days since her last dialysis I told her she probably should not wait till Wednesday without consulting with her dialysis and her nephrologist.  She does not need emergent dialysis today based on her labs and clinical presentation.        Amount and/or Complexity of Data Reviewed  Clinical lab tests: ordered and reviewed  Tests in the radiology section of CPT®: ordered and reviewed  Review and summarize past medical records: yes    Risk of Complications, Morbidity, and/or Mortality  Presenting problems: moderate  Diagnostic procedures: moderate  Management options: low    Patient Progress  Patient progress: stable           Orders Placed This Encounter    CT HEAD WO CONT    CT CHEST WO CONT    BASIC METABOLIC PANEL    oxyCODONE-acetaminophen (PERCOCET) 5-325 mg per tablet 1 Tablet    oxyCODONE-acetaminophen (Percocet) 5-325 mg per tablet       Procedures      Critical Care Time:   0    Disposition:  Discharge    The patient's emergency department evaluation is now complete. I have reviewed all labs, imaging, and pertinent information. I have discussed all results with the patient and/or family. Based on our evaluation today I do believe that the patient is safe to be discharged home. The patient has been provided with at home instructions that are pertinent to their complaint today, although these may not be specific to the exact diagnosis. I have reviewed the patient's home medications and attempted to reconcile if not already done so by pharmacy or nursing staff. I have discussed all new prescriptions with the patient. The patient has been encouraged to follow-up with primary care doctor and/or specialist, and these have been discussed with the patient. The patient has been advised that they may return to the emergency department if they have any worsening symptoms and or new symptoms that are of concern to them. Verbal discharge instructions may have also been provided to the patient that may not be specifically contained in the written discharge instructions. The patient has been given opportunity to ask questions prior to discharge. PLAN:  1. Current Discharge Medication List        START taking these medications    Details   oxyCODONE-acetaminophen (Percocet) 5-325 mg per tablet Take 1 Tablet by mouth every six (6) hours as needed for Pain for up to 3 days. Max Daily Amount: 4 Tablets. Qty: 12 Tablet, Refills: 0  Start date: 11/14/2022, End date: 11/17/2022    Associated Diagnoses: Contusion of rib on right side, initial encounter           2. Follow-up Information       Follow up With Specialties Details Why Contact Info    Nadeen Bucio MD Family Medicine Schedule an appointment as soon as possible for a visit in 1 week  [address] [phone]            Return to ED if worse     Diagnosis     Clinical Impression:   1. Contusion of rib on right side, initial encounter    2.  ESRD (end stage renal disease) on dialysis ([facility] [address]

## 2022-11-14 NOTE — DISCHARGE INSTRUCTIONS
It was a pleasure taking care of you in our Emergency Department today. We know that when you come to [facility], you are entrusting us with your health, comfort, and safety. Our physicians and nurses honor that trust, and truly appreciate the opportunity to care for you and your loved ones. We also value your feedback. If you receive a survey about your Emergency Department experience today, please fill it out. We care about our patients' feedback, and we listen to what you have to say. Please read over your discharge instructions as these contain pertinent information to help you in the healing process. These instructions include a list of prescriptions you were given today. Follow-up information is also noted on your discharge papers. There are attached instructions and information pertaining to the reason why you were seen in the emergency department today. These discharge instructions may not be for exactly why you were here, but may be the closest available instructions that we have. These include important advice for things that you can do at home to feel better, and reasons to return to the emergency department. The evaluation and treatment you received in the emergency department is not always definitive care. If follow-up with your primary care doctor or specialist was recommended, it is important that you make these appointments for follow-up care. You may need further testing, procedures, and/or medications to help you feel better. Further tests may be required that are not available in the emergency department. Failure to make these follow-up appointments may jeopardize your health. The emergency department is here for emergent stabilization and evaluation of life and limb threatening illness and/or injuries.   Further care through a specialist or primary care doctor may be required to assist in your healing and complete your treatment and/or evaluation. We may not always be able to make a diagnosis in the emergency department, or things may change that will alter your diagnosis. Our primary goal is to ensure that nothing serious is occurring and that you are stable to continue your treatment and evaluation at home as an outpatient. Of course, if things change, and you feel worse, you are always encouraged to return to the emergency department for re-evaluation. Lab Results Today:  Recent Results (from the past 8 hour(s))   METABOLIC PANEL, BASIC    Collection Time: 11/14/22  3:46 PM   Result Value Ref Range    Sodium 129 (L) 136 - 145 mmol/L    Potassium 4.3 3.5 - 5.1 mmol/L    Chloride 97 97 - 108 mmol/L    CO2 19 (L) 21 - 32 mmol/L    Anion gap 13 5 - 15 mmol/L    Glucose 303 (H) 65 - 100 mg/dL    BUN 62 (H) 6 - 20 MG/DL    Creatinine 6.19 (H) 0.55 - 1.02 MG/DL    BUN/Creatinine ratio 10 (L) 12 - 20      eGFR 7 (L) >60 ml/min/1.73m2    Calcium 8.2 (L) 8.5 - 10.1 MG/DL        Radiology Results Today:  CT HEAD WO CONT    Result Date: 11/14/2022  No change or acute abnormality    CT CHEST WO CONT    Result Date: 11/14/2022  1. No acute abnormality 2. 2 mm nodule left chest Guidelines by the Fleischner society (Radiology 2017 special report) suggest that patients with low risk for lung cancer and solid nodule(s) less than or equal to 6 mm in diameter require no follow-up. In patients with a higher risk, such as smokers, follow-up noncontrast chest CT should be considered at 12 months. Patients with a known malignancy are at increased risk for metastasis and should receive a three month follow-up. Thelma Page

## 2023-01-16 ENCOUNTER — APPOINTMENT (OUTPATIENT)
Dept: CT IMAGING | Age: 58
End: 2023-01-16
Attending: STUDENT IN AN ORGANIZED HEALTH CARE EDUCATION/TRAINING PROGRAM
Payer: MEDICAID

## 2023-01-16 ENCOUNTER — APPOINTMENT (OUTPATIENT)
Dept: GENERAL RADIOLOGY | Age: 58
End: 2023-01-16
Attending: STUDENT IN AN ORGANIZED HEALTH CARE EDUCATION/TRAINING PROGRAM
Payer: MEDICAID

## 2023-01-16 ENCOUNTER — HOSPITAL ENCOUNTER (EMERGENCY)
Age: 58
Discharge: HOME | End: 2023-01-16
Attending: STUDENT IN AN ORGANIZED HEALTH CARE EDUCATION/TRAINING PROGRAM
Payer: MEDICAID

## 2023-01-16 VITALS
BODY MASS INDEX: 39.37 KG/M2 | WEIGHT: 245 LBS | SYSTOLIC BLOOD PRESSURE: 91 MMHG | OXYGEN SATURATION: 100 % | HEART RATE: 81 BPM | HEIGHT: 66 IN | RESPIRATION RATE: 20 BRPM | DIASTOLIC BLOOD PRESSURE: 70 MMHG | TEMPERATURE: 98.1 F

## 2023-01-16 DIAGNOSIS — M54.17 LUMBOSACRAL RADICULOPATHY AT L5: Primary | ICD-10-CM

## 2023-01-16 LAB
ALBUMIN SERPL-MCNC: 3.4 G/DL (ref 3.5–5)
ALBUMIN/GLOB SERPL: 0.9 (ref 1.1–2.2)
ALP SERPL-CCNC: 157 U/L (ref 45–117)
ALT SERPL-CCNC: 20 U/L (ref 12–78)
ANION GAP SERPL CALC-SCNC: 10 MMOL/L (ref 5–15)
AST SERPL-CCNC: 12 U/L (ref 15–37)
BASOPHILS # BLD: 0 K/UL (ref 0–0.1)
BASOPHILS NFR BLD: 1 % (ref 0–1)
BILIRUB SERPL-MCNC: 0.3 MG/DL (ref 0.2–1)
BUN SERPL-MCNC: 71 MG/DL (ref 6–20)
BUN/CREAT SERPL: 13 (ref 12–20)
CALCIUM SERPL-MCNC: 8.2 MG/DL (ref 8.5–10.1)
CHLORIDE SERPL-SCNC: 92 MMOL/L (ref 97–108)
CO2 SERPL-SCNC: 26 MMOL/L (ref 21–32)
CREAT SERPL-MCNC: 5.46 MG/DL (ref 0.55–1.02)
DIFFERENTIAL METHOD BLD: ABNORMAL
EOSINOPHIL # BLD: 0.2 K/UL (ref 0–0.4)
EOSINOPHIL NFR BLD: 3 % (ref 0–7)
ERYTHROCYTE [DISTWIDTH] IN BLOOD BY AUTOMATED COUNT: 15.1 % (ref 11.5–14.5)
GLOBULIN SER CALC-MCNC: 3.9 G/DL (ref 2–4)
GLUCOSE SERPL-MCNC: 327 MG/DL (ref 65–100)
HCT VFR BLD AUTO: 27.5 % (ref 35–47)
HGB BLD-MCNC: 9.5 G/DL (ref 11.5–16)
IMM GRANULOCYTES # BLD AUTO: 0.1 K/UL (ref 0–0.04)
IMM GRANULOCYTES NFR BLD AUTO: 1 % (ref 0–0.5)
LYMPHOCYTES # BLD: 1.7 K/UL (ref 0.8–3.5)
LYMPHOCYTES NFR BLD: 23 % (ref 12–49)
MCH RBC QN AUTO: 30.7 PG (ref 26–34)
MCHC RBC AUTO-ENTMCNC: 34.5 G/DL (ref 30–36.5)
MCV RBC AUTO: 89 FL (ref 80–99)
MONOCYTES # BLD: 0.5 K/UL (ref 0–1)
MONOCYTES NFR BLD: 7 % (ref 5–13)
NEUTS SEG # BLD: 5 K/UL (ref 1.8–8)
NEUTS SEG NFR BLD: 65 % (ref 32–75)
NRBC # BLD: 0 K/UL (ref 0–0.01)
NRBC BLD-RTO: 0 PER 100 WBC
PLATELET # BLD AUTO: 187 K/UL (ref 150–400)
PMV BLD AUTO: 9.3 FL (ref 8.9–12.9)
POTASSIUM SERPL-SCNC: 4.5 MMOL/L (ref 3.5–5.1)
PROT SERPL-MCNC: 7.3 G/DL (ref 6.4–8.2)
RBC # BLD AUTO: 3.09 M/UL (ref 3.8–5.2)
SODIUM SERPL-SCNC: 128 MMOL/L (ref 136–145)
WBC # BLD AUTO: 7.6 K/UL (ref 3.6–11)

## 2023-01-16 PROCEDURE — 80053 COMPREHEN METABOLIC PANEL: CPT

## 2023-01-16 PROCEDURE — 72192 CT PELVIS W/O DYE: CPT

## 2023-01-16 PROCEDURE — 73502 X-RAY EXAM HIP UNI 2-3 VIEWS: CPT

## 2023-01-16 PROCEDURE — 96374 THER/PROPH/DIAG INJ IV PUSH: CPT

## 2023-01-16 PROCEDURE — 85025 COMPLETE CBC W/AUTO DIFF WBC: CPT

## 2023-01-16 PROCEDURE — 99285 EMERGENCY DEPT VISIT HI MDM: CPT

## 2023-01-16 PROCEDURE — 74011250636 HC RX REV CODE- 250/636: Performed by: STUDENT IN AN ORGANIZED HEALTH CARE EDUCATION/TRAINING PROGRAM

## 2023-01-16 PROCEDURE — 36415 COLL VENOUS BLD VENIPUNCTURE: CPT

## 2023-01-16 PROCEDURE — 93005 ELECTROCARDIOGRAM TRACING: CPT

## 2023-01-16 RX ORDER — FENTANYL CITRATE 50 UG/ML
50 INJECTION, SOLUTION INTRAMUSCULAR; INTRAVENOUS ONCE
Status: COMPLETED | OUTPATIENT
Start: 2023-01-16 | End: 2023-01-16

## 2023-01-16 RX ORDER — OXYCODONE AND ACETAMINOPHEN 5; 325 MG/1; MG/1
1 TABLET ORAL
Qty: 8 TABLET | Refills: 0 | Status: SHIPPED | OUTPATIENT
Start: 2023-01-16 | End: 2023-01-19

## 2023-01-16 RX ADMIN — FENTANYL CITRATE 50 MCG: 50 INJECTION, SOLUTION INTRAMUSCULAR; INTRAVENOUS at 11:50

## 2023-01-16 NOTE — ED PROVIDER NOTES
[facility] EMERGENCY DEPT  EMERGENCY DEPARTMENT ENCOUNTER       Pt Name: Teto Oshea  MRN: [id number]  [place] 1965  Date of evaluation: 1/16/2023  Provider: Justin Madrid MD   PCP: Darnell Solorio MD  Note Started: 11:14 AM 1/16/23     CHIEF COMPLAINT       Chief Complaint   Patient presents with    Hip Pain     Left hip pain that radiates to groin after adjusting in bed yesterday; Other     Patient is scheduled for dialysis today but is missing to be here. Missed Friday dialysis        HISTORY OF PRESENT ILLNESS: 1 or more elements      History From: patient, History limited by: none     Teto Oshea is a 62 y.o. female who presents with left hip pain. She notes it started yesterday when she was getting up to her walker. Denies hearing a crack or pop. Notes since then she has had consistent pain. Took some tramadol did not help. Notes some chronic neuropathy of the bilateral lower extremities and difficulty moving her toes but that is chronic. Denies any new changes in neuro status of her bilateral lower extremities. Notes she gets around with a walker normally but she has been \"dragging\" her leg more instead of using it. She is an ESRD patient due for dialysis today but missed to come here. Denies any fever, chest pain, shortness of breath. She does make urine. Has been having headaches recently but not currently. Nursing Notes were all reviewed and agreed with or any disagreements were addressed in the HPI. REVIEW OF SYSTEMS        Positives and Pertinent negatives as per HPI.     PAST HISTORY     Past Medical History:  Past Medical History:   Diagnosis Date    Arthritis     Asthma     Cancer ([facility] [address]     CHF (congestive heart failure) ([address] 2020    Chronic back pain     Chronic kidney disease     Diabetes ([facility] [address]     Diabetic retinopathy ([facility] [address]     Hypertension     Kidney failure 01/2022    Liver disease     MRSA (methicillin resistant staph aureus) culture positive     labial abscess, negative test 2020    Neuropathy     Sleep apnea     Appointment made but not attended    Stroke [facility]) 2018       Past Surgical History:  Past Surgical History:   Procedure Laterality Date    HX CARPAL TUNNEL RELEASE Right 1986    HX FRACTURE TX Right     HUMERUS    HX HEENT      tonsillectomy    HX ORTHOPAEDIC      left ft bunionectomy x2    HX OTHER SURGICAL      lanced labial abscess    HX TONSILLECTOMY      HX VASCULAR ACCESS      dialysis, rt upper chest       Family History:  Family History   Problem Relation Age of Onset    Diabetes Father     Kidney Disease Father     No Known Problems Sister     Diabetes Sister     Anesth Problems Neg Hx        Social History:  Social History     Tobacco Use    Smoking status: Never    Smokeless tobacco: Never   Vaping Use    Vaping Use: Never used   Substance Use Topics    Alcohol use: No    Drug use: No       Allergies: Allergies   Allergen Reactions    Amoxicillin Nausea Only    Aspirin Other (comments)     \"nosebleeds\" but patient takes daily aspirin 81 mg at home. Patient states naproxen ok    Ciprofloxacin Hives       CURRENT MEDICATIONS      Previous Medications    ALBUTEROL (PROVENTIL HFA, VENTOLIN HFA, PROAIR HFA) 90 MCG/ACTUATION INHALER    Take 2 Puffs by inhalation every six (6) hours as needed for Wheezing. AMITRIPTYLINE (ELAVIL) 50 MG TABLET    Take 50 mg by mouth nightly. AMLODIPINE (NORVASC) 10 MG TABLET    Take 10 mg by mouth nightly. ASPIRIN DELAYED-RELEASE 81 MG TABLET    Take 81 mg by mouth daily. ATORVASTATIN (LIPITOR) 80 MG TABLET    Take 1 Tab by mouth nightly. BUMETANIDE (BUMEX) 2 MG TABLET    Take 1 Tablet by mouth daily. CLOPIDOGREL (PLAVIX) 75 MG TAB    Take 1 Tab by mouth daily. DOCUSATE SODIUM (COLACE) 100 MG CAPSULE    Take 100 mg by mouth two (2) times daily as needed. FLOVENT  MCG/ACTUATION INHALER    Take 2 Puffs by inhalation two (2) times a day.     FLUTICASONE PROPIONATE (FLONASE ALLERGY RELIEF) 50 MCG/ACTUATION NASAL SPRAY    2 SPRAY, Each Nostril, daily, for allergy symptoms, 11 Refills, Dispense: 1, bottle, Pharm[facility]    GABAPENTIN (NEURONTIN) 300 MG CAPSULE    Take 2 Capsules by mouth three (3) times daily. Max Daily Amount: 1,800 mg. INSULIN ASPART U-100 (NOVOLOG FLEXPEN U-100 INSULIN) 100 UNIT/ML (3 ML) INPN    10 Units by SubCUTAneous route Before breakfast, lunch, and dinner. LANTUS SOLOSTAR U-100 INSULIN 100 UNIT/ML (3 ML) INPN    20 Units by SubCUTAneous route nightly. METOPROLOL TARTRATE (LOPRESSOR) 25 MG TABLET    Take 25 mg by mouth two (2) times a day. NYSTATIN (MYCOSTATIN) POWDER    Apply  to affected area two (2) times daily as needed. SCREENINGS               No data recorded         PHYSICAL EXAM      ED Triage Vitals [01/16/23 1019]   ED Encounter Vitals Group      /85      Pulse (Heart Rate) 81      Resp Rate 20      Temp 98.1 °F (36.7 °C)      Temp src       O2 Sat (%) 100 %      Weight 245 lb      Height 5' 5.5\"        Physical Exam  Vitals and nursing note reviewed. HENT:      Head: Normocephalic and atraumatic. Cardiovascular:      Rate and Rhythm: Normal rate and regular rhythm. Musculoskeletal:      Cervical back: Normal range of motion and neck supple. Comments: Pain ovation over left greater trochanter and left superior iliac spine. Unable to range hip secondary to pain. Decree sensation bilateral lower extremities chronic. Able to range ankles. No pain in the knee or femur. DPs 2+ bilaterally.         DIAGNOSTIC RESULTS   LABS:     Recent Results (from the past 12 hour(s))   EKG, 12 LEAD, INITIAL    Collection Time: 01/16/23 11:08 AM   Result Value Ref Range    Ventricular Rate 80 BPM    Atrial Rate 80 BPM    P-R Interval 196 ms    QRS Duration 78 ms    Q-T Interval 386 ms    QTC Calculation (Bezet) 445 ms    Calculated P Axis 38 degrees    Calculated R Axis -2 degrees    Calculated T Axis 53 degrees    Diagnosis Normal sinus rhythm  Low voltage QRS  Possible Inferior infarct , age undetermined  Cannot rule out Anterior infarct (cited on or before 27-NOV-2021)  When compared with ECG of 10-DEC-2021 12:16,  WV interval has decreased  Questionable change in initial forces of Septal leads     METABOLIC PANEL, COMPREHENSIVE    Collection Time: 01/16/23 11:27 AM   Result Value Ref Range    Sodium 128 (L) 136 - 145 mmol/L    Potassium 4.5 3.5 - 5.1 mmol/L    Chloride 92 (L) 97 - 108 mmol/L    CO2 26 21 - 32 mmol/L    Anion gap 10 5 - 15 mmol/L    Glucose 327 (H) 65 - 100 mg/dL    BUN 71 (H) 6 - 20 MG/DL    Creatinine 5.46 (H) 0.55 - 1.02 MG/DL    BUN/Creatinine ratio 13 12 - 20      eGFR 9 (L) >60 ml/min/1.73m2    Calcium 8.2 (L) 8.5 - 10.1 MG/DL    Bilirubin, total 0.3 0.2 - 1.0 MG/DL    ALT (SGPT) 20 12 - 78 U/L    AST (SGOT) 12 (L) 15 - 37 U/L    Alk. phosphatase 157 (H) 45 - 117 U/L    Protein, total 7.3 6.4 - 8.2 g/dL    Albumin 3.4 (L) 3.5 - 5.0 g/dL    Globulin 3.9 2.0 - 4.0 g/dL    A-G Ratio 0.9 (L) 1.1 - 2.2     CBC WITH AUTOMATED DIFF    Collection Time: 01/16/23 11:27 AM   Result Value Ref Range    WBC 7.6 3.6 - 11.0 K/uL    RBC 3.09 (L) 3.80 - 5.20 M/uL    HGB 9.5 (L) 11.5 - 16.0 g/dL    HCT 27.5 (L) 35.0 - 47.0 %    MCV 89.0 80.0 - 99.0 FL    MCH 30.7 26.0 - 34.0 PG    MCHC 34.5 30.0 - 36.5 g/dL    RDW 15.1 (H) 11.5 - 14.5 %    PLATELET 874 770 - 073 K/uL    MPV 9.3 8.9 - 12.9 FL    NRBC 0.0 0  WBC    ABSOLUTE NRBC 0.00 0.00 - 0.01 K/uL    NEUTROPHILS 65 32 - 75 %    LYMPHOCYTES 23 12 - 49 %    MONOCYTES 7 5 - 13 %    EOSINOPHILS 3 0 - 7 %    BASOPHILS 1 0 - 1 %    IMMATURE GRANULOCYTES 1 (H) 0.0 - 0.5 %    ABS. NEUTROPHILS 5.0 1.8 - 8.0 K/UL    ABS. LYMPHOCYTES 1.7 0.8 - 3.5 K/UL    ABS. MONOCYTES 0.5 0.0 - 1.0 K/UL    ABS. EOSINOPHILS 0.2 0.0 - 0.4 K/UL    ABS. BASOPHILS 0.0 0.0 - 0.1 K/UL    ABS. IMM. GRANS. 0.1 (H) 0.00 - 0.04 K/UL    DF AUTOMATED          EKG:  If performed, independent interpretation documented below in the MDM section     RADIOLOGY:  Non-plain film images such as CT, Ultrasound and MRI are read by the radiologist. Plain radiographic images are visualized and preliminarily interpreted by the ED Provider with the findings documented in the MDM section. Interpretation per the Radiologist below, if available at the time of this note:     XR HIP LT W OR WO PELV 2-3 VWS    Result Date: 1/16/2023  EXAM: XR HIP LT W OR WO PELV 2-3 VWS INDICATION: left hip pain. COMPARISON: 12/21/2018. FINDINGS: AP view of the pelvis and a frogleg lateral view of the left hip demonstrate no fracture, dislocation or other acute abnormality. Bones are osteopenic     No acute abnormality. CT PELV WO CONT    Result Date: 1/16/2023  INDICATION: left hip pain status post fall COMPARISON: Images from CT of the abdomen and pelvis dated 12/1/2021 EXAM: Axial CT imaging of the bony pelvis with coronal and sagittal reformations. CT dose reduction was achieved through use of a standardized protocol tailored for this examination and automatic exposure control for dose modulation. FINDINGS: Bone mineralization is within normal limits. There is no acute fracture or dislocation demonstrated. There is moderate bilateral hip and SI joint osteoarthrosis. Lower lumbar degenerative spondylosis is shown again noted to be severe at the L5-S1 level. No acute intrapelvic findings are demonstrated. There are extensive atherosclerotic calcifications, including the small vessels. There is bilateral fatty atrophy of the tensor fascia austyn gluteus gloria. No localized soft tissue collection is shown. Degenerative findings. No acute fracture or dislocation.         PROCEDURES   Unless otherwise noted below, none  Procedures     CRITICAL CARE TIME   none    EMERGENCY DEPARTMENT COURSE and DIFFERENTIAL DIAGNOSIS/MDM   Vitals:    Vitals:    01/16/23 1019 01/16/23 1127 01/16/23 1301   BP: 123/85 138/68 91/70   Pulse: 81     Resp: 20     Temp: 98.1 °F (36.7 °C)     SpO2: 100% 99% 100%   Weight: 111.1 kg (245 lb)     Height: 5' 5.5\" (1.664 m)          Patient was given the following medications:  Medications   fentaNYL citrate (PF) injection 50 mcg (50 mcg IntraVENous Given 1/16/23 1150)       Medical Decision Making  55-year-old female with history of ESRD presents with left hip pain. Vital signs are stable upon arrival.  She appears well is no acute distress. Does have pain to palpation over the left greater trochanter and left superior leg spine. Unable to range hip secondary to pain. She is good pulses distally had some chronic neuropathic changes. No negation for acute vascular injury at this time. Given her bony tenderness will obtain x-ray to evaluate for any signs of fracture or dislocation. 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 EKG obtained which shows no signs of electrolyte changes. We will give analgesia for her left hip pain. Amount and/or Complexity of Data Reviewed  Labs: ordered. Radiology: ordered. ECG/medicine tests: ordered. Risk  Prescription drug management. ED Course as of 01/16/23 1318   Mon Jan 16, 2023   1239 Attempted to walk patient. Able to walk with a walker (baseline), a bit weak on the left leg, drags a bit. Possible nerve impingement? Will obtain CT pelvis without contrast to evaluate for missed fracture on plain film [JS]   1313 CT shows severe DJD at level of S1. Likely nerve impingement given her exam.  Discussed going back to PT (she is established). Will give short course of pain medication. Discussed her care with [facility] at her dialysis center. Given her hyponatremia, likely fluid overloaded. Will transport to dialysis center. [JS]      ED Course User Index  [JS] Kajal Mendez MD         FINAL IMPRESSION     1.  Lumbosacral radiculopathy at L5          DISPOSITION/PLAN Baljeet Chris  results have been reviewed with her. She has been counseled regarding her diagnosis, treatment, and plan. She verbally conveys understanding and agreement of the signs, symptoms, diagnosis, treatment and prognosis and additionally agrees to follow up as discussed. She also agrees with the care-plan and conveys that all of her questions have been answered. I have also provided discharge instructions for her that include: educational information regarding their diagnosis and treatment, and list of reasons why they would want to return to the ED prior to their follow-up appointment, should her condition change. CLINICAL IMPRESSION    Discharge Note: The patient is stable for discharge home. The signs, symptoms, diagnosis, and discharge instructions have been discussed, understanding conveyed, and agreed upon. The patient is to follow up as recommended or return to ER should their symptoms worsen. PATIENT REFERRED TO:  Follow-up Information       Follow up With Specialties Details Why Contact Info    Enrrique Yates MD Family Medicine In 1 week  [address] [address]  [phone]      [facility] EMERGENCY DEPT Emergency Medicine  If symptoms worsen [address] [place] [address]  [phone]              DISCHARGE MEDICATIONS:  Current Discharge Medication List        START taking these medications    Details   oxyCODONE-acetaminophen (Percocet) 5-325 mg per tablet Take 1 Tablet by mouth every six (6) hours as needed for Pain for up to 3 days. Max Daily Amount: 4 Tablets.   Qty: 8 Tablet, Refills: 0  Start date: 1/16/2023, End date: 1/19/2023    Associated Diagnoses: Lumbosacral radiculopathy at L5           STOP taking these medications       traMADoL (ULTRAM) 50 mg tablet Comments:   Reason for Stopping:                 DISCONTINUED MEDICATIONS:  Current Discharge Medication List        STOP taking these medications       traMADoL (ULTRAM) 50 mg tablet Comments:   Reason for Stopping:               I am the Primary Clinician of Record. Jayro Koehler MD (electronically signed)    (Please note that parts of this dictation were completed with voice recognition software. Quite often unanticipated grammatical, syntax, homophones, and other interpretive errors are inadvertently transcribed by the computer software. Please disregards these errors.  Please excuse any errors that have escaped final proofreading.)

## 2023-01-16 NOTE — ED NOTES
Pt ambulated with walker, pt able to bear weight but notes \"weakness\" and \"dragging\" to LLE.  MD notified

## 2023-01-16 NOTE — PROGRESS NOTES
[facility] dialysis unit ([facility]) is expecting her for third shift. Please have her there by 5 PM for dialysis, otherwise it will be tomorrow before Ashley Muñoz can dialyze her.   Jacki Duncan MD

## 2023-01-17 LAB
ATRIAL RATE: 80 BPM
CALCULATED P AXIS, ECG09: 38 DEGREES
CALCULATED R AXIS, ECG10: -2 DEGREES
CALCULATED T AXIS, ECG11: 53 DEGREES
DIAGNOSIS, 93000: NORMAL
P-R INTERVAL, ECG05: 196 MS
Q-T INTERVAL, ECG07: 386 MS
QRS DURATION, ECG06: 78 MS
QTC CALCULATION (BEZET), ECG08: 445 MS
VENTRICULAR RATE, ECG03: 80 BPM

## 2023-03-18 ENCOUNTER — HOSPITAL ENCOUNTER (EMERGENCY)
Age: 58
Discharge: ANOTHER HOSPITAL | End: 2023-03-18
Attending: EMERGENCY MEDICINE
Payer: MEDICAID

## 2023-03-18 VITALS
OXYGEN SATURATION: 98 % | TEMPERATURE: 98.1 F | BODY MASS INDEX: 37.2 KG/M2 | DIASTOLIC BLOOD PRESSURE: 62 MMHG | HEART RATE: 93 BPM | HEIGHT: 66 IN | WEIGHT: 231.48 LBS | SYSTOLIC BLOOD PRESSURE: 110 MMHG | RESPIRATION RATE: 17 BRPM

## 2023-03-18 DIAGNOSIS — L97.421 DIABETIC ULCER OF LEFT MIDFOOT ASSOCIATED WITH TYPE 2 DIABETES MELLITUS, LIMITED TO BREAKDOWN OF SKIN (HCC): Primary | ICD-10-CM

## 2023-03-18 DIAGNOSIS — E11.621 DIABETIC ULCER OF LEFT MIDFOOT ASSOCIATED WITH TYPE 2 DIABETES MELLITUS, LIMITED TO BREAKDOWN OF SKIN (HCC): Primary | ICD-10-CM

## 2023-03-18 LAB
ALBUMIN SERPL-MCNC: 3.6 G/DL (ref 3.5–5)
ALBUMIN/GLOB SERPL: 0.8 (ref 1.1–2.2)
ALP SERPL-CCNC: 115 U/L (ref 45–117)
ALT SERPL-CCNC: 17 U/L (ref 12–78)
ANION GAP SERPL CALC-SCNC: 9 MMOL/L (ref 5–15)
AST SERPL-CCNC: 12 U/L (ref 15–37)
BASOPHILS # BLD: 0 K/UL (ref 0–0.1)
BASOPHILS NFR BLD: 1 % (ref 0–1)
BILIRUB SERPL-MCNC: 0.4 MG/DL (ref 0.2–1)
BUN SERPL-MCNC: 30 MG/DL (ref 6–20)
BUN/CREAT SERPL: 6 (ref 12–20)
CALCIUM SERPL-MCNC: 9.4 MG/DL (ref 8.5–10.1)
CHLORIDE SERPL-SCNC: 93 MMOL/L (ref 97–108)
CO2 SERPL-SCNC: 30 MMOL/L (ref 21–32)
CREAT SERPL-MCNC: 4.82 MG/DL (ref 0.55–1.02)
DIFFERENTIAL METHOD BLD: ABNORMAL
EOSINOPHIL # BLD: 0.1 K/UL (ref 0–0.4)
EOSINOPHIL NFR BLD: 1 % (ref 0–7)
ERYTHROCYTE [DISTWIDTH] IN BLOOD BY AUTOMATED COUNT: 14.6 % (ref 11.5–14.5)
GLOBULIN SER CALC-MCNC: 4.5 G/DL (ref 2–4)
GLUCOSE SERPL-MCNC: 277 MG/DL (ref 65–100)
HCT VFR BLD AUTO: 30.3 % (ref 35–47)
HGB BLD-MCNC: 10.1 G/DL (ref 11.5–16)
IMM GRANULOCYTES # BLD AUTO: 0.1 K/UL (ref 0–0.04)
IMM GRANULOCYTES NFR BLD AUTO: 1 % (ref 0–0.5)
LYMPHOCYTES # BLD: 1.3 K/UL (ref 0.8–3.5)
LYMPHOCYTES NFR BLD: 19 % (ref 12–49)
MCH RBC QN AUTO: 33 PG (ref 26–34)
MCHC RBC AUTO-ENTMCNC: 33.3 G/DL (ref 30–36.5)
MCV RBC AUTO: 99 FL (ref 80–99)
MONOCYTES # BLD: 0.7 K/UL (ref 0–1)
MONOCYTES NFR BLD: 10 % (ref 5–13)
NEUTS SEG # BLD: 4.8 K/UL (ref 1.8–8)
NEUTS SEG NFR BLD: 68 % (ref 32–75)
NRBC # BLD: 0 K/UL (ref 0–0.01)
NRBC BLD-RTO: 0 PER 100 WBC
PLATELET # BLD AUTO: 201 K/UL (ref 150–400)
PMV BLD AUTO: 9.4 FL (ref 8.9–12.9)
POTASSIUM SERPL-SCNC: 4.5 MMOL/L (ref 3.5–5.1)
PROT SERPL-MCNC: 8.1 G/DL (ref 6.4–8.2)
RBC # BLD AUTO: 3.06 M/UL (ref 3.8–5.2)
SODIUM SERPL-SCNC: 132 MMOL/L (ref 136–145)
WBC # BLD AUTO: 7 K/UL (ref 3.6–11)

## 2023-03-18 PROCEDURE — 36415 COLL VENOUS BLD VENIPUNCTURE: CPT

## 2023-03-18 PROCEDURE — 99283 EMERGENCY DEPT VISIT LOW MDM: CPT

## 2023-03-18 PROCEDURE — 74011250637 HC RX REV CODE- 250/637: Performed by: EMERGENCY MEDICINE

## 2023-03-18 PROCEDURE — 85025 COMPLETE CBC W/AUTO DIFF WBC: CPT

## 2023-03-18 PROCEDURE — 80053 COMPREHEN METABOLIC PANEL: CPT

## 2023-03-18 RX ORDER — CEFDINIR 300 MG/1
300 CAPSULE ORAL
Status: COMPLETED | OUTPATIENT
Start: 2023-03-18 | End: 2023-03-18

## 2023-03-18 RX ORDER — SULFAMETHOXAZOLE AND TRIMETHOPRIM 800; 160 MG/1; MG/1
1 TABLET ORAL 2 TIMES DAILY
Qty: 14 TABLET | Refills: 0 | Status: SHIPPED | OUTPATIENT
Start: 2023-03-18 | End: 2023-03-18 | Stop reason: SDUPTHER

## 2023-03-18 RX ORDER — OXYCODONE AND ACETAMINOPHEN 5; 325 MG/1; MG/1
2 TABLET ORAL
Status: COMPLETED | OUTPATIENT
Start: 2023-03-18 | End: 2023-03-18

## 2023-03-18 RX ORDER — OXYCODONE AND ACETAMINOPHEN 5; 325 MG/1; MG/1
1 TABLET ORAL
Qty: 15 TABLET | Refills: 0 | Status: SHIPPED | OUTPATIENT
Start: 2023-03-18 | End: 2023-03-25

## 2023-03-18 RX ORDER — SULFAMETHOXAZOLE AND TRIMETHOPRIM 800; 160 MG/1; MG/1
1 TABLET ORAL 2 TIMES DAILY
Qty: 14 TABLET | Refills: 0 | Status: SHIPPED | OUTPATIENT
Start: 2023-03-18 | End: 2023-03-25

## 2023-03-18 RX ORDER — SULFAMETHOXAZOLE AND TRIMETHOPRIM 800; 160 MG/1; MG/1
2 TABLET ORAL
Status: COMPLETED | OUTPATIENT
Start: 2023-03-18 | End: 2023-03-18

## 2023-03-18 RX ORDER — OXYCODONE AND ACETAMINOPHEN 5; 325 MG/1; MG/1
1 TABLET ORAL
Qty: 15 TABLET | Refills: 0 | Status: SHIPPED | OUTPATIENT
Start: 2023-03-18 | End: 2023-03-18 | Stop reason: SDUPTHER

## 2023-03-18 RX ORDER — CEFDINIR 300 MG/1
300 CAPSULE ORAL 2 TIMES DAILY
Qty: 14 CAPSULE | Refills: 0 | Status: SHIPPED | OUTPATIENT
Start: 2023-03-18 | End: 2023-03-18 | Stop reason: SDUPTHER

## 2023-03-18 RX ORDER — CEFDINIR 300 MG/1
300 CAPSULE ORAL 2 TIMES DAILY
Qty: 14 CAPSULE | Refills: 0 | Status: SHIPPED | OUTPATIENT
Start: 2023-03-18 | End: 2023-03-25

## 2023-03-18 RX ADMIN — OXYCODONE AND ACETAMINOPHEN 2 TABLET: 5; 325 TABLET ORAL at 17:31

## 2023-03-18 RX ADMIN — SULFAMETHOXAZOLE AND TRIMETHOPRIM 2 TABLET: 800; 160 TABLET ORAL at 17:31

## 2023-03-18 RX ADMIN — CEFDINIR 300 MG: 300 CAPSULE ORAL at 17:32

## 2023-03-18 NOTE — DISCHARGE INSTRUCTIONS
Your examination and laboratories are reassuring. I would recommend that you take Omnicef antibiotic twice a day for a full week as well as Bactrim antibiotic twice a day for a full week. For pain control you can use Tylenol or if necessary Percocet. Follow-up with the podiatry clinic, and return to the emergency department if you have worsening pain and redness spreading throughout the foot. It was a pleasure taking care of you at [facility] Emergency Department today. We know that when you come to [facility], you are entrusting us with your health, comfort, and safety. Our physicians and nurses honor that trust, and we truly appreciate the opportunity to care for you and your loved ones. We also value your feedback. If you receive a survey about your Emergency Department experience today, please fill it out. We care about our patients' feedback, and we listen to what you have to say. Thank you!

## 2023-03-18 NOTE — ED NOTES
Pt presents to ED via EMS and triage. Pt states that her dog has been licking at her left foot and her son noticed that pt had a wound on the top of her foot. Pt has hx of diabetes and peripheral neuropathy. Pt's pedal pulses are weak. Pt states she has a hard time feeling sensation on her legs and feet.

## 2023-03-19 NOTE — ED PROVIDER NOTES
EMERGENCY DEPARTMENT HISTORY AND PHYSICAL EXAM    Date: 3/18/2023  Patient Name: Jad Dutta  Patient Age and Sex: 62 y.o. female  MRN:  [id number]  CSN:  [id number]    History of Present Illness     Chief Complaint   Patient presents with    Wound Infection     Wound to left foot right above big toe. Pt reports its been there for about a week and is unsure of how it started. Her dog has been licking wound. It is noted to be red and has some purulent drainage. She denies fevers. She is a diabetic. BG en route was 288       History Provided By: Patient    Ability to gather history was limited by:  HPI Limitations : None    HPI: Jad Dutta, 62 y.o. female with extensive medical comorbidities including ESRD on HD, CHF, diabetes, diabetic retinopathy, neuropathy, CVA, complains of an open wound to her left dorsal foot which started approximately a week ago. No particular known injury. She has mild spreading redness and burning pain. No fevers. Tobacco Use      Smoking status: Never      Smokeless tobacco: Never     Past History   The patient's medical, surgical, and social history were reviewed by me today. Current Medications:  No current facility-administered medications on file prior to encounter. Current Outpatient Medications on File Prior to Encounter   Medication Sig Dispense Refill    bumetanide (BUMEX) 2 mg tablet Take 1 Tablet by mouth daily. (Patient taking differently: Take 2 mg by mouth two (2) times a day.) 10 Tablet 0    gabapentin (NEURONTIN) 300 mg capsule Take 2 Capsules by mouth three (3) times daily. Max Daily Amount: 1,800 mg. 30 Capsule 0    metoprolol tartrate (LOPRESSOR) 25 mg tablet Take 25 mg by mouth two (2) times a day. insulin aspart U-100 (NovoLOG Flexpen U-100 Insulin) 100 unit/mL (3 mL) inpn 10 Units by SubCUTAneous route Before breakfast, lunch, and dinner. nystatin (MYCOSTATIN) powder Apply  to affected area two (2) times daily as needed.       amLODIPine (NORVASC) 10 mg tablet Take 10 mg by mouth nightly. Flovent  mcg/actuation inhaler Take 2 Puffs by inhalation two (2) times a day. albuterol (PROVENTIL HFA, VENTOLIN HFA, PROAIR HFA) 90 mcg/actuation inhaler Take 2 Puffs by inhalation every six (6) hours as needed for Wheezing. Lantus Solostar U-100 Insulin 100 unit/mL (3 mL) inpn 20 Units by SubCUTAneous route nightly. fluticasone propionate (Flonase Allergy Relief) 50 mcg/actuation nasal spray 2 SPRAY, Each Nostril, daily, for allergy symptoms, 11 Refills, Dispense: 1, bottle, [facility]      aspirin delayed-release 81 mg tablet Take 81 mg by mouth daily. amitriptyline (ELAVIL) 50 mg tablet Take 50 mg by mouth nightly. docusate sodium (Colace) 100 mg capsule Take 100 mg by mouth two (2) times daily as needed. atorvastatin (LIPITOR) 80 mg tablet Take 1 Tab by mouth nightly. 30 Tab 0    clopidogrel (PLAVIX) 75 mg tab Take 1 Tab by mouth daily.  30 Tab 0       Past Medical History:   Diagnosis Date    Arthritis     Asthma     Cancer ([facility]     CHF (congestive heart failure) ([facility] 2020    Chronic back pain     Chronic kidney disease     Diabetes ([facility]     Diabetic retinopathy ([facility]     Hypertension     Kidney failure 01/2022    Liver disease     MRSA (methicillin resistant staph aureus) culture positive     labial abscess, negative test 2020    Neuropathy     Sleep apnea     Appointment made but not attended    Stroke [facility]) 2018       Past Surgical History:   Procedure Laterality Date    HX CARPAL TUNNEL RELEASE Right 1986    HX FRACTURE TX Right     HUMERUS    HX HEENT      tonsillectomy    HX ORTHOPAEDIC      left ft bunionectomy x2    HX OTHER SURGICAL      lanced labial abscess    HX TONSILLECTOMY      HX VASCULAR ACCESS      dialysis, rt upper chest       Social History     Tobacco Use    Smoking status: Never    Smokeless tobacco: Never   Vaping Use    Vaping Use: Never used   Substance Use Topics Alcohol use: No    Drug use: No       Allergies: Allergies   Allergen Reactions    Amoxicillin Nausea Only    Aspirin Other (comments)     \"nosebleeds\" but patient takes daily aspirin 81 mg at home. Patient states naproxen ok    Ciprofloxacin Hives     Review of Systems   A Review of Systems was reviewed by me today during this encounter. Pertinent positive and negative elements are noted in the HPI and MDM sections. Review of Systems   Constitutional:  Negative for fatigue and fever. Skin:  Positive for wound. All other systems reviewed and are negative. Physical Exam   Vital Signs  Patient Vitals for the past 8 hrs:   Temp Pulse Resp BP SpO2   03/18/23 1511 98.1 °F (36.7 °C) 93 17 110/62 98 %          Physical Exam  Constitutional:       General: She is not in acute distress. Appearance: She is not ill-appearing. Musculoskeletal:        Feet:    Feet:      Comments: 4 x 5 cm area of erythema with a central superficial area of open skin, no fluctuance or apparent abscess  Skin:     Findings: Wound present. Neurological:      General: No focal deficit present. Mental Status: She is alert and oriented to person, place, and time. Diagnostic Study Results   Labs  Recent Results (from the past 24 hour(s))   CBC WITH AUTOMATED DIFF    Collection Time: 03/18/23  3:47 PM   Result Value Ref Range    WBC 7.0 3.6 - 11.0 K/uL    RBC 3.06 (L) 3.80 - 5.20 M/uL    HGB 10.1 (L) 11.5 - 16.0 g/dL    HCT 30.3 (L) 35.0 - 47.0 %    MCV 99.0 80.0 - 99.0 FL    MCH 33.0 26.0 - 34.0 PG    MCHC 33.3 30.0 - 36.5 g/dL    RDW 14.6 (H) 11.5 - 14.5 %    PLATELET 406 211 - 591 K/uL    MPV 9.4 8.9 - 12.9 FL    NRBC 0.0 0  WBC    ABSOLUTE NRBC 0.00 0.00 - 0.01 K/uL    NEUTROPHILS 68 32 - 75 %    LYMPHOCYTES 19 12 - 49 %    MONOCYTES 10 5 - 13 %    EOSINOPHILS 1 0 - 7 %    BASOPHILS 1 0 - 1 %    IMMATURE GRANULOCYTES 1 (H) 0.0 - 0.5 %    ABS. NEUTROPHILS 4.8 1.8 - 8.0 K/UL    ABS.  LYMPHOCYTES 1.3 0.8 - 3.5 K/UL ABS. MONOCYTES 0.7 0.0 - 1.0 K/UL    ABS. EOSINOPHILS 0.1 0.0 - 0.4 K/UL    ABS. BASOPHILS 0.0 0.0 - 0.1 K/UL    ABS. IMM. GRANS. 0.1 (H) 0.00 - 0.04 K/UL    DF AUTOMATED     METABOLIC PANEL, COMPREHENSIVE    Collection Time: 03/18/23  3:47 PM   Result Value Ref Range    Sodium 132 (L) 136 - 145 mmol/L    Potassium 4.5 3.5 - 5.1 mmol/L    Chloride 93 (L) 97 - 108 mmol/L    CO2 30 21 - 32 mmol/L    Anion gap 9 5 - 15 mmol/L    Glucose 277 (H) 65 - 100 mg/dL    BUN 30 (H) 6 - 20 MG/DL    Creatinine 4.82 (H) 0.55 - 1.02 MG/DL    BUN/Creatinine ratio 6 (L) 12 - 20      eGFR 10 (L) >60 ml/min/1.73m2    Calcium 9.4 8.5 - 10.1 MG/DL    Bilirubin, total 0.4 0.2 - 1.0 MG/DL    ALT (SGPT) 17 12 - 78 U/L    AST (SGOT) 12 (L) 15 - 37 U/L    Alk. phosphatase 115 45 - 117 U/L    Protein, total 8.1 6.4 - 8.2 g/dL    Albumin 3.6 3.5 - 5.0 g/dL    Globulin 4.5 (H) 2.0 - 4.0 g/dL    A-G Ratio 0.8 (L) 1.1 - 2.2         ==============================================================    Radiologic Studies  CT Results  (Last 48 hours)      None          CXR Results  (Last 48 hours)      None            Critical Care and Billable Procedures   EKG reviewed by ED Physician Darreld Space in the absence of a cardiologist: Yes  EKG below was independently interpreted by me Codey Aguilar MD)    Procedures    Medical Decision Making   I reviewed the patient's most recent Emergency Dept notes and diagnostic tests in formulating my MDM on today's visit.     Medications administered during ED course:  Medications   trimethoprim-sulfamethoxazole (BACTRIM DS, SEPTRA DS) 160-800 mg per tablet 2 Tablet (2 Tablets Oral Given 3/18/23 1731)   cefdinir (OMNICEF) capsule 300 mg (300 mg Oral Given 3/18/23 1732)   oxyCODONE-acetaminophen (PERCOCET) 5-325 mg per tablet 2 Tablet (2 Tablets Oral Given 3/18/23 1731)     Medical Decision Making // ED Course // Reassessment:  Sonja Sumner, 62 y.o. female complaining of open diabetic foot wound to the dorsal left foot starting about a week ago. No fevers. Mild pain. Diabetic neuropathy. On examination she has an area of approximately 4 x 5 cm of erythema on the dorsal left foot near the first MTP, with a small area of open skin, no significant fluctuance. Appears amenable to outpatient management with oral antibiotics. Given her history of MRSA I prescribed a course of Bactrim and Omnicef. I also advised her to use over-the-counter antibiotic ointment to the open skin and follow-up with her podiatrist.      Radiology results independently interpreted by me:     External Records reviewed:   Consults:  None    Tests considered but not performed: X-ray foot    Differential Diagnoses ruled out: Abscess    Final Diagnosis:   1. Diabetic ulcer of left midfoot associated with type 2 diabetes mellitus, limited to breakdown of skin [facility])        Additional documentation if relevant for this encounter     Diagnosis and Disposition     Disposition:  Discharged    Final Diagnosis:   1. Diabetic ulcer of left midfoot associated with type 2 diabetes mellitus, limited to breakdown of skin [facility])        Discharge Medication List as of 3/18/2023  5:21 PM        START taking these medications    Details   oxyCODONE-acetaminophen (Percocet) 5-325 mg per tablet Take 1 Tablet by mouth every six (6) hours as needed for Pain for up to 7 days. Max Daily Amount: 4 Tablets., Normal, Disp-15 Tablet, R-0      cefdinir (OMNICEF) 300 mg capsule Take 1 Capsule by mouth two (2) times a day for 7 days. , Normal, Disp-14 Capsule, R-0      trimethoprim-sulfamethoxazole (Bactrim DS) 160-800 mg per tablet Take 1 Tablet by mouth two (2) times a day for 7 days. , Normal, Disp-14 Tablet, R-0           CONTINUE these medications which have NOT CHANGED    Details   bumetanide (BUMEX) 2 mg tablet Take 1 Tablet by mouth daily. , Normal, Disp-10 Tablet, R-0      gabapentin (NEURONTIN) 300 mg capsule Take 2 Capsules by mouth three (3) times daily.  Max Daily Amount: 1,800 mg., No Print, Disp-30 Capsule, R-0      metoprolol tartrate (LOPRESSOR) 25 mg tablet Take 25 mg by mouth two (2) times a day., Historical Med      insulin aspart U-100 (NovoLOG Flexpen U-100 Insulin) 100 unit/mL (3 mL) inpn 10 Units by SubCUTAneous route Before breakfast, lunch, and dinner., Historical Med      nystatin (MYCOSTATIN) powder Apply  to affected area two (2) times daily as needed., Historical Med      amLODIPine (NORVASC) 10 mg tablet Take 10 mg by mouth nightly., Historical Med      Flovent  mcg/actuation inhaler Take 2 Puffs by inhalation two (2) times a day., Historical Med, SONDRA      albuterol (PROVENTIL HFA, VENTOLIN HFA, PROAIR HFA) 90 mcg/actuation inhaler Take 2 Puffs by inhalation every six (6) hours as needed for Wheezing., Historical Med      Lantus Solostar U-100 Insulin 100 unit/mL (3 mL) inpn 20 Units by SubCUTAneous route nightly., Historical Med, SONDRA      fluticasone propionate (Flonase Allergy Relief) 50 mcg/actuation nasal spray 2 SPRAY, Each Nostril, daily, for allergy symptoms, 11 Refills, Dispense: 1, bottle, Pharmacy [facility], Historical Med      aspirin delayed-release 81 mg tablet Take 81 mg by mouth daily. , Historical Med      amitriptyline (ELAVIL) 50 mg tablet Take 50 mg by mouth nightly., Historical Med      docusate sodium (Colace) 100 mg capsule Take 100 mg by mouth two (2) times daily as needed., Historical Med      atorvastatin (LIPITOR) 80 mg tablet Take 1 Tab by mouth nightly. , Print, Disp-30 Tab, R-0      clopidogrel (PLAVIX) 75 mg tab Take 1 Tab by mouth daily. , Print, Disp-30 Tab, R-0              Follow up:   Follow-up Information       Follow up With Specialties Details Why Contact Info    Jelani Dejesus MD Family Medicine   [address] [place] [address] 1330  [phone]      [facility] EMERGENCY DEPT Emergency Medicine   [address]   [address] [address] [phone]    [facility]    [facility] [address] [facility]   I was the first provider for this patient on this visit. To the best of my ability I reviewed relevant prior medical records, electrocardiograms, laboratories, and radiologic studies. The patient's presenting problems were discussed, and the patient was in agreement with the care plan formulated and outlined with them. Please note that this dictation was completed with Dragon voice recognition software. Quite often unanticipated grammatical, syntax, homophones, and other interpretive errors are inadvertently transcribed by the computer software. Please disregard these errors and excuse any errors that have escaped final proofreading. Luis Andres MD    I personally performed the services described in this documentation on this date 3/18/2023 for patient Jonathan Chaidez.       uLis Andres MD  9:57 PM

## 2023-05-15 ENCOUNTER — APPOINTMENT (OUTPATIENT)
Facility: HOSPITAL | Age: 58
End: 2023-05-15
Payer: MEDICAID

## 2023-05-15 ENCOUNTER — HOSPITAL ENCOUNTER (EMERGENCY)
Facility: HOSPITAL | Age: 58
Discharge: HOME | End: 2023-05-15
Attending: EMERGENCY MEDICINE
Payer: MEDICAID

## 2023-05-15 VITALS
HEART RATE: 91 BPM | HEIGHT: 65 IN | TEMPERATURE: 98 F | DIASTOLIC BLOOD PRESSURE: 79 MMHG | SYSTOLIC BLOOD PRESSURE: 146 MMHG | OXYGEN SATURATION: 99 % | BODY MASS INDEX: 45.91 KG/M2 | WEIGHT: 275.57 LBS | RESPIRATION RATE: 14 BRPM

## 2023-05-15 DIAGNOSIS — S82.114A NONDISPLACED FRACTURE OF RIGHT TIBIAL SPINE, INITIAL ENCOUNTER FOR CLOSED FRACTURE: Primary | ICD-10-CM
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
## 2023-05-15 DIAGNOSIS — M25.00 LIPOHEMARTHROSIS: ICD-10-CM

## 2023-05-15 PROCEDURE — 73630 X-RAY EXAM OF FOOT: CPT

## 2023-05-15 PROCEDURE — 73562 X-RAY EXAM OF KNEE 3: CPT

## 2023-05-15 PROCEDURE — 6370000000 HC RX 637 (ALT 250 FOR IP): Performed by: EMERGENCY MEDICINE

## 2023-05-15 PROCEDURE — 70450 CT HEAD/BRAIN W/O DYE: CPT

## 2023-05-15 PROCEDURE — 73700 CT LOWER EXTREMITY W/O DYE: CPT

## 2023-05-15 PROCEDURE — 73610 X-RAY EXAM OF ANKLE: CPT

## 2023-05-15 PROCEDURE — 99284 EMERGENCY DEPT VISIT MOD MDM: CPT

## 2023-05-15 RX ORDER — OXYCODONE HYDROCHLORIDE 5 MG/1
5 TABLET ORAL
Status: COMPLETED | OUTPATIENT
Start: 2023-05-15 | End: 2023-05-15

## 2023-05-15 RX ADMIN — OXYCODONE HYDROCHLORIDE 5 MG: 5 TABLET ORAL at 09:47

## 2023-05-15 ASSESSMENT — PAIN SCALES - GENERAL
PAINLEVEL_OUTOF10: 7
PAINLEVEL_OUTOF10: 8

## 2023-05-15 NOTE — ED PROVIDER NOTES
(electronically signed)    (Please note that parts of this dictation were completed with voice recognition software. Quite often unanticipated grammatical, syntax, homophones, and other interpretive errors are inadvertently transcribed by the computer software. Please disregards these errors.  Please excuse any errors that have escaped final proofreading.)       Dallas Davis MD  05/15/23 9769

## 2023-05-15 NOTE — DISCHARGE INSTRUCTIONS
You were evaluated in the emergency department for fall with knee pain. Your examination was reassuring as was your work-up including x-rays and CT scan of your head. You do have a fracture of the tibial spine of the right knee. It will be important for you to follow-up with your primary care physician in 2-3 days to discuss your tramadol pain medication. Please make an appointment within 1 week with Dr. Anneliese Momin of [address] [place] [address]. You can bear weight as tolerated. If you develop worsening symptoms such as worsening pain, swelling, fevers, weakness, or numbness, please return to the emergency department immediately. Please discuss with your dialysis center to have dialysis arranged for either later today or early tomorrow morning.

## 2023-06-09 ENCOUNTER — APPOINTMENT (OUTPATIENT)
Facility: HOSPITAL | Age: 58
End: 2023-06-09
Payer: MEDICAID

## 2023-06-09 ENCOUNTER — HOSPITAL ENCOUNTER (EMERGENCY)
Facility: HOSPITAL | Age: 58
Discharge: HOME | End: 2023-06-09
Attending: EMERGENCY MEDICINE
Payer: MEDICAID

## 2023-06-09 VITALS
SYSTOLIC BLOOD PRESSURE: 125 MMHG | HEIGHT: 65 IN | BODY MASS INDEX: 45.86 KG/M2 | HEART RATE: 79 BPM | DIASTOLIC BLOOD PRESSURE: 58 MMHG | RESPIRATION RATE: 11 BRPM | TEMPERATURE: 98.5 F | OXYGEN SATURATION: 96 %

## 2023-06-09 DIAGNOSIS — S90.822A BLISTER OF LEFT HEEL, INITIAL ENCOUNTER: ICD-10-CM

## 2023-06-09 DIAGNOSIS — L89.626 PRESSURE INJURY OF DEEP TISSUE OF LEFT HEEL: Primary | ICD-10-CM

## 2023-06-09 LAB
ALBUMIN SERPL-MCNC: 3.3 G/DL (ref 3.5–5)
ALBUMIN/GLOB SERPL: 0.7 (ref 1.1–2.2)
ALP SERPL-CCNC: 104 U/L (ref 45–117)
ALT SERPL-CCNC: 14 U/L (ref 12–78)
ANION GAP SERPL CALC-SCNC: 10 MMOL/L (ref 5–15)
AST SERPL-CCNC: 10 U/L (ref 15–37)
BASOPHILS # BLD: 0 K/UL (ref 0–0.1)
BASOPHILS NFR BLD: 1 % (ref 0–1)
BILIRUB SERPL-MCNC: 0.3 MG/DL (ref 0.2–1)
BUN SERPL-MCNC: 35 MG/DL (ref 6–20)
BUN/CREAT SERPL: 6 (ref 12–20)
CALCIUM SERPL-MCNC: 9 MG/DL (ref 8.5–10.1)
CHLORIDE SERPL-SCNC: 90 MMOL/L (ref 97–108)
CO2 SERPL-SCNC: 27 MMOL/L (ref 21–32)
COMMENT:: NORMAL
CREAT SERPL-MCNC: 5.57 MG/DL (ref 0.55–1.02)
DIFFERENTIAL METHOD BLD: ABNORMAL
EOSINOPHIL # BLD: 0.2 K/UL (ref 0–0.4)
EOSINOPHIL NFR BLD: 3 % (ref 0–7)
ERYTHROCYTE [DISTWIDTH] IN BLOOD BY AUTOMATED COUNT: 15.1 % (ref 11.5–14.5)
ERYTHROCYTE [SEDIMENTATION RATE] IN BLOOD: 107 MM/HR (ref 0–30)
GLOBULIN SER CALC-MCNC: 4.5 G/DL (ref 2–4)
GLUCOSE SERPL-MCNC: 306 MG/DL (ref 65–100)
HCT VFR BLD AUTO: 28.6 % (ref 35–47)
HGB BLD-MCNC: 9.6 G/DL (ref 11.5–16)
IMM GRANULOCYTES # BLD AUTO: 0 K/UL (ref 0–0.04)
IMM GRANULOCYTES NFR BLD AUTO: 1 % (ref 0–0.5)
LACTATE BLD-SCNC: 1.75 MMOL/L (ref 0.4–2)
LYMPHOCYTES # BLD: 1.5 K/UL (ref 0.8–3.5)
LYMPHOCYTES NFR BLD: 24 % (ref 12–49)
MCH RBC QN AUTO: 32.1 PG (ref 26–34)
MCHC RBC AUTO-ENTMCNC: 33.6 G/DL (ref 30–36.5)
MCV RBC AUTO: 95.7 FL (ref 80–99)
MONOCYTES # BLD: 0.6 K/UL (ref 0–1)
MONOCYTES NFR BLD: 9 % (ref 5–13)
NEUTS SEG # BLD: 3.9 K/UL (ref 1.8–8)
NEUTS SEG NFR BLD: 62 % (ref 32–75)
NRBC # BLD: 0 K/UL (ref 0–0.01)
NRBC BLD-RTO: 0 PER 100 WBC
PLATELET # BLD AUTO: 239 K/UL (ref 150–400)
PMV BLD AUTO: 8.9 FL (ref 8.9–12.9)
POTASSIUM SERPL-SCNC: 4 MMOL/L (ref 3.5–5.1)
PROT SERPL-MCNC: 7.8 G/DL (ref 6.4–8.2)
RBC # BLD AUTO: 2.99 M/UL (ref 3.8–5.2)
SODIUM SERPL-SCNC: 127 MMOL/L (ref 136–145)
SPECIMEN HOLD: NORMAL
WBC # BLD AUTO: 6.2 K/UL (ref 3.6–11)

## 2023-06-09 PROCEDURE — 85025 COMPLETE CBC W/AUTO DIFF WBC: CPT

## 2023-06-09 PROCEDURE — 85652 RBC SED RATE AUTOMATED: CPT

## 2023-06-09 PROCEDURE — 36415 COLL VENOUS BLD VENIPUNCTURE: CPT

## 2023-06-09 PROCEDURE — 6370000000 HC RX 637 (ALT 250 FOR IP): Performed by: EMERGENCY MEDICINE

## 2023-06-09 PROCEDURE — 73630 X-RAY EXAM OF FOOT: CPT

## 2023-06-09 PROCEDURE — 83605 ASSAY OF LACTIC ACID: CPT

## 2023-06-09 PROCEDURE — 80053 COMPREHEN METABOLIC PANEL: CPT

## 2023-06-09 RX ORDER — CEPHALEXIN 500 MG/1
500 CAPSULE ORAL 4 TIMES DAILY
Qty: 28 CAPSULE | Refills: 0 | Status: SHIPPED | OUTPATIENT
Start: 2023-06-09 | End: 2023-06-16

## 2023-06-09 RX ORDER — HYDROCODONE BITARTRATE AND ACETAMINOPHEN 5; 325 MG/1; MG/1
1 TABLET ORAL
Status: COMPLETED | OUTPATIENT
Start: 2023-06-09 | End: 2023-06-09

## 2023-06-09 RX ORDER — CEPHALEXIN 500 MG/1
500 CAPSULE ORAL 4 TIMES DAILY
Qty: 28 CAPSULE | Refills: 0 | Status: SHIPPED | OUTPATIENT
Start: 2023-06-09 | End: 2023-06-09 | Stop reason: SDUPTHER

## 2023-06-09 RX ADMIN — HYDROCODONE BITARTRATE AND ACETAMINOPHEN 1 TABLET: 5; 325 TABLET ORAL at 10:16

## 2023-06-09 ASSESSMENT — PAIN SCALES - GENERAL: PAINLEVEL_OUTOF10: 5

## 2023-06-09 ASSESSMENT — PAIN DESCRIPTION - ORIENTATION: ORIENTATION: LEFT

## 2023-06-09 ASSESSMENT — PAIN DESCRIPTION - LOCATION: LOCATION: FOOT

## 2023-06-09 NOTE — ED TRIAGE NOTES
Pt brought in via EMS. Patient woke up to pain in her heel and noticed a black spot that wasn't there before she went to bed last night. Ems reported hx of DM, HTN, and end stage kidney disease.

## 2023-06-09 NOTE — ED PROVIDER NOTES
[facility] EMERGENCY DEPT  EMERGENCY DEPARTMENT ENCOUNTER       Pt Name: Jany Miller  MRN: [id number]  [place] 1965  Date of evaluation: 6/9/2023  Provider: Jolly Mayberry MD   PCP: Marcos Villar MD  Note Started: 2:33 PM 6/9/23     CHIEF COMPLAINT       Chief Complaint   Patient presents with    Wound Check     Left heel black spot        HISTORY OF PRESENT ILLNESS: 1 or more elements      History From: Patient, History limited by: No limitations     Jany Miller is a 62 y.o. female with history of ESRD on MWF HD who presents with chief complaint of left heel wound. Patient just noticed this today, she endorses aching pain located to the posterior calcaneus of the left foot, symptoms are unfamiliar to her, she denies any prior history of ulcer or wound to this foot. She denies fevers or chills. Patient was recently seen on the ED on 5/15 for tibial spine fracture, placed in Ace wrap per orthopedics and has been following with [address] [place] [address]. She has now been wearing a knee brace and has been instructed to remain nonweightbearing. She has been getting around with use of a wheelchair and has been using her left foot to help scoot and transition her weight. Denies any injury, trauma, fevers, or purulent drainage. Nursing Notes were all reviewed and agreed with or any disagreements were addressed in the HPI. REVIEW OF SYSTEMS        Positives and Pertinent negatives as per HPI.     PAST HISTORY     Past Medical History:  Past Medical History:   Diagnosis Date    Arthritis     Asthma     Cancer ([address]     CHF (congestive heart failure) ([address] 2020    Chronic back pain     Chronic kidney disease     Diabetes ([address]     Diabetic retinopathy ([address]     Hypertension     Kidney failure 01/2022    Liver disease     MRSA (methicillin resistant staph aureus) culture positive     labial abscess, negative test 2020    Neuropathy     Sleep apnea     Appointment made but not attended    Stroke [facility]) 2018       Past

## 2023-06-09 NOTE — ED NOTES
Discharge instructions and follow up care reviewed with patient. Take all antibiotics as prescribed. Return to ED for any worsening or concerning symptoms.       Roge Dumont RN  06/09/23 0555

## 2023-06-09 NOTE — DISCHARGE INSTRUCTIONS
You were evaluated in the emergency department for wound to the left heel. Your examination was reassuring as was your work-up including x-ray and blood work. It will be important for you to follow-up with your primary care physician in 2-3 days, please see podiatry for evaluation of your foot. If you develop worsening symptoms such as fevers, worsening redness, or pus draining, please return to the emergency department immediately.

## 2023-07-13 ENCOUNTER — HOSPITAL ENCOUNTER (OUTPATIENT)
Facility: HOSPITAL | Age: 58
Discharge: HOME | End: 2023-07-13
Payer: MEDICAID

## 2023-07-13 VITALS
HEART RATE: 90 BPM | RESPIRATION RATE: 18 BRPM | TEMPERATURE: 97.5 F | DIASTOLIC BLOOD PRESSURE: 56 MMHG | SYSTOLIC BLOOD PRESSURE: 121 MMHG

## 2023-07-13 DIAGNOSIS — E11.621 DIABETIC ULCER OF LEFT MIDFOOT ASSOCIATED WITH TYPE 2 DIABETES MELLITUS, WITH FAT LAYER EXPOSED (HCC): ICD-10-CM

## 2023-07-13 DIAGNOSIS — L97.422 DIABETIC ULCER OF LEFT MIDFOOT ASSOCIATED WITH TYPE 2 DIABETES MELLITUS, WITH FAT LAYER EXPOSED (HCC): ICD-10-CM

## 2023-07-13 DIAGNOSIS — E11.621 DIABETIC ULCER OF LEFT HEEL ASSOCIATED WITH TYPE 2 DIABETES MELLITUS, WITH FAT LAYER EXPOSED (HCC): Primary | ICD-10-CM

## 2023-07-13 DIAGNOSIS — L97.422 DIABETIC ULCER OF LEFT HEEL ASSOCIATED WITH TYPE 2 DIABETES MELLITUS, WITH FAT LAYER EXPOSED (HCC): Primary | ICD-10-CM

## 2023-07-13 PROCEDURE — 99203 OFFICE O/P NEW LOW 30 MIN: CPT | Performed by: SURGERY

## 2023-07-13 PROCEDURE — 99213 OFFICE O/P EST LOW 20 MIN: CPT

## 2023-07-13 RX ORDER — CLOBETASOL PROPIONATE 0.5 MG/G
OINTMENT TOPICAL ONCE
Status: CANCELLED | OUTPATIENT
Start: 2023-07-13 | End: 2023-07-13

## 2023-07-13 RX ORDER — LIDOCAINE 40 MG/G
CREAM TOPICAL ONCE
OUTPATIENT
Start: 2023-07-13 | End: 2023-07-13

## 2023-07-13 RX ORDER — LIDOCAINE 50 MG/G
OINTMENT TOPICAL ONCE
Status: CANCELLED | OUTPATIENT
Start: 2023-07-13 | End: 2023-07-13

## 2023-07-13 RX ORDER — SODIUM CHLOR/HYPOCHLOROUS ACID 0.033 %
SOLUTION, IRRIGATION IRRIGATION ONCE
Status: CANCELLED | OUTPATIENT
Start: 2023-07-13 | End: 2023-07-13

## 2023-07-13 RX ORDER — SODIUM CHLOR/HYPOCHLOROUS ACID 0.033 %
SOLUTION, IRRIGATION IRRIGATION ONCE
OUTPATIENT
Start: 2023-07-13 | End: 2023-07-13

## 2023-07-13 RX ORDER — LIDOCAINE HYDROCHLORIDE 20 MG/ML
JELLY TOPICAL ONCE
Status: CANCELLED | OUTPATIENT
Start: 2023-07-13 | End: 2023-07-13

## 2023-07-13 RX ORDER — BETAMETHASONE DIPROPIONATE 0.05 %
OINTMENT (GRAM) TOPICAL ONCE
OUTPATIENT
Start: 2023-07-13 | End: 2023-07-13

## 2023-07-13 RX ORDER — LIDOCAINE HYDROCHLORIDE 20 MG/ML
JELLY TOPICAL ONCE
OUTPATIENT
Start: 2023-07-13 | End: 2023-07-13

## 2023-07-13 RX ORDER — BACITRACIN ZINC AND POLYMYXIN B SULFATE 500; 1000 [USP'U]/G; [USP'U]/G
OINTMENT TOPICAL ONCE
Status: CANCELLED | OUTPATIENT
Start: 2023-07-13 | End: 2023-07-13

## 2023-07-13 RX ORDER — GINSENG 100 MG
CAPSULE ORAL ONCE
Status: CANCELLED | OUTPATIENT
Start: 2023-07-13 | End: 2023-07-13

## 2023-07-13 RX ORDER — IBUPROFEN 200 MG
TABLET ORAL ONCE
OUTPATIENT
Start: 2023-07-13 | End: 2023-07-13

## 2023-07-13 RX ORDER — BACITRACIN ZINC AND POLYMYXIN B SULFATE 500; 1000 [USP'U]/G; [USP'U]/G
OINTMENT TOPICAL ONCE
OUTPATIENT
Start: 2023-07-13 | End: 2023-07-13

## 2023-07-13 RX ORDER — LIDOCAINE 50 MG/G
OINTMENT TOPICAL ONCE
OUTPATIENT
Start: 2023-07-13 | End: 2023-07-13

## 2023-07-13 RX ORDER — IBUPROFEN 200 MG
TABLET ORAL ONCE
Status: CANCELLED | OUTPATIENT
Start: 2023-07-13 | End: 2023-07-13

## 2023-07-13 RX ORDER — LIDOCAINE HYDROCHLORIDE 40 MG/ML
SOLUTION TOPICAL ONCE
OUTPATIENT
Start: 2023-07-13 | End: 2023-07-13

## 2023-07-13 RX ORDER — LIDOCAINE 40 MG/G
CREAM TOPICAL ONCE
Status: CANCELLED | OUTPATIENT
Start: 2023-07-13 | End: 2023-07-13

## 2023-07-13 RX ORDER — CLOBETASOL PROPIONATE 0.5 MG/G
OINTMENT TOPICAL ONCE
OUTPATIENT
Start: 2023-07-13 | End: 2023-07-13

## 2023-07-13 RX ORDER — GINSENG 100 MG
CAPSULE ORAL ONCE
OUTPATIENT
Start: 2023-07-13 | End: 2023-07-13

## 2023-07-13 RX ORDER — GENTAMICIN SULFATE 1 MG/G
OINTMENT TOPICAL ONCE
Status: CANCELLED | OUTPATIENT
Start: 2023-07-13 | End: 2023-07-13

## 2023-07-13 RX ORDER — BETAMETHASONE DIPROPIONATE 0.05 %
OINTMENT (GRAM) TOPICAL ONCE
Status: CANCELLED | OUTPATIENT
Start: 2023-07-13 | End: 2023-07-13

## 2023-07-13 RX ORDER — GENTAMICIN SULFATE 1 MG/G
OINTMENT TOPICAL ONCE
OUTPATIENT
Start: 2023-07-13 | End: 2023-07-13

## 2023-07-13 RX ORDER — LIDOCAINE HYDROCHLORIDE 40 MG/ML
SOLUTION TOPICAL ONCE
Status: CANCELLED | OUTPATIENT
Start: 2023-07-13 | End: 2023-07-13

## 2023-07-13 ASSESSMENT — PAIN DESCRIPTION - DESCRIPTORS: DESCRIPTORS: ACHING

## 2023-07-13 ASSESSMENT — PAIN SCALES - GENERAL: PAINLEVEL_OUTOF10: 4

## 2023-07-13 ASSESSMENT — PAIN DESCRIPTION - LOCATION: LOCATION: BACK

## 2023-07-13 NOTE — H&P
posterior tibial pulse. There was trace edema of the right lower leg. There were no ulcers on the right lower leg or foot. Examination of the left lower extremity revealed a 1+ left dorsalis pedis pulse. There is a strong biphasic Doppler signal at that site. I did not palpate posterior tibial pulse. There was trace edema in the left lower leg. There was on the left posterior heel an ulcer 0.7 x 1.8 x 0.1 cm in dimension. The site had the appearance that it had recently had a bulla. Surface of the current wound is pink. There is on the dorsal surface of the midfoot on the left slightly toward medial side, a wound which is 0.7 x 0.7 x 0.1 cm and has a dry eschar. There is mild erythema around it. Mechanical debridement of the wounds was carried out by abrasion with dry gauze. The patient appears to have diabetic wounds in the left foot associated with peripheral neuropathy and poor diabetic control. I recommended the patient, when sitting, have either the sole of her left foot flat on the floor or have the left calf elevated on a pillow or a stool with the left heel suspended in air. Rationale for avoiding pressure on the left heel was reviewed. DRESSING DRESSINGS ORDERED:  Apply bordered foam dressing to left heel wound. Apply DuoDerm over left dorsal foot wound. Change both dressings three times per week and p.r.n. I strongly urged the patient to follow a diabetic diet. She was given a set of printed basic instructions on this diet. She should comminute with the nutritionist who is associated with her dialysis unit for detailed instructions on diabetic and renal diet. She understands that control of blood sugar would be very helpful with respect to healing the present wounds and minimizing risk of future wounds and limb loss. The patient will follow up in the wound care center in two weeks.       DIAGNOSIS:  Diabetic ulcer left heel, diabetic ulcer left dorsal

## 2023-07-13 NOTE — FLOWSHEET NOTE
07/13/23 0858   Right Leg Edema Point of Measurement   Leg circumference 38 cm   Ankle circumference 25 cm   Compression Therapy Compression not ordered   Left Leg Edema Point of Measurement   Leg circumference 38 cm   Ankle circumference 25 cm   Compression Therapy Compression not ordered   RLE Neurovascular Assessment   Capillary Refill Less than/Equal to 3 seconds   Color Appropriate for Ethnicity   Temperature Warm   R Pedal Pulse +2   LLE Neurovascular Assessment   Capillary Refill Less than/Equal to 3 seconds   Color Appropriate for Ethnicity   Temperature Warm   L Pedal Pulse +2   Wound 07/13/23 Heel Left #1   Date First Assessed/Time First Assessed: 07/13/23 0900   Present on Hospital Admission: Yes  Wound Approximate Age at First Assessment (Weeks): 6 weeks  Primary Wound Type: Diabetic Ulcer  Location: Heel  Wound Location Orientation: Left  Wound Descri. .. Wound Image    Wound Etiology Diabetic   Dressing Status   (Open to air)   Wound Cleansed Cleansed with saline   Dressing/Treatment   (Open to air)   Wound Length (cm) 0.7 cm   Wound Width (cm) 1.8 cm   Wound Depth (cm) 0.1 cm   Wound Surface Area (cm^2) 1.26 cm^2   Wound Volume (cm^3) 0.126 cm^3   Wound Assessment Pink/red   Drainage Amount None   Odor None   Samantha-wound Assessment Intact;Dry/flaky   Margins Defined edges   Wound Thickness Description not for Pressure Injury Partial thickness   Wound 07/13/23 Foot Left;Dorsal #2   Date First Assessed/Time First Assessed: 07/13/23 0901   Present on Hospital Admission: Yes  Wound Approximate Age at First Assessment (Weeks): 12 weeks  Primary Wound Type: Diabetic Ulcer  Location: Foot  Wound Location Orientation: Left;Dorsal  Woun. ..    Wound Image    Wound Etiology Diabetic   Dressing Status   (Open to air)   Wound Cleansed Cleansed with saline   Dressing/Treatment   (Open to air)   Wound Length (cm) 0.7 cm   Wound Width (cm) 0.7 cm   Wound Depth (cm) 0.1 cm   Wound Surface Area (cm^2) 0.49 cm^2

## 2023-07-13 NOTE — DISCHARGE INSTRUCTIONS
Discharge Instructions [place] [id number] [facility], [address] [place] [address]  Telephone: [phone]    NAME:  Laney Luna OF BIRTH:  1965  MEDICAL RECORD NUMBER:  [id number]  DATE:  7/13/2023  WOUND CARE ORDERS:  Left heel wound :Cleanse with soap and water, apply primary dressing Foam border dressing. Pt./pcg/HH nurse to change (freq) 3x Weekly  and as needed for compromise. Left dorsal foot wound - cleanse with soap and water, rinse and pat dry, apply duoderm extra thin, change 3 x week and as needed for compromise. F/U in 2 week. TREATMENT:  Elevate leg(s) above the level of the heart when sitting, if swelling present. Avoid prolonged standing in one place. Wear off-loading shoe/boot on the affected foot when walking. Do not get dressing/cast wet. Do not use objects to scratch inside cast/wrap. Follow Diet as prescribed:   [x] Diet as tolerated: [x] Calorie Diabetic Diet: Low carb and no Sugar [] No Added Salt:  [] Increase Protein: [] Limit the amount of liquid you are drinking and avoid drinking in between meals     Return Appointment:  [x] Return Appointment: With Dr. Roman Burton in  2 [facility])  [] Nurse Visit :  days  [] Ordered tests:    Electronically signed Abbey Calix on 7/13/2023 at 9:20 AM     [address] [place] [address] Information: Should you experience any significant changes in your wound(s) or have questions about your wound care, please contact the [address] [place] [address] at 1 [facility] 8:00 am - 4:30. If you need help with your wound outside these hours and cannot wait until we are again available, contact your PCP or go to the hospital emergency room. PLEASE NOTE: IF YOU ARE UNABLE TO OBTAIN WOUND SUPPLIES, CONTINUE TO USE THE SUPPLIES YOU HAVE AVAILABLE UNTIL YOU ARE ABLE TO REACH US.  IT IS MOST IMPORTANT TO KEEP THE WOUND

## 2023-07-13 NOTE — FLOWSHEET NOTE
07/13/23 0858   Right Leg Edema Point of Measurement   Leg circumference 38 cm   Ankle circumference 25 cm   Compression Therapy Compression not ordered   Left Leg Edema Point of Measurement   Leg circumference 38 cm   Ankle circumference 25 cm   Compression Therapy Compression not ordered   RLE Neurovascular Assessment   Capillary Refill Less than/Equal to 3 seconds   Color Appropriate for Ethnicity   Temperature Warm   R Pedal Pulse +2   LLE Neurovascular Assessment   Capillary Refill Less than/Equal to 3 seconds   Color Appropriate for Ethnicity   Temperature Warm   L Pedal Pulse +2   Wound 07/13/23 Heel Left #1   Date First Assessed/Time First Assessed: 07/13/23 0900   Present on Hospital Admission: Yes  Wound Approximate Age at First Assessment (Weeks): 6 weeks  Primary Wound Type: Diabetic Ulcer  Location: Heel  Wound Location Orientation: Left  Wound Descri. .. Wound Image    Wound Etiology Diabetic   Dressing Status   (Open to air)   Wound Cleansed Cleansed with saline   Dressing/Treatment   (Open to air)   Wound Length (cm) 0.7 cm   Wound Width (cm) 1.8 cm   Wound Depth (cm) 0.1 cm   Wound Surface Area (cm^2) 1.26 cm^2   Wound Volume (cm^3) 0.126 cm^3   Wound Assessment Pink/red   Drainage Amount Moderate   Drainage Description Serosanguinous   Odor None   Samantha-wound Assessment Intact;Dry/flaky   Margins Defined edges   Wound Thickness Description not for Pressure Injury Partial thickness   Wound 07/13/23 Foot Left;Dorsal #2   Date First Assessed/Time First Assessed: 07/13/23 0901   Present on Hospital Admission: Yes  Wound Approximate Age at First Assessment (Weeks): 12 weeks  Primary Wound Type: Diabetic Ulcer  Location: Foot  Wound Location Orientation: Left;Dorsal  Woun. ..    Wound Image    Wound Etiology Diabetic   Dressing Status   (Open to air)   Wound Cleansed Cleansed with saline   Dressing/Treatment   (Open to air)   Wound Length (cm) 0.7 cm   Wound Width (cm) 0.7 cm   Wound Depth (cm) 0.1 cm

## 2023-08-06 ENCOUNTER — APPOINTMENT (OUTPATIENT)
Facility: HOSPITAL | Age: 58
DRG: 720 | End: 2023-08-06
Payer: COMMERCIAL

## 2023-08-06 ENCOUNTER — HOSPITAL ENCOUNTER (INPATIENT)
Facility: HOSPITAL | Age: 58
LOS: 8 days | Discharge: NURSING, REHAB OR LONG-TERM CARE | DRG: 720 | End: 2023-08-15
Attending: EMERGENCY MEDICINE | Admitting: STUDENT IN AN ORGANIZED HEALTH CARE EDUCATION/TRAINING PROGRAM
Payer: COMMERCIAL

## 2023-08-06 DIAGNOSIS — R73.9 HYPERGLYCEMIA: ICD-10-CM

## 2023-08-06 DIAGNOSIS — L03.116 LEFT LEG CELLULITIS: ICD-10-CM

## 2023-08-06 DIAGNOSIS — L97.422 DIABETIC ULCER OF LEFT HEEL ASSOCIATED WITH TYPE 2 DIABETES MELLITUS, WITH FAT LAYER EXPOSED (HCC): ICD-10-CM

## 2023-08-06 DIAGNOSIS — A41.9 SEPTICEMIA (HCC): Primary | ICD-10-CM

## 2023-08-06 DIAGNOSIS — I63.9 CEREBROVASCULAR ACCIDENT (CVA), UNSPECIFIED MECHANISM (HCC): ICD-10-CM

## 2023-08-06 DIAGNOSIS — L03.116 CELLULITIS OF LEFT LOWER EXTREMITY: ICD-10-CM

## 2023-08-06 DIAGNOSIS — R79.89 PSEUDOHYPONATREMIA: ICD-10-CM

## 2023-08-06 DIAGNOSIS — E11.621 DIABETIC ULCER OF LEFT HEEL ASSOCIATED WITH TYPE 2 DIABETES MELLITUS, WITH FAT LAYER EXPOSED (HCC): ICD-10-CM

## 2023-08-06 LAB
ALBUMIN SERPL-MCNC: 3.2 G/DL (ref 3.5–5)
ALBUMIN/GLOB SERPL: 0.6 (ref 1.1–2.2)
ALP SERPL-CCNC: 110 U/L (ref 45–117)
ALT SERPL-CCNC: 14 U/L (ref 12–78)
ANION GAP BLD CALC-SCNC: 9 (ref 10–20)
ANION GAP SERPL CALC-SCNC: 11 MMOL/L (ref 5–15)
AST SERPL-CCNC: 5 U/L (ref 15–37)
BASE DEFICIT BLD-SCNC: 2.2 MMOL/L
BASOPHILS # BLD: 0 K/UL (ref 0–0.1)
BASOPHILS NFR BLD: 0 % (ref 0–1)
BILIRUB SERPL-MCNC: 0.4 MG/DL (ref 0.2–1)
BUN SERPL-MCNC: 54 MG/DL (ref 6–20)
BUN/CREAT SERPL: 7 (ref 12–20)
CA-I BLD-MCNC: 1.02 MMOL/L (ref 1.12–1.32)
CALCIUM SERPL-MCNC: 8.6 MG/DL (ref 8.5–10.1)
CHLORIDE BLD-SCNC: 92 MMOL/L (ref 100–108)
CHLORIDE SERPL-SCNC: 88 MMOL/L (ref 97–108)
CO2 BLD-SCNC: 22 MMOL/L (ref 19–24)
CO2 SERPL-SCNC: 24 MMOL/L (ref 21–32)
COMMENT:: NORMAL
CREAT SERPL-MCNC: 7.54 MG/DL (ref 0.55–1.02)
CREAT UR-MCNC: 7.4 MG/DL (ref 0.6–1.3)
DIFFERENTIAL METHOD BLD: ABNORMAL
EOSINOPHIL # BLD: 0 K/UL (ref 0–0.4)
EOSINOPHIL NFR BLD: 0 % (ref 0–7)
ERYTHROCYTE [DISTWIDTH] IN BLOOD BY AUTOMATED COUNT: 14.9 % (ref 11.5–14.5)
GLOBULIN SER CALC-MCNC: 5.1 G/DL (ref 2–4)
GLUCOSE BLD STRIP.AUTO-MCNC: 505 MG/DL (ref 74–106)
GLUCOSE SERPL-MCNC: 523 MG/DL (ref 65–100)
HCO3 BLDA-SCNC: 23 MMOL/L
HCT VFR BLD AUTO: 30.4 % (ref 35–47)
HGB BLD-MCNC: 10.2 G/DL (ref 11.5–16)
IMM GRANULOCYTES # BLD AUTO: 0.1 K/UL (ref 0–0.04)
IMM GRANULOCYTES NFR BLD AUTO: 1 % (ref 0–0.5)
LACTATE BLD-SCNC: 1.96 MMOL/L (ref 0.4–2)
LYMPHOCYTES # BLD: 0.7 K/UL (ref 0.8–3.5)
LYMPHOCYTES NFR BLD: 6 % (ref 12–49)
MCH RBC QN AUTO: 31.4 PG (ref 26–34)
MCHC RBC AUTO-ENTMCNC: 33.6 G/DL (ref 30–36.5)
MCV RBC AUTO: 93.5 FL (ref 80–99)
MONOCYTES # BLD: 0.7 K/UL (ref 0–1)
MONOCYTES NFR BLD: 6 % (ref 5–13)
NEUTS SEG # BLD: 10.9 K/UL (ref 1.8–8)
NEUTS SEG NFR BLD: 87 % (ref 32–75)
NRBC # BLD: 0 K/UL (ref 0–0.01)
NRBC BLD-RTO: 0 PER 100 WBC
PCO2 BLDV: 37.7 MMHG (ref 41–51)
PH BLDV: 7.38 (ref 7.32–7.42)
PLATELET # BLD AUTO: 176 K/UL (ref 150–400)
PMV BLD AUTO: 10.4 FL (ref 8.9–12.9)
PO2 BLDV: 19 MMHG (ref 25–40)
POTASSIUM BLD-SCNC: 4.9 MMOL/L (ref 3.5–5.5)
POTASSIUM SERPL-SCNC: 4.6 MMOL/L (ref 3.5–5.1)
PROT SERPL-MCNC: 8.3 G/DL (ref 6.4–8.2)
RBC # BLD AUTO: 3.25 M/UL (ref 3.8–5.2)
RBC MORPH BLD: ABNORMAL
SAO2 % BLD: 30 %
SODIUM BLD-SCNC: 123 MMOL/L (ref 136–145)
SODIUM SERPL-SCNC: 123 MMOL/L (ref 136–145)
SPECIMEN HOLD: NORMAL
SPECIMEN SITE: ABNORMAL
WBC # BLD AUTO: 12.4 K/UL (ref 3.6–11)

## 2023-08-06 PROCEDURE — 6370000000 HC RX 637 (ALT 250 FOR IP): Performed by: EMERGENCY MEDICINE

## 2023-08-06 PROCEDURE — 87086 URINE CULTURE/COLONY COUNT: CPT

## 2023-08-06 PROCEDURE — 2580000003 HC RX 258: Performed by: EMERGENCY MEDICINE

## 2023-08-06 PROCEDURE — 73552 X-RAY EXAM OF FEMUR 2/>: CPT

## 2023-08-06 PROCEDURE — 73590 X-RAY EXAM OF LOWER LEG: CPT

## 2023-08-06 PROCEDURE — 85025 COMPLETE CBC W/AUTO DIFF WBC: CPT

## 2023-08-06 PROCEDURE — 82330 ASSAY OF CALCIUM: CPT

## 2023-08-06 PROCEDURE — 82803 BLOOD GASES ANY COMBINATION: CPT

## 2023-08-06 PROCEDURE — 84295 ASSAY OF SERUM SODIUM: CPT

## 2023-08-06 PROCEDURE — 36415 COLL VENOUS BLD VENIPUNCTURE: CPT

## 2023-08-06 PROCEDURE — 99285 EMERGENCY DEPT VISIT HI MDM: CPT

## 2023-08-06 PROCEDURE — 82947 ASSAY GLUCOSE BLOOD QUANT: CPT

## 2023-08-06 PROCEDURE — 84132 ASSAY OF SERUM POTASSIUM: CPT

## 2023-08-06 PROCEDURE — 81001 URINALYSIS AUTO W/SCOPE: CPT

## 2023-08-06 PROCEDURE — 87186 SC STD MICRODIL/AGAR DIL: CPT

## 2023-08-06 PROCEDURE — 87077 CULTURE AEROBIC IDENTIFY: CPT

## 2023-08-06 PROCEDURE — 87040 BLOOD CULTURE FOR BACTERIA: CPT

## 2023-08-06 PROCEDURE — 6360000002 HC RX W HCPCS: Performed by: EMERGENCY MEDICINE

## 2023-08-06 PROCEDURE — 96374 THER/PROPH/DIAG INJ IV PUSH: CPT

## 2023-08-06 PROCEDURE — 80053 COMPREHEN METABOLIC PANEL: CPT

## 2023-08-06 PROCEDURE — 96375 TX/PRO/DX INJ NEW DRUG ADDON: CPT

## 2023-08-06 PROCEDURE — 93005 ELECTROCARDIOGRAM TRACING: CPT | Performed by: EMERGENCY MEDICINE

## 2023-08-06 RX ORDER — ONDANSETRON 2 MG/ML
4 INJECTION INTRAMUSCULAR; INTRAVENOUS EVERY 4 HOURS PRN
Status: DISCONTINUED | OUTPATIENT
Start: 2023-08-06 | End: 2023-08-07

## 2023-08-06 RX ORDER — 0.9 % SODIUM CHLORIDE 0.9 %
500 INTRAVENOUS SOLUTION INTRAVENOUS ONCE
Status: COMPLETED | OUTPATIENT
Start: 2023-08-06 | End: 2023-08-07

## 2023-08-06 RX ORDER — ACETAMINOPHEN 500 MG
1000 TABLET ORAL
Status: COMPLETED | OUTPATIENT
Start: 2023-08-06 | End: 2023-08-06

## 2023-08-06 RX ORDER — MORPHINE SULFATE 4 MG/ML
4 INJECTION INTRAVENOUS EVERY 4 HOURS PRN
Status: DISCONTINUED | OUTPATIENT
Start: 2023-08-06 | End: 2023-08-06

## 2023-08-06 RX ORDER — MORPHINE SULFATE 4 MG/ML
4 INJECTION INTRAVENOUS
Status: DISCONTINUED | OUTPATIENT
Start: 2023-08-06 | End: 2023-08-07

## 2023-08-06 RX ADMIN — SODIUM CHLORIDE 500 ML: 9 INJECTION, SOLUTION INTRAVENOUS at 22:57

## 2023-08-06 RX ADMIN — Medication 10 UNITS: at 22:51

## 2023-08-06 RX ADMIN — ACETAMINOPHEN 1000 MG: 500 TABLET ORAL at 22:50

## 2023-08-06 RX ADMIN — Medication 2500 MG: at 23:12

## 2023-08-06 ASSESSMENT — PAIN DESCRIPTION - ORIENTATION: ORIENTATION: LEFT

## 2023-08-06 ASSESSMENT — PAIN SCALES - GENERAL: PAINLEVEL_OUTOF10: 10

## 2023-08-06 ASSESSMENT — PAIN DESCRIPTION - LOCATION: LOCATION: LEG

## 2023-08-07 ENCOUNTER — APPOINTMENT (OUTPATIENT)
Facility: HOSPITAL | Age: 58
DRG: 720 | End: 2023-08-07
Payer: COMMERCIAL

## 2023-08-07 PROBLEM — L03.116 CELLULITIS OF LEFT LOWER EXTREMITY: Status: ACTIVE | Noted: 2023-08-07

## 2023-08-07 LAB
ANION GAP SERPL CALC-SCNC: 13 MMOL/L (ref 5–15)
APPEARANCE UR: ABNORMAL
BACTERIA URNS QL MICRO: ABNORMAL /HPF
BILIRUB UR QL: NEGATIVE
BUN SERPL-MCNC: 62 MG/DL (ref 6–20)
BUN/CREAT SERPL: 8 (ref 12–20)
CALCIUM SERPL-MCNC: 8.1 MG/DL (ref 8.5–10.1)
CHLORIDE SERPL-SCNC: 92 MMOL/L (ref 97–108)
CO2 SERPL-SCNC: 20 MMOL/L (ref 21–32)
COLOR UR: ABNORMAL
CREAT SERPL-MCNC: 7.8 MG/DL (ref 0.55–1.02)
ECHO BSA: 2.39 M2
EKG ATRIAL RATE: 102 BPM
EKG DIAGNOSIS: NORMAL
EKG P AXIS: 36 DEGREES
EKG P-R INTERVAL: 204 MS
EKG Q-T INTERVAL: 342 MS
EKG QRS DURATION: 88 MS
EKG QTC CALCULATION (BAZETT): 445 MS
EKG R AXIS: -17 DEGREES
EKG T AXIS: 49 DEGREES
EKG VENTRICULAR RATE: 102 BPM
EPITH CASTS URNS QL MICRO: ABNORMAL /LPF
ERYTHROCYTE [DISTWIDTH] IN BLOOD BY AUTOMATED COUNT: 15 % (ref 11.5–14.5)
GLUCOSE BLD STRIP.AUTO-MCNC: 218 MG/DL (ref 65–117)
GLUCOSE BLD STRIP.AUTO-MCNC: 232 MG/DL (ref 65–117)
GLUCOSE BLD STRIP.AUTO-MCNC: 259 MG/DL (ref 65–117)
GLUCOSE BLD STRIP.AUTO-MCNC: 286 MG/DL (ref 65–117)
GLUCOSE BLD STRIP.AUTO-MCNC: 346 MG/DL (ref 65–117)
GLUCOSE BLD STRIP.AUTO-MCNC: 356 MG/DL (ref 65–117)
GLUCOSE BLD STRIP.AUTO-MCNC: 475 MG/DL (ref 65–117)
GLUCOSE SERPL-MCNC: 332 MG/DL (ref 65–100)
GLUCOSE UR STRIP.AUTO-MCNC: 500 MG/DL
HCT VFR BLD AUTO: 26.8 % (ref 35–47)
HGB BLD-MCNC: 9.2 G/DL (ref 11.5–16)
HGB UR QL STRIP: ABNORMAL
KETONES UR QL STRIP.AUTO: NEGATIVE MG/DL
LEUKOCYTE ESTERASE UR QL STRIP.AUTO: ABNORMAL
MCH RBC QN AUTO: 32.2 PG (ref 26–34)
MCHC RBC AUTO-ENTMCNC: 34.3 G/DL (ref 30–36.5)
MCV RBC AUTO: 93.7 FL (ref 80–99)
NITRITE UR QL STRIP.AUTO: NEGATIVE
NRBC # BLD: 0 K/UL (ref 0–0.01)
NRBC BLD-RTO: 0 PER 100 WBC
PH UR STRIP: 8 (ref 5–8)
PLATELET # BLD AUTO: 171 K/UL (ref 150–400)
PMV BLD AUTO: 10.5 FL (ref 8.9–12.9)
POTASSIUM SERPL-SCNC: 4.4 MMOL/L (ref 3.5–5.1)
PROT UR STRIP-MCNC: 300 MG/DL
RBC # BLD AUTO: 2.86 M/UL (ref 3.8–5.2)
RBC #/AREA URNS HPF: ABNORMAL /HPF (ref 0–5)
SERVICE CMNT-IMP: ABNORMAL
SODIUM SERPL-SCNC: 125 MMOL/L (ref 136–145)
SP GR UR REFRACTOMETRY: 1.02
URINE CULTURE IF INDICATED: ABNORMAL
UROBILINOGEN UR QL STRIP.AUTO: 0.2 EU/DL (ref 0.2–1)
WBC # BLD AUTO: 8.4 K/UL (ref 3.6–11)
WBC URNS QL MICRO: >100 /HPF (ref 0–4)
YEAST URNS QL MICRO: PRESENT

## 2023-08-07 PROCEDURE — 5A1D70Z PERFORMANCE OF URINARY FILTRATION, INTERMITTENT, LESS THAN 6 HOURS PER DAY: ICD-10-PCS | Performed by: INTERNAL MEDICINE

## 2023-08-07 PROCEDURE — 94664 DEMO&/EVAL PT USE INHALER: CPT

## 2023-08-07 PROCEDURE — 36415 COLL VENOUS BLD VENIPUNCTURE: CPT

## 2023-08-07 PROCEDURE — 80048 BASIC METABOLIC PNL TOTAL CA: CPT

## 2023-08-07 PROCEDURE — 82962 GLUCOSE BLOOD TEST: CPT

## 2023-08-07 PROCEDURE — 90935 HEMODIALYSIS ONE EVALUATION: CPT

## 2023-08-07 PROCEDURE — 6360000002 HC RX W HCPCS: Performed by: INTERNAL MEDICINE

## 2023-08-07 PROCEDURE — 93971 EXTREMITY STUDY: CPT

## 2023-08-07 PROCEDURE — 2580000003 HC RX 258: Performed by: STUDENT IN AN ORGANIZED HEALTH CARE EDUCATION/TRAINING PROGRAM

## 2023-08-07 PROCEDURE — 6360000002 HC RX W HCPCS: Performed by: STUDENT IN AN ORGANIZED HEALTH CARE EDUCATION/TRAINING PROGRAM

## 2023-08-07 PROCEDURE — 94640 AIRWAY INHALATION TREATMENT: CPT

## 2023-08-07 PROCEDURE — 1100000003 HC PRIVATE W/ TELEMETRY

## 2023-08-07 PROCEDURE — 6370000000 HC RX 637 (ALT 250 FOR IP): Performed by: STUDENT IN AN ORGANIZED HEALTH CARE EDUCATION/TRAINING PROGRAM

## 2023-08-07 PROCEDURE — 85027 COMPLETE CBC AUTOMATED: CPT

## 2023-08-07 PROCEDURE — 2580000003 HC RX 258: Performed by: INTERNAL MEDICINE

## 2023-08-07 RX ORDER — ACETAMINOPHEN 325 MG/1
650 TABLET ORAL EVERY 6 HOURS PRN
Status: DISCONTINUED | OUTPATIENT
Start: 2023-08-07 | End: 2023-08-15 | Stop reason: HOSPADM

## 2023-08-07 RX ORDER — FLUTICASONE PROPIONATE 220 UG/1
2 AEROSOL, METERED RESPIRATORY (INHALATION) 2 TIMES DAILY
Status: DISCONTINUED | OUTPATIENT
Start: 2023-08-07 | End: 2023-08-07 | Stop reason: CLARIF

## 2023-08-07 RX ORDER — ONDANSETRON 2 MG/ML
4 INJECTION INTRAMUSCULAR; INTRAVENOUS EVERY 6 HOURS PRN
Status: DISCONTINUED | OUTPATIENT
Start: 2023-08-07 | End: 2023-08-13 | Stop reason: SDUPTHER

## 2023-08-07 RX ORDER — ARFORMOTEROL TARTRATE 15 UG/2ML
15 SOLUTION RESPIRATORY (INHALATION)
Status: DISCONTINUED | OUTPATIENT
Start: 2023-08-07 | End: 2023-08-15 | Stop reason: HOSPADM

## 2023-08-07 RX ORDER — CLOTRIMAZOLE 1 %
CREAM (GRAM) TOPICAL 2 TIMES DAILY
Status: DISCONTINUED | OUTPATIENT
Start: 2023-08-07 | End: 2023-08-15 | Stop reason: HOSPADM

## 2023-08-07 RX ORDER — IPRATROPIUM BROMIDE AND ALBUTEROL SULFATE 2.5; .5 MG/3ML; MG/3ML
1 SOLUTION RESPIRATORY (INHALATION) EVERY 4 HOURS PRN
Status: DISCONTINUED | OUTPATIENT
Start: 2023-08-07 | End: 2023-08-15 | Stop reason: HOSPADM

## 2023-08-07 RX ORDER — ACETAMINOPHEN 650 MG/1
650 SUPPOSITORY RECTAL EVERY 6 HOURS PRN
Status: DISCONTINUED | OUTPATIENT
Start: 2023-08-07 | End: 2023-08-15 | Stop reason: HOSPADM

## 2023-08-07 RX ORDER — SODIUM CHLORIDE 9 MG/ML
INJECTION, SOLUTION INTRAVENOUS PRN
Status: DISCONTINUED | OUTPATIENT
Start: 2023-08-07 | End: 2023-08-12 | Stop reason: SDUPTHER

## 2023-08-07 RX ORDER — CLOPIDOGREL BISULFATE 75 MG/1
75 TABLET ORAL DAILY
Status: DISCONTINUED | OUTPATIENT
Start: 2023-08-07 | End: 2023-08-11

## 2023-08-07 RX ORDER — POLYETHYLENE GLYCOL 3350 17 G/17G
17 POWDER, FOR SOLUTION ORAL DAILY PRN
Status: DISCONTINUED | OUTPATIENT
Start: 2023-08-07 | End: 2023-08-15 | Stop reason: HOSPADM

## 2023-08-07 RX ORDER — INSULIN GLARGINE 100 [IU]/ML
10 INJECTION, SOLUTION SUBCUTANEOUS NIGHTLY
Status: DISCONTINUED | OUTPATIENT
Start: 2023-08-07 | End: 2023-08-08

## 2023-08-07 RX ORDER — AMITRIPTYLINE HYDROCHLORIDE 50 MG/1
50 TABLET, FILM COATED ORAL NIGHTLY
Status: DISCONTINUED | OUTPATIENT
Start: 2023-08-07 | End: 2023-08-15 | Stop reason: HOSPADM

## 2023-08-07 RX ORDER — ASPIRIN 81 MG/1
81 TABLET ORAL DAILY
Status: DISCONTINUED | OUTPATIENT
Start: 2023-08-07 | End: 2023-08-11

## 2023-08-07 RX ORDER — INSULIN LISPRO 100 [IU]/ML
0-8 INJECTION, SOLUTION INTRAVENOUS; SUBCUTANEOUS EVERY 4 HOURS
Status: DISCONTINUED | OUTPATIENT
Start: 2023-08-07 | End: 2023-08-15

## 2023-08-07 RX ORDER — CLOTRIMAZOLE 1 %
CREAM (GRAM) TOPICAL 2 TIMES DAILY
Status: DISCONTINUED | OUTPATIENT
Start: 2023-08-07 | End: 2023-08-07 | Stop reason: SDUPTHER

## 2023-08-07 RX ORDER — OXYCODONE HYDROCHLORIDE 5 MG/1
5 TABLET ORAL EVERY 4 HOURS PRN
Status: DISCONTINUED | OUTPATIENT
Start: 2023-08-07 | End: 2023-08-11

## 2023-08-07 RX ORDER — ONDANSETRON 4 MG/1
4 TABLET, ORALLY DISINTEGRATING ORAL EVERY 8 HOURS PRN
Status: DISCONTINUED | OUTPATIENT
Start: 2023-08-07 | End: 2023-08-13 | Stop reason: SDUPTHER

## 2023-08-07 RX ORDER — GABAPENTIN 300 MG/1
600 CAPSULE ORAL 3 TIMES DAILY
Status: DISCONTINUED | OUTPATIENT
Start: 2023-08-07 | End: 2023-08-12

## 2023-08-07 RX ORDER — INSULIN LISPRO 100 [IU]/ML
0-4 INJECTION, SOLUTION INTRAVENOUS; SUBCUTANEOUS NIGHTLY
Status: DISCONTINUED | OUTPATIENT
Start: 2023-08-07 | End: 2023-08-07

## 2023-08-07 RX ORDER — HEPARIN SODIUM 5000 [USP'U]/ML
5000 INJECTION, SOLUTION INTRAVENOUS; SUBCUTANEOUS EVERY 8 HOURS SCHEDULED
Status: DISCONTINUED | OUTPATIENT
Start: 2023-08-07 | End: 2023-08-15 | Stop reason: HOSPADM

## 2023-08-07 RX ORDER — SODIUM CHLORIDE 0.9 % (FLUSH) 0.9 %
5-40 SYRINGE (ML) INJECTION EVERY 12 HOURS SCHEDULED
Status: DISCONTINUED | OUTPATIENT
Start: 2023-08-07 | End: 2023-08-12 | Stop reason: SDUPTHER

## 2023-08-07 RX ORDER — SODIUM CHLORIDE 0.9 % (FLUSH) 0.9 %
5-40 SYRINGE (ML) INJECTION PRN
Status: DISCONTINUED | OUTPATIENT
Start: 2023-08-07 | End: 2023-08-12 | Stop reason: SDUPTHER

## 2023-08-07 RX ORDER — DEXTROSE MONOHYDRATE 100 MG/ML
INJECTION, SOLUTION INTRAVENOUS CONTINUOUS PRN
Status: DISCONTINUED | OUTPATIENT
Start: 2023-08-07 | End: 2023-08-15 | Stop reason: HOSPADM

## 2023-08-07 RX ORDER — ATORVASTATIN CALCIUM 40 MG/1
80 TABLET, FILM COATED ORAL NIGHTLY
Status: DISCONTINUED | OUTPATIENT
Start: 2023-08-07 | End: 2023-08-11

## 2023-08-07 RX ADMIN — ACETAMINOPHEN 650 MG: 325 TABLET ORAL at 02:02

## 2023-08-07 RX ADMIN — Medication 4 UNITS: at 18:10

## 2023-08-07 RX ADMIN — SODIUM CHLORIDE, PRESERVATIVE FREE 10 ML: 5 INJECTION INTRAVENOUS at 22:40

## 2023-08-07 RX ADMIN — ASPIRIN 81 MG: 81 TABLET, COATED ORAL at 08:51

## 2023-08-07 RX ADMIN — CLOTRIMAZOLE: 10 CREAM TOPICAL at 11:50

## 2023-08-07 RX ADMIN — METOPROLOL TARTRATE 25 MG: 25 TABLET, FILM COATED ORAL at 22:11

## 2023-08-07 RX ADMIN — METOPROLOL TARTRATE 25 MG: 25 TABLET, FILM COATED ORAL at 08:59

## 2023-08-07 RX ADMIN — AMITRIPTYLINE HYDROCHLORIDE 50 MG: 25 TABLET, FILM COATED ORAL at 01:55

## 2023-08-07 RX ADMIN — VANCOMYCIN HYDROCHLORIDE 1000 MG: 1 INJECTION, POWDER, LYOPHILIZED, FOR SOLUTION INTRAVENOUS at 22:32

## 2023-08-07 RX ADMIN — Medication 4 UNITS: at 22:24

## 2023-08-07 RX ADMIN — GABAPENTIN 600 MG: 300 CAPSULE ORAL at 18:10

## 2023-08-07 RX ADMIN — AMITRIPTYLINE HYDROCHLORIDE 50 MG: 25 TABLET, FILM COATED ORAL at 22:11

## 2023-08-07 RX ADMIN — ATORVASTATIN CALCIUM 80 MG: 40 TABLET, FILM COATED ORAL at 22:11

## 2023-08-07 RX ADMIN — HEPARIN SODIUM 5000 UNITS: 5000 INJECTION INTRAVENOUS; SUBCUTANEOUS at 08:52

## 2023-08-07 RX ADMIN — ATORVASTATIN CALCIUM 80 MG: 40 TABLET, FILM COATED ORAL at 02:02

## 2023-08-07 RX ADMIN — CLOTRIMAZOLE: 10 CREAM TOPICAL at 23:45

## 2023-08-07 RX ADMIN — CEFEPIME 2000 MG: 2 INJECTION, POWDER, FOR SOLUTION INTRAVENOUS at 02:16

## 2023-08-07 RX ADMIN — CLOPIDOGREL BISULFATE 75 MG: 75 TABLET ORAL at 08:51

## 2023-08-07 RX ADMIN — INSULIN GLARGINE 10 UNITS: 100 INJECTION, SOLUTION SUBCUTANEOUS at 22:23

## 2023-08-07 RX ADMIN — GABAPENTIN 600 MG: 300 CAPSULE ORAL at 08:51

## 2023-08-07 RX ADMIN — ARFORMOTEROL TARTRATE 15 MCG: 15 SOLUTION RESPIRATORY (INHALATION) at 19:54

## 2023-08-07 RX ADMIN — Medication 2 UNITS: at 12:08

## 2023-08-07 RX ADMIN — Medication 4 UNITS: at 03:39

## 2023-08-07 RX ADMIN — Medication 2 UNITS: at 08:52

## 2023-08-07 RX ADMIN — HEPARIN SODIUM 5000 UNITS: 5000 INJECTION INTRAVENOUS; SUBCUTANEOUS at 18:13

## 2023-08-07 RX ADMIN — INSULIN GLARGINE 10 UNITS: 100 INJECTION, SOLUTION SUBCUTANEOUS at 01:55

## 2023-08-07 RX ADMIN — SODIUM CHLORIDE, PRESERVATIVE FREE 10 ML: 5 INJECTION INTRAVENOUS at 08:53

## 2023-08-07 RX ADMIN — OXYCODONE HYDROCHLORIDE 5 MG: 5 TABLET ORAL at 11:49

## 2023-08-07 RX ADMIN — OXYCODONE HYDROCHLORIDE 5 MG: 5 TABLET ORAL at 23:57

## 2023-08-07 RX ADMIN — ARFORMOTEROL TARTRATE 15 MCG: 15 SOLUTION RESPIRATORY (INHALATION) at 08:40

## 2023-08-07 ASSESSMENT — PAIN DESCRIPTION - ORIENTATION
ORIENTATION: LEFT
ORIENTATION: ANTERIOR
ORIENTATION: LEFT

## 2023-08-07 ASSESSMENT — PAIN DESCRIPTION - LOCATION
LOCATION: LEG
LOCATION: LEG
LOCATION: HEAD

## 2023-08-07 ASSESSMENT — PAIN DESCRIPTION - DESCRIPTORS: DESCRIPTORS: ACHING

## 2023-08-07 ASSESSMENT — PAIN SCALES - GENERAL
PAINLEVEL_OUTOF10: 7
PAINLEVEL_OUTOF10: 3

## 2023-08-07 ASSESSMENT — PAIN - FUNCTIONAL ASSESSMENT: PAIN_FUNCTIONAL_ASSESSMENT: 0-10

## 2023-08-07 NOTE — H&P
26.0 - 34.0 PG    MCHC 33.6 30.0 - 36.5 g/dL    RDW 14.9 (H) 11.5 - 14.5 %    Platelets 360 905 - 744 K/uL    MPV 10.4 8.9 - 12.9 FL    Nucleated RBCs 0.0 0  WBC    nRBC 0.00 0.00 - 0.01 K/uL    Neutrophils % 87 (H) 32 - 75 %    Lymphocytes % 6 (L) 12 - 49 %    Monocytes % 6 5 - 13 %    Eosinophils % 0 0 - 7 %    Basophils % 0 0 - 1 %    Immature Granulocytes 1 (H) 0.0 - 0.5 %    Neutrophils Absolute 10.9 (H) 1.8 - 8.0 K/UL    Lymphocytes Absolute 0.7 (L) 0.8 - 3.5 K/UL    Monocytes Absolute 0.7 0.0 - 1.0 K/UL    Eosinophils Absolute 0.0 0.0 - 0.4 K/UL    Basophils Absolute 0.0 0.0 - 0.1 K/UL    Absolute Immature Granulocyte 0.1 (H) 0.00 - 0.04 K/UL    Differential Type SMEAR SCANNED      RBC Comment NORMOCYTIC, NORMOCHROMIC     CMP    Collection Time: 08/06/23  8:23 PM   Result Value Ref Range    Sodium 123 (L) 136 - 145 mmol/L    Potassium 4.6 3.5 - 5.1 mmol/L    Chloride 88 (L) 97 - 108 mmol/L    CO2 24 21 - 32 mmol/L    Anion Gap 11 5 - 15 mmol/L    Glucose 523 (H) 65 - 100 mg/dL    BUN 54 (H) 6 - 20 MG/DL    Creatinine 7.54 (H) 0.55 - 1.02 MG/DL    Bun/Cre Ratio 7 (L) 12 - 20      Est, Glom Filt Rate 6 (L) >60 ml/min/1.73m2    Calcium 8.6 8.5 - 10.1 MG/DL    Total Bilirubin 0.4 0.2 - 1.0 MG/DL    ALT 14 12 - 78 U/L    AST 5 (L) 15 - 37 U/L    Alk Phosphatase 110 45 - 117 U/L    Total Protein 8.3 (H) 6.4 - 8.2 g/dL    Albumin 3.2 (L) 3.5 - 5.0 g/dL    Globulin 5.1 (H) 2.0 - 4.0 g/dL    Albumin/Globulin Ratio 0.6 (L) 1.1 - 2.2     Extra Tubes Hold    Collection Time: 08/06/23  8:23 PM   Result Value Ref Range    Specimen HOld PST     Comment:        Add-on orders for these samples will be processed based on acceptable specimen integrity and analyte stability, which may vary by analyte.    POCT Blood Gas & Electrolytes    Collection Time: 08/06/23  8:29 PM   Result Value Ref Range    PH, VENOUS (POC) 7.38 7.32 - 7.42      PCO2, [place], POC 37.7 (L) 41 - 51 MMHG    PO2, VENOUS (POC) 19 (L) 25 - 40 mmHg

## 2023-08-07 NOTE — ED PROVIDER NOTES
[facility] EMERGENCY DEPT  EMERGENCY DEPARTMENT ENCOUNTER       Pt Name: Norma Beyer  MRN: [id number]  [address] 1965  Date of evaluation: 8/6/2023  Provider: Finn Gomes MD   PCP: Tan Paiz MD  Note Started: 4:19 AM 8/7/23     CHIEF COMPLAINT       Chief Complaint   Patient presents with    Fall     Patient presents to triage via hospital wheelchair with EMS complaining of left leg pain and swelling after a GLF on yesterday. Patient also reports feeling ill starting Friday with nausea and vomiting. Patient was febrile with EMS. Patient does acknowledge a PMD of cellulitis. Patient is a dialysis patient with sessions on Mon.,Wed., &Fridays. Patient reports compliance with all sessions. Leg Pain        HISTORY OF PRESENT ILLNESS: 1 or more elements      History From: Patient, History limited by: No limitations     Norma Beyer is a 62 y.o. female with history of diabetes, ESRD on MWF HD,  who presents with chief complaint of fall, leg pain, fever. Symptoms onset yesterday when patient rolled out of bed and cut her leg, she then developed redness and swelling throughout the left lower extremity, the erythema started to spread up the medial aspect of the leg up toward the groin. She is noted to have a fever in triage which she was unaware of. Nursing Notes were all reviewed and agreed with or any disagreements were addressed in the HPI. REVIEW OF SYSTEMS        Positives and Pertinent negatives as per HPI.     PAST HISTORY     Past Medical History:  Past Medical History:   Diagnosis Date    Arthritis     Asthma     Cancer ([address])     CHF (congestive heart failure) ([address]) 2020    Chronic back pain     Chronic kidney disease     Diabetes ([address])     Diabetic retinopathy ([address])     Hypertension     Kidney failure 01/2022    Liver disease     MRSA (methicillin resistant staph aureus) culture positive     labial abscess, negative test 2020    Neuropathy     Sleep apnea     Appointment made but not attended

## 2023-08-07 NOTE — ED NOTES
1701: Per verbal order from Dr. Sophia Howard, pt was given 5 units of correction insulin. Provider request insulin be rechecked in 2 hours.      Sheela King, MARS  80/95/10 [phone]

## 2023-08-08 LAB
ANION GAP SERPL CALC-SCNC: 10 MMOL/L (ref 5–15)
BASOPHILS # BLD: 0 K/UL (ref 0–0.1)
BASOPHILS NFR BLD: 1 % (ref 0–1)
BUN SERPL-MCNC: 42 MG/DL (ref 6–20)
BUN/CREAT SERPL: 7 (ref 12–20)
CALCIUM SERPL-MCNC: 8.6 MG/DL (ref 8.5–10.1)
CHLORIDE SERPL-SCNC: 95 MMOL/L (ref 97–108)
CO2 SERPL-SCNC: 22 MMOL/L (ref 21–32)
CREAT SERPL-MCNC: 6.02 MG/DL (ref 0.55–1.02)
DIFFERENTIAL METHOD BLD: ABNORMAL
EOSINOPHIL # BLD: 0.1 K/UL (ref 0–0.4)
EOSINOPHIL NFR BLD: 1 % (ref 0–7)
ERYTHROCYTE [DISTWIDTH] IN BLOOD BY AUTOMATED COUNT: 15.2 % (ref 11.5–14.5)
GLUCOSE BLD STRIP.AUTO-MCNC: 210 MG/DL (ref 65–117)
GLUCOSE BLD STRIP.AUTO-MCNC: 289 MG/DL (ref 65–117)
GLUCOSE BLD STRIP.AUTO-MCNC: 296 MG/DL (ref 65–117)
GLUCOSE BLD STRIP.AUTO-MCNC: 315 MG/DL (ref 65–117)
GLUCOSE SERPL-MCNC: 216 MG/DL (ref 65–100)
HCT VFR BLD AUTO: 29.5 % (ref 35–47)
HGB BLD-MCNC: 10.1 G/DL (ref 11.5–16)
IMM GRANULOCYTES # BLD AUTO: 0 K/UL (ref 0–0.04)
IMM GRANULOCYTES NFR BLD AUTO: 1 % (ref 0–0.5)
LYMPHOCYTES # BLD: 1.3 K/UL (ref 0.8–3.5)
LYMPHOCYTES NFR BLD: 15 % (ref 12–49)
MCH RBC QN AUTO: 32.1 PG (ref 26–34)
MCHC RBC AUTO-ENTMCNC: 34.2 G/DL (ref 30–36.5)
MCV RBC AUTO: 93.7 FL (ref 80–99)
MONOCYTES # BLD: 1.1 K/UL (ref 0–1)
MONOCYTES NFR BLD: 13 % (ref 5–13)
NEUTS SEG # BLD: 6 K/UL (ref 1.8–8)
NEUTS SEG NFR BLD: 69 % (ref 32–75)
NRBC # BLD: 0 K/UL (ref 0–0.01)
NRBC BLD-RTO: 0 PER 100 WBC
PLATELET # BLD AUTO: 176 K/UL (ref 150–400)
PMV BLD AUTO: 9.9 FL (ref 8.9–12.9)
POTASSIUM SERPL-SCNC: 4.4 MMOL/L (ref 3.5–5.1)
RBC # BLD AUTO: 3.15 M/UL (ref 3.8–5.2)
SERVICE CMNT-IMP: ABNORMAL
SODIUM SERPL-SCNC: 127 MMOL/L (ref 136–145)
WBC # BLD AUTO: 8.4 K/UL (ref 3.6–11)

## 2023-08-08 PROCEDURE — 6360000002 HC RX W HCPCS: Performed by: STUDENT IN AN ORGANIZED HEALTH CARE EDUCATION/TRAINING PROGRAM

## 2023-08-08 PROCEDURE — 6360000002 HC RX W HCPCS: Performed by: INTERNAL MEDICINE

## 2023-08-08 PROCEDURE — 36415 COLL VENOUS BLD VENIPUNCTURE: CPT

## 2023-08-08 PROCEDURE — 2580000003 HC RX 258: Performed by: STUDENT IN AN ORGANIZED HEALTH CARE EDUCATION/TRAINING PROGRAM

## 2023-08-08 PROCEDURE — 80048 BASIC METABOLIC PNL TOTAL CA: CPT

## 2023-08-08 PROCEDURE — 94640 AIRWAY INHALATION TREATMENT: CPT

## 2023-08-08 PROCEDURE — 94761 N-INVAS EAR/PLS OXIMETRY MLT: CPT

## 2023-08-08 PROCEDURE — 2580000003 HC RX 258: Performed by: INTERNAL MEDICINE

## 2023-08-08 PROCEDURE — 6370000000 HC RX 637 (ALT 250 FOR IP): Performed by: STUDENT IN AN ORGANIZED HEALTH CARE EDUCATION/TRAINING PROGRAM

## 2023-08-08 PROCEDURE — 1100000003 HC PRIVATE W/ TELEMETRY

## 2023-08-08 PROCEDURE — 85025 COMPLETE CBC W/AUTO DIFF WBC: CPT

## 2023-08-08 PROCEDURE — 82962 GLUCOSE BLOOD TEST: CPT

## 2023-08-08 PROCEDURE — 6370000000 HC RX 637 (ALT 250 FOR IP): Performed by: INTERNAL MEDICINE

## 2023-08-08 RX ORDER — INSULIN GLARGINE 100 [IU]/ML
28 INJECTION, SOLUTION SUBCUTANEOUS NIGHTLY
Status: DISCONTINUED | OUTPATIENT
Start: 2023-08-08 | End: 2023-08-09

## 2023-08-08 RX ORDER — INSULIN LISPRO 100 [IU]/ML
8 INJECTION, SOLUTION INTRAVENOUS; SUBCUTANEOUS
Status: DISCONTINUED | OUTPATIENT
Start: 2023-08-08 | End: 2023-08-13

## 2023-08-08 RX ADMIN — GABAPENTIN 600 MG: 300 CAPSULE ORAL at 09:52

## 2023-08-08 RX ADMIN — Medication 6 UNITS: at 20:49

## 2023-08-08 RX ADMIN — GABAPENTIN 600 MG: 300 CAPSULE ORAL at 20:47

## 2023-08-08 RX ADMIN — CEFEPIME 1000 MG: 1 INJECTION, POWDER, FOR SOLUTION INTRAMUSCULAR; INTRAVENOUS at 02:25

## 2023-08-08 RX ADMIN — ASPIRIN 81 MG: 81 TABLET, COATED ORAL at 09:52

## 2023-08-08 RX ADMIN — OXYCODONE HYDROCHLORIDE 5 MG: 5 TABLET ORAL at 19:15

## 2023-08-08 RX ADMIN — Medication 2 UNITS: at 09:52

## 2023-08-08 RX ADMIN — CLOPIDOGREL BISULFATE 75 MG: 75 TABLET ORAL at 09:52

## 2023-08-08 RX ADMIN — METOPROLOL TARTRATE 25 MG: 25 TABLET, FILM COATED ORAL at 20:50

## 2023-08-08 RX ADMIN — SODIUM CHLORIDE, PRESERVATIVE FREE 10 ML: 5 INJECTION INTRAVENOUS at 09:53

## 2023-08-08 RX ADMIN — CLOTRIMAZOLE: 10 CREAM TOPICAL at 09:53

## 2023-08-08 RX ADMIN — OXYCODONE HYDROCHLORIDE 5 MG: 5 TABLET ORAL at 04:59

## 2023-08-08 RX ADMIN — METOPROLOL TARTRATE 25 MG: 25 TABLET, FILM COATED ORAL at 09:52

## 2023-08-08 RX ADMIN — CLOTRIMAZOLE: 10 CREAM TOPICAL at 20:47

## 2023-08-08 RX ADMIN — HEPARIN SODIUM 5000 UNITS: 5000 INJECTION INTRAVENOUS; SUBCUTANEOUS at 22:19

## 2023-08-08 RX ADMIN — SODIUM CHLORIDE, PRESERVATIVE FREE 10 ML: 5 INJECTION INTRAVENOUS at 20:50

## 2023-08-08 RX ADMIN — GABAPENTIN 600 MG: 300 CAPSULE ORAL at 13:58

## 2023-08-08 RX ADMIN — HEPARIN SODIUM 5000 UNITS: 5000 INJECTION INTRAVENOUS; SUBCUTANEOUS at 13:58

## 2023-08-08 RX ADMIN — INSULIN GLARGINE 28 UNITS: 100 INJECTION, SOLUTION SUBCUTANEOUS at 20:47

## 2023-08-08 RX ADMIN — Medication 4 UNITS: at 13:58

## 2023-08-08 RX ADMIN — ARFORMOTEROL TARTRATE 15 MCG: 15 SOLUTION RESPIRATORY (INHALATION) at 19:57

## 2023-08-08 RX ADMIN — Medication 8 UNITS: at 19:17

## 2023-08-08 RX ADMIN — ARFORMOTEROL TARTRATE 15 MCG: 15 SOLUTION RESPIRATORY (INHALATION) at 08:51

## 2023-08-08 RX ADMIN — AMITRIPTYLINE HYDROCHLORIDE 50 MG: 25 TABLET, FILM COATED ORAL at 20:46

## 2023-08-08 RX ADMIN — OXYCODONE HYDROCHLORIDE 5 MG: 5 TABLET ORAL at 09:51

## 2023-08-08 RX ADMIN — HEPARIN SODIUM 5000 UNITS: 5000 INJECTION INTRAVENOUS; SUBCUTANEOUS at 05:48

## 2023-08-08 RX ADMIN — ATORVASTATIN CALCIUM 80 MG: 40 TABLET, FILM COATED ORAL at 20:46

## 2023-08-08 ASSESSMENT — PAIN DESCRIPTION - DESCRIPTORS: DESCRIPTORS: ACHING

## 2023-08-08 ASSESSMENT — PAIN DESCRIPTION - LOCATION: LOCATION: HEAD

## 2023-08-08 ASSESSMENT — PAIN SCALES - GENERAL
PAINLEVEL_OUTOF10: 7
PAINLEVEL_OUTOF10: 9
PAINLEVEL_OUTOF10: 2
PAINLEVEL_OUTOF10: 8

## 2023-08-09 LAB
ANION GAP SERPL CALC-SCNC: 12 MMOL/L (ref 5–15)
BACTERIA SPEC CULT: ABNORMAL
BUN SERPL-MCNC: 62 MG/DL (ref 6–20)
BUN/CREAT SERPL: 8 (ref 12–20)
CALCIUM SERPL-MCNC: 8.7 MG/DL (ref 8.5–10.1)
CC UR VC: ABNORMAL
CHLORIDE SERPL-SCNC: 88 MMOL/L (ref 97–108)
CO2 SERPL-SCNC: 23 MMOL/L (ref 21–32)
CREAT SERPL-MCNC: 7.74 MG/DL (ref 0.55–1.02)
ERYTHROCYTE [DISTWIDTH] IN BLOOD BY AUTOMATED COUNT: 15.1 % (ref 11.5–14.5)
GLUCOSE BLD STRIP.AUTO-MCNC: 228 MG/DL (ref 65–117)
GLUCOSE BLD STRIP.AUTO-MCNC: 232 MG/DL (ref 65–117)
GLUCOSE BLD STRIP.AUTO-MCNC: 244 MG/DL (ref 65–117)
GLUCOSE BLD STRIP.AUTO-MCNC: 257 MG/DL (ref 65–117)
GLUCOSE BLD STRIP.AUTO-MCNC: 260 MG/DL (ref 65–117)
GLUCOSE BLD STRIP.AUTO-MCNC: 279 MG/DL (ref 65–117)
GLUCOSE SERPL-MCNC: 293 MG/DL (ref 65–100)
HCT VFR BLD AUTO: 28 % (ref 35–47)
HGB BLD-MCNC: 9.8 G/DL (ref 11.5–16)
MCH RBC QN AUTO: 32.1 PG (ref 26–34)
MCHC RBC AUTO-ENTMCNC: 35 G/DL (ref 30–36.5)
MCV RBC AUTO: 91.8 FL (ref 80–99)
NRBC # BLD: 0 K/UL (ref 0–0.01)
NRBC BLD-RTO: 0 PER 100 WBC
PLATELET # BLD AUTO: 197 K/UL (ref 150–400)
PMV BLD AUTO: 9.8 FL (ref 8.9–12.9)
POTASSIUM SERPL-SCNC: 4.2 MMOL/L (ref 3.5–5.1)
RBC # BLD AUTO: 3.05 M/UL (ref 3.8–5.2)
SERVICE CMNT-IMP: ABNORMAL
SODIUM SERPL-SCNC: 123 MMOL/L (ref 136–145)
VANCOMYCIN SERPL-MCNC: 34.7 UG/ML
WBC # BLD AUTO: 6.5 K/UL (ref 3.6–11)

## 2023-08-09 PROCEDURE — 6360000002 HC RX W HCPCS: Performed by: STUDENT IN AN ORGANIZED HEALTH CARE EDUCATION/TRAINING PROGRAM

## 2023-08-09 PROCEDURE — 80048 BASIC METABOLIC PNL TOTAL CA: CPT

## 2023-08-09 PROCEDURE — 94761 N-INVAS EAR/PLS OXIMETRY MLT: CPT

## 2023-08-09 PROCEDURE — 6370000000 HC RX 637 (ALT 250 FOR IP): Performed by: STUDENT IN AN ORGANIZED HEALTH CARE EDUCATION/TRAINING PROGRAM

## 2023-08-09 PROCEDURE — 90935 HEMODIALYSIS ONE EVALUATION: CPT

## 2023-08-09 PROCEDURE — 36415 COLL VENOUS BLD VENIPUNCTURE: CPT

## 2023-08-09 PROCEDURE — 2580000003 HC RX 258: Performed by: INTERNAL MEDICINE

## 2023-08-09 PROCEDURE — 6370000000 HC RX 637 (ALT 250 FOR IP): Performed by: INTERNAL MEDICINE

## 2023-08-09 PROCEDURE — 6360000002 HC RX W HCPCS: Performed by: INTERNAL MEDICINE

## 2023-08-09 PROCEDURE — 2580000003 HC RX 258: Performed by: STUDENT IN AN ORGANIZED HEALTH CARE EDUCATION/TRAINING PROGRAM

## 2023-08-09 PROCEDURE — 85027 COMPLETE CBC AUTOMATED: CPT

## 2023-08-09 PROCEDURE — 82962 GLUCOSE BLOOD TEST: CPT

## 2023-08-09 PROCEDURE — 94640 AIRWAY INHALATION TREATMENT: CPT

## 2023-08-09 PROCEDURE — 80202 ASSAY OF VANCOMYCIN: CPT

## 2023-08-09 PROCEDURE — 1100000003 HC PRIVATE W/ TELEMETRY

## 2023-08-09 RX ORDER — INSULIN GLARGINE 100 [IU]/ML
32 INJECTION, SOLUTION SUBCUTANEOUS NIGHTLY
Status: DISCONTINUED | OUTPATIENT
Start: 2023-08-09 | End: 2023-08-10

## 2023-08-09 RX ADMIN — OXYCODONE HYDROCHLORIDE 5 MG: 5 TABLET ORAL at 09:17

## 2023-08-09 RX ADMIN — Medication 8 UNITS: at 18:22

## 2023-08-09 RX ADMIN — ARFORMOTEROL TARTRATE 15 MCG: 15 SOLUTION RESPIRATORY (INHALATION) at 08:49

## 2023-08-09 RX ADMIN — HEPARIN SODIUM 5000 UNITS: 5000 INJECTION INTRAVENOUS; SUBCUTANEOUS at 21:00

## 2023-08-09 RX ADMIN — CLOTRIMAZOLE: 10 CREAM TOPICAL at 09:00

## 2023-08-09 RX ADMIN — METOPROLOL TARTRATE 25 MG: 25 TABLET, FILM COATED ORAL at 22:07

## 2023-08-09 RX ADMIN — ONDANSETRON 4 MG: 2 INJECTION INTRAMUSCULAR; INTRAVENOUS at 18:21

## 2023-08-09 RX ADMIN — Medication 2 UNITS: at 03:37

## 2023-08-09 RX ADMIN — HEPARIN SODIUM 5000 UNITS: 5000 INJECTION INTRAVENOUS; SUBCUTANEOUS at 05:43

## 2023-08-09 RX ADMIN — Medication 4 UNITS: at 09:19

## 2023-08-09 RX ADMIN — HEPARIN SODIUM 5000 UNITS: 5000 INJECTION INTRAVENOUS; SUBCUTANEOUS at 14:52

## 2023-08-09 RX ADMIN — SODIUM CHLORIDE, PRESERVATIVE FREE 10 ML: 5 INJECTION INTRAVENOUS at 18:21

## 2023-08-09 RX ADMIN — Medication 4 UNITS: at 18:21

## 2023-08-09 RX ADMIN — ATORVASTATIN CALCIUM 80 MG: 40 TABLET, FILM COATED ORAL at 21:00

## 2023-08-09 RX ADMIN — AMITRIPTYLINE HYDROCHLORIDE 50 MG: 25 TABLET, FILM COATED ORAL at 21:00

## 2023-08-09 RX ADMIN — CEFEPIME 1000 MG: 1 INJECTION, POWDER, FOR SOLUTION INTRAMUSCULAR; INTRAVENOUS at 01:59

## 2023-08-09 RX ADMIN — CLOTRIMAZOLE: 10 CREAM TOPICAL at 21:14

## 2023-08-09 RX ADMIN — GABAPENTIN 600 MG: 300 CAPSULE ORAL at 09:16

## 2023-08-09 RX ADMIN — Medication 4 UNITS: at 21:00

## 2023-08-09 RX ADMIN — Medication 4 UNITS: at 00:25

## 2023-08-09 RX ADMIN — Medication 8 UNITS: at 09:18

## 2023-08-09 RX ADMIN — SODIUM CHLORIDE, PRESERVATIVE FREE 10 ML: 5 INJECTION INTRAVENOUS at 09:21

## 2023-08-09 RX ADMIN — INSULIN GLARGINE 32 UNITS: 100 INJECTION, SOLUTION SUBCUTANEOUS at 21:04

## 2023-08-09 RX ADMIN — GABAPENTIN 600 MG: 300 CAPSULE ORAL at 14:52

## 2023-08-09 RX ADMIN — ARFORMOTEROL TARTRATE 15 MCG: 15 SOLUTION RESPIRATORY (INHALATION) at 19:37

## 2023-08-09 RX ADMIN — CLOPIDOGREL BISULFATE 75 MG: 75 TABLET ORAL at 09:19

## 2023-08-09 RX ADMIN — OXYCODONE HYDROCHLORIDE 5 MG: 5 TABLET ORAL at 14:52

## 2023-08-09 RX ADMIN — ASPIRIN 81 MG: 81 TABLET, COATED ORAL at 09:19

## 2023-08-09 RX ADMIN — GABAPENTIN 600 MG: 300 CAPSULE ORAL at 21:00

## 2023-08-09 RX ADMIN — METOPROLOL TARTRATE 25 MG: 25 TABLET, FILM COATED ORAL at 09:19

## 2023-08-09 ASSESSMENT — PAIN SCALES - WONG BAKER
WONGBAKER_NUMERICALRESPONSE: 0
WONGBAKER_NUMERICALRESPONSE: 0

## 2023-08-09 ASSESSMENT — PAIN DESCRIPTION - LOCATION: LOCATION: BACK

## 2023-08-09 ASSESSMENT — PAIN SCALES - GENERAL
PAINLEVEL_OUTOF10: 8
PAINLEVEL_OUTOF10: 4

## 2023-08-10 LAB
GLUCOSE BLD STRIP.AUTO-MCNC: 192 MG/DL (ref 65–117)
GLUCOSE BLD STRIP.AUTO-MCNC: 214 MG/DL (ref 65–117)
GLUCOSE BLD STRIP.AUTO-MCNC: 235 MG/DL (ref 65–117)
GLUCOSE BLD STRIP.AUTO-MCNC: 256 MG/DL (ref 65–117)
GLUCOSE BLD STRIP.AUTO-MCNC: 262 MG/DL (ref 65–117)
GLUCOSE BLD STRIP.AUTO-MCNC: 280 MG/DL (ref 65–117)
SERVICE CMNT-IMP: ABNORMAL

## 2023-08-10 PROCEDURE — 6360000002 HC RX W HCPCS: Performed by: STUDENT IN AN ORGANIZED HEALTH CARE EDUCATION/TRAINING PROGRAM

## 2023-08-10 PROCEDURE — 6370000000 HC RX 637 (ALT 250 FOR IP): Performed by: INTERNAL MEDICINE

## 2023-08-10 PROCEDURE — 82962 GLUCOSE BLOOD TEST: CPT

## 2023-08-10 PROCEDURE — 6360000002 HC RX W HCPCS: Performed by: INTERNAL MEDICINE

## 2023-08-10 PROCEDURE — 2580000003 HC RX 258: Performed by: STUDENT IN AN ORGANIZED HEALTH CARE EDUCATION/TRAINING PROGRAM

## 2023-08-10 PROCEDURE — 94640 AIRWAY INHALATION TREATMENT: CPT

## 2023-08-10 PROCEDURE — 1100000003 HC PRIVATE W/ TELEMETRY

## 2023-08-10 PROCEDURE — 94761 N-INVAS EAR/PLS OXIMETRY MLT: CPT

## 2023-08-10 PROCEDURE — 2580000003 HC RX 258: Performed by: INTERNAL MEDICINE

## 2023-08-10 PROCEDURE — 6370000000 HC RX 637 (ALT 250 FOR IP): Performed by: STUDENT IN AN ORGANIZED HEALTH CARE EDUCATION/TRAINING PROGRAM

## 2023-08-10 RX ORDER — INSULIN GLARGINE 100 [IU]/ML
35 INJECTION, SOLUTION SUBCUTANEOUS NIGHTLY
Status: DISCONTINUED | OUTPATIENT
Start: 2023-08-10 | End: 2023-08-14

## 2023-08-10 RX ADMIN — CLOTRIMAZOLE: 10 CREAM TOPICAL at 09:14

## 2023-08-10 RX ADMIN — Medication 2 UNITS: at 01:00

## 2023-08-10 RX ADMIN — Medication 4 UNITS: at 16:48

## 2023-08-10 RX ADMIN — Medication 8 UNITS: at 09:10

## 2023-08-10 RX ADMIN — SODIUM CHLORIDE, PRESERVATIVE FREE 10 ML: 5 INJECTION INTRAVENOUS at 20:59

## 2023-08-10 RX ADMIN — GABAPENTIN 600 MG: 300 CAPSULE ORAL at 14:40

## 2023-08-10 RX ADMIN — ASPIRIN 81 MG: 81 TABLET, COATED ORAL at 09:10

## 2023-08-10 RX ADMIN — INSULIN GLARGINE 35 UNITS: 100 INJECTION, SOLUTION SUBCUTANEOUS at 20:51

## 2023-08-10 RX ADMIN — GABAPENTIN 600 MG: 300 CAPSULE ORAL at 20:50

## 2023-08-10 RX ADMIN — ATORVASTATIN CALCIUM 80 MG: 40 TABLET, FILM COATED ORAL at 20:50

## 2023-08-10 RX ADMIN — HEPARIN SODIUM 5000 UNITS: 5000 INJECTION INTRAVENOUS; SUBCUTANEOUS at 14:40

## 2023-08-10 RX ADMIN — Medication 4 UNITS: at 12:55

## 2023-08-10 RX ADMIN — CEFEPIME 1000 MG: 1 INJECTION, POWDER, FOR SOLUTION INTRAMUSCULAR; INTRAVENOUS at 02:27

## 2023-08-10 RX ADMIN — GABAPENTIN 600 MG: 300 CAPSULE ORAL at 09:10

## 2023-08-10 RX ADMIN — Medication 2 UNITS: at 05:18

## 2023-08-10 RX ADMIN — ARFORMOTEROL TARTRATE 15 MCG: 15 SOLUTION RESPIRATORY (INHALATION) at 20:05

## 2023-08-10 RX ADMIN — HEPARIN SODIUM 5000 UNITS: 5000 INJECTION INTRAVENOUS; SUBCUTANEOUS at 05:17

## 2023-08-10 RX ADMIN — SODIUM CHLORIDE, PRESERVATIVE FREE 10 ML: 5 INJECTION INTRAVENOUS at 00:12

## 2023-08-10 RX ADMIN — CLOTRIMAZOLE: 10 CREAM TOPICAL at 21:01

## 2023-08-10 RX ADMIN — Medication 4 UNITS: at 20:51

## 2023-08-10 RX ADMIN — CLOPIDOGREL BISULFATE 75 MG: 75 TABLET ORAL at 09:10

## 2023-08-10 RX ADMIN — ARFORMOTEROL TARTRATE 15 MCG: 15 SOLUTION RESPIRATORY (INHALATION) at 08:00

## 2023-08-10 RX ADMIN — SODIUM CHLORIDE, PRESERVATIVE FREE 10 ML: 5 INJECTION INTRAVENOUS at 09:22

## 2023-08-10 RX ADMIN — AMITRIPTYLINE HYDROCHLORIDE 50 MG: 25 TABLET, FILM COATED ORAL at 20:50

## 2023-08-10 RX ADMIN — OXYCODONE HYDROCHLORIDE 5 MG: 5 TABLET ORAL at 12:54

## 2023-08-10 RX ADMIN — HEPARIN SODIUM 5000 UNITS: 5000 INJECTION INTRAVENOUS; SUBCUTANEOUS at 20:51

## 2023-08-10 RX ADMIN — Medication 8 UNITS: at 12:55

## 2023-08-10 RX ADMIN — Medication 8 UNITS: at 16:48

## 2023-08-11 ENCOUNTER — APPOINTMENT (OUTPATIENT)
Facility: HOSPITAL | Age: 58
DRG: 720 | End: 2023-08-11
Payer: COMMERCIAL

## 2023-08-11 ENCOUNTER — APPOINTMENT (OUTPATIENT)
Facility: HOSPITAL | Age: 58
DRG: 720 | End: 2023-08-11
Attending: INTERNAL MEDICINE
Payer: COMMERCIAL

## 2023-08-11 PROBLEM — G93.40 ENCEPHALOPATHY: Status: ACTIVE | Noted: 2023-08-11

## 2023-08-11 PROBLEM — I60.9 SUBARACHNOID HEMORRHAGE (HCC): Status: ACTIVE | Noted: 2023-08-11

## 2023-08-11 PROBLEM — A41.9 SEPTICEMIA (HCC): Status: ACTIVE | Noted: 2023-08-11

## 2023-08-11 LAB
ANION GAP SERPL CALC-SCNC: 10 MMOL/L (ref 5–15)
ARTERIAL PATENCY WRIST A: YES
BASE EXCESS BLDA CALC-SCNC: 1.3 MMOL/L
BDY SITE: ABNORMAL
BUN SERPL-MCNC: 61 MG/DL (ref 6–20)
BUN/CREAT SERPL: 9 (ref 12–20)
CALCIUM SERPL-MCNC: 8.4 MG/DL (ref 8.5–10.1)
CHLORIDE SERPL-SCNC: 92 MMOL/L (ref 97–108)
CO2 SERPL-SCNC: 22 MMOL/L (ref 21–32)
CREAT SERPL-MCNC: 7.13 MG/DL (ref 0.55–1.02)
ERYTHROCYTE [DISTWIDTH] IN BLOOD BY AUTOMATED COUNT: 14.8 % (ref 11.5–14.5)
GLUCOSE BLD STRIP.AUTO-MCNC: 168 MG/DL (ref 65–117)
GLUCOSE BLD STRIP.AUTO-MCNC: 179 MG/DL (ref 65–117)
GLUCOSE BLD STRIP.AUTO-MCNC: 220 MG/DL (ref 65–117)
GLUCOSE BLD STRIP.AUTO-MCNC: 223 MG/DL (ref 65–117)
GLUCOSE BLD STRIP.AUTO-MCNC: 233 MG/DL (ref 65–117)
GLUCOSE BLD STRIP.AUTO-MCNC: 250 MG/DL (ref 65–117)
GLUCOSE BLD STRIP.AUTO-MCNC: 338 MG/DL (ref 65–117)
GLUCOSE SERPL-MCNC: 374 MG/DL (ref 65–100)
HCO3 BLDA-SCNC: 26 MMOL/L (ref 22–26)
HCT VFR BLD AUTO: 28.2 % (ref 35–47)
HGB BLD-MCNC: 9.6 G/DL (ref 11.5–16)
MCH RBC QN AUTO: 32.3 PG (ref 26–34)
MCHC RBC AUTO-ENTMCNC: 34 G/DL (ref 30–36.5)
MCV RBC AUTO: 94.9 FL (ref 80–99)
NRBC # BLD: 0 K/UL (ref 0–0.01)
NRBC BLD-RTO: 0 PER 100 WBC
PCO2 BLDA: 40 MMHG (ref 35–45)
PH BLDA: 7.43 (ref 7.35–7.45)
PLATELET # BLD AUTO: 244 K/UL (ref 150–400)
PMV BLD AUTO: 10 FL (ref 8.9–12.9)
PO2 BLDA: 77 MMHG (ref 80–100)
POTASSIUM SERPL-SCNC: 5.7 MMOL/L (ref 3.5–5.1)
RBC # BLD AUTO: 2.97 M/UL (ref 3.8–5.2)
SAO2 % BLD: 96 % (ref 92–97)
SAO2% DEVICE SAO2% SENSOR NAME: ABNORMAL
SERVICE CMNT-IMP: ABNORMAL
SODIUM SERPL-SCNC: 124 MMOL/L (ref 136–145)
SPECIMEN SITE: ABNORMAL
TSH SERPL DL<=0.05 MIU/L-ACNC: 0.6 UIU/ML (ref 0.36–3.74)
WBC # BLD AUTO: 8.4 K/UL (ref 3.6–11)

## 2023-08-11 PROCEDURE — 82803 BLOOD GASES ANY COMBINATION: CPT

## 2023-08-11 PROCEDURE — 0042T CT BRAIN PERFUSION: CPT

## 2023-08-11 PROCEDURE — 6360000004 HC RX CONTRAST MEDICATION: Performed by: INTERNAL MEDICINE

## 2023-08-11 PROCEDURE — 6370000000 HC RX 637 (ALT 250 FOR IP): Performed by: INTERNAL MEDICINE

## 2023-08-11 PROCEDURE — P9047 ALBUMIN (HUMAN), 25%, 50ML: HCPCS | Performed by: INTERNAL MEDICINE

## 2023-08-11 PROCEDURE — 70450 CT HEAD/BRAIN W/O DYE: CPT

## 2023-08-11 PROCEDURE — 80048 BASIC METABOLIC PNL TOTAL CA: CPT

## 2023-08-11 PROCEDURE — 95819 EEG AWAKE AND ASLEEP: CPT

## 2023-08-11 PROCEDURE — 4A03X5D MEASUREMENT OF ARTERIAL FLOW, INTRACRANIAL, EXTERNAL APPROACH: ICD-10-PCS | Performed by: RADIOLOGY

## 2023-08-11 PROCEDURE — 2580000003 HC RX 258: Performed by: STUDENT IN AN ORGANIZED HEALTH CARE EDUCATION/TRAINING PROGRAM

## 2023-08-11 PROCEDURE — 36600 WITHDRAWAL OF ARTERIAL BLOOD: CPT

## 2023-08-11 PROCEDURE — 6360000002 HC RX W HCPCS: Performed by: INTERNAL MEDICINE

## 2023-08-11 PROCEDURE — 2580000003 HC RX 258: Performed by: INTERNAL MEDICINE

## 2023-08-11 PROCEDURE — 95816 EEG AWAKE AND DROWSY: CPT | Performed by: PSYCHIATRY & NEUROLOGY

## 2023-08-11 PROCEDURE — 94640 AIRWAY INHALATION TREATMENT: CPT

## 2023-08-11 PROCEDURE — 6370000000 HC RX 637 (ALT 250 FOR IP): Performed by: STUDENT IN AN ORGANIZED HEALTH CARE EDUCATION/TRAINING PROGRAM

## 2023-08-11 PROCEDURE — 36415 COLL VENOUS BLD VENIPUNCTURE: CPT

## 2023-08-11 PROCEDURE — 1100000003 HC PRIVATE W/ TELEMETRY

## 2023-08-11 PROCEDURE — 94761 N-INVAS EAR/PLS OXIMETRY MLT: CPT

## 2023-08-11 PROCEDURE — 6370000000 HC RX 637 (ALT 250 FOR IP): Performed by: NURSE PRACTITIONER

## 2023-08-11 PROCEDURE — 90935 HEMODIALYSIS ONE EVALUATION: CPT

## 2023-08-11 PROCEDURE — 6360000002 HC RX W HCPCS: Performed by: STUDENT IN AN ORGANIZED HEALTH CARE EDUCATION/TRAINING PROGRAM

## 2023-08-11 PROCEDURE — 99223 1ST HOSP IP/OBS HIGH 75: CPT | Performed by: PSYCHIATRY & NEUROLOGY

## 2023-08-11 PROCEDURE — 85027 COMPLETE CBC AUTOMATED: CPT

## 2023-08-11 PROCEDURE — 70551 MRI BRAIN STEM W/O DYE: CPT

## 2023-08-11 PROCEDURE — 82962 GLUCOSE BLOOD TEST: CPT

## 2023-08-11 PROCEDURE — 70498 CT ANGIOGRAPHY NECK: CPT

## 2023-08-11 PROCEDURE — 84443 ASSAY THYROID STIM HORMONE: CPT

## 2023-08-11 RX ORDER — CLOPIDOGREL BISULFATE 75 MG/1
75 TABLET ORAL DAILY
Status: DISCONTINUED | OUTPATIENT
Start: 2023-08-11 | End: 2023-08-15 | Stop reason: HOSPADM

## 2023-08-11 RX ORDER — ATORVASTATIN CALCIUM 40 MG/1
80 TABLET, FILM COATED ORAL NIGHTLY
Status: DISCONTINUED | OUTPATIENT
Start: 2023-08-11 | End: 2023-08-15 | Stop reason: HOSPADM

## 2023-08-11 RX ORDER — MIDODRINE HYDROCHLORIDE 5 MG/1
10 TABLET ORAL PRN
Status: DISCONTINUED | OUTPATIENT
Start: 2023-08-11 | End: 2023-08-15 | Stop reason: HOSPADM

## 2023-08-11 RX ORDER — INSULIN GLARGINE 100 [IU]/ML
17 INJECTION, SOLUTION SUBCUTANEOUS NIGHTLY
Status: COMPLETED | OUTPATIENT
Start: 2023-08-11 | End: 2023-08-11

## 2023-08-11 RX ORDER — ONDANSETRON 2 MG/ML
4 INJECTION INTRAMUSCULAR; INTRAVENOUS EVERY 6 HOURS PRN
Status: DISCONTINUED | OUTPATIENT
Start: 2023-08-11 | End: 2023-08-15 | Stop reason: HOSPADM

## 2023-08-11 RX ORDER — LEVETIRACETAM 500 MG/5ML
500 INJECTION, SOLUTION, CONCENTRATE INTRAVENOUS
Status: DISCONTINUED | OUTPATIENT
Start: 2023-08-11 | End: 2023-08-12

## 2023-08-11 RX ORDER — CLOTRIMAZOLE 1 %
CREAM (GRAM) TOPICAL
Qty: 12 G | Refills: 1 | Status: SHIPPED | OUTPATIENT
Start: 2023-08-11 | End: 2023-08-18

## 2023-08-11 RX ORDER — ASPIRIN 81 MG/1
81 TABLET ORAL DAILY
Status: DISCONTINUED | OUTPATIENT
Start: 2023-08-11 | End: 2023-08-15 | Stop reason: HOSPADM

## 2023-08-11 RX ORDER — SODIUM CHLORIDE 9 MG/ML
INJECTION, SOLUTION INTRAVENOUS PRN
Status: DISCONTINUED | OUTPATIENT
Start: 2023-08-11 | End: 2023-08-15 | Stop reason: HOSPADM

## 2023-08-11 RX ORDER — SODIUM CHLORIDE 0.9 % (FLUSH) 0.9 %
5-40 SYRINGE (ML) INJECTION PRN
Status: DISCONTINUED | OUTPATIENT
Start: 2023-08-11 | End: 2023-08-15 | Stop reason: HOSPADM

## 2023-08-11 RX ORDER — LEVETIRACETAM 500 MG/5ML
500 INJECTION, SOLUTION, CONCENTRATE INTRAVENOUS ONCE
Status: COMPLETED | OUTPATIENT
Start: 2023-08-11 | End: 2023-08-11

## 2023-08-11 RX ORDER — ALBUMIN (HUMAN) 12.5 G/50ML
12.5 SOLUTION INTRAVENOUS PRN
Status: DISCONTINUED | OUTPATIENT
Start: 2023-08-11 | End: 2023-08-15 | Stop reason: HOSPADM

## 2023-08-11 RX ORDER — POLYETHYLENE GLYCOL 3350 17 G/17G
17 POWDER, FOR SOLUTION ORAL DAILY PRN
Status: DISCONTINUED | OUTPATIENT
Start: 2023-08-11 | End: 2023-08-15 | Stop reason: HOSPADM

## 2023-08-11 RX ORDER — INSULIN GLARGINE 100 [IU]/ML
35 INJECTION, SOLUTION SUBCUTANEOUS NIGHTLY
Qty: 5 ADJUSTABLE DOSE PRE-FILLED PEN SYRINGE | Refills: 3 | Status: SHIPPED
Start: 2023-08-11 | End: 2023-08-15 | Stop reason: SDUPTHER

## 2023-08-11 RX ORDER — LABETALOL HYDROCHLORIDE 5 MG/ML
10 INJECTION, SOLUTION INTRAVENOUS EVERY 10 MIN PRN
Status: DISCONTINUED | OUTPATIENT
Start: 2023-08-11 | End: 2023-08-15 | Stop reason: HOSPADM

## 2023-08-11 RX ORDER — ONDANSETRON 4 MG/1
4 TABLET, ORALLY DISINTEGRATING ORAL EVERY 8 HOURS PRN
Status: DISCONTINUED | OUTPATIENT
Start: 2023-08-11 | End: 2023-08-15 | Stop reason: HOSPADM

## 2023-08-11 RX ORDER — HEPARIN SODIUM 5000 [USP'U]/ML
5000 INJECTION, SOLUTION INTRAVENOUS; SUBCUTANEOUS EVERY 8 HOURS SCHEDULED
Status: DISCONTINUED | OUTPATIENT
Start: 2023-08-11 | End: 2023-08-11

## 2023-08-11 RX ORDER — DOXYCYCLINE HYCLATE 100 MG
100 TABLET ORAL EVERY 12 HOURS SCHEDULED
Qty: 10 TABLET | Refills: 0 | Status: SHIPPED | OUTPATIENT
Start: 2023-08-11 | End: 2023-08-15 | Stop reason: HOSPADM

## 2023-08-11 RX ORDER — SODIUM CHLORIDE 0.9 % (FLUSH) 0.9 %
5-40 SYRINGE (ML) INJECTION EVERY 12 HOURS SCHEDULED
Status: DISCONTINUED | OUTPATIENT
Start: 2023-08-11 | End: 2023-08-15 | Stop reason: HOSPADM

## 2023-08-11 RX ORDER — DOXYCYCLINE HYCLATE 100 MG
100 TABLET ORAL EVERY 12 HOURS SCHEDULED
Status: DISCONTINUED | OUTPATIENT
Start: 2023-08-11 | End: 2023-08-15 | Stop reason: HOSPADM

## 2023-08-11 RX ADMIN — SODIUM CHLORIDE, PRESERVATIVE FREE 10 ML: 5 INJECTION INTRAVENOUS at 21:15

## 2023-08-11 RX ADMIN — CEFEPIME 1000 MG: 1 INJECTION, POWDER, FOR SOLUTION INTRAMUSCULAR; INTRAVENOUS at 02:44

## 2023-08-11 RX ADMIN — IOPAMIDOL 100 ML: 755 INJECTION, SOLUTION INTRAVENOUS at 15:35

## 2023-08-11 RX ADMIN — ASPIRIN 81 MG: 81 TABLET, COATED ORAL at 11:06

## 2023-08-11 RX ADMIN — SODIUM CHLORIDE, PRESERVATIVE FREE 10 ML: 5 INJECTION INTRAVENOUS at 11:16

## 2023-08-11 RX ADMIN — Medication 6 UNITS: at 05:58

## 2023-08-11 RX ADMIN — HEPARIN SODIUM 5000 UNITS: 5000 INJECTION INTRAVENOUS; SUBCUTANEOUS at 05:59

## 2023-08-11 RX ADMIN — CLOTRIMAZOLE: 10 CREAM TOPICAL at 22:53

## 2023-08-11 RX ADMIN — INSULIN GLARGINE 17 UNITS: 100 INJECTION, SOLUTION SUBCUTANEOUS at 21:34

## 2023-08-11 RX ADMIN — LEVETIRACETAM 500 MG: 100 INJECTION INTRAVENOUS at 21:11

## 2023-08-11 RX ADMIN — CLOPIDOGREL BISULFATE 75 MG: 75 TABLET ORAL at 11:06

## 2023-08-11 RX ADMIN — ARFORMOTEROL TARTRATE 15 MCG: 15 SOLUTION RESPIRATORY (INHALATION) at 21:55

## 2023-08-11 RX ADMIN — Medication 4 UNITS: at 00:32

## 2023-08-11 RX ADMIN — Medication 2 UNITS: at 11:07

## 2023-08-11 RX ADMIN — CLOTRIMAZOLE: 10 CREAM TOPICAL at 11:18

## 2023-08-11 RX ADMIN — ALBUMIN (HUMAN) 12.5 G: 0.25 INJECTION, SOLUTION INTRAVENOUS at 09:07

## 2023-08-11 RX ADMIN — METOPROLOL TARTRATE 25 MG: 25 TABLET, FILM COATED ORAL at 11:08

## 2023-08-11 RX ADMIN — SODIUM ZIRCONIUM CYCLOSILICATE 10 G: 10 POWDER, FOR SUSPENSION ORAL at 11:06

## 2023-08-11 RX ADMIN — SODIUM CHLORIDE, PRESERVATIVE FREE 10 ML: 5 INJECTION INTRAVENOUS at 21:12

## 2023-08-11 RX ADMIN — Medication 8 UNITS: at 11:06

## 2023-08-11 RX ADMIN — GABAPENTIN 600 MG: 300 CAPSULE ORAL at 11:06

## 2023-08-11 RX ADMIN — LEVETIRACETAM 500 MG: 100 INJECTION INTRAVENOUS at 16:24

## 2023-08-11 RX ADMIN — EPOETIN ALFA 10000 UNITS: 10000 SOLUTION INTRAVENOUS; SUBCUTANEOUS at 23:57

## 2023-08-11 ASSESSMENT — PAIN SCALES - GENERAL
PAINLEVEL_OUTOF10: 0
PAINLEVEL_OUTOF10: 0

## 2023-08-11 ASSESSMENT — PAIN SCALES - WONG BAKER: WONGBAKER_NUMERICALRESPONSE: 0

## 2023-08-11 NOTE — CODE DOCUMENTATION
RAPID RESPONSE TEAM    Stopped by unit staff while on rounds. Patient reportedly less responsive. Noted significant aphasia in a patient reportedly normally A/Ox4. She maintains own airway. Respirations even, unlabored, regular  Pulses equal, regular, strong    LTKW 1330  FSBG 233    NIHSS completed    Patient repeats \"fifty top\" when shown each of the NIHSS images. Head CT without contrast    Teleneuro consult completed    CTP were completed. Patient returned to room and EEG was initiated. Transfer to Neuro unit room 129 initiated. Please call back if needed.     Charissa Jimenez RN  Ext. [phone]

## 2023-08-11 NOTE — CONSULTS
abscess, negative test 2020    Neuropathy     Sleep apnea     Appointment made but not attended    Stroke [facility]) 2018        Past Surgical History:   Procedure Laterality Date    CARPAL TUNNEL RELEASE Right 1986    CT BIOPSY RENAL  1/10/2022    CT BIOPSY RENAL 1/10/2022 [facility] RAD CT    FRACTURE SURGERY Right     HUMERUS    HEENT      tonsillectomy    ORTHOPEDIC SURGERY      left ft bunionectomy x2    OTHER SURGICAL HISTORY      lanced labial abscess    TONSILLECTOMY      VASCULAR SURGERY      dialysis, rt upper chest        Family History   Problem Relation Age of Onset    Diabetes Sister     Anesth Problems Neg Hx     No Known Problems Sister     Kidney Disease Father     Diabetes Father         Social History     Tobacco Use    Smoking status: Never    Smokeless tobacco: Never   Substance Use Topics    Alcohol use: No        Allergies   Allergen Reactions    Amoxicillin Nausea Only    Aspirin Other (See Comments)     \"nosebleeds\" but patient takes daily aspirin 81 mg at home.  Patient states naproxen ok    Ciprofloxacin Hives        Current Facility-Administered Medications   Medication Dose Route Frequency    albumin human 25% IV solution 12.5 g  12.5 g IntraVENous PRN    midodrine (PROAMATINE) tablet 10 mg  10 mg Oral PRN    epoetin ivet (EPOGEN;PROCRIT) injection 10,000 Units  10,000 Units SubCUTAneous Q7 Days    doxycycline hyclate (VIBRA-TABS) tablet 100 mg  100 mg Oral 2 times per day    sodium chloride flush 0.9 % injection 5-40 mL  5-40 mL IntraVENous 2 times per day    sodium chloride flush 0.9 % injection 5-40 mL  5-40 mL IntraVENous PRN    0.9 % sodium chloride infusion   IntraVENous PRN    ondansetron (ZOFRAN-ODT) disintegrating tablet 4 mg  4 mg Oral Q8H PRN    Or    ondansetron (ZOFRAN) injection 4 mg  4 mg IntraVENous Q6H PRN    polyethylene glycol (GLYCOLAX) packet 17 g  17 g Oral Daily PRN    heparin (porcine) injection 5,000 Units  5,000 Units SubCUTAneous 3 times per day    atorvastatin (LIPITOR)
bilaterally/no wheezing/rhonchi/rales/crackles    [] rhonchi bilaterally - no dullness  [] wheezing   [] rhonchi   [] crackles     use of accessory muscles [] yes [x] no    CARD:   [x]  regular rate and rhythm/No murmurs/rubs/gallops    murmur  [] yes ()  [] no      Rubs  [] yes  [] no       Gallops [] yes  [] no    Rate []  regular  []  irregular        carotid bruits  [] Right  []  Left                 LE edema [x] yes  [] no           JVP  []  yes   []  no  AV access - positive thrill and bruit, no sign of inflammation   ABD:    [x] soft/non distended/non tender/+bowel sounds/no HSM    []  Rigid    tenderness [] yes [] no   Liver enlargement  []  yes []  no                Spleen enlargement  []  yes []  no     distended []  yes [] no     bowel sound  [] hypoactive   [] hyperactive    LYMPH:    Neck []  yes []  no       Axillae []  yes []  no    SKIN:   Rashes []  yes   []  no    Ulcers []  yes   []  no               [] tight to palpitation    skin turgor []  good  [] poor  [] decreased               Cyanosis/clubbing []  yes []  no    NEUR:   [x] cranial nerves II-XII grossly intact       [] Cranial nerves deficit                 []  facial droop    []  slurred speech   [] aphasic     [] Strength normal     []  weakness  []  LUE  []   RUE/ []  LLE  []   RLE    follows commands  [x]  yes []  no           PSYCH:   insight [] poor [] good   Alert and Oriented to  [x] person  [x] place  [x]  time                    [] depressed [] anxious [] agitated  [] lethargic [] stuporous  [] sedated     LAB DATA REVIEWED:    Recent Labs     08/06/23 2023   WBC 12.4*   HGB 10.2*   HCT 30.4*        Recent Labs     08/06/23 2023   *   K 4.6   CL 88*   CO2 24   BUN 54*     Recent Labs     08/06/23 2023   ALT 14   GLOB 5.1*     No results for input(s): INR, APTT in the last 72 hours. Invalid input(s): PTP   No results for input(s): TIBC, FERR in the last 72 hours.     Invalid input(s): FE, PSAT   No results for

## 2023-08-11 NOTE — PROCEDURES
EEG REPORT    Patient Name: Trevor Shah  : 1965  Age: 62 y.o. Ordering physician: Dr. Sg Barrett  Date of EE2023  16:00 - 16:20  Diagnosis: encephalopathy  Interpreting physician: Ronn Luque. TERESA Sevilla FAAN    Procedure: EEG    CLINICAL INDICATION: The patient is a 62 y.o. female who is being evaluated for baseline electro cerebral activities and to rule out seizure focus.       Current Facility-Administered Medications   Medication Dose Route Frequency    albumin human 25% IV solution 12.5 g  12.5 g IntraVENous PRN    midodrine (PROAMATINE) tablet 10 mg  10 mg Oral PRN    epoetin ivet (EPOGEN;PROCRIT) injection 10,000 Units  10,000 Units SubCUTAneous Q7 Days    doxycycline hyclate (VIBRA-TABS) tablet 100 mg  100 mg Oral 2 times per day    sodium chloride flush 0.9 % injection 5-40 mL  5-40 mL IntraVENous 2 times per day    sodium chloride flush 0.9 % injection 5-40 mL  5-40 mL IntraVENous PRN    0.9 % sodium chloride infusion   IntraVENous PRN    ondansetron (ZOFRAN-ODT) disintegrating tablet 4 mg  4 mg Oral Q8H PRN    Or    ondansetron (ZOFRAN) injection 4 mg  4 mg IntraVENous Q6H PRN    polyethylene glycol (GLYCOLAX) packet 17 g  17 g Oral Daily PRN    heparin (porcine) injection 5,000 Units  5,000 Units SubCUTAneous 3 times per day    atorvastatin (LIPITOR) tablet 80 mg  80 mg Oral Nightly    [Held by provider] clopidogrel (PLAVIX) tablet 75 mg  75 mg Oral Daily    [Held by provider] aspirin EC tablet 81 mg  81 mg Oral Daily    labetalol (NORMODYNE;TRANDATE) injection 10 mg  10 mg IntraVENous Q10 Min PRN    levETIRAcetam (KEPPRA) injection 500 mg  500 mg IntraVENous QHS    insulin glargine (LANTUS) injection vial 35 Units  35 Units SubCUTAneous Nightly    insulin lispro (HUMALOG) injection vial 8 Units  8 Units SubCUTAneous TID WC    ipratropium 0.5 mg-albuterol 2.5 mg (DUONEB) nebulizer solution 1 Dose  1 Dose Inhalation Q4H PRN    amitriptyline (ELAVIL) tablet 50 mg  50 mg Oral Nightly    [Held by

## 2023-08-12 ENCOUNTER — APPOINTMENT (OUTPATIENT)
Facility: HOSPITAL | Age: 58
DRG: 720 | End: 2023-08-12
Payer: COMMERCIAL

## 2023-08-12 PROBLEM — R41.82 ACUTE ALTERATION IN MENTAL STATUS: Status: ACTIVE | Noted: 2023-08-12

## 2023-08-12 PROBLEM — G93.41 ACUTE METABOLIC ENCEPHALOPATHY: Status: ACTIVE | Noted: 2023-08-12

## 2023-08-12 LAB
ALBUMIN SERPL-MCNC: 3 G/DL (ref 3.5–5)
ALBUMIN/GLOB SERPL: 0.6 (ref 1.1–2.2)
ALP SERPL-CCNC: 119 U/L (ref 45–117)
ALT SERPL-CCNC: 26 U/L (ref 12–78)
AMMONIA PLAS-SCNC: 23 UMOL/L
ANION GAP SERPL CALC-SCNC: 10 MMOL/L (ref 5–15)
AST SERPL-CCNC: 28 U/L (ref 15–37)
BACTERIA SPEC CULT: NORMAL
BILIRUB SERPL-MCNC: 0.3 MG/DL (ref 0.2–1)
BUN SERPL-MCNC: 43 MG/DL (ref 6–20)
BUN/CREAT SERPL: 8 (ref 12–20)
CALCIUM SERPL-MCNC: 8.9 MG/DL (ref 8.5–10.1)
CHLORIDE SERPL-SCNC: 95 MMOL/L (ref 97–108)
CHOLEST SERPL-MCNC: 141 MG/DL
CO2 SERPL-SCNC: 25 MMOL/L (ref 21–32)
CREAT SERPL-MCNC: 5.42 MG/DL (ref 0.55–1.02)
ERYTHROCYTE [DISTWIDTH] IN BLOOD BY AUTOMATED COUNT: 15.1 % (ref 11.5–14.5)
EST. AVERAGE GLUCOSE BLD GHB EST-MCNC: 275 MG/DL
GLOBULIN SER CALC-MCNC: 5 G/DL (ref 2–4)
GLUCOSE BLD STRIP.AUTO-MCNC: 128 MG/DL (ref 65–117)
GLUCOSE BLD STRIP.AUTO-MCNC: 141 MG/DL (ref 65–117)
GLUCOSE BLD STRIP.AUTO-MCNC: 189 MG/DL (ref 65–117)
GLUCOSE BLD STRIP.AUTO-MCNC: 323 MG/DL (ref 65–117)
GLUCOSE BLD STRIP.AUTO-MCNC: 406 MG/DL (ref 65–117)
GLUCOSE SERPL-MCNC: 140 MG/DL (ref 65–100)
HBA1C MFR BLD: 11.2 % (ref 4–5.6)
HCT VFR BLD AUTO: 29.2 % (ref 35–47)
HDLC SERPL-MCNC: 30 MG/DL
HDLC SERPL: 4.7 (ref 0–5)
HGB BLD-MCNC: 9.5 G/DL (ref 11.5–16)
LDLC SERPL CALC-MCNC: 52 MG/DL (ref 0–100)
MCH RBC QN AUTO: 31.4 PG (ref 26–34)
MCHC RBC AUTO-ENTMCNC: 32.5 G/DL (ref 30–36.5)
MCV RBC AUTO: 96.4 FL (ref 80–99)
NRBC # BLD: 0 K/UL (ref 0–0.01)
NRBC BLD-RTO: 0 PER 100 WBC
PLATELET # BLD AUTO: 239 K/UL (ref 150–400)
PMV BLD AUTO: 9.7 FL (ref 8.9–12.9)
POTASSIUM SERPL-SCNC: 4.4 MMOL/L (ref 3.5–5.1)
PROT SERPL-MCNC: 8 G/DL (ref 6.4–8.2)
RBC # BLD AUTO: 3.03 M/UL (ref 3.8–5.2)
SERVICE CMNT-IMP: ABNORMAL
SERVICE CMNT-IMP: NORMAL
SODIUM SERPL-SCNC: 130 MMOL/L (ref 136–145)
TRIGL SERPL-MCNC: 295 MG/DL
VLDLC SERPL CALC-MCNC: 59 MG/DL
WBC # BLD AUTO: 7.8 K/UL (ref 3.6–11)

## 2023-08-12 PROCEDURE — 82140 ASSAY OF AMMONIA: CPT

## 2023-08-12 PROCEDURE — 97530 THERAPEUTIC ACTIVITIES: CPT

## 2023-08-12 PROCEDURE — 92610 EVALUATE SWALLOWING FUNCTION: CPT

## 2023-08-12 PROCEDURE — 6370000000 HC RX 637 (ALT 250 FOR IP): Performed by: STUDENT IN AN ORGANIZED HEALTH CARE EDUCATION/TRAINING PROGRAM

## 2023-08-12 PROCEDURE — 97535 SELF CARE MNGMENT TRAINING: CPT

## 2023-08-12 PROCEDURE — 2580000003 HC RX 258: Performed by: INTERNAL MEDICINE

## 2023-08-12 PROCEDURE — 36415 COLL VENOUS BLD VENIPUNCTURE: CPT

## 2023-08-12 PROCEDURE — 85027 COMPLETE CBC AUTOMATED: CPT

## 2023-08-12 PROCEDURE — 6360000002 HC RX W HCPCS: Performed by: STUDENT IN AN ORGANIZED HEALTH CARE EDUCATION/TRAINING PROGRAM

## 2023-08-12 PROCEDURE — 83036 HEMOGLOBIN GLYCOSYLATED A1C: CPT

## 2023-08-12 PROCEDURE — 94761 N-INVAS EAR/PLS OXIMETRY MLT: CPT

## 2023-08-12 PROCEDURE — 80171 DRUG SCREEN QUANT GABAPENTIN: CPT

## 2023-08-12 PROCEDURE — 99233 SBSQ HOSP IP/OBS HIGH 50: CPT | Performed by: PSYCHIATRY & NEUROLOGY

## 2023-08-12 PROCEDURE — 80053 COMPREHEN METABOLIC PANEL: CPT

## 2023-08-12 PROCEDURE — 97162 PT EVAL MOD COMPLEX 30 MIN: CPT

## 2023-08-12 PROCEDURE — 94640 AIRWAY INHALATION TREATMENT: CPT

## 2023-08-12 PROCEDURE — 1100000003 HC PRIVATE W/ TELEMETRY

## 2023-08-12 PROCEDURE — 70450 CT HEAD/BRAIN W/O DYE: CPT

## 2023-08-12 PROCEDURE — 6370000000 HC RX 637 (ALT 250 FOR IP): Performed by: INTERNAL MEDICINE

## 2023-08-12 PROCEDURE — 97165 OT EVAL LOW COMPLEX 30 MIN: CPT

## 2023-08-12 PROCEDURE — 80061 LIPID PANEL: CPT

## 2023-08-12 PROCEDURE — 82962 GLUCOSE BLOOD TEST: CPT

## 2023-08-12 RX ORDER — GABAPENTIN 100 MG/1
200 CAPSULE ORAL 3 TIMES DAILY
Status: DISCONTINUED | OUTPATIENT
Start: 2023-08-12 | End: 2023-08-13

## 2023-08-12 RX ADMIN — Medication 8 UNITS: at 21:21

## 2023-08-12 RX ADMIN — INSULIN GLARGINE 35 UNITS: 100 INJECTION, SOLUTION SUBCUTANEOUS at 21:21

## 2023-08-12 RX ADMIN — DOXYCYCLINE HYCLATE 100 MG: 100 TABLET, COATED ORAL at 09:20

## 2023-08-12 RX ADMIN — SODIUM CHLORIDE, PRESERVATIVE FREE 10 ML: 5 INJECTION INTRAVENOUS at 22:21

## 2023-08-12 RX ADMIN — Medication 6 UNITS: at 18:27

## 2023-08-12 RX ADMIN — GABAPENTIN 200 MG: 100 CAPSULE ORAL at 20:51

## 2023-08-12 RX ADMIN — METOPROLOL TARTRATE 25 MG: 25 TABLET, FILM COATED ORAL at 09:20

## 2023-08-12 RX ADMIN — CLOTRIMAZOLE: 10 CREAM TOPICAL at 20:53

## 2023-08-12 RX ADMIN — DOXYCYCLINE HYCLATE 100 MG: 100 TABLET, COATED ORAL at 20:51

## 2023-08-12 RX ADMIN — ARFORMOTEROL TARTRATE 15 MCG: 15 SOLUTION RESPIRATORY (INHALATION) at 21:18

## 2023-08-12 RX ADMIN — ATORVASTATIN CALCIUM 80 MG: 40 TABLET, FILM COATED ORAL at 20:51

## 2023-08-12 RX ADMIN — ARFORMOTEROL TARTRATE 15 MCG: 15 SOLUTION RESPIRATORY (INHALATION) at 08:25

## 2023-08-12 ASSESSMENT — PAIN SCALES - GENERAL
PAINLEVEL_OUTOF10: 0
PAINLEVEL_OUTOF10: 0

## 2023-08-13 ENCOUNTER — APPOINTMENT (OUTPATIENT)
Facility: HOSPITAL | Age: 58
DRG: 720 | End: 2023-08-13
Attending: INTERNAL MEDICINE
Payer: COMMERCIAL

## 2023-08-13 LAB
BACTERIA SPEC CULT: NORMAL
ECHO AO ASC DIAM: 2.2 CM
ECHO AO ASCENDING AORTA INDEX: 1.03 CM/M2
ECHO AO ROOT DIAM: 3.7 CM
ECHO AO ROOT INDEX: 1.74 CM/M2
ECHO AV PEAK GRADIENT: 8 MMHG
ECHO AV PEAK VELOCITY: 1.4 M/S
ECHO AV VELOCITY RATIO: 0.93
ECHO BSA: 2.27 M2
ECHO LA DIAMETER INDEX: 1.6 CM/M2
ECHO LA DIAMETER: 3.4 CM
ECHO LA TO AORTIC ROOT RATIO: 0.92
ECHO LV FRACTIONAL SHORTENING: 33 % (ref 28–44)
ECHO LV INTERNAL DIMENSION DIASTOLE INDEX: 1.97 CM/M2
ECHO LV INTERNAL DIMENSION DIASTOLIC: 4.2 CM (ref 3.9–5.3)
ECHO LV INTERNAL DIMENSION SYSTOLIC INDEX: 1.31 CM/M2
ECHO LV INTERNAL DIMENSION SYSTOLIC: 2.8 CM
ECHO LV IVSD: 1.4 CM (ref 0.6–0.9)
ECHO LV MASS 2D: 200.6 G (ref 67–162)
ECHO LV MASS INDEX 2D: 94.2 G/M2 (ref 43–95)
ECHO LV POSTERIOR WALL DIASTOLIC: 1.2 CM (ref 0.6–0.9)
ECHO LV RELATIVE WALL THICKNESS RATIO: 0.57
ECHO LVOT PEAK GRADIENT: 7 MMHG
ECHO LVOT PEAK VELOCITY: 1.3 M/S
ECHO PV MAX VELOCITY: 1.1 M/S
ECHO PV PEAK GRADIENT: 5 MMHG
ECHO RV INTERNAL DIMENSION: 3.5 CM
ECHO RV TAPSE: 1.7 CM (ref 1.7–?)
ECHO TV REGURGITANT MAX VELOCITY: 2.63 M/S
ECHO TV REGURGITANT PEAK GRADIENT: 28 MMHG
GLUCOSE BLD STRIP.AUTO-MCNC: 273 MG/DL (ref 65–117)
GLUCOSE BLD STRIP.AUTO-MCNC: 290 MG/DL (ref 65–117)
GLUCOSE BLD STRIP.AUTO-MCNC: 308 MG/DL (ref 65–117)
GLUCOSE BLD STRIP.AUTO-MCNC: 358 MG/DL (ref 65–117)
SERVICE CMNT-IMP: ABNORMAL
SERVICE CMNT-IMP: NORMAL

## 2023-08-13 PROCEDURE — 6360000002 HC RX W HCPCS: Performed by: STUDENT IN AN ORGANIZED HEALTH CARE EDUCATION/TRAINING PROGRAM

## 2023-08-13 PROCEDURE — 94761 N-INVAS EAR/PLS OXIMETRY MLT: CPT

## 2023-08-13 PROCEDURE — 6370000000 HC RX 637 (ALT 250 FOR IP): Performed by: STUDENT IN AN ORGANIZED HEALTH CARE EDUCATION/TRAINING PROGRAM

## 2023-08-13 PROCEDURE — 2580000003 HC RX 258: Performed by: INTERNAL MEDICINE

## 2023-08-13 PROCEDURE — 82962 GLUCOSE BLOOD TEST: CPT

## 2023-08-13 PROCEDURE — 6360000004 HC RX CONTRAST MEDICATION: Performed by: INTERNAL MEDICINE

## 2023-08-13 PROCEDURE — 94640 AIRWAY INHALATION TREATMENT: CPT

## 2023-08-13 PROCEDURE — C8929 TTE W OR WO FOL WCON,DOPPLER: HCPCS

## 2023-08-13 PROCEDURE — 6370000000 HC RX 637 (ALT 250 FOR IP): Performed by: INTERNAL MEDICINE

## 2023-08-13 PROCEDURE — 1100000003 HC PRIVATE W/ TELEMETRY

## 2023-08-13 RX ORDER — GABAPENTIN 100 MG/1
100 CAPSULE ORAL 3 TIMES DAILY
Status: DISCONTINUED | OUTPATIENT
Start: 2023-08-13 | End: 2023-08-15 | Stop reason: HOSPADM

## 2023-08-13 RX ORDER — INSULIN LISPRO 100 [IU]/ML
12 INJECTION, SOLUTION INTRAVENOUS; SUBCUTANEOUS
Status: DISCONTINUED | OUTPATIENT
Start: 2023-08-13 | End: 2023-08-15

## 2023-08-13 RX ORDER — GABAPENTIN 100 MG/1
100 CAPSULE ORAL 3 TIMES DAILY
Qty: 9 CAPSULE | Refills: 0 | Status: SHIPPED | OUTPATIENT
Start: 2023-08-13 | End: 2023-08-16

## 2023-08-13 RX ADMIN — ARFORMOTEROL TARTRATE 15 MCG: 15 SOLUTION RESPIRATORY (INHALATION) at 21:56

## 2023-08-13 RX ADMIN — GABAPENTIN 200 MG: 100 CAPSULE ORAL at 08:41

## 2023-08-13 RX ADMIN — DOXYCYCLINE HYCLATE 100 MG: 100 TABLET, COATED ORAL at 08:41

## 2023-08-13 RX ADMIN — Medication 4 UNITS: at 08:40

## 2023-08-13 RX ADMIN — ATORVASTATIN CALCIUM 80 MG: 40 TABLET, FILM COATED ORAL at 21:03

## 2023-08-13 RX ADMIN — AMITRIPTYLINE HYDROCHLORIDE 50 MG: 25 TABLET, FILM COATED ORAL at 21:04

## 2023-08-13 RX ADMIN — PERFLUTREN 1.5 ML: 6.52 INJECTION, SUSPENSION INTRAVENOUS at 11:34

## 2023-08-13 RX ADMIN — Medication 4 UNITS: at 12:13

## 2023-08-13 RX ADMIN — ARFORMOTEROL TARTRATE 15 MCG: 15 SOLUTION RESPIRATORY (INHALATION) at 07:15

## 2023-08-13 RX ADMIN — SODIUM CHLORIDE, PRESERVATIVE FREE 10 ML: 5 INJECTION INTRAVENOUS at 21:05

## 2023-08-13 RX ADMIN — Medication 6 UNITS: at 16:56

## 2023-08-13 RX ADMIN — ASPIRIN 81 MG: 81 TABLET, COATED ORAL at 08:41

## 2023-08-13 RX ADMIN — Medication 8 UNITS: at 12:13

## 2023-08-13 RX ADMIN — CLOPIDOGREL BISULFATE 75 MG: 75 TABLET ORAL at 08:41

## 2023-08-13 RX ADMIN — Medication 12 UNITS: at 16:56

## 2023-08-13 RX ADMIN — GABAPENTIN 100 MG: 100 CAPSULE ORAL at 16:55

## 2023-08-13 RX ADMIN — SODIUM CHLORIDE, PRESERVATIVE FREE 10 ML: 5 INJECTION INTRAVENOUS at 08:47

## 2023-08-13 RX ADMIN — Medication 8 UNITS: at 08:39

## 2023-08-13 RX ADMIN — GABAPENTIN 100 MG: 100 CAPSULE ORAL at 21:04

## 2023-08-13 RX ADMIN — DOXYCYCLINE HYCLATE 100 MG: 100 TABLET, COATED ORAL at 21:06

## 2023-08-13 RX ADMIN — INSULIN GLARGINE 35 UNITS: 100 INJECTION, SOLUTION SUBCUTANEOUS at 21:04

## 2023-08-13 RX ADMIN — CLOTRIMAZOLE: 10 CREAM TOPICAL at 21:07

## 2023-08-13 RX ADMIN — CLOTRIMAZOLE: 10 CREAM TOPICAL at 08:43

## 2023-08-13 RX ADMIN — Medication 8 UNITS: at 21:04

## 2023-08-13 ASSESSMENT — PAIN SCALES - GENERAL: PAINLEVEL_OUTOF10: 0

## 2023-08-14 PROBLEM — R73.9 HYPERGLYCEMIA: Status: ACTIVE | Noted: 2023-08-14

## 2023-08-14 LAB
ANION GAP SERPL CALC-SCNC: 13 MMOL/L (ref 5–15)
BUN SERPL-MCNC: 73 MG/DL (ref 6–20)
BUN/CREAT SERPL: 9 (ref 12–20)
CALCIUM SERPL-MCNC: 8.6 MG/DL (ref 8.5–10.1)
CHLORIDE SERPL-SCNC: 90 MMOL/L (ref 97–108)
CO2 SERPL-SCNC: 22 MMOL/L (ref 21–32)
CREAT SERPL-MCNC: 8.03 MG/DL (ref 0.55–1.02)
GABAPENTIN SERPLBLD-MCNC: 38.2 UG/ML (ref 4–16)
GLUCOSE BLD STRIP.AUTO-MCNC: 223 MG/DL (ref 65–117)
GLUCOSE BLD STRIP.AUTO-MCNC: 225 MG/DL (ref 65–117)
GLUCOSE BLD STRIP.AUTO-MCNC: 275 MG/DL (ref 65–117)
GLUCOSE BLD STRIP.AUTO-MCNC: 315 MG/DL (ref 65–117)
GLUCOSE SERPL-MCNC: 209 MG/DL (ref 65–100)
POTASSIUM SERPL-SCNC: 5.3 MMOL/L (ref 3.5–5.1)
SERVICE CMNT-IMP: ABNORMAL
SODIUM SERPL-SCNC: 125 MMOL/L (ref 136–145)

## 2023-08-14 PROCEDURE — 6370000000 HC RX 637 (ALT 250 FOR IP)

## 2023-08-14 PROCEDURE — 94761 N-INVAS EAR/PLS OXIMETRY MLT: CPT

## 2023-08-14 PROCEDURE — 90935 HEMODIALYSIS ONE EVALUATION: CPT

## 2023-08-14 PROCEDURE — 99221 1ST HOSP IP/OBS SF/LOW 40: CPT

## 2023-08-14 PROCEDURE — 6370000000 HC RX 637 (ALT 250 FOR IP): Performed by: INTERNAL MEDICINE

## 2023-08-14 PROCEDURE — 1100000003 HC PRIVATE W/ TELEMETRY

## 2023-08-14 PROCEDURE — 36415 COLL VENOUS BLD VENIPUNCTURE: CPT

## 2023-08-14 PROCEDURE — 94640 AIRWAY INHALATION TREATMENT: CPT

## 2023-08-14 PROCEDURE — 80048 BASIC METABOLIC PNL TOTAL CA: CPT

## 2023-08-14 PROCEDURE — 6360000002 HC RX W HCPCS: Performed by: STUDENT IN AN ORGANIZED HEALTH CARE EDUCATION/TRAINING PROGRAM

## 2023-08-14 PROCEDURE — 2580000003 HC RX 258: Performed by: INTERNAL MEDICINE

## 2023-08-14 PROCEDURE — 82962 GLUCOSE BLOOD TEST: CPT

## 2023-08-14 PROCEDURE — 6370000000 HC RX 637 (ALT 250 FOR IP): Performed by: STUDENT IN AN ORGANIZED HEALTH CARE EDUCATION/TRAINING PROGRAM

## 2023-08-14 RX ORDER — INSULIN GLARGINE 100 [IU]/ML
48 INJECTION, SOLUTION SUBCUTANEOUS NIGHTLY
Status: DISCONTINUED | OUTPATIENT
Start: 2023-08-14 | End: 2023-08-15 | Stop reason: HOSPADM

## 2023-08-14 RX ADMIN — SODIUM CHLORIDE, PRESERVATIVE FREE 10 ML: 5 INJECTION INTRAVENOUS at 08:06

## 2023-08-14 RX ADMIN — ASPIRIN 81 MG: 81 TABLET, COATED ORAL at 08:04

## 2023-08-14 RX ADMIN — ARFORMOTEROL TARTRATE 15 MCG: 15 SOLUTION RESPIRATORY (INHALATION) at 07:52

## 2023-08-14 RX ADMIN — Medication 4 UNITS: at 11:36

## 2023-08-14 RX ADMIN — GABAPENTIN 100 MG: 100 CAPSULE ORAL at 17:20

## 2023-08-14 RX ADMIN — CLOPIDOGREL BISULFATE 75 MG: 75 TABLET ORAL at 08:04

## 2023-08-14 RX ADMIN — Medication 2 UNITS: at 17:20

## 2023-08-14 RX ADMIN — Medication 12 UNITS: at 08:02

## 2023-08-14 RX ADMIN — Medication 2 UNITS: at 08:03

## 2023-08-14 RX ADMIN — GABAPENTIN 100 MG: 100 CAPSULE ORAL at 20:20

## 2023-08-14 RX ADMIN — ARFORMOTEROL TARTRATE 15 MCG: 15 SOLUTION RESPIRATORY (INHALATION) at 20:10

## 2023-08-14 RX ADMIN — DOXYCYCLINE HYCLATE 100 MG: 100 TABLET, COATED ORAL at 20:20

## 2023-08-14 RX ADMIN — CLOTRIMAZOLE: 10 CREAM TOPICAL at 20:32

## 2023-08-14 RX ADMIN — AMITRIPTYLINE HYDROCHLORIDE 50 MG: 25 TABLET, FILM COATED ORAL at 20:20

## 2023-08-14 RX ADMIN — Medication 4 UNITS: at 21:39

## 2023-08-14 RX ADMIN — MIDODRINE HYDROCHLORIDE 10 MG: 5 TABLET ORAL at 11:37

## 2023-08-14 RX ADMIN — Medication 12 UNITS: at 11:35

## 2023-08-14 RX ADMIN — ATORVASTATIN CALCIUM 80 MG: 40 TABLET, FILM COATED ORAL at 20:20

## 2023-08-14 RX ADMIN — GABAPENTIN 100 MG: 100 CAPSULE ORAL at 08:04

## 2023-08-14 RX ADMIN — SODIUM CHLORIDE, PRESERVATIVE FREE 10 ML: 5 INJECTION INTRAVENOUS at 20:32

## 2023-08-14 RX ADMIN — CLOTRIMAZOLE: 10 CREAM TOPICAL at 08:05

## 2023-08-14 RX ADMIN — INSULIN GLARGINE 48 UNITS: 100 INJECTION, SOLUTION SUBCUTANEOUS at 20:20

## 2023-08-14 RX ADMIN — DOXYCYCLINE HYCLATE 100 MG: 100 TABLET, COATED ORAL at 08:04

## 2023-08-14 RX ADMIN — Medication 12 UNITS: at 17:20

## 2023-08-14 ASSESSMENT — PAIN SCALES - WONG BAKER: WONGBAKER_NUMERICALRESPONSE: 0

## 2023-08-14 ASSESSMENT — PAIN SCALES - GENERAL: PAINLEVEL_OUTOF10: 0

## 2023-08-14 NOTE — DIABETES MGMT
Lab Results   Component Value Date    UIL2JOW 0.60 08/11/2023     Lab Results   Component Value Date    LABA1C 11.2 (H) 08/12/2023    LABA1C 7.6 (H) 04/08/2022    LABA1C 5.8 (H) 12/01/2021       Factors impacting BG management  Factor Dose Comments   Nutrition:  Standard meals  Tube feeding via OG/NG/PEG  TPN   45 grams/meal      Drugs:  Vasopressor load  Steroids  Epogen  Blood transfusion(s)  Atypical antipsychotics        Affects insulin delivery  Impairs insulin action  A1cs inaccurate  A1cs inaccurate  Weight gain increases insulin resistance   Pain     Infection     Other:   Kidney function  Liver function      CKD   Normal    HD M, W,F     Blood glucose pattern    Significant diabetes-related events over the past 24-72 hours      Assessment and Nursing Intervention   Nursing Diagnosis Risk for unstable blood glucose pattern   Nursing Intervention Domain 5250 Decision-making Support   Nursing Interventions Examined current inpatient diabetes/blood glucose control   Explored factors facilitating and impeding inpatient management  Explored corrective strategies with patient and responsible inpatient provider   Informed patient of rational for insulin strategy while hospitalized     Nursing Diagnosis 93129 Ineffective Health Management   Nursing Intervention Domain 5250 Decision-making Support   Nursing Interventions Identified diabetes self-management practices impeding diabetes control  Discussed diabetes survival skills related to  Healthy Plate eating plan; given handouts  Role of physical activity in improving insulin sensitivity and action  Procedure for blood glucose monitoring & options for low-cost products  Medications plan at discharge     Billing Code(s)   [] 59430 IP subsequent hospital care - 50 minutes   [] 70945 IP subsequent hospital care - 35 minutes   [] [phone] IP subsequent hospital care - 25 minutes   [x] 05392 IP initial hospital care - 40 minutes     Before making these care

## 2023-08-15 VITALS
SYSTOLIC BLOOD PRESSURE: 98 MMHG | DIASTOLIC BLOOD PRESSURE: 54 MMHG | WEIGHT: 272 LBS | OXYGEN SATURATION: 98 % | HEIGHT: 65 IN | TEMPERATURE: 98.2 F | HEART RATE: 91 BPM | BODY MASS INDEX: 45.32 KG/M2 | RESPIRATION RATE: 17 BRPM

## 2023-08-15 PROBLEM — E11.65 UNCONTROLLED TYPE 2 DIABETES MELLITUS WITH HYPERGLYCEMIA (HCC): Status: ACTIVE | Noted: 2023-08-15

## 2023-08-15 LAB
-: NORMAL
GLUCOSE BLD STRIP.AUTO-MCNC: 239 MG/DL (ref 65–117)
GLUCOSE BLD STRIP.AUTO-MCNC: 241 MG/DL (ref 65–117)
GLUCOSE BLD STRIP.AUTO-MCNC: 302 MG/DL (ref 65–117)
GLUCOSE BLD STRIP.AUTO-MCNC: 339 MG/DL (ref 65–117)
HAV IGM SER QL: NONREACTIVE
HBV CORE IGM SER QL: NONREACTIVE
HBV SURFACE AG SER QL: <0.1 INDEX
HBV SURFACE AG SER QL: NEGATIVE
HCV AB SER IA-ACNC: 0.12 INDEX
HCV AB SERPL QL IA: NONREACTIVE
SERVICE CMNT-IMP: ABNORMAL

## 2023-08-15 PROCEDURE — 97530 THERAPEUTIC ACTIVITIES: CPT | Performed by: PHYSICAL THERAPIST

## 2023-08-15 PROCEDURE — 6370000000 HC RX 637 (ALT 250 FOR IP): Performed by: NURSE PRACTITIONER

## 2023-08-15 PROCEDURE — 6370000000 HC RX 637 (ALT 250 FOR IP)

## 2023-08-15 PROCEDURE — 80074 ACUTE HEPATITIS PANEL: CPT

## 2023-08-15 PROCEDURE — 6370000000 HC RX 637 (ALT 250 FOR IP): Performed by: STUDENT IN AN ORGANIZED HEALTH CARE EDUCATION/TRAINING PROGRAM

## 2023-08-15 PROCEDURE — 6370000000 HC RX 637 (ALT 250 FOR IP): Performed by: INTERNAL MEDICINE

## 2023-08-15 PROCEDURE — 36415 COLL VENOUS BLD VENIPUNCTURE: CPT

## 2023-08-15 PROCEDURE — 6360000002 HC RX W HCPCS: Performed by: STUDENT IN AN ORGANIZED HEALTH CARE EDUCATION/TRAINING PROGRAM

## 2023-08-15 PROCEDURE — 99231 SBSQ HOSP IP/OBS SF/LOW 25: CPT

## 2023-08-15 PROCEDURE — 92526 ORAL FUNCTION THERAPY: CPT

## 2023-08-15 PROCEDURE — 94640 AIRWAY INHALATION TREATMENT: CPT

## 2023-08-15 PROCEDURE — 82962 GLUCOSE BLOOD TEST: CPT

## 2023-08-15 PROCEDURE — 2700000000 HC OXYGEN THERAPY PER DAY

## 2023-08-15 PROCEDURE — 2580000003 HC RX 258: Performed by: INTERNAL MEDICINE

## 2023-08-15 PROCEDURE — 94761 N-INVAS EAR/PLS OXIMETRY MLT: CPT

## 2023-08-15 RX ORDER — INSULIN LISPRO 100 [IU]/ML
0-8 INJECTION, SOLUTION INTRAVENOUS; SUBCUTANEOUS
Status: DISCONTINUED | OUTPATIENT
Start: 2023-08-15 | End: 2023-08-15 | Stop reason: HOSPADM

## 2023-08-15 RX ORDER — INSULIN GLARGINE 100 [IU]/ML
48 INJECTION, SOLUTION SUBCUTANEOUS NIGHTLY
Qty: 5 ADJUSTABLE DOSE PRE-FILLED PEN SYRINGE | Refills: 3 | Status: SHIPPED
Start: 2023-08-15

## 2023-08-15 RX ORDER — INSULIN LISPRO 100 [IU]/ML
15 INJECTION, SOLUTION INTRAVENOUS; SUBCUTANEOUS
Status: DISCONTINUED | OUTPATIENT
Start: 2023-08-15 | End: 2023-08-15 | Stop reason: HOSPADM

## 2023-08-15 RX ADMIN — Medication 15 UNITS: at 17:30

## 2023-08-15 RX ADMIN — Medication 15 UNITS: at 12:41

## 2023-08-15 RX ADMIN — ARFORMOTEROL TARTRATE 15 MCG: 15 SOLUTION RESPIRATORY (INHALATION) at 07:43

## 2023-08-15 RX ADMIN — Medication 6 UNITS: at 00:12

## 2023-08-15 RX ADMIN — SODIUM CHLORIDE, PRESERVATIVE FREE 10 ML: 5 INJECTION INTRAVENOUS at 10:00

## 2023-08-15 RX ADMIN — ASPIRIN 81 MG: 81 TABLET, COATED ORAL at 08:44

## 2023-08-15 RX ADMIN — Medication 12 UNITS: at 08:45

## 2023-08-15 RX ADMIN — Medication 2 UNITS: at 17:30

## 2023-08-15 RX ADMIN — CLOTRIMAZOLE: 10 CREAM TOPICAL at 12:44

## 2023-08-15 RX ADMIN — GABAPENTIN 100 MG: 100 CAPSULE ORAL at 15:00

## 2023-08-15 RX ADMIN — GABAPENTIN 100 MG: 100 CAPSULE ORAL at 08:44

## 2023-08-15 RX ADMIN — CLOPIDOGREL BISULFATE 75 MG: 75 TABLET ORAL at 08:44

## 2023-08-15 RX ADMIN — Medication 6 UNITS: at 12:41

## 2023-08-15 RX ADMIN — DOXYCYCLINE HYCLATE 100 MG: 100 TABLET, COATED ORAL at 08:44

## 2023-08-15 RX ADMIN — Medication 2 UNITS: at 08:45

## 2023-08-15 NOTE — CARE COORDINATION
08/15/23 1232   Discharge Planning   Type of Residence Acute Rehab   Current Services Prior To Admission None   Potential Assistance Needed N/A   DME Ordered? No   Potential Assistance Purchasing Medications No   Patient expects to be discharged to: Acute rehab   Services At/After Discharge   Transition of Care Consult (CM Consult) Acute Rehab  ([facility])   [address] Provided? No   Mode of Transport at Discharge [address] Time of Discharge 1500   Confirm Follow Up Transport Family   Condition of Participation: Discharge Planning   The Plan for Transition of Care is related to the following treatment goals: Plan for Acute Rehab at [facility]   The Patient and/or Patient Representative was provided with a Choice of Provider? Patient   The Patient and/Or Patient Representative agree with the Discharge Plan? Yes   Freedom of Choice list was provided with basic dialogue that supports the patient's individualized plan of care/goals, treatment preferences, and shares the quality data associated with the providers?   Yes
Discharge order discontinued. Code S called this PM and further work up is pending. CM will continue to follow and be available should disposition needs arise.     Lorenzo Hinton, MSW   Care Manager, [facility]
Transition of Care Plan:     RUR: 20% high risk     Prior Level of Functioning: Independent with ADLs/ambulates with walker     Disposition: IPR -  [facility]-  Auth: [id number] 8/15  Accepting MD: Vijay  Room 2022  Report Number: [phone]  [facility] coming at 6:30 PM      [address] M/W/F    4:45 PM   CM called [facility] and Joyce Bellamy has not called them yet to set up trip. CM called [facility] again and frankly I am not sure that they will arrange this before midnight. CM talked to  and approved [facility] d/t these issues with Joyce Bellamy. [place] and the unpredictablility    4:16 PM   RN just let CM know that transport has not arrived yet. CM called [facility] and they stated that they need to hear from Joyce Bellamy to get the ride set up and they have not. CM called Joyce Bellamy -Medicaid . . [phone]. CM provided the Transport Number [id number] and let them know that Pt is still here and needs transport arranged. Adebayo Sanchez for the mistake and is contacting them now to see if they can still transport this Pt this evening. Joyce Bellamy has to contact [facility] to set that piece up. 12:31 PM   CM let Finesse Kaiser know transport time and RN working on [employer]. No further CM needs. 11:36 AM  ELBERT talked to Finesse Kaiser and she indicated that they can take Pt today after 3 PM.  She will call me back with Accepting MD/Room Number and Number to call report. Finesse Kaiser requested that Hep panel lab bed drawn for HD since one was not done during this admission. RN will draw prior to DC. CM sent request to [facility]: [phone] requesting 3 PM . - Confirmation: [id number]. CM requested [facility] and CM called them . . they will be here at 3 Pm. Pt will need EMTALA     9 AM  ELBERT talked to Finesse Kaiser at [facility] and she indicated that [employer] has approved.   She will be going to her morning meeting soon and will let me know if they have a bed available for her
Transition of Care Plan:     RUR: 20% high risk   Prior Level of Functioning: Ind with ADLs/ambulates with walker    Disposition: IPR -  [facility]  Auth: Insurance [facility] Care: Pending Started 8/14  HD [place] M/W/F    11 AM  [facility] accepted and they are going to start [place] today. [facility] will let me know reference number later today. 9:54 AM  CM received a message from [facility] and they stated that they could not accept Pt as she is HD Pt and they are full. CM sent update to [facility] and Jeromy Singh to see if they can accept. Celeste Sawant Pending. 8:55 AM  CM completed chart review. Pt is stable. [facility] accepted 8/13. CM saw message from [facility] with [facility]: [phone] and she confirmed that will start auth today. CM requested and auth reference number once they have that. If SNF or IPR: Date FOC offered: IPR 8/13  Date [address] received: 8/13  Accepting facility: [facility]  Date authorization started with reference number: Ginger Emily started: 8/14 Pending. Date authorization received and expires: Follow up appointments: Specialist needs? Defer PCP follow up until after IPR  DME needed: Defer to IPR  Transportation at discharge: Will need BLS transport   IM/IMM Medicare/ letter given: N/A  Is patient a  and connected with VA? N/A              If yes, was [employer] transfer form completed and VA notified? Caregiver Contact:  Irma Arnaldo (sister) or Eduin Mckeon [phone] (nephew)  Discharge Caregiver contacted prior to discharge? To be updated as pt requests. Both at bedside today during conversation regarding IPR recommendations and preferences. Care Conference needed? Not indicated at present   Barriers to discharge: Bed Availability and  insurance authorization     CM will continue to monitor discharge plan.      Alisha Sarmiento, [address]  Ext [phone]
Transition of Care Plan:    RUR: 20% high risk   Prior Level of Functioning: Ind with ADLs/ambulates with walker  Disposition: IPR - [facility] accepted   If SNF or IPR: Date FOC offered: IPR 8/13  Date [address] received: 8/13  Accepting facility: [facility]   Date authorization started with reference number: [id number] [facility], requested [facility] to start today  Date authorization received and expires: Follow up appointments: Specialist needs? Defer PCP follow up until after IPR  DME needed: Defer to IPR  Transportation at discharge: Will need BLS transport   IM/IMM Medicare/ letter given: N/A  Is patient a [place] and connected with VA? N/A   If yes, was [employer] transfer form completed and VA notified? Caregiver Contact:  Irma Mcintosh (sister) or Francine Boss [phone] (nephew)  Discharge Caregiver contacted prior to discharge? To be updated as pt requests. Both at bedside today during conversation regarding IPR recommendations and preferences. Care Conference needed? Not indicated at present   Barriers to discharge: Accepting IPR bed, insurance authorization     4:14 PM  Informed by [facility] that they are able to accept. CM updated pt, who is agreeable to pursue this option. CM messaged [facility] to ask that auth be started today as pt is stable for d/c. Care management will continue to be available to assist as transition of care planning needs arise. Initial note:  CM met with pt, pt's sister Irma, and nephew,  [employer], at bedside to review transition of care plans. IPR recommendations reviewed, freedom of choice provided, IPR list provided. Pt /family has provided the following selections:  [facility] of [address]    Referrals placed via [facility] and are currently pending. Pt will require insurance authorization for transition.     Patricia Uriostegui, [address]  [phone]/Available on [employer]
Family Members   Current Services Prior To Admission Durable Medical Equipment   Current DME Prior to [employer]; Wheelchair   Patient expects to be discharged to: [facility], MSW    [phone]

## 2023-10-03 ENCOUNTER — OFFICE VISIT (OUTPATIENT)
Age: 58
End: 2023-10-03
Payer: COMMERCIAL

## 2023-10-03 VITALS — SYSTOLIC BLOOD PRESSURE: 104 MMHG | DIASTOLIC BLOOD PRESSURE: 67 MMHG | HEART RATE: 85 BPM

## 2023-10-03 DIAGNOSIS — Z99.2 STAGE 5 CHRONIC KIDNEY DISEASE ON CHRONIC DIALYSIS (HCC): ICD-10-CM

## 2023-10-03 DIAGNOSIS — E11.42 DIABETIC POLYNEUROPATHY ASSOCIATED WITH TYPE 2 DIABETES MELLITUS (HCC): ICD-10-CM

## 2023-10-03 DIAGNOSIS — E11.65 UNCONTROLLED TYPE 2 DIABETES MELLITUS WITH HYPERGLYCEMIA (HCC): Primary | ICD-10-CM

## 2023-10-03 DIAGNOSIS — E11.319 DIABETIC RETINOPATHY OF BOTH EYES ASSOCIATED WITH TYPE 2 DIABETES MELLITUS, MACULAR EDEMA PRESENCE UNSPECIFIED, UNSPECIFIED RETINOPATHY SEVERITY (HCC): ICD-10-CM

## 2023-10-03 DIAGNOSIS — N18.6 STAGE 5 CHRONIC KIDNEY DISEASE ON CHRONIC DIALYSIS (HCC): ICD-10-CM

## 2023-10-03 PROCEDURE — 3078F DIAST BP <80 MM HG: CPT | Performed by: GENERAL ACUTE CARE HOSPITAL

## 2023-10-03 PROCEDURE — 99204 OFFICE O/P NEW MOD 45 MIN: CPT | Performed by: GENERAL ACUTE CARE HOSPITAL

## 2023-10-03 PROCEDURE — 3046F HEMOGLOBIN A1C LEVEL >9.0%: CPT | Performed by: GENERAL ACUTE CARE HOSPITAL

## 2023-10-03 PROCEDURE — 3074F SYST BP LT 130 MM HG: CPT | Performed by: GENERAL ACUTE CARE HOSPITAL

## 2023-10-03 NOTE — PROGRESS NOTES
70 mg/dL, eat another 15 grams of carbohydrate. Repeat this every 15 minutes until your blood sugar is in a safe target range. Once your blood sugar is in a safe range, eat a snack or meal to prevent recurrent low blood sugar. Make sure family, friends, and coworkers know the symptoms of low blood sugar and know how to get your sugar level up. If you were prescribed glucagon, always have it with you. Make sure friends and family know how to use it. When should you call for help? Call 911 anytime you think you may need emergency care. For example, call if:    You passed out (lost consciousness). You are confused or cannot think clearly. Your blood sugar is very high or very low. Watch closely for changes in your health, and be sure to contact your doctor if:    Your blood sugar stays outside the level your doctor set for you. You have any problems.

## 2023-10-03 NOTE — PATIENT INSTRUCTIONS
PLAN FOR TODAY    We will plan to make the following changes to your diabetes medications: On Hemodialysis days (MWF):  -Lantus (long acting insulin) 48 units at bedtime     On Non-Hemodialysis days (TTSS):  -Lantus 54  (long acting insulin)  units at bedtime     -Lispro insulin (mealtime insulin) 16 units three times daily before meals + correction scale     Correction Scale:   1 unit for every 30 above 150    IF GLUCOSE IS:                 THEN TAKE:      0   Extra Unit  151-180   1   Extra Unit  181-210   2   Extra Units  211-240   3   Extra Units  241-270   4   Extra Units  271-300   5   Extra Units  >300    6   Extra Units    Example: My planned insulin dose:    16 Units    +   ____ Extra Correction Units  =  ____ total units to take together as one injection. It will be important to continue checking your glucose just as you did previously. I would like you at the very least to check you glucose during:    AM fasting before breakfast  Before Lunch   Before Dinner  Any other time that you are not feeling well. Always provide a glucose log that is completed at every visit so that we can review the results of your home glucose together. Without this, it is not possible to make accurate changes to your diabetes regimen. Lu Carter MD  [facility]     Diabetes and Meal Planning    Meal planning can be a key part of managing diabetes. Planning meals and snacks with the right balance of carbohydrate, protein, and fat can help you keep your blood sugar at the target level. You don't have to eat special foods. You can eat what your family eats, including sweets once in a while. But you do have to pay attention to how often you eat and how much you eat of certain foods. Your plate  The plate format is a simple way to help you manage how you eat. You plan meals by learning how much space each food should take on a plate.  It can make it easier to keep your blood sugar

## 2023-11-06 PROBLEM — Z99.2 STAGE 5 CHRONIC KIDNEY DISEASE ON CHRONIC DIALYSIS (HCC): Status: ACTIVE | Noted: 2023-11-06

## 2023-11-06 PROBLEM — N18.6 STAGE 5 CHRONIC KIDNEY DISEASE ON CHRONIC DIALYSIS (HCC): Status: ACTIVE | Noted: 2023-11-06

## 2024-02-14 ENCOUNTER — TELEPHONE (OUTPATIENT)
Age: 59
End: 2024-02-14

## 2024-02-14 NOTE — TELEPHONE ENCOUNTER
2/14/2024  2:07 PM    per pt; LVM and will call back at a later date and time to R/S appt 02/14/2024 [facility]

## 2024-04-24 ENCOUNTER — HOSPITAL ENCOUNTER (INPATIENT)
Facility: HOSPITAL | Age: 59
LOS: 3 days | Discharge: HOME HEALTH | DRG: 383 | End: 2024-04-28
Attending: EMERGENCY MEDICINE | Admitting: STUDENT IN AN ORGANIZED HEALTH CARE EDUCATION/TRAINING PROGRAM
Payer: MEDICAID

## 2024-04-24 ENCOUNTER — APPOINTMENT (OUTPATIENT)
Facility: HOSPITAL | Age: 59
DRG: 383 | End: 2024-04-24
Payer: MEDICAID

## 2024-04-24 DIAGNOSIS — L03.031 CELLULITIS OF TOE OF RIGHT FOOT: Primary | ICD-10-CM

## 2024-04-24 DIAGNOSIS — G62.9 PERIPHERAL POLYNEUROPATHY: ICD-10-CM

## 2024-04-24 DIAGNOSIS — E11.42 TYPE 2 DIABETES MELLITUS WITH PERIPHERAL NEUROPATHY (HCC): ICD-10-CM

## 2024-04-24 DIAGNOSIS — E87.1 HYPONATREMIA: ICD-10-CM

## 2024-04-24 DIAGNOSIS — N18.6 ESRD (END STAGE RENAL DISEASE) (HCC): ICD-10-CM

## 2024-04-24 DIAGNOSIS — E11.65 UNCONTROLLED TYPE 2 DIABETES MELLITUS WITH HYPERGLYCEMIA (HCC): ICD-10-CM

## 2024-04-24 LAB
ALBUMIN SERPL-MCNC: 3.3 G/DL (ref 3.5–5)
ALBUMIN/GLOB SERPL: 0.8 (ref 1.1–2.2)
ALP SERPL-CCNC: 151 U/L (ref 45–117)
ALT SERPL-CCNC: 20 U/L (ref 12–78)
ANION GAP SERPL CALC-SCNC: 9 MMOL/L (ref 5–15)
AST SERPL-CCNC: 24 U/L (ref 15–37)
BASOPHILS # BLD: 0 K/UL (ref 0–0.1)
BASOPHILS NFR BLD: 1 % (ref 0–1)
BILIRUB SERPL-MCNC: 0.4 MG/DL (ref 0.2–1)
BUN SERPL-MCNC: 44 MG/DL (ref 6–20)
BUN/CREAT SERPL: 6 (ref 12–20)
CALCIUM SERPL-MCNC: 8.2 MG/DL (ref 8.5–10.1)
CHLORIDE SERPL-SCNC: 93 MMOL/L (ref 97–108)
CO2 SERPL-SCNC: 24 MMOL/L (ref 21–32)
CREAT SERPL-MCNC: 7.85 MG/DL (ref 0.55–1.02)
DIFFERENTIAL METHOD BLD: ABNORMAL
EOSINOPHIL # BLD: 0.1 K/UL (ref 0–0.4)
EOSINOPHIL NFR BLD: 2 % (ref 0–7)
ERYTHROCYTE [DISTWIDTH] IN BLOOD BY AUTOMATED COUNT: 15.9 % (ref 11.5–14.5)
GLOBULIN SER CALC-MCNC: 4.1 G/DL (ref 2–4)
GLUCOSE SERPL-MCNC: 476 MG/DL (ref 65–100)
HCT VFR BLD AUTO: 27.2 % (ref 35–47)
HGB BLD-MCNC: 9.2 G/DL (ref 11.5–16)
IMM GRANULOCYTES # BLD AUTO: 0 K/UL (ref 0–0.04)
IMM GRANULOCYTES NFR BLD AUTO: 1 % (ref 0–0.5)
LYMPHOCYTES # BLD: 1.1 K/UL (ref 0.8–3.5)
LYMPHOCYTES NFR BLD: 15 % (ref 12–49)
MCH RBC QN AUTO: 32.6 PG (ref 26–34)
MCHC RBC AUTO-ENTMCNC: 33.8 G/DL (ref 30–36.5)
MCV RBC AUTO: 96.5 FL (ref 80–99)
MONOCYTES # BLD: 0.6 K/UL (ref 0–1)
MONOCYTES NFR BLD: 7 % (ref 5–13)
NEUTS SEG # BLD: 5.7 K/UL (ref 1.8–8)
NEUTS SEG NFR BLD: 74 % (ref 32–75)
NRBC # BLD: 0 K/UL (ref 0–0.01)
NRBC BLD-RTO: 0 PER 100 WBC
PLATELET # BLD AUTO: 175 K/UL (ref 150–400)
PMV BLD AUTO: 9.9 FL (ref 8.9–12.9)
POTASSIUM SERPL-SCNC: 4.6 MMOL/L (ref 3.5–5.1)
PROT SERPL-MCNC: 7.4 G/DL (ref 6.4–8.2)
RBC # BLD AUTO: 2.82 M/UL (ref 3.8–5.2)
SODIUM SERPL-SCNC: 126 MMOL/L (ref 136–145)
WBC # BLD AUTO: 7.6 K/UL (ref 3.6–11)

## 2024-04-24 PROCEDURE — 85025 COMPLETE CBC W/AUTO DIFF WBC: CPT

## 2024-04-24 PROCEDURE — 90471 IMMUNIZATION ADMIN: CPT | Performed by: EMERGENCY MEDICINE

## 2024-04-24 PROCEDURE — 96375 TX/PRO/DX INJ NEW DRUG ADDON: CPT

## 2024-04-24 PROCEDURE — 80053 COMPREHEN METABOLIC PANEL: CPT

## 2024-04-24 PROCEDURE — 6370000000 HC RX 637 (ALT 250 FOR IP): Performed by: EMERGENCY MEDICINE

## 2024-04-24 PROCEDURE — 86140 C-REACTIVE PROTEIN: CPT

## 2024-04-24 PROCEDURE — 2500000003 HC RX 250 WO HCPCS: Performed by: EMERGENCY MEDICINE

## 2024-04-24 PROCEDURE — 6360000002 HC RX W HCPCS: Performed by: EMERGENCY MEDICINE

## 2024-04-24 PROCEDURE — 96365 THER/PROPH/DIAG IV INF INIT: CPT

## 2024-04-24 PROCEDURE — 90715 TDAP VACCINE 7 YRS/> IM: CPT | Performed by: EMERGENCY MEDICINE

## 2024-04-24 PROCEDURE — 73660 X-RAY EXAM OF TOE(S): CPT

## 2024-04-24 PROCEDURE — 85652 RBC SED RATE AUTOMATED: CPT

## 2024-04-24 PROCEDURE — 99285 EMERGENCY DEPT VISIT HI MDM: CPT

## 2024-04-24 RX ORDER — LIDOCAINE HYDROCHLORIDE 20 MG/ML
5 INJECTION, SOLUTION EPIDURAL; INFILTRATION; INTRACAUDAL; PERINEURAL
Status: ACTIVE | OUTPATIENT
Start: 2024-04-24 | End: 2024-04-25

## 2024-04-24 RX ADMIN — Medication 3 ML: at 21:27

## 2024-04-24 RX ADMIN — TETANUS TOXOID, REDUCED DIPHTHERIA TOXOID AND ACELLULAR PERTUSSIS VACCINE, ADSORBED 0.5 ML: 5; 2.5; 8; 8; 2.5 SUSPENSION INTRAMUSCULAR at 21:28

## 2024-04-24 ASSESSMENT — LIFESTYLE VARIABLES
HOW MANY STANDARD DRINKS CONTAINING ALCOHOL DO YOU HAVE ON A TYPICAL DAY: PATIENT DOES NOT DRINK
HOW OFTEN DO YOU HAVE A DRINK CONTAINING ALCOHOL: NEVER

## 2024-04-24 ASSESSMENT — PAIN - FUNCTIONAL ASSESSMENT: PAIN_FUNCTIONAL_ASSESSMENT: NONE - DENIES PAIN

## 2024-04-25 PROBLEM — L03.90 CELLULITIS: Status: ACTIVE | Noted: 2024-04-25

## 2024-04-25 LAB
ABO + RH BLD: NORMAL
ANION GAP SERPL CALC-SCNC: 10 MMOL/L (ref 5–15)
BASOPHILS # BLD: 0 K/UL (ref 0–0.1)
BASOPHILS NFR BLD: 0 % (ref 0–1)
BLOOD GROUP ANTIBODIES SERPL: NORMAL
BUN SERPL-MCNC: 45 MG/DL (ref 6–20)
BUN/CREAT SERPL: 6 (ref 12–20)
CALCIUM SERPL-MCNC: 8.4 MG/DL (ref 8.5–10.1)
CHLORIDE SERPL-SCNC: 97 MMOL/L (ref 97–108)
CHOLEST SERPL-MCNC: 116 MG/DL
CO2 SERPL-SCNC: 22 MMOL/L (ref 21–32)
CREAT SERPL-MCNC: 7.88 MG/DL (ref 0.55–1.02)
CRP SERPL-MCNC: 2.65 MG/DL (ref 0–0.3)
DIFFERENTIAL METHOD BLD: ABNORMAL
EKG ATRIAL RATE: 84 BPM
EKG DIAGNOSIS: NORMAL
EKG P AXIS: 39 DEGREES
EKG P-R INTERVAL: 196 MS
EKG Q-T INTERVAL: 380 MS
EKG QRS DURATION: 78 MS
EKG QTC CALCULATION (BAZETT): 449 MS
EKG R AXIS: 0 DEGREES
EKG T AXIS: 80 DEGREES
EKG VENTRICULAR RATE: 84 BPM
EOSINOPHIL # BLD: 0.1 K/UL (ref 0–0.4)
EOSINOPHIL NFR BLD: 2 % (ref 0–7)
ERYTHROCYTE [DISTWIDTH] IN BLOOD BY AUTOMATED COUNT: 15.7 % (ref 11.5–14.5)
ERYTHROCYTE [SEDIMENTATION RATE] IN BLOOD: 109 MM/HR (ref 0–30)
EST. AVERAGE GLUCOSE BLD GHB EST-MCNC: 237 MG/DL
FERRITIN SERPL-MCNC: 602 NG/ML (ref 26–388)
FOLATE SERPL-MCNC: 5.8 NG/ML (ref 5–21)
GLUCOSE BLD STRIP.AUTO-MCNC: 116 MG/DL (ref 65–117)
GLUCOSE BLD STRIP.AUTO-MCNC: 220 MG/DL (ref 65–117)
GLUCOSE BLD STRIP.AUTO-MCNC: 270 MG/DL (ref 65–117)
GLUCOSE BLD STRIP.AUTO-MCNC: 302 MG/DL (ref 65–117)
GLUCOSE SERPL-MCNC: 274 MG/DL (ref 65–100)
HBA1C MFR BLD: 9.9 % (ref 4–5.6)
HBV SURFACE AB SER QL: NONREACTIVE
HBV SURFACE AB SER-ACNC: 8.68 MIU/ML
HBV SURFACE AG SER QL: <0.1 INDEX
HBV SURFACE AG SER QL: NEGATIVE
HCT VFR BLD AUTO: 27.6 % (ref 35–47)
HDLC SERPL-MCNC: 33 MG/DL
HDLC SERPL: 3.5 (ref 0–5)
HGB BLD-MCNC: 9.3 G/DL (ref 11.5–16)
IMM GRANULOCYTES # BLD AUTO: 0 K/UL (ref 0–0.04)
IMM GRANULOCYTES NFR BLD AUTO: 0 % (ref 0–0.5)
IRON SATN MFR SERPL: 24 % (ref 20–50)
IRON SERPL-MCNC: 57 UG/DL (ref 35–150)
LACTATE SERPL-SCNC: 1.6 MMOL/L (ref 0.4–2)
LDLC SERPL CALC-MCNC: 30.8 MG/DL (ref 0–100)
LYMPHOCYTES # BLD: 1.2 K/UL (ref 0.8–3.5)
LYMPHOCYTES NFR BLD: 16 % (ref 12–49)
MCH RBC QN AUTO: 32.6 PG (ref 26–34)
MCHC RBC AUTO-ENTMCNC: 33.7 G/DL (ref 30–36.5)
MCV RBC AUTO: 96.8 FL (ref 80–99)
MONOCYTES # BLD: 0.5 K/UL (ref 0–1)
MONOCYTES NFR BLD: 7 % (ref 5–13)
NEUTS SEG # BLD: 5.7 K/UL (ref 1.8–8)
NEUTS SEG NFR BLD: 75 % (ref 32–75)
NRBC # BLD: 0 K/UL (ref 0–0.01)
NRBC BLD-RTO: 0 PER 100 WBC
PLATELET # BLD AUTO: 154 K/UL (ref 150–400)
PMV BLD AUTO: 9.6 FL (ref 8.9–12.9)
POTASSIUM SERPL-SCNC: 4.1 MMOL/L (ref 3.5–5.1)
PROCALCITONIN SERPL-MCNC: 0.26 NG/ML
RBC # BLD AUTO: 2.85 M/UL (ref 3.8–5.2)
SERVICE CMNT-IMP: ABNORMAL
SERVICE CMNT-IMP: NORMAL
SODIUM SERPL-SCNC: 129 MMOL/L (ref 136–145)
SPECIMEN EXP DATE BLD: NORMAL
TIBC SERPL-MCNC: 240 UG/DL (ref 250–450)
TRIGL SERPL-MCNC: 261 MG/DL
VIT B12 SERPL-MCNC: 565 PG/ML (ref 193–986)
VLDLC SERPL CALC-MCNC: 52.2 MG/DL
WBC # BLD AUTO: 7.6 K/UL (ref 3.6–11)

## 2024-04-25 PROCEDURE — 86900 BLOOD TYPING SEROLOGIC ABO: CPT

## 2024-04-25 PROCEDURE — 85025 COMPLETE CBC W/AUTO DIFF WBC: CPT

## 2024-04-25 PROCEDURE — 86706 HEP B SURFACE ANTIBODY: CPT

## 2024-04-25 PROCEDURE — 82728 ASSAY OF FERRITIN: CPT

## 2024-04-25 PROCEDURE — 2580000003 HC RX 258: Performed by: NURSE PRACTITIONER

## 2024-04-25 PROCEDURE — 80048 BASIC METABOLIC PNL TOTAL CA: CPT

## 2024-04-25 PROCEDURE — 6360000002 HC RX W HCPCS: Performed by: EMERGENCY MEDICINE

## 2024-04-25 PROCEDURE — 6360000002 HC RX W HCPCS

## 2024-04-25 PROCEDURE — 93005 ELECTROCARDIOGRAM TRACING: CPT | Performed by: NURSE PRACTITIONER

## 2024-04-25 PROCEDURE — 97161 PT EVAL LOW COMPLEX 20 MIN: CPT

## 2024-04-25 PROCEDURE — 5A1D70Z PERFORMANCE OF URINARY FILTRATION, INTERMITTENT, LESS THAN 6 HOURS PER DAY: ICD-10-PCS | Performed by: INTERNAL MEDICINE

## 2024-04-25 PROCEDURE — 87340 HEPATITIS B SURFACE AG IA: CPT

## 2024-04-25 PROCEDURE — 6370000000 HC RX 637 (ALT 250 FOR IP): Performed by: EMERGENCY MEDICINE

## 2024-04-25 PROCEDURE — 80061 LIPID PANEL: CPT

## 2024-04-25 PROCEDURE — 97530 THERAPEUTIC ACTIVITIES: CPT

## 2024-04-25 PROCEDURE — 97535 SELF CARE MNGMENT TRAINING: CPT | Performed by: OCCUPATIONAL THERAPIST

## 2024-04-25 PROCEDURE — 83540 ASSAY OF IRON: CPT

## 2024-04-25 PROCEDURE — 94640 AIRWAY INHALATION TREATMENT: CPT

## 2024-04-25 PROCEDURE — 2580000003 HC RX 258: Performed by: EMERGENCY MEDICINE

## 2024-04-25 PROCEDURE — 82962 GLUCOSE BLOOD TEST: CPT

## 2024-04-25 PROCEDURE — 82746 ASSAY OF FOLIC ACID SERUM: CPT

## 2024-04-25 PROCEDURE — 82607 VITAMIN B-12: CPT

## 2024-04-25 PROCEDURE — 6360000002 HC RX W HCPCS: Performed by: STUDENT IN AN ORGANIZED HEALTH CARE EDUCATION/TRAINING PROGRAM

## 2024-04-25 PROCEDURE — 86850 RBC ANTIBODY SCREEN: CPT

## 2024-04-25 PROCEDURE — 6370000000 HC RX 637 (ALT 250 FOR IP): Performed by: NURSE PRACTITIONER

## 2024-04-25 PROCEDURE — 86901 BLOOD TYPING SEROLOGIC RH(D): CPT

## 2024-04-25 PROCEDURE — 1100000003 HC PRIVATE W/ TELEMETRY

## 2024-04-25 PROCEDURE — 36415 COLL VENOUS BLD VENIPUNCTURE: CPT

## 2024-04-25 PROCEDURE — 84145 PROCALCITONIN (PCT): CPT

## 2024-04-25 PROCEDURE — 87205 SMEAR GRAM STAIN: CPT

## 2024-04-25 PROCEDURE — 90935 HEMODIALYSIS ONE EVALUATION: CPT

## 2024-04-25 PROCEDURE — P9047 ALBUMIN (HUMAN), 25%, 50ML: HCPCS

## 2024-04-25 PROCEDURE — 83036 HEMOGLOBIN GLYCOSYLATED A1C: CPT

## 2024-04-25 PROCEDURE — 87147 CULTURE TYPE IMMUNOLOGIC: CPT

## 2024-04-25 PROCEDURE — 87040 BLOOD CULTURE FOR BACTERIA: CPT

## 2024-04-25 PROCEDURE — 97165 OT EVAL LOW COMPLEX 30 MIN: CPT | Performed by: OCCUPATIONAL THERAPIST

## 2024-04-25 PROCEDURE — 87077 CULTURE AEROBIC IDENTIFY: CPT

## 2024-04-25 PROCEDURE — 87186 SC STD MICRODIL/AGAR DIL: CPT

## 2024-04-25 PROCEDURE — 6370000000 HC RX 637 (ALT 250 FOR IP): Performed by: INTERNAL MEDICINE

## 2024-04-25 PROCEDURE — 83605 ASSAY OF LACTIC ACID: CPT

## 2024-04-25 PROCEDURE — 83550 IRON BINDING TEST: CPT

## 2024-04-25 PROCEDURE — 87070 CULTURE OTHR SPECIMN AEROBIC: CPT

## 2024-04-25 RX ORDER — ONDANSETRON 4 MG/1
4 TABLET, ORALLY DISINTEGRATING ORAL EVERY 8 HOURS PRN
Status: DISCONTINUED | OUTPATIENT
Start: 2024-04-25 | End: 2024-04-28 | Stop reason: HOSPADM

## 2024-04-25 RX ORDER — ASPIRIN 81 MG/1
81 TABLET ORAL DAILY
Status: DISCONTINUED | OUTPATIENT
Start: 2024-04-25 | End: 2024-04-28 | Stop reason: HOSPADM

## 2024-04-25 RX ORDER — FLUTICASONE PROPIONATE 50 MCG
1 SPRAY, SUSPENSION (ML) NASAL DAILY
Status: DISCONTINUED | OUTPATIENT
Start: 2024-04-25 | End: 2024-04-28 | Stop reason: HOSPADM

## 2024-04-25 RX ORDER — ALBUMIN (HUMAN) 12.5 G/50ML
25 SOLUTION INTRAVENOUS PRN
Status: DISCONTINUED | OUTPATIENT
Start: 2024-04-25 | End: 2024-04-28 | Stop reason: HOSPADM

## 2024-04-25 RX ORDER — INSULIN LISPRO 100 [IU]/ML
14 INJECTION, SOLUTION INTRAVENOUS; SUBCUTANEOUS
Status: DISCONTINUED | OUTPATIENT
Start: 2024-04-25 | End: 2024-04-28 | Stop reason: HOSPADM

## 2024-04-25 RX ORDER — SEVELAMER CARBONATE 800 MG/1
2400 TABLET, FILM COATED ORAL
Status: DISCONTINUED | OUTPATIENT
Start: 2024-04-25 | End: 2024-04-28 | Stop reason: HOSPADM

## 2024-04-25 RX ORDER — ALBUMIN (HUMAN) 12.5 G/50ML
SOLUTION INTRAVENOUS
Status: COMPLETED
Start: 2024-04-25 | End: 2024-04-25

## 2024-04-25 RX ORDER — ACETAMINOPHEN 650 MG/1
650 SUPPOSITORY RECTAL EVERY 6 HOURS PRN
Status: DISCONTINUED | OUTPATIENT
Start: 2024-04-25 | End: 2024-04-28 | Stop reason: HOSPADM

## 2024-04-25 RX ORDER — ONDANSETRON 2 MG/ML
4 INJECTION INTRAMUSCULAR; INTRAVENOUS EVERY 6 HOURS PRN
Status: DISCONTINUED | OUTPATIENT
Start: 2024-04-25 | End: 2024-04-28 | Stop reason: HOSPADM

## 2024-04-25 RX ORDER — GABAPENTIN 300 MG/1
300 CAPSULE ORAL DAILY
Status: DISCONTINUED | OUTPATIENT
Start: 2024-04-25 | End: 2024-04-28 | Stop reason: HOSPADM

## 2024-04-25 RX ORDER — INSULIN GLARGINE 100 [IU]/ML
48 INJECTION, SOLUTION SUBCUTANEOUS NIGHTLY
Status: DISCONTINUED | OUTPATIENT
Start: 2024-04-25 | End: 2024-04-28 | Stop reason: HOSPADM

## 2024-04-25 RX ORDER — CLOPIDOGREL BISULFATE 75 MG/1
75 TABLET ORAL DAILY
Status: DISCONTINUED | OUTPATIENT
Start: 2024-04-25 | End: 2024-04-28 | Stop reason: HOSPADM

## 2024-04-25 RX ORDER — GLUCAGON 1 MG/ML
1 KIT INJECTION PRN
Status: DISCONTINUED | OUTPATIENT
Start: 2024-04-25 | End: 2024-04-28 | Stop reason: HOSPADM

## 2024-04-25 RX ORDER — SODIUM CHLORIDE 0.9 % (FLUSH) 0.9 %
5-40 SYRINGE (ML) INJECTION EVERY 12 HOURS SCHEDULED
Status: DISCONTINUED | OUTPATIENT
Start: 2024-04-25 | End: 2024-04-28 | Stop reason: HOSPADM

## 2024-04-25 RX ORDER — BUDESONIDE 0.5 MG/2ML
0.5 INHALANT ORAL
Status: DISCONTINUED | OUTPATIENT
Start: 2024-04-25 | End: 2024-04-28 | Stop reason: HOSPADM

## 2024-04-25 RX ORDER — SODIUM CHLORIDE 0.9 % (FLUSH) 0.9 %
5-40 SYRINGE (ML) INJECTION PRN
Status: DISCONTINUED | OUTPATIENT
Start: 2024-04-25 | End: 2024-04-28 | Stop reason: HOSPADM

## 2024-04-25 RX ORDER — DEXTROSE MONOHYDRATE 100 MG/ML
INJECTION, SOLUTION INTRAVENOUS CONTINUOUS PRN
Status: DISCONTINUED | OUTPATIENT
Start: 2024-04-25 | End: 2024-04-28 | Stop reason: HOSPADM

## 2024-04-25 RX ORDER — SEVELAMER HYDROCHLORIDE 800 MG/1
3200 TABLET, FILM COATED ORAL
COMMUNITY

## 2024-04-25 RX ORDER — PANTOPRAZOLE SODIUM 40 MG/1
40 TABLET, DELAYED RELEASE ORAL
Status: DISCONTINUED | OUTPATIENT
Start: 2024-04-25 | End: 2024-04-28 | Stop reason: HOSPADM

## 2024-04-25 RX ORDER — ATORVASTATIN CALCIUM 40 MG/1
80 TABLET, FILM COATED ORAL NIGHTLY
Status: DISCONTINUED | OUTPATIENT
Start: 2024-04-26 | End: 2024-04-28 | Stop reason: HOSPADM

## 2024-04-25 RX ORDER — FLUTICASONE PROPIONATE 220 UG/1
2 AEROSOL, METERED RESPIRATORY (INHALATION) 2 TIMES DAILY
Status: DISCONTINUED | OUTPATIENT
Start: 2024-04-25 | End: 2024-04-25

## 2024-04-25 RX ORDER — ACETAMINOPHEN 325 MG/1
650 TABLET ORAL EVERY 6 HOURS PRN
Status: DISCONTINUED | OUTPATIENT
Start: 2024-04-25 | End: 2024-04-28 | Stop reason: HOSPADM

## 2024-04-25 RX ORDER — INSULIN LISPRO 100 [IU]/ML
0-4 INJECTION, SOLUTION INTRAVENOUS; SUBCUTANEOUS NIGHTLY
Status: DISCONTINUED | OUTPATIENT
Start: 2024-04-25 | End: 2024-04-28 | Stop reason: HOSPADM

## 2024-04-25 RX ORDER — POLYETHYLENE GLYCOL 3350 17 G/17G
17 POWDER, FOR SOLUTION ORAL DAILY PRN
Status: DISCONTINUED | OUTPATIENT
Start: 2024-04-25 | End: 2024-04-28 | Stop reason: HOSPADM

## 2024-04-25 RX ORDER — SODIUM CHLORIDE 9 MG/ML
INJECTION, SOLUTION INTRAVENOUS PRN
Status: DISCONTINUED | OUTPATIENT
Start: 2024-04-25 | End: 2024-04-28 | Stop reason: HOSPADM

## 2024-04-25 RX ORDER — BUDESONIDE 0.5 MG/2ML
1 INHALANT ORAL
Status: DISCONTINUED | OUTPATIENT
Start: 2024-04-26 | End: 2024-04-25

## 2024-04-25 RX ORDER — INSULIN LISPRO 100 [IU]/ML
0-8 INJECTION, SOLUTION INTRAVENOUS; SUBCUTANEOUS
Status: DISCONTINUED | OUTPATIENT
Start: 2024-04-25 | End: 2024-04-28 | Stop reason: HOSPADM

## 2024-04-25 RX ORDER — DOCUSATE SODIUM 100 MG/1
100 CAPSULE, LIQUID FILLED ORAL 2 TIMES DAILY
Status: DISCONTINUED | OUTPATIENT
Start: 2024-04-25 | End: 2024-04-28 | Stop reason: HOSPADM

## 2024-04-25 RX ORDER — LANOLIN ALCOHOL/MO/W.PET/CERES
3 CREAM (GRAM) TOPICAL NIGHTLY PRN
Status: DISCONTINUED | OUTPATIENT
Start: 2024-04-25 | End: 2024-04-28 | Stop reason: HOSPADM

## 2024-04-25 RX ORDER — ALBUTEROL SULFATE 2.5 MG/3ML
2.5 SOLUTION RESPIRATORY (INHALATION) EVERY 4 HOURS PRN
Status: DISCONTINUED | OUTPATIENT
Start: 2024-04-25 | End: 2024-04-28 | Stop reason: HOSPADM

## 2024-04-25 RX ORDER — AMITRIPTYLINE HYDROCHLORIDE 25 MG/1
50 TABLET, FILM COATED ORAL NIGHTLY
Status: DISCONTINUED | OUTPATIENT
Start: 2024-04-26 | End: 2024-04-28 | Stop reason: HOSPADM

## 2024-04-25 RX ORDER — SEVELAMER CARBONATE 800 MG/1
800 TABLET, FILM COATED ORAL
Status: DISCONTINUED | OUTPATIENT
Start: 2024-04-25 | End: 2024-04-25

## 2024-04-25 RX ADMIN — INSULIN HUMAN 10 UNITS: 100 INJECTION, SOLUTION PARENTERAL at 00:49

## 2024-04-25 RX ADMIN — ALBUMIN (HUMAN) 25 G: 12.5 SOLUTION INTRAVENOUS at 13:35

## 2024-04-25 RX ADMIN — INSULIN LISPRO 2 UNITS: 100 INJECTION, SOLUTION INTRAVENOUS; SUBCUTANEOUS at 10:12

## 2024-04-25 RX ADMIN — ASPIRIN 81 MG: 81 TABLET, COATED ORAL at 09:34

## 2024-04-25 RX ADMIN — ALBUMIN (HUMAN) 25 G: 0.25 INJECTION, SOLUTION INTRAVENOUS at 12:40

## 2024-04-25 RX ADMIN — Medication 2500 MG: at 01:23

## 2024-04-25 RX ADMIN — BUDESONIDE 500 MCG: 0.5 INHALANT RESPIRATORY (INHALATION) at 08:38

## 2024-04-25 RX ADMIN — INSULIN GLARGINE 48 UNITS: 100 INJECTION, SOLUTION SUBCUTANEOUS at 21:16

## 2024-04-25 RX ADMIN — PANTOPRAZOLE SODIUM 40 MG: 40 TABLET, DELAYED RELEASE ORAL at 09:34

## 2024-04-25 RX ADMIN — DOCUSATE SODIUM 100 MG: 100 CAPSULE, LIQUID FILLED ORAL at 21:16

## 2024-04-25 RX ADMIN — INSULIN GLARGINE 48 UNITS: 100 INJECTION, SOLUTION SUBCUTANEOUS at 02:41

## 2024-04-25 RX ADMIN — INSULIN LISPRO 4 UNITS: 100 INJECTION, SOLUTION INTRAVENOUS; SUBCUTANEOUS at 02:41

## 2024-04-25 RX ADMIN — ALBUMIN (HUMAN) 25 G: 0.25 INJECTION, SOLUTION INTRAVENOUS at 13:35

## 2024-04-25 RX ADMIN — ALBUMIN (HUMAN) 25 G: 12.5 SOLUTION INTRAVENOUS at 12:40

## 2024-04-25 RX ADMIN — MELATONIN 3 MG: at 22:27

## 2024-04-25 RX ADMIN — DOCUSATE SODIUM 100 MG: 100 CAPSULE, LIQUID FILLED ORAL at 09:34

## 2024-04-25 RX ADMIN — SODIUM CHLORIDE, PRESERVATIVE FREE 10 ML: 5 INJECTION INTRAVENOUS at 09:34

## 2024-04-25 RX ADMIN — BUDESONIDE 500 MCG: 0.5 INHALANT RESPIRATORY (INHALATION) at 21:19

## 2024-04-25 RX ADMIN — GABAPENTIN 300 MG: 300 CAPSULE ORAL at 09:34

## 2024-04-25 RX ADMIN — WATER 1000 MG: 1 INJECTION INTRAMUSCULAR; INTRAVENOUS; SUBCUTANEOUS at 01:18

## 2024-04-25 RX ADMIN — SEVELAMER CARBONATE 2400 MG: 800 TABLET, FILM COATED ORAL at 17:58

## 2024-04-25 RX ADMIN — SODIUM CHLORIDE, PRESERVATIVE FREE 10 ML: 5 INJECTION INTRAVENOUS at 21:17

## 2024-04-25 RX ADMIN — CLOPIDOGREL BISULFATE 75 MG: 75 TABLET ORAL at 09:34

## 2024-04-25 NOTE — FLOWSHEET NOTE
04/25/24 1505   Vital Signs   /66   Temp 97.6 °F (36.4 °C)   Pulse 82   Respirations 18   Pain Assessment   Pain Assessment None - Denies Pain   Post-Hemodialysis Assessment   Post-Treatment Procedures Blood returned;Access bleeding time < 10 minutes   Machine Disinfection Process Acid/Vinegar Clean;Bleach;Exterior Machine Disinfection   Rinseback Volume (ml) 300 ml   Blood Volume Processed (Liters) 77.5 L   Dialyzer Clearance Lightly streaked   Duration of Treatment (minutes) 210 minutes   Hemodialysis Intake (ml) 500 ml   Hemodialysis Output (ml) 3000 ml   NET Removed (ml) 2500   Tolerated Treatment Fair   Interventions Taken Ultrafiltration goal decreased;Medication   Bilateral Breath Sounds Clear   Edema Right lower extremity;Left lower extremity   RLE Edema +1;+2   LLE Edema +1;+2   Physician Notified Yes   Time Off 1505   Patient Disposition Return to room   Observations & Evaluations   Level of Consciousness 0   Oriented X 4   Heart Rhythm Regular   Respiratory Quality/Effort Unlabored   O2 Device None (Room air)   Skin Color Pale   Skin Condition/Temp Dry;Warm   Appetite Good   Abdomen Inspection Obese;Soft   Bowel Sounds (All Quadrants) Active     Primary RN SBAR: Afshan Miguel RN  Comments: Pt tolerated treatment fair. PRN albumin given and UF goal decreased d/t asymptomatic hypotension. Dr. Plummer aware.

## 2024-04-25 NOTE — ED PROVIDER NOTES
[facility] EMERGENCY DEPT  EMERGENCY DEPARTMENT ENCOUNTER         Pt Name: Candida Sherman  MRN: [id number]  Birthdate 1965  Date of evaluation: 4/24/2024  Provider: Ngozi Mauricio MD   PCP: Boyd Donato MD  Note Started: 12:53 AM 4/25/24     CHIEF COMPLAINT       Chief Complaint   Patient presents with    Toe Injury     Pt arrives via EMS with a cut to her right pinky toe. Pt thinks she hung it on the stove door.         HISTORY OF PRESENT ILLNESS: 1 or more elements      History From:   HPI Limitations:      Candida Sherman is a 58 y.o. female who presents   Polyneuropathy, esrd, morbid obesity, uncontrolled type 2 dm with hyperglycemia, chronic hyponatremia  Here with laceration to 5th toe on right foot  Says it \"probably\" happened yesterday, family saw blood in kitchen. She cannot recall being cut  Laceration looks more chronic. No osteo on xray but will need podiatry and possibly mri. Cellulitis at minimum  Sending esr, crp, iv abx.    Please see more comprehensive history below under MDM  Nursing Notes were all reviewed in real time as they are made available. Any disagreements addressed in the HPI/MDM.     REVIEW OF SYSTEMS      Review of Systems     Positives and Pertinent negatives as per HPI and MDM.    PAST HISTORY     Past Medical History:  Past Medical History:   Diagnosis Date    Arthritis     Asthma     Cancer (HCC)     CHF (congestive heart failure) ([facility]) 2020    Chronic back pain     Chronic kidney disease     Diabetes (HCC)     Diabetic retinopathy (HCC)     Hypertension     Kidney failure 01/2022    Liver disease     MRSA (methicillin resistant staph aureus) culture positive     labial abscess, negative test 2020    Neuropathy     Sleep apnea     Appointment made but not attended    Stroke ([facility]) 2018       Past Surgical History:  Past Surgical History:   Procedure Laterality Date    CARPAL TUNNEL RELEASE Right 1986    CT BIOPSY RENAL  1/10/2022    CT BIOPSY RENAL 1/10/2022 [facility]    FRACTURE

## 2024-04-25 NOTE — CONSULTS
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 Renal service will follow patient with you.Please don't hesitate to contact me if any questions arise of if I can assist in any manner.      Francesca Lopez MD  Cell no- [phone]  Available on perfect serve.          Signed By: Francesca Lopez MD     April 25, 2024           Consult Date: 4/25/2024    Inpatient consult to Nephrology  Consult performed by: Francesca Lopez MD  Consult ordered by: Genaro Gomez, APRN - CNP            Subjective   HISTORY OF PRESENTING ILLNESS :  Candida Sherman is a 58 y.o.,female ,White (non-) with with history of ESRD on hemodialysis, type 2 diabetes mellitus with complications include diabetic neuropathy and peripheral neuropathy who presents to the emergency room after sustaining a wound on her fifth toe on the right foot.  Recently she may have stubbed her toe and having had diabetic neuropathy with diminished sensation she did

## 2024-04-25 NOTE — H&P
Hospitalist Admission Note    NAME:   Candida Sherman   : 1965   MRN: [id number]     Date/Time: 2024 1:16 AM    Patient PCP: Boyd Donato MD    ______________________________________________________________________  Given the patient's current clinical presentation, I have a high level of concern for decompensation if discharged from the emergency department.  Complex decision making was performed, which includes reviewing the patient's available past medical records, laboratory results, and x-ray films.       My assessment of this patient's clinical condition and my plan of care is as follows.    Assessment / Plan:    Acute laceration 5th toe right foot on top of chronic 5th digit non-healing appearing wound, acute on chronic appearing w/acute cellulitis, no c/o pain with severe PAD and neuropathy, no leukocytosis, not meeting SIRS criteria, hemodynamically stable, not septic shock appearing, wbc wnl on admit, ? 24 occurred, 5th metatarsal neck fracture acute, pt doesn't remember being cut/injury,   -admit inpatient  -tetanus shot in ER given  -vancomycin iv started in ER 52- continued, pharmacy to dose  -cefepime 1gm iv in ER started- continued  -bld cx x2 done in ER- results pending  -procalcitonin, lactic- pending  -esr, crp ordered in ER- pending  -telemetry monitoring continuous  -wound care consult ordered  -wound culture if able- pending  -am labs  -fall precautions  -EKG, u/a pending possible pre-op routine work-up  -prn tylenol- pt declines other pain meds at this time, doesn't feel her feet 2/2 neuropathy  -nurse may apply wet to dry to 5th rt toe until wound and vascular see this AM  -will change diet to npo until seen by vascular this AM    T2DM w/hyperglycemia glucose 476 in ER  -achs accuchecks, ssi, hypoglycemia protocol  -given 10 units regular insulin iv in ER for coverage of 476  -continue home lantus dose now  -continue home tid w/meals 14 units lispro insulin with ssi

## 2024-04-25 NOTE — ED NOTES
Report given to VAN Miller. Nurse was informed of reason for arrival, vitals, labs, medications, orders, procedures, results, anything left pending and current plan of action. Questions were asked and received prior to departure from the patient.

## 2024-04-25 NOTE — CONSENT
Informed Consent for Blood Component Transfusion Note    I have discussed with the patient the rationale for blood component transfusion; its benefits in treating or preventing fatigue, organ damage, or death; and its risk which includes mild transfusion reactions, rare risk of blood borne infection, or more serious but rare reactions. I have discussed the alternatives to transfusion, including the risk and consequences of not receiving transfusion. The patient had an opportunity to ask questions and had agreed to proceed with transfusion of blood components.    Electronically signed by WONG Pendleton CNP on 4/25/24 at 5:21 AM EDT

## 2024-04-25 NOTE — FLOWSHEET NOTE
Primary RN SBAR: Angela Hansen RN  Patient Education provided: HD treatment  Preferred Education method and Primary language: Verbal, English  Hospital associated wait time; reason: 30 min wait for transport    No Hep B surface AG or AB within date in Epic or from clinic. Labs drawn and sent for processing.      04/25/24 1133   Vital Signs   /64   Temp 97.8 °F (36.6 °C)   Pulse 81   Respirations 18   Pain Assessment   Pain Assessment None - Denies Pain   Treatment   Time On 1133   Treatment Goal 3000 ml   Observations & Evaluations   Level of Consciousness 0   Oriented X 4   Heart Rhythm Regular   Respiratory Quality/Effort Unlabored   O2 Device None (Room air)   Bilateral Breath Sounds Clear   Skin Color Pale   Skin Condition/Temp Dry;Warm   Appetite Good   Abdomen Inspection Obese;Soft   Bowel Sounds (All Quadrants) Active   Edema Right lower extremity;Left lower extremity   RLE Edema +2   LLE Edema +2   Technical Checks   Dialysis Machine No. 10   RO Machine Number [id number]   Dialyzer Lot No. [id number]   Tubing Lot Number [id number]   All Connections Secure Yes   NS Bag Yes   Saline Line Double Clamped Yes   Dialyzer Revaclear 300   Prime Volume (mL) 200 mL   ICEBOAT I;C;E;B;O;A;T   RO Machine Log Sheet Completed Yes   Machine Alarm Self Test Completed;Passed   Air Foam Detector Tested;Proper Function   Extracorporeal Circuit Tested for Integrity Yes   Machine Conductivity 13.8   Manual Ph 7   Bleach Test (Neg) Yes   Bath Temperature 98.6 °F (37 °C)   Dialysis Bath   K+ (Potassium) 3   Ca+ (Calcium) 2.5   Na+ (Sodium) 138   HCO3 (Bicarb) 35   Treatment Initiation   Dialyze Hours 3.5   Treatment  Initiation Universal Precautions maintained;Lines secured to patient;Connections secured;Prime given;Venous Parameters set;Arterial Parameters set;Air foam detector engaged;Saline line double clamped;Revaclear Dialyzer   Access   Add Access? Yes   Hemodialysis Fistula/Graft Arteriovenous fistula Right Arm   Placement

## 2024-04-26 ENCOUNTER — APPOINTMENT (OUTPATIENT)
Facility: HOSPITAL | Age: 59
DRG: 383 | End: 2024-04-26
Attending: SURGERY
Payer: MEDICAID

## 2024-04-26 LAB
ANION GAP SERPL CALC-SCNC: 7 MMOL/L (ref 5–15)
APTT PPP: 20.7 SEC (ref 22.1–31)
BUN SERPL-MCNC: 27 MG/DL (ref 6–20)
BUN/CREAT SERPL: 5 (ref 12–20)
CALCIUM SERPL-MCNC: 8.7 MG/DL (ref 8.5–10.1)
CHLORIDE SERPL-SCNC: 97 MMOL/L (ref 97–108)
CO2 SERPL-SCNC: 24 MMOL/L (ref 21–32)
CREAT SERPL-MCNC: 5.83 MG/DL (ref 0.55–1.02)
GLUCOSE BLD STRIP.AUTO-MCNC: 121 MG/DL (ref 65–117)
GLUCOSE BLD STRIP.AUTO-MCNC: 180 MG/DL (ref 65–117)
GLUCOSE BLD STRIP.AUTO-MCNC: 284 MG/DL (ref 65–117)
GLUCOSE BLD STRIP.AUTO-MCNC: 297 MG/DL (ref 65–117)
GLUCOSE SERPL-MCNC: 283 MG/DL (ref 65–100)
INR PPP: 1.1 (ref 0.9–1.1)
POTASSIUM SERPL-SCNC: 4.6 MMOL/L (ref 3.5–5.1)
PROTHROMBIN TIME: 11.2 SEC (ref 9–11.1)
SERVICE CMNT-IMP: ABNORMAL
SODIUM SERPL-SCNC: 128 MMOL/L (ref 136–145)
THERAPEUTIC RANGE: ABNORMAL SECS (ref 58–77)

## 2024-04-26 PROCEDURE — 2500000003 HC RX 250 WO HCPCS: Performed by: NURSE PRACTITIONER

## 2024-04-26 PROCEDURE — 1100000003 HC PRIVATE W/ TELEMETRY

## 2024-04-26 PROCEDURE — 99222 1ST HOSP IP/OBS MODERATE 55: CPT | Performed by: PODIATRIST

## 2024-04-26 PROCEDURE — 94640 AIRWAY INHALATION TREATMENT: CPT

## 2024-04-26 PROCEDURE — 80048 BASIC METABOLIC PNL TOTAL CA: CPT

## 2024-04-26 PROCEDURE — 97530 THERAPEUTIC ACTIVITIES: CPT | Performed by: PHYSICAL THERAPIST

## 2024-04-26 PROCEDURE — 6370000000 HC RX 637 (ALT 250 FOR IP): Performed by: NURSE PRACTITIONER

## 2024-04-26 PROCEDURE — 85610 PROTHROMBIN TIME: CPT

## 2024-04-26 PROCEDURE — 85730 THROMBOPLASTIN TIME PARTIAL: CPT

## 2024-04-26 PROCEDURE — 6360000002 HC RX W HCPCS: Performed by: STUDENT IN AN ORGANIZED HEALTH CARE EDUCATION/TRAINING PROGRAM

## 2024-04-26 PROCEDURE — 2580000003 HC RX 258: Performed by: NURSE PRACTITIONER

## 2024-04-26 PROCEDURE — 93922 UPR/L XTREMITY ART 2 LEVELS: CPT

## 2024-04-26 PROCEDURE — 6360000002 HC RX W HCPCS: Performed by: NURSE PRACTITIONER

## 2024-04-26 PROCEDURE — 6370000000 HC RX 637 (ALT 250 FOR IP): Performed by: INTERNAL MEDICINE

## 2024-04-26 PROCEDURE — 82962 GLUCOSE BLOOD TEST: CPT

## 2024-04-26 PROCEDURE — 36415 COLL VENOUS BLD VENIPUNCTURE: CPT

## 2024-04-26 RX ADMIN — SEVELAMER CARBONATE 2400 MG: 800 TABLET, FILM COATED ORAL at 17:21

## 2024-04-26 RX ADMIN — INSULIN LISPRO 4 UNITS: 100 INJECTION, SOLUTION INTRAVENOUS; SUBCUTANEOUS at 09:30

## 2024-04-26 RX ADMIN — CEFEPIME 500 MG: 1 INJECTION, POWDER, FOR SOLUTION INTRAMUSCULAR; INTRAVENOUS at 03:43

## 2024-04-26 RX ADMIN — CLOPIDOGREL BISULFATE 75 MG: 75 TABLET ORAL at 09:31

## 2024-04-26 RX ADMIN — BUDESONIDE 500 MCG: 0.5 INHALANT RESPIRATORY (INHALATION) at 08:56

## 2024-04-26 RX ADMIN — SODIUM CHLORIDE, PRESERVATIVE FREE 10 ML: 5 INJECTION INTRAVENOUS at 20:39

## 2024-04-26 RX ADMIN — AMITRIPTYLINE HYDROCHLORIDE 50 MG: 25 TABLET, FILM COATED ORAL at 20:36

## 2024-04-26 RX ADMIN — ATORVASTATIN CALCIUM 80 MG: 40 TABLET, FILM COATED ORAL at 20:37

## 2024-04-26 RX ADMIN — DOCUSATE SODIUM 100 MG: 100 CAPSULE, LIQUID FILLED ORAL at 20:37

## 2024-04-26 RX ADMIN — BUDESONIDE 500 MCG: 0.5 INHALANT RESPIRATORY (INHALATION) at 19:32

## 2024-04-26 RX ADMIN — DOCUSATE SODIUM 100 MG: 100 CAPSULE, LIQUID FILLED ORAL at 09:31

## 2024-04-26 RX ADMIN — INSULIN LISPRO 4 UNITS: 100 INJECTION, SOLUTION INTRAVENOUS; SUBCUTANEOUS at 11:35

## 2024-04-26 RX ADMIN — INSULIN LISPRO 14 UNITS: 100 INJECTION, SOLUTION INTRAVENOUS; SUBCUTANEOUS at 09:31

## 2024-04-26 RX ADMIN — SEVELAMER CARBONATE 2400 MG: 800 TABLET, FILM COATED ORAL at 09:31

## 2024-04-26 RX ADMIN — PANTOPRAZOLE SODIUM 40 MG: 40 TABLET, DELAYED RELEASE ORAL at 06:59

## 2024-04-26 RX ADMIN — FLUTICASONE PROPIONATE 1 SPRAY: 50 SPRAY, METERED NASAL at 11:36

## 2024-04-26 RX ADMIN — GABAPENTIN 300 MG: 300 CAPSULE ORAL at 09:31

## 2024-04-26 RX ADMIN — INSULIN GLARGINE 48 UNITS: 100 INJECTION, SOLUTION SUBCUTANEOUS at 20:37

## 2024-04-26 RX ADMIN — SODIUM CHLORIDE, PRESERVATIVE FREE 10 ML: 5 INJECTION INTRAVENOUS at 09:32

## 2024-04-26 RX ADMIN — MICONAZOLE NITRATE: 2 POWDER TOPICAL at 20:40

## 2024-04-26 RX ADMIN — ASPIRIN 81 MG: 81 TABLET, COATED ORAL at 09:31

## 2024-04-26 RX ADMIN — SEVELAMER CARBONATE 2400 MG: 800 TABLET, FILM COATED ORAL at 11:36

## 2024-04-26 RX ADMIN — INSULIN LISPRO 14 UNITS: 100 INJECTION, SOLUTION INTRAVENOUS; SUBCUTANEOUS at 11:36

## 2024-04-26 RX ADMIN — INSULIN LISPRO 14 UNITS: 100 INJECTION, SOLUTION INTRAVENOUS; SUBCUTANEOUS at 17:21

## 2024-04-26 ASSESSMENT — PAIN SCALES - GENERAL: PAINLEVEL_OUTOF10: 0

## 2024-04-26 NOTE — CONSULTS
[facility] PODIATRY & FOOT SURGERY         Consult Note    Subjective:         Date of Consultation: April 26, 2024     Referring Physician: Jasiel Grande MD    Patient is a 58 y.o.  female who is being seen for right toe ulcer.   Patient has a hx of uncontrolled diabetes mellitus with neuropathy.  Patient is noted that yesterday while she was in the kitchen she noticed bleeding from her toe.  Patient presents to the ER and was admitted due to ulceration.  Patient states that she has severe neuropathy and does not remember hitting her toe.  She states it could possibly have trauma.    Patient Active Problem List    Diagnosis Date Noted    Cellulitis 04/25/2024    Stage 5 chronic kidney disease on chronic dialysis (formerly [facility]) 11/06/2023    Uncontrolled type 2 diabetes mellitus with hyperglycemia ([facility]) 08/15/2023    Hyperglycemia 08/14/2023    Acute alteration in mental status 08/12/2023    Acute metabolic encephalopathy 08/12/2023    Septicemia (formerly [facility]) 08/11/2023    Encephalopathy 08/11/2023    Subarachnoid hemorrhage ([facility]) 08/11/2023    Cellulitis of left lower extremity 08/07/2023    Diabetic ulcer of left heel associated with type 2 diabetes mellitus, with fat layer exposed ([facility]) 07/13/2023    Diabetic ulcer of left midfoot associated with type 2 diabetes mellitus, with fat layer exposed (formerly [facility]) 07/13/2023    Acute kidney failure, unspecified ([facility]) 01/10/2022    Proteinuria 01/10/2022    Acute on chronic kidney failure ([facility]) 12/10/2021    Chronic heart failure (formerly [facility]) 12/10/2021    CHF (congestive heart failure) ([facility]) 08/25/2021    GLADIS (acute kidney injury) (formerly [facility]) 08/25/2021    Acute exacerbation of CHF (congestive heart failure) (formerly [facility]) 08/16/2020    TIA (transient ischemic attack) 12/20/2018    Vitamin D deficiency 09/08/2015    Lymphadenopathy 09/02/2015    Disorder of liver 09/02/2015    Atopic rhinitis 09/02/2015    Lymphoma ([facility]) 09/02/2015    Morbid obesity (formerly [facility]) 09/02/2015    Cervical

## 2024-04-26 NOTE — CARE COORDINATION
Care Management Initial Assessment       RUR: 17  Readmission? No  1st IM letter given? No  1st  letter given: No    Insurance is [employer].  Pt lives with her Son and Sister in first floor apt with level entry.   Pt goes to HD M (10 AM to 1 PM) T/Th/S from (5:30 AM to 10 AM) at [facility].   Medicaid Transport takes her to and from HD Unit.   Pt has RW, 3:1 and Tub Transfer Bench at home.   Pt has used [facility] in the past.   Pt has [facility] to [facility] back in 8/23.   Pt is I W ADLs   Sister or Medicaid transports her as needed.   Sister should be able to transport her home in car.   Pharmacy: [facility] and they deliver meds to her.     Barrier:   Podiatry and Nephrology clearance.   Therapy rec HH: CM offered HH and Pt did not have a preference.  CM sent out MASS referrals to see who is in network with [place] [employer] and then CM will offer choices to get preference if more than one agency accept per Pt request.     Pham Neal CM  Ext [phone]    Advance Care Planning     General Advance Care Planning (ACP) Conversation    Date of Conversation: 4/26/2024  Conducted with: Patient with Decision Making Capacity  Other persons present: None    Healthcare Decision Maker:   Primary Decision Maker: Irma Mcintosh - Brother/Sister - [phone]    Secondary Decision Maker: Janes Miranda (nephew) - Other - [phone]  Click here to complete Healthcare Decision Makers including selection of the Healthcare Decision Maker Relationship (ie \"Primary\").       Content/Action Overview:  Has ACP document(s) on file - reflects the patient's care preferences  Reviewed DNR/DNI and patient elects Full Code (Attempt Resuscitation)        Length of Voluntary ACP Conversation in minutes:  <16 minutes (Non-Billable)    Pham Neal         04/26/24 1249   Service Assessment   Patient Orientation Alert and Oriented   Cognition Alert   History Provided By Patient   Primary Caregiver Self   Support Systems Family

## 2024-04-26 NOTE — WOUND CARE
Wound care nurse consult for right 5th toe laceration.    59 y/o female diabetic with neuropathy admitted for cellulitis of right 5th toe. X-rays show that patient has a Chronic fifth metatarsal neck fracture. Soft tissue swelling of the fifth toe  with no plain film evidence of osteomyelitis.    Past Medical History:   Diagnosis Date    Arthritis     Asthma     Cancer (HCC)     CHF (congestive heart failure) ([facility]) 2020    Chronic back pain     Chronic kidney disease     Diabetes (HCC)     Diabetic retinopathy ([facility])     Hypertension     Kidney failure 01/2022    Liver disease     MRSA (methicillin resistant staph aureus) culture positive     labial abscess, negative test 2020    Neuropathy     Sleep apnea     Appointment made but not attended    Stroke ([facility]) 2018     Past Surgical History:   Procedure Laterality Date    CARPAL TUNNEL RELEASE Right 1986    CT BIOPSY RENAL  1/10/2022    CT BIOPSY RENAL 1/10/2022 [facility] RAD CT    FRACTURE SURGERY Right     HUMERUS    HEENT      tonsillectomy    ORTHOPEDIC SURGERY      left ft bunionectomy x2    OTHER SURGICAL HISTORY      lanced labial abscess    TONSILLECTOMY      VASCULAR SURGERY      dialysis, rt upper chest     Patient had HD yesterday,  Neuropathy of feet and hands  Diabetic with Hgb A1c = 9.9     5th toe wound causing injury to 4th toe          Recommend:    Podiatry consult (perfect served DR Isaac to consult)- patient states she does not see a foot doctor outpatient, needs to be followed outpatient for wound healing - high risk for bone infection/gangrene/amputation.    Right 5th toe trauma wound: MWF, cleanse wound with soap and water. Dry skin thoroughly.Paint toe with Betadine/povidone solution, place a piece of Opt[employer]ell [employer] silver alginate between 4th and 5th toes, cover with a 4x4 and secure with rome.   JHONY BALDWIN RN, CWON

## 2024-04-27 LAB
ANION GAP SERPL CALC-SCNC: 8 MMOL/L (ref 5–15)
BASOPHILS # BLD: 0 K/UL (ref 0–0.1)
BASOPHILS NFR BLD: 1 % (ref 0–1)
BUN SERPL-MCNC: 41 MG/DL (ref 6–20)
BUN/CREAT SERPL: 6 (ref 12–20)
CALCIUM SERPL-MCNC: 9.1 MG/DL (ref 8.5–10.1)
CHLORIDE SERPL-SCNC: 93 MMOL/L (ref 97–108)
CO2 SERPL-SCNC: 25 MMOL/L (ref 21–32)
CREAT SERPL-MCNC: 7.38 MG/DL (ref 0.55–1.02)
DIFFERENTIAL METHOD BLD: ABNORMAL
EOSINOPHIL # BLD: 0.1 K/UL (ref 0–0.4)
EOSINOPHIL NFR BLD: 2 % (ref 0–7)
ERYTHROCYTE [DISTWIDTH] IN BLOOD BY AUTOMATED COUNT: 15.3 % (ref 11.5–14.5)
GLUCOSE BLD STRIP.AUTO-MCNC: 138 MG/DL (ref 65–117)
GLUCOSE BLD STRIP.AUTO-MCNC: 166 MG/DL (ref 65–117)
GLUCOSE BLD STRIP.AUTO-MCNC: 210 MG/DL (ref 65–117)
GLUCOSE BLD STRIP.AUTO-MCNC: 219 MG/DL (ref 65–117)
GLUCOSE SERPL-MCNC: 177 MG/DL (ref 65–100)
HCT VFR BLD AUTO: 25.9 % (ref 35–47)
HGB BLD-MCNC: 8.6 G/DL (ref 11.5–16)
IMM GRANULOCYTES # BLD AUTO: 0 K/UL (ref 0–0.04)
IMM GRANULOCYTES NFR BLD AUTO: 1 % (ref 0–0.5)
LYMPHOCYTES # BLD: 1.1 K/UL (ref 0.8–3.5)
LYMPHOCYTES NFR BLD: 17 % (ref 12–49)
MCH RBC QN AUTO: 32 PG (ref 26–34)
MCHC RBC AUTO-ENTMCNC: 33.2 G/DL (ref 30–36.5)
MCV RBC AUTO: 96.3 FL (ref 80–99)
MONOCYTES # BLD: 0.5 K/UL (ref 0–1)
MONOCYTES NFR BLD: 8 % (ref 5–13)
NEUTS SEG # BLD: 4.7 K/UL (ref 1.8–8)
NEUTS SEG NFR BLD: 71 % (ref 32–75)
NRBC # BLD: 0 K/UL (ref 0–0.01)
NRBC BLD-RTO: 0 PER 100 WBC
PLATELET # BLD AUTO: 157 K/UL (ref 150–400)
PMV BLD AUTO: 9.8 FL (ref 8.9–12.9)
POTASSIUM SERPL-SCNC: 4.7 MMOL/L (ref 3.5–5.1)
RBC # BLD AUTO: 2.69 M/UL (ref 3.8–5.2)
SERVICE CMNT-IMP: ABNORMAL
SODIUM SERPL-SCNC: 126 MMOL/L (ref 136–145)
WBC # BLD AUTO: 6.6 K/UL (ref 3.6–11)

## 2024-04-27 PROCEDURE — P9047 ALBUMIN (HUMAN), 25%, 50ML: HCPCS

## 2024-04-27 PROCEDURE — 2580000003 HC RX 258: Performed by: NURSE PRACTITIONER

## 2024-04-27 PROCEDURE — 36415 COLL VENOUS BLD VENIPUNCTURE: CPT

## 2024-04-27 PROCEDURE — 6360000002 HC RX W HCPCS: Performed by: STUDENT IN AN ORGANIZED HEALTH CARE EDUCATION/TRAINING PROGRAM

## 2024-04-27 PROCEDURE — 82962 GLUCOSE BLOOD TEST: CPT

## 2024-04-27 PROCEDURE — 6360000002 HC RX W HCPCS: Performed by: NURSE PRACTITIONER

## 2024-04-27 PROCEDURE — 85025 COMPLETE CBC W/AUTO DIFF WBC: CPT

## 2024-04-27 PROCEDURE — 6370000000 HC RX 637 (ALT 250 FOR IP): Performed by: INTERNAL MEDICINE

## 2024-04-27 PROCEDURE — 6360000002 HC RX W HCPCS

## 2024-04-27 PROCEDURE — 94640 AIRWAY INHALATION TREATMENT: CPT

## 2024-04-27 PROCEDURE — 6370000000 HC RX 637 (ALT 250 FOR IP): Performed by: NURSE PRACTITIONER

## 2024-04-27 PROCEDURE — 1100000003 HC PRIVATE W/ TELEMETRY

## 2024-04-27 PROCEDURE — 6360000002 HC RX W HCPCS: Performed by: INTERNAL MEDICINE

## 2024-04-27 PROCEDURE — 80048 BASIC METABOLIC PNL TOTAL CA: CPT

## 2024-04-27 PROCEDURE — 2580000003 HC RX 258: Performed by: INTERNAL MEDICINE

## 2024-04-27 RX ORDER — ALBUMIN (HUMAN) 12.5 G/50ML
SOLUTION INTRAVENOUS
Status: COMPLETED
Start: 2024-04-27 | End: 2024-04-28

## 2024-04-27 RX ADMIN — SEVELAMER CARBONATE 2400 MG: 800 TABLET, FILM COATED ORAL at 17:43

## 2024-04-27 RX ADMIN — MELATONIN 3 MG: at 01:22

## 2024-04-27 RX ADMIN — INSULIN LISPRO 2 UNITS: 100 INJECTION, SOLUTION INTRAVENOUS; SUBCUTANEOUS at 13:35

## 2024-04-27 RX ADMIN — INSULIN LISPRO 14 UNITS: 100 INJECTION, SOLUTION INTRAVENOUS; SUBCUTANEOUS at 17:44

## 2024-04-27 RX ADMIN — BUDESONIDE 500 MCG: 0.5 INHALANT RESPIRATORY (INHALATION) at 20:02

## 2024-04-27 RX ADMIN — ALBUMIN (HUMAN) 25 G: 0.25 INJECTION, SOLUTION INTRAVENOUS at 14:21

## 2024-04-27 RX ADMIN — ATORVASTATIN CALCIUM 80 MG: 40 TABLET, FILM COATED ORAL at 22:03

## 2024-04-27 RX ADMIN — ASPIRIN 81 MG: 81 TABLET, COATED ORAL at 09:12

## 2024-04-27 RX ADMIN — INSULIN GLARGINE 48 UNITS: 100 INJECTION, SOLUTION SUBCUTANEOUS at 22:22

## 2024-04-27 RX ADMIN — DOCUSATE SODIUM 100 MG: 100 CAPSULE, LIQUID FILLED ORAL at 22:03

## 2024-04-27 RX ADMIN — INSULIN LISPRO 14 UNITS: 100 INJECTION, SOLUTION INTRAVENOUS; SUBCUTANEOUS at 09:13

## 2024-04-27 RX ADMIN — FLUTICASONE PROPIONATE 1 SPRAY: 50 SPRAY, METERED NASAL at 09:15

## 2024-04-27 RX ADMIN — PANTOPRAZOLE SODIUM 40 MG: 40 TABLET, DELAYED RELEASE ORAL at 06:38

## 2024-04-27 RX ADMIN — INSULIN LISPRO 14 UNITS: 100 INJECTION, SOLUTION INTRAVENOUS; SUBCUTANEOUS at 13:36

## 2024-04-27 RX ADMIN — VANCOMYCIN HYDROCHLORIDE 1000 MG: 1 INJECTION, POWDER, LYOPHILIZED, FOR SOLUTION INTRAVENOUS at 22:04

## 2024-04-27 RX ADMIN — AMITRIPTYLINE HYDROCHLORIDE 50 MG: 25 TABLET, FILM COATED ORAL at 22:03

## 2024-04-27 RX ADMIN — CEFEPIME 500 MG: 1 INJECTION, POWDER, FOR SOLUTION INTRAMUSCULAR; INTRAVENOUS at 01:31

## 2024-04-27 RX ADMIN — MICONAZOLE NITRATE: 2 POWDER TOPICAL at 09:16

## 2024-04-27 RX ADMIN — ALBUMIN (HUMAN) 25 G: 12.5 SOLUTION INTRAVENOUS at 14:21

## 2024-04-27 RX ADMIN — BUDESONIDE 500 MCG: 0.5 INHALANT RESPIRATORY (INHALATION) at 09:31

## 2024-04-27 RX ADMIN — EPOETIN ALFA-EPBX 10000 UNITS: 10000 INJECTION, SOLUTION INTRAVENOUS; SUBCUTANEOUS at 17:50

## 2024-04-27 RX ADMIN — MICONAZOLE NITRATE: 2 POWDER TOPICAL at 23:02

## 2024-04-27 RX ADMIN — SODIUM CHLORIDE, PRESERVATIVE FREE 10 ML: 5 INJECTION INTRAVENOUS at 22:04

## 2024-04-27 RX ADMIN — SEVELAMER CARBONATE 2400 MG: 800 TABLET, FILM COATED ORAL at 09:11

## 2024-04-27 RX ADMIN — SODIUM CHLORIDE: 9 INJECTION, SOLUTION INTRAVENOUS at 01:30

## 2024-04-27 RX ADMIN — SEVELAMER CARBONATE 2400 MG: 800 TABLET, FILM COATED ORAL at 13:34

## 2024-04-27 RX ADMIN — CLOPIDOGREL BISULFATE 75 MG: 75 TABLET ORAL at 09:12

## 2024-04-27 RX ADMIN — DOCUSATE SODIUM 100 MG: 100 CAPSULE, LIQUID FILLED ORAL at 09:12

## 2024-04-27 RX ADMIN — SODIUM CHLORIDE, PRESERVATIVE FREE 10 ML: 5 INJECTION INTRAVENOUS at 09:13

## 2024-04-27 RX ADMIN — GABAPENTIN 300 MG: 300 CAPSULE ORAL at 09:13

## 2024-04-27 ASSESSMENT — PAIN SCALES - GENERAL: PAINLEVEL_OUTOF10: 0

## 2024-04-27 NOTE — FLOWSHEET NOTE
04/27/24 1550   Vital Signs   BP (!) 95/53   Temp 97.9 °F (36.6 °C)   Pulse 76   Respirations 18   Pain Assessment   Pain Assessment None - Denies Pain   Pain Level 0   Post-Hemodialysis Assessment   Post-Treatment Procedures Blood returned;Access bleeding time < 10 minutes   Machine Disinfection Process Acid/Vinegar Clean;Heat Disinfect;Exterior Machine Disinfection   Rinseback Volume (ml) 300 ml   Blood Volume Processed (Liters) 75.7 L   Dialyzer Clearance Moderately streaked   Duration of Treatment (minutes) 190 minutes   Hemodialysis Intake (ml) 500 ml   Hemodialysis Output (ml) 2000 ml   NET Removed (ml) 1500   Tolerated Treatment Fair   Interventions Taken Ultrafiltration stopped;Ultrafiltration goal decreased   Bilateral Breath Sounds Clear   Edema Right lower extremity;Left lower extremity   RLE Edema +2   LLE Edema +2   Physician Notified Yes   Time Off 1550   Patient Disposition Return to room   Observations & Evaluations   Level of Consciousness 0   Oriented X 4   Heart Rhythm Regular   Respiratory Quality/Effort Unlabored   O2 Device None (Room air)   Skin Color Pale   Skin Condition/Temp Dry;Warm   Appetite Good   Abdomen Inspection Obese;Soft   Bowel Sounds (All Quadrants) Active     Primary RN SBAR: Susan Sosa RN  Comments: Pt tolerated treatment fair. UF goal decreased and PRN albumin given for asymptomatic hypotension. Pt clotted circuit during last 20 minutes of treatment. Most of blood able to rinsed back. Dr. Grande aware.

## 2024-04-27 NOTE — CARE COORDINATION
ELBERT arranged  with [facility] for PT/OT/SN. [facility] agreeable to do wound care every other day for pt starting Tuesday. Per MD rebollar care will be completed Sunday prior to d/c. Pt will need transportation arranged for d/c.    THEO Lin,  [phone]

## 2024-04-27 NOTE — FLOWSHEET NOTE
Primary RN SBAR: Geraldine Nascimento RN  Patient Education provided: HD treatment  Preferred Education method and Primary language: Verbal, English  Hospital associated wait time; reason: 70 min wait for transport  Hepatitis B Surface Ag   Date/Time Value Ref Range Status   04/25/2024 11:42 AM <0.10 Index Final     Hep B S Ab   Date/Time Value Ref Range Status   04/25/2024 11:42 AM 8.68 mIU/mL Final        04/27/24 1240   Vital Signs   /63   Temp 97.8 °F (36.6 °C)   Pulse 77   Respirations 18   Pain Assessment   Pain Assessment None - Denies Pain   Treatment   Time On 1240   Treatment Goal 3000 ml   Observations & Evaluations   Level of Consciousness 0   Oriented X 4   Heart Rhythm Regular   Respiratory Quality/Effort Unlabored   O2 Device None (Room air)   Bilateral Breath Sounds Clear   Skin Color Pale   Skin Condition/Temp Dry;Warm   Appetite Good   Abdomen Inspection Obese;Soft   Bowel Sounds (All Quadrants) Active   Edema Right lower extremity;Left lower extremity   RLE Edema +2   LLE Edema +2   Technical Checks   Dialysis Machine No. 04   RO Machine Number [id number]   Dialyzer Lot No. [id number]   Tubing Lot Number [id number]   All Connections Secure Yes   NS Bag Yes   Saline Line Double Clamped Yes   Dialyzer Revaclear 300   Prime Volume (mL) 200 mL   ICEBOAT I;C;E;B;O;A;T   RO Machine Log Sheet Completed Yes   Machine Alarm Self Test Completed;Passed   Air Foam Detector Tested;Proper Function   Extracorporeal Circuit Tested for Integrity Yes   Machine Conductivity 13.8   Manual Ph 7.2   Bleach Test (Neg) Yes   Bath Temperature 98.6 °F (37 °C)   Dialysis Bath   K+ (Potassium) 3   Ca+ (Calcium) 2.5   Na+ (Sodium) 138   HCO3 (Bicarb) 35   Treatment Initiation   Dialyze Hours 3.5   Treatment  Initiation Universal Precautions maintained;Lines secured to patient;Connections secured;Prime given;Venous Parameters set;Arterial Parameters set;Air foam detector engaged;Saline line double clamped;Revaclear Dialyzer

## 2024-04-28 VITALS
OXYGEN SATURATION: 97 % | HEIGHT: 61 IN | WEIGHT: 247 LBS | DIASTOLIC BLOOD PRESSURE: 89 MMHG | TEMPERATURE: 98.1 F | SYSTOLIC BLOOD PRESSURE: 114 MMHG | HEART RATE: 83 BPM | BODY MASS INDEX: 46.63 KG/M2 | RESPIRATION RATE: 16 BRPM

## 2024-04-28 LAB
BACTERIA SPEC CULT: ABNORMAL
ECHO BSA: 2.2 M2
GLUCOSE BLD STRIP.AUTO-MCNC: 186 MG/DL (ref 65–117)
GLUCOSE BLD STRIP.AUTO-MCNC: 336 MG/DL (ref 65–117)
GRAM STN SPEC: ABNORMAL
GRAM STN SPEC: ABNORMAL
SERVICE CMNT-IMP: ABNORMAL
VAS LEFT ARM BP: 135 MMHG
VAS LEFT DORSALIS PEDIS BP: >240 MMHG
VAS LEFT PTA BP: >240 MMHG
VAS LEFT TBI: 0.99
VAS LEFT TOE PRESSURE: 133 MMHG
VAS RIGHT DORSALIS PEDIS BP: >240 MMHG
VAS RIGHT PTA BP: >240 MMHG
VAS RIGHT TBI: 0.8
VAS RIGHT TOE PRESSURE: 108 MMHG

## 2024-04-28 PROCEDURE — 6360000002 HC RX W HCPCS: Performed by: NURSE PRACTITIONER

## 2024-04-28 PROCEDURE — 6370000000 HC RX 637 (ALT 250 FOR IP): Performed by: INTERNAL MEDICINE

## 2024-04-28 PROCEDURE — 6370000000 HC RX 637 (ALT 250 FOR IP): Performed by: NURSE PRACTITIONER

## 2024-04-28 PROCEDURE — 6360000002 HC RX W HCPCS: Performed by: STUDENT IN AN ORGANIZED HEALTH CARE EDUCATION/TRAINING PROGRAM

## 2024-04-28 PROCEDURE — 82962 GLUCOSE BLOOD TEST: CPT

## 2024-04-28 PROCEDURE — 2580000003 HC RX 258: Performed by: NURSE PRACTITIONER

## 2024-04-28 PROCEDURE — 94640 AIRWAY INHALATION TREATMENT: CPT

## 2024-04-28 RX ORDER — CEPHALEXIN 250 MG/1
500 CAPSULE ORAL EVERY 12 HOURS SCHEDULED
Status: DISCONTINUED | OUTPATIENT
Start: 2024-04-28 | End: 2024-04-28 | Stop reason: HOSPADM

## 2024-04-28 RX ORDER — CEPHALEXIN 250 MG/1
250 CAPSULE ORAL EVERY 6 HOURS SCHEDULED
Status: DISCONTINUED | OUTPATIENT
Start: 2024-04-28 | End: 2024-04-28

## 2024-04-28 RX ORDER — CEPHALEXIN 250 MG/1
250 CAPSULE ORAL 4 TIMES DAILY
Qty: 28 CAPSULE | Refills: 0 | Status: CANCELLED | OUTPATIENT
Start: 2024-04-28 | End: 2024-05-05

## 2024-04-28 RX ORDER — CEPHALEXIN 500 MG/1
500 CAPSULE ORAL EVERY 12 HOURS SCHEDULED
Qty: 13 CAPSULE | Refills: 0 | Status: SHIPPED | OUTPATIENT
Start: 2024-04-28 | End: 2024-05-05

## 2024-04-28 RX ADMIN — GABAPENTIN 300 MG: 300 CAPSULE ORAL at 08:38

## 2024-04-28 RX ADMIN — INSULIN LISPRO 14 UNITS: 100 INJECTION, SOLUTION INTRAVENOUS; SUBCUTANEOUS at 11:55

## 2024-04-28 RX ADMIN — PANTOPRAZOLE SODIUM 40 MG: 40 TABLET, DELAYED RELEASE ORAL at 07:48

## 2024-04-28 RX ADMIN — DOCUSATE SODIUM 100 MG: 100 CAPSULE, LIQUID FILLED ORAL at 08:38

## 2024-04-28 RX ADMIN — FLUTICASONE PROPIONATE 1 SPRAY: 50 SPRAY, METERED NASAL at 08:42

## 2024-04-28 RX ADMIN — INSULIN LISPRO 14 UNITS: 100 INJECTION, SOLUTION INTRAVENOUS; SUBCUTANEOUS at 08:38

## 2024-04-28 RX ADMIN — CEPHALEXIN 500 MG: 250 CAPSULE ORAL at 10:05

## 2024-04-28 RX ADMIN — BUDESONIDE 500 MCG: 0.5 INHALANT RESPIRATORY (INHALATION) at 09:06

## 2024-04-28 RX ADMIN — MICONAZOLE NITRATE: 2 POWDER TOPICAL at 08:41

## 2024-04-28 RX ADMIN — ASPIRIN 81 MG: 81 TABLET, COATED ORAL at 08:38

## 2024-04-28 RX ADMIN — CEFEPIME 500 MG: 1 INJECTION, POWDER, FOR SOLUTION INTRAMUSCULAR; INTRAVENOUS at 02:59

## 2024-04-28 RX ADMIN — CLOPIDOGREL BISULFATE 75 MG: 75 TABLET ORAL at 08:38

## 2024-04-28 RX ADMIN — INSULIN LISPRO 6 UNITS: 100 INJECTION, SOLUTION INTRAVENOUS; SUBCUTANEOUS at 11:55

## 2024-04-28 RX ADMIN — FLUTICASONE PROPIONATE 1 SPRAY: 50 SPRAY, METERED NASAL at 04:33

## 2024-04-28 RX ADMIN — SEVELAMER CARBONATE 2400 MG: 800 TABLET, FILM COATED ORAL at 11:55

## 2024-04-28 RX ADMIN — SEVELAMER CARBONATE 2400 MG: 800 TABLET, FILM COATED ORAL at 08:38

## 2024-04-28 NOTE — PROGRESS NOTES
Name: Candida Sherman   MRN: [id number]  : 1965                                                           Assessment:         ESKD on hemodialysis ([facility]; MTTS; 4x/week; AVF)  R Diabetic foot  Peripheral neuropathy  Hypervolemic hyponatremia, moderate, chronic  Hypertriglyceridemia  Anemia of CKD  Peripheral arterial disease  Immunosuppressed because of dialysis Discussion:     Seen on HD today at 3:45 PM.  Using right AV access, 450 QB, 2K dialysate, 2.5 calcium.  SBP dropped to 89.  She got IV albumin and UF was cut back to 2 kg.  X-ray of foot shows chronic right foot fifth metatarsal fracture  She has chronic hyponatremia.  Advised to limit free water intake   hemoglobin at 9.3; positive saturation 24 and ferritin 602  Triglycerides high at 261  Appreciate hospitalist care  Vancomycin and cefepime  ID and vascular surgery will be involved  DONNA antunez MTTS      Plan:   Next HD will be on Monday, if patient still here    Discussed with patient and HD RN     Subjective:  Seen on HD.  SBP dropped.  After albumin it came up to 110 mmHg.  Asymptomatic.    ROS:   No nausea, no vomiting  No chest pain, no shortness of breath    Exam:  BP (!) 110/47   Pulse 80   Temp 97.8 °F (36.6 °C)   Resp 18   Ht 1.549 m (5' 1\")   Wt 112 kg (247 lb)   SpO2 97%   BMI 46.67 kg/m²     NAD  Clear  Regular rate rhythm  Right foot with dressing in place  Right AV access patent  Aox3    Labs/Data:    Lab Results   Component Value Date    WBC 6.6 2024    HGB 8.6 (L) 2024    HCT 25.9 (L) 2024    MCV 96.3 2024     2024       Lab Results   Component Value Date/Time     2024 05:00 AM    K 4.7 2024 05:00 AM    CL 93 2024 05:00 AM    CO2 25 2024 05:00 AM    BUN 41 2024 05:00 AM    CREATININE 7.38 2024 05:00 AM    
        Hospitalist Progress Note    NAME:   Candida Sherman   : 1965   MRN: [id number]     Date/Time: 2024 9:18 AM  Patient PCP: Boyd Donato MD    Estimated discharge date: >48 hours  Barriers: SAMUEL, vascular recommendation      Assessment / Plan:    Acute laceration 5th toe right  4th & 5th digit non-healing wound  Acute cellulitis 4th & 5th toe  PAD    5th metatarsal neck fracture acute   BC  Lactic 1.6, procalcitonin 0.26, , CRP 2.65  - s/p tetanus shot in ED  - continue antibiotic coverage with Vancomycin and Cefepime-bld cx x2 done in ER- results pending  - wound care following  - fall precautions  - podiatry and vascular consulted, greatly appreciated expertise       T2DM   A1C: 9.9  - BG checks with Lispro for SSI coverage  - continue PTA lantus   - continue home tid w/meals 14 units lispro insulin with SSI  - IP DM team consult, greatly appreciate input       Chronic hyponatremia  ESRD (on HD Mon partial run, T,TH,Sat full run per pt reported)  - trend renal function  - nephrology following, greatly appreciate expertise  - dialyzed      Chronic intermittent  BLE edema bilateral  h/o HFpEF  Peripheral neuropathy  HFpEF echo 23 EF 55-60%  - DESI hose  - symptom management     Anemia of CKD  Ferritin 602, Iron 57, TIBC 240, folate 5.8, B12 565, iron saturation 24  - stable hemoglobin  - consider transfuse keep hgb > 7.0      Morbid obesity  BMI: 46.67  - encourage weight loss with diet, activity and lifestyle modification    Asthma, well controlled  FARAZ (no CPAP)  - symptom management    H/o unspecified cancer  MRSA  negative last test  Chronic back pain  CVA  HTN (currently normotensive)  Liver disease      Medical Decision Making:   I personally reviewed labs: BMP, CBC  I personally reviewed imaging: none  I personally reviewed EKG: NSR  Toxic drug monitoring: crea  Discussed case with: attending, consults        Code Status: Full  DVT Prophylaxis: SCD  GI Prophylaxis: 
  Patient early AM admit by NP Hospitalist  Plan of care per outlined in H&P  Currently NPO pending Vascular consult assessment ordered by admitting NP  Added Podiatry consult for eval of right foot 5th toe fracture, wound and tissue swelling  Diet ordered for dinner      
4/29/24 1138 POST DISCHARGE NOTE - CMA received phone call from PCP office stating they have reached out to the patient and left a vm for her to call them back. They have attempted to schedule the PCP hospital follow up.     Attempted to schedule PCP hospital follow up. Sent PCP office a message, awaiting return call from PCP office with appt information. CMA placed [facility] information AVS for patient resource.  Pending patient discharge. Kathleen Masterson, Care Management Assistant  
Attempted to see pt for OT services.  Pt was independently sitting edge of bed and eating lunch on her own.  She reports she had completed all ADLS with nursing this morning.  Will defer but continue to follow.   
DISCHARGE NOTE FROM ORTHO NURSE    Patient determined to be stable for discharge by attending provider. I have reviewed the discharge instructions with the patient. They verbalized understanding and all questions were answered to their satisfaction. No complaints or further questions were expressed.      Medications sent to pharmacy. Appropriate educational materials and medication side effect teaching were provided.      PIV were removed prior to discharge.     Patient did not discharge with any line, rios, or drain.    Personal items and valuables accounted for at discharge by patient and/or family: Yes    Post-op patient: No    Hailey Carrillo LPN    
End of Shift Note    Bedside shift change report given to Cristy magaña (oncoming nurse) by Leonel Thomas RN (offgoing nurse).  Report included the following information SBAR, Kardex, Intake/Output, and MAR    Shift worked:  7-7 PM      Shift summary and any significant changes:     Patient is alert oriented times 4, room air, walk with walker to bathroom. Patient with g20 iv in left hand. Limb alert in right arm due to right arm Fistula for dialysis. SAMUEL done. Due medications given, needs attended . Will continue to monitor.      Leonel Thomas RN                            
Noninvasive testing with excellent toe pressures. Available for Dr Watters as needed.  
Patient's belongings were brought in room from ED.   
Pharmacy Antimicrobial Kinetic Dosing    Indication for Antimicrobials: SSTI      Current Regimen of Each Antimicrobial:  Vancomycin Pharmacy to Dose; Start Date ; Day # 1  Cefepime 1g IV load, then 500mg q24h; Start Date ; Day # 1    Previous Antimicrobial Therapy:  N/A     Goal Level: Vancomycin trough 15-20    Date Dose & Interval Measured (mcg/mL) Predicted AUC                       Significant Cultures:    Blood 1, paired: pending   Blood 2, paired: pending   Wounds: pending    Labs:  Recent Labs     Units 24  2134   CREATININE MG/DL 7.85*   BUN MG/DL 44*   WBC K/uL 7.6     Temp (24hrs), Av.3 °F (36.8 °C), Min:97.6 °F (36.4 °C), Max:98.9 °F (37.2 °C)    Conditions for Dosing Consideration: Hemodialysis    Creatinine Clearance (mL/min): Estimated Creatinine Clearance: 9 mL/min (A) (based on SCr of 7.85 mg/dL (H)).     Impression/Plan:   ESRD on HD (Mon-partial run, TTS full run per pt reported) - HD planned for today in the morning   Vancomycin 2500mg IV loading dose administered   Will order vancomycin 1000mg IV x 1 dose post-HD (pre-HD level will be scheduled after first maintenance dose)  Antimicrobial stop date 7 days     Pharmacy will follow daily and adjust medications as appropriate for renal function and/or serum levels.    Thank you,  THOMAS GEIGER [facility]  
SBAR report received from VAN Miller. Patient just transported from ED for dialysis  
MANAGEMENT    Length of Stay:  Expected LOS: 5  Actual LOS: 2    TANIYA WASHBURN, RN                              
  04/27/24 1415 (!) 93/30 -- -- 74 -- --   04/27/24 1400 (!) 109/59 -- -- 73 -- --   04/27/24 1345 (!) 96/53 -- -- 76 -- --   04/27/24 1330 (!) 113/57 -- -- 76 -- --   04/27/24 1315 (!) 105/56 -- -- 75 -- --   04/27/24 1300 120/62 -- -- 75 -- --   04/27/24 1245 129/65 -- -- 75 -- --   04/27/24 1240 120/63 97.8 °F (36.6 °C) -- 77 18 --         Intake/Output Summary (Last 24 hours) at 4/28/2024 1022  Last data filed at 4/27/2024 1550  Gross per 24 hour   Intake 500 ml   Output 2000 ml   Net -1500 ml        I had a face to face encounter and independently examined this patient on 4/28/2024, as outlined below:  PHYSICAL EXAM:  General: Alert, cooperative  EENT:  EOMI. Anicteric sclerae.  Resp:  CTA bilaterally, no wheezing or rales.  No accessory muscle use  CV:  Regular  rhythm,  No edema  GI:  Soft, Non distended, Non tender.  +Bowel sounds  Neurologic:  Alert and oriented X 3, normal speech,   Psych:   Good insight. Not anxious nor agitated  Skin:  No rashes.  No jaundice    Reviewed most current lab test results and cultures  YES  Reviewed most current radiology test results   YES  Review and summation of old records today    NO  Reviewed patient's current orders and MAR    YES  PMH/SH reviewed - no change compared to H&P    Procedures: see electronic medical records for all procedures/Xrays and details which were not copied into this note but were reviewed prior to creation of Plan.      LABS:  I reviewed today's most current labs and imaging studies.  Pertinent labs include:  Recent Labs     04/27/24  0500   WBC 6.6   HGB 8.6*   HCT 25.9*        Recent Labs     04/26/24  0350 04/27/24  0500   * 126*   K 4.6 4.7   CL 97 93*   CO2 24 25   GLUCOSE 283* 177*   BUN 27* 41*   CREATININE 5.83* 7.38*   CALCIUM 8.7 9.1   INR 1.1  --        Signed: WONG Longoria NP         
disease), lumbar    TIA (transient ischemic attack)    Episodic tension-type headache, not intractable    Lymphadenopathy    Type 2 diabetes mellitus with peripheral neuropathy (HCC)    Morbid obesity with BMI of 45.0-49.9, adult (HCC)    Proteinuria    Chronic heart failure (HCC)    GLADIS (acute kidney injury) (HCC)    Essential hypertension    Disorder of liver    Atopic rhinitis    Lymphoma (HCC)    Morbid obesity (HCC)    Vitamin D deficiency    Diabetic ulcer of left heel associated with type 2 diabetes mellitus, with fat layer exposed (HCC)    Diabetic ulcer of left midfoot associated with type 2 diabetes mellitus, with fat layer exposed (HCC)    Cellulitis of left lower extremity    Septicemia (HCC)    Encephalopathy    Subarachnoid hemorrhage (HCC)    Acute alteration in mental status    Acute metabolic encephalopathy    Hyperglycemia    Uncontrolled type 2 diabetes mellitus with hyperglycemia (HCC)    Stage 5 chronic kidney disease on chronic dialysis (HCC)    Cellulitis      Full Code     Care Plan discussed with:  Patient x    Family      RN x    Dialysis RN     Consultant:     ED     Intensivist     Hospitalist         Comments   >50% of visit spent in counseling and coordination of care         This dictation was done by dragon, computer voice recognition software.  Often unanticipated grammatical, syntax, phones and other interpretive errors are inadvertently transcribed.  Please excuse errors that have escaped final proofreading. Please contact me if you suspect dictation or transcription errors.      Signed By: Francesca Lopez MD     April 26, 2024       Dr Francesca Lopez    Office No- [phone]  [facility]  [address]  [address]  [place] [address]    Fax: [phone]

## 2024-04-28 NOTE — DISCHARGE INSTRUCTIONS
Patient to be weightbearing with surgical shoe at all times.    2. Wound care: Clean ulcer with normal saline apply calcium alginate fourth interspace and cover with a 4 4 gauze, Kerlix and secured with an Ace bandage.  Every other day dressing change. (Per podiatrist)    Right 5th toe trauma wound: MWF, cleanse wound with soap and water. Dry skin thoroughly.Paint toe with Betadine/povidone solution, place a piece of [employer] silver alginate between 4th and 5th toes, cover with a 4x4 and secure with rome. (Per wound care)

## 2024-04-28 NOTE — DISCHARGE SUMMARY
Discharge Summary    Name: Candida Sherman  [id number]  YOB: 1965 (Age: 58 y.o.)   Date of Admission: 4/24/2024  Date of Discharge: 4/28/2024  Attending Physician: Jasiel Grande MD    Discharge Diagnosis:     Acute laceration 5th toe right    4th & 5th digit non-healing wound    Acute cellulitis 4th & 5th toe    PAD      5th metatarsal neck fracture acute      T2DM      Chronic hyponatremia     Chronic intermittent  BLE edema bilateral    h/o HFpEF    Peripheral neuropathy     Anemia of CKD     Morbid obesity     Asthma, well controlled    FARAZ (no CPAP)      H/o unspecified cancer  MRSA 2020 negative last test  Chronic back pain  CVA  HTN (currently normotensive)  Liver disease    Consultations:  IP CONSULT TO CASE MANAGEMENT  IP CONSULT TO SOCIAL WORK  IP CONSULT TO NEPHROLOGY  IP WOUND CARE NURSE CONSULT TO EVAL  IP CONSULT TO VASCULAR SURGERY  IP CONSULT TO PHARMACY  IP CONSULT TO PODIATRY  IP CONSULT TO DIABETES MANAGEMENT  IP CONSULT HOME HEALTH  IP CONSULT TO CASE MANAGEMENT  IP CONSULT TO CASE MANAGEMENT      Brief Admission History/Reason for Admission Per Mak Donohue, DO:     Candida Sherman is a 58 y.o.  female with PMHx significant for polyneuropathy bilateral legs 2/2 DM, ESRD on HD M,T,Th,Sat, morbid obesity, uncontrolled IDDM, chronic hyponatremia asymptomatic in setting of ckd and liver disease, asthma, h/o unspecified cancer, HFpEF EF 55-60% on recent echo, chronic back pain, diabetic retinopathy, CKD, HTN, liver disease, h/o MRSA 2020 last tested was neg, FARAZ w/o cpap- didn't follow-up outpt, CVA. Patient reported she was in the kitchen last night prior to arrival, she opened up the stove bottom drawer and then her son told her she was bleeding. States she looked at her foot, and her 5th right foot digit was bleeding and open. She reported she doesn't have any pain sensation to the legs due to severe DM neuropathy. Denies any recent fevers,

## 2024-04-28 NOTE — CARE COORDINATION
[phone] pickup window [facility]    1153 - Received call from [employer] [place] advising that transport company [facility] has accepted transport request and will arrive between 12:30 and 1:00pm. CM left vm for pt's RN.    [id number] - Received call from NP reporting that pt is ready for discharge and requesting CM arrange transportation. CM called [facility] [phone],  for pt's [facility] plan, spoke with rep [employer] to arrange ride home. Boyd reported that pickup time will be requested for 1:30pm with 3 hour wait window, reference #[id number]. Pedro has CM's phone number and nurses' station phone number to call once transportation company has been assigned and actual pickup time scheduled.     Initial note - CM received call from NP advising of potential discharge today and informing this CM of pt's need for wound care every other day; NP reported that weekend CM yesterday informed NP that earliest available visit by home health would be Tuesday 4/30, per NP this is acceptable as pt will receive wound care today. CM confirming start of care 4/30, once confirmed will coordinate with NP for timing of transportation which CM will arrange through pt's Medicaid. Message sent to [facility] requesting confirmation of SOC.    Marci Vallejo, [facility]  Care Management  [phone]   Pt arrived to the ED from home with c/o increased weakness, decreased food and fluid intake, and SOB for the past 2-3 days.  Per EMS, pts brother lives across the matute from pt in an apartment complex and stated that pts brother makes frequent checks on pt because he lives alone.  Per brother, he has noticed the pt declining the past few days so he called EMS to have pt taken to the hospital.  Pt has a hx of esophogeal CA, and COPD.

## 2024-04-28 NOTE — PLAN OF CARE
Problem: Occupational Therapy - Adult  Goal: By Discharge: Performs self-care activities at highest level of function for planned discharge setting.  See evaluation for individualized goals.  Description: FUNCTIONAL STATUS PRIOR TO ADMISSION:  chronic neuropathy, bilateral foot drop and doesn't wear AFOs due to difficulty donning, sits side sitting edge of bed to thread LB clothing and always stands with bed or chair behind her during dressing for safety,  sits on shower chair to bathe or sponge bathes, wears depends, difficulty with fasteners and FMC due to neuropathy in hands, gets medical transport to dialysis T,TH,Sat, ambulated with Rw at baseline      HOME SUPPORT: Patient lived son whom is autistic and her sister.    Occupational Therapy Goals:  Initiated 4/25/2024  1.  Patient will perform grooming with Supervision within 7 day(s).  2.  Patient will perform lower body dressing with Stand by Assist within 7 day(s).  3.  Patient will perform toileting with Supervision within 7 day(s).  4.  Patient will perform toilet transfers with Supervision  within 7 day(s).      4/25/2024 1543 by Elly Nassar, OTR/L  Outcome: Not Progressing     Problem: Occupational Therapy - Adult  Goal: By Discharge: Performs self-care activities at highest level of function for planned discharge setting.  See evaluation for individualized goals.  Description: FUNCTIONAL STATUS PRIOR TO ADMISSION:  chronic neuropathy, bilateral foot drop and doesn't wear AFOs due to difficulty donning, sits side sitting edge of bed to thread LB clothing and always stands with bed or chair behind her during dressing for safety,  sits on shower chair to bathe or sponge bathes, wears depends, difficulty with fasteners and FMC due to neuropathy in hands, gets medical transport to dialysis T,TH,Sat, ambulated with Rw at baseline      HOME SUPPORT: Patient lived son whom is autistic and her sister.    Occupational Therapy Goals:  Initiated 
  Problem: Physical Therapy - Adult  Goal: By Discharge: Performs mobility at highest level of function for planned discharge setting.  See evaluation for individualized goals.  Description: FUNCTIONAL STATUS PRIOR TO ADMISSION: Patient was modified independent using a rolling walker for functional mobility.    HOME SUPPORT PRIOR TO ADMISSION: The patient lived with her son and her sister who provide assistance when needed.    Physical Therapy Goals  Initiated 4/25/2024  1.  Patient will move from supine to sit and sit to supine in bed with independence within 7 day(s).    2.  Patient will perform sit to stand with modified independence within 7 day(s).  3.  Patient will transfer from bed to chair and chair to bed with modified independence using the least restrictive device within 7 day(s).  4.  Patient will ambulate with modified independence for 100 feet with the least restrictive device within 7 day(s).     Outcome: Progressing   PHYSICAL THERAPY TREATMENT    Patient: Candida Sherman (58 y.o. female)  Date: 4/26/2024  Diagnosis: Cellulitis [L03.90]  Hyponatremia [E87.1]  ESRD (end stage renal disease) ([facility]) [N18.6]  Cellulitis of toe of right foot [L03.031]  Peripheral polyneuropathy [G62.9]  Uncontrolled type 2 diabetes mellitus with hyperglycemia ([facility]) [E11.65] Cellulitis      Precautions:                        ASSESSMENT:  Patient continues to benefit from skilled PT services and is slowly progressing towards goals. Patient supine upon arrival; agreeable to mobility.  Able to come to EOB with min assist and has good sitting balance.  Sit to stand with min assist then able to take a few steps from bed to chair with rolling walker and post-op shoe on right LE.  Verbal cues for hand placement when sitting in chair.  Up in chair at end of session with alarm set.         PLAN:  Patient continues to benefit from skilled intervention to address the above impairments.  Continue treatment per established plan of 
  Problem: Respiratory - Adult  Goal: Achieves optimal ventilation and oxygenation  4/26/2024 2332 by Cristy Sanchez, RN  Outcome: Progressing  4/26/2024 1935 by Mckayla Ash, RT  Outcome: Progressing     Problem: Safety - Adult  Goal: Free from fall injury  Outcome: Progressing     Problem: Chronic Conditions and Co-morbidities  Goal: Patient's chronic conditions and co-morbidity symptoms are monitored and maintained or improved  Outcome: Progressing     
  Problem: Respiratory - Adult  Goal: Achieves optimal ventilation and oxygenation  4/28/2024 1133 by Hailey Carrillo LPN  Outcome: Completed  4/28/2024 1104 by Hailey Carrillo LPN  Outcome: Progressing  Flowsheets (Taken 4/28/2024 0750)  Achieves optimal ventilation and oxygenation:   Assess for changes in respiratory status   Assess for changes in mentation and behavior   Position to facilitate oxygenation and minimize respiratory effort   Oxygen supplementation based on oxygen saturation or arterial blood gases   Encourage broncho-pulmonary hygiene including cough, deep breathe, incentive spirometry   Assess the need for suctioning and aspirate as needed   Assess and instruct to report shortness of breath or any respiratory difficulty   Respiratory therapy support as indicated     Problem: Safety - Adult  Goal: Free from fall injury  4/28/2024 1133 by Hailey Carrillo LPN  Outcome: Completed  4/28/2024 1104 by Hailey Carrillo LPN  Outcome: Progressing  Flowsheets (Taken 4/28/2024 0750)  Free From Fall Injury:   Instruct family/caregiver on patient safety   Based on caregiver fall risk screen, instruct family/caregiver to ask for assistance with transferring infant if caregiver noted to have fall risk factors     Problem: Discharge Planning  Goal: Discharge to home or other facility with appropriate resources  4/28/2024 1133 by Hailey Carrillo LPN  Outcome: Completed  4/28/2024 1104 by Hailey Carrillo LPN  Outcome: Progressing  Flowsheets (Taken 4/28/2024 0750)  Discharge to home or other facility with appropriate resources:   Identify barriers to discharge with patient and caregiver   Arrange for needed discharge resources and transportation as appropriate   Identify discharge learning needs (meds, wound care, etc)   Arrange for interpreters to assist at discharge as needed   Refer to discharge planning if patient needs post-hospital services based on physician order or complex needs related to functional 
  Problem: Respiratory - Adult  Goal: Achieves optimal ventilation and oxygenation  Outcome: Progressing  Flowsheets (Taken 4/28/2024 0750)  Achieves optimal ventilation and oxygenation:   Assess for changes in respiratory status   Assess for changes in mentation and behavior   Position to facilitate oxygenation and minimize respiratory effort   Oxygen supplementation based on oxygen saturation or arterial blood gases   Encourage broncho-pulmonary hygiene including cough, deep breathe, incentive spirometry   Assess the need for suctioning and aspirate as needed   Assess and instruct to report shortness of breath or any respiratory difficulty   Respiratory therapy support as indicated     Problem: Safety - Adult  Goal: Free from fall injury  Outcome: Progressing  Flowsheets (Taken 4/28/2024 0750)  Free From Fall Injury:   Instruct family/caregiver on patient safety   Based on caregiver fall risk screen, instruct family/caregiver to ask for assistance with transferring infant if caregiver noted to have fall risk factors     Problem: Discharge Planning  Goal: Discharge to home or other facility with appropriate resources  Outcome: Progressing  Flowsheets (Taken 4/28/2024 0750)  Discharge to home or other facility with appropriate resources:   Identify barriers to discharge with patient and caregiver   Arrange for needed discharge resources and transportation as appropriate   Identify discharge learning needs (meds, wound care, etc)   Arrange for interpreters to assist at discharge as needed   Refer to discharge planning if patient needs post-hospital services based on physician order or complex needs related to functional status, cognitive ability or social support system     Problem: ABCDS Injury Assessment  Goal: Absence of physical injury  Outcome: Progressing     Problem: Chronic Conditions and Co-morbidities  Goal: Patient's chronic conditions and co-morbidity symptoms are monitored and maintained or 
                                                                                            Pain Rating:  Denies foot pain secondary to numbness   Pain Intervention(s):       Activity Tolerance:   Good    After treatment:   Patient left in no apparent distress in bed and Call bell within reach    COMMUNICATION/EDUCATION:   The patient's plan of care was discussed with: occupational therapist and registered nurse    Patient Education  Education Given To: Patient  Education Provided: Plan of Care;Role of Therapy  Education Method: Demonstration;Verbal  Barriers to Learning: None  Education Outcome: Verbalized understanding    Thank you for this referral.  Queta Cerda, PT  Minutes: 21      Physical Therapy Evaluation Charge Determination   History Examination Presentation Decision-Making   LOW Complexity : Zero comorbidities / personal factors that will impact the outcome / POC LOW Complexity : 1-2 Standardized tests and measures addressing body structure, function, activity limitation and / or participation in recreation  LOW Complexity : Stable, uncomplicated  AM-PAC  LOW    Based on the above components, the patient evaluation is determined to be of the following complexity level: Low   
but direct observation and common sense are also important. However direct testing is not needed.  5. Usually the patient's performance over the preceding 24-48 hours is important, but occasionally longer periods will be relevant.  6. Middle categories imply that the patient supplies over 50 per cent of the effort.  7. Use of aids to be independent is allowed.    Score Interpretation (from Sinoff 1997)    Independent   60-79 Minimally independent   40-59 Partially dependent   20-39 Very dependent   <20 Totally dependent     Skyler Barahona., BarthelKAROW. (1965). Functional evaluation: the Barthel Index. [facility] J (142.  KIM Owen, SOFI Campos., Saurabh, LOI., Neel, DREW. (1999). Measuring the change indisability after inpatient rehabilitation; comparison of the responsiveness of the Barthel Index and Functional [place] Measure. Journal of Neurology, Neurosurgery, and Psychiatry, 66(4), 480-484.  [place], [place].A, ASHLEY Rust, & Benjamin M.A. (2004.) Assessment of post-stroke quality of life in cost-effectiveness studies: The usefulness of the Barthel Index and the EuroQoL-5D. Quality of Life Research, 13, 427-43     Activity Tolerance:   Fair  and requires rest breaks    After treatment:   Assisted back to stretcher as prior to session with bilateral rails up and all lines and leads as prior to session    COMMUNICATION/EDUCATION:   The patient's plan of care was discussed with: physical therapist, registered nurse, and patient    Patient Education  Education Given To: Patient  Education Provided: Role of Therapy;Plan of Care;ADL Adaptive Strategies;Fall Prevention Strategies  Education Method: Verbal  Barriers to Learning: None  Education Outcome: Verbalized understanding    Thank you for this referral.  Elly Nassar OTR/L  Minutes: 23    Occupational Therapy Evaluation Charge Determination   History Examination Decision-Making   MEDIUM Complexity : Expanded

## 2024-05-01 LAB
BACTERIA SPEC CULT: NORMAL
BACTERIA SPEC CULT: NORMAL
SERVICE CMNT-IMP: NORMAL
SERVICE CMNT-IMP: NORMAL

## 2024-05-06 ASSESSMENT — ENCOUNTER SYMPTOMS
COLOR CHANGE: 0
COUGH: 0
DIARRHEA: 0
ABDOMINAL PAIN: 0
BACK PAIN: 0
NAUSEA: 0
VOMITING: 0
SHORTNESS OF BREATH: 0

## 2024-05-16 ENCOUNTER — APPOINTMENT (OUTPATIENT)
Facility: HOSPITAL | Age: 59
DRG: 045 | End: 2024-05-16
Payer: MEDICAID

## 2024-05-16 ENCOUNTER — HOSPITAL ENCOUNTER (INPATIENT)
Facility: HOSPITAL | Age: 59
LOS: 10 days | Discharge: NURSING, REHAB OR LONG-TERM CARE | DRG: 045 | End: 2024-05-28
Attending: EMERGENCY MEDICINE | Admitting: STUDENT IN AN ORGANIZED HEALTH CARE EDUCATION/TRAINING PROGRAM
Payer: MEDICAID

## 2024-05-16 DIAGNOSIS — L97.422 DIABETIC ULCER OF LEFT HEEL ASSOCIATED WITH TYPE 2 DIABETES MELLITUS, WITH FAT LAYER EXPOSED (HCC): ICD-10-CM

## 2024-05-16 DIAGNOSIS — R55 NEAR SYNCOPE: Primary | ICD-10-CM

## 2024-05-16 DIAGNOSIS — R53.1 GENERALIZED WEAKNESS: ICD-10-CM

## 2024-05-16 DIAGNOSIS — E11.621 DIABETIC ULCER OF LEFT HEEL ASSOCIATED WITH TYPE 2 DIABETES MELLITUS, WITH FAT LAYER EXPOSED (HCC): ICD-10-CM

## 2024-05-16 DIAGNOSIS — I73.9 PERIPHERAL ARTERIAL DISEASE (HCC): ICD-10-CM

## 2024-05-16 DIAGNOSIS — I63.012 CEREBROVASCULAR ACCIDENT (CVA) DUE TO THROMBOSIS OF LEFT VERTEBRAL ARTERY (HCC): ICD-10-CM

## 2024-05-16 LAB
ALBUMIN SERPL-MCNC: 3.8 G/DL (ref 3.5–5)
ALBUMIN/GLOB SERPL: 0.7 (ref 1.1–2.2)
ALP SERPL-CCNC: 146 U/L (ref 45–117)
ALT SERPL-CCNC: 18 U/L (ref 12–78)
ANION GAP SERPL CALC-SCNC: 10 MMOL/L (ref 5–15)
AST SERPL-CCNC: 15 U/L (ref 15–37)
BASOPHILS # BLD: 0.1 K/UL (ref 0–0.1)
BASOPHILS NFR BLD: 1 % (ref 0–1)
BILIRUB SERPL-MCNC: 0.5 MG/DL (ref 0.2–1)
BUN SERPL-MCNC: 19 MG/DL (ref 6–20)
BUN/CREAT SERPL: 4 (ref 12–20)
CALCIUM SERPL-MCNC: 9 MG/DL (ref 8.5–10.1)
CHLORIDE SERPL-SCNC: 93 MMOL/L (ref 97–108)
CO2 SERPL-SCNC: 27 MMOL/L (ref 21–32)
CREAT SERPL-MCNC: 4.25 MG/DL (ref 0.55–1.02)
DIFFERENTIAL METHOD BLD: ABNORMAL
EOSINOPHIL # BLD: 0 K/UL (ref 0–0.4)
EOSINOPHIL NFR BLD: 0 % (ref 0–7)
ERYTHROCYTE [DISTWIDTH] IN BLOOD BY AUTOMATED COUNT: 15.8 % (ref 11.5–14.5)
GLOBULIN SER CALC-MCNC: 5.1 G/DL (ref 2–4)
GLUCOSE BLD STRIP.AUTO-MCNC: 337 MG/DL (ref 65–117)
GLUCOSE BLD STRIP.AUTO-MCNC: 354 MG/DL (ref 65–117)
GLUCOSE SERPL-MCNC: 347 MG/DL (ref 65–100)
HCT VFR BLD AUTO: 28.6 % (ref 35–47)
HGB BLD-MCNC: 9.5 G/DL (ref 11.5–16)
IMM GRANULOCYTES # BLD AUTO: 0.1 K/UL (ref 0–0.04)
IMM GRANULOCYTES NFR BLD AUTO: 1 % (ref 0–0.5)
LYMPHOCYTES # BLD: 0.5 K/UL (ref 0.8–3.5)
LYMPHOCYTES NFR BLD: 5 % (ref 12–49)
MCH RBC QN AUTO: 32.3 PG (ref 26–34)
MCHC RBC AUTO-ENTMCNC: 33.2 G/DL (ref 30–36.5)
MCV RBC AUTO: 97.3 FL (ref 80–99)
MONOCYTES # BLD: 0.6 K/UL (ref 0–1)
MONOCYTES NFR BLD: 6 % (ref 5–13)
NEUTS SEG # BLD: 9.1 K/UL (ref 1.8–8)
NEUTS SEG NFR BLD: 87 % (ref 32–75)
NRBC # BLD: 0 K/UL (ref 0–0.01)
NRBC BLD-RTO: 0 PER 100 WBC
PLATELET # BLD AUTO: 203 K/UL (ref 150–400)
PMV BLD AUTO: 10.1 FL (ref 8.9–12.9)
POTASSIUM SERPL-SCNC: 3.5 MMOL/L (ref 3.5–5.1)
PROT SERPL-MCNC: 8.9 G/DL (ref 6.4–8.2)
RBC # BLD AUTO: 2.94 M/UL (ref 3.8–5.2)
RBC MORPH BLD: ABNORMAL
SERVICE CMNT-IMP: ABNORMAL
SERVICE CMNT-IMP: ABNORMAL
SODIUM SERPL-SCNC: 130 MMOL/L (ref 136–145)
T4 FREE SERPL-MCNC: 1 NG/DL (ref 0.8–1.5)
TROPONIN I SERPL HS-MCNC: 5 NG/L (ref 0–51)
TROPONIN I SERPL HS-MCNC: 5 NG/L (ref 0–51)
TSH SERPL DL<=0.05 MIU/L-ACNC: 1.27 UIU/ML (ref 0.36–3.74)
WBC # BLD AUTO: 10.4 K/UL (ref 3.6–11)

## 2024-05-16 PROCEDURE — G0378 HOSPITAL OBSERVATION PER HR: HCPCS

## 2024-05-16 PROCEDURE — 71250 CT THORAX DX C-: CPT

## 2024-05-16 PROCEDURE — 72125 CT NECK SPINE W/O DYE: CPT

## 2024-05-16 PROCEDURE — 99285 EMERGENCY DEPT VISIT HI MDM: CPT

## 2024-05-16 PROCEDURE — 2580000003 HC RX 258: Performed by: INTERNAL MEDICINE

## 2024-05-16 PROCEDURE — 36415 COLL VENOUS BLD VENIPUNCTURE: CPT

## 2024-05-16 PROCEDURE — 6360000002 HC RX W HCPCS: Performed by: INTERNAL MEDICINE

## 2024-05-16 PROCEDURE — 6370000000 HC RX 637 (ALT 250 FOR IP): Performed by: EMERGENCY MEDICINE

## 2024-05-16 PROCEDURE — 82962 GLUCOSE BLOOD TEST: CPT

## 2024-05-16 PROCEDURE — 96375 TX/PRO/DX INJ NEW DRUG ADDON: CPT

## 2024-05-16 PROCEDURE — 6370000000 HC RX 637 (ALT 250 FOR IP): Performed by: INTERNAL MEDICINE

## 2024-05-16 PROCEDURE — 84439 ASSAY OF FREE THYROXINE: CPT

## 2024-05-16 PROCEDURE — 85025 COMPLETE CBC W/AUTO DIFF WBC: CPT

## 2024-05-16 PROCEDURE — 96361 HYDRATE IV INFUSION ADD-ON: CPT

## 2024-05-16 PROCEDURE — 2580000003 HC RX 258: Performed by: HOSPITALIST

## 2024-05-16 PROCEDURE — 2580000003 HC RX 258: Performed by: EMERGENCY MEDICINE

## 2024-05-16 PROCEDURE — 96374 THER/PROPH/DIAG INJ IV PUSH: CPT

## 2024-05-16 PROCEDURE — 84443 ASSAY THYROID STIM HORMONE: CPT

## 2024-05-16 PROCEDURE — 72192 CT PELVIS W/O DYE: CPT

## 2024-05-16 PROCEDURE — 80053 COMPREHEN METABOLIC PANEL: CPT

## 2024-05-16 PROCEDURE — 84484 ASSAY OF TROPONIN QUANT: CPT

## 2024-05-16 PROCEDURE — 96372 THER/PROPH/DIAG INJ SC/IM: CPT

## 2024-05-16 PROCEDURE — 70450 CT HEAD/BRAIN W/O DYE: CPT

## 2024-05-16 PROCEDURE — 94640 AIRWAY INHALATION TREATMENT: CPT

## 2024-05-16 PROCEDURE — 6360000002 HC RX W HCPCS: Performed by: EMERGENCY MEDICINE

## 2024-05-16 RX ORDER — ENOXAPARIN SODIUM 100 MG/ML
30 INJECTION SUBCUTANEOUS DAILY
Status: DISCONTINUED | OUTPATIENT
Start: 2024-05-16 | End: 2024-05-16

## 2024-05-16 RX ORDER — SODIUM CHLORIDE 9 MG/ML
INJECTION, SOLUTION INTRAVENOUS PRN
Status: DISCONTINUED | OUTPATIENT
Start: 2024-05-16 | End: 2024-05-28 | Stop reason: HOSPADM

## 2024-05-16 RX ORDER — ALBUTEROL SULFATE 90 UG/1
2 AEROSOL, METERED RESPIRATORY (INHALATION) EVERY 6 HOURS PRN
Status: DISCONTINUED | OUTPATIENT
Start: 2024-05-16 | End: 2024-05-16 | Stop reason: CLARIF

## 2024-05-16 RX ORDER — FLUTICASONE PROPIONATE 220 UG/1
2 AEROSOL, METERED RESPIRATORY (INHALATION) 2 TIMES DAILY
Status: DISCONTINUED | OUTPATIENT
Start: 2024-05-16 | End: 2024-05-16 | Stop reason: CLARIF

## 2024-05-16 RX ORDER — SEVELAMER CARBONATE 800 MG/1
800 TABLET, FILM COATED ORAL
Status: DISCONTINUED | OUTPATIENT
Start: 2024-05-16 | End: 2024-05-28 | Stop reason: HOSPADM

## 2024-05-16 RX ORDER — GLUCAGON 1 MG/ML
1 KIT INJECTION PRN
Status: DISCONTINUED | OUTPATIENT
Start: 2024-05-16 | End: 2024-05-28 | Stop reason: HOSPADM

## 2024-05-16 RX ORDER — HEPARIN SODIUM 5000 [USP'U]/ML
5000 INJECTION, SOLUTION INTRAVENOUS; SUBCUTANEOUS EVERY 8 HOURS SCHEDULED
Status: DISCONTINUED | OUTPATIENT
Start: 2024-05-16 | End: 2024-05-16 | Stop reason: ALTCHOICE

## 2024-05-16 RX ORDER — LIDOCAINE 4 G/G
1 PATCH TOPICAL
Status: COMPLETED | OUTPATIENT
Start: 2024-05-16 | End: 2024-05-17

## 2024-05-16 RX ORDER — ACETAMINOPHEN 650 MG/1
650 SUPPOSITORY RECTAL EVERY 6 HOURS PRN
Status: DISCONTINUED | OUTPATIENT
Start: 2024-05-16 | End: 2024-05-28 | Stop reason: HOSPADM

## 2024-05-16 RX ORDER — SODIUM CHLORIDE 0.9 % (FLUSH) 0.9 %
5-40 SYRINGE (ML) INJECTION PRN
Status: DISCONTINUED | OUTPATIENT
Start: 2024-05-16 | End: 2024-05-28 | Stop reason: HOSPADM

## 2024-05-16 RX ORDER — 0.9 % SODIUM CHLORIDE 0.9 %
250 INTRAVENOUS SOLUTION INTRAVENOUS ONCE
Status: COMPLETED | OUTPATIENT
Start: 2024-05-16 | End: 2024-05-16

## 2024-05-16 RX ORDER — INSULIN LISPRO 100 [IU]/ML
0-4 INJECTION, SOLUTION INTRAVENOUS; SUBCUTANEOUS NIGHTLY
Status: DISCONTINUED | OUTPATIENT
Start: 2024-05-16 | End: 2024-05-28 | Stop reason: HOSPADM

## 2024-05-16 RX ORDER — INSULIN GLARGINE 100 [IU]/ML
48 INJECTION, SOLUTION SUBCUTANEOUS NIGHTLY
Status: DISCONTINUED | OUTPATIENT
Start: 2024-05-16 | End: 2024-05-28 | Stop reason: HOSPADM

## 2024-05-16 RX ORDER — ONDANSETRON 4 MG/1
4 TABLET, ORALLY DISINTEGRATING ORAL EVERY 8 HOURS PRN
Status: DISCONTINUED | OUTPATIENT
Start: 2024-05-16 | End: 2024-05-28 | Stop reason: HOSPADM

## 2024-05-16 RX ORDER — ALBUTEROL SULFATE 2.5 MG/3ML
2.5 SOLUTION RESPIRATORY (INHALATION) EVERY 6 HOURS PRN
Status: DISCONTINUED | OUTPATIENT
Start: 2024-05-16 | End: 2024-05-28 | Stop reason: HOSPADM

## 2024-05-16 RX ORDER — AMITRIPTYLINE HYDROCHLORIDE 50 MG/1
50 TABLET, FILM COATED ORAL NIGHTLY
Status: DISCONTINUED | OUTPATIENT
Start: 2024-05-16 | End: 2024-05-28 | Stop reason: HOSPADM

## 2024-05-16 RX ORDER — INSULIN LISPRO 100 [IU]/ML
0-8 INJECTION, SOLUTION INTRAVENOUS; SUBCUTANEOUS
Status: DISCONTINUED | OUTPATIENT
Start: 2024-05-16 | End: 2024-05-28 | Stop reason: HOSPADM

## 2024-05-16 RX ORDER — HEPARIN SODIUM 5000 [USP'U]/ML
5000 INJECTION, SOLUTION INTRAVENOUS; SUBCUTANEOUS EVERY 8 HOURS SCHEDULED
Status: DISCONTINUED | OUTPATIENT
Start: 2024-05-16 | End: 2024-05-28 | Stop reason: HOSPADM

## 2024-05-16 RX ORDER — ACETAMINOPHEN 325 MG/1
650 TABLET ORAL EVERY 4 HOURS PRN
Status: DISCONTINUED | OUTPATIENT
Start: 2024-05-16 | End: 2024-05-28 | Stop reason: HOSPADM

## 2024-05-16 RX ORDER — MIDODRINE HYDROCHLORIDE 5 MG/1
5 TABLET ORAL
Status: DISCONTINUED | OUTPATIENT
Start: 2024-05-16 | End: 2024-05-28 | Stop reason: HOSPADM

## 2024-05-16 RX ORDER — POLYETHYLENE GLYCOL 3350 17 G/17G
17 POWDER, FOR SOLUTION ORAL DAILY PRN
Status: DISCONTINUED | OUTPATIENT
Start: 2024-05-16 | End: 2024-05-27

## 2024-05-16 RX ORDER — INSULIN LISPRO 100 [IU]/ML
14 INJECTION, SOLUTION INTRAVENOUS; SUBCUTANEOUS
Status: DISCONTINUED | OUTPATIENT
Start: 2024-05-16 | End: 2024-05-17

## 2024-05-16 RX ORDER — ASPIRIN 81 MG/1
81 TABLET ORAL DAILY
Status: DISCONTINUED | OUTPATIENT
Start: 2024-05-17 | End: 2024-05-28 | Stop reason: HOSPADM

## 2024-05-16 RX ORDER — BUDESONIDE 0.5 MG/2ML
0.5 INHALANT ORAL
Status: DISCONTINUED | OUTPATIENT
Start: 2024-05-16 | End: 2024-05-28 | Stop reason: HOSPADM

## 2024-05-16 RX ORDER — ONDANSETRON 2 MG/ML
4 INJECTION INTRAMUSCULAR; INTRAVENOUS EVERY 6 HOURS PRN
Status: DISCONTINUED | OUTPATIENT
Start: 2024-05-16 | End: 2024-05-28 | Stop reason: HOSPADM

## 2024-05-16 RX ORDER — DEXTROSE MONOHYDRATE 100 MG/ML
INJECTION, SOLUTION INTRAVENOUS CONTINUOUS PRN
Status: DISCONTINUED | OUTPATIENT
Start: 2024-05-16 | End: 2024-05-28 | Stop reason: HOSPADM

## 2024-05-16 RX ORDER — ACETAMINOPHEN 325 MG/1
650 TABLET ORAL EVERY 6 HOURS PRN
Status: DISCONTINUED | OUTPATIENT
Start: 2024-05-16 | End: 2024-05-28 | Stop reason: HOSPADM

## 2024-05-16 RX ORDER — 0.9 % SODIUM CHLORIDE 0.9 %
250 INTRAVENOUS SOLUTION INTRAVENOUS ONCE
Status: COMPLETED | OUTPATIENT
Start: 2024-05-16 | End: 2024-05-17

## 2024-05-16 RX ORDER — CLOPIDOGREL BISULFATE 75 MG/1
75 TABLET ORAL DAILY
Status: DISCONTINUED | OUTPATIENT
Start: 2024-05-17 | End: 2024-05-18

## 2024-05-16 RX ORDER — ONDANSETRON 2 MG/ML
4 INJECTION INTRAMUSCULAR; INTRAVENOUS ONCE
Status: COMPLETED | OUTPATIENT
Start: 2024-05-16 | End: 2024-05-16

## 2024-05-16 RX ORDER — FENTANYL CITRATE 50 UG/ML
25 INJECTION, SOLUTION INTRAMUSCULAR; INTRAVENOUS
Status: COMPLETED | OUTPATIENT
Start: 2024-05-16 | End: 2024-05-16

## 2024-05-16 RX ORDER — SODIUM CHLORIDE 0.9 % (FLUSH) 0.9 %
5-40 SYRINGE (ML) INJECTION EVERY 12 HOURS SCHEDULED
Status: DISCONTINUED | OUTPATIENT
Start: 2024-05-16 | End: 2024-05-28 | Stop reason: HOSPADM

## 2024-05-16 RX ORDER — ATORVASTATIN CALCIUM 40 MG/1
80 TABLET, FILM COATED ORAL NIGHTLY
Status: DISCONTINUED | OUTPATIENT
Start: 2024-05-16 | End: 2024-05-28 | Stop reason: HOSPADM

## 2024-05-16 RX ADMIN — SODIUM CHLORIDE 250 ML: 9 INJECTION, SOLUTION INTRAVENOUS at 23:59

## 2024-05-16 RX ADMIN — HEPARIN SODIUM 5000 UNITS: 5000 INJECTION INTRAVENOUS; SUBCUTANEOUS at 21:10

## 2024-05-16 RX ADMIN — MIDODRINE HYDROCHLORIDE 5 MG: 5 TABLET ORAL at 18:38

## 2024-05-16 RX ADMIN — SODIUM CHLORIDE 250 ML: 9 INJECTION, SOLUTION INTRAVENOUS at 14:25

## 2024-05-16 RX ADMIN — FENTANYL CITRATE 25 MCG: 50 INJECTION INTRAMUSCULAR; INTRAVENOUS at 13:11

## 2024-05-16 RX ADMIN — SEVELAMER CARBONATE 800 MG: 800 TABLET, FILM COATED ORAL at 18:38

## 2024-05-16 RX ADMIN — ONDANSETRON 4 MG: 2 INJECTION INTRAMUSCULAR; INTRAVENOUS at 13:10

## 2024-05-16 RX ADMIN — SODIUM CHLORIDE, PRESERVATIVE FREE 10 ML: 5 INJECTION INTRAVENOUS at 21:06

## 2024-05-16 RX ADMIN — ATORVASTATIN CALCIUM 80 MG: 40 TABLET, FILM COATED ORAL at 21:10

## 2024-05-16 RX ADMIN — BUDESONIDE INHALATION 500 MCG: 0.5 SUSPENSION RESPIRATORY (INHALATION) at 20:27

## 2024-05-16 RX ADMIN — AMITRIPTYLINE HYDROCHLORIDE 50 MG: 50 TABLET, FILM COATED ORAL at 21:37

## 2024-05-16 RX ADMIN — INSULIN GLARGINE 48 UNITS: 100 INJECTION, SOLUTION SUBCUTANEOUS at 21:08

## 2024-05-16 RX ADMIN — INSULIN LISPRO 4 UNITS: 100 INJECTION, SOLUTION INTRAVENOUS; SUBCUTANEOUS at 21:07

## 2024-05-16 ASSESSMENT — PAIN SCALES - GENERAL
PAINLEVEL_OUTOF10: 6
PAINLEVEL_OUTOF10: 8

## 2024-05-16 ASSESSMENT — PAIN DESCRIPTION - LOCATION: LOCATION: NECK;HEAD

## 2024-05-16 ASSESSMENT — LIFESTYLE VARIABLES
HOW OFTEN DO YOU HAVE A DRINK CONTAINING ALCOHOL: NEVER
HOW MANY STANDARD DRINKS CONTAINING ALCOHOL DO YOU HAVE ON A TYPICAL DAY: PATIENT DOES NOT DRINK

## 2024-05-16 ASSESSMENT — PAIN - FUNCTIONAL ASSESSMENT: PAIN_FUNCTIONAL_ASSESSMENT: 0-10

## 2024-05-16 NOTE — ED PROVIDER NOTES
[facility] EMERGENCY DEPT  EMERGENCY DEPARTMENT ENCOUNTER       Pt Name: Candida Sherman  MRN: [id number]  Birthdate 1965  Date of evaluation: 5/16/2024  Provider: Evens Ayala MD   PCP: Boyd Donato MD  Note Started: 2:18 PM EDT 5/16/24     CHIEF COMPLAINT       Chief Complaint   Patient presents with    Extremity Weakness     Pt arrives to ED via EMS from home. EMS reports that on Saturday pt started to feel weak and fatigue. Pt had a fall on Saturday, denies hitting her head and LOC. Pt went to dialysis today and did not receive the full dialysis. Pt reports bouts of dizziness that is relieved when closing eyes         HISTORY OF PRESENT ILLNESS: 1 or more elements      History From: Patient and EMS, History limited by: none     Candida Sherman is a 58 y.o. female patient with history of end-stage renal disease, CHF, diabetes, hypertension, here for general weakness and lightheadedness.  Patient states that she normally is in a wheelchair and only ambulates to move to the bathroom.  She uses a walker at those times.  She was in her wheelchair 5 days ago, and reached down to pick something up, when she fell.  She did not lose consciousness, but states she has been lightheaded and feeling off balance ever since.  She does have a headache today as well as neck pain she is of both of those pains are 6 out of 10 in severity.  She is on Plavix.  She also complains of contusion to the lower back and bilateral lower rib pain.  The rib pain is a 6 out of 10 in severity.  She denies shortness of breath, but states that when she does not take a deep breath or moves a certain way, that exacerbates her rib pain.  She denies abdominal pain, diarrhea, nausea or vomiting.  She denies fever or chills.  She denies tinnitus, states she has a spinning sensation.       Please See MDM for Additional Details of the HPI/PMH  Nursing Notes were all reviewed and agreed with or any disagreements were addressed in the HPI.     REVIEW OF

## 2024-05-16 NOTE — ED NOTES
TRANSFER - OUT REPORT:    Verbal report given to [facility] on [address]  being transferred to [facility] for routine progression of patient care       Report consisted of patient's Situation, Background, Assessment and   Recommendations(SBAR).     Information from the following report(s) ED SBAR, Adult Overview, Recent Results, and Neuro Assessment was reviewed with the receiving nurse.    [place] Fall Assessment:    Presents to emergency department  because of falls (Syncope, seizure, or loss of consciousness): No  Age > 70: No  Altered Mental Status, Intoxication with alcohol or substance confusion (Disorientation, impaired judgment, poor safety awaremess, or inability to follow instructions): No  Impaired Mobility: Ambulates or transfers with assistive devices or assistance; Unable to ambulate or transer.: Yes (walker)  Nursing Judgement: Yes          Lines:   Peripheral IV 05/16/24 Right Antecubital (Active)        Opportunity for questions and clarification was provided.        Pt's belongings (pink shirt, pants, and one tennis shoe; left) placed in patient belongings bag for transportl

## 2024-05-16 NOTE — H&P
Hospitalist Admission Note    NAME:   Candida Sherman   : 1965   MRN: [id number]     Date/Time: 2024 5:01 PM    Patient PCP: Boyd Donato MD    ______________________________________________________________________  Given the patient's current clinical presentation, I have a high level of concern for decompensation if discharged from the emergency department.  Complex decision making was performed, which includes reviewing the patient's available past medical records, laboratory results, and x-ray films.       My assessment of this patient's clinical condition and my plan of care is as follows.    Assessment / Plan:  Dizziness and presyncopal episode    Will admit to telemetry, CT of the head is negative, will get orthostatic vital signs, MRI of the brain without contrast  Will get TSH  Get 2D echo.  Depending on results we will either consult cardiology or neurology    End-stage renal disease on hemodialysis  Will consult nephrology for dialysis purposes    Diabetes mellitus with hyperglycemia  Resume home dose insulin, start sliding scale insulin    PAD  History of wounds on the fourth and fifth  toes   Peripheral neuropathy  Chronic bilateral extremity edema  Will consult wound care    Anemia of chronic disease  Hemoglobin around 9.5    Morbid obesity  Encouraged weight loss  Asthma  Jose Juan not on cpap   History of CVA  Hypertension  Not on BP meds    Chronic bilateral rib fracture  Pain control    Medical Decision Making:   I personally reviewed labs: CBC BMP  I personally reviewed imaging: CT chest  I personally reviewed EKG: Yes  Toxic drug monitoring: CBC while on heparin  Discussed case with: ED provider. After discussion I am in agreement that acuity of patient's medical condition necessitates hospital stay.      Code Status: Full code  DVT Prophylaxis: Heparin  Baseline: Wheelchair-bound   Subjective:   CHIEF COMPLAINT: Dizziness and fall    HISTORY OF PRESENT ILLNESS:     Candida Sherman is a

## 2024-05-16 NOTE — ED NOTES
Transfer RN requested this RN to obtain blood sugar. Insulin not given at this time due to meal trays not arriving within department.

## 2024-05-17 ENCOUNTER — APPOINTMENT (OUTPATIENT)
Facility: HOSPITAL | Age: 59
DRG: 045 | End: 2024-05-17
Payer: MEDICAID

## 2024-05-17 ENCOUNTER — APPOINTMENT (OUTPATIENT)
Facility: HOSPITAL | Age: 59
DRG: 045 | End: 2024-05-17
Attending: INTERNAL MEDICINE
Payer: MEDICAID

## 2024-05-17 PROBLEM — R42 DIZZINESS: Status: ACTIVE | Noted: 2024-05-17

## 2024-05-17 PROBLEM — I63.012 CEREBROVASCULAR ACCIDENT (CVA) DUE TO THROMBOSIS OF LEFT VERTEBRAL ARTERY (HCC): Status: ACTIVE | Noted: 2024-05-17

## 2024-05-17 LAB
ANION GAP SERPL CALC-SCNC: 11 MMOL/L (ref 5–15)
BASOPHILS # BLD: 0 K/UL (ref 0–0.1)
BASOPHILS NFR BLD: 1 % (ref 0–1)
BUN SERPL-MCNC: 27 MG/DL (ref 6–20)
BUN/CREAT SERPL: 5 (ref 12–20)
CALCIUM SERPL-MCNC: 9 MG/DL (ref 8.5–10.1)
CHLORIDE SERPL-SCNC: 94 MMOL/L (ref 97–108)
CO2 SERPL-SCNC: 27 MMOL/L (ref 21–32)
CREAT SERPL-MCNC: 5.77 MG/DL (ref 0.55–1.02)
DIFFERENTIAL METHOD BLD: ABNORMAL
ECHO AO ASC DIAM: 3 CM
ECHO AO ASCENDING AORTA INDEX: 1.42 CM/M2
ECHO AV CUSP MM: 2 CM
ECHO AV MEAN GRADIENT: 4 MMHG
ECHO AV MEAN VELOCITY: 1 M/S
ECHO AV PEAK GRADIENT: 8 MMHG
ECHO AV PEAK GRADIENT: 8 MMHG
ECHO AV PEAK VELOCITY: 1.4 M/S
ECHO AV PEAK VELOCITY: 1.4 M/S
ECHO AV VTI: 26.4 CM
ECHO BSA: 2.21 M2
ECHO LA DIAMETER INDEX: 1.51 CM/M2
ECHO LA DIAMETER: 3.2 CM
ECHO LV E' LATERAL VELOCITY: 7 CM/S
ECHO LV E' SEPTAL VELOCITY: 5 CM/S
ECHO LV EDV A2C: 84 ML
ECHO LV EDV A4C: 61 ML
ECHO LV EDV BP: 75 ML (ref 56–104)
ECHO LV EDV INDEX A4C: 29 ML/M2
ECHO LV EDV INDEX BP: 35 ML/M2
ECHO LV EDV NDEX A2C: 40 ML/M2
ECHO LV EJECTION FRACTION A2C: 70 %
ECHO LV EJECTION FRACTION A4C: 61 %
ECHO LV EJECTION FRACTION BIPLANE: 69 % (ref 55–100)
ECHO LV ESV A2C: 25 ML
ECHO LV ESV A4C: 24 ML
ECHO LV ESV BP: 24 ML (ref 19–49)
ECHO LV ESV INDEX A2C: 12 ML/M2
ECHO LV ESV INDEX A4C: 11 ML/M2
ECHO LV ESV INDEX BP: 11 ML/M2
ECHO LV FRACTIONAL SHORTENING: 40 % (ref 28–44)
ECHO LV INTERNAL DIMENSION DIASTOLE INDEX: 1.65 CM/M2
ECHO LV INTERNAL DIMENSION DIASTOLIC: 3.5 CM (ref 3.9–5.3)
ECHO LV INTERNAL DIMENSION SYSTOLIC INDEX: 0.99 CM/M2
ECHO LV INTERNAL DIMENSION SYSTOLIC: 2.1 CM
ECHO LV IVSD: 1.2 CM (ref 0.6–0.9)
ECHO LV MASS 2D: 135.8 G (ref 67–162)
ECHO LV MASS INDEX 2D: 64.1 G/M2 (ref 43–95)
ECHO LV POSTERIOR WALL DIASTOLIC: 1.2 CM (ref 0.6–0.9)
ECHO LV RELATIVE WALL THICKNESS RATIO: 0.69
ECHO LVOT AV VTI INDEX: 0.62
ECHO LVOT MEAN GRADIENT: 2 MMHG
ECHO LVOT PEAK GRADIENT: 4 MMHG
ECHO LVOT PEAK GRADIENT: 4 MMHG
ECHO LVOT PEAK VELOCITY: 1 M/S
ECHO LVOT PEAK VELOCITY: 1 M/S
ECHO LVOT VTI: 16.4 CM
ECHO MV A VELOCITY: 0.7 M/S
ECHO MV E DECELERATION TIME (DT): 193.4 MS
ECHO MV E VELOCITY: 0.9 M/S
ECHO MV E/A RATIO: 1.29
ECHO MV E/E' LATERAL: 12.86
ECHO MV E/E' RATIO (AVERAGED): 15.43
ECHO MV E/E' SEPTAL: 18
ECHO MV LVOT VTI INDEX: 1.41
ECHO MV MAX VELOCITY: 1.1 M/S
ECHO MV MEAN GRADIENT: 2 MMHG
ECHO MV MEAN VELOCITY: 0.7 M/S
ECHO MV PEAK GRADIENT: 5 MMHG
ECHO MV VTI: 23.1 CM
ECHO RV FREE WALL PEAK S': 8 CM/S
EOSINOPHIL # BLD: 0.1 K/UL (ref 0–0.4)
EOSINOPHIL NFR BLD: 1 % (ref 0–7)
ERYTHROCYTE [DISTWIDTH] IN BLOOD BY AUTOMATED COUNT: 15.9 % (ref 11.5–14.5)
GLUCOSE BLD STRIP.AUTO-MCNC: 245 MG/DL (ref 65–117)
GLUCOSE BLD STRIP.AUTO-MCNC: 309 MG/DL (ref 65–117)
GLUCOSE BLD STRIP.AUTO-MCNC: 318 MG/DL (ref 65–117)
GLUCOSE BLD STRIP.AUTO-MCNC: 433 MG/DL (ref 65–117)
GLUCOSE SERPL-MCNC: 226 MG/DL (ref 65–100)
HCT VFR BLD AUTO: 26 % (ref 35–47)
HGB BLD-MCNC: 8.6 G/DL (ref 11.5–16)
IMM GRANULOCYTES # BLD AUTO: 0.1 K/UL (ref 0–0.04)
IMM GRANULOCYTES NFR BLD AUTO: 1 % (ref 0–0.5)
LYMPHOCYTES # BLD: 1.5 K/UL (ref 0.8–3.5)
LYMPHOCYTES NFR BLD: 17 % (ref 12–49)
MCH RBC QN AUTO: 32.3 PG (ref 26–34)
MCHC RBC AUTO-ENTMCNC: 33.1 G/DL (ref 30–36.5)
MCV RBC AUTO: 97.7 FL (ref 80–99)
MONOCYTES # BLD: 0.6 K/UL (ref 0–1)
MONOCYTES NFR BLD: 7 % (ref 5–13)
NEUTS SEG # BLD: 6.4 K/UL (ref 1.8–8)
NEUTS SEG NFR BLD: 73 % (ref 32–75)
NRBC # BLD: 0.03 K/UL (ref 0–0.01)
NRBC BLD-RTO: 0.3 PER 100 WBC
PLATELET # BLD AUTO: 206 K/UL (ref 150–400)
PMV BLD AUTO: 10.2 FL (ref 8.9–12.9)
POTASSIUM SERPL-SCNC: 3.7 MMOL/L (ref 3.5–5.1)
RBC # BLD AUTO: 2.66 M/UL (ref 3.8–5.2)
SERVICE CMNT-IMP: ABNORMAL
SODIUM SERPL-SCNC: 132 MMOL/L (ref 136–145)
WBC # BLD AUTO: 8.7 K/UL (ref 3.6–11)

## 2024-05-17 PROCEDURE — 70544 MR ANGIOGRAPHY HEAD W/O DYE: CPT

## 2024-05-17 PROCEDURE — 99223 1ST HOSP IP/OBS HIGH 75: CPT | Performed by: PSYCHIATRY & NEUROLOGY

## 2024-05-17 PROCEDURE — 80048 BASIC METABOLIC PNL TOTAL CA: CPT

## 2024-05-17 PROCEDURE — 96375 TX/PRO/DX INJ NEW DRUG ADDON: CPT

## 2024-05-17 PROCEDURE — 2580000003 HC RX 258: Performed by: INTERNAL MEDICINE

## 2024-05-17 PROCEDURE — 6360000002 HC RX W HCPCS: Performed by: INTERNAL MEDICINE

## 2024-05-17 PROCEDURE — G0378 HOSPITAL OBSERVATION PER HR: HCPCS

## 2024-05-17 PROCEDURE — C8929 TTE W OR WO FOL WCON,DOPPLER: HCPCS

## 2024-05-17 PROCEDURE — 6370000000 HC RX 637 (ALT 250 FOR IP)

## 2024-05-17 PROCEDURE — 94761 N-INVAS EAR/PLS OXIMETRY MLT: CPT

## 2024-05-17 PROCEDURE — 6360000002 HC RX W HCPCS

## 2024-05-17 PROCEDURE — 6370000000 HC RX 637 (ALT 250 FOR IP): Performed by: INTERNAL MEDICINE

## 2024-05-17 PROCEDURE — 96376 TX/PRO/DX INJ SAME DRUG ADON: CPT

## 2024-05-17 PROCEDURE — 6360000002 HC RX W HCPCS: Performed by: HOSPITALIST

## 2024-05-17 PROCEDURE — 6360000004 HC RX CONTRAST MEDICATION: Performed by: STUDENT IN AN ORGANIZED HEALTH CARE EDUCATION/TRAINING PROGRAM

## 2024-05-17 PROCEDURE — 36415 COLL VENOUS BLD VENIPUNCTURE: CPT

## 2024-05-17 PROCEDURE — 96361 HYDRATE IV INFUSION ADD-ON: CPT

## 2024-05-17 PROCEDURE — 70551 MRI BRAIN STEM W/O DYE: CPT

## 2024-05-17 PROCEDURE — 82962 GLUCOSE BLOOD TEST: CPT

## 2024-05-17 PROCEDURE — 96372 THER/PROPH/DIAG INJ SC/IM: CPT

## 2024-05-17 PROCEDURE — 85025 COMPLETE CBC W/AUTO DIFF WBC: CPT

## 2024-05-17 PROCEDURE — 70547 MR ANGIOGRAPHY NECK W/O DYE: CPT

## 2024-05-17 PROCEDURE — 94640 AIRWAY INHALATION TREATMENT: CPT

## 2024-05-17 RX ORDER — LORAZEPAM 2 MG/ML
1 INJECTION INTRAMUSCULAR ONCE
Status: COMPLETED | OUTPATIENT
Start: 2024-05-17 | End: 2024-05-17

## 2024-05-17 RX ORDER — LIDOCAINE 4 G/G
1 PATCH TOPICAL DAILY
Status: DISCONTINUED | OUTPATIENT
Start: 2024-05-17 | End: 2024-05-28 | Stop reason: HOSPADM

## 2024-05-17 RX ORDER — LORAZEPAM 2 MG/ML
1 INJECTION INTRAMUSCULAR
Status: COMPLETED | OUTPATIENT
Start: 2024-05-17 | End: 2024-05-17

## 2024-05-17 RX ORDER — BENZONATATE 100 MG/1
100 CAPSULE ORAL 3 TIMES DAILY
Status: DISCONTINUED | OUTPATIENT
Start: 2024-05-17 | End: 2024-05-28 | Stop reason: HOSPADM

## 2024-05-17 RX ORDER — MECLIZINE HYDROCHLORIDE 25 MG/1
12.5 TABLET ORAL 3 TIMES DAILY
Status: DISCONTINUED | OUTPATIENT
Start: 2024-05-17 | End: 2024-05-28 | Stop reason: HOSPADM

## 2024-05-17 RX ORDER — INSULIN LISPRO 100 [IU]/ML
16 INJECTION, SOLUTION INTRAVENOUS; SUBCUTANEOUS
Status: DISCONTINUED | OUTPATIENT
Start: 2024-05-18 | End: 2024-05-28 | Stop reason: HOSPADM

## 2024-05-17 RX ADMIN — LORAZEPAM 1 MG: 2 INJECTION INTRAMUSCULAR; INTRAVENOUS at 20:21

## 2024-05-17 RX ADMIN — SEVELAMER CARBONATE 800 MG: 800 TABLET, FILM COATED ORAL at 11:39

## 2024-05-17 RX ADMIN — BUDESONIDE INHALATION 500 MCG: 0.5 SUSPENSION RESPIRATORY (INHALATION) at 07:09

## 2024-05-17 RX ADMIN — MECLIZINE HYDROCHLORIDE 12.5 MG: 25 TABLET ORAL at 21:58

## 2024-05-17 RX ADMIN — SODIUM CHLORIDE, PRESERVATIVE FREE 10 ML: 5 INJECTION INTRAVENOUS at 08:17

## 2024-05-17 RX ADMIN — ATORVASTATIN CALCIUM 80 MG: 40 TABLET, FILM COATED ORAL at 21:58

## 2024-05-17 RX ADMIN — INSULIN LISPRO 4 UNITS: 100 INJECTION, SOLUTION INTRAVENOUS; SUBCUTANEOUS at 21:59

## 2024-05-17 RX ADMIN — SEVELAMER CARBONATE 800 MG: 800 TABLET, FILM COATED ORAL at 08:17

## 2024-05-17 RX ADMIN — BUDESONIDE INHALATION 500 MCG: 0.5 SUSPENSION RESPIRATORY (INHALATION) at 19:41

## 2024-05-17 RX ADMIN — INSULIN LISPRO 2 UNITS: 100 INJECTION, SOLUTION INTRAVENOUS; SUBCUTANEOUS at 08:16

## 2024-05-17 RX ADMIN — HEPARIN SODIUM 5000 UNITS: 5000 INJECTION INTRAVENOUS; SUBCUTANEOUS at 05:21

## 2024-05-17 RX ADMIN — ASPIRIN 81 MG: 81 TABLET, COATED ORAL at 08:17

## 2024-05-17 RX ADMIN — MIDODRINE HYDROCHLORIDE 5 MG: 5 TABLET ORAL at 08:17

## 2024-05-17 RX ADMIN — PERFLUTREN 1.5 ML: 6.52 INJECTION, SUSPENSION INTRAVENOUS at 10:13

## 2024-05-17 RX ADMIN — SEVELAMER CARBONATE 800 MG: 800 TABLET, FILM COATED ORAL at 16:50

## 2024-05-17 RX ADMIN — LORAZEPAM 1 MG: 2 INJECTION INTRAMUSCULAR; INTRAVENOUS at 13:18

## 2024-05-17 RX ADMIN — HEPARIN SODIUM 5000 UNITS: 5000 INJECTION INTRAVENOUS; SUBCUTANEOUS at 14:29

## 2024-05-17 RX ADMIN — BENZONATATE 100 MG: 100 CAPSULE ORAL at 14:29

## 2024-05-17 RX ADMIN — MECLIZINE HYDROCHLORIDE 12.5 MG: 25 TABLET ORAL at 14:28

## 2024-05-17 RX ADMIN — INSULIN LISPRO 14 UNITS: 100 INJECTION, SOLUTION INTRAVENOUS; SUBCUTANEOUS at 11:42

## 2024-05-17 RX ADMIN — BENZONATATE 100 MG: 100 CAPSULE ORAL at 21:58

## 2024-05-17 RX ADMIN — INSULIN LISPRO 14 UNITS: 100 INJECTION, SOLUTION INTRAVENOUS; SUBCUTANEOUS at 08:16

## 2024-05-17 RX ADMIN — INSULIN GLARGINE 48 UNITS: 100 INJECTION, SOLUTION SUBCUTANEOUS at 21:59

## 2024-05-17 RX ADMIN — INSULIN LISPRO 8 UNITS: 100 INJECTION, SOLUTION INTRAVENOUS; SUBCUTANEOUS at 16:50

## 2024-05-17 RX ADMIN — MIDODRINE HYDROCHLORIDE 5 MG: 5 TABLET ORAL at 11:39

## 2024-05-17 RX ADMIN — CLOPIDOGREL BISULFATE 75 MG: 75 TABLET ORAL at 08:17

## 2024-05-17 RX ADMIN — AMITRIPTYLINE HYDROCHLORIDE 50 MG: 50 TABLET, FILM COATED ORAL at 21:58

## 2024-05-17 RX ADMIN — HEPARIN SODIUM 5000 UNITS: 5000 INJECTION INTRAVENOUS; SUBCUTANEOUS at 21:58

## 2024-05-17 RX ADMIN — INSULIN LISPRO 14 UNITS: 100 INJECTION, SOLUTION INTRAVENOUS; SUBCUTANEOUS at 16:51

## 2024-05-17 RX ADMIN — INSULIN LISPRO 6 UNITS: 100 INJECTION, SOLUTION INTRAVENOUS; SUBCUTANEOUS at 11:41

## 2024-05-17 RX ADMIN — MIDODRINE HYDROCHLORIDE 5 MG: 5 TABLET ORAL at 16:50

## 2024-05-17 RX ADMIN — SODIUM CHLORIDE, PRESERVATIVE FREE 5 ML: 5 INJECTION INTRAVENOUS at 21:59

## 2024-05-17 ASSESSMENT — PAIN SCALES - GENERAL
PAINLEVEL_OUTOF10: 0
PAINLEVEL_OUTOF10: 0

## 2024-05-17 NOTE — WOUND CARE
Wound care nurse consult for POA bilateral 4th and 5th toe wounds.    57 y/o female admitted for near syncope.  Past Medical History:   Diagnosis Date    Arthritis     Asthma     Cancer (HCC)     CHF (congestive heart failure) ([facility]) 2020    Chronic back pain     Chronic kidney disease     Diabetes (HCC)     Diabetic retinopathy (HCC)     Hypertension     Kidney failure 01/2022    Liver disease     MRSA (methicillin resistant staph aureus) culture positive     labial abscess, negative test 2020    Neuropathy     Sleep apnea     Appointment made but not attended    Stroke ([facility]) 2018     Past Surgical History:   Procedure Laterality Date    CARPAL TUNNEL RELEASE Right 1986    CT BIOPSY RENAL  1/10/2022    CT BIOPSY RENAL 1/10/2022 [facility] RAD CT    FRACTURE SURGERY Right     HUMERUS    HEENT      tonsillectomy    ORTHOPEDIC SURGERY      left ft bunionectomy x2    OTHER SURGICAL HISTORY      lanced labial abscess    TONSILLECTOMY      VASCULAR SURGERY      dialysis, rt upper chest     Patient gets [facility] for wounds to bilateral 4th and 5th toes.  Last admission aprox 2-3 weeks ago she only had right 5th toe wound from trauma, she has now developed left 5th toe wound and both 5th toe wounds affecting the 4th toe.  Diabetic toe wounds   Right 4th and 5th toe    Left 4th and 5th toes      Recommend:    Podiatry consult needed    Right and left 4th and 5th toe wounds: MWF, cleanse with wound cleanser spray and 4x4. Place a dry piece of silver alginate([employer]) between toes, cover with dry 4x4's and secure with rolled gauze and then and ace wrap.    JHONY BALDWIN RN, CWON      JHONY BALDWIN RN, CWON

## 2024-05-18 ENCOUNTER — TELEPHONE (OUTPATIENT)
Facility: HOSPITAL | Age: 59
End: 2024-05-18

## 2024-05-18 PROBLEM — I61.9 CVA (CEREBROVASCULAR ACCIDENT DUE TO INTRACEREBRAL HEMORRHAGE) (HCC): Status: ACTIVE | Noted: 2024-05-18

## 2024-05-18 LAB
ANION GAP SERPL CALC-SCNC: 8 MMOL/L (ref 5–15)
BASOPHILS # BLD: 0 K/UL (ref 0–0.1)
BASOPHILS NFR BLD: 1 % (ref 0–1)
BUN SERPL-MCNC: 22 MG/DL (ref 6–20)
BUN/CREAT SERPL: 5 (ref 12–20)
CALCIUM SERPL-MCNC: 9.1 MG/DL (ref 8.5–10.1)
CHLORIDE SERPL-SCNC: 96 MMOL/L (ref 97–108)
CHOLEST SERPL-MCNC: 97 MG/DL
CO2 SERPL-SCNC: 26 MMOL/L (ref 21–32)
COMMENT:: NORMAL
CREAT SERPL-MCNC: 4.52 MG/DL (ref 0.55–1.02)
DIFFERENTIAL METHOD BLD: ABNORMAL
EOSINOPHIL # BLD: 0.1 K/UL (ref 0–0.4)
EOSINOPHIL NFR BLD: 2 % (ref 0–7)
ERYTHROCYTE [DISTWIDTH] IN BLOOD BY AUTOMATED COUNT: 15.9 % (ref 11.5–14.5)
EST. AVERAGE GLUCOSE BLD GHB EST-MCNC: 214 MG/DL
GLUCOSE BLD STRIP.AUTO-MCNC: 193 MG/DL (ref 65–117)
GLUCOSE BLD STRIP.AUTO-MCNC: 246 MG/DL (ref 65–117)
GLUCOSE BLD STRIP.AUTO-MCNC: 287 MG/DL (ref 65–117)
GLUCOSE BLD STRIP.AUTO-MCNC: 312 MG/DL (ref 65–117)
GLUCOSE BLD STRIP.AUTO-MCNC: 337 MG/DL (ref 65–117)
GLUCOSE SERPL-MCNC: 230 MG/DL (ref 65–100)
HBA1C MFR BLD: 9.1 % (ref 4–5.6)
HCT VFR BLD AUTO: 25 % (ref 35–47)
HDLC SERPL-MCNC: 34 MG/DL
HDLC SERPL: 2.9 (ref 0–5)
HGB BLD-MCNC: 8.2 G/DL (ref 11.5–16)
IMM GRANULOCYTES # BLD AUTO: 0.1 K/UL (ref 0–0.04)
IMM GRANULOCYTES NFR BLD AUTO: 1 % (ref 0–0.5)
LDLC SERPL CALC-MCNC: 9.2 MG/DL (ref 0–100)
LYMPHOCYTES # BLD: 0.9 K/UL (ref 0.8–3.5)
LYMPHOCYTES NFR BLD: 15 % (ref 12–49)
MCH RBC QN AUTO: 32.4 PG (ref 26–34)
MCHC RBC AUTO-ENTMCNC: 32.8 G/DL (ref 30–36.5)
MCV RBC AUTO: 98.8 FL (ref 80–99)
MONOCYTES # BLD: 0.4 K/UL (ref 0–1)
MONOCYTES NFR BLD: 7 % (ref 5–13)
NEUTS SEG # BLD: 4.5 K/UL (ref 1.8–8)
NEUTS SEG NFR BLD: 74 % (ref 32–75)
NRBC # BLD: 0.03 K/UL (ref 0–0.01)
NRBC BLD-RTO: 0.5 PER 100 WBC
PLATELET # BLD AUTO: 183 K/UL (ref 150–400)
PMV BLD AUTO: 9.8 FL (ref 8.9–12.9)
POTASSIUM SERPL-SCNC: 3.8 MMOL/L (ref 3.5–5.1)
RBC # BLD AUTO: 2.53 M/UL (ref 3.8–5.2)
SERVICE CMNT-IMP: ABNORMAL
SODIUM SERPL-SCNC: 130 MMOL/L (ref 136–145)
SPECIMEN HOLD: NORMAL
TRIGL SERPL-MCNC: 269 MG/DL
VLDLC SERPL CALC-MCNC: 53.8 MG/DL
WBC # BLD AUTO: 6 K/UL (ref 3.6–11)

## 2024-05-18 PROCEDURE — 80061 LIPID PANEL: CPT

## 2024-05-18 PROCEDURE — 6370000000 HC RX 637 (ALT 250 FOR IP)

## 2024-05-18 PROCEDURE — 6370000000 HC RX 637 (ALT 250 FOR IP): Performed by: INTERNAL MEDICINE

## 2024-05-18 PROCEDURE — P9047 ALBUMIN (HUMAN), 25%, 50ML: HCPCS

## 2024-05-18 PROCEDURE — 6360000002 HC RX W HCPCS

## 2024-05-18 PROCEDURE — 5A1D70Z PERFORMANCE OF URINARY FILTRATION, INTERMITTENT, LESS THAN 6 HOURS PER DAY: ICD-10-PCS | Performed by: INTERNAL MEDICINE

## 2024-05-18 PROCEDURE — 94761 N-INVAS EAR/PLS OXIMETRY MLT: CPT

## 2024-05-18 PROCEDURE — 6360000002 HC RX W HCPCS: Performed by: INTERNAL MEDICINE

## 2024-05-18 PROCEDURE — 84443 ASSAY THYROID STIM HORMONE: CPT

## 2024-05-18 PROCEDURE — 82962 GLUCOSE BLOOD TEST: CPT

## 2024-05-18 PROCEDURE — 90935 HEMODIALYSIS ONE EVALUATION: CPT

## 2024-05-18 PROCEDURE — G0378 HOSPITAL OBSERVATION PER HR: HCPCS

## 2024-05-18 PROCEDURE — 80048 BASIC METABOLIC PNL TOTAL CA: CPT

## 2024-05-18 PROCEDURE — 36415 COLL VENOUS BLD VENIPUNCTURE: CPT

## 2024-05-18 PROCEDURE — 96366 THER/PROPH/DIAG IV INF ADDON: CPT

## 2024-05-18 PROCEDURE — 1100000003 HC PRIVATE W/ TELEMETRY

## 2024-05-18 PROCEDURE — 96372 THER/PROPH/DIAG INJ SC/IM: CPT

## 2024-05-18 PROCEDURE — 2580000003 HC RX 258: Performed by: INTERNAL MEDICINE

## 2024-05-18 PROCEDURE — 85025 COMPLETE CBC W/AUTO DIFF WBC: CPT

## 2024-05-18 PROCEDURE — 83036 HEMOGLOBIN GLYCOSYLATED A1C: CPT

## 2024-05-18 PROCEDURE — 94640 AIRWAY INHALATION TREATMENT: CPT

## 2024-05-18 PROCEDURE — 96365 THER/PROPH/DIAG IV INF INIT: CPT

## 2024-05-18 PROCEDURE — 99233 SBSQ HOSP IP/OBS HIGH 50: CPT | Performed by: INTERNAL MEDICINE

## 2024-05-18 RX ORDER — ALBUMIN (HUMAN) 12.5 G/50ML
SOLUTION INTRAVENOUS
Status: COMPLETED
Start: 2024-05-18 | End: 2024-05-18

## 2024-05-18 RX ORDER — ALBUMIN (HUMAN) 12.5 G/50ML
25 SOLUTION INTRAVENOUS PRN
Status: DISCONTINUED | OUTPATIENT
Start: 2024-05-18 | End: 2024-05-28 | Stop reason: HOSPADM

## 2024-05-18 RX ADMIN — SODIUM CHLORIDE, PRESERVATIVE FREE 10 ML: 5 INJECTION INTRAVENOUS at 20:25

## 2024-05-18 RX ADMIN — ALBUMIN (HUMAN) 25 G: 0.25 INJECTION, SOLUTION INTRAVENOUS at 10:05

## 2024-05-18 RX ADMIN — ATORVASTATIN CALCIUM 80 MG: 40 TABLET, FILM COATED ORAL at 20:24

## 2024-05-18 RX ADMIN — MECLIZINE HYDROCHLORIDE 12.5 MG: 25 TABLET ORAL at 20:25

## 2024-05-18 RX ADMIN — MIDODRINE HYDROCHLORIDE 5 MG: 5 TABLET ORAL at 07:27

## 2024-05-18 RX ADMIN — INSULIN LISPRO 4 UNITS: 100 INJECTION, SOLUTION INTRAVENOUS; SUBCUTANEOUS at 20:33

## 2024-05-18 RX ADMIN — INSULIN LISPRO 6 UNITS: 100 INJECTION, SOLUTION INTRAVENOUS; SUBCUTANEOUS at 19:10

## 2024-05-18 RX ADMIN — BENZONATATE 100 MG: 100 CAPSULE ORAL at 13:47

## 2024-05-18 RX ADMIN — MIDODRINE HYDROCHLORIDE 5 MG: 5 TABLET ORAL at 19:09

## 2024-05-18 RX ADMIN — INSULIN GLARGINE 48 UNITS: 100 INJECTION, SOLUTION SUBCUTANEOUS at 20:33

## 2024-05-18 RX ADMIN — HEPARIN SODIUM 5000 UNITS: 5000 INJECTION INTRAVENOUS; SUBCUTANEOUS at 06:29

## 2024-05-18 RX ADMIN — BUDESONIDE INHALATION 500 MCG: 0.5 SUSPENSION RESPIRATORY (INHALATION) at 20:12

## 2024-05-18 RX ADMIN — ASPIRIN 81 MG: 81 TABLET, COATED ORAL at 13:45

## 2024-05-18 RX ADMIN — ACETAMINOPHEN 650 MG: 325 TABLET ORAL at 13:45

## 2024-05-18 RX ADMIN — SEVELAMER CARBONATE 800 MG: 800 TABLET, FILM COATED ORAL at 13:45

## 2024-05-18 RX ADMIN — AMITRIPTYLINE HYDROCHLORIDE 50 MG: 50 TABLET, FILM COATED ORAL at 20:24

## 2024-05-18 RX ADMIN — BUDESONIDE INHALATION 500 MCG: 0.5 SUSPENSION RESPIRATORY (INHALATION) at 07:47

## 2024-05-18 RX ADMIN — MECLIZINE HYDROCHLORIDE 12.5 MG: 25 TABLET ORAL at 13:49

## 2024-05-18 RX ADMIN — TICAGRELOR 90 MG: 90 TABLET ORAL at 13:45

## 2024-05-18 RX ADMIN — BENZONATATE 100 MG: 100 CAPSULE ORAL at 20:25

## 2024-05-18 RX ADMIN — MIDODRINE HYDROCHLORIDE 5 MG: 5 TABLET ORAL at 13:54

## 2024-05-18 RX ADMIN — HEPARIN SODIUM 5000 UNITS: 5000 INJECTION INTRAVENOUS; SUBCUTANEOUS at 22:25

## 2024-05-18 RX ADMIN — TICAGRELOR 90 MG: 90 TABLET ORAL at 20:25

## 2024-05-18 RX ADMIN — HEPARIN SODIUM 5000 UNITS: 5000 INJECTION INTRAVENOUS; SUBCUTANEOUS at 13:54

## 2024-05-18 RX ADMIN — SODIUM CHLORIDE, PRESERVATIVE FREE 10 ML: 5 INJECTION INTRAVENOUS at 13:48

## 2024-05-18 RX ADMIN — SEVELAMER CARBONATE 800 MG: 800 TABLET, FILM COATED ORAL at 19:10

## 2024-05-18 ASSESSMENT — PAIN DESCRIPTION - LOCATION: LOCATION: RIB CAGE

## 2024-05-18 ASSESSMENT — PAIN SCALES - GENERAL: PAINLEVEL_OUTOF10: 6

## 2024-05-18 ASSESSMENT — PAIN DESCRIPTION - ORIENTATION: ORIENTATION: RIGHT

## 2024-05-19 LAB
ANION GAP SERPL CALC-SCNC: 7 MMOL/L (ref 5–15)
BASOPHILS # BLD: 0 K/UL (ref 0–0.1)
BASOPHILS NFR BLD: 1 % (ref 0–1)
BUN SERPL-MCNC: 30 MG/DL (ref 6–20)
BUN/CREAT SERPL: 5 (ref 12–20)
CALCIUM SERPL-MCNC: 9.4 MG/DL (ref 8.5–10.1)
CHLORIDE SERPL-SCNC: 92 MMOL/L (ref 97–108)
CO2 SERPL-SCNC: 27 MMOL/L (ref 21–32)
CREAT SERPL-MCNC: 5.66 MG/DL (ref 0.55–1.02)
DIFFERENTIAL METHOD BLD: ABNORMAL
EOSINOPHIL # BLD: 0.1 K/UL (ref 0–0.4)
EOSINOPHIL NFR BLD: 2 % (ref 0–7)
ERYTHROCYTE [DISTWIDTH] IN BLOOD BY AUTOMATED COUNT: 15.8 % (ref 11.5–14.5)
GLUCOSE BLD STRIP.AUTO-MCNC: 200 MG/DL (ref 65–117)
GLUCOSE BLD STRIP.AUTO-MCNC: 235 MG/DL (ref 65–117)
GLUCOSE BLD STRIP.AUTO-MCNC: 236 MG/DL (ref 65–117)
GLUCOSE BLD STRIP.AUTO-MCNC: 269 MG/DL (ref 65–117)
GLUCOSE SERPL-MCNC: 196 MG/DL (ref 65–100)
HCT VFR BLD AUTO: 25.8 % (ref 35–47)
HGB BLD-MCNC: 8.7 G/DL (ref 11.5–16)
IMM GRANULOCYTES # BLD AUTO: 0.1 K/UL (ref 0–0.04)
IMM GRANULOCYTES NFR BLD AUTO: 1 % (ref 0–0.5)
LYMPHOCYTES # BLD: 1.2 K/UL (ref 0.8–3.5)
LYMPHOCYTES NFR BLD: 16 % (ref 12–49)
MCH RBC QN AUTO: 32.3 PG (ref 26–34)
MCHC RBC AUTO-ENTMCNC: 33.7 G/DL (ref 30–36.5)
MCV RBC AUTO: 95.9 FL (ref 80–99)
MONOCYTES # BLD: 0.5 K/UL (ref 0–1)
MONOCYTES NFR BLD: 7 % (ref 5–13)
NEUTS SEG # BLD: 5.3 K/UL (ref 1.8–8)
NEUTS SEG NFR BLD: 73 % (ref 32–75)
NRBC # BLD: 0.06 K/UL (ref 0–0.01)
NRBC BLD-RTO: 0.8 PER 100 WBC
PLATELET # BLD AUTO: 195 K/UL (ref 150–400)
PMV BLD AUTO: 9.7 FL (ref 8.9–12.9)
POTASSIUM SERPL-SCNC: 4 MMOL/L (ref 3.5–5.1)
RBC # BLD AUTO: 2.69 M/UL (ref 3.8–5.2)
SERVICE CMNT-IMP: ABNORMAL
SODIUM SERPL-SCNC: 126 MMOL/L (ref 136–145)
WBC # BLD AUTO: 7.3 K/UL (ref 3.6–11)

## 2024-05-19 PROCEDURE — 97535 SELF CARE MNGMENT TRAINING: CPT

## 2024-05-19 PROCEDURE — 97161 PT EVAL LOW COMPLEX 20 MIN: CPT

## 2024-05-19 PROCEDURE — 97162 PT EVAL MOD COMPLEX 30 MIN: CPT

## 2024-05-19 PROCEDURE — 6370000000 HC RX 637 (ALT 250 FOR IP): Performed by: INTERNAL MEDICINE

## 2024-05-19 PROCEDURE — 82962 GLUCOSE BLOOD TEST: CPT

## 2024-05-19 PROCEDURE — 97166 OT EVAL MOD COMPLEX 45 MIN: CPT

## 2024-05-19 PROCEDURE — 6370000000 HC RX 637 (ALT 250 FOR IP)

## 2024-05-19 PROCEDURE — 80048 BASIC METABOLIC PNL TOTAL CA: CPT

## 2024-05-19 PROCEDURE — 2580000003 HC RX 258: Performed by: INTERNAL MEDICINE

## 2024-05-19 PROCEDURE — 36415 COLL VENOUS BLD VENIPUNCTURE: CPT

## 2024-05-19 PROCEDURE — 1100000000 HC RM PRIVATE

## 2024-05-19 PROCEDURE — 6360000002 HC RX W HCPCS: Performed by: INTERNAL MEDICINE

## 2024-05-19 PROCEDURE — 97530 THERAPEUTIC ACTIVITIES: CPT

## 2024-05-19 PROCEDURE — 94761 N-INVAS EAR/PLS OXIMETRY MLT: CPT

## 2024-05-19 PROCEDURE — 94640 AIRWAY INHALATION TREATMENT: CPT

## 2024-05-19 PROCEDURE — 85025 COMPLETE CBC W/AUTO DIFF WBC: CPT

## 2024-05-19 RX ADMIN — BENZONATATE 100 MG: 100 CAPSULE ORAL at 20:51

## 2024-05-19 RX ADMIN — HEPARIN SODIUM 5000 UNITS: 5000 INJECTION INTRAVENOUS; SUBCUTANEOUS at 14:01

## 2024-05-19 RX ADMIN — POLYETHYLENE GLYCOL 3350 17 G: 17 POWDER, FOR SOLUTION ORAL at 03:23

## 2024-05-19 RX ADMIN — BENZONATATE 100 MG: 100 CAPSULE ORAL at 09:30

## 2024-05-19 RX ADMIN — HEPARIN SODIUM 5000 UNITS: 5000 INJECTION INTRAVENOUS; SUBCUTANEOUS at 06:14

## 2024-05-19 RX ADMIN — INSULIN LISPRO 2 UNITS: 100 INJECTION, SOLUTION INTRAVENOUS; SUBCUTANEOUS at 12:27

## 2024-05-19 RX ADMIN — TICAGRELOR 90 MG: 90 TABLET ORAL at 09:30

## 2024-05-19 RX ADMIN — INSULIN LISPRO 16 UNITS: 100 INJECTION, SOLUTION INTRAVENOUS; SUBCUTANEOUS at 09:37

## 2024-05-19 RX ADMIN — AMITRIPTYLINE HYDROCHLORIDE 50 MG: 50 TABLET, FILM COATED ORAL at 20:50

## 2024-05-19 RX ADMIN — BUDESONIDE INHALATION 500 MCG: 0.5 SUSPENSION RESPIRATORY (INHALATION) at 19:38

## 2024-05-19 RX ADMIN — INSULIN LISPRO 16 UNITS: 100 INJECTION, SOLUTION INTRAVENOUS; SUBCUTANEOUS at 12:28

## 2024-05-19 RX ADMIN — MECLIZINE HYDROCHLORIDE 12.5 MG: 25 TABLET ORAL at 20:52

## 2024-05-19 RX ADMIN — SODIUM CHLORIDE, PRESERVATIVE FREE 10 ML: 5 INJECTION INTRAVENOUS at 20:52

## 2024-05-19 RX ADMIN — ASPIRIN 81 MG: 81 TABLET, COATED ORAL at 09:30

## 2024-05-19 RX ADMIN — MIDODRINE HYDROCHLORIDE 5 MG: 5 TABLET ORAL at 17:25

## 2024-05-19 RX ADMIN — SEVELAMER CARBONATE 800 MG: 800 TABLET, FILM COATED ORAL at 12:27

## 2024-05-19 RX ADMIN — BUDESONIDE INHALATION 500 MCG: 0.5 SUSPENSION RESPIRATORY (INHALATION) at 07:32

## 2024-05-19 RX ADMIN — MIDODRINE HYDROCHLORIDE 5 MG: 5 TABLET ORAL at 09:30

## 2024-05-19 RX ADMIN — INSULIN LISPRO 2 UNITS: 100 INJECTION, SOLUTION INTRAVENOUS; SUBCUTANEOUS at 09:29

## 2024-05-19 RX ADMIN — MECLIZINE HYDROCHLORIDE 12.5 MG: 25 TABLET ORAL at 09:30

## 2024-05-19 RX ADMIN — HEPARIN SODIUM 5000 UNITS: 5000 INJECTION INTRAVENOUS; SUBCUTANEOUS at 22:20

## 2024-05-19 RX ADMIN — INSULIN LISPRO 2 UNITS: 100 INJECTION, SOLUTION INTRAVENOUS; SUBCUTANEOUS at 17:25

## 2024-05-19 RX ADMIN — INSULIN GLARGINE 48 UNITS: 100 INJECTION, SOLUTION SUBCUTANEOUS at 20:53

## 2024-05-19 RX ADMIN — SEVELAMER CARBONATE 800 MG: 800 TABLET, FILM COATED ORAL at 17:25

## 2024-05-19 RX ADMIN — BENZONATATE 100 MG: 100 CAPSULE ORAL at 14:01

## 2024-05-19 RX ADMIN — SODIUM CHLORIDE, PRESERVATIVE FREE 10 ML: 5 INJECTION INTRAVENOUS at 09:32

## 2024-05-19 RX ADMIN — TICAGRELOR 90 MG: 90 TABLET ORAL at 20:52

## 2024-05-19 RX ADMIN — MIDODRINE HYDROCHLORIDE 5 MG: 5 TABLET ORAL at 12:27

## 2024-05-19 RX ADMIN — ATORVASTATIN CALCIUM 80 MG: 40 TABLET, FILM COATED ORAL at 20:51

## 2024-05-19 RX ADMIN — SEVELAMER CARBONATE 800 MG: 800 TABLET, FILM COATED ORAL at 09:30

## 2024-05-19 RX ADMIN — INSULIN LISPRO 16 UNITS: 100 INJECTION, SOLUTION INTRAVENOUS; SUBCUTANEOUS at 17:26

## 2024-05-19 RX ADMIN — MECLIZINE HYDROCHLORIDE 12.5 MG: 25 TABLET ORAL at 14:01

## 2024-05-19 ASSESSMENT — PAIN DESCRIPTION - LOCATION: LOCATION: RIB CAGE

## 2024-05-19 ASSESSMENT — PAIN SCALES - GENERAL: PAINLEVEL_OUTOF10: 4

## 2024-05-20 LAB
ANION GAP SERPL CALC-SCNC: 8 MMOL/L (ref 5–15)
BASOPHILS # BLD: 0 K/UL (ref 0–0.1)
BASOPHILS NFR BLD: 1 % (ref 0–1)
BUN SERPL-MCNC: 40 MG/DL (ref 6–20)
BUN/CREAT SERPL: 5 (ref 12–20)
CALCIUM SERPL-MCNC: 9.3 MG/DL (ref 8.5–10.1)
CHLORIDE SERPL-SCNC: 88 MMOL/L (ref 97–108)
CO2 SERPL-SCNC: 28 MMOL/L (ref 21–32)
CREAT SERPL-MCNC: 7.34 MG/DL (ref 0.55–1.02)
DIFFERENTIAL METHOD BLD: ABNORMAL
EOSINOPHIL # BLD: 0.2 K/UL (ref 0–0.4)
EOSINOPHIL NFR BLD: 2 % (ref 0–7)
ERYTHROCYTE [DISTWIDTH] IN BLOOD BY AUTOMATED COUNT: 15.9 % (ref 11.5–14.5)
GLUCOSE BLD STRIP.AUTO-MCNC: 206 MG/DL (ref 65–117)
GLUCOSE BLD STRIP.AUTO-MCNC: 215 MG/DL (ref 65–117)
GLUCOSE BLD STRIP.AUTO-MCNC: 237 MG/DL (ref 65–117)
GLUCOSE SERPL-MCNC: 156 MG/DL (ref 65–100)
HCT VFR BLD AUTO: 27.5 % (ref 35–47)
HGB BLD-MCNC: 9.4 G/DL (ref 11.5–16)
IMM GRANULOCYTES # BLD AUTO: 0.1 K/UL (ref 0–0.04)
IMM GRANULOCYTES NFR BLD AUTO: 2 % (ref 0–0.5)
LYMPHOCYTES # BLD: 1.2 K/UL (ref 0.8–3.5)
LYMPHOCYTES NFR BLD: 18 % (ref 12–49)
MCH RBC QN AUTO: 32 PG (ref 26–34)
MCHC RBC AUTO-ENTMCNC: 34.2 G/DL (ref 30–36.5)
MCV RBC AUTO: 93.5 FL (ref 80–99)
MONOCYTES # BLD: 0.4 K/UL (ref 0–1)
MONOCYTES NFR BLD: 6 % (ref 5–13)
NEUTS SEG # BLD: 4.7 K/UL (ref 1.8–8)
NEUTS SEG NFR BLD: 72 % (ref 32–75)
NRBC # BLD: 0.03 K/UL (ref 0–0.01)
NRBC BLD-RTO: 0.5 PER 100 WBC
PLATELET # BLD AUTO: 153 K/UL (ref 150–400)
POTASSIUM SERPL-SCNC: 4.1 MMOL/L (ref 3.5–5.1)
RBC # BLD AUTO: 2.94 M/UL (ref 3.8–5.2)
SERVICE CMNT-IMP: ABNORMAL
SODIUM SERPL-SCNC: 124 MMOL/L (ref 136–145)
WBC # BLD AUTO: 6.6 K/UL (ref 3.6–11)

## 2024-05-20 PROCEDURE — 6370000000 HC RX 637 (ALT 250 FOR IP): Performed by: INTERNAL MEDICINE

## 2024-05-20 PROCEDURE — 90935 HEMODIALYSIS ONE EVALUATION: CPT

## 2024-05-20 PROCEDURE — 2580000003 HC RX 258: Performed by: INTERNAL MEDICINE

## 2024-05-20 PROCEDURE — 6360000002 HC RX W HCPCS: Performed by: INTERNAL MEDICINE

## 2024-05-20 PROCEDURE — 80048 BASIC METABOLIC PNL TOTAL CA: CPT

## 2024-05-20 PROCEDURE — 94761 N-INVAS EAR/PLS OXIMETRY MLT: CPT

## 2024-05-20 PROCEDURE — 97535 SELF CARE MNGMENT TRAINING: CPT

## 2024-05-20 PROCEDURE — 6370000000 HC RX 637 (ALT 250 FOR IP)

## 2024-05-20 PROCEDURE — 36415 COLL VENOUS BLD VENIPUNCTURE: CPT

## 2024-05-20 PROCEDURE — 97530 THERAPEUTIC ACTIVITIES: CPT

## 2024-05-20 PROCEDURE — P9047 ALBUMIN (HUMAN), 25%, 50ML: HCPCS | Performed by: INTERNAL MEDICINE

## 2024-05-20 PROCEDURE — 85025 COMPLETE CBC W/AUTO DIFF WBC: CPT

## 2024-05-20 PROCEDURE — 1100000000 HC RM PRIVATE

## 2024-05-20 PROCEDURE — 94640 AIRWAY INHALATION TREATMENT: CPT

## 2024-05-20 PROCEDURE — 82962 GLUCOSE BLOOD TEST: CPT

## 2024-05-20 RX ORDER — GABAPENTIN 100 MG/1
100 CAPSULE ORAL 3 TIMES DAILY
Status: DISCONTINUED | OUTPATIENT
Start: 2024-05-20 | End: 2024-05-28 | Stop reason: HOSPADM

## 2024-05-20 RX ADMIN — HEPARIN SODIUM 5000 UNITS: 5000 INJECTION INTRAVENOUS; SUBCUTANEOUS at 21:12

## 2024-05-20 RX ADMIN — INSULIN LISPRO 16 UNITS: 100 INJECTION, SOLUTION INTRAVENOUS; SUBCUTANEOUS at 17:21

## 2024-05-20 RX ADMIN — BENZONATATE 100 MG: 100 CAPSULE ORAL at 15:01

## 2024-05-20 RX ADMIN — SODIUM CHLORIDE, PRESERVATIVE FREE 10 ML: 5 INJECTION INTRAVENOUS at 21:12

## 2024-05-20 RX ADMIN — BUDESONIDE INHALATION 500 MCG: 0.5 SUSPENSION RESPIRATORY (INHALATION) at 08:12

## 2024-05-20 RX ADMIN — HEPARIN SODIUM 5000 UNITS: 5000 INJECTION INTRAVENOUS; SUBCUTANEOUS at 05:45

## 2024-05-20 RX ADMIN — HEPARIN SODIUM 5000 UNITS: 5000 INJECTION INTRAVENOUS; SUBCUTANEOUS at 15:02

## 2024-05-20 RX ADMIN — MECLIZINE HYDROCHLORIDE 12.5 MG: 25 TABLET ORAL at 15:01

## 2024-05-20 RX ADMIN — INSULIN LISPRO 2 UNITS: 100 INJECTION, SOLUTION INTRAVENOUS; SUBCUTANEOUS at 17:21

## 2024-05-20 RX ADMIN — MECLIZINE HYDROCHLORIDE 12.5 MG: 25 TABLET ORAL at 08:25

## 2024-05-20 RX ADMIN — MIDODRINE HYDROCHLORIDE 5 MG: 5 TABLET ORAL at 08:25

## 2024-05-20 RX ADMIN — AMITRIPTYLINE HYDROCHLORIDE 50 MG: 50 TABLET, FILM COATED ORAL at 21:12

## 2024-05-20 RX ADMIN — HEPARIN SODIUM 2100 UNITS: 1000 INJECTION INTRAVENOUS; SUBCUTANEOUS at 13:57

## 2024-05-20 RX ADMIN — TICAGRELOR 90 MG: 90 TABLET ORAL at 21:11

## 2024-05-20 RX ADMIN — TICAGRELOR 90 MG: 90 TABLET ORAL at 08:25

## 2024-05-20 RX ADMIN — GABAPENTIN 100 MG: 100 CAPSULE ORAL at 17:20

## 2024-05-20 RX ADMIN — INSULIN LISPRO 2 UNITS: 100 INJECTION, SOLUTION INTRAVENOUS; SUBCUTANEOUS at 08:28

## 2024-05-20 RX ADMIN — SODIUM CHLORIDE, PRESERVATIVE FREE 10 ML: 5 INJECTION INTRAVENOUS at 08:27

## 2024-05-20 RX ADMIN — SEVELAMER CARBONATE 800 MG: 800 TABLET, FILM COATED ORAL at 17:21

## 2024-05-20 RX ADMIN — BUDESONIDE INHALATION 500 MCG: 0.5 SUSPENSION RESPIRATORY (INHALATION) at 20:23

## 2024-05-20 RX ADMIN — HEPARIN SODIUM 2000 UNITS: 1000 INJECTION INTRAVENOUS; SUBCUTANEOUS at 13:57

## 2024-05-20 RX ADMIN — MIDODRINE HYDROCHLORIDE 5 MG: 5 TABLET ORAL at 17:21

## 2024-05-20 RX ADMIN — GABAPENTIN 100 MG: 100 CAPSULE ORAL at 21:12

## 2024-05-20 RX ADMIN — ATORVASTATIN CALCIUM 80 MG: 40 TABLET, FILM COATED ORAL at 21:11

## 2024-05-20 RX ADMIN — ALBUMIN (HUMAN) 25 G: 0.25 INJECTION, SOLUTION INTRAVENOUS at 11:31

## 2024-05-20 RX ADMIN — BENZONATATE 100 MG: 100 CAPSULE ORAL at 08:25

## 2024-05-20 RX ADMIN — INSULIN LISPRO 16 UNITS: 100 INJECTION, SOLUTION INTRAVENOUS; SUBCUTANEOUS at 08:27

## 2024-05-20 RX ADMIN — BENZONATATE 100 MG: 100 CAPSULE ORAL at 21:12

## 2024-05-20 RX ADMIN — ASPIRIN 81 MG: 81 TABLET, COATED ORAL at 08:26

## 2024-05-20 RX ADMIN — INSULIN GLARGINE 48 UNITS: 100 INJECTION, SOLUTION SUBCUTANEOUS at 21:13

## 2024-05-20 RX ADMIN — MECLIZINE HYDROCHLORIDE 12.5 MG: 25 TABLET ORAL at 21:11

## 2024-05-20 RX ADMIN — SEVELAMER CARBONATE 800 MG: 800 TABLET, FILM COATED ORAL at 08:25

## 2024-05-21 LAB
ANION GAP SERPL CALC-SCNC: 7 MMOL/L (ref 5–15)
BASOPHILS # BLD: 0 K/UL (ref 0–0.1)
BASOPHILS NFR BLD: 0 % (ref 0–1)
BUN SERPL-MCNC: 28 MG/DL (ref 6–20)
BUN/CREAT SERPL: 5 (ref 12–20)
CALCIUM SERPL-MCNC: 9.3 MG/DL (ref 8.5–10.1)
CHLORIDE SERPL-SCNC: 93 MMOL/L (ref 97–108)
CO2 SERPL-SCNC: 27 MMOL/L (ref 21–32)
CREAT SERPL-MCNC: 5.44 MG/DL (ref 0.55–1.02)
DIFFERENTIAL METHOD BLD: ABNORMAL
EOSINOPHIL # BLD: 0.1 K/UL (ref 0–0.4)
EOSINOPHIL NFR BLD: 1 % (ref 0–7)
ERYTHROCYTE [DISTWIDTH] IN BLOOD BY AUTOMATED COUNT: 16.2 % (ref 11.5–14.5)
GLUCOSE BLD STRIP.AUTO-MCNC: 104 MG/DL (ref 65–117)
GLUCOSE BLD STRIP.AUTO-MCNC: 140 MG/DL (ref 65–117)
GLUCOSE BLD STRIP.AUTO-MCNC: 183 MG/DL (ref 65–117)
GLUCOSE BLD STRIP.AUTO-MCNC: 214 MG/DL (ref 65–117)
GLUCOSE SERPL-MCNC: 133 MG/DL (ref 65–100)
HBV SURFACE AB SER QL: NONREACTIVE
HBV SURFACE AB SER-ACNC: 5.18 MIU/ML
HBV SURFACE AG SER QL: 0.27 INDEX
HBV SURFACE AG SER QL: <0.1 INDEX
HBV SURFACE AG SER QL: NEGATIVE
HBV SURFACE AG SER QL: NEGATIVE
HCT VFR BLD AUTO: 23.4 % (ref 35–47)
HGB BLD-MCNC: 7.7 G/DL (ref 11.5–16)
IMM GRANULOCYTES # BLD AUTO: 0.1 K/UL (ref 0–0.04)
IMM GRANULOCYTES NFR BLD AUTO: 2 % (ref 0–0.5)
LYMPHOCYTES # BLD: 1.2 K/UL (ref 0.8–3.5)
LYMPHOCYTES NFR BLD: 17 % (ref 12–49)
MCH RBC QN AUTO: 32.2 PG (ref 26–34)
MCHC RBC AUTO-ENTMCNC: 32.9 G/DL (ref 30–36.5)
MCV RBC AUTO: 97.9 FL (ref 80–99)
MONOCYTES # BLD: 0.6 K/UL (ref 0–1)
MONOCYTES NFR BLD: 8 % (ref 5–13)
NEUTS SEG # BLD: 5.4 K/UL (ref 1.8–8)
NEUTS SEG NFR BLD: 72 % (ref 32–75)
NRBC # BLD: 0.03 K/UL (ref 0–0.01)
NRBC BLD-RTO: 0.4 PER 100 WBC
PLATELET # BLD AUTO: 185 K/UL (ref 150–400)
PMV BLD AUTO: 9.8 FL (ref 8.9–12.9)
POTASSIUM SERPL-SCNC: 4.5 MMOL/L (ref 3.5–5.1)
RBC # BLD AUTO: 2.39 M/UL (ref 3.8–5.2)
SERVICE CMNT-IMP: ABNORMAL
SERVICE CMNT-IMP: NORMAL
SODIUM SERPL-SCNC: 127 MMOL/L (ref 136–145)
TSH SERPL DL<=0.05 MIU/L-ACNC: 2.07 UIU/ML (ref 0.45–4.5)
WBC # BLD AUTO: 7.4 K/UL (ref 3.6–11)

## 2024-05-21 PROCEDURE — 6360000002 HC RX W HCPCS

## 2024-05-21 PROCEDURE — 85025 COMPLETE CBC W/AUTO DIFF WBC: CPT

## 2024-05-21 PROCEDURE — P9047 ALBUMIN (HUMAN), 25%, 50ML: HCPCS

## 2024-05-21 PROCEDURE — 6370000000 HC RX 637 (ALT 250 FOR IP): Performed by: INTERNAL MEDICINE

## 2024-05-21 PROCEDURE — 6360000002 HC RX W HCPCS: Performed by: INTERNAL MEDICINE

## 2024-05-21 PROCEDURE — 80048 BASIC METABOLIC PNL TOTAL CA: CPT

## 2024-05-21 PROCEDURE — 99223 1ST HOSP IP/OBS HIGH 75: CPT | Performed by: PODIATRIST

## 2024-05-21 PROCEDURE — 2580000003 HC RX 258: Performed by: INTERNAL MEDICINE

## 2024-05-21 PROCEDURE — 86706 HEP B SURFACE ANTIBODY: CPT

## 2024-05-21 PROCEDURE — 6370000000 HC RX 637 (ALT 250 FOR IP)

## 2024-05-21 PROCEDURE — 90935 HEMODIALYSIS ONE EVALUATION: CPT

## 2024-05-21 PROCEDURE — 87340 HEPATITIS B SURFACE AG IA: CPT

## 2024-05-21 PROCEDURE — 36415 COLL VENOUS BLD VENIPUNCTURE: CPT

## 2024-05-21 PROCEDURE — 82962 GLUCOSE BLOOD TEST: CPT

## 2024-05-21 PROCEDURE — 94761 N-INVAS EAR/PLS OXIMETRY MLT: CPT

## 2024-05-21 PROCEDURE — 1100000000 HC RM PRIVATE

## 2024-05-21 RX ORDER — ALBUMIN (HUMAN) 12.5 G/50ML
SOLUTION INTRAVENOUS
Status: COMPLETED
Start: 2024-05-21 | End: 2024-05-21

## 2024-05-21 RX ORDER — DIPHENHYDRAMINE HCL 25 MG
25 CAPSULE ORAL EVERY 6 HOURS PRN
Status: DISCONTINUED | OUTPATIENT
Start: 2024-05-21 | End: 2024-05-21

## 2024-05-21 RX ORDER — DIPHENHYDRAMINE HYDROCHLORIDE 50 MG/ML
25 INJECTION INTRAMUSCULAR; INTRAVENOUS EVERY 6 HOURS PRN
Status: DISCONTINUED | OUTPATIENT
Start: 2024-05-21 | End: 2024-05-28 | Stop reason: HOSPADM

## 2024-05-21 RX ORDER — HEPARIN SODIUM 1000 [USP'U]/ML
INJECTION, SOLUTION INTRAVENOUS; SUBCUTANEOUS
Status: DISPENSED
Start: 2024-05-21 | End: 2024-05-21

## 2024-05-21 RX ADMIN — SODIUM CHLORIDE, PRESERVATIVE FREE 5 ML: 5 INJECTION INTRAVENOUS at 21:15

## 2024-05-21 RX ADMIN — ATORVASTATIN CALCIUM 80 MG: 40 TABLET, FILM COATED ORAL at 21:13

## 2024-05-21 RX ADMIN — BENZONATATE 100 MG: 100 CAPSULE ORAL at 12:57

## 2024-05-21 RX ADMIN — SODIUM CHLORIDE, PRESERVATIVE FREE 10 ML: 5 INJECTION INTRAVENOUS at 13:04

## 2024-05-21 RX ADMIN — MECLIZINE HYDROCHLORIDE 12.5 MG: 25 TABLET ORAL at 21:13

## 2024-05-21 RX ADMIN — EPOETIN ALFA 4000 UNITS: 4000 SOLUTION INTRAVENOUS; SUBCUTANEOUS at 22:30

## 2024-05-21 RX ADMIN — DIPHENHYDRAMINE HYDROCHLORIDE 25 MG: 50 INJECTION, SOLUTION INTRAMUSCULAR; INTRAVENOUS at 21:20

## 2024-05-21 RX ADMIN — SEVELAMER CARBONATE 800 MG: 800 TABLET, FILM COATED ORAL at 16:59

## 2024-05-21 RX ADMIN — INSULIN LISPRO 16 UNITS: 100 INJECTION, SOLUTION INTRAVENOUS; SUBCUTANEOUS at 17:00

## 2024-05-21 RX ADMIN — GABAPENTIN 100 MG: 100 CAPSULE ORAL at 12:57

## 2024-05-21 RX ADMIN — ALBUMIN (HUMAN) 25 G: 0.25 INJECTION, SOLUTION INTRAVENOUS at 09:32

## 2024-05-21 RX ADMIN — MIDODRINE HYDROCHLORIDE 5 MG: 5 TABLET ORAL at 17:00

## 2024-05-21 RX ADMIN — BENZONATATE 100 MG: 100 CAPSULE ORAL at 21:13

## 2024-05-21 RX ADMIN — HEPARIN SODIUM 5000 UNITS: 5000 INJECTION INTRAVENOUS; SUBCUTANEOUS at 07:42

## 2024-05-21 RX ADMIN — SEVELAMER CARBONATE 800 MG: 800 TABLET, FILM COATED ORAL at 12:56

## 2024-05-21 RX ADMIN — HEPARIN SODIUM 5000 UNITS: 5000 INJECTION INTRAVENOUS; SUBCUTANEOUS at 13:00

## 2024-05-21 RX ADMIN — GABAPENTIN 100 MG: 100 CAPSULE ORAL at 21:12

## 2024-05-21 RX ADMIN — HEPARIN SODIUM 5000 UNITS: 5000 INJECTION INTRAVENOUS; SUBCUTANEOUS at 22:30

## 2024-05-21 RX ADMIN — AMITRIPTYLINE HYDROCHLORIDE 50 MG: 50 TABLET, FILM COATED ORAL at 21:12

## 2024-05-21 RX ADMIN — INSULIN GLARGINE 48 UNITS: 100 INJECTION, SOLUTION SUBCUTANEOUS at 21:14

## 2024-05-21 RX ADMIN — TICAGRELOR 90 MG: 90 TABLET ORAL at 21:14

## 2024-05-21 RX ADMIN — MIDODRINE HYDROCHLORIDE 5 MG: 5 TABLET ORAL at 12:58

## 2024-05-21 RX ADMIN — HEPARIN SODIUM 2000 UNITS: 1000 INJECTION INTRAVENOUS; SUBCUTANEOUS at 10:53

## 2024-05-21 RX ADMIN — MECLIZINE HYDROCHLORIDE 12.5 MG: 25 TABLET ORAL at 12:57

## 2024-05-21 RX ADMIN — DIPHENHYDRAMINE HYDROCHLORIDE 25 MG: 50 INJECTION, SOLUTION INTRAMUSCULAR; INTRAVENOUS at 13:27

## 2024-05-21 RX ADMIN — HEPARIN SODIUM 2100 UNITS: 1000 INJECTION INTRAVENOUS; SUBCUTANEOUS at 10:53

## 2024-05-21 ASSESSMENT — PAIN SCALES - GENERAL: PAINLEVEL_OUTOF10: 0

## 2024-05-22 PROBLEM — B99.9 INFECTION: Status: ACTIVE | Noted: 2024-05-22

## 2024-05-22 PROBLEM — R73.09 HEMOGLOBIN A1C GREATER THAN 9%, INDICATING POOR DIABETIC CONTROL: Status: ACTIVE | Noted: 2024-05-22

## 2024-05-22 LAB
ANION GAP SERPL CALC-SCNC: 7 MMOL/L (ref 5–15)
BASOPHILS # BLD: 0 K/UL (ref 0–0.1)
BASOPHILS NFR BLD: 1 % (ref 0–1)
BUN SERPL-MCNC: 17 MG/DL (ref 6–20)
BUN/CREAT SERPL: 4 (ref 12–20)
CALCIUM SERPL-MCNC: 9.1 MG/DL (ref 8.5–10.1)
CHLORIDE SERPL-SCNC: 88 MMOL/L (ref 97–108)
CO2 SERPL-SCNC: 32 MMOL/L (ref 21–32)
CREAT SERPL-MCNC: 3.9 MG/DL (ref 0.55–1.02)
DIFFERENTIAL METHOD BLD: ABNORMAL
EOSINOPHIL # BLD: 0.2 K/UL (ref 0–0.4)
EOSINOPHIL NFR BLD: 2 % (ref 0–7)
ERYTHROCYTE [DISTWIDTH] IN BLOOD BY AUTOMATED COUNT: 16.4 % (ref 11.5–14.5)
GLUCOSE BLD STRIP.AUTO-MCNC: 141 MG/DL (ref 65–117)
GLUCOSE BLD STRIP.AUTO-MCNC: 163 MG/DL (ref 65–117)
GLUCOSE BLD STRIP.AUTO-MCNC: 218 MG/DL (ref 65–117)
GLUCOSE BLD STRIP.AUTO-MCNC: 241 MG/DL (ref 65–117)
GLUCOSE SERPL-MCNC: 114 MG/DL (ref 65–100)
HCT VFR BLD AUTO: 24.5 % (ref 35–47)
HGB BLD-MCNC: 8 G/DL (ref 11.5–16)
IMM GRANULOCYTES # BLD AUTO: 0.1 K/UL (ref 0–0.04)
IMM GRANULOCYTES NFR BLD AUTO: 1 % (ref 0–0.5)
LYMPHOCYTES # BLD: 1.4 K/UL (ref 0.8–3.5)
LYMPHOCYTES NFR BLD: 18 % (ref 12–49)
MCH RBC QN AUTO: 31.7 PG (ref 26–34)
MCHC RBC AUTO-ENTMCNC: 32.7 G/DL (ref 30–36.5)
MCV RBC AUTO: 97.2 FL (ref 80–99)
MONOCYTES # BLD: 0.5 K/UL (ref 0–1)
MONOCYTES NFR BLD: 7 % (ref 5–13)
NEUTS SEG # BLD: 5.3 K/UL (ref 1.8–8)
NEUTS SEG NFR BLD: 71 % (ref 32–75)
NRBC # BLD: 0.02 K/UL (ref 0–0.01)
NRBC BLD-RTO: 0.3 PER 100 WBC
PLATELET # BLD AUTO: 186 K/UL (ref 150–400)
PMV BLD AUTO: 9.6 FL (ref 8.9–12.9)
POTASSIUM SERPL-SCNC: 4 MMOL/L (ref 3.5–5.1)
RBC # BLD AUTO: 2.52 M/UL (ref 3.8–5.2)
SERVICE CMNT-IMP: ABNORMAL
SODIUM SERPL-SCNC: 127 MMOL/L (ref 136–145)
WBC # BLD AUTO: 7.5 K/UL (ref 3.6–11)

## 2024-05-22 PROCEDURE — 6370000000 HC RX 637 (ALT 250 FOR IP): Performed by: INTERNAL MEDICINE

## 2024-05-22 PROCEDURE — 6360000002 HC RX W HCPCS: Performed by: INTERNAL MEDICINE

## 2024-05-22 PROCEDURE — 94761 N-INVAS EAR/PLS OXIMETRY MLT: CPT

## 2024-05-22 PROCEDURE — 97535 SELF CARE MNGMENT TRAINING: CPT

## 2024-05-22 PROCEDURE — 85025 COMPLETE CBC W/AUTO DIFF WBC: CPT

## 2024-05-22 PROCEDURE — 6370000000 HC RX 637 (ALT 250 FOR IP)

## 2024-05-22 PROCEDURE — 1100000000 HC RM PRIVATE

## 2024-05-22 PROCEDURE — 94640 AIRWAY INHALATION TREATMENT: CPT

## 2024-05-22 PROCEDURE — 36415 COLL VENOUS BLD VENIPUNCTURE: CPT

## 2024-05-22 PROCEDURE — 2580000003 HC RX 258: Performed by: INTERNAL MEDICINE

## 2024-05-22 PROCEDURE — 99221 1ST HOSP IP/OBS SF/LOW 40: CPT

## 2024-05-22 PROCEDURE — 80048 BASIC METABOLIC PNL TOTAL CA: CPT

## 2024-05-22 PROCEDURE — 82962 GLUCOSE BLOOD TEST: CPT

## 2024-05-22 RX ADMIN — TICAGRELOR 90 MG: 90 TABLET ORAL at 08:50

## 2024-05-22 RX ADMIN — BUDESONIDE INHALATION 500 MCG: 0.5 SUSPENSION RESPIRATORY (INHALATION) at 22:14

## 2024-05-22 RX ADMIN — BENZONATATE 100 MG: 100 CAPSULE ORAL at 23:21

## 2024-05-22 RX ADMIN — MECLIZINE HYDROCHLORIDE 12.5 MG: 25 TABLET ORAL at 13:18

## 2024-05-22 RX ADMIN — SEVELAMER CARBONATE 800 MG: 800 TABLET, FILM COATED ORAL at 12:12

## 2024-05-22 RX ADMIN — SODIUM CHLORIDE, PRESERVATIVE FREE 10 ML: 5 INJECTION INTRAVENOUS at 08:53

## 2024-05-22 RX ADMIN — BENZONATATE 100 MG: 100 CAPSULE ORAL at 13:17

## 2024-05-22 RX ADMIN — ATORVASTATIN CALCIUM 80 MG: 40 TABLET, FILM COATED ORAL at 23:23

## 2024-05-22 RX ADMIN — MECLIZINE HYDROCHLORIDE 12.5 MG: 25 TABLET ORAL at 23:21

## 2024-05-22 RX ADMIN — TICAGRELOR 90 MG: 90 TABLET ORAL at 23:23

## 2024-05-22 RX ADMIN — BUDESONIDE INHALATION 500 MCG: 0.5 SUSPENSION RESPIRATORY (INHALATION) at 08:41

## 2024-05-22 RX ADMIN — INSULIN LISPRO 2 UNITS: 100 INJECTION, SOLUTION INTRAVENOUS; SUBCUTANEOUS at 12:13

## 2024-05-22 RX ADMIN — SEVELAMER CARBONATE 800 MG: 800 TABLET, FILM COATED ORAL at 08:50

## 2024-05-22 RX ADMIN — MIDODRINE HYDROCHLORIDE 5 MG: 5 TABLET ORAL at 08:51

## 2024-05-22 RX ADMIN — DIPHENHYDRAMINE HYDROCHLORIDE 25 MG: 50 INJECTION, SOLUTION INTRAMUSCULAR; INTRAVENOUS at 06:52

## 2024-05-22 RX ADMIN — POLYETHYLENE GLYCOL 3350 17 G: 17 POWDER, FOR SOLUTION ORAL at 23:25

## 2024-05-22 RX ADMIN — MECLIZINE HYDROCHLORIDE 12.5 MG: 25 TABLET ORAL at 08:50

## 2024-05-22 RX ADMIN — INSULIN LISPRO 16 UNITS: 100 INJECTION, SOLUTION INTRAVENOUS; SUBCUTANEOUS at 12:12

## 2024-05-22 RX ADMIN — MIDODRINE HYDROCHLORIDE 5 MG: 5 TABLET ORAL at 12:12

## 2024-05-22 RX ADMIN — ASPIRIN 81 MG: 81 TABLET, COATED ORAL at 08:50

## 2024-05-22 RX ADMIN — HEPARIN SODIUM 5000 UNITS: 5000 INJECTION INTRAVENOUS; SUBCUTANEOUS at 06:13

## 2024-05-22 RX ADMIN — INSULIN GLARGINE 48 UNITS: 100 INJECTION, SOLUTION SUBCUTANEOUS at 23:19

## 2024-05-22 RX ADMIN — GABAPENTIN 100 MG: 100 CAPSULE ORAL at 13:17

## 2024-05-22 RX ADMIN — GABAPENTIN 100 MG: 100 CAPSULE ORAL at 23:23

## 2024-05-22 RX ADMIN — SODIUM CHLORIDE, PRESERVATIVE FREE 10 ML: 5 INJECTION INTRAVENOUS at 23:24

## 2024-05-22 RX ADMIN — BENZONATATE 100 MG: 100 CAPSULE ORAL at 08:50

## 2024-05-22 RX ADMIN — HEPARIN SODIUM 5000 UNITS: 5000 INJECTION INTRAVENOUS; SUBCUTANEOUS at 13:18

## 2024-05-22 RX ADMIN — DIPHENHYDRAMINE HYDROCHLORIDE 25 MG: 50 INJECTION, SOLUTION INTRAMUSCULAR; INTRAVENOUS at 12:10

## 2024-05-22 RX ADMIN — MIDODRINE HYDROCHLORIDE 5 MG: 5 TABLET ORAL at 17:12

## 2024-05-22 RX ADMIN — ACETAMINOPHEN 650 MG: 325 TABLET ORAL at 06:10

## 2024-05-22 RX ADMIN — GABAPENTIN 100 MG: 100 CAPSULE ORAL at 08:50

## 2024-05-22 RX ADMIN — AMITRIPTYLINE HYDROCHLORIDE 50 MG: 50 TABLET, FILM COATED ORAL at 23:24

## 2024-05-22 RX ADMIN — HEPARIN SODIUM 5000 UNITS: 5000 INJECTION INTRAVENOUS; SUBCUTANEOUS at 23:20

## 2024-05-22 RX ADMIN — SEVELAMER CARBONATE 800 MG: 800 TABLET, FILM COATED ORAL at 17:12

## 2024-05-22 ASSESSMENT — PAIN SCALES - GENERAL
PAINLEVEL_OUTOF10: 0
PAINLEVEL_OUTOF10: 7
PAINLEVEL_OUTOF10: 0

## 2024-05-22 ASSESSMENT — PAIN DESCRIPTION - DESCRIPTORS: DESCRIPTORS: ACHING

## 2024-05-22 ASSESSMENT — PAIN DESCRIPTION - ONSET: ONSET: GRADUAL

## 2024-05-22 ASSESSMENT — PAIN DESCRIPTION - PAIN TYPE: TYPE: ACUTE PAIN

## 2024-05-22 ASSESSMENT — PAIN DESCRIPTION - ORIENTATION: ORIENTATION: RIGHT;LOWER

## 2024-05-22 ASSESSMENT — PAIN DESCRIPTION - LOCATION: LOCATION: RIB CAGE

## 2024-05-22 ASSESSMENT — PAIN - FUNCTIONAL ASSESSMENT: PAIN_FUNCTIONAL_ASSESSMENT: ACTIVITIES ARE NOT PREVENTED

## 2024-05-22 ASSESSMENT — PAIN DESCRIPTION - FREQUENCY: FREQUENCY: INTERMITTENT

## 2024-05-22 NOTE — DIABETES MGMT
[facility]  DIABETES MANAGEMENT CONSULT    Consulted by Provider for advanced nursing evaluation and care for inpatient blood glucose management.    Evaluation and Action Plan   Candida Sherman is a 58 year old female patient with a hx of uncontrolled type 2 diabetes. The patient reportedly takes 48 units Lantus at home and 15 units Humalog with each meal. The patient was admitted after a near syncope episode after dialysis treatment. The patient reports worsening dizziness for several weeks. She reports consistency with her insulin regimen. However A1c remains elevated. I suspect the patient's diet is the culprit. She reports eating snacks such as chips and drinking sugary beverages.The patient may benefit from outpatient diabetes education. It is also note the patient has bilateral foots wounds , which also can elevate BG's. The patient encouraged to wear hard sole shoes and check feet ( with assistance if needed) often. Bg's are stable shyam the current insulin dosing. No changes recommended.     Management Rationale Action Plan   Medication   Basal needs Using Home dose  Continue 48 units Lantus daily   Nutritional needs Using Home dose Continue to use 16 units Humalog with each meal   Corrective insulin Using medium sensitivity based on weight    Additional orders          Diabetes Discharge Plan   Medication  Resume PTA insulin dosing and follow-up with PCP for future diabetes mangement   Referral  [x]        Outpatient diabetes education   Additional orders            Initial Presentation   Candida Sherman is a 58 y.o. female admitted  after experiencing near syncope. The patient reports increased dizziness for several weeks.  LAB: Glucose 347. Creatine 4.25. GFR 12. A1c 9.1%    CT:Narrative & Impression  INDICATION: Fall, bilateral rib pain     COMPARISON: Chest radiograph April 2022     CONTRAST: None.     TECHNIQUE:  5 mm axial images were obtained through the chest. Coronal

## 2024-05-23 LAB
ANION GAP SERPL CALC-SCNC: 9 MMOL/L (ref 5–15)
BASOPHILS # BLD: 0 K/UL (ref 0–0.1)
BASOPHILS NFR BLD: 0 % (ref 0–1)
BUN SERPL-MCNC: 31 MG/DL (ref 6–20)
BUN/CREAT SERPL: 6 (ref 12–20)
CALCIUM SERPL-MCNC: 9.2 MG/DL (ref 8.5–10.1)
CHLORIDE SERPL-SCNC: 84 MMOL/L (ref 97–108)
CO2 SERPL-SCNC: 27 MMOL/L (ref 21–32)
CREAT SERPL-MCNC: 5.56 MG/DL (ref 0.55–1.02)
DIFFERENTIAL METHOD BLD: ABNORMAL
EOSINOPHIL # BLD: 0.1 K/UL (ref 0–0.4)
EOSINOPHIL NFR BLD: 1 % (ref 0–7)
ERYTHROCYTE [DISTWIDTH] IN BLOOD BY AUTOMATED COUNT: 15.9 % (ref 11.5–14.5)
GLUCOSE BLD STRIP.AUTO-MCNC: 113 MG/DL (ref 65–117)
GLUCOSE BLD STRIP.AUTO-MCNC: 115 MG/DL (ref 65–117)
GLUCOSE BLD STRIP.AUTO-MCNC: 195 MG/DL (ref 65–117)
GLUCOSE BLD STRIP.AUTO-MCNC: 222 MG/DL (ref 65–117)
GLUCOSE SERPL-MCNC: 163 MG/DL (ref 65–100)
HCT VFR BLD AUTO: 24 % (ref 35–47)
HGB BLD-MCNC: 8 G/DL (ref 11.5–16)
IMM GRANULOCYTES # BLD AUTO: 0.1 K/UL (ref 0–0.04)
IMM GRANULOCYTES NFR BLD AUTO: 1 % (ref 0–0.5)
LYMPHOCYTES # BLD: 1.2 K/UL (ref 0.8–3.5)
LYMPHOCYTES NFR BLD: 16 % (ref 12–49)
MCH RBC QN AUTO: 32.3 PG (ref 26–34)
MCHC RBC AUTO-ENTMCNC: 33.3 G/DL (ref 30–36.5)
MCV RBC AUTO: 96.8 FL (ref 80–99)
MONOCYTES # BLD: 0.5 K/UL (ref 0–1)
MONOCYTES NFR BLD: 6 % (ref 5–13)
NEUTS SEG # BLD: 6 K/UL (ref 1.8–8)
NEUTS SEG NFR BLD: 76 % (ref 32–75)
NRBC # BLD: 0 K/UL (ref 0–0.01)
NRBC BLD-RTO: 0 PER 100 WBC
PLATELET # BLD AUTO: 173 K/UL (ref 150–400)
PMV BLD AUTO: 9.5 FL (ref 8.9–12.9)
POTASSIUM SERPL-SCNC: 4.7 MMOL/L (ref 3.5–5.1)
RBC # BLD AUTO: 2.48 M/UL (ref 3.8–5.2)
SERVICE CMNT-IMP: ABNORMAL
SERVICE CMNT-IMP: ABNORMAL
SERVICE CMNT-IMP: NORMAL
SERVICE CMNT-IMP: NORMAL
SODIUM SERPL-SCNC: 120 MMOL/L (ref 136–145)
WBC # BLD AUTO: 8 K/UL (ref 3.6–11)

## 2024-05-23 PROCEDURE — 6360000002 HC RX W HCPCS: Performed by: INTERNAL MEDICINE

## 2024-05-23 PROCEDURE — 82962 GLUCOSE BLOOD TEST: CPT

## 2024-05-23 PROCEDURE — 94640 AIRWAY INHALATION TREATMENT: CPT

## 2024-05-23 PROCEDURE — 6370000000 HC RX 637 (ALT 250 FOR IP)

## 2024-05-23 PROCEDURE — 80048 BASIC METABOLIC PNL TOTAL CA: CPT

## 2024-05-23 PROCEDURE — 36415 COLL VENOUS BLD VENIPUNCTURE: CPT

## 2024-05-23 PROCEDURE — 6370000000 HC RX 637 (ALT 250 FOR IP): Performed by: INTERNAL MEDICINE

## 2024-05-23 PROCEDURE — 2580000003 HC RX 258: Performed by: INTERNAL MEDICINE

## 2024-05-23 PROCEDURE — 90935 HEMODIALYSIS ONE EVALUATION: CPT

## 2024-05-23 PROCEDURE — 94761 N-INVAS EAR/PLS OXIMETRY MLT: CPT

## 2024-05-23 PROCEDURE — 85025 COMPLETE CBC W/AUTO DIFF WBC: CPT

## 2024-05-23 PROCEDURE — 1100000000 HC RM PRIVATE

## 2024-05-23 RX ORDER — HEPARIN SODIUM 1000 [USP'U]/ML
INJECTION, SOLUTION INTRAVENOUS; SUBCUTANEOUS
Status: DISPENSED
Start: 2024-05-23 | End: 2024-05-23

## 2024-05-23 RX ADMIN — SODIUM CHLORIDE, PRESERVATIVE FREE 5 ML: 5 INJECTION INTRAVENOUS at 21:52

## 2024-05-23 RX ADMIN — ACETAMINOPHEN 650 MG: 325 TABLET ORAL at 04:03

## 2024-05-23 RX ADMIN — MIDODRINE HYDROCHLORIDE 5 MG: 5 TABLET ORAL at 13:09

## 2024-05-23 RX ADMIN — SEVELAMER CARBONATE 800 MG: 800 TABLET, FILM COATED ORAL at 13:08

## 2024-05-23 RX ADMIN — DIPHENHYDRAMINE HYDROCHLORIDE 25 MG: 50 INJECTION, SOLUTION INTRAMUSCULAR; INTRAVENOUS at 00:24

## 2024-05-23 RX ADMIN — SODIUM CHLORIDE, PRESERVATIVE FREE 10 ML: 5 INJECTION INTRAVENOUS at 13:23

## 2024-05-23 RX ADMIN — HEPARIN SODIUM 2000 UNITS: 1000 INJECTION INTRAVENOUS; SUBCUTANEOUS at 11:55

## 2024-05-23 RX ADMIN — TICAGRELOR 90 MG: 90 TABLET ORAL at 13:19

## 2024-05-23 RX ADMIN — INSULIN LISPRO 16 UNITS: 100 INJECTION, SOLUTION INTRAVENOUS; SUBCUTANEOUS at 13:08

## 2024-05-23 RX ADMIN — INSULIN GLARGINE 48 UNITS: 100 INJECTION, SOLUTION SUBCUTANEOUS at 21:52

## 2024-05-23 RX ADMIN — INSULIN LISPRO 16 UNITS: 100 INJECTION, SOLUTION INTRAVENOUS; SUBCUTANEOUS at 16:45

## 2024-05-23 RX ADMIN — BUDESONIDE INHALATION 500 MCG: 0.5 SUSPENSION RESPIRATORY (INHALATION) at 21:14

## 2024-05-23 RX ADMIN — HEPARIN SODIUM 2100 UNITS: 1000 INJECTION INTRAVENOUS; SUBCUTANEOUS at 11:55

## 2024-05-23 RX ADMIN — GABAPENTIN 100 MG: 100 CAPSULE ORAL at 13:08

## 2024-05-23 RX ADMIN — BENZONATATE 100 MG: 100 CAPSULE ORAL at 21:52

## 2024-05-23 RX ADMIN — MECLIZINE HYDROCHLORIDE 12.5 MG: 25 TABLET ORAL at 21:52

## 2024-05-23 RX ADMIN — EPOETIN ALFA 4000 UNITS: 4000 SOLUTION INTRAVENOUS; SUBCUTANEOUS at 21:53

## 2024-05-23 RX ADMIN — TICAGRELOR 90 MG: 90 TABLET ORAL at 21:52

## 2024-05-23 RX ADMIN — AMITRIPTYLINE HYDROCHLORIDE 50 MG: 50 TABLET, FILM COATED ORAL at 21:52

## 2024-05-23 RX ADMIN — DIPHENHYDRAMINE HYDROCHLORIDE 25 MG: 50 INJECTION, SOLUTION INTRAMUSCULAR; INTRAVENOUS at 13:09

## 2024-05-23 RX ADMIN — HEPARIN SODIUM 5000 UNITS: 5000 INJECTION INTRAVENOUS; SUBCUTANEOUS at 06:07

## 2024-05-23 RX ADMIN — HEPARIN SODIUM 5000 UNITS: 5000 INJECTION INTRAVENOUS; SUBCUTANEOUS at 13:09

## 2024-05-23 RX ADMIN — MIDODRINE HYDROCHLORIDE 5 MG: 5 TABLET ORAL at 16:45

## 2024-05-23 RX ADMIN — GABAPENTIN 100 MG: 100 CAPSULE ORAL at 21:52

## 2024-05-23 RX ADMIN — SEVELAMER CARBONATE 800 MG: 800 TABLET, FILM COATED ORAL at 16:45

## 2024-05-23 RX ADMIN — MECLIZINE HYDROCHLORIDE 12.5 MG: 25 TABLET ORAL at 13:09

## 2024-05-23 RX ADMIN — HEPARIN SODIUM 5000 UNITS: 5000 INJECTION INTRAVENOUS; SUBCUTANEOUS at 21:52

## 2024-05-23 RX ADMIN — BENZONATATE 100 MG: 100 CAPSULE ORAL at 13:09

## 2024-05-23 RX ADMIN — ATORVASTATIN CALCIUM 80 MG: 40 TABLET, FILM COATED ORAL at 21:51

## 2024-05-23 ASSESSMENT — PAIN DESCRIPTION - DESCRIPTORS: DESCRIPTORS: ACHING

## 2024-05-23 ASSESSMENT — PAIN SCALES - GENERAL
PAINLEVEL_OUTOF10: 7
PAINLEVEL_OUTOF10: 4

## 2024-05-23 ASSESSMENT — PAIN DESCRIPTION - LOCATION: LOCATION: HEAD

## 2024-05-23 ASSESSMENT — PAIN - FUNCTIONAL ASSESSMENT: PAIN_FUNCTIONAL_ASSESSMENT: ACTIVITIES ARE NOT PREVENTED

## 2024-05-23 ASSESSMENT — PAIN DESCRIPTION - ORIENTATION: ORIENTATION: POSTERIOR

## 2024-05-23 ASSESSMENT — PULMONARY FUNCTION TESTS: PEFR_L/MIN: 20

## 2024-05-24 LAB
ANION GAP SERPL CALC-SCNC: 5 MMOL/L (ref 5–15)
BASOPHILS # BLD: 0 K/UL (ref 0–0.1)
BASOPHILS NFR BLD: 0 % (ref 0–1)
BUN SERPL-MCNC: 22 MG/DL (ref 6–20)
BUN/CREAT SERPL: 5 (ref 12–20)
CALCIUM SERPL-MCNC: 8.6 MG/DL (ref 8.5–10.1)
CHLORIDE SERPL-SCNC: 90 MMOL/L (ref 97–108)
CO2 SERPL-SCNC: 32 MMOL/L (ref 21–32)
CREAT SERPL-MCNC: 4.19 MG/DL (ref 0.55–1.02)
DIFFERENTIAL METHOD BLD: ABNORMAL
EOSINOPHIL # BLD: 0.1 K/UL (ref 0–0.4)
EOSINOPHIL NFR BLD: 2 % (ref 0–7)
ERYTHROCYTE [DISTWIDTH] IN BLOOD BY AUTOMATED COUNT: 15.9 % (ref 11.5–14.5)
GLUCOSE BLD STRIP.AUTO-MCNC: 130 MG/DL (ref 65–117)
GLUCOSE BLD STRIP.AUTO-MCNC: 164 MG/DL (ref 65–117)
GLUCOSE BLD STRIP.AUTO-MCNC: 196 MG/DL (ref 65–117)
GLUCOSE BLD STRIP.AUTO-MCNC: 240 MG/DL (ref 65–117)
GLUCOSE SERPL-MCNC: 120 MG/DL (ref 65–100)
HCT VFR BLD AUTO: 22.3 % (ref 35–47)
HGB BLD-MCNC: 7.6 G/DL (ref 11.5–16)
IMM GRANULOCYTES # BLD AUTO: 0.1 K/UL (ref 0–0.04)
IMM GRANULOCYTES NFR BLD AUTO: 1 % (ref 0–0.5)
LYMPHOCYTES # BLD: 1 K/UL (ref 0.8–3.5)
LYMPHOCYTES NFR BLD: 17 % (ref 12–49)
MCH RBC QN AUTO: 33 PG (ref 26–34)
MCHC RBC AUTO-ENTMCNC: 34.1 G/DL (ref 30–36.5)
MCV RBC AUTO: 97 FL (ref 80–99)
MONOCYTES # BLD: 0.5 K/UL (ref 0–1)
MONOCYTES NFR BLD: 8 % (ref 5–13)
NEUTS SEG # BLD: 4.3 K/UL (ref 1.8–8)
NEUTS SEG NFR BLD: 72 % (ref 32–75)
NRBC # BLD: 0 K/UL (ref 0–0.01)
NRBC BLD-RTO: 0 PER 100 WBC
PLATELET # BLD AUTO: 165 K/UL (ref 150–400)
PMV BLD AUTO: 9.7 FL (ref 8.9–12.9)
POTASSIUM SERPL-SCNC: 4.5 MMOL/L (ref 3.5–5.1)
RBC # BLD AUTO: 2.3 M/UL (ref 3.8–5.2)
SERVICE CMNT-IMP: ABNORMAL
SODIUM SERPL-SCNC: 127 MMOL/L (ref 136–145)
WBC # BLD AUTO: 5.8 K/UL (ref 3.6–11)

## 2024-05-24 PROCEDURE — 6370000000 HC RX 637 (ALT 250 FOR IP): Performed by: INTERNAL MEDICINE

## 2024-05-24 PROCEDURE — 97116 GAIT TRAINING THERAPY: CPT

## 2024-05-24 PROCEDURE — 80048 BASIC METABOLIC PNL TOTAL CA: CPT

## 2024-05-24 PROCEDURE — 6360000002 HC RX W HCPCS: Performed by: INTERNAL MEDICINE

## 2024-05-24 PROCEDURE — 82962 GLUCOSE BLOOD TEST: CPT

## 2024-05-24 PROCEDURE — 2580000003 HC RX 258: Performed by: INTERNAL MEDICINE

## 2024-05-24 PROCEDURE — 1100000000 HC RM PRIVATE

## 2024-05-24 PROCEDURE — 94640 AIRWAY INHALATION TREATMENT: CPT

## 2024-05-24 PROCEDURE — 99232 SBSQ HOSP IP/OBS MODERATE 35: CPT | Performed by: PODIATRIST

## 2024-05-24 PROCEDURE — 36415 COLL VENOUS BLD VENIPUNCTURE: CPT

## 2024-05-24 PROCEDURE — 97535 SELF CARE MNGMENT TRAINING: CPT

## 2024-05-24 PROCEDURE — 94761 N-INVAS EAR/PLS OXIMETRY MLT: CPT

## 2024-05-24 PROCEDURE — 6370000000 HC RX 637 (ALT 250 FOR IP)

## 2024-05-24 PROCEDURE — 85025 COMPLETE CBC W/AUTO DIFF WBC: CPT

## 2024-05-24 PROCEDURE — 97530 THERAPEUTIC ACTIVITIES: CPT

## 2024-05-24 RX ORDER — DOCUSATE SODIUM 100 MG/1
100 CAPSULE, LIQUID FILLED ORAL 2 TIMES DAILY
Status: DISCONTINUED | OUTPATIENT
Start: 2024-05-24 | End: 2024-05-28 | Stop reason: HOSPADM

## 2024-05-24 RX ADMIN — DIPHENHYDRAMINE HYDROCHLORIDE 25 MG: 50 INJECTION, SOLUTION INTRAMUSCULAR; INTRAVENOUS at 03:05

## 2024-05-24 RX ADMIN — INSULIN LISPRO 2 UNITS: 100 INJECTION, SOLUTION INTRAVENOUS; SUBCUTANEOUS at 12:06

## 2024-05-24 RX ADMIN — SEVELAMER CARBONATE 800 MG: 800 TABLET, FILM COATED ORAL at 09:06

## 2024-05-24 RX ADMIN — MIDODRINE HYDROCHLORIDE 5 MG: 5 TABLET ORAL at 12:04

## 2024-05-24 RX ADMIN — MECLIZINE HYDROCHLORIDE 12.5 MG: 25 TABLET ORAL at 20:59

## 2024-05-24 RX ADMIN — ACETAMINOPHEN 650 MG: 325 TABLET ORAL at 03:05

## 2024-05-24 RX ADMIN — HEPARIN SODIUM 5000 UNITS: 5000 INJECTION INTRAVENOUS; SUBCUTANEOUS at 06:33

## 2024-05-24 RX ADMIN — DOCUSATE SODIUM 100 MG: 100 CAPSULE, LIQUID FILLED ORAL at 20:59

## 2024-05-24 RX ADMIN — BUDESONIDE INHALATION 500 MCG: 0.5 SUSPENSION RESPIRATORY (INHALATION) at 19:55

## 2024-05-24 RX ADMIN — GABAPENTIN 100 MG: 100 CAPSULE ORAL at 09:06

## 2024-05-24 RX ADMIN — MIDODRINE HYDROCHLORIDE 5 MG: 5 TABLET ORAL at 17:11

## 2024-05-24 RX ADMIN — BUDESONIDE INHALATION 500 MCG: 0.5 SUSPENSION RESPIRATORY (INHALATION) at 08:44

## 2024-05-24 RX ADMIN — ATORVASTATIN CALCIUM 80 MG: 40 TABLET, FILM COATED ORAL at 20:59

## 2024-05-24 RX ADMIN — ASPIRIN 81 MG: 81 TABLET, COATED ORAL at 09:12

## 2024-05-24 RX ADMIN — AMITRIPTYLINE HYDROCHLORIDE 50 MG: 50 TABLET, FILM COATED ORAL at 20:59

## 2024-05-24 RX ADMIN — INSULIN GLARGINE 48 UNITS: 100 INJECTION, SOLUTION SUBCUTANEOUS at 20:59

## 2024-05-24 RX ADMIN — DOCUSATE SODIUM 100 MG: 100 CAPSULE, LIQUID FILLED ORAL at 12:04

## 2024-05-24 RX ADMIN — GABAPENTIN 100 MG: 100 CAPSULE ORAL at 21:00

## 2024-05-24 RX ADMIN — TICAGRELOR 90 MG: 90 TABLET ORAL at 21:00

## 2024-05-24 RX ADMIN — GABAPENTIN 100 MG: 100 CAPSULE ORAL at 15:04

## 2024-05-24 RX ADMIN — HEPARIN SODIUM 5000 UNITS: 5000 INJECTION INTRAVENOUS; SUBCUTANEOUS at 15:05

## 2024-05-24 RX ADMIN — SEVELAMER CARBONATE 800 MG: 800 TABLET, FILM COATED ORAL at 17:11

## 2024-05-24 RX ADMIN — SODIUM CHLORIDE, PRESERVATIVE FREE 10 ML: 5 INJECTION INTRAVENOUS at 21:00

## 2024-05-24 RX ADMIN — POLYETHYLENE GLYCOL 3350 17 G: 17 POWDER, FOR SOLUTION ORAL at 09:05

## 2024-05-24 RX ADMIN — SEVELAMER CARBONATE 800 MG: 800 TABLET, FILM COATED ORAL at 12:04

## 2024-05-24 RX ADMIN — MECLIZINE HYDROCHLORIDE 12.5 MG: 25 TABLET ORAL at 15:04

## 2024-05-24 RX ADMIN — SODIUM CHLORIDE, PRESERVATIVE FREE 10 ML: 5 INJECTION INTRAVENOUS at 09:13

## 2024-05-24 RX ADMIN — TICAGRELOR 90 MG: 90 TABLET ORAL at 09:06

## 2024-05-24 RX ADMIN — MECLIZINE HYDROCHLORIDE 12.5 MG: 25 TABLET ORAL at 09:05

## 2024-05-24 RX ADMIN — BENZONATATE 100 MG: 100 CAPSULE ORAL at 09:05

## 2024-05-24 RX ADMIN — HEPARIN SODIUM 5000 UNITS: 5000 INJECTION INTRAVENOUS; SUBCUTANEOUS at 21:00

## 2024-05-24 RX ADMIN — BENZONATATE 100 MG: 100 CAPSULE ORAL at 20:59

## 2024-05-24 RX ADMIN — DICLOFENAC SODIUM 2 G: 10 GEL TOPICAL at 12:07

## 2024-05-24 RX ADMIN — DICLOFENAC SODIUM 2 G: 10 GEL TOPICAL at 21:00

## 2024-05-24 RX ADMIN — MIDODRINE HYDROCHLORIDE 5 MG: 5 TABLET ORAL at 09:05

## 2024-05-24 RX ADMIN — BENZONATATE 100 MG: 100 CAPSULE ORAL at 15:04

## 2024-05-24 ASSESSMENT — PAIN SCALES - GENERAL
PAINLEVEL_OUTOF10: 0
PAINLEVEL_OUTOF10: 4
PAINLEVEL_OUTOF10: 6

## 2024-05-24 ASSESSMENT — PAIN DESCRIPTION - LOCATION: LOCATION: FLANK

## 2024-05-24 ASSESSMENT — PAIN DESCRIPTION - FREQUENCY: FREQUENCY: INTERMITTENT

## 2024-05-24 ASSESSMENT — PAIN DESCRIPTION - DESCRIPTORS: DESCRIPTORS: ACHING

## 2024-05-24 ASSESSMENT — PAIN DESCRIPTION - ORIENTATION: ORIENTATION: RIGHT

## 2024-05-24 ASSESSMENT — PAIN SCALES - WONG BAKER: WONGBAKER_NUMERICALRESPONSE: NO HURT

## 2024-05-24 ASSESSMENT — PAIN DESCRIPTION - PAIN TYPE: TYPE: ACUTE PAIN

## 2024-05-24 NOTE — DIABETES MGMT
[facility]  DIABETES MANAGEMENT CONSULT    Consulted by Provider for advanced nursing evaluation and care for inpatient blood glucose management.    Evaluation and Action Plan   Candida Sherman is a 58 year old female patient with a hx of uncontrolled type 2 diabetes. The patient reportedly takes 48 units Lantus at home and 15 units Humalog with each meal. The patient was admitted after a near syncope episode after dialysis treatment. The patient reports worsening dizziness for several weeks. She reports consistency with her insulin regimen. However A1c remains elevated. I suspect the patient's diet is the culprit. She reports eating snacks such as chips and drinking sugary beverages.The patient may benefit from outpatient diabetes education. It is also note the patient has bilateral foots wounds , which also can elevate BG's. The patient encouraged to wear hard sole shoes and check feet ( with assistance if needed) often. Bg's are stable shyam the current insulin dosing. No changes recommended.   The patient is using her home dose of insulin while hospitalized and BG are stable. No changes recommended today.     Management Rationale Action Plan   Medication   Basal needs Using Home dose  Continue 48 units Lantus daily   Nutritional needs Using Home dose Continue to use 16 units Humalog with each meal   Corrective insulin Using medium sensitivity based on weight    Additional orders          Diabetes Discharge Plan   Medication  Resume PTA insulin dosing and follow-up with PCP for future diabetes mangement   Referral  [x]        Outpatient diabetes education   Additional orders            Initial Presentation   Candida Sherman is a 58 y.o. female admitted  after experiencing near syncope. The patient reports increased dizziness for several weeks.  LAB: Glucose 347. Creatine 4.25. GFR 12. A1c 9.1%    CT:Narrative & Impression  INDICATION: Fall, bilateral rib pain     COMPARISON: Chest

## 2024-05-25 LAB
ANION GAP SERPL CALC-SCNC: 6 MMOL/L (ref 5–15)
ANION GAP SERPL CALC-SCNC: 6 MMOL/L (ref 5–15)
BASOPHILS # BLD: 0 K/UL (ref 0–0.1)
BASOPHILS NFR BLD: 1 % (ref 0–1)
BUN SERPL-MCNC: 12 MG/DL (ref 6–20)
BUN SERPL-MCNC: 30 MG/DL (ref 6–20)
BUN/CREAT SERPL: 4 (ref 12–20)
BUN/CREAT SERPL: 6 (ref 12–20)
CALCIUM SERPL-MCNC: 8.3 MG/DL (ref 8.5–10.1)
CALCIUM SERPL-MCNC: 8.3 MG/DL (ref 8.5–10.1)
CHLORIDE SERPL-SCNC: 84 MMOL/L (ref 97–108)
CHLORIDE SERPL-SCNC: 94 MMOL/L (ref 97–108)
CO2 SERPL-SCNC: 29 MMOL/L (ref 21–32)
CO2 SERPL-SCNC: 30 MMOL/L (ref 21–32)
CREAT SERPL-MCNC: 2.88 MG/DL (ref 0.55–1.02)
CREAT SERPL-MCNC: 5.34 MG/DL (ref 0.55–1.02)
DIFFERENTIAL METHOD BLD: ABNORMAL
EOSINOPHIL # BLD: 0.1 K/UL (ref 0–0.4)
EOSINOPHIL NFR BLD: 2 % (ref 0–7)
ERYTHROCYTE [DISTWIDTH] IN BLOOD BY AUTOMATED COUNT: 15.6 % (ref 11.5–14.5)
GLUCOSE BLD STRIP.AUTO-MCNC: 101 MG/DL (ref 65–117)
GLUCOSE BLD STRIP.AUTO-MCNC: 186 MG/DL (ref 65–117)
GLUCOSE BLD STRIP.AUTO-MCNC: 219 MG/DL (ref 65–117)
GLUCOSE SERPL-MCNC: 101 MG/DL (ref 65–100)
GLUCOSE SERPL-MCNC: 148 MG/DL (ref 65–100)
HCT VFR BLD AUTO: 23.1 % (ref 35–47)
HGB BLD-MCNC: 7.8 G/DL (ref 11.5–16)
IMM GRANULOCYTES # BLD AUTO: 0 K/UL (ref 0–0.04)
IMM GRANULOCYTES NFR BLD AUTO: 1 % (ref 0–0.5)
LYMPHOCYTES # BLD: 1.1 K/UL (ref 0.8–3.5)
LYMPHOCYTES NFR BLD: 19 % (ref 12–49)
MCH RBC QN AUTO: 32.1 PG (ref 26–34)
MCHC RBC AUTO-ENTMCNC: 33.8 G/DL (ref 30–36.5)
MCV RBC AUTO: 95.1 FL (ref 80–99)
MONOCYTES # BLD: 0.5 K/UL (ref 0–1)
MONOCYTES NFR BLD: 9 % (ref 5–13)
NEUTS SEG # BLD: 4 K/UL (ref 1.8–8)
NEUTS SEG NFR BLD: 68 % (ref 32–75)
NRBC # BLD: 0 K/UL (ref 0–0.01)
NRBC BLD-RTO: 0 PER 100 WBC
PLATELET # BLD AUTO: 150 K/UL (ref 150–400)
PMV BLD AUTO: 9.4 FL (ref 8.9–12.9)
POTASSIUM SERPL-SCNC: 3.8 MMOL/L (ref 3.5–5.1)
POTASSIUM SERPL-SCNC: 4.8 MMOL/L (ref 3.5–5.1)
RBC # BLD AUTO: 2.43 M/UL (ref 3.8–5.2)
SERVICE CMNT-IMP: ABNORMAL
SERVICE CMNT-IMP: ABNORMAL
SERVICE CMNT-IMP: NORMAL
SODIUM SERPL-SCNC: 119 MMOL/L (ref 136–145)
SODIUM SERPL-SCNC: 130 MMOL/L (ref 136–145)
WBC # BLD AUTO: 5.8 K/UL (ref 3.6–11)

## 2024-05-25 PROCEDURE — 6360000002 HC RX W HCPCS

## 2024-05-25 PROCEDURE — 6370000000 HC RX 637 (ALT 250 FOR IP): Performed by: INTERNAL MEDICINE

## 2024-05-25 PROCEDURE — 85025 COMPLETE CBC W/AUTO DIFF WBC: CPT

## 2024-05-25 PROCEDURE — 36415 COLL VENOUS BLD VENIPUNCTURE: CPT

## 2024-05-25 PROCEDURE — 94640 AIRWAY INHALATION TREATMENT: CPT

## 2024-05-25 PROCEDURE — 1100000000 HC RM PRIVATE

## 2024-05-25 PROCEDURE — 82962 GLUCOSE BLOOD TEST: CPT

## 2024-05-25 PROCEDURE — 80048 BASIC METABOLIC PNL TOTAL CA: CPT

## 2024-05-25 PROCEDURE — 6360000002 HC RX W HCPCS: Performed by: INTERNAL MEDICINE

## 2024-05-25 PROCEDURE — 6370000000 HC RX 637 (ALT 250 FOR IP)

## 2024-05-25 PROCEDURE — 94761 N-INVAS EAR/PLS OXIMETRY MLT: CPT

## 2024-05-25 PROCEDURE — 2580000003 HC RX 258: Performed by: INTERNAL MEDICINE

## 2024-05-25 PROCEDURE — 90935 HEMODIALYSIS ONE EVALUATION: CPT

## 2024-05-25 RX ORDER — HEPARIN SODIUM 1000 [USP'U]/ML
INJECTION, SOLUTION INTRAVENOUS; SUBCUTANEOUS
Status: COMPLETED
Start: 2024-05-25 | End: 2024-05-25

## 2024-05-25 RX ADMIN — DICLOFENAC SODIUM 2 G: 10 GEL TOPICAL at 21:09

## 2024-05-25 RX ADMIN — BUDESONIDE INHALATION 500 MCG: 0.5 SUSPENSION RESPIRATORY (INHALATION) at 19:38

## 2024-05-25 RX ADMIN — MIDODRINE HYDROCHLORIDE 5 MG: 5 TABLET ORAL at 17:02

## 2024-05-25 RX ADMIN — GABAPENTIN 100 MG: 100 CAPSULE ORAL at 21:03

## 2024-05-25 RX ADMIN — HEPARIN SODIUM 2000 UNITS: 1000 INJECTION INTRAVENOUS; SUBCUTANEOUS at 12:35

## 2024-05-25 RX ADMIN — HEPARIN SODIUM 5000 UNITS: 5000 INJECTION INTRAVENOUS; SUBCUTANEOUS at 13:16

## 2024-05-25 RX ADMIN — HEPARIN SODIUM 5000 UNITS: 5000 INJECTION INTRAVENOUS; SUBCUTANEOUS at 05:20

## 2024-05-25 RX ADMIN — INSULIN LISPRO 2 UNITS: 100 INJECTION, SOLUTION INTRAVENOUS; SUBCUTANEOUS at 17:02

## 2024-05-25 RX ADMIN — ATORVASTATIN CALCIUM 80 MG: 40 TABLET, FILM COATED ORAL at 21:03

## 2024-05-25 RX ADMIN — SEVELAMER CARBONATE 800 MG: 800 TABLET, FILM COATED ORAL at 13:17

## 2024-05-25 RX ADMIN — HEPARIN SODIUM 2100 UNITS: 1000 INJECTION INTRAVENOUS; SUBCUTANEOUS at 12:35

## 2024-05-25 RX ADMIN — GABAPENTIN 100 MG: 100 CAPSULE ORAL at 13:16

## 2024-05-25 RX ADMIN — DIPHENHYDRAMINE HYDROCHLORIDE 25 MG: 50 INJECTION, SOLUTION INTRAMUSCULAR; INTRAVENOUS at 17:51

## 2024-05-25 RX ADMIN — HEPARIN SODIUM 5000 UNITS: 5000 INJECTION INTRAVENOUS; SUBCUTANEOUS at 22:01

## 2024-05-25 RX ADMIN — SEVELAMER CARBONATE 800 MG: 800 TABLET, FILM COATED ORAL at 17:01

## 2024-05-25 RX ADMIN — DIPHENHYDRAMINE HYDROCHLORIDE 25 MG: 50 INJECTION, SOLUTION INTRAMUSCULAR; INTRAVENOUS at 05:19

## 2024-05-25 RX ADMIN — EPOETIN ALFA 4000 UNITS: 4000 SOLUTION INTRAVENOUS; SUBCUTANEOUS at 21:07

## 2024-05-25 RX ADMIN — TICAGRELOR 90 MG: 90 TABLET ORAL at 21:03

## 2024-05-25 RX ADMIN — SODIUM CHLORIDE, PRESERVATIVE FREE 5 ML: 5 INJECTION INTRAVENOUS at 21:12

## 2024-05-25 RX ADMIN — MECLIZINE HYDROCHLORIDE 12.5 MG: 25 TABLET ORAL at 21:02

## 2024-05-25 RX ADMIN — BENZONATATE 100 MG: 100 CAPSULE ORAL at 21:03

## 2024-05-25 RX ADMIN — BENZONATATE 100 MG: 100 CAPSULE ORAL at 13:16

## 2024-05-25 RX ADMIN — AMITRIPTYLINE HYDROCHLORIDE 50 MG: 50 TABLET, FILM COATED ORAL at 21:01

## 2024-05-25 RX ADMIN — MIDODRINE HYDROCHLORIDE 5 MG: 5 TABLET ORAL at 08:07

## 2024-05-25 RX ADMIN — MIDODRINE HYDROCHLORIDE 5 MG: 5 TABLET ORAL at 13:17

## 2024-05-25 RX ADMIN — MECLIZINE HYDROCHLORIDE 12.5 MG: 25 TABLET ORAL at 13:16

## 2024-05-25 RX ADMIN — DOCUSATE SODIUM 100 MG: 100 CAPSULE, LIQUID FILLED ORAL at 21:02

## 2024-05-25 RX ADMIN — INSULIN GLARGINE 48 UNITS: 100 INJECTION, SOLUTION SUBCUTANEOUS at 21:06

## 2024-05-25 RX ADMIN — BUDESONIDE INHALATION 500 MCG: 0.5 SUSPENSION RESPIRATORY (INHALATION) at 07:41

## 2024-05-25 ASSESSMENT — PAIN DESCRIPTION - ORIENTATION: ORIENTATION: OTHER (COMMENT)

## 2024-05-25 ASSESSMENT — PAIN SCALES - GENERAL
PAINLEVEL_OUTOF10: 0
PAINLEVEL_OUTOF10: 0

## 2024-05-25 ASSESSMENT — PAIN DESCRIPTION - DESCRIPTORS: DESCRIPTORS: OTHER (COMMENT)

## 2024-05-25 ASSESSMENT — PAIN - FUNCTIONAL ASSESSMENT: PAIN_FUNCTIONAL_ASSESSMENT: ACTIVITIES ARE NOT PREVENTED

## 2024-05-26 LAB
GLUCOSE BLD STRIP.AUTO-MCNC: 118 MG/DL (ref 65–117)
GLUCOSE BLD STRIP.AUTO-MCNC: 154 MG/DL (ref 65–117)
GLUCOSE BLD STRIP.AUTO-MCNC: 156 MG/DL (ref 65–117)
GLUCOSE BLD STRIP.AUTO-MCNC: 194 MG/DL (ref 65–117)
SERVICE CMNT-IMP: ABNORMAL

## 2024-05-26 PROCEDURE — 6360000002 HC RX W HCPCS: Performed by: INTERNAL MEDICINE

## 2024-05-26 PROCEDURE — 1100000000 HC RM PRIVATE

## 2024-05-26 PROCEDURE — 82962 GLUCOSE BLOOD TEST: CPT

## 2024-05-26 PROCEDURE — 6370000000 HC RX 637 (ALT 250 FOR IP): Performed by: INTERNAL MEDICINE

## 2024-05-26 PROCEDURE — 6370000000 HC RX 637 (ALT 250 FOR IP)

## 2024-05-26 PROCEDURE — 2580000003 HC RX 258: Performed by: INTERNAL MEDICINE

## 2024-05-26 PROCEDURE — 94640 AIRWAY INHALATION TREATMENT: CPT

## 2024-05-26 PROCEDURE — 94761 N-INVAS EAR/PLS OXIMETRY MLT: CPT

## 2024-05-26 RX ADMIN — DIPHENHYDRAMINE HYDROCHLORIDE 25 MG: 50 INJECTION, SOLUTION INTRAMUSCULAR; INTRAVENOUS at 01:05

## 2024-05-26 RX ADMIN — POLYETHYLENE GLYCOL 3350 17 G: 17 POWDER, FOR SOLUTION ORAL at 03:24

## 2024-05-26 RX ADMIN — MECLIZINE HYDROCHLORIDE 12.5 MG: 25 TABLET ORAL at 14:29

## 2024-05-26 RX ADMIN — MIDODRINE HYDROCHLORIDE 5 MG: 5 TABLET ORAL at 11:21

## 2024-05-26 RX ADMIN — GABAPENTIN 100 MG: 100 CAPSULE ORAL at 21:15

## 2024-05-26 RX ADMIN — HEPARIN SODIUM 5000 UNITS: 5000 INJECTION INTRAVENOUS; SUBCUTANEOUS at 21:14

## 2024-05-26 RX ADMIN — HEPARIN SODIUM 5000 UNITS: 5000 INJECTION INTRAVENOUS; SUBCUTANEOUS at 06:05

## 2024-05-26 RX ADMIN — INSULIN GLARGINE 48 UNITS: 100 INJECTION, SOLUTION SUBCUTANEOUS at 21:15

## 2024-05-26 RX ADMIN — DICLOFENAC SODIUM 2 G: 10 GEL TOPICAL at 08:36

## 2024-05-26 RX ADMIN — BENZONATATE 100 MG: 100 CAPSULE ORAL at 21:15

## 2024-05-26 RX ADMIN — MECLIZINE HYDROCHLORIDE 12.5 MG: 25 TABLET ORAL at 08:28

## 2024-05-26 RX ADMIN — SEVELAMER CARBONATE 800 MG: 800 TABLET, FILM COATED ORAL at 08:27

## 2024-05-26 RX ADMIN — DOCUSATE SODIUM 100 MG: 100 CAPSULE, LIQUID FILLED ORAL at 21:17

## 2024-05-26 RX ADMIN — ASPIRIN 81 MG: 81 TABLET, COATED ORAL at 08:27

## 2024-05-26 RX ADMIN — SODIUM CHLORIDE, PRESERVATIVE FREE 5 ML: 5 INJECTION INTRAVENOUS at 21:19

## 2024-05-26 RX ADMIN — AMITRIPTYLINE HYDROCHLORIDE 50 MG: 50 TABLET, FILM COATED ORAL at 21:17

## 2024-05-26 RX ADMIN — MECLIZINE HYDROCHLORIDE 12.5 MG: 25 TABLET ORAL at 21:15

## 2024-05-26 RX ADMIN — GABAPENTIN 100 MG: 100 CAPSULE ORAL at 14:29

## 2024-05-26 RX ADMIN — GABAPENTIN 100 MG: 100 CAPSULE ORAL at 08:27

## 2024-05-26 RX ADMIN — TICAGRELOR 90 MG: 90 TABLET ORAL at 21:17

## 2024-05-26 RX ADMIN — DIPHENHYDRAMINE HYDROCHLORIDE 25 MG: 50 INJECTION, SOLUTION INTRAMUSCULAR; INTRAVENOUS at 21:14

## 2024-05-26 RX ADMIN — POLYETHYLENE GLYCOL 3350 17 G: 17 POWDER, FOR SOLUTION ORAL at 21:40

## 2024-05-26 RX ADMIN — MIDODRINE HYDROCHLORIDE 5 MG: 5 TABLET ORAL at 16:45

## 2024-05-26 RX ADMIN — BUDESONIDE INHALATION 500 MCG: 0.5 SUSPENSION RESPIRATORY (INHALATION) at 08:41

## 2024-05-26 RX ADMIN — SEVELAMER CARBONATE 800 MG: 800 TABLET, FILM COATED ORAL at 11:21

## 2024-05-26 RX ADMIN — HEPARIN SODIUM 5000 UNITS: 5000 INJECTION INTRAVENOUS; SUBCUTANEOUS at 14:29

## 2024-05-26 RX ADMIN — TICAGRELOR 90 MG: 90 TABLET ORAL at 08:27

## 2024-05-26 RX ADMIN — DICLOFENAC SODIUM 2 G: 10 GEL TOPICAL at 21:26

## 2024-05-26 RX ADMIN — SEVELAMER CARBONATE 800 MG: 800 TABLET, FILM COATED ORAL at 16:45

## 2024-05-26 RX ADMIN — ATORVASTATIN CALCIUM 80 MG: 40 TABLET, FILM COATED ORAL at 21:15

## 2024-05-26 RX ADMIN — SODIUM CHLORIDE, PRESERVATIVE FREE 5 ML: 5 INJECTION INTRAVENOUS at 01:08

## 2024-05-26 RX ADMIN — DIPHENHYDRAMINE HYDROCHLORIDE 25 MG: 50 INJECTION, SOLUTION INTRAMUSCULAR; INTRAVENOUS at 08:35

## 2024-05-26 RX ADMIN — BUDESONIDE INHALATION 500 MCG: 0.5 SUSPENSION RESPIRATORY (INHALATION) at 20:15

## 2024-05-26 RX ADMIN — MIDODRINE HYDROCHLORIDE 5 MG: 5 TABLET ORAL at 08:27

## 2024-05-26 RX ADMIN — DOCUSATE SODIUM 100 MG: 100 CAPSULE, LIQUID FILLED ORAL at 08:27

## 2024-05-26 RX ADMIN — SODIUM CHLORIDE, PRESERVATIVE FREE 10 ML: 5 INJECTION INTRAVENOUS at 08:36

## 2024-05-26 RX ADMIN — BENZONATATE 100 MG: 100 CAPSULE ORAL at 08:27

## 2024-05-26 RX ADMIN — BENZONATATE 100 MG: 100 CAPSULE ORAL at 14:29

## 2024-05-26 ASSESSMENT — PAIN SCALES - GENERAL
PAINLEVEL_OUTOF10: 2
PAINLEVEL_OUTOF10: 0
PAINLEVEL_OUTOF10: 0

## 2024-05-26 ASSESSMENT — PAIN DESCRIPTION - ORIENTATION
ORIENTATION: RIGHT
ORIENTATION: RIGHT

## 2024-05-26 ASSESSMENT — PAIN DESCRIPTION - DESCRIPTORS
DESCRIPTORS: ACHING
DESCRIPTORS: OTHER (COMMENT)

## 2024-05-26 ASSESSMENT — PAIN DESCRIPTION - LOCATION
LOCATION: KNEE
LOCATION: KNEE

## 2024-05-26 ASSESSMENT — PAIN - FUNCTIONAL ASSESSMENT: PAIN_FUNCTIONAL_ASSESSMENT: ACTIVITIES ARE NOT PREVENTED

## 2024-05-27 LAB
ANION GAP SERPL CALC-SCNC: 6 MMOL/L (ref 5–15)
BASOPHILS # BLD: 0 K/UL (ref 0–0.1)
BASOPHILS NFR BLD: 1 % (ref 0–1)
BUN SERPL-MCNC: 33 MG/DL (ref 6–20)
BUN/CREAT SERPL: 6 (ref 12–20)
CALCIUM SERPL-MCNC: 8.6 MG/DL (ref 8.5–10.1)
CHLORIDE SERPL-SCNC: 92 MMOL/L (ref 97–108)
CO2 SERPL-SCNC: 29 MMOL/L (ref 21–32)
CREAT SERPL-MCNC: 5.37 MG/DL (ref 0.55–1.02)
DIFFERENTIAL METHOD BLD: ABNORMAL
EOSINOPHIL # BLD: 0.1 K/UL (ref 0–0.4)
EOSINOPHIL NFR BLD: 2 % (ref 0–7)
ERYTHROCYTE [DISTWIDTH] IN BLOOD BY AUTOMATED COUNT: 15.6 % (ref 11.5–14.5)
GLUCOSE BLD STRIP.AUTO-MCNC: 122 MG/DL (ref 65–117)
GLUCOSE BLD STRIP.AUTO-MCNC: 126 MG/DL (ref 65–117)
GLUCOSE BLD STRIP.AUTO-MCNC: 143 MG/DL (ref 65–117)
GLUCOSE BLD STRIP.AUTO-MCNC: 162 MG/DL (ref 65–117)
GLUCOSE SERPL-MCNC: 138 MG/DL (ref 65–100)
HCT VFR BLD AUTO: 21.9 % (ref 35–47)
HGB BLD-MCNC: 7.1 G/DL (ref 11.5–16)
IMM GRANULOCYTES # BLD AUTO: 0 K/UL (ref 0–0.04)
IMM GRANULOCYTES NFR BLD AUTO: 1 % (ref 0–0.5)
LYMPHOCYTES # BLD: 1 K/UL (ref 0.8–3.5)
LYMPHOCYTES NFR BLD: 19 % (ref 12–49)
MCH RBC QN AUTO: 31.7 PG (ref 26–34)
MCHC RBC AUTO-ENTMCNC: 32.4 G/DL (ref 30–36.5)
MCV RBC AUTO: 97.8 FL (ref 80–99)
MONOCYTES # BLD: 0.4 K/UL (ref 0–1)
MONOCYTES NFR BLD: 8 % (ref 5–13)
NEUTS SEG # BLD: 3.8 K/UL (ref 1.8–8)
NEUTS SEG NFR BLD: 69 % (ref 32–75)
NRBC # BLD: 0 K/UL (ref 0–0.01)
NRBC BLD-RTO: 0 PER 100 WBC
PLATELET # BLD AUTO: 163 K/UL (ref 150–400)
PMV BLD AUTO: 9.4 FL (ref 8.9–12.9)
POTASSIUM SERPL-SCNC: 4.7 MMOL/L (ref 3.5–5.1)
RBC # BLD AUTO: 2.24 M/UL (ref 3.8–5.2)
SERVICE CMNT-IMP: ABNORMAL
SODIUM SERPL-SCNC: 127 MMOL/L (ref 136–145)
WBC # BLD AUTO: 5.4 K/UL (ref 3.6–11)

## 2024-05-27 PROCEDURE — 6370000000 HC RX 637 (ALT 250 FOR IP)

## 2024-05-27 PROCEDURE — 36556 INSERT NON-TUNNEL CV CATH: CPT

## 2024-05-27 PROCEDURE — 6370000000 HC RX 637 (ALT 250 FOR IP): Performed by: INTERNAL MEDICINE

## 2024-05-27 PROCEDURE — 6360000002 HC RX W HCPCS: Performed by: INTERNAL MEDICINE

## 2024-05-27 PROCEDURE — 82962 GLUCOSE BLOOD TEST: CPT

## 2024-05-27 PROCEDURE — 1100000000 HC RM PRIVATE

## 2024-05-27 PROCEDURE — 94640 AIRWAY INHALATION TREATMENT: CPT

## 2024-05-27 PROCEDURE — 99232 SBSQ HOSP IP/OBS MODERATE 35: CPT | Performed by: PODIATRIST

## 2024-05-27 PROCEDURE — 36415 COLL VENOUS BLD VENIPUNCTURE: CPT

## 2024-05-27 PROCEDURE — P9047 ALBUMIN (HUMAN), 25%, 50ML: HCPCS

## 2024-05-27 PROCEDURE — 6360000002 HC RX W HCPCS

## 2024-05-27 PROCEDURE — 2580000003 HC RX 258: Performed by: INTERNAL MEDICINE

## 2024-05-27 PROCEDURE — 94761 N-INVAS EAR/PLS OXIMETRY MLT: CPT

## 2024-05-27 PROCEDURE — 80048 BASIC METABOLIC PNL TOTAL CA: CPT

## 2024-05-27 PROCEDURE — 90935 HEMODIALYSIS ONE EVALUATION: CPT

## 2024-05-27 PROCEDURE — 85025 COMPLETE CBC W/AUTO DIFF WBC: CPT

## 2024-05-27 RX ORDER — HEPARIN SODIUM 1000 [USP'U]/ML
INJECTION, SOLUTION INTRAVENOUS; SUBCUTANEOUS
Status: COMPLETED
Start: 2024-05-27 | End: 2024-05-27

## 2024-05-27 RX ORDER — ALBUMIN (HUMAN) 12.5 G/50ML
SOLUTION INTRAVENOUS
Status: COMPLETED
Start: 2024-05-27 | End: 2024-05-27

## 2024-05-27 RX ORDER — POLYETHYLENE GLYCOL 3350 17 G/17G
17 POWDER, FOR SOLUTION ORAL 2 TIMES DAILY
Status: DISCONTINUED | OUTPATIENT
Start: 2024-05-27 | End: 2024-05-28 | Stop reason: HOSPADM

## 2024-05-27 RX ORDER — SENNOSIDES A AND B 8.6 MG/1
1 TABLET, FILM COATED ORAL NIGHTLY
Status: DISCONTINUED | OUTPATIENT
Start: 2024-05-27 | End: 2024-05-28 | Stop reason: HOSPADM

## 2024-05-27 RX ADMIN — MIDODRINE HYDROCHLORIDE 5 MG: 5 TABLET ORAL at 08:10

## 2024-05-27 RX ADMIN — HEPARIN SODIUM 5000 UNITS: 5000 INJECTION INTRAVENOUS; SUBCUTANEOUS at 20:55

## 2024-05-27 RX ADMIN — BUDESONIDE INHALATION 500 MCG: 0.5 SUSPENSION RESPIRATORY (INHALATION) at 19:52

## 2024-05-27 RX ADMIN — HEPARIN SODIUM 2100 UNITS: 1000 INJECTION INTRAVENOUS; SUBCUTANEOUS at 16:07

## 2024-05-27 RX ADMIN — SEVELAMER CARBONATE 800 MG: 800 TABLET, FILM COATED ORAL at 08:10

## 2024-05-27 RX ADMIN — POLYETHYLENE GLYCOL 3350 17 G: 17 POWDER, FOR SOLUTION ORAL at 20:54

## 2024-05-27 RX ADMIN — HEPARIN SODIUM 5000 UNITS: 5000 INJECTION INTRAVENOUS; SUBCUTANEOUS at 06:17

## 2024-05-27 RX ADMIN — INSULIN GLARGINE 48 UNITS: 100 INJECTION, SOLUTION SUBCUTANEOUS at 20:55

## 2024-05-27 RX ADMIN — MIDODRINE HYDROCHLORIDE 5 MG: 5 TABLET ORAL at 17:15

## 2024-05-27 RX ADMIN — GABAPENTIN 100 MG: 100 CAPSULE ORAL at 08:10

## 2024-05-27 RX ADMIN — TICAGRELOR 90 MG: 90 TABLET ORAL at 20:52

## 2024-05-27 RX ADMIN — MIDODRINE HYDROCHLORIDE 5 MG: 5 TABLET ORAL at 11:46

## 2024-05-27 RX ADMIN — SODIUM CHLORIDE, PRESERVATIVE FREE 10 ML: 5 INJECTION INTRAVENOUS at 08:11

## 2024-05-27 RX ADMIN — DICLOFENAC SODIUM 2 G: 10 GEL TOPICAL at 20:59

## 2024-05-27 RX ADMIN — HEPARIN SODIUM 2000 UNITS: 1000 INJECTION INTRAVENOUS; SUBCUTANEOUS at 16:06

## 2024-05-27 RX ADMIN — DIPHENHYDRAMINE HYDROCHLORIDE 25 MG: 50 INJECTION, SOLUTION INTRAMUSCULAR; INTRAVENOUS at 09:50

## 2024-05-27 RX ADMIN — ATORVASTATIN CALCIUM 80 MG: 40 TABLET, FILM COATED ORAL at 20:52

## 2024-05-27 RX ADMIN — SENNOSIDES 8.6 MG: 8.6 TABLET, FILM COATED ORAL at 20:53

## 2024-05-27 RX ADMIN — SEVELAMER CARBONATE 800 MG: 800 TABLET, FILM COATED ORAL at 11:46

## 2024-05-27 RX ADMIN — ALBUMIN (HUMAN) 25 G: 0.25 INJECTION, SOLUTION INTRAVENOUS at 13:50

## 2024-05-27 RX ADMIN — DICLOFENAC SODIUM 2 G: 10 GEL TOPICAL at 08:11

## 2024-05-27 RX ADMIN — BUDESONIDE INHALATION 500 MCG: 0.5 SUSPENSION RESPIRATORY (INHALATION) at 08:40

## 2024-05-27 RX ADMIN — DIPHENHYDRAMINE HYDROCHLORIDE 25 MG: 50 INJECTION, SOLUTION INTRAMUSCULAR; INTRAVENOUS at 21:04

## 2024-05-27 RX ADMIN — TICAGRELOR 90 MG: 90 TABLET ORAL at 08:10

## 2024-05-27 RX ADMIN — BENZONATATE 100 MG: 100 CAPSULE ORAL at 20:52

## 2024-05-27 RX ADMIN — MECLIZINE HYDROCHLORIDE 12.5 MG: 25 TABLET ORAL at 08:10

## 2024-05-27 RX ADMIN — SEVELAMER CARBONATE 800 MG: 800 TABLET, FILM COATED ORAL at 17:15

## 2024-05-27 RX ADMIN — DOCUSATE SODIUM 100 MG: 100 CAPSULE, LIQUID FILLED ORAL at 08:10

## 2024-05-27 RX ADMIN — BENZONATATE 100 MG: 100 CAPSULE ORAL at 08:09

## 2024-05-27 RX ADMIN — SODIUM CHLORIDE, PRESERVATIVE FREE 10 ML: 5 INJECTION INTRAVENOUS at 20:56

## 2024-05-27 RX ADMIN — DOCUSATE SODIUM 100 MG: 100 CAPSULE, LIQUID FILLED ORAL at 20:51

## 2024-05-27 RX ADMIN — MECLIZINE HYDROCHLORIDE 12.5 MG: 25 TABLET ORAL at 20:54

## 2024-05-27 RX ADMIN — POLYETHYLENE GLYCOL 3350 17 G: 17 POWDER, FOR SOLUTION ORAL at 08:18

## 2024-05-27 RX ADMIN — AMITRIPTYLINE HYDROCHLORIDE 50 MG: 50 TABLET, FILM COATED ORAL at 20:52

## 2024-05-27 RX ADMIN — GABAPENTIN 100 MG: 100 CAPSULE ORAL at 20:52

## 2024-05-27 RX ADMIN — ACETAMINOPHEN 650 MG: 325 TABLET ORAL at 21:13

## 2024-05-27 RX ADMIN — ASPIRIN 81 MG: 81 TABLET, COATED ORAL at 08:10

## 2024-05-27 ASSESSMENT — PAIN SCALES - GENERAL
PAINLEVEL_OUTOF10: 3
PAINLEVEL_OUTOF10: 0
PAINLEVEL_OUTOF10: 0
PAINLEVEL_OUTOF10: 4
PAINLEVEL_OUTOF10: 0

## 2024-05-27 ASSESSMENT — PAIN DESCRIPTION - DESCRIPTORS
DESCRIPTORS: DULL
DESCRIPTORS: ACHING

## 2024-05-27 ASSESSMENT — PAIN - FUNCTIONAL ASSESSMENT
PAIN_FUNCTIONAL_ASSESSMENT: ACTIVITIES ARE NOT PREVENTED
PAIN_FUNCTIONAL_ASSESSMENT: ACTIVITIES ARE NOT PREVENTED

## 2024-05-27 ASSESSMENT — PAIN DESCRIPTION - ORIENTATION: ORIENTATION: RIGHT;ANTERIOR

## 2024-05-27 ASSESSMENT — PAIN DESCRIPTION - LOCATION
LOCATION: HEAD
LOCATION: KNEE

## 2024-05-27 ASSESSMENT — PAIN SCALES - WONG BAKER: WONGBAKER_NUMERICALRESPONSE: NO HURT

## 2024-05-28 VITALS
HEIGHT: 65 IN | HEART RATE: 82 BPM | WEIGHT: 265.21 LBS | SYSTOLIC BLOOD PRESSURE: 101 MMHG | OXYGEN SATURATION: 97 % | DIASTOLIC BLOOD PRESSURE: 39 MMHG | BODY MASS INDEX: 44.19 KG/M2 | TEMPERATURE: 97.1 F | RESPIRATION RATE: 18 BRPM

## 2024-05-28 LAB
ANION GAP SERPL CALC-SCNC: 7 MMOL/L (ref 5–15)
BUN SERPL-MCNC: 22 MG/DL (ref 6–20)
BUN/CREAT SERPL: 5 (ref 12–20)
CALCIUM SERPL-MCNC: 8.5 MG/DL (ref 8.5–10.1)
CHLORIDE SERPL-SCNC: 92 MMOL/L (ref 97–108)
CO2 SERPL-SCNC: 30 MMOL/L (ref 21–32)
CREAT SERPL-MCNC: 4.22 MG/DL (ref 0.55–1.02)
ERYTHROCYTE [DISTWIDTH] IN BLOOD BY AUTOMATED COUNT: 15.2 % (ref 11.5–14.5)
GLUCOSE BLD STRIP.AUTO-MCNC: 141 MG/DL (ref 65–117)
GLUCOSE BLD STRIP.AUTO-MCNC: 155 MG/DL (ref 65–117)
GLUCOSE BLD STRIP.AUTO-MCNC: 211 MG/DL (ref 65–117)
GLUCOSE SERPL-MCNC: 109 MG/DL (ref 65–100)
HCT VFR BLD AUTO: 22.8 % (ref 35–47)
HGB BLD-MCNC: 7.4 G/DL (ref 11.5–16)
MCH RBC QN AUTO: 31.5 PG (ref 26–34)
MCHC RBC AUTO-ENTMCNC: 32.5 G/DL (ref 30–36.5)
MCV RBC AUTO: 97 FL (ref 80–99)
NRBC # BLD: 0 K/UL (ref 0–0.01)
NRBC BLD-RTO: 0 PER 100 WBC
PLATELET # BLD AUTO: 181 K/UL (ref 150–400)
PMV BLD AUTO: 9.1 FL (ref 8.9–12.9)
POTASSIUM SERPL-SCNC: 4.5 MMOL/L (ref 3.5–5.1)
RBC # BLD AUTO: 2.35 M/UL (ref 3.8–5.2)
SERVICE CMNT-IMP: ABNORMAL
SODIUM SERPL-SCNC: 129 MMOL/L (ref 136–145)
WBC # BLD AUTO: 5.4 K/UL (ref 3.6–11)

## 2024-05-28 PROCEDURE — 6370000000 HC RX 637 (ALT 250 FOR IP): Performed by: INTERNAL MEDICINE

## 2024-05-28 PROCEDURE — 36415 COLL VENOUS BLD VENIPUNCTURE: CPT

## 2024-05-28 PROCEDURE — 82962 GLUCOSE BLOOD TEST: CPT

## 2024-05-28 PROCEDURE — 99232 SBSQ HOSP IP/OBS MODERATE 35: CPT | Performed by: PODIATRIST

## 2024-05-28 PROCEDURE — 80048 BASIC METABOLIC PNL TOTAL CA: CPT

## 2024-05-28 PROCEDURE — 6370000000 HC RX 637 (ALT 250 FOR IP)

## 2024-05-28 PROCEDURE — 36556 INSERT NON-TUNNEL CV CATH: CPT

## 2024-05-28 PROCEDURE — 85027 COMPLETE CBC AUTOMATED: CPT

## 2024-05-28 PROCEDURE — 6360000002 HC RX W HCPCS: Performed by: INTERNAL MEDICINE

## 2024-05-28 PROCEDURE — 94761 N-INVAS EAR/PLS OXIMETRY MLT: CPT

## 2024-05-28 PROCEDURE — 94640 AIRWAY INHALATION TREATMENT: CPT

## 2024-05-28 RX ORDER — BENZONATATE 100 MG/1
100 CAPSULE ORAL 3 TIMES DAILY
Qty: 21 CAPSULE | Refills: 0 | Status: SHIPPED
Start: 2024-05-28 | End: 2024-06-04

## 2024-05-28 RX ORDER — LIDOCAINE 4 G/G
1 PATCH TOPICAL DAILY
Qty: 15 EACH | Refills: 0 | Status: SHIPPED
Start: 2024-05-29

## 2024-05-28 RX ORDER — GABAPENTIN 300 MG/1
300 CAPSULE ORAL 3 TIMES DAILY
Qty: 9 CAPSULE | Refills: 0 | Status: SHIPPED | OUTPATIENT
Start: 2024-05-28 | End: 2024-05-31

## 2024-05-28 RX ORDER — POLYETHYLENE GLYCOL 3350 17 G/17G
17 POWDER, FOR SOLUTION ORAL 2 TIMES DAILY
Qty: 527 G | Refills: 0 | Status: SHIPPED
Start: 2024-05-28 | End: 2024-06-27

## 2024-05-28 RX ORDER — MIDODRINE HYDROCHLORIDE 5 MG/1
5 TABLET ORAL
Qty: 90 TABLET | Refills: 0 | Status: SHIPPED
Start: 2024-05-28

## 2024-05-28 RX ORDER — MECLIZINE HCL 12.5 MG/1
12.5 TABLET ORAL 3 TIMES DAILY
Qty: 30 TABLET | Refills: 0 | Status: SHIPPED
Start: 2024-05-28 | End: 2024-06-07

## 2024-05-28 RX ORDER — SENNOSIDES A AND B 8.6 MG/1
1 TABLET, FILM COATED ORAL NIGHTLY
Qty: 30 TABLET | Refills: 0 | Status: SHIPPED
Start: 2024-05-28 | End: 2024-06-27

## 2024-05-28 RX ORDER — INSULIN LISPRO 100 [IU]/ML
0-8 INJECTION, SOLUTION INTRAVENOUS; SUBCUTANEOUS
Qty: 3 ML | Refills: 0 | Status: SHIPPED
Start: 2024-05-28

## 2024-05-28 RX ORDER — PSEUDOEPHEDRINE HCL 30 MG
100 TABLET ORAL 2 TIMES DAILY
Qty: 60 CAPSULE | Refills: 0 | Status: SHIPPED
Start: 2024-05-28

## 2024-05-28 RX ADMIN — HEPARIN SODIUM 2100 UNITS: 1000 INJECTION INTRAVENOUS; SUBCUTANEOUS at 15:44

## 2024-05-28 RX ADMIN — BUDESONIDE INHALATION 500 MCG: 0.5 SUSPENSION RESPIRATORY (INHALATION) at 09:06

## 2024-05-28 RX ADMIN — MIDODRINE HYDROCHLORIDE 5 MG: 5 TABLET ORAL at 16:44

## 2024-05-28 RX ADMIN — SEVELAMER CARBONATE 800 MG: 800 TABLET, FILM COATED ORAL at 11:16

## 2024-05-28 RX ADMIN — MIDODRINE HYDROCHLORIDE 5 MG: 5 TABLET ORAL at 08:07

## 2024-05-28 RX ADMIN — SEVELAMER CARBONATE 800 MG: 800 TABLET, FILM COATED ORAL at 08:07

## 2024-05-28 RX ADMIN — MIDODRINE HYDROCHLORIDE 5 MG: 5 TABLET ORAL at 11:16

## 2024-05-28 RX ADMIN — HEPARIN SODIUM 2000 UNITS: 1000 INJECTION INTRAVENOUS; SUBCUTANEOUS at 15:44

## 2024-05-28 RX ADMIN — HEPARIN SODIUM 5000 UNITS: 5000 INJECTION INTRAVENOUS; SUBCUTANEOUS at 05:46

## 2024-05-28 RX ADMIN — DICLOFENAC SODIUM 2 G: 10 GEL TOPICAL at 08:07

## 2024-05-28 RX ADMIN — MECLIZINE HYDROCHLORIDE 12.5 MG: 25 TABLET ORAL at 08:06

## 2024-05-28 RX ADMIN — DIPHENHYDRAMINE HYDROCHLORIDE 25 MG: 50 INJECTION, SOLUTION INTRAMUSCULAR; INTRAVENOUS at 10:40

## 2024-05-28 RX ADMIN — TICAGRELOR 90 MG: 90 TABLET ORAL at 08:06

## 2024-05-28 RX ADMIN — BENZONATATE 100 MG: 100 CAPSULE ORAL at 08:07

## 2024-05-28 RX ADMIN — DOCUSATE SODIUM 100 MG: 100 CAPSULE, LIQUID FILLED ORAL at 08:06

## 2024-05-28 RX ADMIN — SEVELAMER CARBONATE 800 MG: 800 TABLET, FILM COATED ORAL at 16:44

## 2024-05-28 RX ADMIN — GABAPENTIN 100 MG: 100 CAPSULE ORAL at 08:06

## 2024-05-28 RX ADMIN — ASPIRIN 81 MG: 81 TABLET, COATED ORAL at 08:06

## 2024-05-28 NOTE — CARE COORDINATION
[facility] PODIATRY & FOOT SURGERY        Consult noted. Chart reviewed (including pics of wounds). Will see later today and full note to follow    Thank you!        Andre Martinez DPM, CWSP, AACFAS    [facility]  [address] [place] [address]  O: [phone]  F: [phone]    [facility]  [address]  [place] [address]  O: [phone]  F: [phone]    [facility]  [address]  [place] [address]  O: [phone]  F: [phone]    * Available via [employer] 24/7    
  Transition of Care Plan:     RUR: 22%  Prior Level of Functioning: Independent   Disposition: Inpatient rehab   If SNF or IPR: Date FOC offered: 5/20/24  Date FOC received: 5/20/24  Accepting facility: [facility]  Date authorization started with reference number: 5/23/24  Date authorization received and expires:   Follow up appointments: Defer to facility   DME needed: Defer to facility   Transportation at discharge: Pt will need Medicaid transport   IM/IMM Medicare/ letter given: N/A Medicaid   Is patient a  and connected with VA? No              If yes, was [place] transfer form completed and VA notified? No  Caregiver Contact:   Discharge Caregiver contacted prior to discharge? Pt will contacted   Care Conference needed? No  Barriers to discharge: Auth/Medical clearance/Placement       CM spoke to Ms. Head at [facility] and they have accept pt.    CM requested for Ms. Head to start auth for placement.    Mckayla Saravia      
CM acknowledged d/c order. Pts insurance auth is approved. ELBERT called Sonido @[facility] for bed availability. No answer, voicemail left. ELBERT will continue  to follow.     Yohan BlockBSW,CM  [phone]   
CM acknowledged discharge order, chart review completed. Called [facility] weekend liaison Candida [phone] for update, Candida reported that [facility] has not received any form of written notification from [employer] informing them that auth was approved, until they receive approval in writing [facility] cannot proceed with bed assignment for pt. CM will continue to follow.    Marci Vallejo, [facility]  Care Management  [phone]  
Care Management Initial Assessment       RUR: Observation   Readmission? Yes - D/C on 4/24/24 - Re admit on 5/16/24  1st IM letter given? No-Medicaid   1st  letter given: No    CM introduce self, explain role and confirmed demographics with pt.    Pt lives with her son and sister in an first floor apartment with no steps to enter.    Pt is currently open to [email or web address]. Referral sent to [email or web address].     Pt has a hx of using [employer].    Pt also has a hx of inpatient rehab-[facility].    No hx of SNF.    [facility] at 530 am - 10 am.    Pt uses "[facility] on [address].    At the time of d/c pt's family Vs needing Medicaid transportation.    CM will follow and assist with d/c planning.   05/17/24 1147   Service Assessment   Patient Orientation Alert and Oriented   Cognition Alert   History Provided By Patient;Medical Record   Primary Caregiver Self   Support Systems Children;/;Other (Comment);Home Care Staff  (Staff at dialysis)   Patient's Healthcare Decision Maker is: Named in Scanned ACP Document   PCP Verified by CM Yes   Last Visit to PCP Within last 6 months   Prior Functional Level Independent in ADLs/IADLs   Current Functional Level Independent in ADLs/IADLs   Can patient return to prior living arrangement Yes   Family able to assist with home care needs: Yes   Would you like for me to discuss the discharge plan with any other family members/significant others, and if so, who? Yes  (Pt's sister Irma Mcintosh)   Financial Resources Medicaid   Community Resources None   Social/Functional History   Lives With Family   Type of Home Apartment   Home Equipment Walker - Rolling;Wheelchair - Manual  (3:1 and Tub bench)   Active  No   Patient's  Info pt's family or Medicaid transportation   Discharge Planning   Type of Residence House   Current Services Prior To Admission Durable Medical Equipment;Home Care 
Transition of Care Plan:     RUR: 22%  Prior Level of Functioning: Independent   Disposition: Inpatient rehab   If SNF or IPR: Date FOC offered: 5/20/24  Date FOC received: 5/20/24  Accepting facility: [facility]  Date authorization started with reference number: 5/23/24  Date authorization received and expires: 5/24/24-5/30/24  Follow up appointments: Defer to facility   DME needed: Defer to facility   Transportation at discharge: Pt will need Medicaid transport   IM/IMM Medicare/ letter given: N/A Medicaid   Is patient a [place] and connected with VA? No              If yes, was [place] transfer form completed and VA notified? No  Caregiver Contact:   Discharge Caregiver contacted prior to discharge? Pt will contacted   Care Conference needed? No  Barriers to discharge: Bed    340 pm: ELBERT spoke to Cindy with [place] [employer] and was informed that pt has auth for placement at [facility] and the auth is good until 5/30/24.    CM spoke to MsChuck Sonido regarding a bed for pt.    Waiting for bed at [facility].     Pt is aware that she has been approve and auth for placement at [facility]. Just waiting for bed.       Initial Note: CM is aware of the d/c order but pt's auth is still pending for placement at [facility].    Hopefully wants [facility] gets auth they will have a bed for the pt.    CM has reached out to [facility] regarding auth and bed updated.    CM will follow and assist with d/c planning.    Mckayla Carmichaellan  Case manger    
Transition of Care Plan:     RUR: 22%  Prior Level of Functioning: Independent   Disposition: Inpatient rehab   If SNF or IPR: Date FOC offered: 5/20/24  Date FOC received: 5/20/24  Accepting facility: Pending   Date authorization started with reference number:   Date authorization received and expires:   Follow up appointments: Defer to facility   DME needed: Defer to facility   Transportation at discharge: Pt will need Medicaid transport   IM/IMM Medicare/ letter given: N/A Medicaid   Is patient a [place] and connected with VA? No              If yes, was [place] transfer form completed and VA notified? No  Caregiver Contact:   Discharge Caregiver contacted prior to discharge? Pt will contacted   Care Conference needed? No  Barriers to discharge: Auth/Medical clearance/Placement     CM spoke to Ms. Head at [facility] and she is looking into seeing if they can accommodate with pt getting dialysis four times a week.     Pt will need auth for placement.    CM will follow and assist with d/c planning.    Mckayla Saravia      
Transition of Care Plan:    RUR: 21%  Prior Level of Functioning: Independent   Disposition: Inpatient rehab   If SNF or IPR: Date FOC offered: 5/20/24  Date FOC received: 5/20/24  Accepting facility: Pending   Date authorization started with reference number:   Date authorization received and expires:   Follow up appointments: Defer to facility   DME needed: Defer to facility   Transportation at discharge: Pt will need Medicaid transport   IM/IMM Medicare/ letter given: N/A Medicaid   Is patient a  and connected with VA? No   If yes, was [place] transfer form completed and VA notified? No  Caregiver Contact:   Discharge Caregiver contacted prior to discharge? Pt will contacted   Care Conference needed? No  Barriers to discharge: Auth/Medical clearance/Placement         CM spoke to pt regarding the recommendation is for inpatient rehab.    Pt was agreeable for inpatient rehab and requested information to be sent to [facility].    CM sent referral to [facility].    CM will continue to follow and assist with d/c planning.    Mckayla Saravia      
manager

## 2024-05-28 NOTE — PLAN OF CARE
Problem: Discharge Planning  Goal: Discharge to home or other facility with appropriate resources  5/19/2024 2228 by Tariq Harrison RN  Outcome: Progressing  5/19/2024 1601 by Mario Gutiérrez RN  Outcome: Progressing     Problem: Pain  Goal: Verbalizes/displays adequate comfort level or baseline comfort level  5/19/2024 2228 by Tariq Harrison RN  Outcome: Progressing  5/19/2024 1601 by Mario Gutiérrez RN  Outcome: Progressing     Problem: Respiratory - Adult  Goal: Achieves optimal ventilation and oxygenation  5/19/2024 2228 by Tariq Harrison RN  Outcome: Progressing  5/19/2024 1940 by Mckayla Ash RT  Outcome: Progressing  5/19/2024 1516 by Christine Mtz RT  Outcome: Progressing     Problem: Safety - Adult  Goal: Free from fall injury  5/19/2024 2228 by Tariq Harrison RN  Outcome: Progressing  5/19/2024 1601 by Mario Gutiérrez RN  Outcome: Progressing     Problem: Chronic Conditions and Co-morbidities  Goal: Patient's chronic conditions and co-morbidity symptoms are monitored and maintained or improved  5/19/2024 2228 by Tariq Harrison RN  Outcome: Progressing  5/19/2024 1601 by Mario Gutiérrez RN  Outcome: Progressing     Problem: Physical Therapy - Adult  Goal: By Discharge: Performs mobility at highest level of function for planned discharge setting.  See evaluation for individualized goals.  Description: FUNCTIONAL STATUS PRIOR TO ADMISSION: Patient was modified independent using a rollator for functional mobility, only for short household distances. Patient used w/c when going to her dialysis appointments, denies using it inside the house. Indep with ADLs, occasionally needs help donning post-op shoe on R.     HOME SUPPORT PRIOR TO ADMISSION: The patient lived with son & sister who are able to provide assist as needed.    Physical Therapy Goals  Initiated 5/19/2024  1.  Patient will move from supine to sit and sit to supine, scoot up and down, and roll side to side in bed with modified 
  Problem: Discharge Planning  Goal: Discharge to home or other facility with appropriate resources  5/20/2024 1156 by Karmen Montano, RN  Outcome: Progressing  5/20/2024 0829 by Tricia Salamanca, RT  Outcome: Progressing  5/19/2024 2228 by Tariq Harrison, RN  Outcome: Progressing     
  Problem: Discharge Planning  Goal: Discharge to home or other facility with appropriate resources  5/28/2024 0947 by Manuel Zamarripa RN  Outcome: Progressing  5/28/2024 0350 by Angeles Leach RN  Outcome: Progressing     Problem: Pain  Goal: Verbalizes/displays adequate comfort level or baseline comfort level  5/28/2024 0947 by Manuel Zamarripa RN  Outcome: Progressing  5/28/2024 0350 by Angeles Leach RN  Outcome: Progressing  Flowsheets (Taken 5/27/2024 2010)  Verbalizes/displays adequate comfort level or baseline comfort level: Encourage patient to monitor pain and request assistance     Problem: Respiratory - Adult  Goal: Achieves optimal ventilation and oxygenation  5/28/2024 0947 by Manuel Zamarripa RN  Outcome: Progressing  5/28/2024 0350 by Angeles Leach RN  Outcome: Progressing  Flowsheets (Taken 5/27/2024 2010)  Achieves optimal ventilation and oxygenation: Assess for changes in respiratory status  5/27/2024 1955 by Mckayla Ash, RT  Outcome: Progressing     Problem: Safety - Adult  Goal: Free from fall injury  5/28/2024 0947 by Manuel Zamarripa RN  Outcome: Progressing  5/28/2024 0350 by Angeles Leach RN  Outcome: Progressing     Problem: Chronic Conditions and Co-morbidities  Goal: Patient's chronic conditions and co-morbidity symptoms are monitored and maintained or improved  5/28/2024 0947 by Manuel Zamarripa RN  Outcome: Progressing  5/28/2024 0350 by Angeles Leach RN  Outcome: Progressing     
  Problem: Discharge Planning  Goal: Discharge to home or other facility with appropriate resources  Outcome: Progressing     
  Problem: Discharge Planning  Goal: Discharge to home or other facility with appropriate resources  Outcome: Progressing     Problem: Pain  Goal: Verbalizes/displays adequate comfort level or baseline comfort level  Outcome: Progressing     Problem: Respiratory - Adult  Goal: Achieves optimal ventilation and oxygenation  5/18/2024 2137 by Tariq Harrison, RN  Outcome: Progressing  5/18/2024 2014 by Mckayla Ash, RT  Outcome: Progressing  5/18/2024 1718 by Christine Mtz, RT  Outcome: Progressing  Flowsheets (Taken 5/18/2024 0736 by Celeste Campbell, RN)  Achieves optimal ventilation and oxygenation: Assess for changes in respiratory status     Problem: Safety - Adult  Goal: Free from fall injury  Outcome: Progressing     Problem: Chronic Conditions and Co-morbidities  Goal: Patient's chronic conditions and co-morbidity symptoms are monitored and maintained or improved  Outcome: Progressing     
  Problem: Discharge Planning  Goal: Discharge to home or other facility with appropriate resources  Outcome: Progressing     Problem: Pain  Goal: Verbalizes/displays adequate comfort level or baseline comfort level  Outcome: Progressing     Problem: Respiratory - Adult  Goal: Achieves optimal ventilation and oxygenation  5/19/2024 1516 by Christine Mtz, RT  Outcome: Progressing     Problem: Safety - Adult  Goal: Free from fall injury  Outcome: Progressing     
  Problem: Discharge Planning  Goal: Discharge to home or other facility with appropriate resources  Outcome: Progressing  Flowsheets (Taken 5/21/2024 2100)  Discharge to home or other facility with appropriate resources: Identify barriers to discharge with patient and caregiver     Problem: Pain  Goal: Verbalizes/displays adequate comfort level or baseline comfort level  Outcome: Progressing  Flowsheets (Taken 5/21/2024 2100)  Verbalizes/displays adequate comfort level or baseline comfort level: Encourage patient to monitor pain and request assistance     Problem: Respiratory - Adult  Goal: Achieves optimal ventilation and oxygenation  Outcome: Progressing  Flowsheets (Taken 5/21/2024 2100)  Achieves optimal ventilation and oxygenation:   Assess for changes in respiratory status   Assess for changes in mentation and behavior   Position to facilitate oxygenation and minimize respiratory effort     Problem: Safety - Adult  Goal: Free from fall injury  Outcome: Progressing     Problem: Chronic Conditions and Co-morbidities  Goal: Patient's chronic conditions and co-morbidity symptoms are monitored and maintained or improved  Outcome: Progressing  Flowsheets (Taken 5/21/2024 2100)  Care Plan - Patient's Chronic Conditions and Co-Morbidity Symptoms are Monitored and Maintained or Improved: Monitor and assess patient's chronic conditions and comorbid symptoms for stability, deterioration, or improvement     
  Problem: Discharge Planning  Goal: Discharge to home or other facility with appropriate resources  Outcome: Progressing  Flowsheets (Taken 5/25/2024 2045 by Becky Carl RN)  Discharge to home or other facility with appropriate resources: Identify barriers to discharge with patient and caregiver     Problem: Pain  Goal: Verbalizes/displays adequate comfort level or baseline comfort level  Outcome: Progressing  Flowsheets  Taken 5/25/2024 2109 by Becky Carl RN  Verbalizes/displays adequate comfort level or baseline comfort level:   Encourage patient to monitor pain and request assistance   Assess pain using appropriate pain scale  Taken 5/25/2024 2045 by Becky Carl RN  Verbalizes/displays adequate comfort level or baseline comfort level:   Encourage patient to monitor pain and request assistance   Assess pain using appropriate pain scale     Problem: Respiratory - Adult  Goal: Achieves optimal ventilation and oxygenation  5/26/2024 1005 by Tricia Escudero RN  Outcome: Progressing  5/26/2024 0840 by Rashid Dominguez, RT  Outcome: Progressing  Flowsheets  Taken 5/26/2024 0840 by Rashid Dominguez, RT  Achieves optimal ventilation and oxygenation:   Assess for changes in respiratory status   Position to facilitate oxygenation and minimize respiratory effort   Respiratory therapy support as indicated   Oxygen supplementation based on oxygen saturation or arterial blood gases   Assess and instruct to report shortness of breath or any respiratory difficulty  Taken 5/25/2024 2045 by Becky Carl RN  Achieves optimal ventilation and oxygenation:   Assess for changes in respiratory status   Assess for changes in mentation and behavior   Position to facilitate oxygenation and minimize respiratory effort   Oxygen supplementation based on oxygen saturation or arterial blood gases     Problem: Safety - Adult  Goal: Free from fall injury  Outcome: Progressing     Problem: Chronic Conditions and 
  Problem: Occupational Therapy - Adult  Goal: By Discharge: Performs self-care activities at highest level of function for planned discharge setting.  See evaluation for individualized goals.  Description: FUNCTIONAL STATUS PRIOR TO ADMISSION:  Patient was ambulatory using RW and wheelchair to HD and overall mod I for ADLs, able to dress self and son helps with donning R post op shoe honly.   Receives Help From: Family, ADL Assistance: Independent (occasional help donning post-op shoe),  ,  ,  ,  ,  ,  , Ambulation Assistance: Independent (with rollator), Transfer Assistance: Independent, Active : No     HOME SUPPORT: Patient lived with family but didn't require assistance for ADLs, family completes most IADLs for patient    Occupational Therapy Goals:  Initiated 5/19/2024  1.  Patient will perform grooming with Supervision within 7 day(s).  2.  Patient will perform bathing with Supervision within 7 day(s).  3.  Patient will perform lower body dressing with Supervision within 7 day(s).  4.  Patient will perform toilet transfers with Supervision  within 7 day(s).  5.  Patient will perform all aspects of toileting with Supervision within 7 day(s).  6.  Patient will participate in upper extremity therapeutic exercise/activities with Supervision for 10 minutes within 7 day(s).    7.  Patient will utilize energy conservation techniques during functional activities with verbal cues within 7 day(s).   Outcome: Progressing     OCCUPATIONAL THERAPY EVALUATION    Patient: Candida Sherman (58 y.o. female)  Date: 5/19/2024  Primary Diagnosis: Near syncope [R55]  CVA (cerebrovascular accident due to intracerebral hemorrhage) ([facility]) [I61.9]         Precautions: Fall Risk                  ASSESSMENT :  The patient is limited by decreased functional mobility, independence in ADLs, strength, activity tolerance, balance, orthostatic hypotension s/p admission with syncope now with CVA with +MRI for acute/subacute L solitario CVA, with 
  Problem: Occupational Therapy - Adult  Goal: By Discharge: Performs self-care activities at highest level of function for planned discharge setting.  See evaluation for individualized goals.  Description: FUNCTIONAL STATUS PRIOR TO ADMISSION:  Patient was ambulatory using RW and wheelchair to HD and overall mod I for ADLs, able to dress self and son helps with donning R post op shoe honly.   Receives Help From: Family, ADL Assistance: Independent (occasional help donning post-op shoe),  ,  ,  ,  ,  ,  , Ambulation Assistance: Independent (with rollator), Transfer Assistance: Independent, Active : No     HOME SUPPORT: Patient lived with family but didn't require assistance for ADLs, family completes most IADLs for patient    Occupational Therapy Goals:  Initiated 5/19/2024  1.  Patient will perform grooming with Supervision within 7 day(s).  2.  Patient will perform bathing with Supervision within 7 day(s).  3.  Patient will perform lower body dressing with Supervision within 7 day(s).  4.  Patient will perform toilet transfers with Supervision  within 7 day(s).  5.  Patient will perform all aspects of toileting with Supervision within 7 day(s).  6.  Patient will participate in upper extremity therapeutic exercise/activities with Supervision for 10 minutes within 7 day(s).    7.  Patient will utilize energy conservation techniques during functional activities with verbal cues within 7 day(s).   Outcome: Progressing   OCCUPATIONAL THERAPY TREATMENT  Patient: Candida Sherman (58 y.o. female)  Date: 5/20/2024  Primary Diagnosis: Near syncope [R55]  CVA (cerebrovascular accident due to intracerebral hemorrhage) ([facility]) [I61.9]       Precautions: Fall Risk                Chart, occupational therapy assessment, plan of care, and goals were reviewed.    ASSESSMENT  Patient continues to benefit from skilled OT services and is progressing towards goals. Patient is received transferring to [facility] with [place]; OT taking over 
  Problem: Occupational Therapy - Adult  Goal: By Discharge: Performs self-care activities at highest level of function for planned discharge setting.  See evaluation for individualized goals.  Description: FUNCTIONAL STATUS PRIOR TO ADMISSION:  Patient was ambulatory using RW and wheelchair to HD and overall mod I for ADLs, able to dress self and son helps with donning R post op shoe honly.   Receives Help From: Family, ADL Assistance: Independent (occasional help donning post-op shoe),  ,  ,  ,  ,  ,  , Ambulation Assistance: Independent (with rollator), Transfer Assistance: Independent, Active : No     HOME SUPPORT: Patient lived with family but didn't require assistance for ADLs, family completes most IADLs for patient    Occupational Therapy Goals:  Initiated 5/19/2024 goals reviewed and updated 5-24  1.  Patient will perform grooming with Supervision within 7 day(s). Met upgrade to mod I in 7 days  2.  Patient will perform bathing with Supervision within 7 day(s). cont  3.  Patient will perform lower body dressing with Supervision within 7 day(s). cont  4.  Patient will perform toilet transfers with Supervision  within 7 day(s). cont  5.  Patient will perform all aspects of toileting with Supervision within 7 day(s). cont  6.  Patient will participate in upper extremity therapeutic exercise/activities including 6 pack hand exercises with Supervision for 10 minutes within 7 day(s).    7.  Patient will utilize energy conservation, WBS, skin care techniques during functional activities with verbal cues within 7 day(s).   Outcome: Progressing   OCCUPATIONAL THERAPY TREATMENT: WEEKLY REASSESSMENT    Patient: Candida Sherman (58 y.o. female)  Date: 5/24/2024  Primary Diagnosis: Near syncope [R55]  CVA (cerebrovascular accident due to intracerebral hemorrhage) ([facility]) [I61.9]       Precautions: Fall Risk, Bed Alarm, General Precautions, Weight Bearing (\"strict off loading\" L LE, cast shoe R LE (request MD 
  Problem: Pain  Goal: Verbalizes/displays adequate comfort level or baseline comfort level  5/22/2024 1042 by Ching Baron RN  Outcome: Progressing  Flowsheets (Taken 5/22/2024 0610 by Becky Carl RN)  Verbalizes/displays adequate comfort level or baseline comfort level:   Encourage patient to monitor pain and request assistance   Assess pain using appropriate pain scale  5/22/2024 0107 by Becky Carl RN  Outcome: Progressing  Flowsheets (Taken 5/21/2024 2100)  Verbalizes/displays adequate comfort level or baseline comfort level: Encourage patient to monitor pain and request assistance     Problem: Respiratory - Adult  Goal: Achieves optimal ventilation and oxygenation  5/22/2024 1042 by Ching Baron RN  Outcome: Progressing  5/22/2024 0107 by Becky Carl RN  Outcome: Progressing  Flowsheets (Taken 5/21/2024 2100)  Achieves optimal ventilation and oxygenation:   Assess for changes in respiratory status   Assess for changes in mentation and behavior   Position to facilitate oxygenation and minimize respiratory effort     Problem: Safety - Adult  Goal: Free from fall injury  5/22/2024 1042 by Ching Baron RN  Outcome: Progressing  5/22/2024 0107 by Becky Carl RN  Outcome: Progressing     Problem: Chronic Conditions and Co-morbidities  Goal: Patient's chronic conditions and co-morbidity symptoms are monitored and maintained or improved  5/22/2024 1042 by Ching Baron RN  Outcome: Progressing  Flowsheets (Taken 5/22/2024 0940)  Care Plan - Patient's Chronic Conditions and Co-Morbidity Symptoms are Monitored and Maintained or Improved: Monitor and assess patient's chronic conditions and comorbid symptoms for stability, deterioration, or improvement  5/22/2024 0107 by Becky Carl RN  Outcome: Progressing  Flowsheets (Taken 5/21/2024 2100)  Care Plan - Patient's Chronic Conditions and Co-Morbidity Symptoms are Monitored and Maintained or Improved: Monitor and assess 
  Problem: Pain  Goal: Verbalizes/displays adequate comfort level or baseline comfort level  5/23/2024 1329 by Angela Hansen, RN  Outcome: Progressing  Flowsheets (Taken 5/23/2024 1329)  Verbalizes/displays adequate comfort level or baseline comfort level:   Encourage patient to monitor pain and request assistance   Assess pain using appropriate pain scale   Administer analgesics based on type and severity of pain and evaluate response     Problem: Safety - Adult  Goal: Free from fall injury  5/23/2024 1329 by Angela Hansen, RN  Outcome: Progressing  Flowsheets (Taken 5/23/2024 1329)  Free From Fall Injury: Instruct family/caregiver on patient safety     
  Problem: Physical Therapy - Adult  Goal: By Discharge: Performs mobility at highest level of function for planned discharge setting.  See evaluation for individualized goals.  Description: FUNCTIONAL STATUS PRIOR TO ADMISSION: Patient was modified independent using a rollator for functional mobility, only for short household distances. Patient used w/c when going to her dialysis appointments, denies using it inside the house. Indep with ADLs, occasionally needs help donning post-op shoe on R.     HOME SUPPORT PRIOR TO ADMISSION: The patient lived with son & sister who are able to provide assist as needed.    Physical Therapy Goals  Initiated 5/19/2024  1.  Patient will move from supine to sit and sit to supine, scoot up and down, and roll side to side in bed with modified independence within 7 day(s).    2.  Patient will perform sit to stand with supervision/set-up within 7 day(s).  3.  Patient will transfer from bed to chair and chair to bed with supervision/set-up using the least restrictive device within 7 day(s).  4.  Patient will ambulate with supervision/set-up for 25 feet with the least restrictive device within 7 day(s).     Outcome: Progressing   PHYSICAL THERAPY TREATMENT    Patient: Candida Sherman (58 y.o. female)  Date: 5/24/2024  Diagnosis: Near syncope [R55]  CVA (cerebrovascular accident due to intracerebral hemorrhage) ([facility]) [I61.9] Near syncope      Precautions:  Right Lower Extremity Weight Bearing: Weight Bearing As Tolerated (verbal permission from Justin Philligami podiatrist) Left Lower Extremity Weight Bearing: Weight Bearing As Tolerated                  ASSESSMENT:  Patient continues to benefit from skilled PT services and is slowly progressing towards goals. Clarification received from podiatrist re WB tolerance on L - pt can WB as tolerated per podiatrist today.  Note pt has a fx of distal 5th metatarsal which occurred in April before this admission.  Pt has a cast shoe for B 
  Problem: Respiratory - Adult  Goal: Achieves optimal ventilation and oxygenation  5/24/2024 0843 by Rashid Dominguez, RT  Outcome: Progressing  Flowsheets (Taken 5/24/2024 0843)  Achieves optimal ventilation and oxygenation:   Assess for changes in respiratory status   Position to facilitate oxygenation and minimize respiratory effort   Respiratory therapy support as indicated   Oxygen supplementation based on oxygen saturation or arterial blood gases   Assess and instruct to report shortness of breath or any respiratory difficulty     
  Problem: Respiratory - Adult  Goal: Achieves optimal ventilation and oxygenation  5/24/2024 1956 by Anibal Pena RCP  Outcome: Progressing  5/24/2024 0843 by Rashid Dominguez, RT  Outcome: Progressing  Flowsheets  Taken 5/24/2024 0843 by Rashid Dominguez, RT  Achieves optimal ventilation and oxygenation:   Assess for changes in respiratory status   Position to facilitate oxygenation and minimize respiratory effort   Respiratory therapy support as indicated   Oxygen supplementation based on oxygen saturation or arterial blood gases   Assess and instruct to report shortness of breath or any respiratory difficulty  Taken 5/24/2024 0745 by Alberta Cano, RN  Achieves optimal ventilation and oxygenation: Assess for changes in respiratory status     
  Problem: Respiratory - Adult  Goal: Achieves optimal ventilation and oxygenation  5/25/2024 0740 by Rashid Dominguez, RT  Outcome: Progressing  Flowsheets (Taken 5/25/2024 0740)  Achieves optimal ventilation and oxygenation:   Assess for changes in respiratory status   Position to facilitate oxygenation and minimize respiratory effort   Respiratory therapy support as indicated   Oxygen supplementation based on oxygen saturation or arterial blood gases   Assess and instruct to report shortness of breath or any respiratory difficulty     
  Problem: Respiratory - Adult  Goal: Achieves optimal ventilation and oxygenation  5/25/2024 1940 by Anibal Pena RCP  Outcome: Progressing  Flowsheets (Taken 5/25/2024 0805 by Susan Sosa LPN)  Achieves optimal ventilation and oxygenation: Assess for changes in respiratory status  5/25/2024 0740 by Rashid Dominguez, RT  Outcome: Progressing  Flowsheets (Taken 5/25/2024 0740)  Achieves optimal ventilation and oxygenation:   Assess for changes in respiratory status   Position to facilitate oxygenation and minimize respiratory effort   Respiratory therapy support as indicated   Oxygen supplementation based on oxygen saturation or arterial blood gases   Assess and instruct to report shortness of breath or any respiratory difficulty     
  Problem: Respiratory - Adult  Goal: Achieves optimal ventilation and oxygenation  5/26/2024 0840 by Rashid Dominguez, RT  Outcome: Progressing  Flowsheets  Taken 5/26/2024 0840 by aRshid Dominguez, RT  Achieves optimal ventilation and oxygenation:   Assess for changes in respiratory status   Position to facilitate oxygenation and minimize respiratory effort   Respiratory therapy support as indicated   Oxygen supplementation based on oxygen saturation or arterial blood gases   Assess and instruct to report shortness of breath or any respiratory difficulty       
  Problem: Respiratory - Adult  Goal: Achieves optimal ventilation and oxygenation  5/27/2024 0839 by Rashid Dominguez, RT  Outcome: Progressing  Flowsheets  Taken 5/27/2024 0839 by Rashid Dominguez, RT  Achieves optimal ventilation and oxygenation:   Position to facilitate oxygenation and minimize respiratory effort   Assess for changes in respiratory status   Respiratory therapy support as indicated   Oxygen supplementation based on oxygen saturation or arterial blood gases   Assess and instruct to report shortness of breath or any respiratory difficulty       
-Patient A&O X4, VSS, RA.  -Upon assessment pt blood sugar at 354mg/dL, (insulin given as scheduled & per parameters) pt hypotensive asymptomatic, 250ml bolus given per MD orders. Will continue to monitor.  -At reassessment pt resting with eyes closed, regular respirations.      Problem: Discharge Planning  Goal: Discharge to home or other facility with appropriate resources  Outcome: Progressing  Flowsheets (Taken 5/16/2024 2000)  Discharge to home or other facility with appropriate resources: Identify barriers to discharge with patient and caregiver     Problem: Pain  Goal: Verbalizes/displays adequate comfort level or baseline comfort level  Outcome: Progressing  Flowsheets (Taken 5/16/2024 2000)  Verbalizes/displays adequate comfort level or baseline comfort level: Encourage patient to monitor pain and request assistance     Problem: Respiratory - Adult  Goal: Achieves optimal ventilation and oxygenation  5/17/2024 0129 by Angeles Leach, RN  Outcome: Progressing  5/16/2024 2030 by Mckayla Ash, RT  Outcome: Progressing  Flowsheets (Taken 5/16/2024 2000 by Angeles Leach, RN)  Achieves optimal ventilation and oxygenation: Assess for changes in respiratory status     Problem: Safety - Adult  Goal: Free from fall injury  Outcome: Progressing     
Patient A&O X4, VSS, RA. Pt c/o mild headache overnight, tylenol given as needed.  Pt resting with eyes closed, regular respirations.  CHG bath done, linens changed.      Problem: Discharge Planning  Goal: Discharge to home or other facility with appropriate resources  Outcome: Progressing     Problem: Pain  Goal: Verbalizes/displays adequate comfort level or baseline comfort level  Outcome: Progressing  Flowsheets (Taken 5/27/2024 2010)  Verbalizes/displays adequate comfort level or baseline comfort level: Encourage patient to monitor pain and request assistance     Problem: Respiratory - Adult  Goal: Achieves optimal ventilation and oxygenation  5/28/2024 0350 by Angeles Leach, RN  Outcome: Progressing  Flowsheets (Taken 5/27/2024 2010)  Achieves optimal ventilation and oxygenation: Assess for changes in respiratory status  5/27/2024 1955 by Mckayla Ash, RT  Outcome: Progressing     Problem: Safety - Adult  Goal: Free from fall injury  Outcome: Progressing     Problem: Chronic Conditions and Co-morbidities  Goal: Patient's chronic conditions and co-morbidity symptoms are monitored and maintained or improved  Outcome: Progressing     
Problem: Physical Therapy - Adult  Goal: By Discharge: Performs mobility at highest level of function for planned discharge setting.  See evaluation for individualized goals.  Description: FUNCTIONAL STATUS PRIOR TO ADMISSION: Patient was modified independent using a rollator for functional mobility, only for short household distances. Patient used w/c when going to her dialysis appointments, denies using it inside the house. Indep with ADLs, occasionally needs help donning post-op shoe on R.     HOME SUPPORT PRIOR TO ADMISSION: The patient lived with son & sister who are able to provide assist as needed.    Physical Therapy Goals  Initiated 5/19/2024  1.  Patient will move from supine to sit and sit to supine, scoot up and down, and roll side to side in bed with modified independence within 7 day(s).    2.  Patient will perform sit to stand with supervision/set-up within 7 day(s).  3.  Patient will transfer from bed to chair and chair to bed with supervision/set-up using the least restrictive device within 7 day(s).  4.  Patient will ambulate with supervision/set-up for 25 feet with the least restrictive device within 7 day(s).     Outcome: Progressing   PHYSICAL THERAPY EVALUATION    Patient: Candida Sherman (58 y.o. female)  Date: 5/19/2024  Primary Diagnosis: Near syncope [R55]  CVA (cerebrovascular accident due to intracerebral hemorrhage) ([facility]) [I61.9]       Precautions: Restrictions/Precautions: Fall Risk                    ASSESSMENT :   DEFICITS/IMPAIRMENTS:   The patient is limited by decreased functional mobility, strength, activity tolerance, balance, orthostatic hypotension, increased pain levels following admission for syncopal episode and GW. Found to have an acute to subacute L pontine stroke. PmHx significant for ESRD (attends HD 4x/week), DM with chronic wounds bilat feet, falls, and chronic rib fx.    Based on the impairments listed above patient is below her functional baseline. Orthostatic vitals 
she is unable to recall WBS instructions from MD yesterday    Functional Mobility and Transfers for ADLs:  Bed Mobility:  Bed Mobility Training  Scooting: Minimum assistance     Transfers:   Transfer Training  Transfer Training: Yes  Interventions: Safety awareness training;Verbal cues;Visual cues;Tactile cues;Weight shifting training/pressure relief  Sit to Stand: Minimum assistance;Additional time;Adaptive equipment;Assist X1  Stand to Sit: Minimum assistance;Adaptive equipment (decreased eccentic control with fatigue)  Bed to Chair: Minimum assistance;Moderate assistance;Additional time;Adaptive equipment;Assist X1 (requesting WBS clarification with \"offlaoding\" and foot ware orders with fx R 5th metatarsal and L wound between 4th and 5th digits)  Toilet Transfer: Moderate assistance;Additional time;Adaptive equipment;Assist X1 (limited by decreased standing balance, L shoe and R post op shoe this session)           Balance:     Balance  Sitting: Impaired  Sitting - Static: Good (unsupported)  Sitting - Dynamic: Fair (occasional)  Standing: Impaired  Standing - Static: Constant support;Fair;Poor (loss of balance backwards with sustained standing)  Standing - Dynamic: Fair;Poor      ADL Intervention:         Feeding: Modified independent          Grooming: Stand by assistance;Minimal assistance;Increased time to complete   Grooming Skilled Clinical Factors: seated in chair                    UE Dressing: Minimal assistance       LE Dressing: Maximum assistance;Dependent/Total;Increased time to complete  LE Dressing Skilled Clinical Factors: asking for clarification from MD, B LEs with ++ foot dressing limiting AROM at ankles, too thick for normal socks and shoes, post op shoes and orders for \"strict offloading\" need clarification    Toileting: Moderate assistance;Maximum assistance;Increased time to complete;Adaptive equipment  Toileting Skilled Clinical Factors: decreased standing balance, tolerance    Receptive

## 2024-05-28 NOTE — FLOWSHEET NOTE
Pre Dialysis:   05/27/24 1305   Treatment   Time On 1305   Time Off 1605   Treatment Goal 2L   Observations & Evaluations   Level of Consciousness 0   Heart Rhythm Regular   Respiratory Quality/Effort Unlabored   O2 Device None (Room air)   Skin Condition/Temp Dry;Warm   Abdomen Inspection Rounded;Soft   Edema Generalized   Vital Signs   /66   Temp 97.9 °F (36.6 °C)   Pulse 75   Respirations 18   Pain Assessment   Pain Assessment None - Denies Pain   Technical Checks   Dialysis Machine No. 09   RO Machine Number [id number]   Dialyzer Lot No. [id number]   Tubing Lot Number [id number]   All Connections Secure Yes   NS Bag Yes   Saline Line Double Clamped Yes   Dialyzer Revaclear 300   Prime Volume (mL) 250 mL   ICEBOAT I;C;E;B;O;A;T   RO Machine Log Sheet Completed Yes   Machine Alarm Self Test Completed;Passed   Air Foam Detector Tested;Proper Function   Extracorporeal Circuit Tested for Integrity Yes   Machine Conductivity 13.5   Manual Ph 7.4   Bleach Test (Neg) Yes   Bath Temperature 98.6 °F (37 °C)   Treatment Initiation   Dialyze Hours 3   Treatment  Initiation [place] Precautions maintained;Connections secured;Prime given;Venous Parameters set;Arterial Parameters set;Air foam detector engaged;Saline line double clamped;Revaclear Dialyzer   During Hemodialysis Assessment   Blood Flow Rate (ml/min) 400 ml/min   Arterial Pressure (mmHg) -210 mmHg   Venous Pressure (mmHg) 170   TMP 80      Access Visible Yes   Ultrafiltration Rate (ml/hr) 830 ml/hr   Ultrafiltration Removed (ml) 0 ml   Hemodialysis Central Access Left Subclavian   No placement date or time found.   Present on Admission/Arrival: Yes  Orientation: Left  Access Location: Subclavian   Continued need for line? Yes   Site Assessment Clean, dry & intact   CVC Lumen Status Infusing   Venous Lumen Status Brisk blood return;Flushed;Infusing   Arterial Lumen Status Brisk blood return;Flushed;Infusing   Line Care Ports disinfected;Connections checked and 
Pre HD note    05/21/24 0815   Vital Signs   /62   Temp 98 °F (36.7 °C)   Pulse 77   Respirations 16   Pain Assessment   Pain Assessment None - Denies Pain   Treatment   Time On 0815   Treatment Goal 2000   Observations & Evaluations   Level of Consciousness 0   Oriented X 4   Heart Rhythm Regular   Respiratory Quality/Effort Unlabored   O2 Device Nasal cannula   Bilateral Breath Sounds Diminished   Skin Condition/Temp Cool;Dry   Edema Generalized   Edema Generalized +2   RLE Edema +2   LLE Edema +2   Technical Checks   Dialysis Machine No. 03   RO Machine Number [id number]   Dialyzer Lot No. [id number]   Tubing Lot Number [id number]   All Connections Secure Yes   NS Bag Yes   Saline Line Double Clamped Yes   Dialyzer Revaclear 300   Prime Volume (mL) 300 mL   ICEBOAT I;C;E;B;O;A;T   RO Machine Log Sheet Completed Yes   Machine Alarm Self Test Completed;Passed   Air Foam Detector Tested;Proper Function;pH Reading   Extracorporeal Circuit Tested for Integrity Yes   Machine Conductivity 13.8   Machine Ph 7.2   Manual Ph 7.4   Bleach Test (Neg) Yes   Bath Temperature 98.1 °F (36.7 °C)   Dialysis Bath   K+ (Potassium) 3   Ca+ (Calcium) 2.5   Na+ (Sodium) 138   HCO3 (Bicarb) 35   Bicarbonate Concentrate Lot No. [id number]   Acid Concentrate Lot No. [id number]   Treatment Initiation   Dialyze Hours 4   Treatment  Initiation Universal Precautions maintained;Lines secured to patient;Connections secured;Prime given;Venous Parameters set;Arterial Parameters set;Air foam detector engaged;Dialysate;Saline line double clamped;Hemo-Safe Applied;Dialyzer;Revaclear Dialyzer;REV-300   Hemodialysis Central Access Left Subclavian   No placement date or time found.   Present on Admission/Arrival: Yes  Orientation: Left  Access Location: Subclavian   Continued need for line? Yes   Site Assessment Clean, dry & intact   Venous Lumen Status Brisk blood return;Flushed;Alcohol cap present   Arterial Lumen Status Sluggish blood 
Pre HD note    05/28/24 1153   Vital Signs   BP (!) 125/54   Temp 97.5 °F (36.4 °C)   Pulse 83   Respirations 20   Pain Assessment   Pain Assessment None - Denies Pain   Treatment   Time On 1153   Treatment Goal 2000   Observations & Evaluations   Level of Consciousness 0   Oriented X 4   Heart Rhythm Regular   Respiratory Quality/Effort Unlabored   O2 Device None (Room air)   Bilateral Breath Sounds Clear   Skin Condition/Temp Dry;Warm   Abdomen Inspection Obese;Soft   Edema Generalized   Edema Generalized +1   RUE Edema None   LUE Edema None   RLE Edema +1   LLE Edema +1   Facial Edema None   Technical Checks   Dialysis Machine No. 10   RO Machine Number [id number]   Dialyzer Lot No. [id number]   Tubing Lot Number [id number]   All Connections Secure Yes   NS Bag Yes   Saline Line Double Clamped Yes   Dialyzer Revaclear 300   Prime Volume (mL) 300 mL   ICEBOAT I;C;E;B;O;A;T   RO Machine Log Sheet Completed Yes   Machine Alarm Self Test Completed;Passed   Air Foam Detector Tested;Proper Function;pH Reading   Extracorporeal Circuit Tested for Integrity Yes   Machine Conductivity 13.8   Machine Ph 7.4   Manual Ph 7.4   Bleach Test (Neg) Yes   Bath Temperature 98.2 °F (36.8 °C)   Dialysis Bath   K+ (Potassium) 2   Ca+ (Calcium) 2.5   Na+ (Sodium) 140   HCO3 (Bicarb) 35   Bicarbonate Concentrate Lot No. [id number]   Acid Concentrate Lot No. [id number]   Treatment Initiation   Dialyze Hours 4   Treatment  Initiation Universal Precautions maintained;Lines secured to patient;Connections secured;Prime given;Venous Parameters set;Arterial Parameters set;Air foam detector engaged;Dialysate;Saline line double clamped;Hemo-Safe Applied;Dialyzer;Revaclear Dialyzer;[place];REV-300   Hemodialysis Central Access Left Subclavian   No placement date or time found.   Present on Admission/Arrival: Yes  Orientation: Left  Access Location: Subclavian   Continued need for line? Yes   Site Assessment Clean, dry & intact   Alcohol Cap Used No   Line 
Pre-HD   05/25/24 0827   Treatment   Time On 0830   Treatment Goal 2kg in 4hr   Observations & Evaluations   Level of Consciousness 0   Oriented X 4   Heart Rhythm Regular   Respiratory Quality/Effort Unlabored;Dyspnea with exertion   O2 Device None (Room air)   Bilateral Breath Sounds Clear;Diminished   Skin Condition/Temp Warm;Dry;Swollen/edematous  (foot wounds)   Appetite Good  (heard ordering tomato soup and a banana; Nephrology notified to make diet changes for lower potassium)   RLE Edema +1;Pitting   LLE Edema +1;Pitting   Vital Signs   /62   Temp 97.5 °F (36.4 °C)   Pulse 75   Respirations 18   Pain Assessment   Pain Assessment None - Denies Pain   Technical Checks   Dialysis Machine No. 09   RO Machine Number [id number]   Dialyzer Lot No. [id number]   Tubing Lot Number [id number]   All Connections Secure Yes   NS Bag Yes   Saline Line Double Clamped Yes   Dialyzer Revaclear 300   Prime Volume (mL) 200 mL   ICEBOAT I;C;E;B;O;A;T   RO Machine Log Sheet Completed Yes   Machine Alarm Self Test Completed;Passed   Air Foam Detector Tested;Proper Function   Extracorporeal Circuit Tested for Integrity Yes   Machine Conductivity 14   Machine Ph 7.4   Bleach Test (Neg) Yes   Bath Temperature 96.8 °F (36 °C)   Treatment Initiation   Dialyze Hours 4   Treatment  Initiation Universal Precautions maintained;Lines secured to patient;Connections secured;Prime given;Venous Parameters set;Arterial Parameters set;Air foam detector engaged;Saline line double clamped   During Hemodialysis Assessment   Blood Flow Rate (ml/min) 400 ml/min  (REVERSED; CVC also found with alcohol cap on venous lumen)   Arterial Pressure (mmHg) -200 mmHg   Venous Pressure (mmHg) 180   TMP 70      Comments Treatment initiated   Access Visible Yes   Ultrafiltration Rate (ml/hr) 730 ml/hr   Ultrafiltration Removed (ml) 0 ml   Hemodialysis Central Access Left Subclavian   No placement date or time found.   Present on Admission/Arrival: Yes  
Primary RN SBAR: REGGIE Hansen, RN  Patient Education provided: Procedural, Access care   Incapacitated Nurse edu. provided: Yes  Preferred Education method and Primary language: Verbal/English  Hospital associated wait time; reason: 45 minutes for transport to and from suite  Hepatitis B Surface Ag   Date/Time Value Ref Range Status   05/21/2024 08:27 AM <0.10 Index Final     Hep B S Ab   Date/Time Value Ref Range Status   05/21/2024 08:27 AM 5.18 mIU/mL Final        05/23/24 0754   Treatment   Time On 0754   Treatment Goal 2000   Observations & Evaluations   Level of Consciousness 0   Oriented X 4   Heart Rhythm Regular   Respiratory Quality/Effort Unlabored   O2 Device None (Room air)   Bilateral Breath Sounds Clear   Skin Color Other (comment)  (Appropriate for ethnicity)   Skin Condition/Temp Warm;Dry   Appetite Good   Abdomen Inspection Rounded   Bowel Sounds (All Quadrants) Active   RLE Edema +2   LLE Edema +2   Vital Signs   /67   Temp 98.3 °F (36.8 °C)   Pulse 76   Respirations 16   Pain Assessment   Pain Assessment None - Denies Pain   Technical Checks   Dialysis Machine No. 07   RO Machine Number [id number]   Dialyzer Lot No. [id number]   Tubing Lot Number [id number]   All Connections Secure Yes   NS Bag Yes   Saline Line Double Clamped Yes   Dialyzer Revaclear 300   Prime Volume (mL) 200 mL   ICEBOAT I;C;E;B;O;A;T   RO Machine Log Sheet Completed Yes   Machine Alarm Self Test Completed;Passed   Air Foam Detector Tested;Proper Function;pH Reading   Extracorporeal Circuit Tested for Integrity Yes   Machine Conductivity 13.8   Manual Ph 7.4   Bleach Test (Neg) Yes   Bath Temperature 97.7 °F (36.5 °C)   Treatment Initiation   Dialyze Hours 4   Treatment  Initiation Universal Precautions maintained;Lines secured to patient;Connections secured;Prime given;Venous Parameters set;Arterial Parameters set;Air foam detector engaged;Dialysate;Saline line double clamped;Revaclear Dialyzer;REV-300   During Hemodialysis 
(ml) 1500   Tolerated Treatment Good   Interventions Taken Medication;Ultrafiltration goal decreased   Patient Response to Treatment fair, uf decreased and albumin given   Physician Notified Yes   Primary RN SBAR: VAN Ace  Comments: Issues with BP dropping. Uf decreased and albumin given. No other issues. Pt transported back to room and report given to primary nurse.  VAN Dangelo completed patient assessment. RN reviewed LPN vital signs and procedure note. Tx completed. Reviewed by RN   
Clean;Heat Disinfect;Exterior Machine Disinfection   Rinseback Volume (ml) 200 ml     RN completed patient assessment. RN reviewed technicians vital signs and procedure note. Tx completed. Reviewed by VAN Mercado  Pt. Nickie hd well, bp soft. Admin. Albumin 25g with good results. All possible blood returned to pt. Report given to primary nurse  Karmen Montano RN

## 2024-05-28 NOTE — CONSULTS
[facility] PODIATRY & FOOT SURGERY     CONSULT NOTE      Subjective:         Date of Consultation: May 21, 2024     Referring Physician: Reagan Hawkins MD     Patient is a 58 y.o. female who is being seen for wounds to both feet.   Patient has a hx of arthritis, asthma, CHF, ESRD on HD, HTN, DM T2 with neuropathy and FARAZ. Pt presented to the ED on 5/16/2024 due to dizziness. She was noted to have bilateral foot wounds. States she does not have outpatient physician follow up for the wounds. Denies any overt LE trauma. Admits to chronic pain but states she does not feel her feet due to neuropathy. Denies any systemic signs of infx but admits to local drainage from the wounds. Denies any other LE complaints      Patient Active Problem List    Diagnosis Date Noted    CVA (cerebrovascular accident due to intracerebral hemorrhage) ([facility]) 05/18/2024    Cerebrovascular accident (CVA) due to thrombosis of left vertebral artery ([facility]) 05/17/2024    Dizziness 05/17/2024    Near syncope 05/16/2024    Cellulitis 04/25/2024    Stage 5 chronic kidney disease on chronic dialysis ([facility]) 11/06/2023    Uncontrolled type 2 diabetes mellitus with hyperglycemia ([facility]) 08/15/2023    Hyperglycemia 08/14/2023    Acute alteration in mental status 08/12/2023    Acute metabolic encephalopathy 08/12/2023    Septicemia ([facility]) 08/11/2023    Encephalopathy 08/11/2023    Subarachnoid hemorrhage ([facility]) 08/11/2023    Cellulitis of left lower extremity 08/07/2023    Diabetic ulcer of left heel associated with type 2 diabetes mellitus, with fat layer exposed ([facility]) 07/13/2023    Diabetic ulcer of left midfoot associated with type 2 diabetes mellitus, with fat layer exposed ([facility]) 07/13/2023    Acute kidney failure, unspecified ([facility]) 01/10/2022    Proteinuria 01/10/2022    Acute on chronic kidney failure ([facility]) 12/10/2021    Chronic heart failure ([facility]) 12/10/2021    CHF (congestive heart failure) ([facility]) 08/25/2021    GLADIS 
Consulted completed on 5/21/2024.  See previous documentation.  
(PROVENTIL) (2.5 MG/3ML) 0.083% nebulizer solution 2.5 mg  2.5 mg Nebulization Q6H PRN Collin Johnson MD        budesonide (PULMICORT) nebulizer suspension 500 mcg  0.5 mg Nebulization BID RT Collin Johnson MD   500 mcg at 05/17/24 0709    heparin (porcine) injection 5,000 Units  5,000 Units SubCUTAneous 3 times per day Collin Johnson MD   5,000 Units at 05/17/24 0521    midodrine (PROAMATINE) tablet 5 mg  5 mg Oral TID WC Collin Johnson MD   5 mg at 05/17/24 0817         Review of Systems:  Pertinent items are noted in the History of Present Illness.     Objective     Vital signs for last 24 hours:  BP (!) 96/46   Pulse 82   Temp 98.4 °F (36.9 °C) (Oral)   Resp 16   Ht 1.651 m (5' 5\")   Wt 106.6 kg (235 lb)   SpO2 98%   BMI 39.11 kg/m²         Recent Results (from the past 24 hour(s))   CBC with Auto Differential    Collection Time: 05/16/24 12:33 PM   Result Value Ref Range    WBC 10.4 3.6 - 11.0 K/uL    RBC 2.94 (L) 3.80 - 5.20 M/uL    Hemoglobin 9.5 (L) 11.5 - 16.0 g/dL    Hematocrit 28.6 (L) 35.0 - 47.0 %    MCV 97.3 80.0 - 99.0 FL    MCH 32.3 26.0 - 34.0 PG    MCHC 33.2 30.0 - 36.5 g/dL    RDW 15.8 (H) 11.5 - 14.5 %    Platelets 203 150 - 400 K/uL    MPV 10.1 8.9 - 12.9 FL    Nucleated RBCs 0.0 0  WBC    nRBC 0.00 0.00 - 0.01 K/uL    Neutrophils % 87 (H) 32 - 75 %    Lymphocytes % 5 (L) 12 - 49 %    Monocytes % 6 5 - 13 %    Eosinophils % 0 0 - 7 %    Basophils % 1 0 - 1 %    Immature Granulocytes % 1 (H) 0.0 - 0.5 %    Neutrophils Absolute 9.1 (H) 1.8 - 8.0 K/UL    Lymphocytes Absolute 0.5 (L) 0.8 - 3.5 K/UL    Monocytes Absolute 0.6 0.0 - 1.0 K/UL    Eosinophils Absolute 0.0 0.0 - 0.4 K/UL    Basophils Absolute 0.1 0.0 - 0.1 K/UL    Immature Granulocytes Absolute 0.1 (H) 0.00 - 0.04 K/UL    Differential Type SMEAR SCANNED      RBC Comment NORMOCYTIC, NORMOCHROMIC     Comprehensive Metabolic Panel    Collection Time: 05/16/24 12:33 PM   Result Value Ref Range    Sodium 130 (L) 136 - 145 
(ANTIVERT) tablet 12.5 mg  12.5 mg Oral TID    benzonatate (TESSALON) capsule 100 mg  100 mg Oral TID    lidocaine 4 % external patch 1 patch  1 patch TransDERmal Daily    acetaminophen (TYLENOL) tablet 650 mg  650 mg Oral Q4H PRN    amitriptyline (ELAVIL) tablet 50 mg  50 mg Oral Nightly    aspirin EC tablet 81 mg  81 mg Oral Daily    atorvastatin (LIPITOR) tablet 80 mg  80 mg Oral Nightly    clopidogrel (PLAVIX) tablet 75 mg  75 mg Oral Daily    insulin lispro (HUMALOG,ADMELOG) injection vial 14 Units  14 Units SubCUTAneous TID WC    insulin glargine (LANTUS) injection vial 48 Units  48 Units SubCUTAneous Nightly    sevelamer (RENVELA) tablet 800 mg  800 mg Oral TID WC    insulin lispro (HUMALOG,ADMELOG) injection vial 0-8 Units  0-8 Units SubCUTAneous TID WC    insulin lispro (HUMALOG,ADMELOG) injection vial 0-4 Units  0-4 Units SubCUTAneous Nightly    glucose chewable tablet 16 g  4 tablet Oral PRN    dextrose bolus 10% 125 mL  125 mL IntraVENous PRN    Or    dextrose bolus 10% 250 mL  250 mL IntraVENous PRN    glucagon injection 1 mg  1 mg SubCUTAneous PRN    dextrose 10 % infusion   IntraVENous Continuous PRN    sodium chloride flush 0.9 % injection 5-40 mL  5-40 mL IntraVENous 2 times per day    sodium chloride flush 0.9 % injection 5-40 mL  5-40 mL IntraVENous PRN    0.9 % sodium chloride infusion   IntraVENous PRN    ondansetron (ZOFRAN-ODT) disintegrating tablet 4 mg  4 mg Oral Q8H PRN    Or    ondansetron (ZOFRAN) injection 4 mg  4 mg IntraVENous Q6H PRN    polyethylene glycol (GLYCOLAX) packet 17 g  17 g Oral Daily PRN    acetaminophen (TYLENOL) tablet 650 mg  650 mg Oral Q6H PRN    Or    acetaminophen (TYLENOL) suppository 650 mg  650 mg Rectal Q6H PRN    albuterol (PROVENTIL) (2.5 MG/3ML) 0.083% nebulizer solution 2.5 mg  2.5 mg Nebulization Q6H PRN    budesonide (PULMICORT) nebulizer suspension 500 mcg  0.5 mg Nebulization BID RT    heparin (porcine) injection 5,000 Units  5,000 Units SubCUTAneous 3

## 2024-05-28 NOTE — DISCHARGE SUMMARY
Discharge Summary    Name: Candida Sherman  [id number]  YOB: 1965 (Age: 58 y.o.)   Date of Admission: 5/16/2024  Date of Discharge: 5/28/2024  Attending Physician: Suha Santos MD    Discharge Diagnosis:   Acute to subacute stroke- infarct left solitario  Acute on chronic hyponatremia  Dizziness, pre syncope  Falls at home  ESRD on HD  Chronic low blood pressure on midodrine  Peripheral arterial disease  Diabetic wounds on B/L feet  Chronic BLE edema  Constipation  Type II DM  Chronic B/L rib fracture  Hypertension  Morbid obesity  Asthma  FARAZ not on CPAP  Hx of CVA      Consultations:  IP WOUND CARE NURSE CONSULT TO EVAL  IP CONSULT TO NEPHROLOGY  IP CONSULT TO NEUROLOGY  IP CONSULT TO DIABETES MANAGEMENT  IP CONSULT TO PODIATRY  IP CONSULT TO VASCULAR SURGERY  IP CONSULT TO PODIATRY      Brief Admission History/Reason for Admission Per Kim Ferrera MD:   Candida Sherman is a 58 y.o.  female with PMHx significant for end-stage renal disease on hemodialysis, diabetes mellitus who presented to the ED with complaint of generalized weakness and lightheadedness.  5 days ago she fell, when she tried to reach out to  something.  She is complaining of being dizzy and feeling off balance.  She is complaining of headaches neck pain and lower back pain and rib pain.  Patient was unable to complete her dialysis session  In the ED, her vital signs were stable  Her lab work showed low hemoglobin with hemoglobin 9.5, BMP showed hyponatremia, elevated creatinine and elevated blood sugars  Her CT of the head showed no acute abnormality, CT of the chest showed chronic bilateral rib fracture, CT of cervical spine did not show any acute fracture    Brief Hospital Course by Main Problems:   Dizziness, presyncopal episodes  Chronic low BP on midodrine  MRI (+) acute to subacute infarct left solitario, small chronic infarct right corona radiata  CT head (-)  MRI (+) 1. Punctate acute to

## 2024-05-28 NOTE — PROGRESS NOTES
05/18/24        I have been asked to see this patient by Kim Ferrera MD  for advice/opinion re: -----ESKD                                                        Assessment:         ESKD on hemodialysis  Tunneled hemodialysis catheter  Dizziness/presyncopal episode  Peripheral arterial disease  Autonomic neuropathy  Anemia of chronic kidney disease  Obstructive sleep apnea  Chronic bilateral rib fracture Discussion:     On hemodialysis Tuesday, Thursday, Saturday and Monday-4 times a week.  Requiring midodrine for chronic hypotension  Sodium low at 132, potassium 3.7, hemoglobin at 8.6  TSH and neuroimaging are normal so far  Appreciate neurology workup  Appreciate hospitalist care    Plan:   Hemodialysis today.  Will use albumin for hypotension on dialysis.  2 L fluid removal                 Thanks for consulting me. Renal service will follow patient with you.Please don't hesitate to contact me if any questions arise of if I can assist in any manner.      Francesca Lopez MD  Cell no- [phone]  Available on perfect serve.          Signed By: Francesca Lopez MD     May 18, 2024           Consult Date: 5/18/2024        Subjective   Seen and examined while on dialysis.  Blood pressure is low but she has no symptoms.  She states as long as she is in bed she is not dizzy.  PMH:  Past Medical History:   Diagnosis Date    Arthritis     Asthma     Cancer (HCC)     CHF (congestive heart failure) (HCC) 2020    Chronic back pain     Chronic kidney disease     Diabetes (HCC)     Diabetic retinopathy (HCC)     Hypertension     Kidney failure 01/2022    Liver disease     MRSA (methicillin resistant staph aureus) culture positive     labial abscess, negative test 2020    Neuropathy     Sleep apnea     Appointment made but not attended    Stroke (HCC) 2018     PSH:  Past Surgical 
                                                                                                                                         05/23/24        I have been asked to see this patient by Collin Johnson MD  for advice/opinion re: -----ESKD                                                        Assessment:         ESKD on hemodialysis - [facility]  Tunneled hemodialysis catheter  Dizziness/presyncopal episode  Peripheral arterial disease  Autonomic neuropathy  Anemia of chronic kidney disease  Obstructive sleep apnea  Chronic bilateral rib fracture Discussion:     On hemodialysis Tuesday, Thursday, Saturday and Monday-4 times a week.  Requiring midodrine for chronic hypotension  TSH and neuroimaging are normal so far  Appreciate neurology workup  Appreciate hospitalist care    Plan:   Seen on HD at 0845.  BP stable.    Fluid restrict 1500 cc a day  Will use albumin for hypotension on dialysis.  3 L fluid removal  H/H not at goal.  Start SAHRA                           Signed By: Wayne Branham MD     May 23, 2024                 Subjective     \"I feel OK.\"  No N/V.  No dyspnea.          PMH:  Past Medical History:   Diagnosis Date    Arthritis     Asthma     Cancer (HCC)     CHF (congestive heart failure) ([facility]) 2020    Chronic back pain     Chronic kidney disease     Diabetes (HCC)     Diabetic retinopathy (HCC)     Hypertension     Kidney failure 01/2022    Liver disease     MRSA (methicillin resistant staph aureus) culture positive     labial abscess, negative test 2020    Neuropathy     Sleep apnea     Appointment made but not attended    Stroke ([facility]) 2018     PSH:  Past Surgical History:   Procedure Laterality Date    CARPAL TUNNEL RELEASE Right 1986    CT BIOPSY RENAL  1/10/2022    CT BIOPSY RENAL 1/10/2022 [facility] RAD CT    FRACTURE SURGERY Right     HUMERUS    HEENT      tonsillectomy    ORTHOPEDIC SURGERY      left ft bunionectomy x2    OTHER SURGICAL HISTORY      lanced labial abscess    
                                                                                                                                         05/24/24        I have been asked to see this patient by Suha Santos MD  for advice/opinion re: -----ESKD                                                        Assessment:         ESKD on hemodialysis - [facility]  Tunneled hemodialysis catheter  Dizziness/presyncopal episode  Peripheral arterial disease  Autonomic neuropathy  Anemia of chronic kidney disease  Obstructive sleep apnea  Chronic bilateral rib fracture Discussion:     On hemodialysis Tuesday, Thursday, Saturday and Monday-4 times a week.  Requiring midodrine for chronic hypotension  TSH and neuroimaging are normal so far  Appreciate neurology workup  Appreciate hospitalist care    Plan:   No acute need for HD today.  Plan HD in AM.  Fluid restrict 1500 cc a day  Will use albumin for hypotension on dialysis.  3 L fluid removal  H/H not at goal.  Start SAHRA                           Signed By: Wayne Branham MD     May 24, 2024                 Subjective     \"I feel fine.\"  No N/V.  No dyspnea.          PMH:  Past Medical History:   Diagnosis Date    Arthritis     Asthma     Cancer (HCC)     CHF (congestive heart failure) ([facility]) 2020    Chronic back pain     Chronic kidney disease     Diabetes (HCC)     Diabetic retinopathy (HCC)     Hypertension     Kidney failure 01/2022    Liver disease     MRSA (methicillin resistant staph aureus) culture positive     labial abscess, negative test 2020    Neuropathy     Sleep apnea     Appointment made but not attended    Stroke (HCC) 2018     PSH:  Past Surgical History:   Procedure Laterality Date    CARPAL TUNNEL RELEASE Right 1986    CT BIOPSY RENAL  1/10/2022    CT BIOPSY RENAL 1/10/2022 [facility] RAD CT    FRACTURE SURGERY Right     HUMERUS    HEENT      tonsillectomy    ORTHOPEDIC SURGERY      left ft bunionectomy x2    OTHER SURGICAL HISTORY      lanced labial abscess 
                                                                                                                                         05/25/24        I have been asked to see this patient by Suha Santos MD  for advice/opinion re: -----ESKD                                                        Assessment:         ESKD on hemodialysis - [facility]  Tunneled hemodialysis catheter  Dizziness/presyncopal episode  Peripheral arterial disease  Autonomic neuropathy  Anemia of chronic kidney disease  Obstructive sleep apnea  Chronic bilateral rib fracture Discussion:     On hemodialysis Tuesday, Thursday, Saturday and Monday-4 times a week.  Requiring midodrine for chronic hypotension  TSH and neuroimaging are normal so far  Appreciate neurology workup  Appreciate hospitalist care    Plan:   HD today.  Sodium down due to excess fluid intake.  Fluid restrict 1500 cc a day  Will use albumin for hypotension on dialysis.  3 L fluid removal  H/H not at goal.  Continue SAHRA                           Signed By: Wayne Branham MD     May 25, 2024                 Subjective     \"I feel fine.\"  No N/V.  No dyspnea.          PMH:  Past Medical History:   Diagnosis Date    Arthritis     Asthma     Cancer (HCC)     CHF (congestive heart failure) ([facility]) 2020    Chronic back pain     Chronic kidney disease     Diabetes (HCC)     Diabetic retinopathy (HCC)     Hypertension     Kidney failure 01/2022    Liver disease     MRSA (methicillin resistant staph aureus) culture positive     labial abscess, negative test 2020    Neuropathy     Sleep apnea     Appointment made but not attended    Stroke ([facility]) 2018     PSH:  Past Surgical History:   Procedure Laterality Date    CARPAL TUNNEL RELEASE Right 1986    CT BIOPSY RENAL  1/10/2022    CT BIOPSY RENAL 1/10/2022 [facility] RAD CT    FRACTURE SURGERY Right     HUMERUS    HEENT      tonsillectomy    ORTHOPEDIC SURGERY      left ft bunionectomy x2    OTHER SURGICAL HISTORY      lanced 
      Hospitalist Progress Note    NAME:   Candida Sherman   : 1965   MRN: [id number]     Date/Time: 2024 11:06 PM  Patient PCP: Boyd Donato MD    Estimated discharge date: ?  Barriers: podiatry consult, PT OT recc rehab placement    Assessment / Plan:  Dizziness, presyncopal episodes  Chronic low BP on midodrine  MRI (+) acute to subacute infarct left solitario, small chronic infarct right corona radiata  CT head (-)  MRI (+) 1. Punctate acute to subacute infarct in the left solitario. 2. Unchanged generalized parenchymal volume loss and mild chronic microvascular ischemic disease. Small chronic infarct in the right corona radiata.    MRA of the head and neck (-)   Note than neuro is changing Plavix to Brilinta   PT/OT eval tx  Cont ASA, statin, plavix  Neurology consulted  Scheduled meclizine helping with dizziness      ESRD on HD Monday, Tuesday, Thursday, Saturday  Nephrology following, appreciate expertise  Monitor BUN/Creat  Avoid known nephrotoxic agents  Replete electrolytes  Next HD   Left chest HD PermCath  Has RUE old fistula which is non-functioning--has a suture and what looks to be a small drain in, pt asking for it to be removed as it is overdue.  Reviewed EMR, unable to see information about this procedure, pt reports it was performed by Dr. Rivera.  Will consult them per pt request to see about removing.  Eventual plan for LUE fistula creation--left arm preservation precautions.    PAD  Diabetic wounds on bilateral feet 4th and 5th toes  Chronic BLE edema  Appreciate wound care expertise  Podiatry consulted,  -notified Dr Martinez via PS directly as nursing staff having trouble calling consult to office line    Diabetes mellitus type 2  HgbA1C 9.9  Accuchecks ACHS  Sliding scale insulin  Monitor for s/s hypo/hyperglycemia  Hypoglycemia treatment per protocol  Diabetes mgmt consulted,    Falls at home  Chronic bilateral rib fracture  Pain control  PT/OT 
      Hospitalist Progress Note    NAME:   Candida Sherman   : 1965   MRN: [id number]     Date/Time: 2024 2:35 PM  Patient PCP: Boyd Donato MD    Estimated discharge date: ?  Barriers: neuro clearance, plavix assays, PT/OT eval/tx, anticipate might need placement for rehab, podiatry consult      Assessment / Plan:  Dizziness, presyncopal episodes  Chronic low BP on midodrine  MRI (+) acute to subacute infarct left solitario, small chronic infarct right corona radiata  CT head (-)  MRI (+) 1. Punctate acute to subacute infarct in the left solitario. 2. Unchanged generalized parenchymal volume loss and mild chronic microvascular ischemic disease. Small chronic infarct in the right corona radiata.    MRA of the head and neck pending  P2Y12 assay pending to r/o Plavix non-responding  Note than neuro is changing Plavix to Brilinta   PT/OT eval tx  Passed nurse swallow, OK to resume diet  Cont ASA, statin, plavix  Neurology consulted, appreciate expertise  Scheduled meclizine  Pt seen in HD today, says she isn't sure if the dizziness is better as she hasn't been up OOB    ESRD on HD Monday, Tuesday, Thursday, Saturday  Nephrology following, appreciate expertise  Monitor BUN/Creat  Avoid known nephrotoxic agents  Replete electrolytes  Seen in HD, tolerating well    PAD  Diabetic wounds on bilateral feet 4th and 5th toes  Chronic BLE edema  Appreciate wound care expertise  Podiatry consulted--msg Dr Martinez via PS as nursing staff having trouble calling consult to office line    Diabetes mellitus type 2  HgbA1C 9.9  Accuchecks ACHS  Sliding scale insulin  Monitor for s/s hypo/hyperglycemia  Hypoglycemia treatment per protocol  Diabetes mgmt consulted    Falls at home  Chronic bilateral rib fracture  Pain control  PT/OT eval/tx    Hypertension - Not on BP meds; now with chronic low BP on midodrine d/t HD    Morbid obesity  Encouraged weight loss    Asthma, Jose Juan not on cpap   History of CVA    Medical 
      Hospitalist Progress Note    NAME:   Candida Sherman   : 1965   MRN: [id number]     Date/Time: 2024 8:10 PM  Patient PCP: Boyd Donato MD    Estimated discharge date: ?  Barriers: podiatry consult, PT OT recc rehab placement    Assessment / Plan:  Dizziness, presyncopal episodes  Chronic low BP on midodrine  MRI (+) acute to subacute infarct left solitario, small chronic infarct right corona radiata  CT head (-)  MRI (+) 1. Punctate acute to subacute infarct in the left solitario. 2. Unchanged generalized parenchymal volume loss and mild chronic microvascular ischemic disease. Small chronic infarct in the right corona radiata.    MRA of the head and neck (-)   Note than neuro is changing Plavix to Brilinta   PT/OT eval tx -- recc rehab placement  Passed nurse swallow, OK to resume diet  Cont ASA, statin, plavix  Neurology consulted, appreciate expertise  Scheduled meclizine helping with dizziness    ESRD on HD Monday, Tuesday, Thursday, Saturday  Nephrology following, appreciate expertise  Monitor BUN/Creat  Avoid known nephrotoxic agents  Replete electrolytes  Next HD   Left chest HD PermCath  Has RUE old fistula which is non-functioning--has a suture and what looks to be a small drain in, pt asking for it to be removed as it is overdue.  Reviewed EMR, unable to see information about this procedure, pt reports it was performed by Dr. Rivera.  Will consult them per pt request to see about removing.  Eventual plan for LUE fistula creation--left arm preservation precautions.    PAD  Diabetic wounds on bilateral feet 4th and 5th toes  Chronic BLE edema  Appreciate wound care expertise  Podiatry consulted, pending--notified Dr Martinez via PS directly as nursing staff having trouble calling consult to office line    Diabetes mellitus type 2  HgbA1C 9.9  Accuchecks ACHS  Sliding scale insulin  Monitor for s/s hypo/hyperglycemia  Hypoglycemia treatment per protocol  Diabetes mgmt 
      Hospitalist Progress Note    NAME:   Candida Sherman   : 1965   MRN: [id number]     Date/Time: 2024 2:25 PM  Patient PCP: Boyd Donato MD    Estimated discharge date:   Barriers: hyponatremia     Assessment / Plan:  Dizziness, presyncopal episodes  Chronic low BP on midodrine  MRI (+) acute to subacute infarct left solitario, small chronic infarct right corona radiata  CT head (-)  MRI (+) 1. Punctate acute to subacute infarct in the left solitario. 2. Unchanged generalized parenchymal volume loss and mild chronic microvascular ischemic disease. Small chronic infarct in the right corona radiata.    MRA of the head and neck (-)   Note than neuro is changing Plavix to Brilinta   PT/OT eval tx  Cont ASA, statin, plavix  Neurology consulted  Scheduled meclizine helping with dizziness      ESRD on HD Monday, Tuesday, Thursday, Saturday  Nephrology following, appreciate expertise  Monitor BUN/Creat  Avoid known nephrotoxic agents  Replete electrolytes  Next HD   Left chest HD PermCath  Has RUE old fistula which is non-functioning--has a suture and what looks to be a small drain in, pt asking for it to be removed as it is overdue.  Reviewed EMR, unable to see information about this procedure, pt reports it was performed by Dr. Rivera.  Will consult them per pt request to see about removing.  Eventual plan for LUE fistula creation--left arm preservation precautions.    PAD  Diabetic wounds on bilateral feet 4th and 5th toes  Chronic BLE edema  Appreciate wound care expertise  Podiatry consulted, appreciate input, continue wound care      Diabetes mellitus type 2  HgbA1C 9.9  Accuchecks ACHS  Sliding scale insulin  Monitor for s/s hypo/hyperglycemia  Hypoglycemia treatment per protocol  Diabetes mgmt consulted,    Chronic hyponatremia most likely due to hemodialysis status  Sodium around 120, getting HD today  Falls at home  Chronic bilateral rib fracture  Pain control  PT/OT eval/tx    Hypertension 
      Hospitalist Progress Note    NAME:   Candida Sherman   : 1965   MRN: [id number]     Date/Time: 2024 4:08 PM  Patient PCP: Boyd Donato MD    Estimated discharge date:   Barriers:patient is medically ready for discharge, awaiting insurance auth    Assessment / Plan:  Dizziness, presyncopal episodes  Chronic low BP on midodrine  MRI (+) acute to subacute infarct left solitario, small chronic infarct right corona radiata  CT head (-)  MRI (+) 1. Punctate acute to subacute infarct in the left solitario. 2. Unchanged generalized parenchymal volume loss and mild chronic microvascular ischemic disease. Small chronic infarct in the right corona radiata.    MRA of the head and neck (-)   Note than neuro is changing Plavix to Brilinta   PT/OT eval tx  Cont ASA, statin, plavix  Neurology consulted  Scheduled meclizine helping with dizziness      ESRD on HD Monday, Tuesday, Thursday, Saturday  Nephrology following, appreciate expertise  Monitor BUN/Creat  Avoid known nephrotoxic agents  Replete electrolytes    Left chest HD PermCath  Has RUE old fistula which is non-functioning-had suture- suture removed    PAD  Diabetic wounds on bilateral feet 4th and 5th toes  Chronic BLE edema  Appreciate wound care expertise  Podiatry consulted, appreciate input, continue wound care      Diabetes mellitus type 2  HgbA1C 9.9  Accuchecks ACHS  Sliding scale insulin  Monitor for s/s hypo/hyperglycemia  Hypoglycemia treatment per protocol  Diabetes mgmt consulted,    Chronic hyponatremia most likely due to hemodialysis status  Sodium around 120, getting HD today  Falls at home  Chronic bilateral rib fracture  Pain control  PT/OT eval/tx    Hypertension - Not on BP meds; now with chronic low BP on midodrine d/t HD    Morbid obesity  Encouraged weight loss    Asthma, Jose Juan not on cpap   History of CVA    Medical Decision Making:   I personally reviewed labs: CBC, BMP  I personally reviewed imaging:   I personally reviewed 
      Hospitalist Progress Note    NAME:   Candida Sherman   : 1965   MRN: [id number]     Date/Time: 2024 2:06 PM  Patient PCP: Boyd Donato MD    Estimated discharge date:   Barriers:patient is medically ready for discharge, awaiting bed availability at [facility]    Assessment / Plan:  Dizziness, presyncopal episodes  Chronic low BP on midodrine  MRI (+) acute to subacute infarct left solitario, small chronic infarct right corona radiata  CT head (-)  MRI (+) 1. Punctate acute to subacute infarct in the left solitario. 2. Unchanged generalized parenchymal volume loss and mild chronic microvascular ischemic disease. Small chronic infarct in the right corona radiata.    MRA of the head and neck (-)   Note than neuro is changing Plavix to Brilinta   PT/OT eval tx  Cont ASA, statin, plavix  Neurology consulted  Scheduled meclizine helping with dizziness      ESRD on HD Monday, Tuesday, Thursday, Saturday  Nephrology following, appreciate expertise  Monitor BUN/Creat  Avoid known nephrotoxic agents  Replete electrolytes    Left chest HD PermCath  Has RUE old fistula which is non-functioning-had suture- suture removed    PAD  Diabetic wounds on bilateral feet 4th and 5th toes  Chronic BLE edema  Appreciate wound care expertise  Podiatry consulted, appreciate input, continue wound care      Diabetes mellitus type 2  HgbA1C 9.9  Accuchecks ACHS  Sliding scale insulin  Monitor for s/s hypo/hyperglycemia  Hypoglycemia treatment per protocol  Diabetes mgmt consulted,    Acute on Chronic hyponatremia likely due to excess fluid intake  Falls at home  Chronic bilateral rib fracture  Pain control  PT/OT eval/tx  Fluid removal as per nephrology  Fluid restriction to 1500 cc per day    Hypertension - Not on BP meds; now with chronic low BP on midodrine d/t HD    Morbid obesity  Encouraged weight loss    Asthma, Jose Juan not on cpap   History of CVA    Medical Decision Making:   I personally reviewed labs:blood sugar  I personally 
      Hospitalist Progress Note    NAME:   Candida Sherman   : 1965   MRN: [id number]     Date/Time: 2024 6:41 PM  Patient PCP: Boyd Donato MD    Estimated discharge date: ?  Barriers: neuro clearance, MRA head/neck, plavix assays, PT/OT eval/tx, anticipate might need placement for rehab      Assessment / Plan:  Dizziness, presyncopal episodes  Chronic low BP on midodrine  MRI (+) acute to subacute infarct left solitario, small chronic infarct right corona radiata  CT head (-)  MRI (+) 1. Punctate acute to subacute infarct in the left solitario. 2. Unchanged generalized parenchymal volume loss and mild chronic microvascular ischemic disease. Small chronic infarct in the right corona radiata.    MRA of the head and neck pending  P2Y12 assay pending to r/o Plavix non-responding  PT/OT eval tx  Passed nurse swallow, OK to resume diet  Cont ASA, statin, plavix  Neurology consulted, appreciate expertise  Scheduled meclizine    ESRD on HD  Nephrology following, appreciate expertise  Monitor BUN/Creat  Avoid known nephrotoxic agents  Replete electrolytes    PAD  Diabetic wounds on bilateral feet 4th and 5th toes  Chronic BLE edema  Appreciate wound care expertise  Podiatry consulted    Diabetes mellitus type 2  HgbA1C 9.9  Accuchecks ACHS  Sliding scale insulin  Monitor for s/s hypo/hyperglycemia  Hypoglycemia treatment per protocol  Diabetes mgmt consulted    Falls at home  Chronic bilateral rib fracture  Pain control  PT/OT eval/tx    Hypertension - Not on BP meds; now with chronic low BP on midodrine d/t HD    Morbid obesity  Encouraged weight loss    Asthma, Jose Juan not on cpap   History of CVA    Medical Decision Making:   I personally reviewed labs: CBC, BMP  I personally reviewed imaging: CT chest, head, c-spine  I personally reviewed EKG:  Toxic drug monitoring: nephrotoxicity  Discussed case with:   Patient, Nurse, Attending MD    Code Status: Full Code  DVT Prophylaxis: Heparin  GI Prophylaxis: 
   05/22/24 1230 05/22/24 1245 05/22/24 1254   Vital Signs   Pulse 82 (!) 110 98   BP (!) 103/54 113/65  --    MAP (Calculated) 70 81  --    BP Location Left upper arm Left upper arm  --    BP Method Automatic Automatic  --    Patient Position Sitting;Up in chair Standing  --      O2 sats 98% on room air with activity; no pain reported  
  [facility] SURGERY    Progress Note    Date:2024       Room:[address] [place] [address]  Patient Name:Candida Sherman     YOB: 1965     Age:58 y.o.    Subjective    Subjective   Pt seen at BS. Denies any new complaints. Per nursing, no acute overnight events       Review of Systems  Constitutional: No fever, No chills, No sweats, + weakness, No fatigue  Respiratory: No shortness of breath, No cough, No sputum production, No hemoptysis, No wheezing, No cyanosis.  Cardiovascular: No chest pain, No palpitations, No bradycardia, No tachycardia, No peripheral edema, No syncope.  Gastrointestinal: No nausea, No vomiting, No diarrhea, No constipation, No heartburn, No abdominal pain.  Endocrine: No excessive thirst, No polyuria, No cold intolerance, No heat intolerance, No excessive hunger.  Immunologic: + immunocompromised, No recurrent fevers, No recurrent infections.  Musculoskeletal: No back pain, No neck pain, No joint pain, No muscle pain, No claudication, No decreased range of motion, No trauma.  Integumentary: + Wounds, No rash, No pruritus, No abrasions.  Neurologic: Alert and oriented X4, No abnormal balance, No headache, No confusion, + numbness, No tingling.  Psychiatric: No anxiety, No depression, No aj.       Objective         Vitals Last 24 Hours:  TEMPERATURE:  Temp  Av.8 °F (36.6 °C)  Min: 97.1 °F (36.2 °C)  Max: 98.4 °F (36.9 °C)  RESPIRATIONS RANGE: Resp  Av  Min: 16  Max: 20  PULSE OXIMETRY RANGE: SpO2  Av %  Min: 97 %  Max: 99 %  PULSE RANGE: Pulse  Av.6  Min: 75  Max: 83  BLOOD PRESSURE RANGE: Systolic (24hrs), Av , Min:100 , Max:141   ; Diastolic (24hrs), Av, Min:35, Max:61    I/O (24Hr):    Intake/Output Summary (Last 24 hours) at 2024 1629  Last data filed at 2024 1553  Gross per 24 hour   Intake 4920 ml   Output 2800 ml   Net 2120 ml     Objective  GEN: Pt is AAOx4 and in NAD. Dressings noted to B/L LE are 
  Physical therapy     Chart reviewed in prep for skilled OT treatment; however, pt NATALIYA for HD.  Of note, still attempting to clarify WB orders; RN aware and following. PT to defer and continue to follow.     Thank you,        
..End of Shift Note    Bedside shift change report given to VAN Potter (oncoming nurse) by Mario Gutiérrez RN (offgoing nurse).  Report included the following information SBAR    Shift worked:  0700 - 1900     Shift summary and any significant changes:    Pt. Tolerated all medication w/o issue.  No c/o pain or distress.  Worked with PT during day shift.  Continue to monitor blood sugar.       Concerns for physician to address:  No     Zone phone for oncoming shift:  No       Activity:     Number times ambulated in hallways past shift: 0  Number of times OOB to chair past shift: 0    Cardiac:   Cardiac Monitoring: Yes           Access:  Current line(s): PIV     Genitourinary:   Urinary status: voiding    Respiratory:      Chronic home O2 use?: NO  Incentive spirometer at bedside: NO       GI:     Current diet:  ADULT DIET; Regular; 3 carb choices (45 gm/meal); Low Fat/Low Chol/High Fiber/2 gm Na  Passing flatus: YES  Tolerating current diet: YES       Pain Management:   Patient states pain is manageable on current regimen: YES    Skin:     Interventions: float heels and increase time out of bed    Patient Safety:  Fall Score:    Interventions: bed/chair alarm and pt to call before getting OOB       Length of Stay:  Expected LOS: 4  Actual LOS: 1      Mario Gutiérrez RN                            
Attempted report to [facility]. (Trista) Nurse is in with another patient will return call per .   
Attempted to call podiatry consult multiple times.  The recording stated that they were unable to take the call and the like hung up.  This RN was able to leave a message stating that there was a hospital consult needing.  No patient specific information was left on the voicemail.  Waiting on a return call.  
Chart reviewed for PCP hospital follow up. Patient's current JAMAR is 5/25/24. Patient is currently recommended for IPR per CM note. CMA placed [facility] information AVS for patient resource. Pending patient discharge.  
Date of Consultation:  May 18, 2024    History of Present Illness:   Candida Sherman is a 58 y.o. female who presented with acute onset of lightheadedness/dizziness and generalized weakness found to have an acute to subacute left pontine stroke.    Interval events:  This morning patient is receiving dialysis.  She has no new symptoms.  Denies any residual dizziness.    Past Medical History:   Diagnosis Date    Arthritis     Asthma     Cancer (HCC)     CHF (congestive heart failure) ([facility]) 2020    Chronic back pain     Chronic kidney disease     Diabetes (HCC)     Diabetic retinopathy (HCC)     Hypertension     Kidney failure 01/2022    Liver disease     MRSA (methicillin resistant staph aureus) culture positive     labial abscess, negative test 2020    Neuropathy     Sleep apnea     Appointment made but not attended    Stroke ([facility]) 2018        Past Surgical History:   Procedure Laterality Date    CARPAL TUNNEL RELEASE Right 1986    CT BIOPSY RENAL  1/10/2022    CT BIOPSY RENAL 1/10/2022 [facility] RAD CT    FRACTURE SURGERY Right     HUMERUS    HEENT      tonsillectomy    ORTHOPEDIC SURGERY      left ft bunionectomy x2    OTHER SURGICAL HISTORY      lanced labial abscess    TONSILLECTOMY      VASCULAR SURGERY      dialysis, rt upper chest        Family History   Problem Relation Age of Onset    Diabetes Sister     Anesth Problems Neg Hx     No Known Problems Sister     Kidney Disease Father     Diabetes Father         Social History     Tobacco Use    Smoking status: Never    Smokeless tobacco: Never   Substance Use Topics    Alcohol use: No        Allergies   Allergen Reactions    Amoxicillin Nausea Only    Aspirin Other (See Comments)     \"nosebleeds\" but patient takes daily aspirin 81 mg at home. Patient states naproxen ok    Ciprofloxacin Hives        Prior to Admission medications    Medication Sig Start Date End Date Taking? Authorizing Provider   sevelamer hcl (RENAGEL) 800 MG tablet Take 4 tablets by mouth 3 times 
End of Shift Note    Bedside shift change report given to  (oncoming nurse) by Clair Oreilly RN (offgoing nurse).  Report included the following information SBAR, ED Summary, Intake/Output, MAR, and Recent Results    Shift worked:  7p-7a     Shift summary and any significant changes:     Uneventful      Concerns for physician to address:  None     Zone phone for oncoming shift:          Activity:  Level of Assistance: Minimal assist, patient does 75% or more    Cardiac:   Cardiac Monitoring:  no    Access:  Current line(s): PIV  and Other TDC    Genitourinary:   Urinary Status: Oliguria/ incontinent     Respiratory:   O2 Device: None (Room air)    GI:  Current diet: ADULT DIET; Regular; 3 carb choices (45 gm/meal); 1500 ml; No Beef, No Pork    Pain Management:   Patient states pain is manageable on current regimen: YES    Skin:  Sebastian Scale Score: 20  Interventions: Wound Offloading (Prevention Methods): Turning  Pressure injury: no    Patient Safety:  Fall Score: Membreno Total Score: 85  Fall Risk Interventions  Nursing Judgement-Fall Risk High(Add Comments): Yes  Toilet Every 2 Hours-In Advance of Need: Yes  Hourly Visual Checks: In bed, Awake  Fall Visual Posted: Armband, Socks, Fall sign posted  Room Door Open: Deferred to promote rest  Alarm On: Bed  Patient Moved Closer to Nursing Station: No    Active Consults:  IP WOUND CARE NURSE CONSULT TO EVAL  IP CONSULT TO NEPHROLOGY  IP CONSULT TO NEUROLOGY  IP CONSULT TO DIABETES MANAGEMENT  IP CONSULT TO PODIATRY  IP CONSULT TO VASCULAR SURGERY  IP CONSULT TO PODIATRY    Length of Stay:  Expected LOS: 12  Actual LOS: 7      Clair Oreilly RN  
End of Shift Note    Bedside shift change report given to Angeles ARAUJO  (oncoming nurse) by Leonel Thomas RN (offgoing nurse).  Report included the following information SBAR, Kardex, Intake/Output, and MAR    Shift worked:  7-7 PM      Shift summary and any significant changes:     Patient is alert oriented times 4, room air with dialysis line in left chest area. Rn called kitchen for pt diet, prior to giving insulin. Patient have small wound in left and right hand, dry and healed. Patient have big bruise in the right lower abdomen area due to fall. Patient have left and right blister in the foot area with ace wrap.         Leonel Thomas RN                            
End of Shift Note    Bedside shift change report given to Becky ARAUJO  (oncoming nurse) by Leonel Thomas RN (offgoing nurse).  Report included the following information SBAR, Kardex, Intake/Output, and MAR    Shift worked:  7-7 PM .      Shift summary and any significant changes:     Patient is alert oriented times 4, room air, up with one assisst using walker. Patient had dialysis today and taken out 2liter of fluid. Blood test was drawn in dialysis also. This morning serum Na 119, recheck in the afternoon it is 130 . Patient needs attended , due medications given. Vitals signs with in normal limits.         Leonel Thomas RN                            
End of Shift Note    Bedside shift change report given to Dinorah (oncoming nurse) by Mabel Pacheco LPN (offgoing nurse).  Report included the following information SBAR, Kardex, and MAR    Shift worked:  7p-7:30a     Shift summary and any significant changes:     Pt tolerated care fairly well. All pt medications administered per Mar. Pt denied having pain. Routine rounding completed, call bell within reach    Am labs collected     Concerns for physician to address:       Zone phone for oncoming shift:                Mabel Pacheco LPN                           
End of Shift Note    Bedside shift change report given to Dinorah ARAUJO (oncoming nurse) by Tariq Harrison RN (offgoing nurse).  Report included the following information SBAR, Kardex, ED Summary, Intake/Output, MAR, Recent Results, and Cardiac Rhythm SR    Shift worked:  nights     Shift summary and any significant changes:     none     Concerns for physician to address:  none     Zone phone for oncoming shift:   [phone]       Activity:     Number times ambulated in hallways past shift: 0  Number of times OOB to chair past shift: 0    Cardiac:   Cardiac Monitoring: Yes           Access:  Current line(s): PIV and HD access     Genitourinary:   Urinary status: oliguric    Respiratory:      Chronic home O2 use?: NO  Incentive spirometer at bedside: YES       GI:     Current diet:  ADULT DIET; Regular; 3 carb choices (45 gm/meal); Low Fat/Low Chol/High Fiber/2 gm Na  Passing flatus: YES  Tolerating current diet: YES       Pain Management:   Patient states pain is manageable on current regimen: YES    Skin:     Interventions: float heels and increase time out of bed    Patient Safety:  Fall Score:    Interventions: bed/chair alarm       Length of Stay:  Expected LOS: 4  Actual LOS: 2      Tariq Harrison RN                            
End of Shift Note    Bedside shift change report given to Ed RN (oncoming nurse) by Tariq Harrison RN (offgoing nurse).  Report included the following information SBAR, Kardex, ED Summary, Intake/Output, MAR, Recent Results, and Cardiac Rhythm SR    Shift worked:  Nights       Shift summary and any significant changes:     none     Concerns for physician to address:  none     Zone phone for oncoming shift:   [phone]       Activity:     Number times ambulated in hallways past shift: 0  Number of times OOB to chair past shift: 0    Cardiac:   Cardiac Monitoring: Yes           Access:  Current line(s): PIV and HD access     Genitourinary:   Urinary status: oliguric    Respiratory:      Chronic home O2 use?: NO  Incentive spirometer at bedside: YES       GI:     Current diet:  ADULT DIET; Regular; 3 carb choices (45 gm/meal); Low Fat/Low Chol/High Fiber/2 gm Na  Passing flatus: YES  Tolerating current diet: YES       Pain Management:   Patient states pain is manageable on current regimen: YES    Skin:     Interventions: float heels and increase time out of bed    Patient Safety:  Fall Score:    Interventions: bed/chair alarm       Length of Stay:  Expected LOS: 2  Actual LOS: 1      Tariq Harrison, RN                            
End of Shift Note    Bedside shift change report given to Magy ARAUJO  (oncoming nurse) by Leonel Thomas RN (offgoing nurse).  Report included the following information SBAR, Kardex, Intake/Output, and MAR    Shift worked:  7-7 PM      Shift summary and any significant changes:     Patient is alert oriented times 4, room air with tunneled catheter for dialysis, Patient AV fistula not working in the right arm. Patient needs attened, due medication given. Patient had Mri and found to have acute to subacute infarct in the left solitario. Patient order to have MRA of neck and schedule for tonight. Patient will also have doppler and bilateral carotid artery, as per vascular they will do the procedure on Monday. Patient blood sugar is on high level but given scheduled and standing order of insulin. Patient for podiatry consult due to bilateral 4th and 5th toes wound.        Leonel Thomas RN                           
End of Shift Note    Bedside shift change report given to VAN Glover (oncoming nurse) by Alberta Cano RN (offgoing nurse).  Report included the following information SBAR, Kardex, MAR, and Recent Results    Shift worked:  07:00-19:30     Shift summary and any significant changes:    PT/OT worked with the patient. Insurance has approved placement at [facility]. Patient discharge barrier is now availability of bed at [facility].     Concerns for physician to address:  None at this time     Zone phone for oncoming shift:   [phone] No zone phone was available       Activity:     Number times ambulated in hallways past shift: 0  Number of times OOB to chair past shift: 3    Cardiac:   Cardiac Monitoring: No           Access:  Current line(s): PIV     Genitourinary:   Urinary status: voiding    Respiratory:      Chronic home O2 use?: NO  Incentive spirometer at bedside: NO       GI:     Current diet:  ADULT DIET; Regular; 3 carb choices (45 gm/meal); 1500 ml; No Beef, No Pork  Passing flatus: YES  Tolerating current diet: YES       Pain Management:   Patient states pain is manageable on current regimen: YES    Skin:     Interventions: float heels and increase time out of bed    Patient Safety:  Fall Score:    Interventions: bed/chair alarm, assistive device (walker, cane. etc), gripper socks, and stay with me (per policy)       Length of Stay:  Expected LOS: 12  Actual LOS: 6      Alberta Cano RN                            
End of Shift Note    Bedside shift change report given to VAN Miller (oncoming nurse) by Bing Hyatt RN (offgoing nurse).  Report included the following information SBAR, Kardex, ED Summary, Procedure Summary, Intake/Output, MAR, and Recent Results    Shift worked:  2212-2413     Shift summary and any significant changes:     Pt had 2x BM over shift, hard, golf ball sized, brown - spent many hours on commode d/t constipation    C/o headache - managed w/ 1x dose of  mg tylenol    Given 1x dose of 25 mg benadryl for itching    Wound care for khoi pinky toes completed.     Vital signs stable, pt awake for majority of night.   Concerns for physician to address:  Colace orders: Pt reports taking colace 2x a day normally, believes current constipation is d/t cessation of med     Zone phone for oncoming shift:   [phone]           Bing Hyatt RN                            
Hospital follow-up PCP transitional care appointment has been scheduled with Dr. Boyd Donato on 5/29/24 7679. This is the first available appt due to limited provider availability and that coordinates with patient's HD schedule of TTS. PCP office does not offer alternate provider option for hospital follow up. [facility] placed [facility] information AVS for patient resource. Pending patient discharge. Kathleen Masterson, Care Management Assistant  
MRI ON HOLD - TELEMETRY ORDERS AND SCREENING SHEET    Please complete screening and sign electronically or fax to [phone].    Telemetry order must be changed to allow the patient to leave the unit without telemetry.    Telemetry box cannot come to MRI with the patient.    Please call MRI at [phone] when this has been done.    If this patient needs monitored while off the unit, please call MRI at [phone] to coordinate a time for an RN to accompany the patient to MRI.  
Non-useable AVF RUE with suture in place  Using left sided cath  Awaiting LUE access  Sutures can be removed  
Nutrition: Chart reviewed for length of stay. Patient stable for discharge yesterday; waiting on bed availability at [facility]. Nutrition assessment not indicated at this time. RD available by consult if needs arise. Will rescreen if patient remains inpatient. Thanks.    Patient Vitals for the past 120 hrs:   PO Meals Eaten (%)   05/22/24 1214 76 - 100%     Tracy Salazar, RAISA  Ext [phone]  
Occupational Therapy    Chart reviewed in prep for skilled OT treatment; however, pt NATAL[facility]YA for HD.  Of note, still attempting to clarify WB orders; RN aware and following.  OT to defer and continue to follow.    Thank you,  Matthew Kerley, OT    
Occupational Therapy   Chart reviewed; patient currently off unit HD; Note she is on schedule for d/c later today to IPR. Will retry later as able. Iram Adames OTR/L  
Occupational Therapy   Chart reviewed; patient currently off unit for HD; will retry later as able for OT with noted IPR goal. Iram Adames OTR/L  
Occupational Therapy  Medical record reviewed.  Will defer as pt is receiving HD, and continue to follow  
Occupational Therapy  OT consult received, chart reviewed. Patient is currently off the floor for dialysis and unavailable for OT evaluation. Will defer for now but continue to follow.  
Physical Therapy    Chart reviewed and attempted visit but patient is currently off the floor for HD.  Will defer and continue to follow.    NIRU MannT  
Physical Therapy    Chart reviewed and discussed with RN, note that podiatrist stated patient should be \"Strict offloading\" but no order entered in Epic and it is unclear if this is for one or both feet. RN will call podiatry office to clarify, will defer PT visit today. Patient has cast shoe for right so provided an additional post op shoe for left.  Will continue to follow.    STEFAN Mann  
Physical Therapy    Pt currently receiving HD. Will defer and follow.    Sofie Hernandez PT  
Physical Therapy  Consult received and chart reviewed. Patient currently [facility]YA for dialysis.  Will defer therapy at this time and continue to follow.  Thank you,  Arin Baker, PT    
Physical Therapy:  Patient currently [facility]IYA for HD, unavailable for PT session. Will defer and continue to follow.    Malaika Looney PT, DPT  
Pt alert and awake, no issues with her night.  Nurse put a depends on and pt taken to dialysis.    
Pt back from Dialysis.  Eating lunch   No complaints at this time.  
Pt was itching from Dialysis dressing.  We applied ice and she had some benadryl.  Itching has resolved and site looks fine.  Redness has gone down.  Pt sitting in the chair on her cell phone no needs or complaints at this time.  
Report was given to VAN Potter by this RN including patients vitals, orientation, respiratory and cardiac status, cardiac rhythm, IV access and medications.   
Reporting DPT spoke with CM Team who confirms Pt pending dc to [facility] @ 5PM. All documentation needs met and no [facility] PT services needed for discharge. PT Team will try to provide skilled services later as time permits and appropriate to patient tolerance.    Delia Jean, PT, DPT        
Suture removed from right upper arm  
LE are C/D/I. No family noted at BS  DERM: Wounds noted to both small toes, L>R. Mild drainge.No malodor or purulence  VASC: Pedal pulses (DP/PT) palpable to B/L LE. CFT<3sec to all digits of B/L LE. No pedal hair growth noted to the level of the digits for B/L LE. Skin temp is warm to warm from proximal to distal for B/L LE. Neg homans/thalia signs to B/L LE. No varicosities or telangectasias noted to B/L LE.  NEURO: Protective and epicritic sensations grossly absentt to B/L LE  MSK: (-) POP, No gross deformities. Good muscle tone and bulk noted to B/L LE.  PSYCH: Cooperative with normal mood and affect       Labs/Imaging/Diagnostics    Labs:  CBC:  Recent Labs     05/22/24 0410 05/23/24 0413 05/24/24  0454   WBC 7.5 8.0 5.8   RBC 2.52* 2.48* 2.30*   HGB 8.0* 8.0* 7.6*   HCT 24.5* 24.0* 22.3*   MCV 97.2 96.8 97.0   RDW 16.4* 15.9* 15.9*    173 165     CHEMISTRIES:  Recent Labs     05/22/24 0410 05/23/24 0413 05/24/24  0454   * 120* 127*   K 4.0 4.7 4.5   CL 88* 84* 90*   CO2 32 27 32   BUN 17 31* 22*   CREATININE 3.90* 5.56* 4.19*   GLUCOSE 114* 163* 120*     PT/INR:No results for input(s): \"PROTIME\", \"INR\" in the last 72 hours.  APTT:No results for input(s): \"APTT\" in the last 72 hours.  LIVER PROFILE:No results for input(s): \"AST\", \"ALT\", \"BILIDIR\", \"BILITOT\", \"ALKPHOS\" in the last 72 hours.    Imaging Last 24 Hours:  No results found.      Assessment//Plan           Hospital Problems             Last Modified POA    * (Principal) Near syncope 5/16/2024 Yes    Diabetic polyneuropathy associated with type 2 diabetes mellitus (HCC) 5/17/2024 Yes    Cerebrovascular accident (CVA) due to thrombosis of left vertebral artery (HCC) 5/17/2024 Yes    Dizziness 5/17/2024 Yes    CVA (cerebrovascular accident due to intracerebral hemorrhage) (HCC) 5/18/2024 Yes    Infection 5/22/2024 Yes    Hemoglobin A1C greater than 9%, indicating poor diabetic control 5/22/2024 Yes     Assessment & Plan    Diabetic 
  Patient seen and evaluated at bedside  - Current labs personally reviewed and findings dicussed with patient  - SAMUEL study previously personally reviewed. Plan for outpatient follow up with vascular surgery. Referral info placed to be printed with DC paperwork  - Cont local wound care and strict offloading for wound healing purposes (orders placed in epic)  - No need for abx, wounds have responded to local wound care since admission and labs are normal  - Cont to rec dietary supplementation to improve wound healing potential  - Pt stable for DC from podiatry standpoint with outpatient follow up with me at the [facility]  - We will cont to follow and update recs as needed     Thank you for the consult!              Andre Martinez DPM, CWSP, AACFAS    [facility]  [address], [place] [address]  O: [phone]  F: [phone]     [facility]  [address]  [place] [address]  O: [phone]  F: [phone]     [facility]  [address]  [place] [address]  O: [phone]  F: [phone]     * Available via [employer] 24/7      Electronically signed by Andre Martinez DPM on 5/27/24 at 9:02 AM EDT    
edema  GI:  Soft, Non distended, Non tender.  +Bowel sounds  Neurologic:  Alert and oriented X 3, normal speech,   Psych:   Good insight. Not anxious nor agitated  Skin:  No rashes.  No jaundice  Foot wounds to bilateral toes as below:         Reviewed most current lab test results and cultures  YES  Reviewed most current radiology test results   YES  Review and summation of old records today    NO  Reviewed patient's current orders and MAR    YES  PMH/SH reviewed - no change compared to H&P    Procedures: see electronic medical records for all procedures/Xrays and details which were not copied into this note but were reviewed prior to creation of Plan.      LABS:  I reviewed today's most current labs and imaging studies.  Pertinent labs include:  Recent Labs     05/23/24  0413 05/24/24  0454 05/25/24  0550   WBC 8.0 5.8 5.8   HGB 8.0* 7.6* 7.8*   HCT 24.0* 22.3* 23.1*    165 150       Recent Labs     05/23/24  0413 05/24/24  0454 05/25/24  0550   * 127* 119*   K 4.7 4.5 4.8   CL 84* 90* 84*   CO2 27 32 29   GLUCOSE 163* 120* 101*   BUN 31* 22* 30*   CREATININE 5.56* 4.19* 5.34*   CALCIUM 9.2 8.6 8.3*         Signed: Suha Santos MD        Total non critical care time spent 38 minutes    
electronic medical records for all procedures/Xrays and details which were not copied into this note but were reviewed prior to creation of Plan.      LABS:  I reviewed today's most current labs and imaging studies.  Pertinent labs include:  Recent Labs     05/20/24 0520 05/21/24 0433 05/22/24 0410   WBC 6.6 7.4 7.5   HGB 9.4* 7.7* 8.0*   HCT 27.5* 23.4* 24.5*    185 186       Recent Labs     05/20/24 0520 05/21/24 0433 05/22/24  0410   * 127* 127*   K 4.1 4.5 4.0   CL 88* 93* 88*   CO2 28 27 32   GLUCOSE 156* 133* 114*   BUN 40* 28* 17   CREATININE 7.34* 5.44* 3.90*   CALCIUM 9.3 9.3 9.1         Signed: Collin Johnson MD        Total non critical care time spent 35 minutes    
which were not copied into this note but were reviewed prior to creation of Plan.      LABS:  I reviewed today's most current labs and imaging studies.  Pertinent labs include:  Recent Labs     05/25/24  0550 05/27/24  0429   WBC 5.8 5.4   HGB 7.8* 7.1*   HCT 23.1* 21.9*    163       Recent Labs     05/25/24  0550 05/25/24  1511 05/27/24  0429   * 130* 127*   K 4.8 3.8 4.7   CL 84* 94* 92*   CO2 29 30 29   GLUCOSE 101* 148* 138*   BUN 30* 12 33*   CREATININE 5.34* 2.88* 5.37*   CALCIUM 8.3* 8.3* 8.6         Signed: Suha Santos MD        Total non critical care time spent 38 minutes    
  CALCIUM 9.0 9.1 9.4         Signed: WONG Chen        Total non critical care time spent 35 minutes    
Affect: Mood normal.         Behavior: Behavior normal.          Data Review:   Recent Results (from the past 24 hour(s))   POCT Glucose    Collection Time: 05/27/24  6:23 AM   Result Value Ref Range    POC Glucose 126 (H) 65 - 117 mg/dL    Performed by: Dallin Egan (CON)    POCT Glucose    Collection Time: 05/27/24 11:20 AM   Result Value Ref Range    POC Glucose 162 (H) 65 - 117 mg/dL    Performed by: Coni Serrano PCT    POCT Glucose    Collection Time: 05/27/24  4:27 PM   Result Value Ref Range    POC Glucose 122 (H) 65 - 117 mg/dL    Performed by: Coni Serrano PCT    POCT Glucose    Collection Time: 05/27/24  8:45 PM   Result Value Ref Range    POC Glucose 143 (H) 65 - 117 mg/dL    Performed by: Shelby Carrillo PCT    Basic Metabolic Panel    Collection Time: 05/28/24  3:12 AM   Result Value Ref Range    Sodium 129 (L) 136 - 145 mmol/L    Potassium 4.5 3.5 - 5.1 mmol/L    Chloride 92 (L) 97 - 108 mmol/L    CO2 30 21 - 32 mmol/L    Anion Gap 7 5 - 15 mmol/L    Glucose 109 (H) 65 - 100 mg/dL    BUN 22 (H) 6 - 20 MG/DL    Creatinine 4.22 (H) 0.55 - 1.02 MG/DL    BUN/Creatinine Ratio 5 (L) 12 - 20      Est, Glom Filt Rate 12 (L) >60 ml/min/1.73m2    Calcium 8.5 8.5 - 10.1 MG/DL   CBC    Collection Time: 05/28/24  3:12 AM   Result Value Ref Range    WBC 5.4 3.6 - 11.0 K/uL    RBC 2.35 (L) 3.80 - 5.20 M/uL    Hemoglobin 7.4 (L) 11.5 - 16.0 g/dL    Hematocrit 22.8 (L) 35.0 - 47.0 %    MCV 97.0 80.0 - 99.0 FL    MCH 31.5 26.0 - 34.0 PG    MCHC 32.5 30.0 - 36.5 g/dL    RDW 15.2 (H) 11.5 - 14.5 %    Platelets 181 150 - 400 K/uL    MPV 9.1 8.9 - 12.9 FL    Nucleated RBCs 0.0 0  WBC    nRBC 0.00 0.00 - 0.01 K/uL         MRA HEAD WO CONTRAST   Final Result      MRA Head:   1. No evidence of significant stenosis or aneurysm.      MRA Neck:   1. No evidence of flow-limiting stenosis.         MRA NECK WO CONTRAST   Final Result      MRA Head:   1. No evidence of significant stenosis or aneurysm.      MRA Neck:   1. No 
Normal breath sounds.   Abdominal:      General: There is no distension.      Palpations: Abdomen is soft. There is no mass.   Skin:     Coloration: Skin is not pale.      Findings: No bruising or erythema.   Neurological:      General: No focal deficit present.      Mental Status: She is alert and oriented to person, place, and time. Mental status is at baseline.   Psychiatric:         Mood and Affect: Mood normal.         Behavior: Behavior normal.          Data Review:   Recent Results (from the past 24 hour(s))   POCT Glucose    Collection Time: 05/26/24 10:53 AM   Result Value Ref Range    POC Glucose 194 (H) 65 - 117 mg/dL    Performed by: Coni Serrano PCT    POCT Glucose    Collection Time: 05/26/24  4:29 PM   Result Value Ref Range    POC Glucose 154 (H) 65 - 117 mg/dL    Performed by: Kena Clarke RN    POCT Glucose    Collection Time: 05/26/24  9:08 PM   Result Value Ref Range    POC Glucose 156 (H) 65 - 117 mg/dL    Performed by: Dallin Egan (CON)    Basic Metabolic Panel    Collection Time: 05/27/24  4:29 AM   Result Value Ref Range    Sodium 127 (L) 136 - 145 mmol/L    Potassium 4.7 3.5 - 5.1 mmol/L    Chloride 92 (L) 97 - 108 mmol/L    CO2 29 21 - 32 mmol/L    Anion Gap 6 5 - 15 mmol/L    Glucose 138 (H) 65 - 100 mg/dL    BUN 33 (H) 6 - 20 MG/DL    Creatinine 5.37 (H) 0.55 - 1.02 MG/DL    BUN/Creatinine Ratio 6 (L) 12 - 20      Est, Glom Filt Rate 9 (L) >60 ml/min/1.73m2    Calcium 8.6 8.5 - 10.1 MG/DL   CBC with Auto Differential    Collection Time: 05/27/24  4:29 AM   Result Value Ref Range    WBC 5.4 3.6 - 11.0 K/uL    RBC 2.24 (L) 3.80 - 5.20 M/uL    Hemoglobin 7.1 (L) 11.5 - 16.0 g/dL    Hematocrit 21.9 (L) 35.0 - 47.0 %    MCV 97.8 80.0 - 99.0 FL    MCH 31.7 26.0 - 34.0 PG    MCHC 32.4 30.0 - 36.5 g/dL    RDW 15.6 (H) 11.5 - 14.5 %    Platelets 163 150 - 400 K/uL    MPV 9.4 8.9 - 12.9 FL    Nucleated RBCs 0.0 0  WBC    nRBC 0.00 0.00 - 0.01 K/uL    Neutrophils % 69 32 - 75 %    
POC Glucose 219 (H) 65 - 117 mg/dL    Performed by: Vianey Dhillon PCT    POCT Glucose    Collection Time: 05/25/24  8:51 PM   Result Value Ref Range    POC Glucose 186 (H) 65 - 117 mg/dL    Performed by: Dallin Egan (CON)    POCT Glucose    Collection Time: 05/26/24  6:17 AM   Result Value Ref Range    POC Glucose 118 (H) 65 - 117 mg/dL    Performed by: Dallin Egan (CON)          MRA HEAD WO CONTRAST   Final Result      MRA Head:   1. No evidence of significant stenosis or aneurysm.      MRA Neck:   1. No evidence of flow-limiting stenosis.         MRA NECK WO CONTRAST   Final Result      MRA Head:   1. No evidence of significant stenosis or aneurysm.      MRA Neck:   1. No evidence of flow-limiting stenosis.         MRI BRAIN WO CONTRAST   Final Result      1. Punctate acute to subacute infarct in the left solitario.   2. Unchanged generalized parenchymal volume loss and mild chronic microvascular   ischemic disease. Small chronic infarct in the right corona radiata.      CT Head W/O Contrast   Final Result      No acute intracranial abnormality.         CT CSpine W/O Contrast   Final Result   No acute bony abnormality of the cervical spine. Multilevel degenerative change.      CT CHEST WO CONTRAST   Final Result   Chronic bilateral rib fractures as above.   No acute rib fracture or pulmonary consolidation.      CT PELVIS WO CONTRAST Additional Contrast? None   Final Result   No evidence of acute process.           Patient Active Problem List   Diagnosis    Acute on chronic kidney failure (HCC)    Acute kidney failure, unspecified ([facility])    CHF (congestive heart failure) ([facility])    Cervical spondylosis    Hypercholesteremia    Acute exacerbation of CHF (congestive heart failure) ([facility])    DDD (degenerative disc disease), lumbar    TIA (transient ischemic attack)    Episodic tension-type headache, not intractable    Lymphadenopathy    Diabetic polyneuropathy associated with type 2 diabetes mellitus (HCC)    Morbid 
deficit present.      Mental Status: She is alert and oriented to person, place, and time. Mental status is at baseline.   Psychiatric:         Mood and Affect: Mood normal.         Behavior: Behavior normal.          Data Review:   Recent Results (from the past 24 hour(s))   POCT Glucose    Collection Time: 05/21/24 12:51 PM   Result Value Ref Range    POC Glucose 104 65 - 117 mg/dL    Performed by: Africa Moralez PCT    POCT Glucose    Collection Time: 05/21/24  3:57 PM   Result Value Ref Range    POC Glucose 140 (H) 65 - 117 mg/dL    Performed by: Africa Moralez PCT    POCT Glucose    Collection Time: 05/21/24  8:20 PM   Result Value Ref Range    POC Glucose 214 (H) 65 - 117 mg/dL    Performed by: Christa Doran PCT    CBC with Auto Differential    Collection Time: 05/22/24  4:10 AM   Result Value Ref Range    WBC 7.5 3.6 - 11.0 K/uL    RBC 2.52 (L) 3.80 - 5.20 M/uL    Hemoglobin 8.0 (L) 11.5 - 16.0 g/dL    Hematocrit 24.5 (L) 35.0 - 47.0 %    MCV 97.2 80.0 - 99.0 FL    MCH 31.7 26.0 - 34.0 PG    MCHC 32.7 30.0 - 36.5 g/dL    RDW 16.4 (H) 11.5 - 14.5 %    Platelets 186 150 - 400 K/uL    MPV 9.6 8.9 - 12.9 FL    Nucleated RBCs 0.3 (H) 0  WBC    nRBC 0.02 (H) 0.00 - 0.01 K/uL    Neutrophils % 71 32 - 75 %    Lymphocytes % 18 12 - 49 %    Monocytes % 7 5 - 13 %    Eosinophils % 2 0 - 7 %    Basophils % 1 0 - 1 %    Immature Granulocytes % 1 (H) 0.0 - 0.5 %    Neutrophils Absolute 5.3 1.8 - 8.0 K/UL    Lymphocytes Absolute 1.4 0.8 - 3.5 K/UL    Monocytes Absolute 0.5 0.0 - 1.0 K/UL    Eosinophils Absolute 0.2 0.0 - 0.4 K/UL    Basophils Absolute 0.0 0.0 - 0.1 K/UL    Immature Granulocytes Absolute 0.1 (H) 0.00 - 0.04 K/UL    Differential Type AUTOMATED     Basic Metabolic Panel w/ Reflex to MG    Collection Time: 05/22/24  4:10 AM   Result Value Ref Range    Sodium 127 (L) 136 - 145 mmol/L    Potassium 4.0 3.5 - 5.1 mmol/L    Chloride 88 (L) 97 - 108 mmol/L    CO2 32 21 - 32 mmol/L    Anion Gap 7 5 - 15 mmol/L    
Surface Antigen    Collection Time: 05/21/24  4:33 AM   Result Value Ref Range    Hepatitis B Surface Ag 0.27 Index    Hep B S Ag Interp Negative NEG     POCT Glucose    Collection Time: 05/21/24  6:31 AM   Result Value Ref Range    POC Glucose 183 (H) 65 - 117 mg/dL    Performed by: Juan C Badillo PCT          MRA HEAD WO CONTRAST   Final Result      MRA Head:   1. No evidence of significant stenosis or aneurysm.      MRA Neck:   1. No evidence of flow-limiting stenosis.         MRA NECK WO CONTRAST   Final Result      MRA Head:   1. No evidence of significant stenosis or aneurysm.      MRA Neck:   1. No evidence of flow-limiting stenosis.         MRI BRAIN WO CONTRAST   Final Result      1. Punctate acute to subacute infarct in the left solitario.   2. Unchanged generalized parenchymal volume loss and mild chronic microvascular   ischemic disease. Small chronic infarct in the right corona radiata.      CT Head W/O Contrast   Final Result      No acute intracranial abnormality.         CT CSpine W/O Contrast   Final Result   No acute bony abnormality of the cervical spine. Multilevel degenerative change.      CT CHEST WO CONTRAST   Final Result   Chronic bilateral rib fractures as above.   No acute rib fracture or pulmonary consolidation.      CT PELVIS WO CONTRAST Additional Contrast? None   Final Result   No evidence of acute process.           Patient Active Problem List   Diagnosis    Acute on chronic kidney failure (HCC)    Acute kidney failure, unspecified (HCC)    CHF (congestive heart failure) (HCC)    Cervical spondylosis    Hypercholesteremia    Acute exacerbation of CHF (congestive heart failure) (HCC)    DDD (degenerative disc disease), lumbar    TIA (transient ischemic attack)    Episodic tension-type headache, not intractable    Lymphadenopathy    Diabetic polyneuropathy associated with type 2 diabetes mellitus (HCC)    Morbid obesity with BMI of 45.0-49.9, adult (HCC)    Proteinuria    Chronic heart 
left solitario.   2. Unchanged generalized parenchymal volume loss and mild chronic microvascular   ischemic disease. Small chronic infarct in the right corona radiata.      CT Head W/O Contrast   Final Result      No acute intracranial abnormality.         CT CSpine W/O Contrast   Final Result   No acute bony abnormality of the cervical spine. Multilevel degenerative change.      CT CHEST WO CONTRAST   Final Result   Chronic bilateral rib fractures as above.   No acute rib fracture or pulmonary consolidation.      CT PELVIS WO CONTRAST Additional Contrast? None   Final Result   No evidence of acute process.           Patient Active Problem List   Diagnosis    Acute on chronic kidney failure (HCC)    Acute kidney failure, unspecified (HCC)    CHF (congestive heart failure) (HCC)    Cervical spondylosis    Hypercholesteremia    Acute exacerbation of CHF (congestive heart failure) (HCC)    DDD (degenerative disc disease), lumbar    TIA (transient ischemic attack)    Episodic tension-type headache, not intractable    Lymphadenopathy    Diabetic polyneuropathy associated with type 2 diabetes mellitus (HCC)    Morbid obesity with BMI of 45.0-49.9, adult (HCC)    Proteinuria    Chronic heart failure (HCC)    GLADIS (acute kidney injury) (HCC)    Essential hypertension    Disorder of liver    Atopic rhinitis    Lymphoma (HCC)    Morbid obesity (HCC)    Vitamin D deficiency    Diabetic ulcer of left heel associated with type 2 diabetes mellitus, with fat layer exposed (HCC)    Diabetic ulcer of left midfoot associated with type 2 diabetes mellitus, with fat layer exposed (HCC)    Cellulitis of left lower extremity    Septicemia (HCC)    Encephalopathy    Subarachnoid hemorrhage (HCC)    Acute alteration in mental status    Acute metabolic encephalopathy    Hyperglycemia    Uncontrolled type 2 diabetes mellitus with hyperglycemia (HCC)    Stage 5 chronic kidney disease on chronic dialysis (HCC)    Cellulitis    Near syncope

## 2024-05-30 ENCOUNTER — TELEPHONE (OUTPATIENT)
Age: 59
End: 2024-05-30

## 2024-05-30 NOTE — TELEPHONE ENCOUNTER
Called and spoke with Irma (sister) trying to schedule a 6-8 week hosp f/u. Before I could tell Irma the time frame she stated that pt was in rehab and will call when pt gets out to schedule her appt

## 2024-08-10 ENCOUNTER — APPOINTMENT (OUTPATIENT)
Facility: HOSPITAL | Age: 59
End: 2024-08-10
Payer: MEDICAID

## 2024-08-10 ENCOUNTER — HOSPITAL ENCOUNTER (INPATIENT)
Facility: HOSPITAL | Age: 59
LOS: 5 days | Discharge: HOME | End: 2024-08-15
Attending: STUDENT IN AN ORGANIZED HEALTH CARE EDUCATION/TRAINING PROGRAM | Admitting: STUDENT IN AN ORGANIZED HEALTH CARE EDUCATION/TRAINING PROGRAM
Payer: MEDICAID

## 2024-08-10 DIAGNOSIS — R55 SYNCOPE, UNSPECIFIED SYNCOPE TYPE: ICD-10-CM

## 2024-08-10 DIAGNOSIS — R55 PRE-SYNCOPE: ICD-10-CM

## 2024-08-10 DIAGNOSIS — R55 SYNCOPE AND COLLAPSE: Primary | ICD-10-CM

## 2024-08-10 DIAGNOSIS — I63.9 CEREBROVASCULAR ACCIDENT (CVA), UNSPECIFIED MECHANISM (HCC): ICD-10-CM

## 2024-08-10 LAB
ALBUMIN SERPL-MCNC: 3.5 G/DL (ref 3.5–5)
ALBUMIN/GLOB SERPL: 0.9 (ref 1.1–2.2)
ALP SERPL-CCNC: 180 U/L (ref 45–117)
ALT SERPL-CCNC: 16 U/L (ref 12–78)
ANION GAP SERPL CALC-SCNC: 9 MMOL/L (ref 5–15)
AST SERPL-CCNC: 12 U/L (ref 15–37)
BASOPHILS # BLD: 0.1 K/UL (ref 0–0.1)
BASOPHILS NFR BLD: 1 % (ref 0–1)
BILIRUB SERPL-MCNC: 0.4 MG/DL (ref 0.2–1)
BUN SERPL-MCNC: 20 MG/DL (ref 6–20)
BUN/CREAT SERPL: 4 (ref 12–20)
CALCIUM SERPL-MCNC: 8.1 MG/DL (ref 8.5–10.1)
CHLORIDE SERPL-SCNC: 90 MMOL/L (ref 97–108)
CO2 SERPL-SCNC: 31 MMOL/L (ref 21–32)
CREAT SERPL-MCNC: 5.33 MG/DL (ref 0.55–1.02)
DIFFERENTIAL METHOD BLD: ABNORMAL
EOSINOPHIL # BLD: 0.1 K/UL (ref 0–0.4)
EOSINOPHIL NFR BLD: 2 % (ref 0–7)
ERYTHROCYTE [DISTWIDTH] IN BLOOD BY AUTOMATED COUNT: 16 % (ref 11.5–14.5)
GLOBULIN SER CALC-MCNC: 4 G/DL (ref 2–4)
GLUCOSE BLD STRIP.AUTO-MCNC: 205 MG/DL (ref 65–117)
GLUCOSE SERPL-MCNC: 262 MG/DL (ref 65–100)
HCT VFR BLD AUTO: 29.4 % (ref 35–47)
HGB BLD-MCNC: 9.3 G/DL (ref 11.5–16)
IMM GRANULOCYTES # BLD AUTO: 0.1 K/UL (ref 0–0.04)
IMM GRANULOCYTES NFR BLD AUTO: 2 % (ref 0–0.5)
LYMPHOCYTES # BLD: 1 K/UL (ref 0.8–3.5)
LYMPHOCYTES NFR BLD: 15 % (ref 12–49)
MCH RBC QN AUTO: 29.1 PG (ref 26–34)
MCHC RBC AUTO-ENTMCNC: 31.6 G/DL (ref 30–36.5)
MCV RBC AUTO: 91.9 FL (ref 80–99)
MONOCYTES # BLD: 0.4 K/UL (ref 0–1)
MONOCYTES NFR BLD: 6 % (ref 5–13)
NEUTS SEG # BLD: 5.1 K/UL (ref 1.8–8)
NEUTS SEG NFR BLD: 74 % (ref 32–75)
NRBC # BLD: 0 K/UL (ref 0–0.01)
NRBC BLD-RTO: 0 PER 100 WBC
NT PRO BNP: 816 PG/ML
PLATELET # BLD AUTO: 188 K/UL (ref 150–400)
PMV BLD AUTO: 10 FL (ref 8.9–12.9)
POTASSIUM SERPL-SCNC: 4.1 MMOL/L (ref 3.5–5.1)
PROT SERPL-MCNC: 7.5 G/DL (ref 6.4–8.2)
RBC # BLD AUTO: 3.2 M/UL (ref 3.8–5.2)
SERVICE CMNT-IMP: ABNORMAL
SODIUM SERPL-SCNC: 130 MMOL/L (ref 136–145)
TROPONIN I SERPL HS-MCNC: 6 NG/L (ref 0–51)
WBC # BLD AUTO: 6.9 K/UL (ref 3.6–11)

## 2024-08-10 PROCEDURE — 84484 ASSAY OF TROPONIN QUANT: CPT

## 2024-08-10 PROCEDURE — 94640 AIRWAY INHALATION TREATMENT: CPT

## 2024-08-10 PROCEDURE — 85025 COMPLETE CBC W/AUTO DIFF WBC: CPT

## 2024-08-10 PROCEDURE — 2580000003 HC RX 258: Performed by: STUDENT IN AN ORGANIZED HEALTH CARE EDUCATION/TRAINING PROGRAM

## 2024-08-10 PROCEDURE — 82962 GLUCOSE BLOOD TEST: CPT

## 2024-08-10 PROCEDURE — 70551 MRI BRAIN STEM W/O DYE: CPT

## 2024-08-10 PROCEDURE — 93005 ELECTROCARDIOGRAM TRACING: CPT | Performed by: STUDENT IN AN ORGANIZED HEALTH CARE EDUCATION/TRAINING PROGRAM

## 2024-08-10 PROCEDURE — 80053 COMPREHEN METABOLIC PANEL: CPT

## 2024-08-10 PROCEDURE — 70450 CT HEAD/BRAIN W/O DYE: CPT

## 2024-08-10 PROCEDURE — 99285 EMERGENCY DEPT VISIT HI MDM: CPT

## 2024-08-10 PROCEDURE — 6360000002 HC RX W HCPCS: Performed by: STUDENT IN AN ORGANIZED HEALTH CARE EDUCATION/TRAINING PROGRAM

## 2024-08-10 PROCEDURE — 72125 CT NECK SPINE W/O DYE: CPT

## 2024-08-10 PROCEDURE — 36415 COLL VENOUS BLD VENIPUNCTURE: CPT

## 2024-08-10 PROCEDURE — 72131 CT LUMBAR SPINE W/O DYE: CPT

## 2024-08-10 PROCEDURE — 94664 DEMO&/EVAL PT USE INHALER: CPT

## 2024-08-10 PROCEDURE — 83880 ASSAY OF NATRIURETIC PEPTIDE: CPT

## 2024-08-10 PROCEDURE — 1100000003 HC PRIVATE W/ TELEMETRY

## 2024-08-10 PROCEDURE — 6370000000 HC RX 637 (ALT 250 FOR IP): Performed by: STUDENT IN AN ORGANIZED HEALTH CARE EDUCATION/TRAINING PROGRAM

## 2024-08-10 RX ORDER — FLUTICASONE PROPIONATE 50 MCG
1 SPRAY, SUSPENSION (ML) NASAL DAILY
Status: DISCONTINUED | OUTPATIENT
Start: 2024-08-11 | End: 2024-08-15 | Stop reason: HOSPADM

## 2024-08-10 RX ORDER — ATORVASTATIN CALCIUM 40 MG/1
80 TABLET, FILM COATED ORAL NIGHTLY
Status: DISCONTINUED | OUTPATIENT
Start: 2024-08-10 | End: 2024-08-15 | Stop reason: HOSPADM

## 2024-08-10 RX ORDER — SODIUM CHLORIDE 9 MG/ML
INJECTION, SOLUTION INTRAVENOUS PRN
Status: DISCONTINUED | OUTPATIENT
Start: 2024-08-10 | End: 2024-08-15 | Stop reason: HOSPADM

## 2024-08-10 RX ORDER — MAGNESIUM SULFATE IN WATER 40 MG/ML
2000 INJECTION, SOLUTION INTRAVENOUS PRN
Status: DISCONTINUED | OUTPATIENT
Start: 2024-08-10 | End: 2024-08-15 | Stop reason: HOSPADM

## 2024-08-10 RX ORDER — INSULIN LISPRO 100 [IU]/ML
9 INJECTION, SOLUTION INTRAVENOUS; SUBCUTANEOUS
Status: DISCONTINUED | OUTPATIENT
Start: 2024-08-10 | End: 2024-08-15 | Stop reason: HOSPADM

## 2024-08-10 RX ORDER — MIDODRINE HYDROCHLORIDE 5 MG/1
5 TABLET ORAL
Status: DISCONTINUED | OUTPATIENT
Start: 2024-08-10 | End: 2024-08-15 | Stop reason: HOSPADM

## 2024-08-10 RX ORDER — POTASSIUM CHLORIDE 20 MEQ/1
40 TABLET, EXTENDED RELEASE ORAL PRN
Status: DISCONTINUED | OUTPATIENT
Start: 2024-08-10 | End: 2024-08-15 | Stop reason: HOSPADM

## 2024-08-10 RX ORDER — SEVELAMER CARBONATE 800 MG/1
800 TABLET, FILM COATED ORAL
Status: DISCONTINUED | OUTPATIENT
Start: 2024-08-10 | End: 2024-08-15 | Stop reason: HOSPADM

## 2024-08-10 RX ORDER — SODIUM CHLORIDE 0.9 % (FLUSH) 0.9 %
5-40 SYRINGE (ML) INJECTION EVERY 12 HOURS SCHEDULED
Status: DISCONTINUED | OUTPATIENT
Start: 2024-08-10 | End: 2024-08-15 | Stop reason: HOSPADM

## 2024-08-10 RX ORDER — INSULIN GLARGINE 100 [IU]/ML
48 INJECTION, SOLUTION SUBCUTANEOUS NIGHTLY
Status: DISCONTINUED | OUTPATIENT
Start: 2024-08-10 | End: 2024-08-15 | Stop reason: HOSPADM

## 2024-08-10 RX ORDER — ONDANSETRON 2 MG/ML
4 INJECTION INTRAMUSCULAR; INTRAVENOUS EVERY 6 HOURS PRN
Status: DISCONTINUED | OUTPATIENT
Start: 2024-08-10 | End: 2024-08-15 | Stop reason: HOSPADM

## 2024-08-10 RX ORDER — POTASSIUM CHLORIDE 7.45 MG/ML
10 INJECTION INTRAVENOUS PRN
Status: DISCONTINUED | OUTPATIENT
Start: 2024-08-10 | End: 2024-08-15 | Stop reason: HOSPADM

## 2024-08-10 RX ORDER — SODIUM CHLORIDE 0.9 % (FLUSH) 0.9 %
5-40 SYRINGE (ML) INJECTION PRN
Status: DISCONTINUED | OUTPATIENT
Start: 2024-08-10 | End: 2024-08-15 | Stop reason: HOSPADM

## 2024-08-10 RX ORDER — ALBUTEROL SULFATE 90 UG/1
2 AEROSOL, METERED RESPIRATORY (INHALATION) EVERY 6 HOURS PRN
Status: DISCONTINUED | OUTPATIENT
Start: 2024-08-10 | End: 2024-08-13

## 2024-08-10 RX ORDER — DOCUSATE SODIUM 100 MG/1
100 CAPSULE, LIQUID FILLED ORAL 2 TIMES DAILY
Status: DISCONTINUED | OUTPATIENT
Start: 2024-08-10 | End: 2024-08-15 | Stop reason: HOSPADM

## 2024-08-10 RX ORDER — ASPIRIN 81 MG/1
81 TABLET ORAL DAILY
Status: DISCONTINUED | OUTPATIENT
Start: 2024-08-10 | End: 2024-08-15 | Stop reason: HOSPADM

## 2024-08-10 RX ORDER — GABAPENTIN 300 MG/1
300 CAPSULE ORAL 3 TIMES DAILY
Status: DISCONTINUED | OUTPATIENT
Start: 2024-08-10 | End: 2024-08-15 | Stop reason: HOSPADM

## 2024-08-10 RX ORDER — HEPARIN SODIUM 5000 [USP'U]/ML
5000 INJECTION, SOLUTION INTRAVENOUS; SUBCUTANEOUS EVERY 8 HOURS
Status: DISCONTINUED | OUTPATIENT
Start: 2024-08-10 | End: 2024-08-15 | Stop reason: HOSPADM

## 2024-08-10 RX ORDER — BUDESONIDE 0.5 MG/2ML
1 INHALANT ORAL
Status: DISCONTINUED | OUTPATIENT
Start: 2024-08-10 | End: 2024-08-10

## 2024-08-10 RX ORDER — ENOXAPARIN SODIUM 100 MG/ML
40 INJECTION SUBCUTANEOUS DAILY
Status: DISCONTINUED | OUTPATIENT
Start: 2024-08-10 | End: 2024-08-10

## 2024-08-10 RX ORDER — POLYETHYLENE GLYCOL 3350 17 G/17G
17 POWDER, FOR SOLUTION ORAL DAILY PRN
Status: DISCONTINUED | OUTPATIENT
Start: 2024-08-10 | End: 2024-08-15 | Stop reason: HOSPADM

## 2024-08-10 RX ORDER — ONDANSETRON 4 MG/1
4 TABLET, ORALLY DISINTEGRATING ORAL EVERY 8 HOURS PRN
Status: DISCONTINUED | OUTPATIENT
Start: 2024-08-10 | End: 2024-08-15 | Stop reason: HOSPADM

## 2024-08-10 RX ORDER — BUDESONIDE 0.5 MG/2ML
0.5 INHALANT ORAL
Status: DISCONTINUED | OUTPATIENT
Start: 2024-08-10 | End: 2024-08-15 | Stop reason: HOSPADM

## 2024-08-10 RX ORDER — ACETAMINOPHEN 650 MG/1
650 SUPPOSITORY RECTAL EVERY 6 HOURS PRN
Status: DISCONTINUED | OUTPATIENT
Start: 2024-08-10 | End: 2024-08-15 | Stop reason: HOSPADM

## 2024-08-10 RX ORDER — ACETAMINOPHEN 325 MG/1
650 TABLET ORAL EVERY 6 HOURS PRN
Status: DISCONTINUED | OUTPATIENT
Start: 2024-08-10 | End: 2024-08-15 | Stop reason: HOSPADM

## 2024-08-10 RX ORDER — AMITRIPTYLINE HYDROCHLORIDE 50 MG/1
50 TABLET, FILM COATED ORAL NIGHTLY
Status: DISCONTINUED | OUTPATIENT
Start: 2024-08-10 | End: 2024-08-15 | Stop reason: HOSPADM

## 2024-08-10 RX ORDER — 0.9 % SODIUM CHLORIDE 0.9 %
1000 INTRAVENOUS SOLUTION INTRAVENOUS ONCE
Status: COMPLETED | OUTPATIENT
Start: 2024-08-10 | End: 2024-08-10

## 2024-08-10 RX ADMIN — AMITRIPTYLINE HYDROCHLORIDE 50 MG: 50 TABLET, FILM COATED ORAL at 21:45

## 2024-08-10 RX ADMIN — GABAPENTIN 300 MG: 300 CAPSULE ORAL at 21:30

## 2024-08-10 RX ADMIN — BUDESONIDE INHALATION 500 MCG: 0.5 SUSPENSION RESPIRATORY (INHALATION) at 19:43

## 2024-08-10 RX ADMIN — SODIUM CHLORIDE 1000 ML: 9 INJECTION, SOLUTION INTRAVENOUS at 17:12

## 2024-08-10 RX ADMIN — INSULIN GLARGINE 48 UNITS: 100 INJECTION, SOLUTION SUBCUTANEOUS at 20:10

## 2024-08-10 RX ADMIN — ATORVASTATIN CALCIUM 80 MG: 40 TABLET, FILM COATED ORAL at 21:45

## 2024-08-10 RX ADMIN — TICAGRELOR 90 MG: 90 TABLET ORAL at 21:45

## 2024-08-10 RX ADMIN — SODIUM CHLORIDE, PRESERVATIVE FREE 10 ML: 5 INJECTION INTRAVENOUS at 21:46

## 2024-08-10 RX ADMIN — DOCUSATE SODIUM 100 MG: 100 CAPSULE, LIQUID FILLED ORAL at 21:45

## 2024-08-10 ASSESSMENT — PAIN DESCRIPTION - LOCATION: LOCATION: KNEE

## 2024-08-10 ASSESSMENT — PAIN DESCRIPTION - DESCRIPTORS: DESCRIPTORS: ACHING

## 2024-08-10 ASSESSMENT — PAIN DESCRIPTION - ORIENTATION: ORIENTATION: LEFT

## 2024-08-10 ASSESSMENT — PAIN SCALES - GENERAL
PAINLEVEL_OUTOF10: 8
PAINLEVEL_OUTOF10: 6

## 2024-08-10 NOTE — CONSULTS
Pt with ESRD - [facility] admitted with syncope.  Had HD Friday.  Anemia - start SAHRA  DM  Obesity    Full consult to follow.

## 2024-08-10 NOTE — ED NOTES
TRANSFER - OUT REPORT:    Verbal report given to VAN Hannon on [address]  being transferred to [facility] for routine progression of patient care       Report consisted of patient's Situation, Background, Assessment and   Recommendations(SBAR).     Information from the following report(s) ED SBAR, Adult Overview, MAR, Recent Results, and Neuro Assessment was reviewed with the receiving nurse.    Stopover Fall Assessment:    Presents to emergency department  because of falls (Syncope, seizure, or loss of consciousness): Yes  Age > 70: No  Altered Mental Status, Intoxication with alcohol or substance confusion (Disorientation, impaired judgment, poor safety awaremess, or inability to follow instructions): No  Impaired Mobility: Ambulates or transfers with assistive devices or assistance; Unable to ambulate or transer.: No  Nursing Judgement: Yes          Lines:   Peripheral IV 08/10/24 Right Antecubital (Active)   Site Assessment Clean, dry & intact 08/10/24 1521   Line Status Blood return noted;Flushed;Specimen collected 08/10/24 1521   Phlebitis Assessment No symptoms 08/10/24 1521   Infiltration Assessment 0 08/10/24 1521       Hemodialysis Central Access Left Subclavian (Active)        Opportunity for questions and clarification was provided.

## 2024-08-10 NOTE — ED NOTES
Per patient, right fistula is no longer being used. Pt reports that dialysis is done through left subclavian port

## 2024-08-10 NOTE — ED PROVIDER NOTES
[facility] EMERGENCY DEPT  EMERGENCY DEPARTMENT ENCOUNTER       Pt Name: Candida Sherman  MRN: [id number]  Birthdate 1965  Date of evaluation: 8/10/2024  Provider: Ignacio Jimenez MD   PCP: [facility], Pcp  Note Started: 6:17 PM EDT 8/10/24     CHIEF COMPLAINT       Chief Complaint   Patient presents with   • Fall     BIBEMS from home in the hallway, was going to the bathroom. This is second fall this week. Pt reports she doesn't remember falling, she remembers telling her son about the dog and next thing she knew her son was looking over her. Her butt hurts from today fall. The other fall was Thursday-pt normally has low MIGNON and takes Midodrine three times a day , most recently at 6am   • Loss of Consciousness     Says her \"son had to wake her up\"     HISTORY OF PRESENT ILLNESS: 1 or more elements      History From: patient, History limited by: none     Candida Sherman is a 58 y.o. female w below hx including ESRD on HD, DM, CHF, CVA presents to ED with second episode of syncope in the past week.  First episode seemed to occur shortly after standing.  Today's episode had no prodrome and was sudden.  She reports that she is unsure if she hit her head or neck because she lost consciousness and does not remember.  She does report lower back following fall.    Please See MDM for Additional Details of the HPI/PMH  Nursing Notes were all reviewed and agreed with or any disagreements were addressed in the HPI.     REVIEW OF SYSTEMS        Positives and Pertinent negatives as per HPI.    PAST HISTORY     Past Medical History:  Past Medical History:   Diagnosis Date   • Arthritis    • Asthma    • Cancer (HCC)    • CHF (congestive heart failure) (HCC) 2020   • Chronic back pain    • Chronic kidney disease    • Diabetes (HCC)    • Diabetic retinopathy (HCC)    • Hypertension    • Kidney failure 01/2022   • Liver disease    • MRSA (methicillin resistant staph aureus) culture positive     labial abscess, negative test 2020   •  syncope.  Patient admitted to hospital medicine, for cardiac monitoring and possible cardiology consult.    Amount and/or Complexity of Data Reviewed  Labs: ordered. Decision-making details documented in ED Course.  Radiology: ordered. Decision-making details documented in ED Course.  ECG/medicine tests: ordered and independent interpretation performed. Decision-making details documented in ED Course.  Discussion of management or test interpretation with external provider(s): Discussed ED workup, diagnosis, and management with Dr. Ferrera, hospitalist, who agrees to admit to medicine service    Risk  Prescription drug management.  Decision regarding hospitalization.      ED Course as of 08/10/24 1817   Sat Aug 10, 2024   1333 EKG is interpreted by me with irregularly irregular rhythm concerning for atrial flutter.  Has a sawtooth appearance in some leads, but not others.  Wonder if this could be an advanced heart block given presentation and difficult interpretation [WB]      ED Course User Index  [WB] Ignacio Jimenez MD       FINAL IMPRESSION     1. Syncope and collapse          DISPOSITION/PLAN     CLINICAL IMPRESSION    Admit Note: Pt is being admitted by hospital medicine. The results of their tests and reason(s) for their admission have been discussed with pt and/or available family. They convey agreement and understanding for the need to be admitted and for the admission diagnosis.     I am the Primary Clinician of Record.   Ignacio Jimenez MD (electronically signed)    (Please note that parts of this dictation were completed with voice recognition software. Quite often unanticipated grammatical, syntax, homophones, and other interpretive errors are inadvertently transcribed by the computer software. Please disregards these errors. Please excuse any errors that have escaped final proofreading.)          Ignacio Jimenez MD  08/11/24 0215

## 2024-08-10 NOTE — PROGRESS NOTES
Pt arrived to unit at approx 1900,  chg bath completed, skin scan with on coming nurse, skin tear to rt lower anterior ext, picture uploaded, dry foam dressing applied after cleansing with NS. Surgi graph site to left upper ext. No dressing. Fistula to rt upper arm, no longer in working order. Left chest wall temp dialysis port, dressing needs to be changed and secured. VS obtained.

## 2024-08-10 NOTE — H&P
MG/DL    BUN/Creatinine Ratio 4 (L) 12 - 20      Est, Glom Filt Rate 9 (L) >60 ml/min/1.73m2    Calcium 8.1 (L) 8.5 - 10.1 MG/DL    Total Bilirubin 0.4 0.2 - 1.0 MG/DL    ALT 16 12 - 78 U/L    AST 12 (L) 15 - 37 U/L    Alk Phosphatase 180 (H) 45 - 117 U/L    Total Protein 7.5 6.4 - 8.2 g/dL    Albumin 3.5 3.5 - 5.0 g/dL    Globulin 4.0 2.0 - 4.0 g/dL    Albumin/Globulin Ratio 0.9 (L) 1.1 - 2.2     Troponin    Collection Time: 08/10/24  3:22 PM   Result Value Ref Range    Troponin, High Sensitivity 6 0 - 51 ng/L   Brain Natriuretic Peptide    Collection Time: 08/10/24  3:22 PM   Result Value Ref Range    NT Pro- (H) <125 PG/ML         CT LUMBAR SPINE WO CONTRAST    Result Date: 8/10/2024  EXAM:  CT LUMBAR SPINE WITHOUT  CONTRAST INDICATION: fell, hit head, loss of consciousness, also with midline low back pain. Reason for exam:->fell, hit head, loss of consciousness, also with midline low back pain COMPARISON: None. CONTRAST:  None. TECHNIQUE: Multislice helical CT of the lumbar spine was performed without intravenous contrast administration.  Coronal and sagittal reformats were generated.  CT dose reduction was achieved through use of a standardized protocol tailored for this examination and automatic exposure control for dose modulation. FINDINGS: The alignment is within normal limits. There is no fracture or compression deformity. There is loss of vertical disc height with vacuum phenomenon at the L5-S1 level with relative preservation of other disc heights. There is diffuse posterior disc osteophyte complex with mild spinal stenosis at the L1-2 L2-3, L3-4, and L5-S1 levels. There is no significant neuroforaminal narrowing. The paravertebral soft tissues are within normal limits. Lower thoracic spine: No herniation or stenosis.     Mild degenerative spine change. No acute fracture or other acute findings. Electronically signed by Reddy Licona    CT HEAD WO CONTRAST    Result Date: 8/10/2024  EXAM: CT HEAD  WO CONTRAST INDICATION: fell, hit head, loss of consciousness, also with midline low back pain COMPARISON: MRI 5/17/2024 and CT 5/16/2024.. CONTRAST: None. TECHNIQUE: Unenhanced CT of the head was performed using 5 mm images. Brain and bone windows were generated. Coronal and sagittal reformats. CT dose reduction was achieved through use of a standardized protocol tailored for this examination and automatic exposure control for dose modulation.  FINDINGS: The ventricles and sulci are normal in size, shape and configuration. There is no significant white matter disease. There is no intracranial hemorrhage, extra-axial collection, or mass effect. The basilar cisterns are open. No CT evidence of acute infarct. Atherosclerotic calcifications affect the carotid siphons and vertebrobasilar system. The bone windows demonstrate no abnormalities. The visualized portions of the paranasal sinuses and mastoid air cells are clear.     No acute findings. Electronically signed by Reddy Blackwell W/O Contrast    Result Date: 8/10/2024  EXAM:  CT CERVICAL SPINE WITHOUT CONTRAST INDICATION: fell, hit head, loss of consciousness, also with midline low back pain. Reason for exam:->fell, hit head, loss of consciousness, also with midline low back pain COMPARISON: None. CONTRAST:  None. TECHNIQUE: Multislice helical CT of the cervical spine was performed without intravenous contrast administration.  Sagittal and coronal reformats were generated.  CT dose reduction was achieved through use of a standardized protocol tailored for this examination and automatic exposure control for dose modulation. FINDINGS: The alignment is within normal limits. There is no fracture or compression deformity. The odontoid process is intact. The craniocervical junction is within normal limits. Loss of vertebral disc height diffusely with anterior marginal osteophytes. No CEMA spinal canal stenosis or neuroforaminal narrowing. The incidentally

## 2024-08-10 NOTE — PROGRESS NOTES
P&T-Approved DVT Prophylaxis Dosing    Per P&T Committee-approved protocol lovenox 40 mg daily has been adjusted to heparin 5000 units tid based on weight and renal function as shown in the table below.         Martin Londono RP

## 2024-08-11 LAB
ANION GAP SERPL CALC-SCNC: 9 MMOL/L (ref 5–15)
BASOPHILS # BLD: 0 K/UL (ref 0–0.1)
BASOPHILS NFR BLD: 1 % (ref 0–1)
BUN SERPL-MCNC: 25 MG/DL (ref 6–20)
BUN/CREAT SERPL: 4 (ref 12–20)
CALCIUM SERPL-MCNC: 8.4 MG/DL (ref 8.5–10.1)
CHLORIDE SERPL-SCNC: 91 MMOL/L (ref 97–108)
CO2 SERPL-SCNC: 30 MMOL/L (ref 21–32)
CREAT SERPL-MCNC: 5.86 MG/DL (ref 0.55–1.02)
DIFFERENTIAL METHOD BLD: ABNORMAL
EKG ATRIAL RATE: 88 BPM
EKG DIAGNOSIS: NORMAL
EKG Q-T INTERVAL: 404 MS
EKG QRS DURATION: 78 MS
EKG QTC CALCULATION (BAZETT): 494 MS
EKG R AXIS: 4 DEGREES
EKG T AXIS: 50 DEGREES
EKG VENTRICULAR RATE: 90 BPM
EOSINOPHIL # BLD: 0.2 K/UL (ref 0–0.4)
EOSINOPHIL NFR BLD: 2 % (ref 0–7)
ERYTHROCYTE [DISTWIDTH] IN BLOOD BY AUTOMATED COUNT: 15.9 % (ref 11.5–14.5)
GLUCOSE BLD STRIP.AUTO-MCNC: 196 MG/DL (ref 65–117)
GLUCOSE BLD STRIP.AUTO-MCNC: 214 MG/DL (ref 65–117)
GLUCOSE BLD STRIP.AUTO-MCNC: 242 MG/DL (ref 65–117)
GLUCOSE BLD STRIP.AUTO-MCNC: 321 MG/DL (ref 65–117)
GLUCOSE SERPL-MCNC: 199 MG/DL (ref 65–100)
HCT VFR BLD AUTO: 29 % (ref 35–47)
HGB BLD-MCNC: 9.2 G/DL (ref 11.5–16)
IMM GRANULOCYTES # BLD AUTO: 0.1 K/UL (ref 0–0.04)
IMM GRANULOCYTES NFR BLD AUTO: 1 % (ref 0–0.5)
LYMPHOCYTES # BLD: 1.1 K/UL (ref 0.8–3.5)
LYMPHOCYTES NFR BLD: 14 % (ref 12–49)
MCH RBC QN AUTO: 28.6 PG (ref 26–34)
MCHC RBC AUTO-ENTMCNC: 31.7 G/DL (ref 30–36.5)
MCV RBC AUTO: 90.1 FL (ref 80–99)
MONOCYTES # BLD: 0.5 K/UL (ref 0–1)
MONOCYTES NFR BLD: 7 % (ref 5–13)
NEUTS SEG # BLD: 5.8 K/UL (ref 1.8–8)
NEUTS SEG NFR BLD: 75 % (ref 32–75)
NRBC # BLD: 0 K/UL (ref 0–0.01)
NRBC BLD-RTO: 0 PER 100 WBC
PLATELET # BLD AUTO: 180 K/UL (ref 150–400)
PMV BLD AUTO: 10 FL (ref 8.9–12.9)
POTASSIUM SERPL-SCNC: 4 MMOL/L (ref 3.5–5.1)
RBC # BLD AUTO: 3.22 M/UL (ref 3.8–5.2)
SERVICE CMNT-IMP: ABNORMAL
SODIUM SERPL-SCNC: 130 MMOL/L (ref 136–145)
WBC # BLD AUTO: 7.7 K/UL (ref 3.6–11)

## 2024-08-11 PROCEDURE — 80048 BASIC METABOLIC PNL TOTAL CA: CPT

## 2024-08-11 PROCEDURE — 2500000003 HC RX 250 WO HCPCS: Performed by: STUDENT IN AN ORGANIZED HEALTH CARE EDUCATION/TRAINING PROGRAM

## 2024-08-11 PROCEDURE — 85025 COMPLETE CBC W/AUTO DIFF WBC: CPT

## 2024-08-11 PROCEDURE — 99222 1ST HOSP IP/OBS MODERATE 55: CPT | Performed by: PSYCHIATRY & NEUROLOGY

## 2024-08-11 PROCEDURE — 36415 COLL VENOUS BLD VENIPUNCTURE: CPT

## 2024-08-11 PROCEDURE — 6360000002 HC RX W HCPCS: Performed by: STUDENT IN AN ORGANIZED HEALTH CARE EDUCATION/TRAINING PROGRAM

## 2024-08-11 PROCEDURE — 2580000003 HC RX 258: Performed by: STUDENT IN AN ORGANIZED HEALTH CARE EDUCATION/TRAINING PROGRAM

## 2024-08-11 PROCEDURE — 94640 AIRWAY INHALATION TREATMENT: CPT

## 2024-08-11 PROCEDURE — 1100000003 HC PRIVATE W/ TELEMETRY

## 2024-08-11 PROCEDURE — 6370000000 HC RX 637 (ALT 250 FOR IP): Performed by: STUDENT IN AN ORGANIZED HEALTH CARE EDUCATION/TRAINING PROGRAM

## 2024-08-11 PROCEDURE — 82962 GLUCOSE BLOOD TEST: CPT

## 2024-08-11 PROCEDURE — 94761 N-INVAS EAR/PLS OXIMETRY MLT: CPT

## 2024-08-11 RX ORDER — INSULIN LISPRO 100 [IU]/ML
0-4 INJECTION, SOLUTION INTRAVENOUS; SUBCUTANEOUS NIGHTLY
Status: DISCONTINUED | OUTPATIENT
Start: 2024-08-11 | End: 2024-08-15 | Stop reason: HOSPADM

## 2024-08-11 RX ORDER — DEXTROSE MONOHYDRATE 100 MG/ML
INJECTION, SOLUTION INTRAVENOUS CONTINUOUS PRN
Status: DISCONTINUED | OUTPATIENT
Start: 2024-08-11 | End: 2024-08-15 | Stop reason: HOSPADM

## 2024-08-11 RX ORDER — DIPHENHYDRAMINE HCL 25 MG
25 CAPSULE ORAL EVERY 6 HOURS PRN
Status: DISCONTINUED | OUTPATIENT
Start: 2024-08-11 | End: 2024-08-15 | Stop reason: HOSPADM

## 2024-08-11 RX ORDER — INSULIN LISPRO 100 [IU]/ML
0-8 INJECTION, SOLUTION INTRAVENOUS; SUBCUTANEOUS
Status: DISCONTINUED | OUTPATIENT
Start: 2024-08-11 | End: 2024-08-15 | Stop reason: HOSPADM

## 2024-08-11 RX ORDER — GLUCAGON 1 MG/ML
1 KIT INJECTION PRN
Status: DISCONTINUED | OUTPATIENT
Start: 2024-08-11 | End: 2024-08-15 | Stop reason: HOSPADM

## 2024-08-11 RX ORDER — OXYMETAZOLINE HYDROCHLORIDE 0.05 G/100ML
2 SPRAY NASAL 2 TIMES DAILY PRN
Status: DISPENSED | OUTPATIENT
Start: 2024-08-11 | End: 2024-08-12

## 2024-08-11 RX ADMIN — INSULIN LISPRO 9 UNITS: 100 INJECTION, SOLUTION INTRAVENOUS; SUBCUTANEOUS at 17:47

## 2024-08-11 RX ADMIN — INSULIN LISPRO 6 UNITS: 100 INJECTION, SOLUTION INTRAVENOUS; SUBCUTANEOUS at 12:03

## 2024-08-11 RX ADMIN — SODIUM CHLORIDE, PRESERVATIVE FREE 10 ML: 5 INJECTION INTRAVENOUS at 21:18

## 2024-08-11 RX ADMIN — HEPARIN SODIUM 5000 UNITS: 5000 INJECTION INTRAVENOUS; SUBCUTANEOUS at 17:47

## 2024-08-11 RX ADMIN — BUDESONIDE INHALATION 500 MCG: 0.5 SUSPENSION RESPIRATORY (INHALATION) at 08:58

## 2024-08-11 RX ADMIN — MIDODRINE HYDROCHLORIDE 5 MG: 5 TABLET ORAL at 12:02

## 2024-08-11 RX ADMIN — INSULIN LISPRO 2 UNITS: 100 INJECTION, SOLUTION INTRAVENOUS; SUBCUTANEOUS at 17:47

## 2024-08-11 RX ADMIN — HEPARIN SODIUM 5000 UNITS: 5000 INJECTION INTRAVENOUS; SUBCUTANEOUS at 02:10

## 2024-08-11 RX ADMIN — SODIUM CHLORIDE, PRESERVATIVE FREE 10 ML: 5 INJECTION INTRAVENOUS at 08:42

## 2024-08-11 RX ADMIN — SEVELAMER CARBONATE 800 MG: 800 TABLET, FILM COATED ORAL at 08:41

## 2024-08-11 RX ADMIN — BUDESONIDE INHALATION 500 MCG: 0.5 SUSPENSION RESPIRATORY (INHALATION) at 19:37

## 2024-08-11 RX ADMIN — ASPIRIN 81 MG: 81 TABLET, COATED ORAL at 08:41

## 2024-08-11 RX ADMIN — DOCUSATE SODIUM 100 MG: 100 CAPSULE, LIQUID FILLED ORAL at 21:17

## 2024-08-11 RX ADMIN — FLUTICASONE PROPIONATE 1 SPRAY: 50 SPRAY, METERED NASAL at 08:42

## 2024-08-11 RX ADMIN — GABAPENTIN 300 MG: 300 CAPSULE ORAL at 21:17

## 2024-08-11 RX ADMIN — INSULIN LISPRO 9 UNITS: 100 INJECTION, SOLUTION INTRAVENOUS; SUBCUTANEOUS at 12:02

## 2024-08-11 RX ADMIN — AMITRIPTYLINE HYDROCHLORIDE 50 MG: 50 TABLET, FILM COATED ORAL at 21:17

## 2024-08-11 RX ADMIN — SEVELAMER CARBONATE 800 MG: 800 TABLET, FILM COATED ORAL at 17:48

## 2024-08-11 RX ADMIN — SEVELAMER CARBONATE 800 MG: 800 TABLET, FILM COATED ORAL at 12:02

## 2024-08-11 RX ADMIN — ATORVASTATIN CALCIUM 80 MG: 40 TABLET, FILM COATED ORAL at 21:17

## 2024-08-11 RX ADMIN — MIDODRINE HYDROCHLORIDE 5 MG: 5 TABLET ORAL at 08:41

## 2024-08-11 RX ADMIN — HEPARIN SODIUM 5000 UNITS: 5000 INJECTION INTRAVENOUS; SUBCUTANEOUS at 09:49

## 2024-08-11 RX ADMIN — GABAPENTIN 300 MG: 300 CAPSULE ORAL at 08:41

## 2024-08-11 RX ADMIN — MICONAZOLE NITRATE: 2 POWDER TOPICAL at 12:02

## 2024-08-11 RX ADMIN — DOCUSATE SODIUM 100 MG: 100 CAPSULE, LIQUID FILLED ORAL at 08:42

## 2024-08-11 RX ADMIN — TICAGRELOR 90 MG: 90 TABLET ORAL at 08:41

## 2024-08-11 RX ADMIN — TICAGRELOR 90 MG: 90 TABLET ORAL at 21:17

## 2024-08-11 RX ADMIN — INSULIN GLARGINE 48 UNITS: 100 INJECTION, SOLUTION SUBCUTANEOUS at 21:14

## 2024-08-11 RX ADMIN — DIPHENHYDRAMINE HYDROCHLORIDE 25 MG: 25 CAPSULE ORAL at 09:49

## 2024-08-11 RX ADMIN — GABAPENTIN 300 MG: 300 CAPSULE ORAL at 14:26

## 2024-08-11 RX ADMIN — INSULIN LISPRO 9 UNITS: 100 INJECTION, SOLUTION INTRAVENOUS; SUBCUTANEOUS at 08:41

## 2024-08-11 RX ADMIN — MIDODRINE HYDROCHLORIDE 5 MG: 5 TABLET ORAL at 17:47

## 2024-08-11 RX ADMIN — MICONAZOLE NITRATE: 2 POWDER TOPICAL at 21:19

## 2024-08-11 ASSESSMENT — PAIN SCALES - GENERAL
PAINLEVEL_OUTOF10: 0

## 2024-08-11 NOTE — PLAN OF CARE
Problem: Discharge Planning  Goal: Discharge to home or other facility with appropriate resources  Outcome: Progressing  Flowsheets (Taken 8/11/2024 8200)  Discharge to home or other facility with appropriate resources: Identify barriers to discharge with patient and caregiver     Problem: Safety - Adult  Goal: Free from fall injury  Outcome: Progressing     Problem: Pain  Goal: Verbalizes/displays adequate comfort level or baseline comfort level  Outcome: Progressing     Problem: Cardiovascular - Adult  Goal: Maintains optimal cardiac output and hemodynamic stability  Outcome: Progressing

## 2024-08-11 NOTE — CONSULTS
CONSULT - Neurology      Name:  Candida Sherman       MRN: [id number]  Location: 1142/01    Date: 8/11/2024  Time:  9:46 AM        Chief Complaint:   Chief Complaint   Patient presents with    Fall     BIBEMS from home in the hallway, was going to the bathroom. This is second fall this week. Pt reports she doesn't remember falling, she remembers telling her son about the dog and next thing she knew her son was looking over her. Her butt hurts from today fall. The other fall was Thursday-pt normally has low MIGNON and takes Midodrine three times a day , most recently at 6am    Loss of Consciousness     Says her \"son had to wake her up\"       HPI:  It is a great pleasure to see Candida Sherman, a 58 y.o. female today in the hospital . Briefly these are the events happened as per the chart and taken from the chart.  The patient was doing fine and the symptoms started with recurrent syncope. when patient felt dizzy when she stood up and passed out.  She did not have any jerking movements .  The symptoms fluctuated in the beginning. There were no aggravating and relieving factors. The patient was brought to the emergency room for further evaluation. Patient had a history of recurrent falls and syncope in the past - most recent being in May of this year when she was diagnosed with pontine stroke.     PAST MEDICAL HISTORY:  Past Medical History:   Diagnosis Date    Arthritis     Asthma     Cancer ([facility])     CHF (congestive heart failure) ([facility]) 2020    Chronic back pain     Chronic kidney disease     Diabetes ([facility])     Diabetic retinopathy ([facility])     Hypertension     Kidney failure 01/2022    Liver disease     MRSA (methicillin resistant staph aureus) culture positive     labial abscess, negative test 2020    Neuropathy     Sleep apnea     Appointment made but not attended    Stroke ([facility]) 2018     PAST SURGICAL HISTORY:    Past Surgical History:   Procedure Laterality Date    CARPAL TUNNEL RELEASE Right 1986    CT BIOPSY RENAL

## 2024-08-11 NOTE — CONSULTS
Consultation Note    NAME: Candida Sherman   :  1965   MRN:  [id number]     Date/Time:  2024 8:32 AM    I have been asked to see this patient  for advice/opinion re: ESRD.     Assessment :    Plan:  APSY-BDD-ECE-[place]  Anemia  Syncope   No acute need for HD today.  Plan HD in AM.    H/H not at goal.  Start SAHRA.       Subjective:   CHIEF COMPLAINT:  \"I feel better.\"    HISTORY OF PRESENT ILLNESS:     Candida is a 58 y.o.   female who has a history of ESRD admitted with a syncopal spell.  She says that she went to HD on Friday and did well.  She says that when she went home she had a syncopal spell.    Past Medical History:   Diagnosis Date    Arthritis     Asthma     Cancer ([facility])     CHF (congestive heart failure) ([facility])     Chronic back pain     Chronic kidney disease     Diabetes ([facility])     Diabetic retinopathy ([facility])     Hypertension     Kidney failure 2022    Liver disease     MRSA (methicillin resistant staph aureus) culture positive     labial abscess, negative test     Neuropathy     Sleep apnea     Appointment made but not attended    Stroke ([facility])       Past Surgical History:   Procedure Laterality Date    CARPAL TUNNEL RELEASE Right     CT BIOPSY RENAL  1/10/2022    CT BIOPSY RENAL 1/10/2022 [facility] RAD CT    FRACTURE SURGERY Right     HUMERUS    HEENT      tonsillectomy    ORTHOPEDIC SURGERY      left ft bunionectomy x2    OTHER SURGICAL HISTORY      lanced labial abscess    TONSILLECTOMY      VASCULAR SURGERY      dialysis, rt upper chest     Social History     Tobacco Use    Smoking status: Never    Smokeless tobacco: Never   Substance Use Topics    Alcohol use: No      Family History   Problem Relation Age of Onset    Diabetes Sister     Anesth Problems Neg Hx     No Known Problems Sister     Kidney Disease Father     Diabetes Father       Allergies   Allergen Reactions    Amoxicillin Nausea Only    Aspirin Other (See Comments)     \"nosebleeds\" but patient takes daily

## 2024-08-11 NOTE — PROGRESS NOTES
End of Shift Note    Bedside shift change report given to VAN Villegas,RN (oncoming nurse) by Demetris Zhu RN (offgoing nurse).  Report included the following information SBAR, Kardex, Intake/Output, and MAR    Shift worked:  7a-7p     Shift summary and any significant changes:     Pt is alert and orientation. No any pin complain during my shift. Given 1 time benadryl for itching.cardiology been consulted . Pt is up with max assist but dandre able to get up because of dizziness episode.     Concerns for physician to address:       Zone phone for oncoming shift:   [phone]       Activity:     Number times ambulated in hallways past shift: 0  Number of times OOB to chair past shift: 0    Cardiac:   Cardiac Monitoring: Yes           Access:  Current line(s): PIV and HD access     Genitourinary:   Urinary status: voiding and external catheter    Respiratory:      Chronic home O2 use?: NO  Incentive spirometer at bedside: YES       GI:     Current diet:  ADULT DIET; Regular; 5 carb choices (75 gm/meal)  Passing flatus: YES  Tolerating current diet: YES       Pain Management:   Patient states pain is manageable on current regimen: YES    Skin:     Interventions: float heels and increase time out of bed    Patient Safety:  Fall Score:    Interventions: bed/chair alarm, assistive device (walker, cane. etc), gripper socks, and pt to call before getting OOB       Length of Stay:  Expected LOS: 2  Actual LOS: 1      Demetris Zhu RN

## 2024-08-11 NOTE — PROGRESS NOTES
Hospitalist Progress Note    NAME:   Candida Sherman   : 1965   MRN: [id number]     Date/Time: 2024 8:53 AM  Patient PCP: [facility], Pcp    Estimated discharge date: 2024  Barriers: Needs neurology/cardiology.  Need to check orthostatic blood pressure.  Dialysis in a.m. tomorrow    Assessment / Plan:  Syncopal episode-no prodrome  History of pontine stroke in May 2024  Rule out cardiogenic causes  Rule out postural hypotension  Patient admitted to medical unit with telemetry  EKG done on arrival revealed undetermined rhythm-voltage QRS-rate of 88 bpm.  Cannot rule out flutter  CT head done on arrival within normal limit  Most recent echo on 2024-ejection fraction up to 60%.  Increased wall thickness.  Of note patient was admitted in May for dizziness and presyncope workup.  At that time evaluation revealed that patient had small left pontine stroke.  Neurology was consulted.  Patient was placed on Brilinta ASA statin and discharged.  Patient was also advised for meclizine  Continue aspirin statin, Brilinta  MRI brain done on 8/10/2022 negative for acute stroke  Neurology has been consulted  May need cardiology eval.  PT OT eval  Check orthostatic vitals every shift  Telemetry reveals normal sinus rhythm.     ESRD on dialysis mwf   Dialysis catheter in left upper chest  Nephrology to be consulted for dialysis  Continue sevelamer  Dialysis catheter care  as per nursing-to prevent CLABSI  Benadryl ordered for itching     History of PAD  Chronic bilateral lower extremity edema  Continued current care-no acute intervention     History of diabetes mellitus  Most recent A1c in May 9.1  Patient is on lispro 0 to 8 units 3 times a day at home, insulin Lantus 48 units nightly  Will continue same insulin here-lispro increased to 9 units premeals  Diabetic diet  Hypoglycemic  treatment orders in place  Sliding scale insulin ordered     Chronic:  Hyponatremia  Hypertension not on

## 2024-08-11 NOTE — PROGRESS NOTES
Called for cardiology consult. However, no prior contact with [facility]. Dr. Puentes on for unassigned consults. Please call Dr. Alvarez Puentes. I changed the consult order.

## 2024-08-11 NOTE — PLAN OF CARE
Problem: Discharge Planning  Goal: Discharge to home or other facility with appropriate resources  8/11/2024 1152 by Demetris Zhu RN  Outcome: Progressing  8/11/2024 0630 by Galileo Kennedy RN  Outcome: Progressing  Flowsheets (Taken 8/11/2024 0519)  Discharge to home or other facility with appropriate resources: Identify barriers to discharge with patient and caregiver     Problem: Safety - Adult  Goal: Free from fall injury  8/11/2024 1152 by Demetris Zhu RN  Outcome: Progressing  8/11/2024 0630 by Galileo Kennedy RN  Outcome: Progressing     Problem: Pain  Goal: Verbalizes/displays adequate comfort level or baseline comfort level  8/11/2024 1152 by Demetris Zhu RN  Outcome: Progressing  8/11/2024 0630 by Galileo Kennedy RN  Outcome: Progressing     Problem: Cardiovascular - Adult  Goal: Maintains optimal cardiac output and hemodynamic stability  8/11/2024 1152 by Demetris Zhu RN  Outcome: Progressing  8/11/2024 0630 by Galileo Kennedy RN  Outcome: Progressing     Problem: Respiratory - Adult  Goal: Achieves optimal ventilation and oxygenation  8/11/2024 1152 by Demetris Zhu RN  Outcome: Progressing  8/11/2024 0843 by Melissa Loaiza RCP  Outcome: Progressing

## 2024-08-12 LAB
ANION GAP SERPL CALC-SCNC: 11 MMOL/L (ref 5–15)
BASOPHILS # BLD: 0 K/UL (ref 0–0.1)
BASOPHILS NFR BLD: 1 % (ref 0–1)
BUN SERPL-MCNC: 34 MG/DL (ref 6–20)
BUN/CREAT SERPL: 5 (ref 12–20)
CALCIUM SERPL-MCNC: 8.3 MG/DL (ref 8.5–10.1)
CHLORIDE SERPL-SCNC: 90 MMOL/L (ref 97–108)
CO2 SERPL-SCNC: 25 MMOL/L (ref 21–32)
CREAT SERPL-MCNC: 6.72 MG/DL (ref 0.55–1.02)
DIFFERENTIAL METHOD BLD: ABNORMAL
EOSINOPHIL # BLD: 0.1 K/UL (ref 0–0.4)
EOSINOPHIL NFR BLD: 2 % (ref 0–7)
ERYTHROCYTE [DISTWIDTH] IN BLOOD BY AUTOMATED COUNT: 15.9 % (ref 11.5–14.5)
GLUCOSE BLD STRIP.AUTO-MCNC: 150 MG/DL (ref 65–117)
GLUCOSE BLD STRIP.AUTO-MCNC: 157 MG/DL (ref 65–117)
GLUCOSE BLD STRIP.AUTO-MCNC: 182 MG/DL (ref 65–117)
GLUCOSE BLD STRIP.AUTO-MCNC: 211 MG/DL (ref 65–117)
GLUCOSE SERPL-MCNC: 194 MG/DL (ref 65–100)
HCT VFR BLD AUTO: 26.3 % (ref 35–47)
HGB BLD-MCNC: 8.5 G/DL (ref 11.5–16)
IMM GRANULOCYTES # BLD AUTO: 0.1 K/UL (ref 0–0.04)
IMM GRANULOCYTES NFR BLD AUTO: 1 % (ref 0–0.5)
LYMPHOCYTES # BLD: 0.9 K/UL (ref 0.8–3.5)
LYMPHOCYTES NFR BLD: 14 % (ref 12–49)
MCH RBC QN AUTO: 28.7 PG (ref 26–34)
MCHC RBC AUTO-ENTMCNC: 32.3 G/DL (ref 30–36.5)
MCV RBC AUTO: 88.9 FL (ref 80–99)
MONOCYTES # BLD: 0.3 K/UL (ref 0–1)
MONOCYTES NFR BLD: 5 % (ref 5–13)
NEUTS SEG # BLD: 4.9 K/UL (ref 1.8–8)
NEUTS SEG NFR BLD: 77 % (ref 32–75)
NRBC # BLD: 0 K/UL (ref 0–0.01)
NRBC BLD-RTO: 0 PER 100 WBC
PLATELET # BLD AUTO: 175 K/UL (ref 150–400)
PMV BLD AUTO: 9.8 FL (ref 8.9–12.9)
POTASSIUM SERPL-SCNC: 4 MMOL/L (ref 3.5–5.1)
RBC # BLD AUTO: 2.96 M/UL (ref 3.8–5.2)
SERVICE CMNT-IMP: ABNORMAL
SODIUM SERPL-SCNC: 126 MMOL/L (ref 136–145)
WBC # BLD AUTO: 6.4 K/UL (ref 3.6–11)

## 2024-08-12 PROCEDURE — 86706 HEP B SURFACE ANTIBODY: CPT

## 2024-08-12 PROCEDURE — 6370000000 HC RX 637 (ALT 250 FOR IP): Performed by: STUDENT IN AN ORGANIZED HEALTH CARE EDUCATION/TRAINING PROGRAM

## 2024-08-12 PROCEDURE — 94640 AIRWAY INHALATION TREATMENT: CPT

## 2024-08-12 PROCEDURE — 97530 THERAPEUTIC ACTIVITIES: CPT

## 2024-08-12 PROCEDURE — 85025 COMPLETE CBC W/AUTO DIFF WBC: CPT

## 2024-08-12 PROCEDURE — 6360000002 HC RX W HCPCS: Performed by: INTERNAL MEDICINE

## 2024-08-12 PROCEDURE — 6360000002 HC RX W HCPCS: Performed by: STUDENT IN AN ORGANIZED HEALTH CARE EDUCATION/TRAINING PROGRAM

## 2024-08-12 PROCEDURE — 97535 SELF CARE MNGMENT TRAINING: CPT

## 2024-08-12 PROCEDURE — 36415 COLL VENOUS BLD VENIPUNCTURE: CPT

## 2024-08-12 PROCEDURE — 97162 PT EVAL MOD COMPLEX 30 MIN: CPT

## 2024-08-12 PROCEDURE — 5A1D70Z PERFORMANCE OF URINARY FILTRATION, INTERMITTENT, LESS THAN 6 HOURS PER DAY: ICD-10-PCS | Performed by: INTERNAL MEDICINE

## 2024-08-12 PROCEDURE — 82962 GLUCOSE BLOOD TEST: CPT

## 2024-08-12 PROCEDURE — 1100000003 HC PRIVATE W/ TELEMETRY

## 2024-08-12 PROCEDURE — 80048 BASIC METABOLIC PNL TOTAL CA: CPT

## 2024-08-12 PROCEDURE — 90935 HEMODIALYSIS ONE EVALUATION: CPT

## 2024-08-12 PROCEDURE — 87340 HEPATITIS B SURFACE AG IA: CPT

## 2024-08-12 PROCEDURE — 2580000003 HC RX 258: Performed by: STUDENT IN AN ORGANIZED HEALTH CARE EDUCATION/TRAINING PROGRAM

## 2024-08-12 PROCEDURE — 94761 N-INVAS EAR/PLS OXIMETRY MLT: CPT

## 2024-08-12 PROCEDURE — 97166 OT EVAL MOD COMPLEX 45 MIN: CPT

## 2024-08-12 RX ORDER — HEPARIN SODIUM 1000 [USP'U]/ML
INJECTION, SOLUTION INTRAVENOUS; SUBCUTANEOUS
Status: DISPENSED
Start: 2024-08-12 | End: 2024-08-13

## 2024-08-12 RX ADMIN — ATORVASTATIN CALCIUM 80 MG: 40 TABLET, FILM COATED ORAL at 22:48

## 2024-08-12 RX ADMIN — INSULIN LISPRO 9 UNITS: 100 INJECTION, SOLUTION INTRAVENOUS; SUBCUTANEOUS at 08:20

## 2024-08-12 RX ADMIN — INSULIN LISPRO 2 UNITS: 100 INJECTION, SOLUTION INTRAVENOUS; SUBCUTANEOUS at 12:02

## 2024-08-12 RX ADMIN — HEPARIN SODIUM 5000 UNITS: 5000 INJECTION INTRAVENOUS; SUBCUTANEOUS at 09:37

## 2024-08-12 RX ADMIN — INSULIN LISPRO 9 UNITS: 100 INJECTION, SOLUTION INTRAVENOUS; SUBCUTANEOUS at 17:14

## 2024-08-12 RX ADMIN — ASPIRIN 81 MG: 81 TABLET, COATED ORAL at 09:37

## 2024-08-12 RX ADMIN — SEVELAMER CARBONATE 800 MG: 800 TABLET, FILM COATED ORAL at 08:21

## 2024-08-12 RX ADMIN — MIDODRINE HYDROCHLORIDE 5 MG: 5 TABLET ORAL at 17:13

## 2024-08-12 RX ADMIN — MICONAZOLE NITRATE: 2 POWDER TOPICAL at 09:39

## 2024-08-12 RX ADMIN — AMITRIPTYLINE HYDROCHLORIDE 50 MG: 50 TABLET, FILM COATED ORAL at 22:48

## 2024-08-12 RX ADMIN — BUDESONIDE INHALATION 500 MCG: 0.5 SUSPENSION RESPIRATORY (INHALATION) at 07:27

## 2024-08-12 RX ADMIN — HEPARIN SODIUM 2100 UNITS: 1000 INJECTION INTRAVENOUS; SUBCUTANEOUS at 21:30

## 2024-08-12 RX ADMIN — INSULIN GLARGINE 48 UNITS: 100 INJECTION, SOLUTION SUBCUTANEOUS at 22:45

## 2024-08-12 RX ADMIN — SEVELAMER CARBONATE 800 MG: 800 TABLET, FILM COATED ORAL at 12:02

## 2024-08-12 RX ADMIN — GABAPENTIN 300 MG: 300 CAPSULE ORAL at 09:38

## 2024-08-12 RX ADMIN — TICAGRELOR 90 MG: 90 TABLET ORAL at 09:38

## 2024-08-12 RX ADMIN — DOCUSATE SODIUM 100 MG: 100 CAPSULE, LIQUID FILLED ORAL at 09:38

## 2024-08-12 RX ADMIN — GABAPENTIN 300 MG: 300 CAPSULE ORAL at 22:47

## 2024-08-12 RX ADMIN — HEPARIN SODIUM 5000 UNITS: 5000 INJECTION INTRAVENOUS; SUBCUTANEOUS at 17:15

## 2024-08-12 RX ADMIN — SEVELAMER CARBONATE 800 MG: 800 TABLET, FILM COATED ORAL at 17:13

## 2024-08-12 RX ADMIN — DOCUSATE SODIUM 100 MG: 100 CAPSULE, LIQUID FILLED ORAL at 22:48

## 2024-08-12 RX ADMIN — MIDODRINE HYDROCHLORIDE 5 MG: 5 TABLET ORAL at 12:02

## 2024-08-12 RX ADMIN — SODIUM CHLORIDE, PRESERVATIVE FREE 10 ML: 5 INJECTION INTRAVENOUS at 22:52

## 2024-08-12 RX ADMIN — HEPARIN SODIUM 2000 UNITS: 1000 INJECTION INTRAVENOUS; SUBCUTANEOUS at 21:30

## 2024-08-12 RX ADMIN — MICONAZOLE NITRATE: 2 POWDER TOPICAL at 22:51

## 2024-08-12 RX ADMIN — HEPARIN SODIUM 5000 UNITS: 5000 INJECTION INTRAVENOUS; SUBCUTANEOUS at 01:56

## 2024-08-12 RX ADMIN — TICAGRELOR 90 MG: 90 TABLET ORAL at 22:48

## 2024-08-12 RX ADMIN — SODIUM CHLORIDE, PRESERVATIVE FREE 10 ML: 5 INJECTION INTRAVENOUS at 08:20

## 2024-08-12 RX ADMIN — GABAPENTIN 300 MG: 300 CAPSULE ORAL at 13:49

## 2024-08-12 RX ADMIN — FLUTICASONE PROPIONATE 1 SPRAY: 50 SPRAY, METERED NASAL at 09:38

## 2024-08-12 RX ADMIN — INSULIN LISPRO 9 UNITS: 100 INJECTION, SOLUTION INTRAVENOUS; SUBCUTANEOUS at 12:01

## 2024-08-12 RX ADMIN — EPOETIN ALFA 8000 UNITS: 4000 SOLUTION INTRAVENOUS; SUBCUTANEOUS at 22:51

## 2024-08-12 RX ADMIN — NASAL DECONGESTANT 2 SPRAY: 0.05 SPRAY NASAL at 03:07

## 2024-08-12 RX ADMIN — NASAL DECONGESTANT 2 SPRAY: 0.05 SPRAY NASAL at 09:39

## 2024-08-12 RX ADMIN — ACETAMINOPHEN 650 MG: 325 TABLET ORAL at 17:14

## 2024-08-12 RX ADMIN — MIDODRINE HYDROCHLORIDE 5 MG: 5 TABLET ORAL at 08:21

## 2024-08-12 ASSESSMENT — PAIN SCALES - GENERAL
PAINLEVEL_OUTOF10: 7
PAINLEVEL_OUTOF10: 0
PAINLEVEL_OUTOF10: 7
PAINLEVEL_OUTOF10: 0

## 2024-08-12 ASSESSMENT — PAIN DESCRIPTION - LOCATION
LOCATION: HEAD
LOCATION: HEAD

## 2024-08-12 NOTE — PLAN OF CARE
Problem: Physical Therapy - Adult  Goal: By Discharge: Performs mobility at highest level of function for planned discharge setting.  See evaluation for individualized goals.  Description: FUNCTIONAL STATUS PRIOR TO ADMISSION: Patient was modified independent using a rolling walker and power wheelchair for functional mobility, reports mainly using power chair and only uses RW when walking from bedroom to bathroom. Reports indep with ADLs. At least 10-15 falls this year, a few due to \"passing out\" with majority of falls being due to \"ankle rolling/giving out\". Was at [facility] in June following CVA.    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    Physical Therapy Goals  Initiated 8/12/2024  1.  Patient will move from supine to sit and sit to supine, scoot up and down, and roll side to side in bed with modified independence within 7 day(s).    2.  Patient will perform sit to stand with modified independence within 7 day(s).  3.  Patient will transfer from bed to chair and chair to bed with modified independence using the least restrictive device within 7 day(s).  4.  Patient will ambulate with modified independence for 30 feet with the least restrictive device within 7 day(s).   Outcome: Progressing   PHYSICAL THERAPY EVALUATION    Patient: Candida Sherman (58 y.o. female)  Date: 8/12/2024  Primary Diagnosis: Syncope and collapse [R55]    Precautions: Restrictions/Precautions: Fall Risk                    ASSESSMENT :   DEFICITS/IMPAIRMENTS:   The patient is limited by decreased functional mobility, independence in ADLs, strength, sensation, activity tolerance, safety awareness, balance, liable blood pressures following admission for GLF due to syncope. Patient with extensive PmHx including multiple CVAs resulting in bilat drop foot and L sided weakness, patient reports she has AFOs at home but only wears them when she can get them on which sounds  like is infrequent. She reports at least 10-15 falls this year, some due to \"passing out\" but mostly due to her unstable ankles which AFOs would help with this concern.       Based on the impairments listed above patient is below her baseline functional status, mobility this session limited by increasing dizziness in standing. Patient able to come to sit edge of bed without assist using bed rail. Demonstrates good static sitting balance. Completes sit<>stand from elevated surface (patient reports elevated surfaces at home) with CGA, once in standing patient tolerated standing for about 1 min to obtain BP with BP appearing to remain stable (though SBP increasing 124/100 mmHg). Patient initially c/o diplopia only but with time complains of increasing dizziness. Deferred gait due to standing symptoms. Returned to supine without assistance.     Patient will benefit from skilled intervention to address the above impairments. Pending progress, patient will likely be able to discharge with  services. Would strongly recommend patient wear AFOs more consistently, may benefit from follow-up with orthotist to ensure proper fit for improved adherence to wearing.     Functional Outcome Measure:  The patient scored 18/24 on the AM-PAC outcome measure        PLAN :  Recommendations and Planned Interventions:   bed mobility training, transfer training, gait training, therapeutic exercises, patient and family training/education, and therapeutic activities    Frequency/Duration: Patient will be followed by physical therapy to address goals, PT Plan of Care: 4 times/week to address goals.    Recommendation for discharge: (in order for the patient to meet his/her long term goals): Therapy 2x a week in the home, however, may require supervision, cognitive and/or physical assistance due to the following concerns listed below:    Other factors to consider for discharge: high risk for falls, not safe to be alone, and concern for safely

## 2024-08-12 NOTE — PLAN OF CARE
Problem: Discharge Planning  Goal: Discharge to home or other facility with appropriate resources  8/11/2024 2319 by Bakari Portillo RN  Outcome: Progressing  8/11/2024 1152 by Demetris Zhu RN  Outcome: Progressing     Problem: Safety - Adult  Goal: Free from fall injury  8/11/2024 2319 by Bakari Portillo RN  Outcome: Progressing  8/11/2024 1152 by Demetris Zhu RN  Outcome: Progressing     Problem: Pain  Goal: Verbalizes/displays adequate comfort level or baseline comfort level  8/11/2024 2319 by Bakari Portillo RN  Outcome: Progressing  8/11/2024 1152 by Demetris Zhu RN  Outcome: Progressing     Problem: Cardiovascular - Adult  Goal: Maintains optimal cardiac output and hemodynamic stability  8/11/2024 1152 by Demetris Zhu RN  Outcome: Progressing     Problem: Respiratory - Adult  Goal: Achieves optimal ventilation and oxygenation  8/11/2024 1940 by Mckayla Ash, RT  Outcome: Progressing  8/11/2024 1152 by Demetris Zhu RN  Outcome: Progressing

## 2024-08-12 NOTE — PROGRESS NOTES
PCP is with [facility], walk-in facility from 8:00am to 10:00p.m. Patient will need to walk in to see PCP. Cannot make appt. Pending patient discharge.  Kathleen Masterson, Care Management Assistant

## 2024-08-12 NOTE — PROGRESS NOTES
08/12/24 0818 08/12/24 1010 08/12/24 1021   Vital Signs   Temp 98.2 °F (36.8 °C)  --   --    Temp Source Oral  --   --    Pulse 89 86 89   Heart Rate Source Monitor  --   --    Respirations 16  --   --    BP (!) 146/82 105/62 95/81   MAP (Calculated) 103 76 86   MAP (mmHg) 99  --   --    BP Location Right upper arm Right upper arm Right upper arm   BP Method Automatic Automatic Automatic   Patient Position Sitting Supine Sitting  (edge of bed)      08/12/24 1029   Vital Signs   Temp  --    Temp Source  --    Pulse 96   Heart Rate Source  --    Respirations  --    BP 90/67   MAP (Calculated) 75   MAP (mmHg)  --    BP Location Right upper arm   BP Method Automatic   Patient Position Standing  (symptomatic; unable to maintain standing for full BP assessment)

## 2024-08-12 NOTE — CARE COORDINATION
Care Management Initial Assessment       RUR: 26%  Readmission? No  1st IM letter given? No-Medicaid   1st  letter given: No    227 pm: CM attempted to schedule a hospital follow up appointment with Dr. Jean Marie Dela Cruz but Dr. Dela Cruz's nurse will be reaching out to the pt to schedule a follow up appointment.       Initial Note: Pt's chart was reviewed. CM introduce self, explain role and confirmed demographics with pt.    Pt lives with her family in a first floor apartment with no steps to enter.    Pt has a hx of using home health-[facility] (currently open).    Pt also has a hx of inpatient rehab-[facility].    No hx of SNF.    Pt uses [facility] on [address].    Pt also goes to dialysis at [facility] on M/W/F at 530 am.    At the time of d/c pt will need Medicaid transportation.    CM will follow and assist with d/c planning.      08/12/24 5528   Service Assessment   Patient Orientation Alert and Oriented   Cognition Alert   History Provided By Patient;Medical Record   Primary Caregiver Self   Support Systems Family Members;/;Other (Comment);Children  (Dialysis staff)   Patient's Healthcare Decision Maker is: Named in Scanned ACP Document   PCP Verified by CM Yes   Last Visit to PCP Within last 6 months   Prior Functional Level Independent in ADLs/IADLs   Current Functional Level Independent in ADLs/IADLs   Can patient return to prior living arrangement Yes   Family able to assist with home care needs: Yes   Would you like for me to discuss the discharge plan with any other family members/significant others, and if so, who? Yes  (Pt's sister Irma Mcintosh)   Financial Resources Medicaid   Community Resources None   Social/Functional History   Lives With Family   Type of Home Apartment   Home Equipment Wheelchair - Manual;Rollator;Grab bars  (3:1 and tub bench)   Active  No   Patient's  Info pt's family and Medicaid transport   Discharge Planning    Type of Residence House   Current Services Prior To Admission Durable Medical Equipment   Patient expects to be discharged to: Apartment     Advance Care Planning     General Advance Care Planning (ACP) Conversation    Date of Conversation: 8/12/2024  Conducted with: Patient with Decision Making Capacity  Other persons present: None    Healthcare Decision Maker:   Primary Decision Maker: Irma Mcintosh - Brother/Sister - [phone]    Secondary Decision Maker: Janes Miranda (nephew) - Other - [phone]       Content/Action Overview:  Has ACP document(s) on file - reflects the patient's care preferences  Reviewed DNR/DNI and patient elects Full Code (Attempt Resuscitation)        Length of Voluntary ACP Conversation in minutes:  <16 minutes (Non-Billable)    Mckalya Saravia

## 2024-08-12 NOTE — PROGRESS NOTES
Hospitalist Progress Note    NAME:   Candida Sherman   : 1965   MRN: [id number]     Date/Time: 2024 11:52 AM  Patient PCP: [facility], Pcp    Estimated discharge date: 2024  Barriers: Needs cardiology.  Need to check orthostatic blood pressure.      Assessment / Plan:  Syncopal episode-no prodrome  History of pontine stroke in May 2024  Rule out cardiogenic causes  Rule out postural hypotension  Patient admitted to medical unit with telemetry  EKG done on arrival revealed undetermined rhythm-voltage QRS-rate of 88 bpm.  Cannot rule out flutter  CT head done on arrival within normal limit  Most recent echo on 2024-ejection fraction up to 60%.  Increased wall thickness.  Of note patient was admitted in May for dizziness and presyncope workup.  At that time evaluation revealed that patient had small left pontine stroke.  Neurology was consulted.  Patient was placed on Brilinta ASA statin and discharged.  Patient was also advised for meclizine  Continue aspirin statin, Brilinta  MRI brain done on 8/10/2022 negative for acute stroke  Neurology has been consulted-will need event monitor  PT OT eval  Check orthostatic vitals every shift  Telemetry reveals normal sinus rhythm.  Cardiology Dr. Puentes will be consulted today.     ESRD on dialysis mwf   Dialysis catheter in left upper chest  Nephrology to be consulted for dialysis  Continue sevelamer  Dialysis catheter care  as per nursing-to prevent CLABSI  Benadryl ordered for itching     History of PAD  Chronic bilateral lower extremity edema  Continued current care-no acute intervention     History of diabetes mellitus  Most recent A1c in May 9.1  Patient is on lispro 0 to 8 units 3 times a day at home, insulin Lantus 48 units nightly  Will continue same insulin here-lispro increased to 9 units premeals  Diabetic diet  Hypoglycemic  treatment orders in place  Sliding scale insulin ordered     Chronic:  Hyponatremia  Hypertension not on  --   --    AST 12*  --   --    ALT 16  --   --        Signed: Kim Ferrera MD

## 2024-08-12 NOTE — PROGRESS NOTES
NAME: Candida Sherman        :  1965        MRN:  [id number]                   Assessment   :                                               Plan:  OHYS-NLS-UWT-[place]  Anemia  Syncope    HD today.  Will try to pull 4 kg.     H/H not at goal.  Continue SAHRA.         Subjective:     Chief Complaint:  \" I feel OK.\"  No N/V.  No dyspnea.       Review of Systems:    Symptom Y/N Comments  Symptom Y/N Comments   Fever/Chills    Chest Pain     Poor Appetite    Edema     Cough    Abdominal Pain     Sputum    Joint Pain     SOB/FONSECA    Pruritis/Rash     Nausea/vomit    Tolerating PT/OT     Diarrhea    Tolerating Diet     Constipation    Other       Could not obtain due to:      Objective:     VITALS:   Last 24hrs VS reviewed since prior progress note. Most recent are:  Vitals:    24   BP: (!) 146/82   Pulse: 89   Resp: 16   Temp: 98.2 °F (36.8 °C)   SpO2: 100%       Intake/Output Summary (Last 24 hours) at 2024 1043  Last data filed at 2024  Gross per 24 hour   Intake 200 ml   Output 200 ml   Net 0 ml      Telemetry Reviewed:     PHYSICAL EXAM:  General: NAD  + edema      Lab Data Reviewed: (see below)    Medications Reviewed: (see below)    PMH/SH reviewed - no change compared to H&P  ________________________________________________________________________  Care Plan discussed with:  Patient     Family      RN     Care Manager                    Consultant:          Comments   >50% of visit spent in counseling and coordination of care       ________________________________________________________________________  Wayne Branham MD     Procedures: see electronic medical records for all procedures/Xrays and details which  were not copied into this note but were reviewed prior to creation of Plan.      LABS:  Recent Labs     24  0418 24  0205   WBC 7.7 6.4   HGB 9.2* 8.5*   HCT 29.0* 26.3*

## 2024-08-12 NOTE — PLAN OF CARE
Problem: Occupational Therapy - Adult  Goal: By Discharge: Performs self-care activities at highest level of function for planned discharge setting.  See evaluation for individualized goals.  Description: FUNCTIONAL STATUS PRIOR TO ADMISSION:    Receives Help From: Family (mod I ADLs with increased time; uses power w/c at times in home usually RW; power w/c needed in community), ADL Assistance: Independent (mod I w/c level with DME),  ,  ,  ,  ,  , Homemaking Assistance: Needs assistance (sister assists; son does not assist much; she is more his cog caregiver), Ambulation Assistance: Independent (RW; short distances houshold short distance ambulator with heavy lean into RW), Transfer Assistance: Independent, Active : No  Patient does her own meds and finances PTA; cooks; \"they help with laundry\"    HOME SUPPORT: Patient lived with sister and autistic adult son. Mod I since rehab completion until this week with decreased strength/endurance and subsequent fall with syncope    Occupational Therapy Goals:  Initiated 8/12/2024  1.  Patient will perform grooming in standing VSS with Contact Guard Assist within 7 day(s).  2.  Patient will perform upper body dressing with Supervision within 7 day(s).  3.  Patient will perform lower body dressing with Supervision within 7 day(s).  4.  Patient will perform toilet transfers with Supervision  within 7 day(s).  5.  Patient will perform all aspects of toileting with Supervision within 7 day(s).  6.  Patient will participate in upper extremity therapeutic exercise/activities with Supervision for 5 minutes within 7 day(s).    7.  Patient will utilize energy conservation, fall prevention, safety with orthostatic hypotension techniques during functional activities with verbal cues within 7 day(s).   8.  Patient will complete cog screen to clarify discharge planning within 7 days  Outcome: Progressing   OCCUPATIONAL THERAPY EVALUATION    Patient: Candida DILLON Lane (58 y.o.  duration OOB, be out of bed for all meals, perform daily ADLs (as approved by RN/MD regarding bathing etc), and performing functional mobility to/from [facility]                                                                                                                                                                                                                                       Barthel Index:    Barthel Index Scale  Feeding: Independent, Able to apply any necessary device. Feeds in reasonable time  Bathing: Cannot perform activity  Grooming: Washes face, brian hair, brushes teeth, shaves (manages plug if electric razor)  Dressing: Needs help, but does at least half of task within reasonable time  Bowel Control: No accidents. Able to use enema or suppository if needed  Bladder Control: Occasional accidents or needs help with device  Toilet Transfers: Cannot perform activity  Chair/Bed Trannsfers: Able to sit, but needs maximum assistance to transfer  Ambulation: Cannot perform activity  Stairs: Cannot perform activity  Total Barthel Index Score: 40       The Barthel ADL Index: Guidelines  1. The index should be used as a record of what a patient does, not as a record of what a patient could do.  2. The main aim is to establish degree of independence from any help, physical or verbal, however minor and for whatever reason.  3. The need for supervision renders the patient not independent.  4. A patient's performance should be established using the best available evidence. Asking the patient, friends/relatives and nurses are the usual sources, but direct observation and common sense are also important. However direct testing is not needed.  5. Usually the patient's performance over the preceding 24-48 hours is important, but occasionally longer periods will be relevant.  6. Middle categories imply that the patient supplies over 50 per cent of the effort.  7. Use of aids to be independent is allowed.    Score

## 2024-08-12 NOTE — FLOWSHEET NOTE
Primary RN SBAR: Riddhi Ware, RN  Patient Education provided: HD treatment  Incapacitated Nurse mikayla. provided: 2nd RN in suite  Preferred Education method and Primary language: Verbal, English  Hospital associated wait time; reason: 60 min wait for transport    Hep B Surface Ag Negative 2/16/24- Clinic  Hep B Surface Ab Immune 2/16/24- Clinic       08/12/24 1726   Treatment   Time On 1727   Treatment Goal 4l within 4 hours   Observations & Evaluations   Level of Consciousness 0   Oriented X 4   Heart Rhythm Regular   Respiratory Quality/Effort Unlabored   O2 Device None (Room air)   Bilateral Breath Sounds Diminished;Clear   Skin Condition/Temp Warm   Appetite Good   Abdomen Inspection Rounded;Soft   Bowel Sounds (All Quadrants) Active   Edema Right upper extremity;Left upper extremity;Right lower extremity;Left lower extremity   RUE Edema +1;Non-pitting   LUE Edema +1;Non-pitting   RLE Edema Trace   LLE Edema Trace   Vital Signs   /65   Temp 97.5 °F (36.4 °C)   Pulse 94   Respirations 16   SpO2 99 %   Pain Assessment   Pain Assessment None - Denies Pain   Pain Level 0   Technical Checks   Dialysis Machine No. 05   RO Machine Number R05   Dialyzer Lot No. [id number]   Tubing Lot Number [id number]   All Connections Secure Yes   NS Bag Yes   Saline Line Double Clamped Yes   Dialyzer Revaclear 300   Prime Volume (mL) 300 mL   ICEBOAT I;C;E;B;O;A;T   RO Machine Log Sheet Completed Yes   Machine Alarm Self Test Completed;Passed   Air Foam Detector Tested;Proper Function   Extracorporeal Circuit Tested for Integrity Yes   Machine Conductivity 13.7   Manual Ph 7.2   Bleach Test (Neg) Yes   Bath Temperature 98.6 °F (37 °C)   Treatment Initiation   Dialyze Hours 4   Treatment  Initiation Universal Precautions maintained;Lines secured to patient;Connections secured;Prime given;Venous Parameters set;Arterial Parameters set;Air foam detector engaged;[place] Device;Saline line double clamped;Revaclear Dialyzer   Hemodialysis  Fistula/Graft Arteriovenous vein graft Left Arm   No placement date or time found.   Present on Admission/Arrival: Yes  Access Type: Arteriovenous vein graft  Orientation: Left  Access Location: Arm   Site Assessment Pink;Edematous;Dry   Thrill Present   Bruit Present   Hemodialysis Central Access Left Subclavian   No placement date or time found.   Present on Admission/Arrival: Yes  Orientation: Left  Access Location: Subclavian   Continued need for line? Yes   Site Assessment Clean, dry & intact   Venous Lumen Status Brisk blood return;Flushed   Arterial Lumen Status Sluggish blood return;Flushed   Line Care Connections checked and tightened;Ports disinfected   Dressing Type Bacteriocidal;Transparent   Date of Last Dressing Change 08/11/24   Dressing Status Clean, dry & intact   Dressing Change Due 08/13/24   Dialysis Bath   K+ (Potassium) 2   Ca+ (Calcium) 2.5   Na+ (Sodium) 140   HCO3 (Bicarb) 35

## 2024-08-12 NOTE — PROGRESS NOTES
End of Shift Note    Bedside shift change report given to Terra ARAUJO (oncoming nurse) by HAWK TOM RN (offgoing nurse).  Report included the following information SBAR, Kardex, and MAR    Shift worked:  [id number]     Shift summary and any significant changes:     Pt AAOx4, VSS, no complaint of pain, Medication given per MD order,  dialysis today, hourly rounding completed.      Concerns for physician to address:  no     Zone phone for oncoming shift:   no       Activity:  Level of Assistance: Minimal assist, patient does 75% or more    Cardiac:   Cardiac Monitoring:  yes     Access:  Current line(s): PIV     Genitourinary:   Urinary Status: Voiding, External catheter    Respiratory:   O2 Device: None (Room air)    GI:  Current diet: ADULT DIET; Regular; 5 carb choices (75 gm/meal)    Pain Management:   Patient states pain is manageable on current regimen: YES    Skin:  Sebastian Scale Score: 18  Interventions: Wound Offloading (Prevention Methods): Bed, pressure reduction mattress  Pressure injury: no    Patient Safety:  Fall Score: Membreno Total Score: 65  Fall Risk Interventions  Nursing Judgement-Fall Risk High(Add Comments): Yes  Toilet Every 2 Hours-In Advance of Need: Yes  Hourly Visual Checks: Awake  Fall Visual Posted: Armband, Fall sign posted, Socks  Room Door Open: Deferred to promote rest  Alarm On: Bed    Active Consults:  IP CONSULT TO NEUROLOGY  IP CONSULT TO NEPHROLOGY  IP CONSULT TO CARDIOLOGY    Length of Stay:  Expected LOS: 2  Actual LOS: 2      HAWK TOM RN

## 2024-08-13 LAB
ANION GAP SERPL CALC-SCNC: 9 MMOL/L (ref 5–15)
BASOPHILS # BLD: 0 K/UL (ref 0–0.1)
BASOPHILS NFR BLD: 1 % (ref 0–1)
BUN SERPL-MCNC: 16 MG/DL (ref 6–20)
BUN/CREAT SERPL: 4 (ref 12–20)
CALCIUM SERPL-MCNC: 8.8 MG/DL (ref 8.5–10.1)
CHLORIDE SERPL-SCNC: 96 MMOL/L (ref 97–108)
CO2 SERPL-SCNC: 26 MMOL/L (ref 21–32)
CREAT SERPL-MCNC: 4.21 MG/DL (ref 0.55–1.02)
DIFFERENTIAL METHOD BLD: ABNORMAL
EOSINOPHIL # BLD: 0.1 K/UL (ref 0–0.4)
EOSINOPHIL NFR BLD: 2 % (ref 0–7)
ERYTHROCYTE [DISTWIDTH] IN BLOOD BY AUTOMATED COUNT: 16.4 % (ref 11.5–14.5)
GLUCOSE BLD STRIP.AUTO-MCNC: 175 MG/DL (ref 65–117)
GLUCOSE BLD STRIP.AUTO-MCNC: 195 MG/DL (ref 65–117)
GLUCOSE BLD STRIP.AUTO-MCNC: 211 MG/DL (ref 65–117)
GLUCOSE BLD STRIP.AUTO-MCNC: 218 MG/DL (ref 65–117)
GLUCOSE SERPL-MCNC: 209 MG/DL (ref 65–100)
HBV SURFACE AB SER QL: NONREACTIVE
HBV SURFACE AB SER-ACNC: 7.24 MIU/ML
HBV SURFACE AG SER QL: <0.1 INDEX
HBV SURFACE AG SER QL: NEGATIVE
HCT VFR BLD AUTO: 26.8 % (ref 35–47)
HGB BLD-MCNC: 8.8 G/DL (ref 11.5–16)
IMM GRANULOCYTES # BLD AUTO: 0 K/UL (ref 0–0.04)
IMM GRANULOCYTES NFR BLD AUTO: 1 % (ref 0–0.5)
LYMPHOCYTES # BLD: 0.7 K/UL (ref 0.8–3.5)
LYMPHOCYTES NFR BLD: 9 % (ref 12–49)
MCH RBC QN AUTO: 29.8 PG (ref 26–34)
MCHC RBC AUTO-ENTMCNC: 32.8 G/DL (ref 30–36.5)
MCV RBC AUTO: 90.8 FL (ref 80–99)
MONOCYTES # BLD: 0.5 K/UL (ref 0–1)
MONOCYTES NFR BLD: 6 % (ref 5–13)
NEUTS SEG # BLD: 6 K/UL (ref 1.8–8)
NEUTS SEG NFR BLD: 82 % (ref 32–75)
NRBC # BLD: 0.02 K/UL (ref 0–0.01)
NRBC BLD-RTO: 0.3 PER 100 WBC
PLATELET # BLD AUTO: 195 K/UL (ref 150–400)
PMV BLD AUTO: 9.7 FL (ref 8.9–12.9)
POTASSIUM SERPL-SCNC: 4.2 MMOL/L (ref 3.5–5.1)
RBC # BLD AUTO: 2.95 M/UL (ref 3.8–5.2)
SERVICE CMNT-IMP: ABNORMAL
SODIUM SERPL-SCNC: 131 MMOL/L (ref 136–145)
WBC # BLD AUTO: 7.3 K/UL (ref 3.6–11)

## 2024-08-13 PROCEDURE — 85025 COMPLETE CBC W/AUTO DIFF WBC: CPT

## 2024-08-13 PROCEDURE — 2580000003 HC RX 258: Performed by: STUDENT IN AN ORGANIZED HEALTH CARE EDUCATION/TRAINING PROGRAM

## 2024-08-13 PROCEDURE — 94761 N-INVAS EAR/PLS OXIMETRY MLT: CPT

## 2024-08-13 PROCEDURE — 97110 THERAPEUTIC EXERCISES: CPT

## 2024-08-13 PROCEDURE — 1100000003 HC PRIVATE W/ TELEMETRY

## 2024-08-13 PROCEDURE — 82962 GLUCOSE BLOOD TEST: CPT

## 2024-08-13 PROCEDURE — 6360000002 HC RX W HCPCS: Performed by: STUDENT IN AN ORGANIZED HEALTH CARE EDUCATION/TRAINING PROGRAM

## 2024-08-13 PROCEDURE — 94640 AIRWAY INHALATION TREATMENT: CPT

## 2024-08-13 PROCEDURE — 6370000000 HC RX 637 (ALT 250 FOR IP): Performed by: STUDENT IN AN ORGANIZED HEALTH CARE EDUCATION/TRAINING PROGRAM

## 2024-08-13 PROCEDURE — 97116 GAIT TRAINING THERAPY: CPT

## 2024-08-13 PROCEDURE — 36415 COLL VENOUS BLD VENIPUNCTURE: CPT

## 2024-08-13 PROCEDURE — 80048 BASIC METABOLIC PNL TOTAL CA: CPT

## 2024-08-13 PROCEDURE — 97530 THERAPEUTIC ACTIVITIES: CPT

## 2024-08-13 RX ORDER — MECLIZINE HYDROCHLORIDE 25 MG/1
12.5 TABLET ORAL 3 TIMES DAILY PRN
Status: DISCONTINUED | OUTPATIENT
Start: 2024-08-13 | End: 2024-08-15 | Stop reason: HOSPADM

## 2024-08-13 RX ORDER — ALBUTEROL SULFATE 2.5 MG/3ML
2.5 SOLUTION RESPIRATORY (INHALATION) EVERY 6 HOURS PRN
Status: DISCONTINUED | OUTPATIENT
Start: 2024-08-13 | End: 2024-08-15 | Stop reason: HOSPADM

## 2024-08-13 RX ADMIN — GABAPENTIN 300 MG: 300 CAPSULE ORAL at 15:54

## 2024-08-13 RX ADMIN — INSULIN LISPRO 9 UNITS: 100 INJECTION, SOLUTION INTRAVENOUS; SUBCUTANEOUS at 17:20

## 2024-08-13 RX ADMIN — ACETAMINOPHEN 650 MG: 325 TABLET ORAL at 08:55

## 2024-08-13 RX ADMIN — INSULIN LISPRO 9 UNITS: 100 INJECTION, SOLUTION INTRAVENOUS; SUBCUTANEOUS at 08:46

## 2024-08-13 RX ADMIN — MIDODRINE HYDROCHLORIDE 5 MG: 5 TABLET ORAL at 18:51

## 2024-08-13 RX ADMIN — BUDESONIDE INHALATION 500 MCG: 0.5 SUSPENSION RESPIRATORY (INHALATION) at 07:42

## 2024-08-13 RX ADMIN — BUDESONIDE INHALATION 500 MCG: 0.5 SUSPENSION RESPIRATORY (INHALATION) at 20:47

## 2024-08-13 RX ADMIN — INSULIN LISPRO 2 UNITS: 100 INJECTION, SOLUTION INTRAVENOUS; SUBCUTANEOUS at 17:20

## 2024-08-13 RX ADMIN — HEPARIN SODIUM 5000 UNITS: 5000 INJECTION INTRAVENOUS; SUBCUTANEOUS at 12:00

## 2024-08-13 RX ADMIN — AMITRIPTYLINE HYDROCHLORIDE 50 MG: 50 TABLET, FILM COATED ORAL at 21:43

## 2024-08-13 RX ADMIN — SODIUM CHLORIDE, PRESERVATIVE FREE 10 ML: 5 INJECTION INTRAVENOUS at 21:44

## 2024-08-13 RX ADMIN — ACETAMINOPHEN 650 MG: 325 TABLET ORAL at 00:28

## 2024-08-13 RX ADMIN — ASPIRIN 81 MG: 81 TABLET, COATED ORAL at 08:45

## 2024-08-13 RX ADMIN — MIDODRINE HYDROCHLORIDE 5 MG: 5 TABLET ORAL at 11:59

## 2024-08-13 RX ADMIN — INSULIN GLARGINE 48 UNITS: 100 INJECTION, SOLUTION SUBCUTANEOUS at 21:42

## 2024-08-13 RX ADMIN — MIDODRINE HYDROCHLORIDE 5 MG: 5 TABLET ORAL at 08:55

## 2024-08-13 RX ADMIN — FLUTICASONE PROPIONATE 1 SPRAY: 50 SPRAY, METERED NASAL at 08:49

## 2024-08-13 RX ADMIN — TICAGRELOR 90 MG: 90 TABLET ORAL at 08:46

## 2024-08-13 RX ADMIN — DOCUSATE SODIUM 100 MG: 100 CAPSULE, LIQUID FILLED ORAL at 21:42

## 2024-08-13 RX ADMIN — ATORVASTATIN CALCIUM 80 MG: 40 TABLET, FILM COATED ORAL at 21:43

## 2024-08-13 RX ADMIN — TICAGRELOR 90 MG: 90 TABLET ORAL at 21:42

## 2024-08-13 RX ADMIN — INSULIN LISPRO 2 UNITS: 100 INJECTION, SOLUTION INTRAVENOUS; SUBCUTANEOUS at 11:58

## 2024-08-13 RX ADMIN — HEPARIN SODIUM 5000 UNITS: 5000 INJECTION INTRAVENOUS; SUBCUTANEOUS at 02:46

## 2024-08-13 RX ADMIN — GABAPENTIN 300 MG: 300 CAPSULE ORAL at 21:42

## 2024-08-13 RX ADMIN — DOCUSATE SODIUM 100 MG: 100 CAPSULE, LIQUID FILLED ORAL at 08:46

## 2024-08-13 RX ADMIN — SEVELAMER CARBONATE 800 MG: 800 TABLET, FILM COATED ORAL at 11:59

## 2024-08-13 RX ADMIN — SODIUM CHLORIDE, PRESERVATIVE FREE 10 ML: 5 INJECTION INTRAVENOUS at 08:48

## 2024-08-13 RX ADMIN — SEVELAMER CARBONATE 800 MG: 800 TABLET, FILM COATED ORAL at 18:51

## 2024-08-13 RX ADMIN — MICONAZOLE NITRATE: 2 POWDER TOPICAL at 21:46

## 2024-08-13 RX ADMIN — SEVELAMER CARBONATE 800 MG: 800 TABLET, FILM COATED ORAL at 08:46

## 2024-08-13 RX ADMIN — HEPARIN SODIUM 5000 UNITS: 5000 INJECTION INTRAVENOUS; SUBCUTANEOUS at 17:21

## 2024-08-13 RX ADMIN — MICONAZOLE NITRATE: 2 POWDER TOPICAL at 08:49

## 2024-08-13 RX ADMIN — INSULIN LISPRO 9 UNITS: 100 INJECTION, SOLUTION INTRAVENOUS; SUBCUTANEOUS at 11:59

## 2024-08-13 RX ADMIN — GABAPENTIN 300 MG: 300 CAPSULE ORAL at 08:55

## 2024-08-13 ASSESSMENT — PAIN SCALES - WONG BAKER: WONGBAKER_NUMERICALRESPONSE: HURTS A LITTLE BIT

## 2024-08-13 ASSESSMENT — PAIN SCALES - GENERAL
PAINLEVEL_OUTOF10: 7
PAINLEVEL_OUTOF10: 7
PAINLEVEL_OUTOF10: 3

## 2024-08-13 ASSESSMENT — PAIN DESCRIPTION - DESCRIPTORS
DESCRIPTORS: ACHING
DESCRIPTORS: THROBBING
DESCRIPTORS: THROBBING

## 2024-08-13 ASSESSMENT — PAIN DESCRIPTION - LOCATION
LOCATION: HEAD
LOCATION: HEAD
LOCATION: KNEE

## 2024-08-13 ASSESSMENT — PAIN DESCRIPTION - ORIENTATION: ORIENTATION: RIGHT;LEFT

## 2024-08-13 NOTE — PLAN OF CARE
Problem: Occupational Therapy - Adult  Goal: By Discharge: Performs self-care activities at highest level of function for planned discharge setting.  See evaluation for individualized goals.  Description: FUNCTIONAL STATUS PRIOR TO ADMISSION:    Receives Help From: Family (mod I ADLs with increased time; uses power w/c at times in home usually RW; power w/c needed in community), ADL Assistance: Independent (mod I w/c level with DME),  ,  ,  ,  ,  , Homemaking Assistance: Needs assistance (sister assists; son does not assist much; she is more his cog caregiver), Ambulation Assistance: Independent (RW; short distances houshold short distance ambulator with heavy lean into RW), Transfer Assistance: Independent, Active : No  Patient does her own meds and finances PTA; cooks; \"they help with laundry\"    HOME SUPPORT: Patient lived with sister and autistic adult son. Mod I since rehab completion until this week with decreased strength/endurance and subsequent fall with syncope    Occupational Therapy Goals:  Initiated 8/12/2024  1.  Patient will perform grooming in standing VSS with Contact Guard Assist within 7 day(s).  2.  Patient will perform upper body dressing with Supervision within 7 day(s).  3.  Patient will perform lower body dressing with Supervision within 7 day(s).  4.  Patient will perform toilet transfers with Supervision  within 7 day(s).  5.  Patient will perform all aspects of toileting with Supervision within 7 day(s).  6.  Patient will participate in upper extremity therapeutic exercise/activities with Supervision for 5 minutes within 7 day(s).    7.  Patient will utilize energy conservation, fall prevention, safety with orthostatic hypotension techniques during functional activities with verbal cues within 7 day(s).   8.  Patient will complete cog screen to clarify discharge planning within 7 days  Outcome: Progressing Slowly    OCCUPATIONAL THERAPY TREATMENT  Patient: Candida DILLON Lane (58 y.o.

## 2024-08-13 NOTE — PROGRESS NOTES
NAME: Candida Sherman        :  1965        MRN:  [id number]                   Assessment   :                                               Plan:  [facility]CP-UDK[facility]  Anemia  Syncope    No acute need for HD today.  Plan HD in AM.       H/H not at goal.  Continue SAHRA.         Subjective:     Chief Complaint:  \" I feel better.\"  No N/V.  No dyspnea.       Review of Systems:    Symptom Y/N Comments  Symptom Y/N Comments   Fever/Chills    Chest Pain     Poor Appetite    Edema     Cough    Abdominal Pain     Sputum    Joint Pain     SOB/FONSECA    Pruritis/Rash     Nausea/vomit    Tolerating PT/OT     Diarrhea    Tolerating Diet     Constipation    Other       Could not obtain due to:      Objective:     VITALS:   Last 24hrs VS reviewed since prior progress note. Most recent are:  Vitals:    24 0838   BP: 95/71   Pulse: 90   Resp:    Temp:    SpO2:        Intake/Output Summary (Last 24 hours) at 2024 1030  Last data filed at 2024 2130  Gross per 24 hour   Intake 1000 ml   Output 4900 ml   Net -3900 ml      Telemetry Reviewed:     PHYSICAL EXAM:  General: NAD  + edema      Lab Data Reviewed: (see below)    Medications Reviewed: (see below)    PMH/SH reviewed - no change compared to H&P  ________________________________________________________________________  Care Plan discussed with:  Patient     Family      RN     Care Manager                    Consultant:          Comments   >50% of visit spent in counseling and coordination of care       ________________________________________________________________________  Wayne Branham MD     Procedures: see electronic medical records for all procedures/Xrays and details which  were not copied into this note but were reviewed prior to creation of Plan.      LABS:  Recent Labs     24  0205 24  0206   WBC 6.4 7.3   HGB 8.5* 8.8*   HCT 26.3* 26.8*    195

## 2024-08-13 NOTE — PROGRESS NOTES
Chart reviewed for CARDIOLOGY hospital follow up. Patient's current JAMAR is 8/14/24. Cardiology MD recommends that the patient follows up with PCP per note on 8/13/24 8231. Patient's PCP is with [facility]. [facility] is walk-in only, CMA can't make an appt for patient. Pending patient discharge.

## 2024-08-13 NOTE — PLAN OF CARE
Problem: Physical Therapy - Adult  Goal: By Discharge: Performs mobility at highest level of function for planned discharge setting.  See evaluation for individualized goals.  Description: FUNCTIONAL STATUS PRIOR TO ADMISSION: Patient was modified independent using a rolling walker and power wheelchair for functional mobility, reports mainly using power chair and only uses RW when walking from bedroom to bathroom. Reports indep with ADLs. At least 10-15 falls this year, a few due to \"passing out\" with majority of falls being due to \"ankle rolling/giving out\". Was at [facility] in June following CVA.    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    Physical Therapy Goals  Initiated 8/12/2024  1.  Patient will move from supine to sit and sit to supine, scoot up and down, and roll side to side in bed with modified independence within 7 day(s).    2.  Patient will perform sit to stand with modified independence within 7 day(s).  3.  Patient will transfer from bed to chair and chair to bed with modified independence using the least restrictive device within 7 day(s).  4.  Patient will ambulate with modified independence for 30 feet with the least restrictive device within 7 day(s).     Outcome: Progressing     PHYSICAL THERAPY TREATMENT    Patient: Candida Sherman (58 y.o. female)  Date: 8/13/2024  Diagnosis: Syncope and collapse [R55] Syncope and collapse      Precautions: Fall Risk                      ASSESSMENT:  Pt tolerated PT services well and continues to progress toward PT POC goals. Progress to date less than anticipated 2/2 underlying medical status, pain.  With increased time/effort, most tasks performed with ~Valeriano/close cga. Pt received sleeping in bedside chair with food particles everywhere. Pt admits, \"My gown and floor were clean before I started eating\". Pt amenable to assist with gown change during session with good carryover. Despite  hospital socks donned and  in place, significant difficulty with initial standing trials with feet sliding out even with increased hands on assist. Therapist was in the process of considering Trista Macias use when RN Team arrived to reinforce/encourage Pt of her earlier ability to stand and transfer to chair without issue. Pt then attempted again with only Valeriano/cga required to stand, take a few steps and transfer back to bed. Pt confirmed preference for in home therapy vs rehab. Pt confides that her son is high functioning autistic and able to assist, \"when agreeable and wants to help\", otherwise her adult sister will assist. Deferral additional attempted activity per fatigue and Pt awaiting Vascular Team consult re LUE edema/redness (RN Team already aware and following). PT Team will follow and continue to reassess.       PLAN:  Patient continues to benefit from skilled intervention to address the above impairments.  Continue treatment per established plan of care.    Recommend with staff: continue EOB/OOB as appropriate in between therapy sessions    Recommend next PT session: continue PT POC goals    Recommendation for discharge: (in order for the patient to meet his/her long term goals):  HHPT vs SNF    Other factors to consider for discharge: poor safety awareness, concern for safely navigating or managing the home environment, and medical status.    IF patient discharges home will need the following DME: patient owns DME required for discharge       SUBJECTIVE:   Patient stated, \"I am hoping to get my own power wheelchair. Now, I just rent one.\"  Compassionate listening and patient education to reinforce importance of active movement via RW (to tolerance/appropriate) vs use of power chair toward overall health, recovery.    OBJECTIVE DATA SUMMARY:   Critical Behavior:          Functional Mobility Training:  Bed Mobility:  Bed Mobility Training  Bed Mobility Training: Yes  Overall Level of Assistance:

## 2024-08-13 NOTE — CONSULTS
[facility]        Consult    NAME: Candida Sherman   :  1965   MRN:  [id number]     Date/Time:  2024 9:59 AM    Patient PCP: [facility], Pcp  ________________________________________________________________________     Assessment:     Syncope  History of CVA (May 2024, left pontine): Rx w DAPT  ESRD on Hemodialysis  PAD  Type 2 Diabetes  Hypertension  Orthostatic Hypotension: chronic midodrine  Morbid Obesity  Peripheral Neuropathy  Frequent Falls   FARAZ  Asthma  Chronic Anemia in setting of ESRD        Plan:   Further plan per Dr Dela Cruz  TTE 2024 LVEF 60-65%, no significant valvular disease, EKG from ED appears to be NSR with artifact. Tele reviewed. CTA of Head and Neck with no carotid stenosis on wither side in . Minimally orthostatic today with OT eval (105/62, 76 and 95/81? 86). Amitriptyline 50 nightly and Gabapentin 300 tid may be contributing. Midodrine 5 tid PTA. 30 day event monitor at discharge to assess rhythm, can consider loop recorder on her since so frequent.   Fluid repleted on admission, compression stockings when ambulating would help with orthostasis. She does not wear currently.      This encounter was done in conjunction with KATHERYN noted above. I have reviewed the data, seen and examined the patient independently and agreed with HPI and physical exam findings. I have personally performed the MDM in its entirety. My impression and plan related to this encounter is:     No angina/FONSECA; Intermittent vertigo; intermittent orthostatic Sx; on chronic midodrine.  No Afib on ecg nor tele (sinus and artifact).    Rec: 30 day EVR; pt requests it be place on here.    Management of volume status/orthostatic Sx per renal MD.    No active cardiac issues; will sign-off; Please call if questions/issues.  F/U with PCP after d/c. Thanks.       Subjective:   CHIEF COMPLAINT: Syncope and Falls    HISTORY OF PRESENT ILLNESS:     Candida is a 58 y.o.   female who

## 2024-08-13 NOTE — FLOWSHEET NOTE
08/12/24 2126   Treatment   Time Off 2126   Observations & Evaluations   Level of Consciousness 0   Oriented X 4   Heart Rhythm Regular   Respiratory Quality/Effort Unlabored   O2 Device None (Room air)   Bilateral Breath Sounds Clear;Diminished   Skin Color Pink   Skin Condition/Temp Warm;Dry   Abdomen Inspection Soft;Obese   Edema Right upper extremity;Left upper extremity;Right lower extremity;Left lower extremity   RUE Edema Trace   LUE Edema Trace   RLE Edema Trace   LLE Edema Trace   Vital Signs   BP (!) 105/54   Temp 98.1 °F (36.7 °C)   Pulse 92   Respirations 16   SpO2 100 %   Pain Assessment   Pain Assessment None - Denies Pain   During Hemodialysis Assessment   Blood Flow Rate (ml/min) 300 ml/min   Arterial Pressure (mmHg) -180 mmHg   Venous Pressure (mmHg) 250   TMP 90      Access Visible Yes   Ultrafiltration Rate (ml/hr) 1220 ml/hr   Ultrafiltration Removed (ml) 4500 ml   Hemodialysis Fistula/Graft Arteriovenous vein graft Left Arm   No placement date or time found.   Present on Admission/Arrival: Yes  Access Type: Arteriovenous vein graft  Orientation: Left  Access Location: Arm   Site Assessment Pink;Edematous;Dry   Thrill Present   Bruit Present   Hemodialysis Central Access Left Subclavian   No placement date or time found.   Present on Admission/Arrival: Yes  Orientation: Left  Access Location: Subclavian   Continued need for line? Yes   Site Assessment Clean, dry & intact   Venous Lumen Status Heparin locked;Other (Comment)  (Clear guard cap in use)   Arterial Lumen Status Heparin locked;Other (Comment)  (Clear guard cap in use)   Dressing Type Transparent;Bacteriocidal   Date of Last Dressing Change 08/11/24   Dressing Status Clean, dry & intact   Dressing Change Due 08/13/24   Post-Hemodialysis Assessment   Post-Treatment Procedures Blood returned;Catheter capped, clamped and heparinized x 2 ports   Machine Disinfection Process Acid/Vinegar Clean;Heat Disinfect;Exterior Machine  Disinfection   Rinseback Volume (ml) 300 ml   Blood Volume Processed (Liters) 69 L   Dialyzer Clearance Lightly streaked   Duration of Treatment (minutes) 240 minutes   Hemodialysis Intake (ml) 500 ml   Hemodialysis Output (ml) 4500 ml   NET Removed (ml) 4000   Tolerated Treatment Good   Patient Disposition Return to room     Primary RN SBAR: Bakari Portillo RN  Comments: 2100 Report received and care of patient assumed from Connie Goldstein RN.  5909 Patient returned to room 1142 by the dialysis RN. Patient left with the primary RN at the bedside.

## 2024-08-13 NOTE — PROGRESS NOTES
Hospitalist Progress Note    NAME:   Candida Sherman   : 1965   MRN: [id number]     Date/Time: 2024 8:47 AM  Patient PCP: [facility], Pcp    Estimated discharge date: 2024  Barriers: still dizzy , can d/c in am , will add meclizine     Assessment / Plan:  Syncopal episode-no prodrome  History of pontine stroke in May 2024  Rule out cardiogenic causes  Rule out postural hypotension  Patient admitted to medical unit with telemetry  EKG done on arrival revealed undetermined rhythm-voltage QRS-rate of 88 bpm.  Cannot rule out flutter  CT head done on arrival within normal limit  Most recent echo on 2024-ejection fraction up to 60%.  Increased wall thickness.  Of note patient was admitted in May for dizziness and presyncope workup.  At that time evaluation revealed that patient had small left pontine stroke.  Neurology was consulted.  Patient was placed on Brilinta ASA statin and discharged.  Patient was also advised for meclizine  Continue aspirin statin, Brilinta  MRI brain done on 8/10/2022 negative for acute stroke  Neurology has been consulted-will need event monitor- order placed   PT OT eval  Check orthostatic vitals every shift  Telemetry reveals normal sinus rhythm.  Cardiology - consulted - no active intervention   Will add meclizine   Likely discharge in am tomorrow     ESRD on dialysis mwf   Dialysis catheter in left upper chest  Nephrology to be consulted for dialysis  Continue sevelamer  Dialysis catheter care  as per nursing-to prevent CLABSI  Benadryl ordered for itching     History of PAD  Chronic bilateral lower extremity edema  Continued current care-no acute intervention     History of diabetes mellitus  Most recent A1c in May 9.1  Patient is on lispro 0 to 8 units 3 times a day at home, insulin Lantus 48 units nightly  Will continue same insulin here-lispro increased to 9 units premeals  Diabetic diet  Hypoglycemic  treatment orders in place  Sliding scale insulin

## 2024-08-13 NOTE — PROGRESS NOTES
End of Shift Note    Bedside shift change report given to Terra ARAUJO (oncoming nurse) by HAWK TOM RN (offgoing nurse).  Report included the following information SBAR, Kardex, and MAR    Shift worked:  [id number]     Shift summary and any significant changes:     Pt AAOx4, VSS, pain medication given, hourly rounding completed.     Concerns for physician to address:  no     Zone phone for oncoming shift:   [phone]       Activity:  Level of Assistance: Minimal assist, patient does 75% or more    Cardiac:   Cardiac Monitoring:  yes     Access:  Current line(s): PIV     Genitourinary:   Urinary Status: Voiding, External catheter    Respiratory:   O2 Device: None (Room air)    GI:  Current diet: ADULT DIET; Regular; 5 carb choices (75 gm/meal)    Pain Management:   Patient states pain is manageable on current regimen: YES    Skin:  Sebastian Scale Score: 19  Interventions: Wound Offloading (Prevention Methods): Bed, pressure reduction mattress, Elevate heels, Pillows, Repositioning, Turning  Pressure injury: no    Patient Safety:  Fall Score: Membreno Total Score: 65  Fall Risk Interventions  Nursing Judgement-Fall Risk High(Add Comments): Yes  Toilet Every 2 Hours-In Advance of Need: Yes  Hourly Visual Checks: Awake, In bed  Fall Visual Posted: Armband, Fall sign posted, Socks  Room Door Open: Yes  Alarm On: Bed  Patient Moved Closer to Nursing Station: No    Active Consults:  IP CONSULT TO NEUROLOGY  IP CONSULT TO NEPHROLOGY  IP CONSULT TO CARDIOLOGY    Length of Stay:  Expected LOS: 3  Actual LOS: 3      HAWK TOM RN

## 2024-08-14 LAB
ANION GAP SERPL CALC-SCNC: 10 MMOL/L (ref 5–15)
BASOPHILS # BLD: 0.1 K/UL (ref 0–0.1)
BASOPHILS NFR BLD: 1 % (ref 0–1)
BUN SERPL-MCNC: 26 MG/DL (ref 6–20)
BUN/CREAT SERPL: 4 (ref 12–20)
CALCIUM SERPL-MCNC: 8.7 MG/DL (ref 8.5–10.1)
CHLORIDE SERPL-SCNC: 92 MMOL/L (ref 97–108)
CO2 SERPL-SCNC: 26 MMOL/L (ref 21–32)
CREAT SERPL-MCNC: 5.82 MG/DL (ref 0.55–1.02)
DIFFERENTIAL METHOD BLD: ABNORMAL
EOSINOPHIL # BLD: 0.2 K/UL (ref 0–0.4)
EOSINOPHIL NFR BLD: 2 % (ref 0–7)
ERYTHROCYTE [DISTWIDTH] IN BLOOD BY AUTOMATED COUNT: 17 % (ref 11.5–14.5)
GLUCOSE BLD STRIP.AUTO-MCNC: 128 MG/DL (ref 65–117)
GLUCOSE BLD STRIP.AUTO-MCNC: 213 MG/DL (ref 65–117)
GLUCOSE BLD STRIP.AUTO-MCNC: 223 MG/DL (ref 65–117)
GLUCOSE BLD STRIP.AUTO-MCNC: 97 MG/DL (ref 65–117)
GLUCOSE SERPL-MCNC: 138 MG/DL (ref 65–100)
HCT VFR BLD AUTO: 26.7 % (ref 35–47)
HGB BLD-MCNC: 8.6 G/DL (ref 11.5–16)
IMM GRANULOCYTES # BLD AUTO: 0.1 K/UL (ref 0–0.04)
IMM GRANULOCYTES NFR BLD AUTO: 1 % (ref 0–0.5)
LYMPHOCYTES # BLD: 1.2 K/UL (ref 0.8–3.5)
LYMPHOCYTES NFR BLD: 17 % (ref 12–49)
MCH RBC QN AUTO: 29.4 PG (ref 26–34)
MCHC RBC AUTO-ENTMCNC: 32.2 G/DL (ref 30–36.5)
MCV RBC AUTO: 91.1 FL (ref 80–99)
MONOCYTES # BLD: 0.4 K/UL (ref 0–1)
MONOCYTES NFR BLD: 6 % (ref 5–13)
NEUTS SEG # BLD: 5.1 K/UL (ref 1.8–8)
NEUTS SEG NFR BLD: 73 % (ref 32–75)
NRBC # BLD: 0.04 K/UL (ref 0–0.01)
NRBC BLD-RTO: 0.6 PER 100 WBC
PLATELET # BLD AUTO: 238 K/UL (ref 150–400)
PMV BLD AUTO: 9.6 FL (ref 8.9–12.9)
POTASSIUM SERPL-SCNC: 4.4 MMOL/L (ref 3.5–5.1)
RBC # BLD AUTO: 2.93 M/UL (ref 3.8–5.2)
SERVICE CMNT-IMP: ABNORMAL
SERVICE CMNT-IMP: NORMAL
SODIUM SERPL-SCNC: 128 MMOL/L (ref 136–145)
WBC # BLD AUTO: 7 K/UL (ref 3.6–11)

## 2024-08-14 PROCEDURE — 6360000002 HC RX W HCPCS

## 2024-08-14 PROCEDURE — 85025 COMPLETE CBC W/AUTO DIFF WBC: CPT

## 2024-08-14 PROCEDURE — 97530 THERAPEUTIC ACTIVITIES: CPT

## 2024-08-14 PROCEDURE — 2580000003 HC RX 258: Performed by: STUDENT IN AN ORGANIZED HEALTH CARE EDUCATION/TRAINING PROGRAM

## 2024-08-14 PROCEDURE — 36415 COLL VENOUS BLD VENIPUNCTURE: CPT

## 2024-08-14 PROCEDURE — 6360000002 HC RX W HCPCS: Performed by: INTERNAL MEDICINE

## 2024-08-14 PROCEDURE — 97110 THERAPEUTIC EXERCISES: CPT

## 2024-08-14 PROCEDURE — 94761 N-INVAS EAR/PLS OXIMETRY MLT: CPT

## 2024-08-14 PROCEDURE — 6360000002 HC RX W HCPCS: Performed by: STUDENT IN AN ORGANIZED HEALTH CARE EDUCATION/TRAINING PROGRAM

## 2024-08-14 PROCEDURE — P9047 ALBUMIN (HUMAN), 25%, 50ML: HCPCS

## 2024-08-14 PROCEDURE — 6370000000 HC RX 637 (ALT 250 FOR IP): Performed by: STUDENT IN AN ORGANIZED HEALTH CARE EDUCATION/TRAINING PROGRAM

## 2024-08-14 PROCEDURE — 94640 AIRWAY INHALATION TREATMENT: CPT

## 2024-08-14 PROCEDURE — 90935 HEMODIALYSIS ONE EVALUATION: CPT

## 2024-08-14 PROCEDURE — 1100000003 HC PRIVATE W/ TELEMETRY

## 2024-08-14 PROCEDURE — 82962 GLUCOSE BLOOD TEST: CPT

## 2024-08-14 PROCEDURE — 80048 BASIC METABOLIC PNL TOTAL CA: CPT

## 2024-08-14 PROCEDURE — 6370000000 HC RX 637 (ALT 250 FOR IP): Performed by: NURSE PRACTITIONER

## 2024-08-14 RX ORDER — ALBUMIN (HUMAN) 12.5 G/50ML
SOLUTION INTRAVENOUS
Status: COMPLETED
Start: 2024-08-14 | End: 2024-08-14

## 2024-08-14 RX ORDER — HEPARIN SODIUM 1000 [USP'U]/ML
INJECTION, SOLUTION INTRAVENOUS; SUBCUTANEOUS
Status: DISPENSED
Start: 2024-08-14 | End: 2024-08-14

## 2024-08-14 RX ORDER — ALBUMIN (HUMAN) 12.5 G/50ML
25 SOLUTION INTRAVENOUS PRN
Status: DISCONTINUED | OUTPATIENT
Start: 2024-08-14 | End: 2024-08-15 | Stop reason: HOSPADM

## 2024-08-14 RX ORDER — DOXYCYCLINE HYCLATE 100 MG
100 TABLET ORAL EVERY 12 HOURS SCHEDULED
Status: DISCONTINUED | OUTPATIENT
Start: 2024-08-14 | End: 2024-08-15 | Stop reason: HOSPADM

## 2024-08-14 RX ADMIN — SEVELAMER CARBONATE 800 MG: 800 TABLET, FILM COATED ORAL at 13:55

## 2024-08-14 RX ADMIN — INSULIN GLARGINE 48 UNITS: 100 INJECTION, SOLUTION SUBCUTANEOUS at 22:13

## 2024-08-14 RX ADMIN — SEVELAMER CARBONATE 800 MG: 800 TABLET, FILM COATED ORAL at 17:31

## 2024-08-14 RX ADMIN — SODIUM CHLORIDE, PRESERVATIVE FREE 10 ML: 5 INJECTION INTRAVENOUS at 13:55

## 2024-08-14 RX ADMIN — SODIUM CHLORIDE, PRESERVATIVE FREE 10 ML: 5 INJECTION INTRAVENOUS at 22:17

## 2024-08-14 RX ADMIN — TICAGRELOR 90 MG: 90 TABLET ORAL at 22:14

## 2024-08-14 RX ADMIN — DOCUSATE SODIUM 100 MG: 100 CAPSULE, LIQUID FILLED ORAL at 13:55

## 2024-08-14 RX ADMIN — HEPARIN SODIUM 5000 UNITS: 5000 INJECTION INTRAVENOUS; SUBCUTANEOUS at 19:52

## 2024-08-14 RX ADMIN — GABAPENTIN 300 MG: 300 CAPSULE ORAL at 13:57

## 2024-08-14 RX ADMIN — HEPARIN SODIUM 5000 UNITS: 5000 INJECTION INTRAVENOUS; SUBCUTANEOUS at 02:00

## 2024-08-14 RX ADMIN — ALBUMIN (HUMAN) 25 G: 0.25 INJECTION, SOLUTION INTRAVENOUS at 09:55

## 2024-08-14 RX ADMIN — MICONAZOLE NITRATE: 2 POWDER TOPICAL at 14:03

## 2024-08-14 RX ADMIN — ALBUMIN (HUMAN) 25 G: 12.5 SOLUTION INTRAVENOUS at 09:55

## 2024-08-14 RX ADMIN — ATORVASTATIN CALCIUM 80 MG: 40 TABLET, FILM COATED ORAL at 22:14

## 2024-08-14 RX ADMIN — INSULIN LISPRO 2 UNITS: 100 INJECTION, SOLUTION INTRAVENOUS; SUBCUTANEOUS at 17:33

## 2024-08-14 RX ADMIN — FLUTICASONE PROPIONATE 1 SPRAY: 50 SPRAY, METERED NASAL at 14:02

## 2024-08-14 RX ADMIN — MIDODRINE HYDROCHLORIDE 5 MG: 5 TABLET ORAL at 13:56

## 2024-08-14 RX ADMIN — EPOETIN ALFA 8000 UNITS: 4000 SOLUTION INTRAVENOUS; SUBCUTANEOUS at 22:15

## 2024-08-14 RX ADMIN — INSULIN LISPRO 9 UNITS: 100 INJECTION, SOLUTION INTRAVENOUS; SUBCUTANEOUS at 17:33

## 2024-08-14 RX ADMIN — ALBUMIN (HUMAN) 25 G: 0.25 INJECTION, SOLUTION INTRAVENOUS at 10:23

## 2024-08-14 RX ADMIN — ASPIRIN 81 MG: 81 TABLET, COATED ORAL at 13:57

## 2024-08-14 RX ADMIN — MIDODRINE HYDROCHLORIDE 5 MG: 5 TABLET ORAL at 17:33

## 2024-08-14 RX ADMIN — DOCUSATE SODIUM 100 MG: 100 CAPSULE, LIQUID FILLED ORAL at 22:14

## 2024-08-14 RX ADMIN — GABAPENTIN 300 MG: 300 CAPSULE ORAL at 22:14

## 2024-08-14 RX ADMIN — TICAGRELOR 90 MG: 90 TABLET ORAL at 13:57

## 2024-08-14 RX ADMIN — ALBUMIN (HUMAN) 25 G: 12.5 SOLUTION INTRAVENOUS at 10:23

## 2024-08-14 RX ADMIN — DOXYCYCLINE HYCLATE 100 MG: 100 TABLET, COATED ORAL at 22:14

## 2024-08-14 RX ADMIN — MICONAZOLE NITRATE: 2 POWDER TOPICAL at 22:17

## 2024-08-14 RX ADMIN — INSULIN LISPRO 9 UNITS: 100 INJECTION, SOLUTION INTRAVENOUS; SUBCUTANEOUS at 14:00

## 2024-08-14 RX ADMIN — AMITRIPTYLINE HYDROCHLORIDE 50 MG: 50 TABLET, FILM COATED ORAL at 22:14

## 2024-08-14 RX ADMIN — BUDESONIDE INHALATION 500 MCG: 0.5 SUSPENSION RESPIRATORY (INHALATION) at 20:07

## 2024-08-14 ASSESSMENT — PAIN SCALES - WONG BAKER: WONGBAKER_NUMERICALRESPONSE: NO HURT

## 2024-08-14 ASSESSMENT — ENCOUNTER SYMPTOMS
VOMITING: 0
COLOR CHANGE: 0
COUGH: 0
SHORTNESS OF BREATH: 0

## 2024-08-14 ASSESSMENT — PAIN SCALES - GENERAL: PAINLEVEL_OUTOF10: 0

## 2024-08-14 NOTE — PROGRESS NOTES
End of Shift Note    Bedside shift change report given to VAN Manning (oncoming nurse) by KHANH MERLOS LPN (offgoing nurse).  Report included the following information SBAR, Kardex, and MAR    Shift worked:  [id number]     Shift summary and any significant changes:    Patient received scheduled medications per MAR. Patient had no complaints of n/v. Patient had no complaints of pain. Patient rested in the chair for majority of the shift. Nursing rounds completed.            KHANH MERLOS LPN                        '

## 2024-08-14 NOTE — PROGRESS NOTES
NAME: Candida Sherman        :  1965        MRN:  [id number]                   Assessment   :                                               Plan:  [facility]  Anemia  Syncope    Seen on HD at 0915.      H/H not at goal.  Continue SAHRA.    Left arm access is warm and swollen.  I will have vascular evaluate.         Subjective:     Chief Complaint:  \" I feel fine.\"  No N/V.  No dyspnea.       Review of Systems:    Symptom Y/N Comments  Symptom Y/N Comments   Fever/Chills    Chest Pain     Poor Appetite    Edema     Cough    Abdominal Pain     Sputum    Joint Pain     SOB/FONSECA    Pruritis/Rash     Nausea/vomit    Tolerating PT/OT     Diarrhea    Tolerating Diet     Constipation    Other       Could not obtain due to:      Objective:     VITALS:   Last 24hrs VS reviewed since prior progress note. Most recent are:  Vitals:    24 1000   BP: (!) 102/49   Pulse: 84   Resp:    Temp:    SpO2:      No intake or output data in the 24 hours ending 24 1005     Telemetry Reviewed:     PHYSICAL EXAM:  General: NAD  + edema      Lab Data Reviewed: (see below)    Medications Reviewed: (see below)    PMH/SH reviewed - no change compared to H&P  ________________________________________________________________________  Care Plan discussed with:  Patient     Family      RN     Care Manager                    Consultant:          Comments   >50% of visit spent in counseling and coordination of care       ________________________________________________________________________  Wayne Branham MD     Procedures: see electronic medical records for all procedures/Xrays and details which  were not copied into this note but were reviewed prior to creation of Plan.      LABS:  Recent Labs     24  0206 24  0748   WBC 7.3 7.0   HGB 8.8* 8.6*   HCT 26.8* 26.7*    238     Recent Labs     24  0205 24  0206  chloride flush 0.9 % injection 5-40 mL  5-40 mL IntraVENous PRN    0.9 % sodium chloride infusion   IntraVENous PRN    potassium chloride (KLOR-CON M) extended release tablet 40 mEq  40 mEq Oral PRN    Or    potassium bicarb-citric acid (EFFER-K) effervescent tablet 40 mEq  40 mEq Oral PRN    Or    potassium chloride 10 mEq/100 mL IVPB (Peripheral Line)  10 mEq IntraVENous PRN    magnesium sulfate 2000 mg in 50 mL IVPB premix  2,000 mg IntraVENous PRN    ondansetron (ZOFRAN-ODT) disintegrating tablet 4 mg  4 mg Oral Q8H PRN    Or    ondansetron (ZOFRAN) injection 4 mg  4 mg IntraVENous Q6H PRN    polyethylene glycol (GLYCOLAX) packet 17 g  17 g Oral Daily PRN    acetaminophen (TYLENOL) tablet 650 mg  650 mg Oral Q6H PRN    Or    acetaminophen (TYLENOL) suppository 650 mg  650 mg Rectal Q6H PRN    amitriptyline (ELAVIL) tablet 50 mg  50 mg Oral Nightly    aspirin EC tablet 81 mg  81 mg Oral Daily    atorvastatin (LIPITOR) tablet 80 mg  80 mg Oral Nightly    docusate sodium (COLACE) capsule 100 mg  100 mg Oral BID    fluticasone (FLONASE) 50 MCG/ACT nasal spray 1 spray  1 spray Nasal Daily    gabapentin (NEURONTIN) capsule 300 mg  300 mg Oral TID    insulin lispro (HUMALOG,ADMELOG) injection vial 9 Units  9 Units SubCUTAneous TID WC    midodrine (PROAMATINE) tablet 5 mg  5 mg Oral TID WC    sevelamer (RENVELA) tablet 800 mg  800 mg Oral TID WC    ticagrelor (BRILINTA) tablet 90 mg  90 mg Oral BID    insulin glargine (LANTUS) injection vial 48 Units  48 Units SubCUTAneous Nightly    heparin (porcine) injection 5,000 Units  5,000 Units SubCUTAneous q8h    budesonide (PULMICORT) nebulizer suspension 500 mcg  0.5 mg Nebulization BID RT

## 2024-08-14 NOTE — PROGRESS NOTES
Hospitalist Progress Note    NAME:   Candida Sherman   : 1965   MRN: [id number]     Date/Time: 2024 4:20 PM  Patient PCP: [facility], Pcp    Estimated discharge date:   Barriers: cellulitis left arm , vascular eval/tx     Assessment / Plan:  Syncopal episode-no prodrome  History of pontine stroke in May 2024  Rule out cardiogenic causes  Rule out postural hypotension  Patient admitted to medical unit with telemetry  EKG done on arrival revealed undetermined rhythm-voltage QRS-rate of 88 bpm.  Cannot rule out flutter  CT head done on arrival within normal limit  Most recent echo on 2024-ejection fraction up to 60%.  Increased wall thickness.  Of note patient was admitted in May for dizziness and presyncope workup.  At that time evaluation revealed that patient had small left pontine stroke.  Neurology was consulted.  Patient was placed on Brilinta ASA statin and discharged.  Patient was also advised for meclizine  Continue aspirin statin, Brilinta  MRI brain done on 8/10/2022 negative for acute stroke  Neurology has been consulted-will need event monitor- order placed   PT OT eval  Check orthostatic vitals every shift  Telemetry reveals normal sinus rhythm.  Cardiology - consulted - no active intervention   Will add meclizine        ESRD on dialysis MWF  Dialysis catheter in left upper chest  Nephrology to be consulted for dialysis  Continue sevelamer  Dialysis catheter care  as per nursing-to prevent CLABSI  Benadryl ordered for itching  Using left subcl permcath        New cellulitis left upper arm AV graft site   Start Doxycyline  - localized erythema, edema,  tender to touch increased warmth  Vascular consulted     Chronic bilateral lower extremity edema  Continued current care-no acute intervention     History of diabetes mellitus  Most recent A1c in May 9.1  Patient is on lispro 0 to 8 units 3 times a day at home, insulin Lantus 48 units nightly  Will continue same insulin here-lispro

## 2024-08-14 NOTE — FLOWSHEET NOTE
08/14/24 1137   Vital Signs   BP (!) 106/53   Temp 97.4 °F (36.3 °C)   Pulse 90   Respirations 18   SpO2 94 %   Pain Assessment   Pain Assessment None - Denies Pain   Post-Hemodialysis Assessment   Post-Treatment Procedures Blood returned;Catheter capped, clamped and heparinized x 2 ports   Machine Disinfection Process Exterior Machine Disinfection   Rinseback Volume (ml) 300 ml   Blood Volume Processed (Liters) 65.7 L   Dialyzer Clearance Lightly streaked   Duration of Treatment (minutes) 240 minutes   Hemodialysis Intake (ml) 500 ml   Hemodialysis Output (ml) 3500 ml   NET Removed (ml) 3000   Tolerated Treatment Fair   Interventions Taken Medication   Bilateral Breath Sounds Diminished;Clear   Edema Left upper extremity;Right lower extremity;Left lower extremity   LUE Edema +2;+3   RLE Edema +1   LLE Edema +1   Physician Notified Yes   Time Off 1137   Patient Disposition Return to room   Observations & Evaluations   Level of Consciousness 0   Oriented X 4   Heart Rhythm Regular   Respiratory Quality/Effort Unlabored   O2 Device None (Room air)   Skin Condition/Temp Dry;Warm   Appetite Good   Abdomen Inspection Obese;Soft   Bowel Sounds (All Quadrants) Active     Primary RN SBAR: Kerri Rodriguez, VAN  Comments: Pt tolerated treatment fair. Pt had some asymptomatic hypotension in which UF goal was decreased and PRN albumin given. VSS upon completion on treatment. Sluggish blood return from [place] but machine ran fine with lines reversed. CVC dressing changed and locked with heparin. 3000 ml removed. Dr. Branham made aware of treatment details.

## 2024-08-14 NOTE — PROGRESS NOTES
Occupational Therapy   Chart reviewed; off unit for HD upon initial attempt for OT; will retry later as able. Iram Adames OTR/L

## 2024-08-14 NOTE — PLAN OF CARE
Problem: Physical Therapy - Adult  Goal: By Discharge: Performs mobility at highest level of function for planned discharge setting.  See evaluation for individualized goals.  Description: FUNCTIONAL STATUS PRIOR TO ADMISSION: Patient was modified independent using a rolling walker and power wheelchair for functional mobility, reports mainly using power chair and only uses RW when walking from bedroom to bathroom. Reports indep with ADLs. At least 10-15 falls this year, a few due to \"passing out\" with majority of falls being due to \"ankle rolling/giving out\". Was at [facility] in June following CVA.    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    Physical Therapy Goals  Initiated 8/12/2024  1.  Patient will move from supine to sit and sit to supine, scoot up and down, and roll side to side in bed with modified independence within 7 day(s).    2.  Patient will perform sit to stand with modified independence within 7 day(s).  3.  Patient will transfer from bed to chair and chair to bed with modified independence using the least restrictive device within 7 day(s).  4.  Patient will ambulate with modified independence for 30 feet with the least restrictive device within 7 day(s).     Outcome: Not Progressing     PHYSICAL THERAPY TREATMENT    Patient: Candida Sherman (58 y.o. female)  Date: 8/14/2024  Diagnosis: Syncope and collapse [R55] Syncope and collapse      Precautions: Fall Risk, General Precautions, Bed Alarm (orthostatic; no BP L UE; HD MWF)                      ASSESSMENT:  Patient continues to benefit from skilled PT services and is not progressing towards goals. Pt encountered semi-reclined in bedside chair, reports fatigue post HD but agreeable to participate in therapy. Sit <> stand performed with RW and CGA, pt maintained static standing x2 minutes, unable to obtain standing BP but further sitting deferred 2/2 c/o dizziness. Diastolic  Call bell within reach, Bed/ chair alarm activated, and Updated patient's board on functional status and mobility recommendations      COMMUNICATION/EDUCATION:   The patient's plan of care was discussed with: registered nurse    Patient Education  Education Given To: Patient  Education Provided: Role of Therapy;Transfer Training;Equipment;Plan of Care;Energy Conservation;Fall Prevention Strategies;Orientation;Precautions;Home Exercise Program  Education Method: Demonstration;Verbal;Teach Back  Barriers to Learning: None  Education Outcome: Verbalized understanding;Demonstrated understanding;Continued education needed      MALOU GREEN PT  Minutes: 26

## 2024-08-14 NOTE — CONSULTS
Vascular Surgery Consult Note  8/14/2024    Subjective:     Candida Sherman is a severely obese 58 y.o. female with past medical history significant for lymphoma, diabetes mellitus, hypertension, hyperlipidemia, heart failure, and sleep apnea.  She has a history of stroke in May of this year.  She is a non-smoker.  She was taking Brilinta, aspirin, and a statin prior to presenting.    She has a history of end-stage renal disease and is a known patient of the practice for the management of her hemodialysis access.  She has a history of a failed right upper extremity AV fistula.  She is status post creation of a left upper extremity AV graft with Dr. Rivera on June 28 of this year.  She was last seen at the access center on July 30 for a CVC exchange and was given permission to attempt to cannulate her new left upper extremity access.  She is now admitted to the hospital following a syncopal episode.  She suffers from chronic orthostatic hypotension and takes midodrine.  We have been asked to evaluate for a warm and swollen left upper extremity hemodialysis access.  She continues to have her permacath.  She has a skin tear to the right shin.  ABIs in April 2024 were noncompressible bilaterally.  Her digit indices were right 0.80 and left 0.99.    Past medical history  Lymphoma  End-stage renal failure  Anemia of chronic renal disease  Insulin-dependent diabetes mellitus  Diabetic neuropathy  Hypertension  Hyperlipidemia  Heart failure with preserved ejection fraction  Sleep apnea  Severe obesity  Stroke 2018 and 5/2024  Subarachnoid hemorrhage  Degenerative disc disease of the cervical and lumbar spine  Chronic back pain   Vitamin D deficiency    Past procedural history  Right carpal tunnel release  Renal biopsy  ORIF of right humerus  Tonsillectomy  Left bunionectomy x 2  Lancing of labile abscess  Right upper extremity arteriovenous fistula  Left upper extremity arteriovenous graft 6/28/24    Family history  Father:

## 2024-08-14 NOTE — PROGRESS NOTES
Physical Therapy     Chart reviewed in prep for PT follow up. Attempted to see pt for therapy, pt unavailable 2/2 off the floor for HD. PT will continue to follow as medically appropriate and available.

## 2024-08-15 ENCOUNTER — APPOINTMENT (OUTPATIENT)
Facility: HOSPITAL | Age: 59
End: 2024-08-15
Attending: INTERNAL MEDICINE
Payer: MEDICAID

## 2024-08-15 VITALS
TEMPERATURE: 97.9 F | OXYGEN SATURATION: 98 % | RESPIRATION RATE: 18 BRPM | HEIGHT: 65 IN | HEART RATE: 92 BPM | SYSTOLIC BLOOD PRESSURE: 123 MMHG | BODY MASS INDEX: 42.13 KG/M2 | DIASTOLIC BLOOD PRESSURE: 49 MMHG | WEIGHT: 252.87 LBS

## 2024-08-15 LAB
ANION GAP SERPL CALC-SCNC: 8 MMOL/L (ref 5–15)
BASOPHILS # BLD: 0 K/UL (ref 0–0.1)
BASOPHILS NFR BLD: 1 % (ref 0–1)
BUN SERPL-MCNC: 16 MG/DL (ref 6–20)
BUN/CREAT SERPL: 4 (ref 12–20)
CALCIUM SERPL-MCNC: 9.5 MG/DL (ref 8.5–10.1)
CHLORIDE SERPL-SCNC: 97 MMOL/L (ref 97–108)
CO2 SERPL-SCNC: 27 MMOL/L (ref 21–32)
CREAT SERPL-MCNC: 4.37 MG/DL (ref 0.55–1.02)
DIFFERENTIAL METHOD BLD: ABNORMAL
ECHO BSA: 2.29 M2
EOSINOPHIL # BLD: 0.1 K/UL (ref 0–0.4)
EOSINOPHIL NFR BLD: 3 % (ref 0–7)
ERYTHROCYTE [DISTWIDTH] IN BLOOD BY AUTOMATED COUNT: 16.9 % (ref 11.5–14.5)
GLUCOSE BLD STRIP.AUTO-MCNC: 183 MG/DL (ref 65–117)
GLUCOSE BLD STRIP.AUTO-MCNC: 211 MG/DL (ref 65–117)
GLUCOSE BLD STRIP.AUTO-MCNC: 219 MG/DL (ref 65–117)
GLUCOSE SERPL-MCNC: 223 MG/DL (ref 65–100)
HCT VFR BLD AUTO: 26.9 % (ref 35–47)
HGB BLD-MCNC: 8.6 G/DL (ref 11.5–16)
IMM GRANULOCYTES # BLD AUTO: 0.1 K/UL (ref 0–0.04)
IMM GRANULOCYTES NFR BLD AUTO: 2 % (ref 0–0.5)
LYMPHOCYTES # BLD: 0.8 K/UL (ref 0.8–3.5)
LYMPHOCYTES NFR BLD: 14 % (ref 12–49)
MCH RBC QN AUTO: 29.3 PG (ref 26–34)
MCHC RBC AUTO-ENTMCNC: 32 G/DL (ref 30–36.5)
MCV RBC AUTO: 91.5 FL (ref 80–99)
MONOCYTES # BLD: 0.4 K/UL (ref 0–1)
MONOCYTES NFR BLD: 7 % (ref 5–13)
NEUTS SEG # BLD: 4.1 K/UL (ref 1.8–8)
NEUTS SEG NFR BLD: 75 % (ref 32–75)
NRBC # BLD: 0.07 K/UL (ref 0–0.01)
NRBC BLD-RTO: 1.3 PER 100 WBC
PLATELET # BLD AUTO: 229 K/UL (ref 150–400)
PMV BLD AUTO: 9.4 FL (ref 8.9–12.9)
POTASSIUM SERPL-SCNC: 4.2 MMOL/L (ref 3.5–5.1)
RBC # BLD AUTO: 2.94 M/UL (ref 3.8–5.2)
SERVICE CMNT-IMP: ABNORMAL
SODIUM SERPL-SCNC: 132 MMOL/L (ref 136–145)
WBC # BLD AUTO: 5.5 K/UL (ref 3.6–11)

## 2024-08-15 PROCEDURE — 6360000002 HC RX W HCPCS: Performed by: STUDENT IN AN ORGANIZED HEALTH CARE EDUCATION/TRAINING PROGRAM

## 2024-08-15 PROCEDURE — 94640 AIRWAY INHALATION TREATMENT: CPT

## 2024-08-15 PROCEDURE — 97530 THERAPEUTIC ACTIVITIES: CPT

## 2024-08-15 PROCEDURE — 36415 COLL VENOUS BLD VENIPUNCTURE: CPT

## 2024-08-15 PROCEDURE — 6370000000 HC RX 637 (ALT 250 FOR IP): Performed by: STUDENT IN AN ORGANIZED HEALTH CARE EDUCATION/TRAINING PROGRAM

## 2024-08-15 PROCEDURE — 85025 COMPLETE CBC W/AUTO DIFF WBC: CPT

## 2024-08-15 PROCEDURE — 93270 REMOTE 30 DAY ECG REV/REPORT: CPT

## 2024-08-15 PROCEDURE — 2580000003 HC RX 258: Performed by: STUDENT IN AN ORGANIZED HEALTH CARE EDUCATION/TRAINING PROGRAM

## 2024-08-15 PROCEDURE — 97116 GAIT TRAINING THERAPY: CPT

## 2024-08-15 PROCEDURE — 80048 BASIC METABOLIC PNL TOTAL CA: CPT

## 2024-08-15 PROCEDURE — 94761 N-INVAS EAR/PLS OXIMETRY MLT: CPT

## 2024-08-15 PROCEDURE — 6370000000 HC RX 637 (ALT 250 FOR IP): Performed by: NURSE PRACTITIONER

## 2024-08-15 PROCEDURE — 82962 GLUCOSE BLOOD TEST: CPT

## 2024-08-15 RX ORDER — DOXYCYCLINE HYCLATE 100 MG
100 TABLET ORAL 2 TIMES DAILY
Qty: 10 TABLET | Refills: 0 | Status: SHIPPED | OUTPATIENT
Start: 2024-08-15 | End: 2024-08-20

## 2024-08-15 RX ORDER — MECLIZINE HCL 12.5 MG/1
12.5 TABLET ORAL 3 TIMES DAILY PRN
Qty: 60 TABLET | Refills: 0 | Status: SHIPPED | OUTPATIENT
Start: 2024-08-15 | End: 2024-09-04

## 2024-08-15 RX ADMIN — SEVELAMER CARBONATE 800 MG: 800 TABLET, FILM COATED ORAL at 08:47

## 2024-08-15 RX ADMIN — DOCUSATE SODIUM 100 MG: 100 CAPSULE, LIQUID FILLED ORAL at 08:47

## 2024-08-15 RX ADMIN — INSULIN LISPRO 9 UNITS: 100 INJECTION, SOLUTION INTRAVENOUS; SUBCUTANEOUS at 08:46

## 2024-08-15 RX ADMIN — BUDESONIDE INHALATION 500 MCG: 0.5 SUSPENSION RESPIRATORY (INHALATION) at 09:05

## 2024-08-15 RX ADMIN — DOXYCYCLINE HYCLATE 100 MG: 100 TABLET, COATED ORAL at 08:47

## 2024-08-15 RX ADMIN — HEPARIN SODIUM 5000 UNITS: 5000 INJECTION INTRAVENOUS; SUBCUTANEOUS at 02:09

## 2024-08-15 RX ADMIN — SEVELAMER CARBONATE 800 MG: 800 TABLET, FILM COATED ORAL at 11:45

## 2024-08-15 RX ADMIN — GABAPENTIN 300 MG: 300 CAPSULE ORAL at 08:47

## 2024-08-15 RX ADMIN — MIDODRINE HYDROCHLORIDE 5 MG: 5 TABLET ORAL at 11:45

## 2024-08-15 RX ADMIN — MICONAZOLE NITRATE: 2 POWDER TOPICAL at 08:48

## 2024-08-15 RX ADMIN — SODIUM CHLORIDE, PRESERVATIVE FREE 10 ML: 5 INJECTION INTRAVENOUS at 08:48

## 2024-08-15 RX ADMIN — INSULIN LISPRO 9 UNITS: 100 INJECTION, SOLUTION INTRAVENOUS; SUBCUTANEOUS at 11:44

## 2024-08-15 RX ADMIN — GABAPENTIN 300 MG: 300 CAPSULE ORAL at 14:19

## 2024-08-15 RX ADMIN — POLYETHYLENE GLYCOL 3350 17 G: 17 POWDER, FOR SOLUTION ORAL at 13:15

## 2024-08-15 RX ADMIN — INSULIN LISPRO 2 UNITS: 100 INJECTION, SOLUTION INTRAVENOUS; SUBCUTANEOUS at 11:44

## 2024-08-15 RX ADMIN — TICAGRELOR 90 MG: 90 TABLET ORAL at 08:47

## 2024-08-15 RX ADMIN — HEPARIN SODIUM 5000 UNITS: 5000 INJECTION INTRAVENOUS; SUBCUTANEOUS at 10:55

## 2024-08-15 RX ADMIN — MIDODRINE HYDROCHLORIDE 5 MG: 5 TABLET ORAL at 08:47

## 2024-08-15 RX ADMIN — FLUTICASONE PROPIONATE 1 SPRAY: 50 SPRAY, METERED NASAL at 08:48

## 2024-08-15 RX ADMIN — ASPIRIN 81 MG: 81 TABLET, COATED ORAL at 08:47

## 2024-08-15 NOTE — DISCHARGE SUMMARY
pain, joint pain or rash.  Patient recently had a skin tear on the right shin when she went for dialysis.     In the ED CT head was done along with neck and lumbar spine-no acute findings.    We were asked to admit for work up and evaluation of the below problems.   Brief Hospital Course by Main Problems:     Syncopal episode-no prodrome  History of pontine stroke in May 2024  Rule out cardiogenic causes  Rule out postural hypotension  CT head done on arrival within normal limit  Most recent echo on 5/17/2024-ejection fraction up to 60%.  Increased wall thickness.  Continue aspirin statin, Brilinta  MRI brain done on 8/10/2022 negative for acute stroke  Neurology has been consulted-discharge with 30 day cardiac event  monitor - follow up with Dr. Jose De Jesus sheikhne - added inpt and at discharge         ESRD on dialysis MWF  Dialysis catheter in left upper chest        New cellulitis left upper arm AV graft site   Started Doxycyline  - localized erythema, continue upon discharge   It was evaluated by vascular who states can continue to use      Chronic bilateral lower extremity edema  Continued current care-no acute intervention     History of diabetes mellitus  Resume home regimen      Chronic:  Hyponatremia  Hypertension not on medication  Asthma-FARAZ not on CPAP  History of CVA  Morbid obesity  Peripheral neuropathy  Mild chronic anemia likely due to chronic disease  Patient' s sodium appears to be at baseline   Continue midodrine for pressure support during dialysis  Continue home inhalers for asthma  Continue gabapentin, amitriptyline     Discharge Exam:  Patient seen and examined by me on discharge day.  Pertinent Findings:  Patient Vitals for the past 24 hrs:   BP Temp Temp src Pulse Resp SpO2   08/15/24 1145 (!) 123/49 -- -- 92 -- --   08/15/24 0911 -- -- -- -- -- 98 %   08/15/24 0836 126/62 97.9 °F (36.6 °C) Oral 92 18 100 %   08/14/24 2004 119/65 98.1 °F (36.7 °C) Oral 97 17 96 %   08/14/24 1730 (!) 107/50 --  discharged with a cardiac event monitor to wear for 30 days. Please call the office of Dr. Jean Marie Dela Cruz to schedule a follow up appointment.     Total time in minutes spent coordinating this discharge (includes going over instructions, follow-up, prescriptions, and preparing report for sign off to her PCP) :  35 minutes

## 2024-08-15 NOTE — CARE COORDINATION
Transition of Care Plan:    RUR: 25%  Prior Level of Functioning: Independent   Disposition: Home with family and home health[facility]   If SNF or IPR: Date FOC offered:   Date FOC received:   Accepting facility:   Date authorization started with reference number:   Date authorization received and expires:   Follow up appointments: PCP   DME needed: No DME needed   Transportation at discharge: Medicaid transportation   IM/IMM Medicare/ letter given: N/A-Medicaid   Is patient a  and connected with VA? No   If yes, was  transfer form completed and VA notified? No  Caregiver Contact: Pt's sister   Discharge Caregiver contacted prior to discharge? Pt  Care Conference needed? No   Barriers to discharge: Medical clearance       CM reviewed pt's chart and pt is not medically stable for d/c due to vascular eval and tx and cellulitis in left arm.    CM will continue to follow and assist with d/c planning.    Mckayla Saravia

## 2024-08-15 NOTE — PROGRESS NOTES
New left upper arm AV loop graft in June (Dr Rivera at [facility])  Def a little more swollen than usual and slightly erythematous  No fluctuance or skin issues  Good thrill  WBC = 5K  Afebrile  No shakes or chills    Would con't to use for now and we will monitor closely

## 2024-08-15 NOTE — PLAN OF CARE
Problem: Physical Therapy - Adult  Goal: By Discharge: Performs mobility at highest level of function for planned discharge setting.  See evaluation for individualized goals.  Description: FUNCTIONAL STATUS PRIOR TO ADMISSION: Patient was modified independent using a rolling walker and power wheelchair for functional mobility, reports mainly using power chair and only uses RW when walking from bedroom to bathroom. Reports indep with ADLs. At least 10-15 falls this year, a few due to \"passing out\" with majority of falls being due to \"ankle rolling/giving out\". Was at [facility] in June following CVA.    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    Physical Therapy Goals  Initiated 8/12/2024  1.  Patient will move from supine to sit and sit to supine, scoot up and down, and roll side to side in bed with modified independence within 7 day(s).    2.  Patient will perform sit to stand with modified independence within 7 day(s).  3.  Patient will transfer from bed to chair and chair to bed with modified independence using the least restrictive device within 7 day(s).  4.  Patient will ambulate with modified independence for 30 feet with the least restrictive device within 7 day(s).     8/15/2024 1217 by Nevaeh Martínez PTA  Outcome: Progressing     Problem: Discharge Planning  Goal: Discharge to home or other facility with appropriate resources  8/15/2024 1650 by Tricia Escudero, RN  Outcome: Adequate for Discharge  8/15/2024 0411 by Bakari Portillo RN  Outcome: Progressing  Flowsheets (Taken 8/14/2024 2214)  Discharge to home or other facility with appropriate resources: Identify barriers to discharge with patient and caregiver     Problem: Safety - Adult  Goal: Free from fall injury  8/15/2024 1650 by Tricia Escudero, RN  Outcome: Adequate for Discharge  8/15/2024 0411 by Bakari Portillo RN  Outcome: Progressing     Problem: Pain  Goal:  Verbalizes/displays adequate comfort level or baseline comfort level  8/15/2024 1650 by Tricia Escudero RN  Outcome: Adequate for Discharge  8/15/2024 0411 by Bakari Portillo RN  Outcome: Progressing     Problem: Cardiovascular - Adult  Goal: Maintains optimal cardiac output and hemodynamic stability  Outcome: Adequate for Discharge  Flowsheets (Taken 8/15/2024 0836 by Jo, Yeong Ae Jenny, RN)  Maintains optimal cardiac output and hemodynamic stability: Monitor blood pressure and heart rate     Problem: Respiratory - Adult  Goal: Achieves optimal ventilation and oxygenation  8/15/2024 1650 by Tricia Escudero RN  Outcome: Adequate for Discharge  8/15/2024 0309 by Jim Buckley RCP  Outcome: Progressing  Flowsheets (Taken 8/14/2024 2214 by Bakari Portillo RN)  Achieves optimal ventilation and oxygenation: Assess for changes in respiratory status     Problem: Skin/Tissue Integrity  Goal: Absence of new skin breakdown  Description: 1.  Monitor for areas of redness and/or skin breakdown  2.  Assess vascular access sites hourly  3.  Every 4-6 hours minimum:  Change oxygen saturation probe site  4.  Every 4-6 hours:  If on nasal continuous positive airway pressure, respiratory therapy assess nares and determine need for appliance change or resting period.  Outcome: Adequate for Discharge

## 2024-08-15 NOTE — PROGRESS NOTES
1430: I have reviewed discharge instructions with the patient. The patient verbalized understanding. Discharge medications reviewed with patient and appropriate educational materials and side effects teaching were provided. Follow-up appointments reviewed. Opportunity for questions and clarification was provided.  Venous access removed without difficulty. Cardiac event monitor was put on and education was provided by a nurse from stress lab. Patient is ready for discharge and is waiting to be picked up by medicaid transportation.    1505: Bedside and Verbal shift change report given to Tricia (oncoming nurse) by Ayde (offgoing nurse). Report included the following information Nurse Handoff Report.

## 2024-08-15 NOTE — PROGRESS NOTES
NAME: Candida Sherman        :  1965        MRN:  [id number]                   Assessment   :                                               Plan:  NEOP-[facility]  AVF complications  Anemia  Syncope    Hemodialysis  schedule  Hemodialysis was done yesterday.  3 L fluid was removed.  Catheter was used.  Appreciate vascular surgery evaluating swollen AVF.  As per Dr. Zamarripa we will continue to use      H/H not at goal.  Continue SAHRA.    Left arm access is warm and swollen.  I will have vascular evaluate.         Subjective:     Chief Complaint: No new complaints.  Feels well.  Eating well.      Review of Systems:    Symptom Y/N Comments  Symptom Y/N Comments   Fever/Chills    Chest Pain     Poor Appetite    Edema     Cough    Abdominal Pain     Sputum    Joint Pain     SOB/FONSECA    Pruritis/Rash     Nausea/vomit    Tolerating PT/OT     Diarrhea    Tolerating Diet     Constipation    Other       Could not obtain due to:      Objective:     VITALS:   Last 24hrs VS reviewed since prior progress note. Most recent are:  Vitals:    08/15/24 0911   BP:    Pulse:    Resp:    Temp:    SpO2: 98%       Intake/Output Summary (Last 24 hours) at 8/15/2024 1112  Last data filed at 2024 1137  Gross per 24 hour   Intake 500 ml   Output 3500 ml   Net -3000 ml        Telemetry Reviewed:     PHYSICAL EXAM:  General: NAD  + edema  CTA    Lab Data Reviewed: (see below)    Medications Reviewed: (see below)    PMH/SH reviewed - no change compared to H&P  ________________________________________________________________________  Care Plan discussed with:  Patient     Family  x    RN x    Care Manager                    Consultant:          Comments   >50% of visit spent in counseling and coordination of care       ________________________________________________________________________  Francesca Lopez MD     Procedures:  see electronic medical records for all procedures/Xrays and details which  were not copied into this note but were reviewed prior to creation of Plan.      LABS:  Recent Labs     08/14/24  0748 08/15/24  0219   WBC 7.0 5.5   HGB 8.6* 8.6*   HCT 26.7* 26.9*    229     Recent Labs     08/13/24  0206 08/14/24  0748 08/15/24  0219   * 128* 132*   K 4.2 4.4 4.2   CL 96* 92* 97   CO2 26 26 27   BUN 16 26* 16     No results for input(s): \"TP\", \"GLOB\", \"GGT\" in the last 72 hours.    Invalid input(s): \"SGOT\", \"GPT\", \"AP\", \"TBIL\", \"ALB\", \"AML\", \"AMYP\", \"LPSE\", \"HLPSE\"    No results for input(s): \"INR\", \"APTT\" in the last 72 hours.    Invalid input(s): \"PTP\"   No results for input(s): \"TIBC\" in the last 72 hours.    Invalid input(s): \"FE\", \"PSAT\", \"FERR\"   No results found for: \"RBCF\"   No results for input(s): \"PH\", \"PCO2\", \"PO2\" in the last 72 hours.  No results for input(s): \"CPK\", \"CKMB\", \"TROPONINI\" in the last 72 hours.  No components found for: \"GLPOC\"  @labua@    MEDICATIONS:  Current Facility-Administered Medications   Medication Dose Route Frequency    albumin human 25% IV solution 25 g  25 g IntraVENous PRN    doxycycline hyclate (VIBRA-TABS) tablet 100 mg  100 mg Oral 2 times per day    albuterol (PROVENTIL) (2.5 MG/3ML) 0.083% nebulizer solution 2.5 mg  2.5 mg Nebulization Q6H PRN    meclizine (ANTIVERT) tablet 12.5 mg  12.5 mg Oral TID PRN    epoetin ivet (EPOGEN;PROCRIT) injection 8,000 Units  8,000 Units SubCUTAneous Once per day on Monday Wednesday Friday    heparin (porcine) 1000 UNIT/ML injection 2,100 Units  2,100 Units IntraCATHeter PRN    And    heparin (porcine) 1000 UNIT/ML injection 2,000 Units  2,000 Units IntraCATHeter PRN    miconazole (MICOTIN) 2 % powder   Topical BID    diphenhydrAMINE (BENADRYL) capsule 25 mg  25 mg Oral Q6H PRN    insulin lispro (HUMALOG,ADMELOG) injection vial 0-8 Units  0-8 Units SubCUTAneous TID WC    insulin lispro (HUMALOG,ADMELOG) injection vial 0-4 Units

## 2024-08-15 NOTE — PLAN OF CARE
Yes  Overall Level of Assistance: Contact-guard assistance;Minimum assistance  Distance (ft): 25 Feet  Assistive Device: Gait belt;Walker, rolling  Interventions: Safety awareness training;Verbal cues;Manual cues  Base of Support: Widened  Speed/Rhea: Pace decreased (< 100 feet/min)  Step Length: Left shortened;Right shortened  Gait Abnormalities: Decreased step clearance;Foot drop;Trunk sway increased;Other (comment) (poor ankle stability and proprioception)        Neuro Re-Education:                    Pain Rating:    Pain Intervention(s):       Activity Tolerance:   Fair     After treatment:   Patient left in no apparent distress in bed, Call bell within reach, Bed/ chair alarm activated, and Updated patient's board on functional status and mobility recommendations      COMMUNICATION/EDUCATION:   The patient's plan of care was discussed with: registered nurse    Patient Education  Education Given To: Patient  Education Provided: Role of Therapy;Plan of Care;Transfer Training;Fall Prevention Strategies  Education Method: Verbal  Barriers to Learning: None  Education Outcome: Verbalized understanding;Demonstrated understanding;Continued education needed      Nevaeh Martínez PTA  Minutes: 24

## 2024-08-15 NOTE — CARE COORDINATION
Pt is clear from CM standpoint for d/c.    Medicaid transportation will be here to transport pt btw now and 4 pm.    Trip number is [id number].    Trip phone number is [phone].      Transition of Care Plan:     RUR: 25%  Prior Level of Functioning: Independent   Disposition: Home with family and home health[facility]   If SNF or IPR: Date FOC offered:   Date FOC received:   Accepting facility:   Date authorization started with reference number:   Date authorization received and expires:   Follow up appointments: PCP   DME needed: No DME needed   Transportation at discharge: Medicaid transportation   IM/IMM Medicare/ letter given: N/A-Medicaid   Is patient a [place] and connected with VA? No              If yes, was  transfer form completed and VA notified? No  Caregiver Contact: Pt's sister   Discharge Caregiver contacted prior to discharge? Pt was contact   Care Conference needed? No   Barriers to discharge: None        08/15/24 1302   Services At/After Discharge   Transition of Care Consult (CM Consult) Home Health   Services At/After Discharge Home Health    Resource Information Provided? No   Mode of Transport at Discharge BLS   Confirm Follow Up Transport Self   Condition of Participation: Discharge Planning   The Plan for Transition of Care is related to the following treatment goals: Goal is to return home with family, follow up appointments and home health   The Patient and/or Patient Representative was provided with a Choice of Provider? Patient   The Patient and/Or Patient Representative agree with the Discharge Plan? Yes   Freedom of Choice list was provided with basic dialogue that supports the patient's individualized plan of care/goals, treatment preferences, and shares the quality data associated with the providers?  Yes     Mckayla Saravia

## 2024-08-15 NOTE — PROGRESS NOTES
End of Shift Note    Bedside shift change report given to VAN Messer (oncoming nurse) by HAWK TOM RN (offgoing nurse).  Report included the following information SBAR, Kardex, and MAR    Shift worked:  [id number]     Shift summary and any significant changes:     Pt AAOx4, VSS, no c/o pain, hourly rounding completed.     Concerns for physician to address:  no     Zone phone for oncoming shift:   no       Activity:  Level of Assistance: Minimal assist, patient does 75% or more    Cardiac:   Cardiac Monitoring:  no    Access:  Current line(s): PIV     Genitourinary:   Urinary Status: Voiding    Respiratory:   O2 Device: None (Room air)    GI:  Current diet: ADULT DIET; Regular; 5 carb choices (75 gm/meal)    Pain Management:   Patient states pain is manageable on current regimen: YES    Skin:  Sebastian Scale Score: 19  Interventions: Wound Offloading (Prevention Methods): Bed, pressure reduction mattress, Elevate heels, Pillows, Repositioning, Turning  Pressure injury: no    Patient Safety:  Fall Score: Membreno Total Score: 85  Fall Risk Interventions  Nursing Judgement-Fall Risk High(Add Comments): Yes  Toilet Every 2 Hours-In Advance of Need: Yes  Hourly Visual Checks: Awake, In chair  Fall Visual Posted: Armband, Fall cloth/blanket, Socks  Room Door Open: Yes  Alarm On: Bed  Patient Moved Closer to Nursing Station: No    Active Consults:  IP CONSULT TO NEUROLOGY  IP CONSULT TO NEPHROLOGY  IP CONSULT TO CARDIOLOGY  IP CONSULT TO VASCULAR SURGERY    Length of Stay:  Expected LOS: 6  Actual LOS: 5      HAWK TOM RN

## 2024-08-15 NOTE — PLAN OF CARE
Problem: Physical Therapy - Adult  Goal: By Discharge: Performs mobility at highest level of function for planned discharge setting.  See evaluation for individualized goals.  Description: FUNCTIONAL STATUS PRIOR TO ADMISSION: Patient was modified independent using a rolling walker and power wheelchair for functional mobility, reports mainly using power chair and only uses RW when walking from bedroom to bathroom. Reports indep with ADLs. At least 10-15 falls this year, a few due to \"passing out\" with majority of falls being due to \"ankle rolling/giving out\". Was at [facility] in June following CVA.    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    Physical Therapy Goals  Initiated 8/12/2024  1.  Patient will move from supine to sit and sit to supine, scoot up and down, and roll side to side in bed with modified independence within 7 day(s).    2.  Patient will perform sit to stand with modified independence within 7 day(s).  3.  Patient will transfer from bed to chair and chair to bed with modified independence using the least restrictive device within 7 day(s).  4.  Patient will ambulate with modified independence for 30 feet with the least restrictive device within 7 day(s).     8/14/2024 1538 by Laura Mack, SANTI  Outcome: Not Progressing     Problem: Physical Therapy - Adult  Goal: By Discharge: Performs mobility at highest level of function for planned discharge setting.  See evaluation for individualized goals.  Description: FUNCTIONAL STATUS PRIOR TO ADMISSION: Patient was modified independent using a rolling walker and power wheelchair for functional mobility, reports mainly using power chair and only uses RW when walking from bedroom to bathroom. Reports indep with ADLs. At least 10-15 falls this year, a few due to \"passing out\" with majority of falls being due to \"ankle rolling/giving out\". Was at [facility] in June following CVA.    HOME

## 2024-08-15 NOTE — PROGRESS NOTES
End of Shift Note    Bedside shift change report given to Alexiolee (oncoming nurse) by Kerri Rodriguez RN (offgoing nurse).  Report included the following information SBAR, Intake/Output, and MAR    Shift worked:  7A-7P     Shift summary and any significant changes:     Pt had dialysis done this morning. 3 L fluid removed. X2 assist to bedside chair. Denies pain and discomfort. Safety precaution maintained.      Concerns for physician to address:       Zone phone for oncoming shift:          Activity:     Number times ambulated in hallways past shift: 0  Number of times OOB to chair past shift: 1    Cardiac:   Cardiac Monitoring: No           Access:  Current line(s): PIV     Genitourinary:   Urinary status: Oliguria    Respiratory:      Chronic home O2 use?: NO  Incentive spirometer at bedside: NO       GI:     Current diet:  ADULT DIET; Regular; 5 carb choices (75 gm/meal)  Passing flatus: YES  Tolerating current diet: YES       Pain Management:   Patient states pain is manageable on current regimen: YES    Skin:     Interventions: float heels, increase time out of bed, PT/OT consult, and limit briefs    Patient Safety:  Fall Score:    Interventions: bed/chair alarm, assistive device (walker, cane. etc), gripper socks, and pt to call before getting OOB       Length of Stay:  Expected LOS: 5  Actual LOS: 4      Kerri Rodriguez RN

## 2024-10-16 LAB — ECHO BSA: 2.29 M2

## 2024-11-19 ENCOUNTER — HOSPITAL ENCOUNTER (EMERGENCY)
Facility: HOSPITAL | Age: 59
Discharge: HOME | End: 2024-11-19
Attending: STUDENT IN AN ORGANIZED HEALTH CARE EDUCATION/TRAINING PROGRAM
Payer: MEDICAID

## 2024-11-19 ENCOUNTER — APPOINTMENT (OUTPATIENT)
Facility: HOSPITAL | Age: 59
End: 2024-11-19
Payer: MEDICAID

## 2024-11-19 VITALS
SYSTOLIC BLOOD PRESSURE: 145 MMHG | WEIGHT: 230 LBS | HEART RATE: 93 BPM | RESPIRATION RATE: 19 BRPM | OXYGEN SATURATION: 97 % | HEIGHT: 65 IN | DIASTOLIC BLOOD PRESSURE: 57 MMHG | TEMPERATURE: 98.1 F | BODY MASS INDEX: 38.32 KG/M2

## 2024-11-19 DIAGNOSIS — R10.31 RIGHT LOWER QUADRANT ABDOMINAL PAIN: Primary | ICD-10-CM

## 2024-11-19 LAB
ALBUMIN SERPL-MCNC: 3.6 G/DL (ref 3.5–5)
ALBUMIN/GLOB SERPL: 0.8 (ref 1.1–2.2)
ALP SERPL-CCNC: 171 U/L (ref 45–117)
ALT SERPL-CCNC: 17 U/L (ref 12–78)
ANION GAP SERPL CALC-SCNC: 5 MMOL/L (ref 2–12)
APPEARANCE UR: ABNORMAL
AST SERPL-CCNC: 18 U/L (ref 15–37)
BACTERIA URNS QL MICRO: ABNORMAL /HPF
BASOPHILS # BLD: 0 K/UL (ref 0–0.1)
BASOPHILS NFR BLD: 1 % (ref 0–1)
BILIRUB SERPL-MCNC: 0.3 MG/DL (ref 0.2–1)
BILIRUB UR QL: NEGATIVE
BUN SERPL-MCNC: 22 MG/DL (ref 6–20)
BUN/CREAT SERPL: 5 (ref 12–20)
CALCIUM SERPL-MCNC: 8.8 MG/DL (ref 8.5–10.1)
CHLORIDE SERPL-SCNC: 93 MMOL/L (ref 97–108)
CO2 SERPL-SCNC: 34 MMOL/L (ref 21–32)
COLOR UR: ABNORMAL
COMMENT:: NORMAL
CREAT SERPL-MCNC: 4.6 MG/DL (ref 0.55–1.02)
DIFFERENTIAL METHOD BLD: ABNORMAL
EKG ATRIAL RATE: 91 BPM
EKG DIAGNOSIS: NORMAL
EKG P AXIS: 19 DEGREES
EKG P-R INTERVAL: 200 MS
EKG Q-T INTERVAL: 372 MS
EKG QRS DURATION: 76 MS
EKG QTC CALCULATION (BAZETT): 457 MS
EKG R AXIS: 34 DEGREES
EKG T AXIS: 11 DEGREES
EKG VENTRICULAR RATE: 91 BPM
EOSINOPHIL # BLD: 0.1 K/UL (ref 0–0.4)
EOSINOPHIL NFR BLD: 2 % (ref 0–7)
EPITH CASTS URNS QL MICRO: ABNORMAL /LPF
ERYTHROCYTE [DISTWIDTH] IN BLOOD BY AUTOMATED COUNT: 17 % (ref 11.5–14.5)
GLOBULIN SER CALC-MCNC: 4.6 G/DL (ref 2–4)
GLUCOSE SERPL-MCNC: 229 MG/DL (ref 65–100)
GLUCOSE UR STRIP.AUTO-MCNC: 500 MG/DL
HCT VFR BLD AUTO: 28.5 % (ref 35–47)
HGB BLD-MCNC: 9.2 G/DL (ref 11.5–16)
HGB UR QL STRIP: ABNORMAL
IMM GRANULOCYTES # BLD AUTO: 0 K/UL (ref 0–0.04)
IMM GRANULOCYTES NFR BLD AUTO: 1 % (ref 0–0.5)
KETONES UR QL STRIP.AUTO: NEGATIVE MG/DL
LEUKOCYTE ESTERASE UR QL STRIP.AUTO: ABNORMAL
LIPASE SERPL-CCNC: 36 U/L (ref 13–75)
LYMPHOCYTES # BLD: 0.7 K/UL (ref 0.8–3.5)
LYMPHOCYTES NFR BLD: 17 % (ref 12–49)
MCH RBC QN AUTO: 29.4 PG (ref 26–34)
MCHC RBC AUTO-ENTMCNC: 32.3 G/DL (ref 30–36.5)
MCV RBC AUTO: 91.1 FL (ref 80–99)
MONOCYTES # BLD: 0.3 K/UL (ref 0–1)
MONOCYTES NFR BLD: 7 % (ref 5–13)
NEUTS SEG # BLD: 3.3 K/UL (ref 1.8–8)
NEUTS SEG NFR BLD: 72 % (ref 32–75)
NITRITE UR QL STRIP.AUTO: NEGATIVE
NRBC # BLD: 0 K/UL (ref 0–0.01)
NRBC BLD-RTO: 0 PER 100 WBC
PH UR STRIP: >8.5 [PH] (ref 5–8)
PLATELET # BLD AUTO: 168 K/UL (ref 150–400)
PMV BLD AUTO: 9.1 FL (ref 8.9–12.9)
POTASSIUM SERPL-SCNC: 4.2 MMOL/L (ref 3.5–5.1)
PROT SERPL-MCNC: 8.2 G/DL (ref 6.4–8.2)
PROT UR STRIP-MCNC: >300 MG/DL
RBC # BLD AUTO: 3.13 M/UL (ref 3.8–5.2)
RBC #/AREA URNS HPF: ABNORMAL /HPF (ref 0–5)
RBC MORPH BLD: ABNORMAL
SODIUM SERPL-SCNC: 132 MMOL/L (ref 136–145)
SP GR UR REFRACTOMETRY: 1.02
SPECIMEN HOLD: NORMAL
URINE CULTURE IF INDICATED: ABNORMAL
UROBILINOGEN UR QL STRIP.AUTO: 0.2 EU/DL (ref 0.2–1)
WBC # BLD AUTO: 4.4 K/UL (ref 3.6–11)
WBC URNS QL MICRO: ABNORMAL /HPF (ref 0–4)

## 2024-11-19 PROCEDURE — 80053 COMPREHEN METABOLIC PANEL: CPT

## 2024-11-19 PROCEDURE — 96375 TX/PRO/DX INJ NEW DRUG ADDON: CPT

## 2024-11-19 PROCEDURE — 81001 URINALYSIS AUTO W/SCOPE: CPT

## 2024-11-19 PROCEDURE — 85025 COMPLETE CBC W/AUTO DIFF WBC: CPT

## 2024-11-19 PROCEDURE — 6360000002 HC RX W HCPCS: Performed by: STUDENT IN AN ORGANIZED HEALTH CARE EDUCATION/TRAINING PROGRAM

## 2024-11-19 PROCEDURE — 96374 THER/PROPH/DIAG INJ IV PUSH: CPT

## 2024-11-19 PROCEDURE — 93005 ELECTROCARDIOGRAM TRACING: CPT | Performed by: STUDENT IN AN ORGANIZED HEALTH CARE EDUCATION/TRAINING PROGRAM

## 2024-11-19 PROCEDURE — 74177 CT ABD & PELVIS W/CONTRAST: CPT

## 2024-11-19 PROCEDURE — 6360000004 HC RX CONTRAST MEDICATION

## 2024-11-19 PROCEDURE — 99285 EMERGENCY DEPT VISIT HI MDM: CPT

## 2024-11-19 PROCEDURE — 83690 ASSAY OF LIPASE: CPT

## 2024-11-19 PROCEDURE — 36415 COLL VENOUS BLD VENIPUNCTURE: CPT

## 2024-11-19 PROCEDURE — 87086 URINE CULTURE/COLONY COUNT: CPT

## 2024-11-19 RX ORDER — FENTANYL CITRATE 50 UG/ML
50 INJECTION, SOLUTION INTRAMUSCULAR; INTRAVENOUS
Status: COMPLETED | OUTPATIENT
Start: 2024-11-19 | End: 2024-11-19

## 2024-11-19 RX ORDER — ONDANSETRON 2 MG/ML
4 INJECTION INTRAMUSCULAR; INTRAVENOUS ONCE
Status: COMPLETED | OUTPATIENT
Start: 2024-11-19 | End: 2024-11-19

## 2024-11-19 RX ORDER — IOPAMIDOL 755 MG/ML
100 INJECTION, SOLUTION INTRAVASCULAR
Status: COMPLETED | OUTPATIENT
Start: 2024-11-19 | End: 2024-11-19

## 2024-11-19 RX ADMIN — FENTANYL CITRATE 50 MCG: 50 INJECTION INTRAMUSCULAR; INTRAVENOUS at 16:12

## 2024-11-19 RX ADMIN — ONDANSETRON 4 MG: 2 INJECTION INTRAMUSCULAR; INTRAVENOUS at 16:12

## 2024-11-19 RX ADMIN — IOPAMIDOL 100 ML: 755 INJECTION, SOLUTION INTRAVENOUS at 16:43

## 2024-11-19 ASSESSMENT — ENCOUNTER SYMPTOMS
SHORTNESS OF BREATH: 0
VOMITING: 0
ABDOMINAL PAIN: 1
NAUSEA: 0

## 2024-11-19 NOTE — ED PROVIDER NOTES
EMERGENCY DEPARTMENT HISTORY AND PHYSICAL EXAM      Date: 11/19/2024  Patient Name: Candida Sherman    History of Presenting Illness     Chief Complaint   Patient presents with    Abdominal Pain     Pt arrives via EMS from home w CC of R flank & R abd pain onset 11/19. Pt endorses nausea and reports going to dialysis - MWF is usual, went extra today.        History Provided By: Patient    HPI: Candida Sherman, 58 y.o. female with a past medical history significant for medical problems as stated below presents to the ED with cc of abdominal pain.  Patient states this morning, her pain in her right flank woke her up from her sleep.  States that it has been radiating into her right lower quadrant.  Went to dialysis as normal to date and decided to come into the emergency department.  States that she has never had any pain like this before, denies any history of kidney stones and denies any abdominal surgeries.  States that her bowel movements have been normal with her last 1 being today.  Denies any blood in her stool.  Has been mildly nauseous however, no vomiting.  There are no associated symptoms.  No other exacerbating or ameliorating factors.    PCP: [facility], Pcp    No current facility-administered medications on file prior to encounter.     Current Outpatient Medications on File Prior to Encounter   Medication Sig Dispense Refill    gabapentin (NEURONTIN) 300 MG capsule Take 1 capsule by mouth 3 times daily for 3 days. Max Daily Amount: 900 mg 9 capsule 0    insulin lispro (HUMALOG,ADMELOG) 100 UNIT/ML SOLN injection vial Inject 0-8 Units into the skin 3 times daily (with meals) 3 mL 0    diclofenac sodium (VOLTAREN) 1 % GEL Apply 2 g topically 2 times daily 2 g 0    docusate sodium (COLACE, DULCOLAX) 100 MG CAPS Take 100 mg by mouth 2 times daily 60 capsule 0    ticagrelor (BRILINTA) 90 MG TABS tablet Take 1 tablet by mouth 2 times daily 60 tablet 0    midodrine (PROAMATINE) 5 MG tablet Take 1 tablet by mouth

## 2024-11-20 LAB
BACTERIA SPEC CULT: NORMAL
SERVICE CMNT-IMP: NORMAL

## 2024-11-20 NOTE — DISCHARGE INSTRUCTIONS
You are seen in the emergency department today for abdominal pain.  Your labs and imaging are all reassuring.  Please return the emergency department if you develop any nausea, vomiting, inability to eat or drink, chest pain, shortness of breath, your condition worsens or you are concerned.

## 2024-11-20 NOTE — ED NOTES
Handoff report received from VAN ANDRES Introduced self to pt and gave opportunity to ask questions. Pt alert, conversational, and in no acute distress. Opportunity was given to ask questions. Side rails x 2, call light within reach, bed locked and in lowest position.    Pt is AAOx4. Guard at bedside. Pt reported pain throughout shift PRN morphine and oxy administered. RA. PT/OT worked with pt. Regular diet. Purewick in place. Care administered as ordered.

## 2024-11-20 NOTE — ED NOTES
Verbal shift change report given to Yocasta (oncoming nurse) by Cindy (offgoing nurse). Report included the following information Nurse Handoff Report, ED Encounter Summary, ED SBAR, Intake/Output, MAR, Recent Results, and Neuro Assessment, outstanding orders to be completed

## 2024-11-20 NOTE — ED NOTES
[facility] crew at bedside with pt at this time. Report given to crew with all questions answered. Pt departing alert, conversational, and in no acute distress.

## 2024-12-29 ENCOUNTER — HOSPITAL ENCOUNTER (EMERGENCY)
Facility: HOSPITAL | Age: 59
Discharge: HOME | End: 2024-12-30
Attending: EMERGENCY MEDICINE
Payer: MEDICAID

## 2024-12-29 DIAGNOSIS — J01.90 ACUTE NON-RECURRENT SINUSITIS, UNSPECIFIED LOCATION: ICD-10-CM

## 2024-12-29 DIAGNOSIS — Z99.2 ESRD ON DIALYSIS (HCC): ICD-10-CM

## 2024-12-29 DIAGNOSIS — N18.6 ESRD ON DIALYSIS (HCC): ICD-10-CM

## 2024-12-29 DIAGNOSIS — I16.0 HYPERTENSIVE URGENCY: ICD-10-CM

## 2024-12-29 DIAGNOSIS — R06.00 ACUTE DYSPNEA: Primary | ICD-10-CM

## 2024-12-29 DIAGNOSIS — J22 ACUTE RESPIRATORY INFECTION: ICD-10-CM

## 2024-12-29 PROCEDURE — 99285 EMERGENCY DEPT VISIT HI MDM: CPT

## 2024-12-29 RX ORDER — IPRATROPIUM BROMIDE AND ALBUTEROL SULFATE 2.5; .5 MG/3ML; MG/3ML
1 SOLUTION RESPIRATORY (INHALATION)
Status: COMPLETED | OUTPATIENT
Start: 2024-12-29 | End: 2024-12-30

## 2024-12-29 ASSESSMENT — PAIN - FUNCTIONAL ASSESSMENT: PAIN_FUNCTIONAL_ASSESSMENT: NONE - DENIES PAIN

## 2024-12-30 ENCOUNTER — APPOINTMENT (OUTPATIENT)
Facility: HOSPITAL | Age: 59
End: 2024-12-30
Payer: MEDICAID

## 2024-12-30 VITALS
BODY MASS INDEX: 38.93 KG/M2 | RESPIRATION RATE: 24 BRPM | DIASTOLIC BLOOD PRESSURE: 62 MMHG | HEIGHT: 65 IN | WEIGHT: 233.69 LBS | HEART RATE: 93 BPM | TEMPERATURE: 98.1 F | SYSTOLIC BLOOD PRESSURE: 166 MMHG | OXYGEN SATURATION: 100 %

## 2024-12-30 LAB
ALBUMIN SERPL-MCNC: 3.3 G/DL (ref 3.5–5)
ALBUMIN/GLOB SERPL: 0.9 (ref 1.1–2.2)
ALP SERPL-CCNC: 178 U/L (ref 45–117)
ALT SERPL-CCNC: 11 U/L (ref 12–78)
ANION GAP SERPL CALC-SCNC: 7 MMOL/L (ref 2–12)
AST SERPL-CCNC: 15 U/L (ref 15–37)
BASE EXCESS BLDV CALC-SCNC: 10.3 MMOL/L
BASOPHILS # BLD: 0 K/UL (ref 0–0.1)
BASOPHILS NFR BLD: 0 % (ref 0–1)
BDY SITE: ABNORMAL
BILIRUB SERPL-MCNC: 0.5 MG/DL (ref 0.2–1)
BREATHS.SPONTANEOUS ON VENT: 17
BUN SERPL-MCNC: 10 MG/DL (ref 6–20)
BUN/CREAT SERPL: 3 (ref 12–20)
CALCIUM SERPL-MCNC: 8.3 MG/DL (ref 8.5–10.1)
CHLORIDE SERPL-SCNC: 99 MMOL/L (ref 97–108)
CO2 SERPL-SCNC: 35 MMOL/L (ref 21–32)
COMMENT:: NORMAL
CREAT SERPL-MCNC: 4 MG/DL (ref 0.55–1.02)
DIFFERENTIAL METHOD BLD: ABNORMAL
EOSINOPHIL # BLD: 0.1 K/UL (ref 0–0.4)
EOSINOPHIL NFR BLD: 1 % (ref 0–7)
ERYTHROCYTE [DISTWIDTH] IN BLOOD BY AUTOMATED COUNT: 15.5 % (ref 11.5–14.5)
FIO2 ON VENT: 21 %
FLUAV RNA SPEC QL NAA+PROBE: NOT DETECTED
FLUBV RNA SPEC QL NAA+PROBE: NOT DETECTED
GLOBULIN SER CALC-MCNC: 3.6 G/DL (ref 2–4)
GLUCOSE SERPL-MCNC: 210 MG/DL (ref 65–100)
HCO3 BLDV-SCNC: 35 MMOL/L (ref 23–28)
HCT VFR BLD AUTO: 26.4 % (ref 35–47)
HGB BLD-MCNC: 9.1 G/DL (ref 11.5–16)
IMM GRANULOCYTES # BLD AUTO: 0 K/UL (ref 0–0.04)
IMM GRANULOCYTES NFR BLD AUTO: 0 % (ref 0–0.5)
LYMPHOCYTES # BLD: 0.9 K/UL (ref 0.8–3.5)
LYMPHOCYTES NFR BLD: 19 % (ref 12–49)
MCH RBC QN AUTO: 31.8 PG (ref 26–34)
MCHC RBC AUTO-ENTMCNC: 34.5 G/DL (ref 30–36.5)
MCV RBC AUTO: 92.3 FL (ref 80–99)
MONOCYTES # BLD: 0.5 K/UL (ref 0–1)
MONOCYTES NFR BLD: 10 % (ref 5–13)
NEUTS SEG # BLD: 3.2 K/UL (ref 1.8–8)
NEUTS SEG NFR BLD: 70 % (ref 32–75)
NRBC # BLD: 0 K/UL (ref 0–0.01)
NRBC BLD-RTO: 0 PER 100 WBC
PCO2 BLDV: 47.7 MMHG (ref 41–51)
PH BLDV: 7.49 (ref 7.32–7.42)
PLATELET # BLD AUTO: 149 K/UL (ref 150–400)
PMV BLD AUTO: 9.2 FL (ref 8.9–12.9)
PO2 BLDV: 42 MMHG (ref 25–40)
POTASSIUM SERPL-SCNC: 3.4 MMOL/L (ref 3.5–5.1)
PROT SERPL-MCNC: 6.9 G/DL (ref 6.4–8.2)
RBC # BLD AUTO: 2.86 M/UL (ref 3.8–5.2)
SAO2 % BLDV: 81 % (ref 65–88)
SAO2% DEVICE SAO2% SENSOR NAME: ABNORMAL
SARS-COV-2 RNA RESP QL NAA+PROBE: NOT DETECTED
SODIUM SERPL-SCNC: 141 MMOL/L (ref 136–145)
SOURCE: NORMAL
SPECIMEN HOLD: NORMAL
SPECIMEN SITE: ABNORMAL
WBC # BLD AUTO: 4.6 K/UL (ref 3.6–11)

## 2024-12-30 PROCEDURE — 6370000000 HC RX 637 (ALT 250 FOR IP): Performed by: EMERGENCY MEDICINE

## 2024-12-30 PROCEDURE — 82803 BLOOD GASES ANY COMBINATION: CPT

## 2024-12-30 PROCEDURE — 71045 X-RAY EXAM CHEST 1 VIEW: CPT

## 2024-12-30 PROCEDURE — 93005 ELECTROCARDIOGRAM TRACING: CPT | Performed by: EMERGENCY MEDICINE

## 2024-12-30 PROCEDURE — 85025 COMPLETE CBC W/AUTO DIFF WBC: CPT

## 2024-12-30 PROCEDURE — 87636 SARSCOV2 & INF A&B AMP PRB: CPT

## 2024-12-30 PROCEDURE — 94640 AIRWAY INHALATION TREATMENT: CPT

## 2024-12-30 PROCEDURE — 36415 COLL VENOUS BLD VENIPUNCTURE: CPT

## 2024-12-30 PROCEDURE — 80053 COMPREHEN METABOLIC PANEL: CPT

## 2024-12-30 RX ORDER — OXYMETAZOLINE HYDROCHLORIDE 0.05 G/100ML
2 SPRAY NASAL ONCE
Status: COMPLETED | OUTPATIENT
Start: 2024-12-30 | End: 2024-12-30

## 2024-12-30 RX ORDER — DOXYCYCLINE HYCLATE 100 MG
100 TABLET ORAL 2 TIMES DAILY
Qty: 14 TABLET | Refills: 0 | Status: SHIPPED | OUTPATIENT
Start: 2024-12-30 | End: 2025-01-06

## 2024-12-30 RX ORDER — GUAIFENESIN 600 MG/1
600 TABLET, EXTENDED RELEASE ORAL 2 TIMES DAILY
Qty: 30 TABLET | Refills: 0 | Status: SHIPPED | OUTPATIENT
Start: 2024-12-30 | End: 2025-01-14

## 2024-12-30 RX ORDER — ALBUTEROL SULFATE 90 UG/1
2 INHALANT RESPIRATORY (INHALATION) 4 TIMES DAILY PRN
Qty: 54 G | Refills: 1 | Status: SHIPPED | OUTPATIENT
Start: 2024-12-30

## 2024-12-30 RX ADMIN — OXYMETAZOLINE HYDROCHLORIDE 2 SPRAY: 0.5 SPRAY NASAL at 01:08

## 2024-12-30 RX ADMIN — IPRATROPIUM BROMIDE AND ALBUTEROL SULFATE 1 DOSE: .5; 3 SOLUTION RESPIRATORY (INHALATION) at 00:25

## 2024-12-30 ASSESSMENT — ENCOUNTER SYMPTOMS
SORE THROAT: 0
VOMITING: 0
EYE PAIN: 0
SHORTNESS OF BREATH: 1
COUGH: 1
NAUSEA: 0
DIARRHEA: 0
ABDOMINAL PAIN: 0
RHINORRHEA: 1

## 2024-12-30 NOTE — ED NOTES
See triage note.  Pt is alert and oriented x 4, RR even and unlabored, skin is warm and dry. Assesment completed and pt updated on plan of care.       Emergency Department Nursing Plan of Care       The Nursing Plan of Care is developed from the Nursing assessment and Emergency Department Attending provider initial evaluation.  The plan of care may be reviewed in the “ED Provider note”.    The Plan of Care was developed with the following considerations:   Patient / Family readiness to learn indicated by:verbalized understanding  Persons(s) to be included in education: patient  Barriers to Learning/Limitations:None    Signed     Simon Johnson RN    12/30/2024   12:21 AM

## 2024-12-30 NOTE — ED PROVIDER NOTES
hyclate 100 MG tablet  Commonly known as: VIBRA-TABS  Take 1 tablet by mouth 2 times daily for 7 days     guaiFENesin 600 MG extended release tablet  Commonly known as: MUCINEX  Take 1 tablet by mouth 2 times daily for 15 days            CHANGE how you take these medications      * albuterol sulfate  (90 Base) MCG/ACT inhaler  Commonly known as: PROVENTIL;VENTOLIN;PROAIR  What changed: Another medication with the same name was added. Make sure you understand how and when to take each.     * albuterol sulfate  (90 Base) MCG/ACT inhaler  Commonly known as: Ventolin HFA  Inhale 2 puffs into the lungs 4 times daily as needed for Wheezing  What changed: You were already taking a medication with the same name, and this prescription was added. Make sure you understand how and when to take each.           * This list has 2 medication(s) that are the same as other medications prescribed for you. Read the directions carefully, and ask your doctor or other care provider to review them with you.                ASK your doctor about these medications      amitriptyline 50 MG tablet  Commonly known as: ELAVIL     aspirin 81 MG EC tablet     atorvastatin 80 MG tablet  Commonly known as: LIPITOR     diclofenac sodium 1 % Gel  Commonly known as: VOLTAREN  Apply 2 g topically 2 times daily     docusate 100 MG Caps  Commonly known as: COLACE, DULCOLAX  Take 100 mg by mouth 2 times daily     fluticasone 220 MCG/ACT inhaler  Commonly known as: FLOVENT HFA     fluticasone 50 MCG/ACT nasal spray  Commonly known as: FLONASE     gabapentin 300 MG capsule  Commonly known as: NEURONTIN  Take 1 capsule by mouth 3 times daily for 3 days. Max Daily Amount: 900 mg     insulin lispro 100 UNIT/ML Soln injection vial  Commonly known as: HUMALOG,ADMELOG  Inject 0-8 Units into the skin 3 times daily (with meals)     Lantus SoloStar 100 UNIT/ML injection pen  Generic drug: insulin glargine  Inject 48 Units into the skin nightly

## 2024-12-30 NOTE — ED NOTES
Discharge instructions were given to the patient by everton magaña.     The patient left the Emergency Department alert and oriented and in no acute distress with 3 prescriptions. The patient was encouraged to call or return to the ED for worsening issues or problems and was encouraged to schedule a follow up appointment for continuing care.     Ambulation assessment completed before discharge.  Pt left Emergency Department ambulating at baseline with no ortho devices  Ortho device education: none    The patient verbalized understanding of discharge instructions and prescriptions, all questions were answered. The patient has no further concerns at this time.

## 2024-12-30 NOTE — ED TRIAGE NOTES
Pt comes in via EMS reporting cough, congestions, ear fullness, and watery eyes x 3 weeks. Pt is on dialysis, fistula on L, MWF, last dialysis 12/29 full (due to holiday). Pt denies CP.

## 2024-12-31 LAB
EKG ATRIAL RATE: 83 BPM
EKG DIAGNOSIS: NORMAL
EKG P AXIS: 22 DEGREES
EKG P-R INTERVAL: 194 MS
EKG Q-T INTERVAL: 420 MS
EKG QRS DURATION: 78 MS
EKG QTC CALCULATION (BAZETT): 493 MS
EKG R AXIS: 9 DEGREES
EKG T AXIS: 34 DEGREES
EKG VENTRICULAR RATE: 83 BPM

## 2024-12-31 PROCEDURE — 93010 ELECTROCARDIOGRAM REPORT: CPT | Performed by: SPECIALIST

## 2025-02-19 NOTE — FACE TO FACE
Face to Face Order for [address] [place] [facility]            Pt Name  Sofie Aguilar   Date of Birth 1965   Age  54 y.o. Medical Record Number  [id number]   Room Number  736/39   Admit date:  8/7/2021   Date of Face to Face: 8/12/2021     Admission Diagnosis:   Hypoxic respiratory Failure     Diagnoses POA   HFpEF Exacerbation   Hypoxic respiratory failure    Past Medical History:   Diagnosis Date    Arthritis     Asthma     CHF (congestive heart failure) (HCC)     Chronic back pain     Diabetes ([facility] [address]     Diabetic retinopathy ([facility] [address]     Hypertension     MRSA (methicillin resistant staph aureus) culture positive     labial abscess    Neuropathy       Visit Vitals  BP (!) 134/53 (BP 1 Location: Left upper arm, BP Patient Position: Semi fowlers)   Pulse 80   Temp 98.1 °F (36.7 °C)   Resp 18   Ht 5' 5.5\" (1.664 m)   Wt 137.3 kg (302 lb 12.8 oz)   SpO2 98%   BMI 49.62 kg/m²           Allergies   Allergen Reactions    Amoxicillin Nausea Only    Aspirin Other (comments)     \"nosebleeds\" but patient takes daily aspirin 81 mg at home. Patient states naproxen ok    Ciprofloxacin Hives             I certify that the patient needs home health care as prescribed below and I will not be following this patient in the Community.  I also certify that the patient is homebound as evidenced by    - Patient with increased shortness of breath and elevated heart rate with ambulation greater than 20 feet limiting patient's ability to ambulate safely within the community.   - Requires considerable and taxing effort to leave the home    - and also as noted in various sections of this medical record.  Dr. Bessy Mata MD  will be responsible for signing the Plan of Care and will follow/coordinate ongoing care.  Initial Orders for Care:  - physical therapy: strengthening, stretching/ROM, transfer training, gait/stair training, balance training and pt/caregiver education  - occupational therapy:  ADL safety (ie. cooking, bathing, dressing), ROM and pt/caregiver education  - Report to Janice Ch MD     I certify that I am taking care of the patient today (Please see hospital Discharge Records for details of clinical issues pertaining to this order).       ________________________________________________________________________  Rekha Doshi MD Satisfactory

## 2025-02-23 VITALS
DIASTOLIC BLOOD PRESSURE: 62 MMHG | HEART RATE: 81 BPM | OXYGEN SATURATION: 100 % | RESPIRATION RATE: 18 BRPM | SYSTOLIC BLOOD PRESSURE: 183 MMHG | TEMPERATURE: 97.7 F

## 2025-02-23 PROCEDURE — 99285 EMERGENCY DEPT VISIT HI MDM: CPT

## 2025-02-23 ASSESSMENT — PAIN SCALES - GENERAL: PAINLEVEL_OUTOF10: 5

## 2025-02-24 ENCOUNTER — HOSPITAL ENCOUNTER (EMERGENCY)
Facility: HOSPITAL | Age: 60
Discharge: HOME | End: 2025-02-24
Attending: EMERGENCY MEDICINE
Payer: MEDICAID

## 2025-02-24 ENCOUNTER — APPOINTMENT (OUTPATIENT)
Facility: HOSPITAL | Age: 60
End: 2025-02-24
Payer: MEDICAID

## 2025-02-24 DIAGNOSIS — I87.1 CHRONIC SUPERIOR VENA CAVA OCCLUSION: ICD-10-CM

## 2025-02-24 DIAGNOSIS — R10.9 ABDOMINAL PAIN, UNSPECIFIED ABDOMINAL LOCATION: ICD-10-CM

## 2025-02-24 DIAGNOSIS — K59.00 CONSTIPATION, UNSPECIFIED CONSTIPATION TYPE: Primary | ICD-10-CM

## 2025-02-24 LAB
ALBUMIN SERPL-MCNC: 3.4 G/DL (ref 3.5–5)
ALBUMIN/GLOB SERPL: 0.8 (ref 1.1–2.2)
ALP SERPL-CCNC: 156 U/L (ref 45–117)
ALT SERPL-CCNC: 17 U/L (ref 12–78)
ANION GAP SERPL CALC-SCNC: 8 MMOL/L (ref 2–12)
AST SERPL-CCNC: 17 U/L (ref 15–37)
BASOPHILS # BLD: 0.02 K/UL (ref 0–0.1)
BASOPHILS NFR BLD: 0.3 % (ref 0–1)
BILIRUB SERPL-MCNC: 0.7 MG/DL (ref 0.2–1)
BUN SERPL-MCNC: 48 MG/DL (ref 6–20)
BUN/CREAT SERPL: 6 (ref 12–20)
CALCIUM SERPL-MCNC: 7.9 MG/DL (ref 8.5–10.1)
CHLORIDE SERPL-SCNC: 98 MMOL/L (ref 97–108)
CO2 SERPL-SCNC: 30 MMOL/L (ref 21–32)
CREAT SERPL-MCNC: 7.52 MG/DL (ref 0.55–1.02)
DIFFERENTIAL METHOD BLD: ABNORMAL
EOSINOPHIL # BLD: 0.13 K/UL (ref 0–0.4)
EOSINOPHIL NFR BLD: 2.1 % (ref 0–7)
ERYTHROCYTE [DISTWIDTH] IN BLOOD BY AUTOMATED COUNT: 14.9 % (ref 11.5–14.5)
GLOBULIN SER CALC-MCNC: 4.2 G/DL (ref 2–4)
GLUCOSE SERPL-MCNC: 167 MG/DL (ref 65–100)
HCG SERPL QL: NEGATIVE
HCT VFR BLD AUTO: 34 % (ref 35–47)
HGB BLD-MCNC: 11 G/DL (ref 11.5–16)
IMM GRANULOCYTES # BLD AUTO: 0.03 K/UL (ref 0–0.04)
IMM GRANULOCYTES NFR BLD AUTO: 0.5 % (ref 0–0.5)
LIPASE SERPL-CCNC: 74 U/L (ref 13–75)
LYMPHOCYTES # BLD: 1.01 K/UL (ref 0.8–3.5)
LYMPHOCYTES NFR BLD: 16.4 % (ref 12–49)
MCH RBC QN AUTO: 29.9 PG (ref 26–34)
MCHC RBC AUTO-ENTMCNC: 32.4 G/DL (ref 30–36.5)
MCV RBC AUTO: 92.4 FL (ref 80–99)
MONOCYTES # BLD: 0.48 K/UL (ref 0–1)
MONOCYTES NFR BLD: 7.8 % (ref 5–13)
NEUTS SEG # BLD: 4.48 K/UL (ref 1.8–8)
NEUTS SEG NFR BLD: 72.9 % (ref 32–75)
NRBC # BLD: 0 K/UL (ref 0–0.01)
NRBC BLD-RTO: 0 PER 100 WBC
PLATELET # BLD AUTO: 174 K/UL (ref 150–400)
PMV BLD AUTO: 9.6 FL (ref 8.9–12.9)
POTASSIUM SERPL-SCNC: 3.4 MMOL/L (ref 3.5–5.1)
PROT SERPL-MCNC: 7.6 G/DL (ref 6.4–8.2)
RBC # BLD AUTO: 3.68 M/UL (ref 3.8–5.2)
SODIUM SERPL-SCNC: 136 MMOL/L (ref 136–145)
WBC # BLD AUTO: 6.2 K/UL (ref 3.6–11)

## 2025-02-24 PROCEDURE — 6360000004 HC RX CONTRAST MEDICATION: Performed by: EMERGENCY MEDICINE

## 2025-02-24 PROCEDURE — 74177 CT ABD & PELVIS W/CONTRAST: CPT

## 2025-02-24 PROCEDURE — 84703 CHORIONIC GONADOTROPIN ASSAY: CPT

## 2025-02-24 PROCEDURE — 85025 COMPLETE CBC W/AUTO DIFF WBC: CPT

## 2025-02-24 PROCEDURE — 36415 COLL VENOUS BLD VENIPUNCTURE: CPT

## 2025-02-24 PROCEDURE — 83690 ASSAY OF LIPASE: CPT

## 2025-02-24 PROCEDURE — 80053 COMPREHEN METABOLIC PANEL: CPT

## 2025-02-24 RX ORDER — LACTULOSE 10 G/15ML
20 SOLUTION ORAL DAILY PRN
Qty: 90 ML | Refills: 0 | Status: SHIPPED | OUTPATIENT
Start: 2025-02-24

## 2025-02-24 RX ORDER — POLYETHYLENE GLYCOL 3350 17 G/17G
17 POWDER, FOR SOLUTION ORAL DAILY
Qty: 30 PACKET | Refills: 0 | Status: SHIPPED | OUTPATIENT
Start: 2025-02-24 | End: 2025-03-26

## 2025-02-24 RX ORDER — IOPAMIDOL 755 MG/ML
100 INJECTION, SOLUTION INTRAVASCULAR
Status: COMPLETED | OUTPATIENT
Start: 2025-02-24 | End: 2025-02-24

## 2025-02-24 RX ADMIN — IOPAMIDOL 100 ML: 755 INJECTION, SOLUTION INTRAVENOUS at 03:21

## 2025-02-24 NOTE — DISCHARGE INSTRUCTIONS
----- Message from Omar Akhtar PA-C sent at 6/30/2023  9:47 AM EDT -----  Please notify parent that Brielle's throat culture was negative for strep  You were seen in the emergency department for your symptoms.  Please go to your dialysis this morning.  Please use MiraLAX to help keep stool soft as well as lactulose to help you move your bowels.    Please follow-up with your primary care doctor and vascular doctor for the chronic vena cava occlusion.    Return for new or worse symptoms anytime.

## 2025-02-24 NOTE — ED PROVIDER NOTES
[facility] EMERGENCY DEPARTMENT  EMERGENCY DEPARTMENT ENCOUNTER       Pt Name: Candida Sherman  MRN: [id number]  Birthdate 1965  Date of evaluation: 2/23/2025  Provider: Daniel Valladares MD   PCP: No primary care provider on file.  Note Started: 3:03 AM EST 2/24/25     CHIEF COMPLAINT       Chief Complaint   Patient presents with    Constipation     Pt arrives w cc of constipation. Last BM was Wednesday. Pt took stool softeners w no improvement. Denies nausea/vomiting. Pt is HD patient, last dialysis on Wednesday, missed Friday due to abd pain.         HISTORY OF PRESENT ILLNESS: 1 or more elements      History From: patient, History limited by: none     Candida Sherman is a 59 y.o. female with history of ESRD on Monday/Wednesday/Friday dialysis, diabetes, prior CVA who presents emerged part with a chief plaint of abdominal pain.    Patient reports symptoms began Wednesday.  Patient reports she had a bowel movement was able to pass a small amount of stool.  Patient reports continues to endorse difficulty having a bowel movement despite taking Colace.  Denies any nausea nor vomiting but does report having a \"upset stomach.\"  Denies diarrhea.  Is passing flatus.  Denies chest pain or shortness of breath.       Please See MDM for Additional Details of the HPI/PMH  Nursing Notes were all reviewed and agreed with or any disagreements were addressed in the HPI.     REVIEW OF SYSTEMS        Positives and Pertinent negatives as per HPI.    PAST HISTORY     Past Medical History:  Past Medical History:   Diagnosis Date    Arthritis     Asthma     Cancer (HCC)     CHF (congestive heart failure) (HCC) 2020    Chronic back pain     Chronic kidney disease     Diabetes (HCC)     Diabetic retinopathy (HCC)     Hypertension     Kidney failure 01/2022    Liver disease     MRSA (methicillin resistant staph aureus) culture positive     labial abscess, negative test 2020    Neuropathy     Sleep apnea     Appointment made but not

## 2025-04-01 ENCOUNTER — HOSPITAL ENCOUNTER (EMERGENCY)
Facility: HOSPITAL | Age: 60
Discharge: HOME | End: 2025-04-01
Payer: MEDICAID

## 2025-04-01 ENCOUNTER — APPOINTMENT (OUTPATIENT)
Facility: HOSPITAL | Age: 60
End: 2025-04-01
Payer: MEDICAID

## 2025-04-01 VITALS
HEART RATE: 88 BPM | SYSTOLIC BLOOD PRESSURE: 175 MMHG | RESPIRATION RATE: 20 BRPM | WEIGHT: 229.5 LBS | BODY MASS INDEX: 38.24 KG/M2 | HEIGHT: 65 IN | OXYGEN SATURATION: 99 % | TEMPERATURE: 98.6 F | DIASTOLIC BLOOD PRESSURE: 75 MMHG

## 2025-04-01 DIAGNOSIS — U07.1 COVID-19: Primary | ICD-10-CM

## 2025-04-01 LAB
FLUAV RNA SPEC QL NAA+PROBE: NOT DETECTED
FLUBV RNA SPEC QL NAA+PROBE: NOT DETECTED
SARS-COV-2 RNA RESP QL NAA+PROBE: DETECTED
SOURCE: ABNORMAL

## 2025-04-01 PROCEDURE — 99284 EMERGENCY DEPT VISIT MOD MDM: CPT

## 2025-04-01 PROCEDURE — 87636 SARSCOV2 & INF A&B AMP PRB: CPT

## 2025-04-01 PROCEDURE — 71045 X-RAY EXAM CHEST 1 VIEW: CPT

## 2025-04-01 RX ORDER — GUAIFENESIN 600 MG/1
600 TABLET, EXTENDED RELEASE ORAL 2 TIMES DAILY
Qty: 14 TABLET | Refills: 0 | Status: SHIPPED | OUTPATIENT
Start: 2025-04-01 | End: 2025-04-08

## 2025-04-01 ASSESSMENT — PAIN DESCRIPTION - ORIENTATION: ORIENTATION: MID

## 2025-04-01 ASSESSMENT — PAIN DESCRIPTION - LOCATION: LOCATION: HEAD

## 2025-04-01 ASSESSMENT — PAIN SCALES - GENERAL: PAINLEVEL_OUTOF10: 8

## 2025-04-01 NOTE — ED PROVIDER NOTES
[facility] EMERGENCY DEPARTMENT  EMERGENCY DEPARTMENT ENCOUNTER    Patient Name: Candida Sherman  MRN: [id number]  YOB: 1965  Provider: Bharath Ruiz MD  PCP: No primary care provider on file.  Time/Date of evaluation:  4/1/25    History of Presenting Illness     Chief Complaint   Patient presents with    Headache     BIBEMS from home reporting worsening sinus infection over the last few months; also reports cough       HISTORY (Narrative):   Candida Sherman is a 59 y.o. female presents to the Emergency Department with a chief complaint of cough and sinus pressure ongoing for 4 months. The patient reports experiencing a headache, describing it as \"killing me.\" The cough is described as \"terrible.\"  Patient denies chest pain, shortness of breath, numbness, tingling, difficulty ambulating, fever, chills, abdominal pain, nausea, vomiting, diarrhea, and other associated symptoms.    Medical History  - COVID-19 infection, current    Family History  - Sister: Living, no specific medical conditions mentioned  - Son: Living, no specific medical conditions mentioned    Social History  - Living Situation: Lives with sister and son    Review of Systems  - HEENT: Positive for sinus pressure, headache  - Respiratory: Positive for cough      Nursing Notes were all reviewed and agreed with or any disagreements were addressed in the HPI.    Past History     PAST MEDICAL HISTORY:  Past Medical History:   Diagnosis Date    Arthritis     Asthma     Cancer (HCC)     CHF (congestive heart failure) (HCC) 2020    Chronic back pain     Chronic kidney disease     Diabetes (HCC)     Diabetic retinopathy (HCC)     Hypertension     Kidney failure 01/2022    Liver disease     MRSA (methicillin resistant staph aureus) culture positive     labial abscess, negative test 2020    Neuropathy     Sleep apnea     Appointment made but not attended    Stroke (HCC) 2018       PAST SURGICAL HISTORY:  Past Surgical History:   Procedure

## 2025-04-01 NOTE — DISCHARGE INSTRUCTIONS
Thank You!    It was a pleasure taking care of you in our Emergency Department today. We know that when you come to our Emergency Department, you are entrusting us with your health, comfort, and safety. Our physicians and nurses honor that trust, and truly appreciate the opportunity to care for you and your loved ones.      We also value your feedback. If you receive a survey about your Emergency Department experience today, please fill it out.  We care about our patients' feedback, and we listen to what you have to say.  Thank you.    Bharath Ruiz MD  ________________________________________________________________________  I have included a copy of your lab results and/or radiologic studies from today's visit so you can have them easily available at your follow-up visit. We hope you feel better and please do not hesitate to contact the ED if you have any questions at all!    Recent Results (from the past 12 hours)   COVID-19 & Influenza Combo    Collection Time: 04/01/25  5:11 AM    Specimen: Nasopharyngeal   Result Value Ref Range    Source Nasopharyngeal      SARS-CoV-2, PCR Detected (A) NOTD      Rapid Influenza A By PCR Not detected NOTD      Rapid Influenza B By PCR Not detected NOTD       XR CHEST PORTABLE   Final Result      Mild pulmonary edema.         Electronically signed by Akin Lopez          The exam and treatment you received in the Emergency Department were for an urgent problem and are not intended as complete care. It is important that you follow up with a doctor, nurse practitioner, or physician assistant for ongoing care. If your symptoms become worse or you do not improve as expected and you are unable to reach your usual health care provider, you should return to the Emergency Department. We are available 24 hours a day.    Please take your discharge instructions with you when you go to your follow-up appointment.     If a prescription has been provided, please have it filled as

## 2025-04-05 ENCOUNTER — HOSPITAL ENCOUNTER (EMERGENCY)
Facility: HOSPITAL | Age: 60
Discharge: HOME | End: 2025-04-05
Attending: STUDENT IN AN ORGANIZED HEALTH CARE EDUCATION/TRAINING PROGRAM
Payer: MEDICAID

## 2025-04-05 ENCOUNTER — APPOINTMENT (OUTPATIENT)
Facility: HOSPITAL | Age: 60
End: 2025-04-05
Payer: MEDICAID

## 2025-04-05 VITALS
RESPIRATION RATE: 17 BRPM | TEMPERATURE: 97.8 F | HEART RATE: 68 BPM | OXYGEN SATURATION: 96 % | DIASTOLIC BLOOD PRESSURE: 58 MMHG | SYSTOLIC BLOOD PRESSURE: 156 MMHG

## 2025-04-05 DIAGNOSIS — U07.1 COVID: ICD-10-CM

## 2025-04-05 DIAGNOSIS — N18.6 ESRD (END STAGE RENAL DISEASE) (HCC): Primary | ICD-10-CM

## 2025-04-05 LAB
ALBUMIN SERPL-MCNC: 3.2 G/DL (ref 3.5–5)
ALBUMIN/GLOB SERPL: 0.8 (ref 1.1–2.2)
ALP SERPL-CCNC: 136 U/L (ref 45–117)
ALT SERPL-CCNC: 20 U/L (ref 12–78)
ANION GAP SERPL CALC-SCNC: 10 MMOL/L (ref 2–12)
AST SERPL-CCNC: 26 U/L (ref 15–37)
BASOPHILS # BLD: 0.01 K/UL (ref 0–0.1)
BASOPHILS NFR BLD: 0.2 % (ref 0–1)
BILIRUB SERPL-MCNC: 0.5 MG/DL (ref 0.2–1)
BUN SERPL-MCNC: 63 MG/DL (ref 6–20)
BUN/CREAT SERPL: 7 (ref 12–20)
CALCIUM SERPL-MCNC: 7.5 MG/DL (ref 8.5–10.1)
CHLORIDE SERPL-SCNC: 100 MMOL/L (ref 97–108)
CO2 SERPL-SCNC: 23 MMOL/L (ref 21–32)
CREAT SERPL-MCNC: 9.13 MG/DL (ref 0.55–1.02)
DIFFERENTIAL METHOD BLD: ABNORMAL
EKG ATRIAL RATE: 70 BPM
EKG DIAGNOSIS: NORMAL
EKG P AXIS: 44 DEGREES
EKG P-R INTERVAL: 206 MS
EKG Q-T INTERVAL: 430 MS
EKG QRS DURATION: 80 MS
EKG QTC CALCULATION (BAZETT): 464 MS
EKG R AXIS: 61 DEGREES
EKG T AXIS: 28 DEGREES
EKG VENTRICULAR RATE: 70 BPM
EOSINOPHIL # BLD: 0.06 K/UL (ref 0–0.4)
EOSINOPHIL NFR BLD: 1.5 % (ref 0–7)
ERYTHROCYTE [DISTWIDTH] IN BLOOD BY AUTOMATED COUNT: 14.9 % (ref 11.5–14.5)
GLOBULIN SER CALC-MCNC: 4 G/DL (ref 2–4)
GLUCOSE SERPL-MCNC: 185 MG/DL (ref 65–100)
HBV SURFACE AB SER QL: REACTIVE
HBV SURFACE AB SER-ACNC: >1000 MIU/ML
HBV SURFACE AG SER QL: <0.1 INDEX
HBV SURFACE AG SER QL: NEGATIVE
HCT VFR BLD AUTO: 33.6 % (ref 35–47)
HGB BLD-MCNC: 11 G/DL (ref 11.5–16)
IMM GRANULOCYTES # BLD AUTO: 0.01 K/UL (ref 0–0.04)
IMM GRANULOCYTES NFR BLD AUTO: 0.2 % (ref 0–0.5)
LYMPHOCYTES # BLD: 0.88 K/UL (ref 0.8–3.5)
LYMPHOCYTES NFR BLD: 21.7 % (ref 12–49)
MCH RBC QN AUTO: 28.8 PG (ref 26–34)
MCHC RBC AUTO-ENTMCNC: 32.7 G/DL (ref 30–36.5)
MCV RBC AUTO: 88 FL (ref 80–99)
MONOCYTES # BLD: 0.28 K/UL (ref 0–1)
MONOCYTES NFR BLD: 6.9 % (ref 5–13)
NEUTS SEG # BLD: 2.81 K/UL (ref 1.8–8)
NEUTS SEG NFR BLD: 69.5 % (ref 32–75)
NRBC # BLD: 0 K/UL (ref 0–0.01)
NRBC BLD-RTO: 0 PER 100 WBC
PHOSPHATE SERPL-MCNC: 6.7 MG/DL (ref 2.6–4.7)
PLATELET # BLD AUTO: 103 K/UL (ref 150–400)
PMV BLD AUTO: 9.6 FL (ref 8.9–12.9)
POTASSIUM SERPL-SCNC: 3.5 MMOL/L (ref 3.5–5.1)
PROT SERPL-MCNC: 7.2 G/DL (ref 6.4–8.2)
RBC # BLD AUTO: 3.82 M/UL (ref 3.8–5.2)
SODIUM SERPL-SCNC: 133 MMOL/L (ref 136–145)
WBC # BLD AUTO: 4.1 K/UL (ref 3.6–11)

## 2025-04-05 PROCEDURE — 87340 HEPATITIS B SURFACE AG IA: CPT

## 2025-04-05 PROCEDURE — 71045 X-RAY EXAM CHEST 1 VIEW: CPT

## 2025-04-05 PROCEDURE — 6370000000 HC RX 637 (ALT 250 FOR IP): Performed by: EMERGENCY MEDICINE

## 2025-04-05 PROCEDURE — 90935 HEMODIALYSIS ONE EVALUATION: CPT

## 2025-04-05 PROCEDURE — 84100 ASSAY OF PHOSPHORUS: CPT

## 2025-04-05 PROCEDURE — 99285 EMERGENCY DEPT VISIT HI MDM: CPT

## 2025-04-05 PROCEDURE — 80053 COMPREHEN METABOLIC PANEL: CPT

## 2025-04-05 PROCEDURE — 85025 COMPLETE CBC W/AUTO DIFF WBC: CPT

## 2025-04-05 PROCEDURE — 6370000000 HC RX 637 (ALT 250 FOR IP): Performed by: STUDENT IN AN ORGANIZED HEALTH CARE EDUCATION/TRAINING PROGRAM

## 2025-04-05 PROCEDURE — 93005 ELECTROCARDIOGRAM TRACING: CPT | Performed by: STUDENT IN AN ORGANIZED HEALTH CARE EDUCATION/TRAINING PROGRAM

## 2025-04-05 PROCEDURE — 36415 COLL VENOUS BLD VENIPUNCTURE: CPT

## 2025-04-05 PROCEDURE — 86706 HEP B SURFACE ANTIBODY: CPT

## 2025-04-05 RX ORDER — GUAIFENESIN 600 MG/1
600 TABLET, EXTENDED RELEASE ORAL
Status: COMPLETED | OUTPATIENT
Start: 2025-04-05 | End: 2025-04-05

## 2025-04-05 RX ORDER — ACETAMINOPHEN 500 MG
1000 TABLET ORAL
Status: COMPLETED | OUTPATIENT
Start: 2025-04-05 | End: 2025-04-05

## 2025-04-05 RX ADMIN — GUAIFENESIN 600 MG: 600 TABLET, EXTENDED RELEASE ORAL at 13:12

## 2025-04-05 RX ADMIN — ACETAMINOPHEN 1000 MG: 500 TABLET, FILM COATED ORAL at 17:44

## 2025-04-05 NOTE — ED PROVIDER NOTES
EMERGENCY DEPARTMENT HISTORY AND PHYSICAL EXAM      Date: 4/5/2025  Patient Name: Candida Sherman    History of Presenting Illness     Chief Complaint   Patient presents with    Missed Dialysis         HPI: History From: patient, History limited by: none  Candida Sherman, 59 y.o. female presents to the ED with cc of missed dialysis.  She has a history of ESRD, usually goes to dialysis Monday, Wednesday and Friday.  She was diagnosed with COVID on Tuesday, and states that her transport will not take her to dialysis in the setting of this.  Her last dialysis was on Monday.  She reports ongoing cough, mostly nonproductive as well as congestion.  No fever, no chest pain.  She reports shortness of breath for the past 4 months, no recent change in this.  She also reports baseline edema of her legs without acute change.  She has had diarrhea without vomiting.          There are no other complaints, changes, or physical findings at this time.    PCP: No primary care provider on file.    No current facility-administered medications on file prior to encounter.     Current Outpatient Medications on File Prior to Encounter   Medication Sig Dispense Refill    guaiFENesin (MUCINEX) 600 MG extended release tablet Take 1 tablet by mouth 2 times daily for 7 days 14 tablet 0    lactulose 20 GM/30ML SOLN Take 30 mLs by mouth daily as needed (constipation) 90 mL 0    albuterol sulfate HFA (VENTOLIN HFA) 108 (90 Base) MCG/ACT inhaler Inhale 2 puffs into the lungs 4 times daily as needed for Wheezing 54 g 1    gabapentin (NEURONTIN) 300 MG capsule Take 1 capsule by mouth 3 times daily for 3 days. Max Daily Amount: 900 mg 9 capsule 0    insulin lispro (HUMALOG,ADMELOG) 100 UNIT/ML SOLN injection vial Inject 0-8 Units into the skin 3 times daily (with meals) 3 mL 0    diclofenac sodium (VOLTAREN) 1 % GEL Apply 2 g topically 2 times daily 2 g 0    docusate sodium (COLACE, DULCOLAX) 100 MG CAPS Take 100 mg by mouth 2 times daily 60 capsule 0

## 2025-04-05 NOTE — CONSULTS
Nephrology Consult Note      Consult requested by: Lucila More MD    ADMIT DATE: 4/5/2025  CONSULT DATE: April 5, 2025               Admission diagnosis: <principal problem not specified>     Reason for Nephrology Consultation: Management of ESRD      Assessment & Plan     1. ESRD.  [facility] admitted Missed last 2 sessions of HD due to covid  Will dialyze today and resume HD MWF next week   Midodrine with dialysis treatment as she has a history of intradialytic hypotension    2. Anemia of ESRD.  -Hemoglobin is 11, will hold off on SAHRA for now.    3. Secondary hyperparathyroidism of ESRD.    Resume renal diet and oral phosphate binders.  Phos levels tomorrow morning labs    4.DM-2 - home meds resumed           HPI: Candida Sherman is a 59 y.o. female White (non-) with a known history of ESRD for which she receives HD [facility] unit came to the ER for evaluation of cough and sinus pressure like symptoms which started 4 months ago.  She mentioned that she could not go to her last 2 dialysis treatments because of the symptoms.  In the ER she tested positive for COVID-19 , chest x-ray was negative for any acute pulmonary infiltrates.       Past Medical History:   Diagnosis Date    Arthritis     Asthma     Cancer (HCC)     CHF (congestive heart failure) ([facility]) 2020    Chronic back pain     Chronic kidney disease     Diabetes (HCC)     Diabetic retinopathy (HCC)     Hypertension     Kidney failure 01/2022    Liver disease     MRSA (methicillin resistant staph aureus) culture positive     labial abscess, negative test 2020    Neuropathy     Sleep apnea     Appointment made but not attended    Stroke ([facility]) 2018      Past Surgical History:   Procedure Laterality Date    CARPAL TUNNEL RELEASE Right 1986    CT BIOPSY RENAL  1/10/2022    CT BIOPSY RENAL 1/10/2022 [facility] RAD CT    FRACTURE SURGERY Right     HUMERUS    HEENT      tonsillectomy    ORTHOPEDIC  ankle swelling, no joint paints. No muscle aches. No skin changes.   No dizziness or lightheadedness. No headaches.       Physical Assessment:     Vitals:    04/05/25 0841   BP: (!) 182/77   Pulse: 75   Resp: 18   Temp: 98 °F (36.7 °C)   TempSrc: Axillary   SpO2: 98%     [unfilled]  Admission weight:      No intake or output data in the 24 hours ending 04/05/25 1057    Patient is in no apparent distress.   HEENT: Head is normocephalic and atraumatic. Pupils are round, equal, reactive to light. Sclerae are anicteric. Oropharynx clear.   Neck: no cervical lymphadenopathy or thyromegaly.   Lungs: good air entry, clear to auscultation bilaterally. Trachea at the midline.   Cardiovascular system: S1, S2, regular rate and rhythm. No murmurs, gallops or rubs. No jvd.   Abdomen: soft, non tender, non distended. Positive bowel sounds. No hepatosplenomegaly. No abdominal bruits.   Extremities: no clubbing, cyanosis or edema.   Integumentary: skin is grossly intact.   Neurologic: Alert, oriented time three. Cooperative and appropriate. No gross motor or sensory deficits.     Dialysis access: {[facility] DIALYSIS ACCESS:[id number]} site {[facility] DIALYSIS SITE LOCATION:[id number]}.    AV Graft/Fistula: {[facility] AV GRAFT/FISTULA:37991925}    PD Catheter: {[facility] PD CATHETER:15729563}    Data Review:    Labs: Results:       Chemistry Recent Labs     04/05/25  0854   *   K 3.5      CO2 23   BUN 63*   GLOB 4.0         CBC w/Diff Recent Labs     04/05/25  0854   WBC 4.1   RBC 3.82   HGB 11.0*   HCT 33.6*   *         Iron/Ferritin No results for input(s): \"IRON\" in the last 72 hours.    Invalid input(s): \"TIBCCALC\"   PTH/VIT D No results for input(s): \"PTH\" in the last 72 hours.    Invalid input(s): \"VITD\"

## 2025-04-05 NOTE — FLOWSHEET NOTE
Primary RN SBAR: Andreas May RN  Incapacitated Nurse [facility]. provided: Yes  Patient Education provided: HD treatment procedure  Preferred Education method and Primary language: English/Verbal  Hospital General Consent Verified: Yes  Hospital associated wait time; reason: NA    Hepatitis B Surface Ag   Date/Time Value Ref Range Status   04/05/2025 10:11 AM <0.10 Index Final     Hep B S Ag Interp   Date/Time Value Ref Range Status   04/05/2025 10:11 AM Negative NEG   Final     Hep B S Ab   Date/Time Value Ref Range Status   04/05/2025 10:11 AM >1000.00 mIU/mL Final     Hep B S Ab Interp   Date/Time Value Ref Range Status   04/05/2025 10:11 AM REACTIVE (A) NR   Final        16:30- Patient's Hepatitis B antibody result came in. But Hepatitis B Surface antigen still pending.     Add.  18:39: Hep B S Ag result came in. Negative 4/5/2025    Hep B S Ab- Reactive >1000 (Immune) 4/5/2025 04/05/25 1240   Vital Signs   BP (!) 189/69   Temp 97.7 °F (36.5 °C)   Pulse 79   Respirations 20   Pain Assessment   Pain Assessment None - Denies Pain   Treatment   Time On 1256   Treatment Goal 2.5L/3.5hrs   Observations & Evaluations   Level of Consciousness 0   Oriented X x4   Heart Rhythm Regular   Respiratory Quality/Effort Unlabored   O2 Device None (Room air)   Skin Condition/Temp Dry;Warm   Abdomen Inspection Obese;Soft   Edema Generalized   Edema Generalized Trace   RLE Edema Trace   LLE Edema Trace   Technical Checks   Dialysis Machine No. 07   RO Machine Number [id number]   Dialyzer Lot No. [id number]   Tubing Lot Number [id number]   All Connections Secure Yes   NS Bag Yes   Saline Line Double Clamped Yes   Dialyzer Nipro ELISIO   Prime Volume (mL) 250 mL   ICEBOAT I;C;E;B;O;A;T   RO Machine Log Sheet Completed Yes   Machine Alarm Self Test Completed;Passed   Air Foam Detector Tested;Proper Function;pH Reading   Extracorporeal Circuit Tested for Integrity Yes   Machine Conductivity 13.7   Manual Ph 7.4   Bleach Test (Neg) Yes

## 2025-04-05 NOTE — ED TRIAGE NOTES
Dx with COVID on Tuesday, missed dialysis on Wed and Fri. Last dialysis on Monday. Wants dialysis.

## 2025-05-04 ENCOUNTER — HOSPITAL ENCOUNTER (EMERGENCY)
Facility: HOSPITAL | Age: 60
Discharge: HOME | End: 2025-05-04
Attending: EMERGENCY MEDICINE
Payer: MEDICAID

## 2025-05-04 VITALS
SYSTOLIC BLOOD PRESSURE: 180 MMHG | OXYGEN SATURATION: 97 % | WEIGHT: 215 LBS | RESPIRATION RATE: 16 BRPM | DIASTOLIC BLOOD PRESSURE: 58 MMHG | HEART RATE: 82 BPM | BODY MASS INDEX: 35.78 KG/M2 | TEMPERATURE: 97.9 F

## 2025-05-04 DIAGNOSIS — G62.9 NEUROPATHY: ICD-10-CM

## 2025-05-04 DIAGNOSIS — H61.23 BILATERAL IMPACTED CERUMEN: Primary | ICD-10-CM

## 2025-05-04 PROCEDURE — 6370000000 HC RX 637 (ALT 250 FOR IP): Performed by: EMERGENCY MEDICINE

## 2025-05-04 PROCEDURE — 99283 EMERGENCY DEPT VISIT LOW MDM: CPT

## 2025-05-04 PROCEDURE — 69210 REMOVE IMPACTED EAR WAX UNI: CPT

## 2025-05-04 RX ORDER — GABAPENTIN 100 MG/1
100 CAPSULE ORAL
Status: COMPLETED | OUTPATIENT
Start: 2025-05-04 | End: 2025-05-04

## 2025-05-04 RX ORDER — FLUTICASONE PROPIONATE 50 MCG
1 SPRAY, SUSPENSION (ML) NASAL DAILY
Qty: 16 G | Refills: 0 | Status: SHIPPED | OUTPATIENT
Start: 2025-05-04

## 2025-05-04 RX ADMIN — GABAPENTIN 100 MG: 100 CAPSULE ORAL at 17:43

## 2025-05-04 ASSESSMENT — PAIN SCALES - GENERAL: PAINLEVEL_OUTOF10: 7

## 2025-05-04 ASSESSMENT — PAIN DESCRIPTION - ORIENTATION: ORIENTATION: RIGHT;LEFT

## 2025-05-04 ASSESSMENT — PAIN - FUNCTIONAL ASSESSMENT: PAIN_FUNCTIONAL_ASSESSMENT: 0-10

## 2025-05-04 ASSESSMENT — PAIN DESCRIPTION - LOCATION: LOCATION: EAR

## 2025-05-04 NOTE — DISCHARGE INSTRUCTIONS
You were seen in the emergency department for your symptoms.  Please follow-up with one of the primary care doctor and ENT doctor.  Please return for new or worsening symptoms anytime.    Local Primary Care Physicians  [facility] [phone]  MD Avery Crow, MD Leonel Coyle, MD Alberta Laws, [facility]  Ale Santillan, [facility]  Missy Fontaine, [facility] [phone]  MD Megan Chadwick, NP  Radha Herrera, [facility]  Diya Veronica, [facility] [phone]  Peace Seay, MD Janes Seay, MD Jennifer Griffith, MD Iqra Bedoya, [facility] [phone]  Dennys Candelaria, MD Izzy Cordoba, MD Caro Perez, MD Ignacio Cade, MD Leonel Mooney, MD Jeri Fraser, [facility] [phone]  Leobardo Louie, MD Leonel Perez, NP  Deja Edwards, NP [facility] [phone]  Demetrius \"Tariq\" MD Amilcar Ingram, MD Michael Gonzalez, MD Yimi Tirado, CNP  Genaro Jan, CNP  Sharon Jiménez, PA-C   [facility] [phone]  Michelle Lay, MD Clarence Garcia, MD Cate Jaramillo, CNP  Marilee Braun, [facility] [phone]  MD Reddy Chi MD Bambi Gladfelter, MD Ken Krause MD Karlie Kellstrom PA-C   [facility] [phone]  MD Mabel León MD Matthew Jones, MD Patricia Ryan, MD Gregory Stephens, MD Mallory Cummings, [facility]  Erin Carter, [facility]  Corinna Thomas, [facility]  Morris Duque, [facility] [phone]  Autumn MD Alvarez Hale, MD Steve Schroeder, MD Andreas Angeles, MD Cate Thorne, MD Brenda Montano, MD Miguelangel Cid, MD Andria Ayala, MD   [facility] [phone]  MD Divya Bryant, CNP  Jolene Garcia, CNP  Fay Daniel, [facility]

## 2025-05-04 NOTE — ED PROVIDER NOTES
05/04/25 1730   BP:  (!) 180/58    Pulse:  79 82   Resp:  18 16   Temp:  97.9 °F (36.6 °C)    SpO2:  100% 97%   Weight: 97.5 kg (215 lb)          Patient was given the following medications:  Medications   gabapentin (NEURONTIN) capsule 100 mg (100 mg Oral Given 5/4/25 1743)       Medical Decision Making  59-year-old female arrives via EMS with bilateral ear pain ongoing for months as well as chronic neuropathy ongoing for years.  Time spent at bedside due to social determinants of health including poor healthcare literacy as well as difficulty obtaining outpatient follow-up.    Patient does have evidence of bilateral cerumen impaction.  These were curetted and we will have nursing irrigate.  Suspect may be a element of eustachian tube dysfunction.  No evidence of otitis media, mastoiditis or otitis externa.  Doubt intracranial pathology.    Discussed with patient, will discharge with nasal spray, ENT follow-up.  Also provided list of PCP doctors as patient will require additional resources to manage her 15 years of ongoing chronic pain secondary to neuropathy.    Risk  Prescription drug management.  Diagnosis or treatment significantly limited by social determinants of health.          CLINICAL MANAGEMENT TOOLS:             FINAL IMPRESSION     1. Bilateral impacted cerumen    2. Neuropathy          DISPOSITION/PLAN   Candida Sherman's  results have been reviewed with her.  She has been counseled regarding her diagnosis, treatment, and plan.  She verbally conveys understanding and agreement of the signs, symptoms, diagnosis, treatment and prognosis and additionally agrees to follow up as discussed.  She also agrees with the care-plan and conveys that all of her questions have been answered.  I have also provided discharge instructions for her that include: educational information regarding their diagnosis and treatment, and list of reasons why they would want to return to the ED prior to their follow-up appointment,

## 2025-05-04 NOTE — ED NOTES
Patient discharged from the ED by VAN Christie. Diagnosis, medications, precautions and follow-ups were reviewed with the patient/family. Questions were asked and answered prior to departure. Patient departed the ED via stretcher and was accompanied by ADRIANA.

## 2025-05-04 NOTE — ED NOTES
Bilateral ears irrigated at this time and a large amount of wax was removed from both ears. Pt states her hearing is much better.

## 2025-07-04 ENCOUNTER — APPOINTMENT (OUTPATIENT)
Facility: HOSPITAL | Age: 60
End: 2025-07-04
Payer: MEDICAID

## 2025-07-04 ENCOUNTER — HOSPITAL ENCOUNTER (EMERGENCY)
Facility: HOSPITAL | Age: 60
Discharge: HOME | End: 2025-07-04
Payer: MEDICAID

## 2025-07-04 VITALS
BODY MASS INDEX: 35.81 KG/M2 | SYSTOLIC BLOOD PRESSURE: 152 MMHG | DIASTOLIC BLOOD PRESSURE: 63 MMHG | OXYGEN SATURATION: 96 % | TEMPERATURE: 98.3 F | HEIGHT: 65 IN | WEIGHT: 214.95 LBS | HEART RATE: 81 BPM | RESPIRATION RATE: 18 BRPM
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
## 2025-07-04 DIAGNOSIS — S39.012A BACK STRAIN, INITIAL ENCOUNTER: ICD-10-CM

## 2025-07-04 PROCEDURE — 6370000000 HC RX 637 (ALT 250 FOR IP): Performed by: PHYSICIAN ASSISTANT

## 2025-07-04 PROCEDURE — 72072 X-RAY EXAM THORAC SPINE 3VWS: CPT

## 2025-07-04 PROCEDURE — 99283 EMERGENCY DEPT VISIT LOW MDM: CPT

## 2025-07-04 RX ORDER — LIDOCAINE 50 MG/G
1 PATCH TOPICAL DAILY PRN
Qty: 10 PATCH | Refills: 0 | Status: SHIPPED | OUTPATIENT
Start: 2025-07-04 | End: 2025-07-14

## 2025-07-04 RX ORDER — TRAMADOL HYDROCHLORIDE 50 MG/1
50 TABLET ORAL EVERY 8 HOURS PRN
Qty: 5 TABLET | Refills: 0 | Status: SHIPPED | OUTPATIENT
Start: 2025-07-04 | End: 2025-07-07

## 2025-07-04 RX ORDER — HYDROCODONE BITARTRATE AND ACETAMINOPHEN 5; 325 MG/1; MG/1
1 TABLET ORAL
Refills: 0 | Status: COMPLETED | OUTPATIENT
Start: 2025-07-04 | End: 2025-07-04

## 2025-07-04 RX ADMIN — HYDROCODONE BITARTRATE AND ACETAMINOPHEN 1 TABLET: 5; 325 TABLET ORAL at 12:56

## 2025-07-04 ASSESSMENT — PAIN SCALES - GENERAL
PAINLEVEL_OUTOF10: 7
PAINLEVEL_OUTOF10: 7

## 2025-07-04 ASSESSMENT — PAIN DESCRIPTION - ORIENTATION: ORIENTATION: LOWER

## 2025-07-04 ASSESSMENT — PAIN DESCRIPTION - DESCRIPTORS: DESCRIPTORS: ACHING

## 2025-07-04 ASSESSMENT — PAIN DESCRIPTION - LOCATION
LOCATION: BACK
LOCATION: BACK

## 2025-07-04 ASSESSMENT — PAIN - FUNCTIONAL ASSESSMENT: PAIN_FUNCTIONAL_ASSESSMENT: 0-10

## 2025-07-04 NOTE — ED NOTES
Pt presents to ED by EMS via stretcher. See triage note. Pt is alert and oriented x 4, RR even and unlabored, skin is warm and dry. Assessment completed and pt updated on plan of care.  Call bell in reach.        Emergency Department Nursing Plan of Care       The Nursing Plan of Care is developed from the Nursing assessment and Emergency Department Attending provider initial evaluation.  The plan of care may be reviewed in the “ED Provider note”.    The Plan of Care was developed with the following considerations:   Patient / Family readiness to learn indicated by:verbalized understanding  Persons(s) to be included in education: patient  Barriers to Learning/Limitations:None    Signed

## 2025-07-04 NOTE — ED NOTES
Discharge instructions were given to the patient by Connie ARAUJO. The patient left the Emergency Department ambulatory, alert and oriented and in no acute distress with 2 prescriptions. The patient was encouraged to call or return to the ED for worsening issues or problems and was encouraged to schedule a follow up appointment for continuing care. The patient verbalized understanding of discharge instructions and prescriptions, all questions were answered. The patient has no further concerns at this time.

## 2025-07-04 NOTE — ED NOTES
Patient has been instructed that they have been given Norco which contains opioids, benzodiazepines, or other sedating drugs. Patient is aware that they will need to refrain from driving or operating heavy machinery after taking this medication. Patient also instructed that they need to avoid drinking alcohol and using other products containing opioids, benzodiazepines, or other sedating drugs. Patient verbalized understanding.

## 2025-07-04 NOTE — ED PROVIDER NOTES
distress.     Appearance: Normal appearance.   HENT:      Head: Normocephalic and atraumatic.   Cardiovascular:      Rate and Rhythm: Normal rate and regular rhythm.      Pulses: Normal pulses.      Heart sounds: Normal heart sounds.   Pulmonary:      Effort: Pulmonary effort is normal. No respiratory distress.      Breath sounds: Normal breath sounds. No stridor. No wheezing or rhonchi.   Musculoskeletal:         General: Normal range of motion.      Thoracic back: Tenderness (at lower T spine/Upper L spine) and bony tenderness present.      Lumbar back: Tenderness present. No bony tenderness.        Back:       Right foot: Swelling present. Normal pulse.      Left foot: Swelling present. Normal pulse.      Comments: No bony step offs appreciated   Neurological:      Mental Status: She is alert and oriented to person, place, and time.   Psychiatric:         Mood and Affect: Mood normal.         Behavior: Behavior normal.         Thought Content: Thought content normal.         Judgment: Judgment normal.              EMERGENCY DEPARTMENT COURSE and DIFFERENTIAL DIAGNOSIS/MDM   Vitals:    Vitals:    07/04/25 1215 07/04/25 1300   BP: (!) 126/49 (!) 152/63   Pulse: 81    Resp: 18    Temp: 98.3 °F (36.8 °C)    TempSrc: Oral    SpO2: 100% 96%   Weight: 97.5 kg (214 lb 15.2 oz)    Height: 1.651 m (5' 5\")         MDM: CC/HPI Summary, DDx, ED Course, and Reassessment, Disposition Considerations (Tests not done, Shared Decision Making, Pt Expectation of Test or Tx.):     Medical Decision Making  ED Sherman is a 59-year-old female with a history of arthritis, dialysis, and previous strokes presenting with lower back pain after a fall. The patient's fall was due to a walker malfunction, and she reports exacerbation of chronic lower back pain. Given her history of dialysis, NSAID use is contraindicated, limiting pain management options. The physical exam revealed pain localized to the lower back without radiation, and the patient  per tablet 1 tablet (1 tablet Oral Given 7/4/25 1253)       CONSULTS: (Who and What was discussed)  None    Chronic Conditions:  has a past medical history of Arthritis, Asthma, Cancer (HCC), CHF (congestive heart failure) (HCC), Chronic back pain, Chronic kidney disease, Diabetes (HCC), Diabetic retinopathy (HCC), Hypertension, Kidney failure, Liver disease, MRSA (methicillin resistant staph aureus) culture positive, Neuropathy, Sleep apnea, and Stroke (HCC).      Social Determinants affecting Dx or Tx: None    Records Reviewed (source and summary of external records): Nursing Notes and Old Medical Records        DIAGNOSTIC RESULTS   LABS:     No results found for this or any previous visit (from the past 24 hours).    RADIOLOGY:  Non-plain film images such as CT, Ultrasound and MRI are read by the radiologist.    Interpretation per the Radiologist below, if available at the time of this note:     XR THORACIC SPINE (3 VIEWS)  Result Date: 7/4/2025  EXAM: XR THORACIC SPINE (3 VIEWS) ACC#: [id number] INDICATION: Fall, pain at lower T spine, hx of arthritis. COMPARISON: 5/16/2024 TECHNIQUE: Frontal, lateral, and swimmer's views of the thoracic spine. FINDINGS: Vertebral body heights are maintained. There are osteophytes arising from the vertebrae. There is diffuse disc height loss.     1.  No acute bony abnormalities. 2.  Multilevel degenerative changes of the spine. Electronically signed by KATELYNN Lincoln       PROCEDURES   Unless otherwise noted below, none  Procedures       FINAL IMPRESSION     1. Fall, initial encounter    2. Back strain, initial encounter          DISPOSITION/PLAN   DISPOSITION Decision To Discharge 07/04/2025 02:07:40 PM   DISPOSITION CONDITION Stable       Care plan outlined and precautions discussed.  Patient has no new complaints, changes, or physical findings.  Results of xrays were reviewed with the patient. All medications were reviewed with the patient; will d/c home with tramadol. All of

## 2025-07-04 NOTE — ED TRIAGE NOTES
Pt brought in by EMS via stretcher d/t fall an hour PTA. Pt reports she was attempting to get into her walker and fell backwards. Pt reports 7/10 lower back pain and endorses chronic bilateral leg pain d/t neuropathy. Pt denies head injuries, no LOC. Pt reports taking Eloquis, last dose this morning. Pt has hx of diabetes, stroke, and is dialysis patient (went to dialysis today).

## 2025-07-22 ENCOUNTER — OFFICE VISIT (OUTPATIENT)
Age: 60
End: 2025-07-22
Payer: MEDICAID

## 2025-07-22 VITALS
HEART RATE: 66 BPM | OXYGEN SATURATION: 99 % | TEMPERATURE: 98.9 F | BODY MASS INDEX: 35.96 KG/M2 | DIASTOLIC BLOOD PRESSURE: 69 MMHG | RESPIRATION RATE: 16 BRPM | SYSTOLIC BLOOD PRESSURE: 126 MMHG | HEIGHT: 65 IN | WEIGHT: 215.8 LBS

## 2025-07-22 DIAGNOSIS — E11.8 DIABETES MELLITUS TYPE 2 WITH COMPLICATIONS (HCC): ICD-10-CM

## 2025-07-22 DIAGNOSIS — J45.20 MILD INTERMITTENT ASTHMA WITHOUT COMPLICATION: ICD-10-CM

## 2025-07-22 DIAGNOSIS — M54.50 CHRONIC BILATERAL LOW BACK PAIN WITHOUT SCIATICA: ICD-10-CM

## 2025-07-22 DIAGNOSIS — Z13.220 LIPID SCREENING: ICD-10-CM

## 2025-07-22 DIAGNOSIS — Z00.01 ENCOUNTER FOR GENERAL ADULT MEDICAL EXAMINATION WITH ABNORMAL FINDINGS: ICD-10-CM

## 2025-07-22 DIAGNOSIS — E83.39 HYPERPHOSPHATEMIA DUE TO CHRONIC KIDNEY DISEASE: ICD-10-CM

## 2025-07-22 DIAGNOSIS — G89.29 CHRONIC BILATERAL LOW BACK PAIN WITHOUT SCIATICA: ICD-10-CM

## 2025-07-22 DIAGNOSIS — N18.6 ESRD ON DIALYSIS (HCC): ICD-10-CM

## 2025-07-22 DIAGNOSIS — Z99.2 ESRD ON DIALYSIS (HCC): ICD-10-CM

## 2025-07-22 DIAGNOSIS — Z00.01 ENCOUNTER FOR GENERAL ADULT MEDICAL EXAMINATION WITH ABNORMAL FINDINGS: Primary | ICD-10-CM

## 2025-07-22 DIAGNOSIS — Z13.89 ENCOUNTER FOR SCREENING FOR OTHER DISORDER: ICD-10-CM

## 2025-07-22 DIAGNOSIS — Z79.899 ENCOUNTER FOR LONG-TERM (CURRENT) USE OF MEDICATIONS: ICD-10-CM

## 2025-07-22 DIAGNOSIS — E11.9 ENCOUNTER FOR DIABETIC FOOT EXAM (HCC): ICD-10-CM

## 2025-07-22 DIAGNOSIS — F41.9 ANXIETY: ICD-10-CM

## 2025-07-22 DIAGNOSIS — Z79.4 INSULIN LONG-TERM USE (HCC): ICD-10-CM

## 2025-07-22 DIAGNOSIS — E66.01 MORBID OBESITY (HCC): ICD-10-CM

## 2025-07-22 DIAGNOSIS — I50.9 CHRONIC HEART FAILURE, UNSPECIFIED HEART FAILURE TYPE (HCC): ICD-10-CM

## 2025-07-22 DIAGNOSIS — Z86.73 HISTORY OF TIA (TRANSIENT ISCHEMIC ATTACK): ICD-10-CM

## 2025-07-22 DIAGNOSIS — E11.42 DIABETIC POLYNEUROPATHY ASSOCIATED WITH TYPE 2 DIABETES MELLITUS (HCC): ICD-10-CM

## 2025-07-22 DIAGNOSIS — Z86.73 HISTORY OF CVA (CEREBROVASCULAR ACCIDENT): ICD-10-CM

## 2025-07-22 DIAGNOSIS — K59.09 CHRONIC CONSTIPATION: ICD-10-CM

## 2025-07-22 DIAGNOSIS — I10 ESSENTIAL HYPERTENSION: ICD-10-CM

## 2025-07-22 DIAGNOSIS — N18.9 HYPERPHOSPHATEMIA DUE TO CHRONIC KIDNEY DISEASE: ICD-10-CM

## 2025-07-22 PROBLEM — J45.909 ASTHMA: Status: ACTIVE | Noted: 2020-03-23

## 2025-07-22 PROBLEM — L03.116 CELLULITIS OF LEFT LOWER EXTREMITY: Status: RESOLVED | Noted: 2023-08-07 | Resolved: 2025-07-22

## 2025-07-22 PROBLEM — M85.80 OSTEOPENIA: Status: ACTIVE | Noted: 2018-07-23

## 2025-07-22 PROBLEM — R73.9 HYPERGLYCEMIA: Status: RESOLVED | Noted: 2023-08-14 | Resolved: 2025-07-22

## 2025-07-22 PROBLEM — R60.0 PERIPHERAL EDEMA: Status: ACTIVE | Noted: 2021-04-14

## 2025-07-22 PROBLEM — A41.9 SEPTICEMIA (HCC): Status: RESOLVED | Noted: 2023-08-11 | Resolved: 2025-07-22

## 2025-07-22 PROBLEM — R55 SYNCOPE AND COLLAPSE: Status: RESOLVED | Noted: 2024-08-10 | Resolved: 2025-07-22

## 2025-07-22 PROBLEM — R42 DIZZINESS: Status: RESOLVED | Noted: 2024-05-17 | Resolved: 2025-07-22

## 2025-07-22 PROBLEM — R41.82 ACUTE ALTERATION IN MENTAL STATUS: Status: RESOLVED | Noted: 2023-08-12 | Resolved: 2025-07-22

## 2025-07-22 PROBLEM — G62.9 POLYNEUROPATHY, UNSPECIFIED: Status: ACTIVE | Noted: 2024-10-31

## 2025-07-22 PROBLEM — M19.90 OSTEOARTHROSIS: Status: ACTIVE | Noted: 2018-03-15

## 2025-07-22 PROBLEM — R80.9 PROTEINURIA: Status: RESOLVED | Noted: 2022-01-10 | Resolved: 2025-07-22

## 2025-07-22 PROBLEM — R60.0 PERIPHERAL EDEMA: Status: RESOLVED | Noted: 2021-04-14 | Resolved: 2025-07-22

## 2025-07-22 PROBLEM — B99.9 INFECTION: Status: RESOLVED | Noted: 2024-05-22 | Resolved: 2025-07-22

## 2025-07-22 PROBLEM — R55 NEAR SYNCOPE: Status: RESOLVED | Noted: 2024-05-16 | Resolved: 2025-07-22

## 2025-07-22 PROBLEM — L03.90 CELLULITIS: Status: RESOLVED | Noted: 2024-04-25 | Resolved: 2025-07-22

## 2025-07-22 PROCEDURE — 99205 OFFICE O/P NEW HI 60 MIN: CPT

## 2025-07-22 PROCEDURE — 3078F DIAST BP <80 MM HG: CPT

## 2025-07-22 PROCEDURE — 3074F SYST BP LT 130 MM HG: CPT

## 2025-07-22 PROCEDURE — G2211 COMPLEX E/M VISIT ADD ON: HCPCS

## 2025-07-22 RX ORDER — BUTALBITAL, ACETAMINOPHEN AND CAFFEINE 50; 325; 40 MG/1; MG/1; MG/1
1 TABLET ORAL EVERY 6 HOURS PRN
Qty: 120 TABLET | Refills: 0 | Status: SHIPPED | OUTPATIENT
Start: 2025-07-22

## 2025-07-22 RX ORDER — POLYETHYLENE GLYCOL 3350 17 G/17G
17 POWDER, FOR SOLUTION ORAL DAILY
COMMUNITY
End: 2025-07-22 | Stop reason: SDUPTHER

## 2025-07-22 RX ORDER — PEN NEEDLE, DIABETIC 32GX 5/32"
1 NEEDLE, DISPOSABLE MISCELLANEOUS 4 TIMES DAILY
Qty: 100 EACH | Refills: 1 | Status: CANCELLED | OUTPATIENT
Start: 2025-07-22

## 2025-07-22 RX ORDER — PEN NEEDLE, DIABETIC 32GX 5/32"
1 NEEDLE, DISPOSABLE MISCELLANEOUS 4 TIMES DAILY PRN
COMMUNITY
Start: 2025-06-27

## 2025-07-22 RX ORDER — ATORVASTATIN CALCIUM 40 MG/1
40 TABLET, FILM COATED ORAL DAILY
Qty: 30 TABLET | Refills: 2 | Status: SHIPPED | OUTPATIENT
Start: 2025-07-22

## 2025-07-22 RX ORDER — BUTALBITAL, ACETAMINOPHEN AND CAFFEINE 50; 325; 40 MG/1; MG/1; MG/1
1 TABLET ORAL EVERY 6 HOURS PRN
COMMUNITY
End: 2025-07-22 | Stop reason: SDUPTHER

## 2025-07-22 RX ORDER — PSEUDOEPHEDRINE HCL 30 MG
1 TABLET ORAL 2 TIMES DAILY
Qty: 60 CAPSULE | Refills: 3 | Status: SHIPPED | OUTPATIENT
Start: 2025-07-22

## 2025-07-22 RX ORDER — GABAPENTIN 300 MG/1
300 CAPSULE ORAL 3 TIMES DAILY
Qty: 90 CAPSULE | Refills: 0 | Status: SHIPPED | OUTPATIENT
Start: 2025-07-22 | End: 2025-08-21

## 2025-07-22 RX ORDER — ACYCLOVIR 400 MG/1
TABLET ORAL
Qty: 9 EACH | Refills: 3 | Status: SHIPPED | OUTPATIENT
Start: 2025-07-22

## 2025-07-22 RX ORDER — ACYCLOVIR 400 MG/1
TABLET ORAL
COMMUNITY

## 2025-07-22 RX ORDER — PSEUDOEPHEDRINE HCL 30 MG
1 TABLET ORAL 2 TIMES DAILY
COMMUNITY
End: 2025-07-22 | Stop reason: SDUPTHER

## 2025-07-22 RX ORDER — INSULIN LISPRO 100 [IU]/ML
0-14 INJECTION, SOLUTION INTRAVENOUS; SUBCUTANEOUS
Qty: 10 ML | Refills: 3 | Status: SHIPPED | OUTPATIENT
Start: 2025-07-22

## 2025-07-22 RX ORDER — FLUTICASONE PROPIONATE 50 MCG
1 SPRAY, SUSPENSION (ML) NASAL DAILY
COMMUNITY
End: 2025-07-22 | Stop reason: SDUPTHER

## 2025-07-22 RX ORDER — ANTACID TABLETS 500 MG/1
1 TABLET, CHEWABLE ORAL 2 TIMES DAILY
COMMUNITY
Start: 2025-06-27

## 2025-07-22 RX ORDER — SEVELAMER CARBONATE 800 MG/1
1 TABLET, FILM COATED ORAL 4 TIMES DAILY
COMMUNITY

## 2025-07-22 RX ORDER — TICAGRELOR 90 MG/1
90 TABLET, FILM COATED ORAL 2 TIMES DAILY
Qty: 60 TABLET | Refills: 2 | Status: SHIPPED | OUTPATIENT
Start: 2025-07-22

## 2025-07-22 RX ORDER — SEVELAMER CARBONATE 800 MG/1
1 TABLET, FILM COATED ORAL 4 TIMES DAILY
Qty: 90 TABLET | Refills: 3 | Status: CANCELLED | OUTPATIENT
Start: 2025-07-22

## 2025-07-22 RX ORDER — AMITRIPTYLINE HYDROCHLORIDE 50 MG/1
25 TABLET ORAL NIGHTLY
COMMUNITY
End: 2025-07-22 | Stop reason: ALTCHOICE

## 2025-07-22 RX ORDER — POLYETHYLENE GLYCOL 3350 17 G/17G
17 POWDER, FOR SOLUTION ORAL DAILY
Qty: 527 G | Refills: 2 | Status: SHIPPED | OUTPATIENT
Start: 2025-07-22

## 2025-07-22 RX ORDER — HYDROXYZINE HYDROCHLORIDE 25 MG/1
25 TABLET, FILM COATED ORAL 3 TIMES DAILY PRN
Qty: 30 TABLET | Refills: 1 | Status: SHIPPED | OUTPATIENT
Start: 2025-07-22

## 2025-07-22 RX ORDER — MECLIZINE HCL 12.5 MG 12.5 MG/1
12.5 TABLET ORAL 3 TIMES DAILY PRN
Qty: 90 TABLET | Refills: 0 | Status: SHIPPED | OUTPATIENT
Start: 2025-07-22

## 2025-07-22 RX ORDER — AMLODIPINE BESYLATE 5 MG/1
5 TABLET ORAL DAILY
Qty: 30 TABLET | Refills: 2 | Status: SHIPPED | OUTPATIENT
Start: 2025-07-22

## 2025-07-22 RX ORDER — SUMATRIPTAN SUCCINATE 25 MG/1
25 TABLET ORAL 2 TIMES DAILY PRN
COMMUNITY
End: 2025-07-22 | Stop reason: ALTCHOICE

## 2025-07-22 RX ORDER — ATORVASTATIN CALCIUM 40 MG/1
40 TABLET, FILM COATED ORAL DAILY
COMMUNITY
End: 2025-07-22 | Stop reason: SDUPTHER

## 2025-07-22 RX ORDER — ALBUTEROL SULFATE 90 UG/1
2 INHALANT RESPIRATORY (INHALATION) 4 TIMES DAILY PRN
Qty: 54 G | Refills: 1 | Status: SHIPPED | OUTPATIENT
Start: 2025-07-22

## 2025-07-22 RX ORDER — MECLIZINE HCL 12.5 MG 12.5 MG/1
1 TABLET ORAL 3 TIMES DAILY PRN
COMMUNITY
End: 2025-07-22 | Stop reason: SDUPTHER

## 2025-07-22 RX ORDER — AMLODIPINE BESYLATE 5 MG/1
5 TABLET ORAL DAILY
COMMUNITY
End: 2025-07-22 | Stop reason: SDUPTHER

## 2025-07-22 RX ORDER — INSULIN GLARGINE 100 [IU]/ML
32 INJECTION, SOLUTION SUBCUTANEOUS NIGHTLY
Qty: 15 ML | Refills: 1 | Status: SHIPPED | OUTPATIENT
Start: 2025-07-22

## 2025-07-22 RX ORDER — AMITRIPTYLINE HYDROCHLORIDE 50 MG/1
25 TABLET ORAL NIGHTLY
Qty: 30 TABLET | Refills: 1 | Status: CANCELLED | OUTPATIENT
Start: 2025-07-22

## 2025-07-22 RX ORDER — FLUTICASONE PROPIONATE 220 UG/1
2 AEROSOL, METERED RESPIRATORY (INHALATION) 2 TIMES DAILY
Qty: 12 G | Refills: 2 | Status: SHIPPED | OUTPATIENT
Start: 2025-07-22

## 2025-07-22 RX ORDER — FLUTICASONE PROPIONATE 50 MCG
1 SPRAY, SUSPENSION (ML) NASAL DAILY
Qty: 16 G | Refills: 1 | Status: SHIPPED | OUTPATIENT
Start: 2025-07-22

## 2025-07-22 RX ORDER — ACYCLOVIR 400 MG/1
TABLET ORAL
Qty: 1 EACH | Refills: 0 | Status: SHIPPED | OUTPATIENT
Start: 2025-07-22

## 2025-07-22 SDOH — ECONOMIC STABILITY: FOOD INSECURITY: WITHIN THE PAST 12 MONTHS, YOU WORRIED THAT YOUR FOOD WOULD RUN OUT BEFORE YOU GOT MONEY TO BUY MORE.: NEVER TRUE

## 2025-07-22 SDOH — ECONOMIC STABILITY: FOOD INSECURITY: WITHIN THE PAST 12 MONTHS, THE FOOD YOU BOUGHT JUST DIDN'T LAST AND YOU DIDN'T HAVE MONEY TO GET MORE.: NEVER TRUE

## 2025-07-22 ASSESSMENT — PATIENT HEALTH QUESTIONNAIRE - PHQ9
1. LITTLE INTEREST OR PLEASURE IN DOING THINGS: NOT AT ALL
SUM OF ALL RESPONSES TO PHQ QUESTIONS 1-9: 0
SUM OF ALL RESPONSES TO PHQ QUESTIONS 1-9: 0
2. FEELING DOWN, DEPRESSED OR HOPELESS: NOT AT ALL
SUM OF ALL RESPONSES TO PHQ QUESTIONS 1-9: 0
SUM OF ALL RESPONSES TO PHQ QUESTIONS 1-9: 0

## 2025-07-22 NOTE — PROGRESS NOTES
Chief Complaint   Patient presents with    New Patient     Patient is here today to Establish Care accompanied by Son.    Vascular Access      Patient has Dialysis on M,W,F. Patient has graft to LUE.       \"Have you been to the ER, urgent care clinic since your last visit?  Hospitalized since your last visit?\"    YES - When: approximately 18 days ago.  Where and Why: [facility] for [facility] and [facility] Pain.    “Have you seen or consulted any other health care providers outside of [facility] since your last visit?”    NO    Have you had a mammogram?”   NO    No breast cancer screening on file      “Have you had a pap smear?”    NO    Date of last Cervical Cancer screen (HPV or PAP): 2012         “Have you had a colorectal cancer screening such as a colonoscopy/FIT/Cologuard?    NO    No colonoscopy on file  No cologuard on file  Date of last FIT: 2020   No flexible sigmoidoscopy on file         Click Here for Release of Records Request       There were no vitals filed for this visit.   Health Maintenance Due   Topic Date Due    Depression Screen  Never done    Diabetic retinal exam  Never done    Shingles vaccine (1 of 2) Never done    Hepatitis B vaccine (1 of 3 - Risk Dialysis 4-dose series) Never done    Breast cancer screen  Never done    Cervical cancer screen  2015    Colorectal Cancer Screen  2021    COVID-19 Vaccine (4 - 2024-25 season) 2024    A1C test (Diabetic or Prediabetic)  2025    Lipids  2025    Diabetic foot exam  2025        The patient, Candida Sherman, identity was verified by name and .       
tone. Coordination and gait normal. CN II-XII intact. Normal DTR's 2+         ASSESSMENT/PLAN:  Below is the assessment and plan developed based on review of pertinent history, physical exam, labs, studies, and medications.   ASSESSMENT and PLAN  Candida was seen today for new patient and vascular access .    Diagnoses and all orders for this visit:    Encounter for general adult medical examination with abnormal findings    Mild intermittent asthma without complication  -     albuterol sulfate HFA (VENTOLIN HFA) 108 (90 Base) MCG/ACT inhaler; Inhale 2 puffs into the lungs 4 times daily as needed for Wheezing  -     fluticasone (FLOVENT HFA) 220 MCG/ACT inhaler; Inhale 2 puffs into the lungs 2 times daily  -     fluticasone (FLONASE) 50 MCG/ACT nasal spray; 1 spray by Each Nostril route daily    Chronic heart failure, unspecified heart failure type (HCC)  -     Amb External Referral To Cardiology   no recent echocardiogram or cardiology denies acute symptoms  Referral to cardiology for CHF management echo  Follow-up after cardiology evaluation or sooner if symptoms arise    Essential hypertension  -     amLODIPine (NORVASC) 5 MG tablet; Take 1 tablet by mouth daily  Assess blood pressure check in office and trend home readings  Encourage low-sodium diet  Reinforced home blood pressure monitoring    Insulin long-term use (HCC)    History of CVA (cerebrovascular accident)  -     Amb External Referral To Cardiology  -     ticagrelor (BRILINTA) 90 MG TABS tablet; Take 1 tablet by mouth 2 times daily  -     atorvastatin (LIPITOR) 40 MG tablet; Take 1 tablet by mouth daily    History of TIA (transient ischemic attack)  -     Amb External Referral To Cardiology  -     ticagrelor (BRILINTA) 90 MG TABS tablet; Take 1 tablet by mouth 2 times daily  -     atorvastatin (LIPITOR) 40 MG tablet; Take 1 tablet by mouth daily    Encounter for long-term (current) use of medications    Chronic constipation  -     polyethylene glycol

## 2025-07-23 PROBLEM — N17.9 ACUTE ON CHRONIC KIDNEY FAILURE: Status: RESOLVED | Noted: 2021-12-10 | Resolved: 2025-07-23

## 2025-07-23 PROBLEM — L97.422 DIABETIC ULCER OF LEFT MIDFOOT ASSOCIATED WITH TYPE 2 DIABETES MELLITUS, WITH FAT LAYER EXPOSED (HCC): Status: RESOLVED | Noted: 2023-07-13 | Resolved: 2025-07-23

## 2025-07-23 PROBLEM — N17.9 AKI (ACUTE KIDNEY INJURY): Status: RESOLVED | Noted: 2021-08-25 | Resolved: 2025-07-23

## 2025-07-23 PROBLEM — G62.9 POLYNEUROPATHY, UNSPECIFIED: Status: RESOLVED | Noted: 2024-10-31 | Resolved: 2025-07-23

## 2025-07-23 PROBLEM — L97.422 DIABETIC ULCER OF LEFT HEEL ASSOCIATED WITH TYPE 2 DIABETES MELLITUS, WITH FAT LAYER EXPOSED (HCC): Status: RESOLVED | Noted: 2023-07-13 | Resolved: 2025-07-23

## 2025-07-23 PROBLEM — N18.9 ACUTE ON CHRONIC KIDNEY FAILURE: Status: RESOLVED | Noted: 2021-12-10 | Resolved: 2025-07-23

## 2025-07-23 PROBLEM — E11.621 DIABETIC ULCER OF LEFT MIDFOOT ASSOCIATED WITH TYPE 2 DIABETES MELLITUS, WITH FAT LAYER EXPOSED (HCC): Status: RESOLVED | Noted: 2023-07-13 | Resolved: 2025-07-23

## 2025-07-23 PROBLEM — G93.41 ACUTE METABOLIC ENCEPHALOPATHY: Status: RESOLVED | Noted: 2023-08-12 | Resolved: 2025-07-23

## 2025-07-23 PROBLEM — R73.09 HEMOGLOBIN A1C GREATER THAN 9%, INDICATING POOR DIABETIC CONTROL: Status: RESOLVED | Noted: 2024-05-22 | Resolved: 2025-07-23

## 2025-07-23 PROBLEM — N17.9 ACUTE KIDNEY FAILURE, UNSPECIFIED: Status: RESOLVED | Noted: 2022-01-10 | Resolved: 2025-07-23

## 2025-07-23 PROBLEM — E11.621 DIABETIC ULCER OF LEFT HEEL ASSOCIATED WITH TYPE 2 DIABETES MELLITUS, WITH FAT LAYER EXPOSED (HCC): Status: RESOLVED | Noted: 2023-07-13 | Resolved: 2025-07-23

## 2025-07-23 LAB
ALBUMIN SERPL-MCNC: 3.7 G/DL (ref 3.5–5)
ALBUMIN/GLOB SERPL: 1.1 (ref 1.1–2.2)
ALP SERPL-CCNC: 95 U/L (ref 45–117)
ALT SERPL-CCNC: 17 U/L (ref 12–78)
ANION GAP SERPL CALC-SCNC: 8 MMOL/L (ref 2–12)
AST SERPL-CCNC: 13 U/L (ref 15–37)
BASOPHILS # BLD: 0.04 K/UL (ref 0–0.1)
BASOPHILS NFR BLD: 0.8 % (ref 0–1)
BILIRUB SERPL-MCNC: 0.5 MG/DL (ref 0.2–1)
BUN SERPL-MCNC: 26 MG/DL (ref 6–20)
BUN/CREAT SERPL: 6 (ref 12–20)
CALCIUM SERPL-MCNC: 9 MG/DL (ref 8.5–10.1)
CHLORIDE SERPL-SCNC: 103 MMOL/L (ref 97–108)
CHOLEST SERPL-MCNC: 164 MG/DL
CO2 SERPL-SCNC: 26 MMOL/L (ref 21–32)
CREAT SERPL-MCNC: 4.25 MG/DL (ref 0.55–1.02)
DIFFERENTIAL METHOD BLD: ABNORMAL
EOSINOPHIL # BLD: 0.11 K/UL (ref 0–0.4)
EOSINOPHIL NFR BLD: 2.2 % (ref 0–7)
ERYTHROCYTE [DISTWIDTH] IN BLOOD BY AUTOMATED COUNT: 14.2 % (ref 11.5–14.5)
EST. AVERAGE GLUCOSE BLD GHB EST-MCNC: 105 MG/DL
FERRITIN SERPL-MCNC: 688 NG/ML (ref 8–252)
FOLATE SERPL-MCNC: 6.7 NG/ML (ref 5–21)
GLOBULIN SER CALC-MCNC: 3.4 G/DL (ref 2–4)
GLUCOSE SERPL-MCNC: 91 MG/DL (ref 65–100)
HBA1C MFR BLD: 5.3 % (ref 4–5.6)
HCT VFR BLD AUTO: 32.4 % (ref 35–47)
HDLC SERPL-MCNC: 47 MG/DL
HDLC SERPL: 3.5 (ref 0–5)
HGB BLD-MCNC: 10.5 G/DL (ref 11.5–16)
IMM GRANULOCYTES # BLD AUTO: 0.02 K/UL (ref 0–0.04)
IMM GRANULOCYTES NFR BLD AUTO: 0.4 % (ref 0–0.5)
IRON SATN MFR SERPL: 64 % (ref 20–50)
IRON SERPL-MCNC: 144 UG/DL (ref 35–150)
LDLC SERPL CALC-MCNC: 93.2 MG/DL (ref 0–100)
LYMPHOCYTES # BLD: 1.14 K/UL (ref 0.8–3.5)
LYMPHOCYTES NFR BLD: 22.5 % (ref 12–49)
MAGNESIUM SERPL-MCNC: 2 MG/DL (ref 1.6–2.4)
MCH RBC QN AUTO: 32.3 PG (ref 26–34)
MCHC RBC AUTO-ENTMCNC: 32.4 G/DL (ref 30–36.5)
MCV RBC AUTO: 99.7 FL (ref 80–99)
MONOCYTES # BLD: 0.44 K/UL (ref 0–1)
MONOCYTES NFR BLD: 8.7 % (ref 5–13)
NEUTS SEG # BLD: 3.31 K/UL (ref 1.8–8)
NEUTS SEG NFR BLD: 65.4 % (ref 32–75)
NRBC # BLD: 0 K/UL (ref 0–0.01)
NRBC BLD-RTO: 0 PER 100 WBC
PHOSPHATE SERPL-MCNC: 5.2 MG/DL (ref 2.6–4.7)
PLATELET # BLD AUTO: 125 K/UL (ref 150–400)
PMV BLD AUTO: 10.9 FL (ref 8.9–12.9)
POTASSIUM SERPL-SCNC: 4.6 MMOL/L (ref 3.5–5.1)
PROT SERPL-MCNC: 7.1 G/DL (ref 6.4–8.2)
RBC # BLD AUTO: 3.25 M/UL (ref 3.8–5.2)
SODIUM SERPL-SCNC: 137 MMOL/L (ref 136–145)
TIBC SERPL-MCNC: 226 UG/DL (ref 250–450)
TRIGL SERPL-MCNC: 119 MG/DL
VIT B12 SERPL-MCNC: 329 PG/ML (ref 193–986)
VLDLC SERPL CALC-MCNC: 23.8 MG/DL
WBC # BLD AUTO: 5.1 K/UL (ref 3.6–11)

## 2025-07-25 ENCOUNTER — RESULTS FOLLOW-UP (OUTPATIENT)
Age: 60
End: 2025-07-25

## 2025-07-25 NOTE — TELEPHONE ENCOUNTER
----- Message from CHELSY Simental sent at 7/25/2025 10:03 AM EDT -----  I have reviewed your recent labs and while some results are still outside the normal range due to your kidney disease. I am encouraged by several improvements, including better blood sugar control, lower phosphorous and a drop in your BUN and creatinine levels. You are doing a great job keep up with your treatment plan I referred you to cardiology to support your heart care will continue monitoring everything else closely.  Keep scheduled nephrology and cardiology appointments.

## 2025-07-25 NOTE — TELEPHONE ENCOUNTER
Patient contacted regarding lab results. Unsuccessful. Message was left for a return call. Awaiting return call. Letter mailed out to patient.

## 2025-08-13 DIAGNOSIS — G89.29 CHRONIC BILATERAL LOW BACK PAIN WITHOUT SCIATICA: ICD-10-CM

## 2025-08-13 DIAGNOSIS — M54.50 CHRONIC BILATERAL LOW BACK PAIN WITHOUT SCIATICA: ICD-10-CM

## 2025-08-20 DIAGNOSIS — Z99.2 ESRD ON DIALYSIS (HCC): ICD-10-CM

## 2025-08-20 DIAGNOSIS — M54.50 CHRONIC BILATERAL LOW BACK PAIN WITHOUT SCIATICA: ICD-10-CM

## 2025-08-20 DIAGNOSIS — G89.29 CHRONIC BILATERAL LOW BACK PAIN WITHOUT SCIATICA: ICD-10-CM

## 2025-08-20 DIAGNOSIS — N18.6 ESRD ON DIALYSIS (HCC): ICD-10-CM

## 2025-08-22 RX ORDER — MECLIZINE HCL 12.5 MG 12.5 MG/1
TABLET ORAL
Qty: 90 TABLET | Refills: 0 | Status: SHIPPED | OUTPATIENT
Start: 2025-08-22

## 2025-08-22 RX ORDER — BUTALBITAL, ACETAMINOPHEN AND CAFFEINE 50; 325; 40 MG/1; MG/1; MG/1
1 TABLET ORAL EVERY 6 HOURS PRN
Qty: 120 TABLET | Refills: 0 | Status: SHIPPED | OUTPATIENT
Start: 2025-08-22

## 2025-08-28 DIAGNOSIS — I10 ESSENTIAL HYPERTENSION: ICD-10-CM

## 2025-08-28 DIAGNOSIS — G89.29 CHRONIC BILATERAL LOW BACK PAIN WITHOUT SCIATICA: ICD-10-CM

## 2025-08-28 DIAGNOSIS — M54.50 CHRONIC BILATERAL LOW BACK PAIN WITHOUT SCIATICA: ICD-10-CM

## 2025-08-28 RX ORDER — GABAPENTIN 300 MG/1
300 CAPSULE ORAL 3 TIMES DAILY
Qty: 90 CAPSULE | Refills: 1 | OUTPATIENT
Start: 2025-08-28 | End: 2025-10-27

## 2025-08-29 ENCOUNTER — TELEPHONE (OUTPATIENT)
Age: 60
End: 2025-08-29

## 2025-08-29 DIAGNOSIS — I10 ESSENTIAL HYPERTENSION: ICD-10-CM

## 2025-08-29 RX ORDER — GABAPENTIN 300 MG/1
300 CAPSULE ORAL 3 TIMES DAILY
Qty: 90 CAPSULE | Refills: 0 | OUTPATIENT
Start: 2025-08-29 | End: 2025-09-28

## 2025-09-02 ENCOUNTER — TELEPHONE (OUTPATIENT)
Age: 60
End: 2025-09-02

## 2025-09-03 RX ORDER — INSULIN LISPRO 100 [IU]/ML
0-14 INJECTION, SOLUTION INTRAVENOUS; SUBCUTANEOUS
Qty: 37.8 ML | Refills: 3 | Status: SHIPPED | OUTPATIENT
Start: 2025-09-03

## 2025-09-03 RX ORDER — GABAPENTIN 300 MG/1
300 CAPSULE ORAL 3 TIMES DAILY
Qty: 90 CAPSULE | Refills: 3 | Status: SHIPPED | OUTPATIENT
Start: 2025-09-03 | End: 2026-01-01

## (undated) DEVICE — SUTURE VCRL SZ 3-0 L27IN ABSRB UD L26MM SH 1/2 CIR J416H

## (undated) DEVICE — GLOVE ORANGE PI 7   MSG9070

## (undated) DEVICE — Device

## (undated) DEVICE — SOLUTION IRRIG 1000ML STRL H2O USP PLAS POUR BTL

## (undated) DEVICE — SPONGE GZ W4XL4IN COT 12 PLY TYP VII WVN C FLD DSGN

## (undated) DEVICE — HANDLE LT SNAP ON ULT DURABLE LENS FOR TRUMPF ALC DISPOSABLE

## (undated) DEVICE — SUTURE VCRL SZ 3-0 L27IN ABSRB UD FS-2 L19MM 1/2 CIR J423H

## (undated) DEVICE — SOL INJ SOD CL 0.9% 500ML BG --

## (undated) DEVICE — SUT PROL 6-0 18IN BV1 DA BLU --

## (undated) DEVICE — TOWEL,OR,DSP,ST,BLUE,STD,4/PK,20PK/CS: Brand: MEDLINE